# Patient Record
Sex: FEMALE | Race: WHITE | NOT HISPANIC OR LATINO | Employment: OTHER | URBAN - METROPOLITAN AREA
[De-identification: names, ages, dates, MRNs, and addresses within clinical notes are randomized per-mention and may not be internally consistent; named-entity substitution may affect disease eponyms.]

---

## 2017-06-05 ENCOUNTER — APPOINTMENT (OUTPATIENT)
Dept: PREADMISSION TESTING | Facility: HOSPITAL | Age: 67
End: 2017-06-05

## 2017-06-05 VITALS — BODY MASS INDEX: 35.32 KG/M2 | HEIGHT: 68 IN | WEIGHT: 233.03 LBS

## 2017-06-05 LAB
ANION GAP SERPL CALCULATED.3IONS-SCNC: 6 MMOL/L (ref 3–11)
BUN BLD-MCNC: 20 MG/DL (ref 9–23)
BUN/CREAT SERPL: 25 (ref 7–25)
CALCIUM SPEC-SCNC: 9.5 MG/DL (ref 8.7–10.4)
CHLORIDE SERPL-SCNC: 108 MMOL/L (ref 99–109)
CO2 SERPL-SCNC: 30 MMOL/L (ref 20–31)
CREAT BLD-MCNC: 0.8 MG/DL (ref 0.6–1.3)
DEPRECATED RDW RBC AUTO: 45.6 FL (ref 37–54)
ERYTHROCYTE [DISTWIDTH] IN BLOOD BY AUTOMATED COUNT: 13.6 % (ref 11.3–14.5)
GFR SERPL CREATININE-BSD FRML MDRD: 72 ML/MIN/1.73
GLUCOSE BLD-MCNC: 126 MG/DL (ref 70–100)
HCT VFR BLD AUTO: 44.1 % (ref 34.5–44)
HGB BLD-MCNC: 14 G/DL (ref 11.5–15.5)
MCH RBC QN AUTO: 28.9 PG (ref 27–31)
MCHC RBC AUTO-ENTMCNC: 31.7 G/DL (ref 32–36)
MCV RBC AUTO: 91.1 FL (ref 80–99)
PLATELET # BLD AUTO: 204 10*3/MM3 (ref 150–450)
PMV BLD AUTO: 10.2 FL (ref 6–12)
POTASSIUM BLD-SCNC: 4.3 MMOL/L (ref 3.5–5.5)
RBC # BLD AUTO: 4.84 10*6/MM3 (ref 3.89–5.14)
SODIUM BLD-SCNC: 144 MMOL/L (ref 132–146)
WBC NRBC COR # BLD: 4.61 10*3/MM3 (ref 3.5–10.8)

## 2017-06-05 PROCEDURE — 93010 ELECTROCARDIOGRAM REPORT: CPT | Performed by: INTERNAL MEDICINE

## 2017-06-05 PROCEDURE — 93005 ELECTROCARDIOGRAM TRACING: CPT

## 2017-06-05 PROCEDURE — 80048 BASIC METABOLIC PNL TOTAL CA: CPT | Performed by: SURGERY

## 2017-06-05 PROCEDURE — 36415 COLL VENOUS BLD VENIPUNCTURE: CPT

## 2017-06-05 PROCEDURE — 85027 COMPLETE CBC AUTOMATED: CPT | Performed by: SURGERY

## 2017-06-05 NOTE — DISCHARGE INSTRUCTIONS
The following information and instructions were given:    NPO after MN except sips of water with routine prescribed medication (except blood thinner, diabetes, or weight reducing medication) unless otherwise instructed by your physician.  Do not eat, drink, smoke or chew gum after MN the night before surgery. This also includes no mints.    DO NOT shave, wear makeup or dark nail polish.    Remove all jewelry (advised to go to jeweler if unable to remove).    Leave anything you consider valuable at home.    Leave your suitcase in the car until after your surgery.    Bring the following with you (if applicable)   -picture ID and insurance cards   -Co-pay/deductible required by insurance   -Medications in the original bottles (not a list) including all over-the-counter  medications if not brought to PAT   -Copy of advance directive, living will or power of  documents if not  brought to PAT   -CPAP or BIPAP mask and tubing (do not bring machine)   -Skin prep instructions sheet   -PAT Pass   Education booklet, brochure, handout or binder given to patient.    Pain Control After Surgery handout given to patient.    Respirex use (handout given to patient) and pneumonia prevention.    Signs and Symptoms of infection.    DVT Prevention stressing the importance of ambulation.    Patient to apply Chlorhexadine wipes to surgical area (as instructed) the night before procedure and the AM of procedure.      Hernia book given

## 2017-06-06 ENCOUNTER — HOSPITAL ENCOUNTER (OUTPATIENT)
Facility: HOSPITAL | Age: 67
Discharge: HOME | End: 2017-06-07
Attending: SURGERY | Admitting: SURGERY

## 2017-06-06 ENCOUNTER — ANESTHESIA (OUTPATIENT)
Dept: PERIOP | Facility: HOSPITAL | Age: 67
End: 2017-06-06

## 2017-06-06 ENCOUNTER — ANESTHESIA EVENT (OUTPATIENT)
Dept: PERIOP | Facility: HOSPITAL | Age: 67
End: 2017-06-06

## 2017-06-06 LAB
GLUCOSE BLDC GLUCOMTR-MCNC: 134 MG/DL (ref 70–130)
GLUCOSE BLDC GLUCOMTR-MCNC: 136 MG/DL (ref 70–130)

## 2017-06-06 PROCEDURE — 25010000002 HYDROMORPHONE PER 4 MG: Performed by: NURSE ANESTHETIST, CERTIFIED REGISTERED

## 2017-06-06 PROCEDURE — 25010000002 FENTANYL CITRATE (PF) 100 MCG/2ML SOLUTION: Performed by: NURSE ANESTHETIST, CERTIFIED REGISTERED

## 2017-06-06 PROCEDURE — C1781 MESH (IMPLANTABLE): HCPCS | Performed by: SURGERY

## 2017-06-06 PROCEDURE — 63710000001 NIFEDIPINE 10 MG CAPSULE: Performed by: SURGERY

## 2017-06-06 PROCEDURE — A9270 NON-COVERED ITEM OR SERVICE: HCPCS | Performed by: SURGERY

## 2017-06-06 PROCEDURE — 25010000003 CEFAZOLIN IN DEXTROSE 2-4 GM/100ML-% SOLUTION: Performed by: SURGERY

## 2017-06-06 PROCEDURE — 25010000002 HYDROMORPHONE PER 4 MG: Performed by: SURGERY

## 2017-06-06 PROCEDURE — 82962 GLUCOSE BLOOD TEST: CPT

## 2017-06-06 PROCEDURE — 25010000002 HYDRALAZINE PER 20 MG: Performed by: NURSE ANESTHETIST, CERTIFIED REGISTERED

## 2017-06-06 PROCEDURE — 63710000001 HYDROCODONE-ACETAMINOPHEN 7.5-325 MG TABLET: Performed by: SURGERY

## 2017-06-06 PROCEDURE — 25010000002 ONDANSETRON PER 1 MG: Performed by: NURSE ANESTHETIST, CERTIFIED REGISTERED

## 2017-06-06 PROCEDURE — 25010000002 NEOSTIGMINE 10 MG/10ML SOLUTION: Performed by: NURSE ANESTHETIST, CERTIFIED REGISTERED

## 2017-06-06 PROCEDURE — 25010000002 DEXAMETHASONE PER 1 MG: Performed by: NURSE ANESTHETIST, CERTIFIED REGISTERED

## 2017-06-06 DEVICE — VENTRALIGHT ST MESH WITH ECHO PS POSITONING SYSTEM
Type: IMPLANTABLE DEVICE | Site: UMBILICAL | Status: FUNCTIONAL
Brand: VENTRALIGHT ST MESH WITH ECHO PS POSITONING SYSTEM

## 2017-06-06 RX ORDER — ALBUTEROL SULFATE 90 UG/1
AEROSOL, METERED RESPIRATORY (INHALATION) AS NEEDED
Status: DISCONTINUED | OUTPATIENT
Start: 2017-06-06 | End: 2017-06-06 | Stop reason: SURG

## 2017-06-06 RX ORDER — SODIUM CHLORIDE 9 MG/ML
50 INJECTION, SOLUTION INTRAVENOUS CONTINUOUS
Status: DISCONTINUED | OUTPATIENT
Start: 2017-06-06 | End: 2017-06-07 | Stop reason: HOSPADM

## 2017-06-06 RX ORDER — FAMOTIDINE 20 MG/1
20 TABLET, FILM COATED ORAL ONCE
Status: DISCONTINUED | OUTPATIENT
Start: 2017-06-06 | End: 2017-06-06 | Stop reason: HOSPADM

## 2017-06-06 RX ORDER — LIDOCAINE HYDROCHLORIDE 10 MG/ML
0.5 INJECTION, SOLUTION EPIDURAL; INFILTRATION; INTRACAUDAL; PERINEURAL ONCE AS NEEDED
Status: DISCONTINUED | OUTPATIENT
Start: 2017-06-06 | End: 2017-06-06 | Stop reason: HOSPADM

## 2017-06-06 RX ORDER — PROMETHAZINE HYDROCHLORIDE 25 MG/ML
6.25 INJECTION, SOLUTION INTRAMUSCULAR; INTRAVENOUS ONCE AS NEEDED
Status: DISCONTINUED | OUTPATIENT
Start: 2017-06-06 | End: 2017-06-06 | Stop reason: HOSPADM

## 2017-06-06 RX ORDER — HYDRALAZINE HYDROCHLORIDE 20 MG/ML
5 INJECTION INTRAMUSCULAR; INTRAVENOUS
Status: DISCONTINUED | OUTPATIENT
Start: 2017-06-06 | End: 2017-06-06 | Stop reason: HOSPADM

## 2017-06-06 RX ORDER — GLYCOPYRROLATE 0.2 MG/ML
INJECTION INTRAMUSCULAR; INTRAVENOUS AS NEEDED
Status: DISCONTINUED | OUTPATIENT
Start: 2017-06-06 | End: 2017-06-06 | Stop reason: SURG

## 2017-06-06 RX ORDER — SODIUM CHLORIDE 0.9 % (FLUSH) 0.9 %
1-10 SYRINGE (ML) INJECTION AS NEEDED
Status: DISCONTINUED | OUTPATIENT
Start: 2017-06-06 | End: 2017-06-06 | Stop reason: HOSPADM

## 2017-06-06 RX ORDER — FENTANYL CITRATE 50 UG/ML
INJECTION, SOLUTION INTRAMUSCULAR; INTRAVENOUS AS NEEDED
Status: DISCONTINUED | OUTPATIENT
Start: 2017-06-06 | End: 2017-06-06 | Stop reason: SURG

## 2017-06-06 RX ORDER — HYDROCODONE BITARTRATE AND ACETAMINOPHEN 7.5; 325 MG/1; MG/1
1 TABLET ORAL EVERY 6 HOURS PRN
Status: DISCONTINUED | OUTPATIENT
Start: 2017-06-06 | End: 2017-06-07 | Stop reason: HOSPADM

## 2017-06-06 RX ORDER — CEFAZOLIN SODIUM 2 G/100ML
2 INJECTION, SOLUTION INTRAVENOUS ONCE
Status: COMPLETED | OUTPATIENT
Start: 2017-06-06 | End: 2017-06-06

## 2017-06-06 RX ORDER — ONDANSETRON 2 MG/ML
4 INJECTION INTRAMUSCULAR; INTRAVENOUS ONCE AS NEEDED
Status: COMPLETED | OUTPATIENT
Start: 2017-06-06 | End: 2017-06-06

## 2017-06-06 RX ORDER — DEXAMETHASONE SODIUM PHOSPHATE 4 MG/ML
INJECTION, SOLUTION INTRA-ARTICULAR; INTRALESIONAL; INTRAMUSCULAR; INTRAVENOUS; SOFT TISSUE AS NEEDED
Status: DISCONTINUED | OUTPATIENT
Start: 2017-06-06 | End: 2017-06-06 | Stop reason: SURG

## 2017-06-06 RX ORDER — LIDOCAINE HYDROCHLORIDE 10 MG/ML
0.5 INJECTION, SOLUTION EPIDURAL; INFILTRATION; INTRACAUDAL; PERINEURAL ONCE AS NEEDED
Status: COMPLETED | OUTPATIENT
Start: 2017-06-06 | End: 2017-06-06

## 2017-06-06 RX ORDER — PROMETHAZINE HYDROCHLORIDE 25 MG/1
25 TABLET ORAL ONCE AS NEEDED
Status: DISCONTINUED | OUTPATIENT
Start: 2017-06-06 | End: 2017-06-06 | Stop reason: HOSPADM

## 2017-06-06 RX ORDER — HYDROMORPHONE HYDROCHLORIDE 1 MG/ML
0.5 INJECTION, SOLUTION INTRAMUSCULAR; INTRAVENOUS; SUBCUTANEOUS
Status: DISCONTINUED | OUTPATIENT
Start: 2017-06-06 | End: 2017-06-06 | Stop reason: HOSPADM

## 2017-06-06 RX ORDER — NIFEDIPINE 10 MG/1
10 CAPSULE ORAL EVERY 8 HOURS SCHEDULED
Status: DISCONTINUED | OUTPATIENT
Start: 2017-06-06 | End: 2017-06-07

## 2017-06-06 RX ORDER — FAMOTIDINE 20 MG/1
20 TABLET, FILM COATED ORAL ONCE
Status: COMPLETED | OUTPATIENT
Start: 2017-06-06 | End: 2017-06-06

## 2017-06-06 RX ORDER — OXYCODONE HYDROCHLORIDE AND ACETAMINOPHEN 5; 325 MG/1; MG/1
1 TABLET ORAL ONCE AS NEEDED
Status: COMPLETED | OUTPATIENT
Start: 2017-06-06 | End: 2017-06-06

## 2017-06-06 RX ORDER — FENTANYL CITRATE 50 UG/ML
50 INJECTION, SOLUTION INTRAMUSCULAR; INTRAVENOUS
Status: DISCONTINUED | OUTPATIENT
Start: 2017-06-06 | End: 2017-06-06 | Stop reason: HOSPADM

## 2017-06-06 RX ORDER — FAMOTIDINE 10 MG/ML
20 INJECTION, SOLUTION INTRAVENOUS ONCE
Status: DISCONTINUED | OUTPATIENT
Start: 2017-06-06 | End: 2017-06-06 | Stop reason: HOSPADM

## 2017-06-06 RX ORDER — MAGNESIUM HYDROXIDE 1200 MG/15ML
LIQUID ORAL AS NEEDED
Status: DISCONTINUED | OUTPATIENT
Start: 2017-06-06 | End: 2017-06-06 | Stop reason: HOSPADM

## 2017-06-06 RX ORDER — SODIUM CHLORIDE, SODIUM LACTATE, POTASSIUM CHLORIDE, CALCIUM CHLORIDE 600; 310; 30; 20 MG/100ML; MG/100ML; MG/100ML; MG/100ML
9 INJECTION, SOLUTION INTRAVENOUS CONTINUOUS
Status: DISCONTINUED | OUTPATIENT
Start: 2017-06-06 | End: 2017-06-07

## 2017-06-06 RX ORDER — CEFAZOLIN SODIUM 1 G/3ML
1 INJECTION, POWDER, FOR SOLUTION INTRAMUSCULAR; INTRAVENOUS EVERY 8 HOURS
Status: DISCONTINUED | OUTPATIENT
Start: 2017-06-06 | End: 2017-06-06

## 2017-06-06 RX ORDER — CEFAZOLIN SODIUM 1 G/3ML
1 INJECTION, POWDER, FOR SOLUTION INTRAMUSCULAR; INTRAVENOUS EVERY 8 HOURS
Status: DISCONTINUED | OUTPATIENT
Start: 2017-06-06 | End: 2017-06-07 | Stop reason: HOSPADM

## 2017-06-06 RX ORDER — ONDANSETRON 2 MG/ML
4 INJECTION INTRAMUSCULAR; INTRAVENOUS EVERY 6 HOURS PRN
Status: DISCONTINUED | OUTPATIENT
Start: 2017-06-06 | End: 2017-06-07 | Stop reason: HOSPADM

## 2017-06-06 RX ORDER — SODIUM CHLORIDE 9 MG/ML
INJECTION, SOLUTION INTRAVENOUS AS NEEDED
Status: DISCONTINUED | OUTPATIENT
Start: 2017-06-06 | End: 2017-06-06 | Stop reason: HOSPADM

## 2017-06-06 RX ORDER — HYDROMORPHONE HYDROCHLORIDE 1 MG/ML
0.5 INJECTION, SOLUTION INTRAMUSCULAR; INTRAVENOUS; SUBCUTANEOUS EVERY 4 HOURS PRN
Status: DISCONTINUED | OUTPATIENT
Start: 2017-06-06 | End: 2017-06-07 | Stop reason: HOSPADM

## 2017-06-06 RX ORDER — PROMETHAZINE HYDROCHLORIDE 25 MG/1
25 SUPPOSITORY RECTAL ONCE AS NEEDED
Status: DISCONTINUED | OUTPATIENT
Start: 2017-06-06 | End: 2017-06-06 | Stop reason: HOSPADM

## 2017-06-06 RX ORDER — HYDROCODONE BITARTRATE AND ACETAMINOPHEN 5; 325 MG/1; MG/1
1-2 TABLET ORAL EVERY 6 HOURS PRN
Qty: 30 TABLET | Refills: 0 | Status: SHIPPED | OUTPATIENT
Start: 2017-06-06 | End: 2017-06-14

## 2017-06-06 RX ORDER — FAMOTIDINE 10 MG/ML
20 INJECTION, SOLUTION INTRAVENOUS ONCE
Status: CANCELLED | OUTPATIENT
Start: 2017-06-06 | End: 2017-06-06

## 2017-06-06 RX ORDER — HYDRALAZINE HYDROCHLORIDE 20 MG/ML
INJECTION INTRAMUSCULAR; INTRAVENOUS AS NEEDED
Status: DISCONTINUED | OUTPATIENT
Start: 2017-06-06 | End: 2017-06-06 | Stop reason: SURG

## 2017-06-06 RX ORDER — MEPERIDINE HYDROCHLORIDE 25 MG/ML
12.5 INJECTION INTRAMUSCULAR; INTRAVENOUS; SUBCUTANEOUS
Status: DISCONTINUED | OUTPATIENT
Start: 2017-06-06 | End: 2017-06-06 | Stop reason: HOSPADM

## 2017-06-06 RX ORDER — NEOSTIGMINE METHYLSULFATE 1 MG/ML
INJECTION, SOLUTION INTRAVENOUS AS NEEDED
Status: DISCONTINUED | OUTPATIENT
Start: 2017-06-06 | End: 2017-06-06 | Stop reason: SURG

## 2017-06-06 RX ORDER — BUPIVACAINE HYDROCHLORIDE AND EPINEPHRINE 5; 5 MG/ML; UG/ML
INJECTION, SOLUTION PERINEURAL AS NEEDED
Status: DISCONTINUED | OUTPATIENT
Start: 2017-06-06 | End: 2017-06-06 | Stop reason: HOSPADM

## 2017-06-06 RX ORDER — LABETALOL HYDROCHLORIDE 5 MG/ML
5 INJECTION, SOLUTION INTRAVENOUS
Status: DISCONTINUED | OUTPATIENT
Start: 2017-06-06 | End: 2017-06-06 | Stop reason: HOSPADM

## 2017-06-06 RX ADMIN — EPHEDRINE SULFATE 10 MG: 50 INJECTION INTRAMUSCULAR; INTRAVENOUS; SUBCUTANEOUS at 13:37

## 2017-06-06 RX ADMIN — SODIUM CHLORIDE, POTASSIUM CHLORIDE, SODIUM LACTATE AND CALCIUM CHLORIDE 9 ML/HR: 600; 310; 30; 20 INJECTION, SOLUTION INTRAVENOUS at 14:51

## 2017-06-06 RX ADMIN — DEXAMETHASONE SODIUM PHOSPHATE 8 MG: 4 INJECTION, SOLUTION INTRAMUSCULAR; INTRAVENOUS at 14:02

## 2017-06-06 RX ADMIN — NEOSTIGMINE METHYLSULFATE 3 MG: 1 INJECTION, SOLUTION INTRAVENOUS at 14:30

## 2017-06-06 RX ADMIN — ROBINUL 0.2 MG: 0.2 INJECTION INTRAMUSCULAR; INTRAVENOUS at 13:38

## 2017-06-06 RX ADMIN — HYDROMORPHONE HYDROCHLORIDE 0.5 MG: 1 INJECTION, SOLUTION INTRAMUSCULAR; INTRAVENOUS; SUBCUTANEOUS at 22:38

## 2017-06-06 RX ADMIN — EPHEDRINE SULFATE 10 MG: 50 INJECTION INTRAMUSCULAR; INTRAVENOUS; SUBCUTANEOUS at 13:34

## 2017-06-06 RX ADMIN — LIDOCAINE HYDROCHLORIDE 0.5 ML: 10 INJECTION, SOLUTION EPIDURAL; INFILTRATION; INTRACAUDAL; PERINEURAL at 11:10

## 2017-06-06 RX ADMIN — SODIUM CHLORIDE, POTASSIUM CHLORIDE, SODIUM LACTATE AND CALCIUM CHLORIDE 9 ML/HR: 600; 310; 30; 20 INJECTION, SOLUTION INTRAVENOUS at 11:10

## 2017-06-06 RX ADMIN — FENTANYL CITRATE 50 MCG: 50 INJECTION INTRAMUSCULAR; INTRAVENOUS at 16:23

## 2017-06-06 RX ADMIN — ALBUTEROL SULFATE 4 PUFF: 90 AEROSOL, METERED RESPIRATORY (INHALATION) at 13:34

## 2017-06-06 RX ADMIN — NIFEDIPINE 10 MG: 10 CAPSULE, LIQUID FILLED ORAL at 21:54

## 2017-06-06 RX ADMIN — HYDROMORPHONE HYDROCHLORIDE 0.5 MG: 1 INJECTION, SOLUTION INTRAMUSCULAR; INTRAVENOUS; SUBCUTANEOUS at 15:40

## 2017-06-06 RX ADMIN — HYDRALAZINE HYDROCHLORIDE 5 MG: 20 INJECTION INTRAMUSCULAR; INTRAVENOUS at 15:56

## 2017-06-06 RX ADMIN — ROBINUL 0.4 MG: 0.2 INJECTION INTRAMUSCULAR; INTRAVENOUS at 14:30

## 2017-06-06 RX ADMIN — HYDRALAZINE HYDROCHLORIDE 5 MG: 20 INJECTION INTRAMUSCULAR; INTRAVENOUS at 16:19

## 2017-06-06 RX ADMIN — HYDRALAZINE HYDROCHLORIDE 5 MG: 20 INJECTION INTRAMUSCULAR; INTRAVENOUS at 14:08

## 2017-06-06 RX ADMIN — HYDROCODONE BITARTRATE AND ACETAMINOPHEN 1 TABLET: 7.5; 325 TABLET ORAL at 19:12

## 2017-06-06 RX ADMIN — HYDROMORPHONE HYDROCHLORIDE 0.5 MG: 1 INJECTION, SOLUTION INTRAMUSCULAR; INTRAVENOUS; SUBCUTANEOUS at 15:05

## 2017-06-06 RX ADMIN — SODIUM CHLORIDE 50 ML/HR: 9 INJECTION, SOLUTION INTRAVENOUS at 16:52

## 2017-06-06 RX ADMIN — CEFAZOLIN SODIUM 2 G: 2 INJECTION, SOLUTION INTRAVENOUS at 13:16

## 2017-06-06 RX ADMIN — ONDANSETRON 4 MG: 2 INJECTION INTRAMUSCULAR; INTRAVENOUS at 15:39

## 2017-06-06 RX ADMIN — OXYCODONE AND ACETAMINOPHEN 1 TABLET: 5; 325 TABLET ORAL at 16:06

## 2017-06-06 RX ADMIN — HYDROMORPHONE HYDROCHLORIDE 0.5 MG: 1 INJECTION, SOLUTION INTRAMUSCULAR; INTRAVENOUS; SUBCUTANEOUS at 14:50

## 2017-06-06 RX ADMIN — FENTANYL CITRATE 50 MCG: 50 INJECTION, SOLUTION INTRAMUSCULAR; INTRAVENOUS at 14:04

## 2017-06-06 RX ADMIN — FAMOTIDINE 20 MG: 20 TABLET ORAL at 11:10

## 2017-06-06 NOTE — ANESTHESIA PREPROCEDURE EVALUATION
Anesthesia Evaluation     Patient summary reviewed and Nursing notes reviewed          Airway   Mallampati: II  TM distance: >3 FB  Neck ROM: full  no difficulty expected  Dental - normal exam     Pulmonary - normal exam   (+) sleep apnea,   Cardiovascular - normal exam    (+) hypertension,     ROS comment: 9/16 - stress test - nl EF, neg for reversible ischemia    Neuro/Psych- negative ROS  GI/Hepatic/Renal/Endo    (+) morbid obesity, GERD, renal disease,     Musculoskeletal     Abdominal  - normal exam    Bowel sounds: normal.   Substance History - negative use     OB/GYN negative ob/gyn ROS         Other   (+) arthritis                                   Anesthesia Plan    ASA 3     general     intravenous induction   Anesthetic plan and risks discussed with patient.    Plan discussed with CRNA.

## 2017-06-06 NOTE — OP NOTE
DATE OF PROCEDURE:  06/06/2017    PREOPERATIVE DIAGNOSIS: Incisional hernia.     POSTOPERATIVE DIAGNOSIS: Incisional hernia.    PROCEDURE PERFORMED: Laparoscopic repair of incisional hernia with Bard Ventralight ST Mesh.     SURGEON: Conrad Hernandez MD     ANESTHESIA: General endotracheal.     FINDINGS: The patient had a moderate-sized midline fascial defect. There was incarcerated omentum and small bowel present. The repair was performed with a 6 inch circular Bard Ventralight ST Mesh patch.     DESCRIPTION OF PROCEDURE: The patient was brought to the operating room where general endotracheal anesthesia was induced. She was sterilely prepped and draped. Preoperative antibiotics were in place. Sequential compression boots were used for DVT prophylaxis. Timeout was performed per protocol. Then, 0.5% Marcaine with epinephrine was placed in each trocar site for postoperative analgesia. A small skin incision was made in the left upper quadrant, then a 5 mm Optiview was used to enter the peritoneal cavity under direct vision of the laparoscope. Two additional ports were placed in a standard fashion on the left lateral abdominal wall under direct vision with the laparoscope. Two of the ports were 11 mm ports and one was a 5 mm port. We then began our dissection. As noted, the patient had extensive adhesions in the midline of the abdomen from her prior surgery. These were carefully taken down with hook cautery as well as sharp dissection. In the region of the umbilicus, there was a large fascial defect measuring 5-7 cm in size. There was incarcerated omentum and bowel, which was all reduced. The entirety of the anterior abdominal wall was carefully cleared. After defining the fascial defect, we measured out its size and then the mesh was chosen for the repair. It was passed into the peritoneal cavity and unrolled. A small suture passer was used to pull the mesh up to the anterior abdominal wall and the Echo Positioning  System was deployed. A secure strap absorbable tacking device was then used to secure the mesh using a double ring technique. There was excellent underlay coverage of the fascial defect with good apposition of the mesh to the anterior abdominal wall. After assuring hemostasis, we proceeded to release the CO2 from the peritoneal cavity and our trocars were removed. Trocar fascia sites were closed with 0 Vicryl stitches. The skin incisions were closed with 4-0 Monocryl and skin adhesive. Sterile compressive dressings were placed followed by an abdominal binder.     The patient tolerated the procedure well. There were no immediate complications. Sponge and needle counts were correct. She was taken to recovery in stable condition.      MD PRINCE Nelson/harish  DD: 06/06/2017 14:54:51  DT: 06/06/2017 17:02:43  Voice Rec. ID #[id number]  Voice Original ID #[id number]  Doc ID #[id number]  Rev. #0  cc:

## 2017-06-06 NOTE — PLAN OF CARE
Problem: Patient Care Overview (Adult)  Goal: Plan of Care Review  Outcome: Ongoing (interventions implemented as appropriate)    06/06/17 5921   Coping/Psychosocial Response Interventions   Plan Of Care Reviewed With patient;spouse

## 2017-06-06 NOTE — ANESTHESIA POSTPROCEDURE EVALUATION
Patient: Sara Esparza    Procedure Summary     Date Anesthesia Start Anesthesia Stop Room / Location    06/06/17 1316  [facility] / [facility]       Procedure Diagnosis Surgeon Provider    INCISIONAL HERNIA REPAIR LAPAROSCOPIC (N/A Abdomen) No diagnosis on file. MD Andrea Sal MD          Anesthesia Type: general  Last vitals  /80 (06/06/17 1452)    Temp 97.7 °F (36.5 °C) (06/06/17 1452)    Pulse 63 (06/06/17 1452)   Resp 24 (06/06/17 1452)    SpO2 91 % (06/06/17 1452)      Post Anesthesia Care and Evaluation    Patient location during evaluation: PACU  Patient participation: complete - patient participated  Level of consciousness: awake and alert  Pain score: 7  Pain management: adequate  Airway patency: patent  Anesthetic complications: No anesthetic complications  PONV Status: none  Cardiovascular status: hemodynamically stable and acceptable  Respiratory status: nonlabored ventilation, acceptable and nasal cannula  Hydration status: acceptable    Comments: Pt awake, mcgee, following commands, vss report given

## 2017-06-06 NOTE — ANESTHESIA PROCEDURE NOTES
Airway  Urgency: elective    Airway not difficult    General Information and Staff    Patient location during procedure: OR  CRNA: MEREDITH MALHOTRA    Indications and Patient Condition  Indications for airway management: airway protection    Preoxygenated: yes  MILS not maintained throughout  Mask difficulty assessment: 1 - vent by mask    Final Airway Details  Final airway type: endotracheal airway      Successful airway: ETT  Cuffed: yes   Successful intubation technique: direct laryngoscopy  Endotracheal tube insertion site: oral  Blade: Wally  Blade size: #3  ETT size: 7.0 mm  Cormack-Lehane Classification: grade I - full view of glottis  Placement verified by: chest auscultation and capnometry   Measured from: lips  ETT to lips (cm): 20  Number of attempts at approach: 1    Additional Comments  Negative epigastric sounds, Breath sound equal bilaterally with symmetric chest rise and fall, atraumatic intubation

## 2017-06-06 NOTE — BRIEF OP NOTE
VENTRAL/INCISIONAL HERNIA REPAIR LAPAROSCOPIC  Procedure Note    Sara Esparza  6/6/2017    Pre-op Diagnosis:   * No pre-op diagnosis entered *Incisional hernia    Post-op Diagnosis:     * No Diagnosis Codes entered *  [facility]  Procedure/CPT® Codes:      Procedure(s):  INCISIONAL HERNIA REPAIR LAPAROSCOPIC    Surgeon(s):  Conrad Hernandez MD    Anesthesia: General with Block    Staff:   Circulator: Maria Teresa Vela RN  Scrub Person: Darin Moncada RN  Assistant: MAG Epperson    Estimated Blood Loss: *No blood loss documented*  Urine Voided: * No values recorded between 6/6/2017  1:16 PM and 6/6/2017  2:42 PM *    Specimens:                * No specimens in log *      Drains:           Findings: Incarcerated incisional hernia    Complications: none      Conrad Hernandez MD     Date: 6/6/2017  Time: 2:44 PM

## 2017-06-06 NOTE — INTERVAL H&P NOTE
H&P reviewed. The patient was examined and there are no changes to the H&P.     Temp:  [97.3 °F (36.3 °C)] 97.3 °F (36.3 °C)  Heart Rate:  [58] 58  Resp:  [20] 20  BP: (190)/(90) 190/90     Heart: RRR  Lungs: CTAB    Immunizations:    Tetanus: Unknown     Influenza: 2016     Pneumo: UTD    Labs were reviewed.     EKG: NSR    Plan: Incisional Hernia Repair Lap

## 2017-06-07 VITALS
HEIGHT: 68 IN | RESPIRATION RATE: 16 BRPM | WEIGHT: 230 LBS | TEMPERATURE: 98 F | SYSTOLIC BLOOD PRESSURE: 115 MMHG | BODY MASS INDEX: 34.86 KG/M2 | HEART RATE: 95 BPM | DIASTOLIC BLOOD PRESSURE: 79 MMHG | OXYGEN SATURATION: 92 %

## 2017-06-07 PROBLEM — K43.2 POSTOPERATIVE ABDOMINAL HERNIA: Status: ACTIVE | Noted: 2017-06-07

## 2017-06-07 LAB
GLUCOSE BLDC GLUCOMTR-MCNC: 156 MG/DL (ref 70–130)
GLUCOSE BLDC GLUCOMTR-MCNC: 253 MG/DL (ref 70–130)

## 2017-06-07 PROCEDURE — 82962 GLUCOSE BLOOD TEST: CPT

## 2017-06-07 PROCEDURE — G0378 HOSPITAL OBSERVATION PER HR: HCPCS

## 2017-06-07 PROCEDURE — 63710000001 HYDROCODONE-ACETAMINOPHEN 7.5-325 MG TABLET: Performed by: SURGERY

## 2017-06-07 PROCEDURE — A9270 NON-COVERED ITEM OR SERVICE: HCPCS | Performed by: SURGERY

## 2017-06-07 PROCEDURE — 25010000003 CEFAZOLIN PER 500 MG: Performed by: SURGERY

## 2017-06-07 RX ADMIN — CEFAZOLIN 1 G: 330 INJECTION, POWDER, FOR SOLUTION INTRAMUSCULAR; INTRAVENOUS at 01:38

## 2017-06-07 RX ADMIN — HYDROCODONE BITARTRATE AND ACETAMINOPHEN 1 TABLET: 7.5; 325 TABLET ORAL at 03:39

## 2017-06-07 NOTE — PLAN OF CARE
Problem: Perioperative Period (Adult)  Goal: Signs and Symptoms of Listed Potential Problems Will be Absent or Manageable (Perioperative Period)  Outcome: Ongoing (interventions implemented as appropriate)    06/07/17 0898   Perioperative Period   Problems Assessed (Perioperative Period) all   Problems Present (Perioperative Period) pain

## 2017-06-07 NOTE — DISCHARGE SUMMARY
Pt admitted overnight for observtion.  Issues with HTN and DM.  She is doing ]well today.  Ready for d/c. Instructions given

## 2017-06-07 NOTE — NURSING NOTE
Spoke with Dr Hernandez for meds for elevated BP and headache, meds ordered. Advised that patient has not been seen by consulting MD at this time.

## 2017-06-07 NOTE — PLAN OF CARE
Problem: Perioperative Period (Adult)  Goal: Signs and Symptoms of Listed Potential Problems Will be Absent or Manageable (Perioperative Period)  Outcome: Ongoing (interventions implemented as appropriate)    06/07/17 0545   Perioperative Period   Problems Assessed (Perioperative Period) pain;wound complications;infection   Problems Present (Perioperative Period) none

## 2017-06-07 NOTE — PROGRESS NOTES
Pt admitted overight for observation.  Issues with HTN and DM.  Hospitalists consulted.  Pt doing well today. Ready for d/c

## 2017-06-07 NOTE — PLAN OF CARE
Problem: Pain, Acute (Adult)  Goal: Identify Related Risk Factors and Signs and Symptoms  Outcome: Ongoing (interventions implemented as appropriate)    06/07/17 0545   Pain, Acute   Related Risk Factors (Acute Pain) patient perception;persistent pain;surgery   Signs and Symptoms (Acute Pain) BADLs/IADLs reluctance/inability to perform;sleep pattern alteration;verbalization of pain descriptors

## 2017-06-07 NOTE — NURSING NOTE
Spoke with Dr HOLLAND and alerted of patient consult, he had not been contacted for consult. New orders given for [facility] blood sugar sticks and stated he would see patient in AM

## 2017-11-14 ENCOUNTER — TRANSCRIBE ORDERS (OUTPATIENT)
Dept: PULMONOLOGY | Facility: HOSPITAL | Age: 67
End: 2017-11-14

## 2017-11-14 DIAGNOSIS — G47.61 PERIODIC LIMB MOVEMENT DISORDER: Primary | ICD-10-CM

## 2017-11-14 DIAGNOSIS — R29.818 SUSPECTED SLEEP APNEA: ICD-10-CM

## 2017-12-04 ENCOUNTER — HOSPITAL ENCOUNTER (OUTPATIENT)
Dept: SLEEP MEDICINE | Facility: HOSPITAL | Age: 67
Discharge: HOME | End: 2017-12-04
Admitting: INTERNAL MEDICINE

## 2017-12-04 VITALS
HEIGHT: 68 IN | WEIGHT: 237.38 LBS | BODY MASS INDEX: 35.98 KG/M2 | OXYGEN SATURATION: 96 % | DIASTOLIC BLOOD PRESSURE: 100 MMHG | HEART RATE: 76 BPM | SYSTOLIC BLOOD PRESSURE: 225 MMHG

## 2017-12-04 DIAGNOSIS — R29.818 SUSPECTED SLEEP APNEA: ICD-10-CM

## 2017-12-04 DIAGNOSIS — G47.61 PERIODIC LIMB MOVEMENT DISORDER: ICD-10-CM

## 2017-12-04 PROCEDURE — 95810 POLYSOM 6/> YRS 4/> PARAM: CPT

## 2020-06-26 ENCOUNTER — TRANSCRIBE ORDERS (OUTPATIENT)
Dept: ADMINISTRATIVE | Facility: HOSPITAL | Age: 70
End: 2020-06-26

## 2020-06-26 DIAGNOSIS — R07.9 CHEST PAIN, UNSPECIFIED TYPE: ICD-10-CM

## 2020-06-26 DIAGNOSIS — R07.9 CHEST PAIN, UNSPECIFIED TYPE: Primary | ICD-10-CM

## 2020-06-26 DIAGNOSIS — R60.0 LOCALIZED EDEMA: Primary | ICD-10-CM

## 2020-07-06 ENCOUNTER — HOSPITAL ENCOUNTER (EMERGENCY)
Facility: HOSPITAL | Age: 70
Discharge: HOME | End: 2020-07-06
Attending: EMERGENCY MEDICINE | Admitting: EMERGENCY MEDICINE

## 2020-07-06 ENCOUNTER — APPOINTMENT (OUTPATIENT)
Dept: GENERAL RADIOLOGY | Facility: HOSPITAL | Age: 70
End: 2020-07-06

## 2020-07-06 VITALS
TEMPERATURE: 99.1 F | HEART RATE: 82 BPM | DIASTOLIC BLOOD PRESSURE: 95 MMHG | SYSTOLIC BLOOD PRESSURE: 199 MMHG | RESPIRATION RATE: 18 BRPM | OXYGEN SATURATION: 95 %

## 2020-07-06 DIAGNOSIS — M10.071 ACUTE IDIOPATHIC GOUT INVOLVING TOE OF RIGHT FOOT: Primary | ICD-10-CM

## 2020-07-06 LAB
ALBUMIN SERPL-MCNC: 3.9 G/DL (ref 3.5–5.2)
ALBUMIN/GLOB SERPL: 1.1 G/DL
ALP SERPL-CCNC: 101 U/L (ref 39–117)
ALT SERPL W P-5'-P-CCNC: 21 U/L (ref 1–33)
ANION GAP SERPL CALCULATED.3IONS-SCNC: 10 MMOL/L (ref 5–15)
AST SERPL-CCNC: 28 U/L (ref 1–32)
BASOPHILS # BLD AUTO: 0.04 10*3/MM3 (ref 0–0.2)
BASOPHILS NFR BLD AUTO: 0.7 % (ref 0–1.5)
BILIRUB SERPL-MCNC: 0.5 MG/DL (ref 0–1.2)
BUN SERPL-MCNC: 30 MG/DL (ref 8–23)
BUN/CREAT SERPL: 18.2 (ref 7–25)
CALCIUM SPEC-SCNC: 9.3 MG/DL (ref 8.6–10.5)
CHLORIDE SERPL-SCNC: 101 MMOL/L (ref 98–107)
CO2 SERPL-SCNC: 30 MMOL/L (ref 22–29)
CREAT SERPL-MCNC: 1.65 MG/DL (ref 0.57–1)
CRP SERPL-MCNC: 1.46 MG/DL (ref 0–0.5)
DEPRECATED RDW RBC AUTO: 40.4 FL (ref 37–54)
EOSINOPHIL # BLD AUTO: 0.18 10*3/MM3 (ref 0–0.4)
EOSINOPHIL NFR BLD AUTO: 3.1 % (ref 0.3–6.2)
ERYTHROCYTE [DISTWIDTH] IN BLOOD BY AUTOMATED COUNT: 12.4 % (ref 12.3–15.4)
GFR SERPL CREATININE-BSD FRML MDRD: 31 ML/MIN/1.73
GLOBULIN UR ELPH-MCNC: 3.6 GM/DL
GLUCOSE SERPL-MCNC: 246 MG/DL (ref 65–99)
HCT VFR BLD AUTO: 41.5 % (ref 34–46.6)
HGB BLD-MCNC: 13.4 G/DL (ref 12–15.9)
IMM GRANULOCYTES # BLD AUTO: 0.01 10*3/MM3 (ref 0–0.05)
IMM GRANULOCYTES NFR BLD AUTO: 0.2 % (ref 0–0.5)
LYMPHOCYTES # BLD AUTO: 1.74 10*3/MM3 (ref 0.7–3.1)
LYMPHOCYTES NFR BLD AUTO: 30.2 % (ref 19.6–45.3)
MCH RBC QN AUTO: 28.8 PG (ref 26.6–33)
MCHC RBC AUTO-ENTMCNC: 32.3 G/DL (ref 31.5–35.7)
MCV RBC AUTO: 89.1 FL (ref 79–97)
MONOCYTES # BLD AUTO: 0.92 10*3/MM3 (ref 0.1–0.9)
MONOCYTES NFR BLD AUTO: 16 % (ref 5–12)
NEUTROPHILS NFR BLD AUTO: 2.87 10*3/MM3 (ref 1.7–7)
NEUTROPHILS NFR BLD AUTO: 49.8 % (ref 42.7–76)
NRBC BLD AUTO-RTO: 0 /100 WBC (ref 0–0.2)
PLATELET # BLD AUTO: 280 10*3/MM3 (ref 140–450)
PMV BLD AUTO: 10.2 FL (ref 6–12)
POTASSIUM SERPL-SCNC: 4.3 MMOL/L (ref 3.5–5.2)
PROT SERPL-MCNC: 7.5 G/DL (ref 6–8.5)
RBC # BLD AUTO: 4.66 10*6/MM3 (ref 3.77–5.28)
SODIUM SERPL-SCNC: 141 MMOL/L (ref 136–145)
URATE SERPL-MCNC: 12.3 MG/DL (ref 2.4–5.7)
WBC # BLD AUTO: 5.76 10*3/MM3 (ref 3.4–10.8)

## 2020-07-06 PROCEDURE — 86140 C-REACTIVE PROTEIN: CPT | Performed by: EMERGENCY MEDICINE

## 2020-07-06 PROCEDURE — 99283 EMERGENCY DEPT VISIT LOW MDM: CPT

## 2020-07-06 PROCEDURE — 84550 ASSAY OF BLOOD/URIC ACID: CPT | Performed by: EMERGENCY MEDICINE

## 2020-07-06 PROCEDURE — 85025 COMPLETE CBC W/AUTO DIFF WBC: CPT | Performed by: EMERGENCY MEDICINE

## 2020-07-06 PROCEDURE — 73630 X-RAY EXAM OF FOOT: CPT

## 2020-07-06 PROCEDURE — 80053 COMPREHEN METABOLIC PANEL: CPT | Performed by: EMERGENCY MEDICINE

## 2020-07-06 RX ORDER — PREDNISONE 20 MG/1
40 TABLET ORAL DAILY
Qty: 10 TABLET | Refills: 0 | OUTPATIENT
Start: 2020-07-06 | End: 2021-01-15

## 2020-07-06 NOTE — DISCHARGE INSTRUCTIONS
Steroids for gout are going to cause your sugar to go high.  Monitor your sugar closely and eat as few carbs as possible while taking steroids.

## 2020-07-07 NOTE — ED PROVIDER NOTES
Subjective   Pt was sent from [facility] for foot pain.  She has a 4-5 day history of gradual onset right 1st MTP redness, pain, swelling.  Starting to improve.  No fever, myalgias, vomiting.  She has never had gout before but her father had it a lot.            Review of Systems   Constitutional: Negative for fever.   Gastrointestinal: Negative for vomiting.   Musculoskeletal: Positive for joint swelling. Negative for myalgias.   Skin: Negative for wound.   All other systems reviewed and are negative.      Past Medical History:   Diagnosis Date   • Anxiety    • Arthritis    • Asthma     allergy induced   • Back pain    • Depression    • Diabetes mellitus (CMS/HCC)     dx 10 years ago- checks fsbs most days   • Diverticula of colon    • GERD (gastroesophageal reflux disease)    • Head ache    • Hypertension    • Sleep apnea     no longer needs cpap - approx. 10 years r/t weight loss       No Known Allergies    Past Surgical History:   Procedure Laterality Date   • BACK SURGERY      x 2   • CARDIAC CATHETERIZATION      no intervention   • CHOLECYSTECTOMY OPEN     • COLONOSCOPY      2013   • HYSTERECTOMY     • REPLACEMENT TOTAL KNEE BILATERAL Bilateral    • TONSILLECTOMY     • VENTRAL/INCISIONAL HERNIA REPAIR N/A 6/6/2017    Procedure: INCISIONAL HERNIA REPAIR LAPAROSCOPIC;  Surgeon: Conrad Hernandez MD;  Location: [facility];  Service:        No family history on file.    Social History     Socioeconomic History   • Marital status: Unknown     Spouse name: Not on file   • Number of children: Not on file   • Years of education: Not on file   • Highest education level: Not on file   Tobacco Use   • Smoking status: Never Smoker   • Smokeless tobacco: Never Used   Substance and Sexual Activity   • Alcohol use: No   • Drug use: No   • Sexual activity: Defer           Objective   Physical Exam   Constitutional: She is oriented to person, place, and time. She appears well-developed and well-nourished.   HENT:   Head: Normocephalic  and atraumatic.   Eyes: Conjunctivae are normal. No scleral icterus.   Neck: Neck supple.   Cardiovascular: Normal rate.   Pulmonary/Chest: Effort normal.   Musculoskeletal: Normal range of motion.   There is redness, swelling to the right 1st MTP.  There is minimal tenderness. Normal ROM.  No streaks.  Foot otherwise unremarkable.   Neurological: She is alert and oriented to person, place, and time.   Skin: Skin is warm and dry. No rash noted.   Psychiatric: She has a normal mood and affect. Her behavior is normal. Thought content normal.   Nursing note and vitals reviewed.      Procedures           ED Course         Very high uric acid.  CRP up a little. XR c/w gout.  Ambulatory and no wounds, septic arthritis is much less likely than acute gout.    Discussed treatment plan with pt.  Due to CKD and carvedilol use, colchicine is contraindicated.  NSAIDs are contraindicated by her GFR.  This leaves steroids which are discouraged due to her DM.  Unfortunately it's the only option available.  We discussed this, that it's the best of three bad choices and that she needs to limit carbs and monitor her sugar closely.                                  MDM  Number of Diagnoses or Management Options  Acute idiopathic gout involving toe of right foot:      Amount and/or Complexity of Data Reviewed  Clinical lab tests: reviewed and ordered  Tests in the radiology section of CPT®: ordered and reviewed  Independent visualization of images, tracings, or specimens: yes        Final diagnoses:   Acute idiopathic gout involving toe of right foot            Asad Cruz MD  07/06/20 2021

## 2020-07-17 ENCOUNTER — HOSPITAL ENCOUNTER (OUTPATIENT)
Dept: CARDIOLOGY | Facility: HOSPITAL | Age: 70
Discharge: HOME | End: 2020-07-17
Admitting: INTERNAL MEDICINE

## 2020-07-17 VITALS — WEIGHT: 221 LBS | HEIGHT: 67 IN | BODY MASS INDEX: 34.69 KG/M2

## 2020-07-17 DIAGNOSIS — R60.0 LOCALIZED EDEMA: ICD-10-CM

## 2020-07-17 LAB
BH CV ECHO MEAS - BSA(HAYCOCK): 2.2 M^2
BH CV ECHO MEAS - BSA: 2.1 M^2
BH CV ECHO MEAS - BZI_BMI: 33.6 KILOGRAMS/M^2
BH CV ECHO MEAS - BZI_METRIC_HEIGHT: 172.7 CM
BH CV ECHO MEAS - BZI_METRIC_WEIGHT: 100.2 KG
BH CV LOWER VASCULAR LEFT COMMON FEMORAL AUGMENT: NORMAL
BH CV LOWER VASCULAR LEFT COMMON FEMORAL COMPRESS: NORMAL
BH CV LOWER VASCULAR LEFT COMMON FEMORAL PHASIC: NORMAL
BH CV LOWER VASCULAR LEFT COMMON FEMORAL SPONT: NORMAL
BH CV LOWER VASCULAR LEFT DISTAL FEMORAL AUGMENT: NORMAL
BH CV LOWER VASCULAR LEFT DISTAL FEMORAL COMPRESS: NORMAL
BH CV LOWER VASCULAR LEFT DISTAL FEMORAL PHASIC: NORMAL
BH CV LOWER VASCULAR LEFT DISTAL FEMORAL SPONT: NORMAL
BH CV LOWER VASCULAR LEFT GASTRONEMIUS AUGMENT: NORMAL
BH CV LOWER VASCULAR LEFT GASTRONEMIUS COMPRESS: NORMAL
BH CV LOWER VASCULAR LEFT GASTRONEMIUS PHASIC: NORMAL
BH CV LOWER VASCULAR LEFT GASTRONEMIUS SPONT: NORMAL
BH CV LOWER VASCULAR LEFT GREATER SAPH AK AUGMENT: NORMAL
BH CV LOWER VASCULAR LEFT GREATER SAPH AK COMPRESS: NORMAL
BH CV LOWER VASCULAR LEFT GREATER SAPH AK PHASIC: NORMAL
BH CV LOWER VASCULAR LEFT GREATER SAPH AK SPONT: NORMAL
BH CV LOWER VASCULAR LEFT GREATER SAPH BK AUGMENT: NORMAL
BH CV LOWER VASCULAR LEFT GREATER SAPH BK COMPRESS: NORMAL
BH CV LOWER VASCULAR LEFT GREATER SAPH BK PHASIC: NORMAL
BH CV LOWER VASCULAR LEFT GREATER SAPH BK SPONT: NORMAL
BH CV LOWER VASCULAR LEFT LESSER SAPH AUGMENT: NORMAL
BH CV LOWER VASCULAR LEFT LESSER SAPH COMPRESS: NORMAL
BH CV LOWER VASCULAR LEFT LESSER SAPH PHASIC: NORMAL
BH CV LOWER VASCULAR LEFT LESSER SAPH SPONT: NORMAL
BH CV LOWER VASCULAR LEFT MID FEMORAL AUGMENT: NORMAL
BH CV LOWER VASCULAR LEFT MID FEMORAL COMPRESS: NORMAL
BH CV LOWER VASCULAR LEFT MID FEMORAL PHASIC: NORMAL
BH CV LOWER VASCULAR LEFT MID FEMORAL SPONT: NORMAL
BH CV LOWER VASCULAR LEFT PERONEAL AUGMENT: NORMAL
BH CV LOWER VASCULAR LEFT PERONEAL COMPRESS: NORMAL
BH CV LOWER VASCULAR LEFT PERONEAL PHASIC: NORMAL
BH CV LOWER VASCULAR LEFT PERONEAL SPONT: NORMAL
BH CV LOWER VASCULAR LEFT POPLITEAL AUGMENT: NORMAL
BH CV LOWER VASCULAR LEFT POPLITEAL COMPRESS: NORMAL
BH CV LOWER VASCULAR LEFT POPLITEAL PHASIC: NORMAL
BH CV LOWER VASCULAR LEFT POPLITEAL SPONT: NORMAL
BH CV LOWER VASCULAR LEFT POSTERIOR TIBIAL AUGMENT: NORMAL
BH CV LOWER VASCULAR LEFT POSTERIOR TIBIAL COMPRESS: NORMAL
BH CV LOWER VASCULAR LEFT POSTERIOR TIBIAL PHASIC: NORMAL
BH CV LOWER VASCULAR LEFT POSTERIOR TIBIAL SPONT: NORMAL
BH CV LOWER VASCULAR LEFT PROFUNDA FEMORAL AUGMENT: NORMAL
BH CV LOWER VASCULAR LEFT PROFUNDA FEMORAL COMPRESS: NORMAL
BH CV LOWER VASCULAR LEFT PROFUNDA FEMORAL PHASIC: NORMAL
BH CV LOWER VASCULAR LEFT PROFUNDA FEMORAL SPONT: NORMAL
BH CV LOWER VASCULAR LEFT PROXIMAL FEMORAL AUGMENT: NORMAL
BH CV LOWER VASCULAR LEFT PROXIMAL FEMORAL COMPRESS: NORMAL
BH CV LOWER VASCULAR LEFT PROXIMAL FEMORAL PHASIC: NORMAL
BH CV LOWER VASCULAR LEFT PROXIMAL FEMORAL SPONT: NORMAL
BH CV LOWER VASCULAR LEFT SAPHENOFEMORAL JUNCTION AUGMENT: NORMAL
BH CV LOWER VASCULAR LEFT SAPHENOFEMORAL JUNCTION COMPRESS: NORMAL
BH CV LOWER VASCULAR LEFT SAPHENOFEMORAL JUNCTION PHASIC: NORMAL
BH CV LOWER VASCULAR LEFT SAPHENOFEMORAL JUNCTION SPONT: NORMAL
BH CV LOWER VASCULAR LEFT SOLEAL AUGMENT: NORMAL
BH CV LOWER VASCULAR LEFT SOLEAL COMPRESS: NORMAL
BH CV LOWER VASCULAR LEFT SOLEAL PHASIC: NORMAL
BH CV LOWER VASCULAR LEFT SOLEAL SPONT: NORMAL
BH CV LOWER VASCULAR RIGHT COMMON FEMORAL AUGMENT: NORMAL
BH CV LOWER VASCULAR RIGHT COMMON FEMORAL COMPRESS: NORMAL
BH CV LOWER VASCULAR RIGHT COMMON FEMORAL PHASIC: NORMAL
BH CV LOWER VASCULAR RIGHT COMMON FEMORAL SPONT: NORMAL

## 2020-07-17 PROCEDURE — 93971 EXTREMITY STUDY: CPT | Performed by: INTERNAL MEDICINE

## 2020-07-17 PROCEDURE — 93971 EXTREMITY STUDY: CPT

## 2020-07-30 ENCOUNTER — HOSPITAL ENCOUNTER (OUTPATIENT)
Dept: CARDIOLOGY | Facility: HOSPITAL | Age: 70
Discharge: HOME | End: 2020-07-30
Admitting: INTERNAL MEDICINE

## 2020-07-30 VITALS
HEIGHT: 67 IN | BODY MASS INDEX: 34.69 KG/M2 | DIASTOLIC BLOOD PRESSURE: 84 MMHG | HEART RATE: 65 BPM | WEIGHT: 221 LBS | SYSTOLIC BLOOD PRESSURE: 178 MMHG

## 2020-07-30 DIAGNOSIS — R07.9 CHEST PAIN, UNSPECIFIED TYPE: ICD-10-CM

## 2020-07-30 LAB
BH CV STRESS BP STAGE 2: NORMAL
BH CV STRESS BP STAGE 4: NORMAL
BH CV STRESS COMMENTS STAGE 1: NORMAL
BH CV STRESS DOSE REGADENOSON STAGE 1: 0.4
BH CV STRESS DURATION MIN STAGE 1: 1
BH CV STRESS DURATION MIN STAGE 2: 1
BH CV STRESS DURATION MIN STAGE 3: 1
BH CV STRESS DURATION MIN STAGE 4: 1
BH CV STRESS DURATION SEC STAGE 2: 0
BH CV STRESS HR STAGE 1: 88
BH CV STRESS HR STAGE 2: 90
BH CV STRESS HR STAGE 3: 88
BH CV STRESS HR STAGE 4: 88
BH CV STRESS O2 STAGE 2: 99
BH CV STRESS PROTOCOL 1: NORMAL
BH CV STRESS RECOVERY BP: NORMAL MMHG
BH CV STRESS RECOVERY HR: 84 BPM
BH CV STRESS STAGE 1: 1
BH CV STRESS STAGE 2: 2
BH CV STRESS STAGE 3: 3
BH CV STRESS STAGE 4: 4
LV EF NUC BP: 71 %
MAXIMAL PREDICTED HEART RATE: 150 BPM
PERCENT MAX PREDICTED HR: 64.67 %
STRESS BASELINE BP: NORMAL MMHG
STRESS BASELINE HR: 67 BPM
STRESS PERCENT HR: 76 %
STRESS POST PEAK BP: NORMAL MMHG
STRESS POST PEAK HR: 97 BPM
STRESS TARGET HR: 128 BPM

## 2020-07-30 PROCEDURE — A9500 TC99M SESTAMIBI: HCPCS | Performed by: INTERNAL MEDICINE

## 2020-07-30 PROCEDURE — 78452 HT MUSCLE IMAGE SPECT MULT: CPT

## 2020-07-30 PROCEDURE — 0 TECHNETIUM SESTAMIBI: Performed by: INTERNAL MEDICINE

## 2020-07-30 PROCEDURE — 93017 CV STRESS TEST TRACING ONLY: CPT

## 2020-07-30 PROCEDURE — 25010000002 REGADENOSON 0.4 MG/5ML SOLUTION: Performed by: INTERNAL MEDICINE

## 2020-07-30 PROCEDURE — 93018 CV STRESS TEST I&R ONLY: CPT | Performed by: INTERNAL MEDICINE

## 2020-07-30 PROCEDURE — 78452 HT MUSCLE IMAGE SPECT MULT: CPT | Performed by: INTERNAL MEDICINE

## 2020-07-30 RX ORDER — CYCLOBENZAPRINE HCL 5 MG
5 TABLET ORAL 3 TIMES DAILY PRN
COMMUNITY

## 2020-07-30 RX ORDER — OMEPRAZOLE 40 MG/1
40 CAPSULE, DELAYED RELEASE ORAL DAILY
COMMUNITY

## 2020-07-30 RX ADMIN — REGADENOSON 0.4 MG: 0.08 INJECTION, SOLUTION INTRAVENOUS at 13:19

## 2020-07-30 RX ADMIN — TECHNETIUM TC 99M SESTAMIBI 1 DOSE: 1 INJECTION INTRAVENOUS at 13:20

## 2020-07-30 RX ADMIN — TECHNETIUM TC 99M SESTAMIBI 1 DOSE: 1 INJECTION INTRAVENOUS at 11:20

## 2020-07-31 ENCOUNTER — APPOINTMENT (OUTPATIENT)
Dept: CARDIOLOGY | Facility: HOSPITAL | Age: 70
End: 2020-07-31

## 2021-08-31 PROCEDURE — U0004 COV-19 TEST NON-CDC HGH THRU: HCPCS | Performed by: FAMILY MEDICINE

## 2021-08-31 PROCEDURE — U0005 INFEC AGEN DETEC AMPLI PROBE: HCPCS | Performed by: FAMILY MEDICINE

## 2021-09-02 ENCOUNTER — TELEPHONE (OUTPATIENT)
Dept: FAMILY MEDICINE CLINIC | Facility: TELEHEALTH | Age: 71
End: 2021-09-02

## 2022-01-02 PROCEDURE — U0005 INFEC AGEN DETEC AMPLI PROBE: HCPCS | Performed by: NURSE PRACTITIONER

## 2022-01-02 PROCEDURE — U0004 COV-19 TEST NON-CDC HGH THRU: HCPCS | Performed by: NURSE PRACTITIONER

## 2023-05-31 ENCOUNTER — HOSPITAL ENCOUNTER (OUTPATIENT)
Age: 73
End: 2023-05-31
Payer: MEDICARE

## 2023-05-31 DIAGNOSIS — Z01.818: ICD-10-CM

## 2023-05-31 DIAGNOSIS — R60.9: ICD-10-CM

## 2023-05-31 DIAGNOSIS — M79.671: ICD-10-CM

## 2023-05-31 DIAGNOSIS — M79.672: ICD-10-CM

## 2023-05-31 PROCEDURE — 71046 X-RAY EXAM CHEST 2 VIEWS: CPT

## 2023-05-31 PROCEDURE — 73630 X-RAY EXAM OF FOOT: CPT

## 2023-05-31 PROCEDURE — 93005 ELECTROCARDIOGRAM TRACING: CPT

## 2024-03-26 PROCEDURE — 87070 CULTURE OTHR SPECIMN AEROBIC: CPT | Performed by: FAMILY MEDICINE

## 2024-03-26 PROCEDURE — 87186 SC STD MICRODIL/AGAR DIL: CPT | Performed by: FAMILY MEDICINE

## 2024-03-26 PROCEDURE — 87205 SMEAR GRAM STAIN: CPT | Performed by: FAMILY MEDICINE

## 2024-03-26 PROCEDURE — 87147 CULTURE TYPE IMMUNOLOGIC: CPT | Performed by: FAMILY MEDICINE

## 2024-03-29 DIAGNOSIS — L03.116 CELLULITIS OF LEFT ANTERIOR LOWER LEG: Primary | ICD-10-CM

## 2024-03-29 RX ORDER — DOXYCYCLINE 100 MG/1
100 CAPSULE ORAL 2 TIMES DAILY
Qty: 14 CAPSULE | Refills: 0 | Status: SHIPPED | OUTPATIENT
Start: 2024-03-29

## 2024-09-09 ENCOUNTER — TRANSCRIBE ORDERS (OUTPATIENT)
Dept: ADMINISTRATIVE | Facility: HOSPITAL | Age: 74
End: 2024-09-09
Payer: MEDICARE

## 2024-09-09 DIAGNOSIS — G47.33 OBSTRUCTIVE SLEEP APNEA (ADULT) (PEDIATRIC): Primary | ICD-10-CM

## 2024-09-13 ENCOUNTER — APPOINTMENT (OUTPATIENT)
Dept: INTERVENTIONAL RADIOLOGY/VASCULAR | Facility: HOSPITAL | Age: 74
End: 2024-09-13
Payer: MEDICARE

## 2024-09-13 ENCOUNTER — HOSPITAL ENCOUNTER (INPATIENT)
Facility: HOSPITAL | Age: 74
LOS: 5 days | Discharge: HOME | End: 2024-09-18
Attending: INTERNAL MEDICINE | Admitting: HOSPITALIST
Payer: MEDICARE

## 2024-09-13 ENCOUNTER — APPOINTMENT (OUTPATIENT)
Dept: GENERAL RADIOLOGY | Facility: HOSPITAL | Age: 74
End: 2024-09-13
Payer: MEDICARE

## 2024-09-13 PROBLEM — E87.6 HYPOKALEMIA: Status: ACTIVE | Noted: 2024-09-13

## 2024-09-13 PROBLEM — N18.5 TYPE 2 DIABETES MELLITUS WITH STAGE 5 CHRONIC KIDNEY DISEASE NOT ON CHRONIC DIALYSIS, WITHOUT LONG-TERM CURRENT USE OF INSULIN: Status: ACTIVE | Noted: 2024-09-13

## 2024-09-13 PROBLEM — N18.5 CKD (CHRONIC KIDNEY DISEASE) STAGE 5, GFR LESS THAN 15 ML/MIN: Status: ACTIVE | Noted: 2024-09-13

## 2024-09-13 PROBLEM — E46 MALNUTRITION: Status: ACTIVE | Noted: 2024-09-13

## 2024-09-13 PROBLEM — E11.22 TYPE 2 DIABETES MELLITUS WITH STAGE 5 CHRONIC KIDNEY DISEASE NOT ON CHRONIC DIALYSIS, WITHOUT LONG-TERM CURRENT USE OF INSULIN: Status: ACTIVE | Noted: 2024-09-13

## 2024-09-13 LAB
ALBUMIN SERPL-MCNC: 3.1 G/DL (ref 3.5–5.2)
ALBUMIN/GLOB SERPL: 1 G/DL
ALP SERPL-CCNC: 112 U/L (ref 39–117)
ALT SERPL W P-5'-P-CCNC: 14 U/L (ref 1–33)
ANION GAP SERPL CALCULATED.3IONS-SCNC: 22 MMOL/L (ref 5–15)
AST SERPL-CCNC: 24 U/L (ref 1–32)
BASOPHILS # BLD AUTO: 0.02 10*3/MM3 (ref 0–0.2)
BASOPHILS NFR BLD AUTO: 0.5 % (ref 0–1.5)
BILIRUB SERPL-MCNC: 0.5 MG/DL (ref 0–1.2)
BUN SERPL-MCNC: 111 MG/DL (ref 8–23)
BUN/CREAT SERPL: 16.7 (ref 7–25)
CALCIUM SPEC-SCNC: 8.1 MG/DL (ref 8.6–10.5)
CHLORIDE SERPL-SCNC: 95 MMOL/L (ref 98–107)
CO2 SERPL-SCNC: 25 MMOL/L (ref 22–29)
CREAT SERPL-MCNC: 6.64 MG/DL (ref 0.57–1)
DEPRECATED RDW RBC AUTO: 50.2 FL (ref 37–54)
EGFRCR SERPLBLD CKD-EPI 2021: 6.1 ML/MIN/1.73
EOSINOPHIL # BLD AUTO: 0.14 10*3/MM3 (ref 0–0.4)
EOSINOPHIL NFR BLD AUTO: 3.2 % (ref 0.3–6.2)
ERYTHROCYTE [DISTWIDTH] IN BLOOD BY AUTOMATED COUNT: 15.4 % (ref 12.3–15.4)
GLOBULIN UR ELPH-MCNC: 3 GM/DL
GLUCOSE BLDC GLUCOMTR-MCNC: 119 MG/DL (ref 70–130)
GLUCOSE BLDC GLUCOMTR-MCNC: 129 MG/DL (ref 70–130)
GLUCOSE SERPL-MCNC: 110 MG/DL (ref 65–99)
HCT VFR BLD AUTO: 33.3 % (ref 34–46.6)
HGB BLD-MCNC: 11 G/DL (ref 12–15.9)
IMM GRANULOCYTES # BLD AUTO: 0.01 10*3/MM3 (ref 0–0.05)
IMM GRANULOCYTES NFR BLD AUTO: 0.2 % (ref 0–0.5)
INR PPP: 1.3 (ref 0.89–1.12)
LYMPHOCYTES # BLD AUTO: 1.64 10*3/MM3 (ref 0.7–3.1)
LYMPHOCYTES NFR BLD AUTO: 37.5 % (ref 19.6–45.3)
MAGNESIUM SERPL-MCNC: 2.2 MG/DL (ref 1.6–2.4)
MCH RBC QN AUTO: 29.8 PG (ref 26.6–33)
MCHC RBC AUTO-ENTMCNC: 33 G/DL (ref 31.5–35.7)
MCV RBC AUTO: 90.2 FL (ref 79–97)
MONOCYTES # BLD AUTO: 0.49 10*3/MM3 (ref 0.1–0.9)
MONOCYTES NFR BLD AUTO: 11.2 % (ref 5–12)
NEUTROPHILS NFR BLD AUTO: 2.07 10*3/MM3 (ref 1.7–7)
NEUTROPHILS NFR BLD AUTO: 47.4 % (ref 42.7–76)
NRBC BLD AUTO-RTO: 0 /100 WBC (ref 0–0.2)
PLATELET # BLD AUTO: 178 10*3/MM3 (ref 140–450)
PMV BLD AUTO: 11.2 FL (ref 6–12)
POTASSIUM SERPL-SCNC: 2.5 MMOL/L (ref 3.5–5.2)
PROT SERPL-MCNC: 6.1 G/DL (ref 6–8.5)
PROTHROMBIN TIME: 16.3 SECONDS (ref 12.2–14.5)
RBC # BLD AUTO: 3.69 10*6/MM3 (ref 3.77–5.28)
SODIUM SERPL-SCNC: 142 MMOL/L (ref 136–145)
WBC NRBC COR # BLD AUTO: 4.37 10*3/MM3 (ref 3.4–10.8)

## 2024-09-13 PROCEDURE — 25010000002 FENTANYL CITRATE (PF) 50 MCG/ML SOLUTION: Performed by: RADIOLOGY

## 2024-09-13 PROCEDURE — 76937 US GUIDE VASCULAR ACCESS: CPT

## 2024-09-13 PROCEDURE — 80053 COMPREHEN METABOLIC PANEL: CPT | Performed by: INTERNAL MEDICINE

## 2024-09-13 PROCEDURE — 25010000002 MIDAZOLAM PER 1 MG: Performed by: RADIOLOGY

## 2024-09-13 PROCEDURE — 82948 REAGENT STRIP/BLOOD GLUCOSE: CPT

## 2024-09-13 PROCEDURE — 36558 INSERT TUNNELED CV CATH: CPT

## 2024-09-13 PROCEDURE — 25010000002 HEPARIN (PORCINE) PER 1000 UNITS

## 2024-09-13 PROCEDURE — 85610 PROTHROMBIN TIME: CPT | Performed by: INTERNAL MEDICINE

## 2024-09-13 PROCEDURE — 71045 X-RAY EXAM CHEST 1 VIEW: CPT

## 2024-09-13 PROCEDURE — 99223 1ST HOSP IP/OBS HIGH 75: CPT | Performed by: INTERNAL MEDICINE

## 2024-09-13 PROCEDURE — 83735 ASSAY OF MAGNESIUM: CPT

## 2024-09-13 PROCEDURE — 85025 COMPLETE CBC W/AUTO DIFF WBC: CPT | Performed by: INTERNAL MEDICINE

## 2024-09-13 PROCEDURE — G0316 PR PROLONG INPT EVAL ADD15 M: HCPCS | Performed by: INTERNAL MEDICINE

## 2024-09-13 PROCEDURE — 77001 FLUOROGUIDE FOR VEIN DEVICE: CPT

## 2024-09-13 PROCEDURE — 25010000002 LIDOCAINE 1 % SOLUTION: Performed by: INTERNAL MEDICINE

## 2024-09-13 RX ORDER — MIDAZOLAM HYDROCHLORIDE 1 MG/ML
INJECTION INTRAMUSCULAR; INTRAVENOUS AS NEEDED
Status: COMPLETED | OUTPATIENT
Start: 2024-09-13 | End: 2024-09-13

## 2024-09-13 RX ORDER — SODIUM CHLORIDE 0.9 % (FLUSH) 0.9 %
10 SYRINGE (ML) INJECTION EVERY 12 HOURS SCHEDULED
Status: DISCONTINUED | OUTPATIENT
Start: 2024-09-13 | End: 2024-09-18 | Stop reason: HOSPADM

## 2024-09-13 RX ORDER — SODIUM CHLORIDE 0.9 % (FLUSH) 0.9 %
10 SYRINGE (ML) INJECTION AS NEEDED
Status: DISCONTINUED | OUTPATIENT
Start: 2024-09-13 | End: 2024-09-18 | Stop reason: HOSPADM

## 2024-09-13 RX ORDER — POTASSIUM CHLORIDE 750 MG/1
40 CAPSULE, EXTENDED RELEASE ORAL EVERY 4 HOURS
Status: COMPLETED | OUTPATIENT
Start: 2024-09-13 | End: 2024-09-14

## 2024-09-13 RX ORDER — FENTANYL CITRATE 50 UG/ML
INJECTION, SOLUTION INTRAMUSCULAR; INTRAVENOUS AS NEEDED
Status: COMPLETED | OUTPATIENT
Start: 2024-09-13 | End: 2024-09-13

## 2024-09-13 RX ORDER — ATORVASTATIN CALCIUM 10 MG/1
10 TABLET, FILM COATED ORAL DAILY
Status: DISCONTINUED | OUTPATIENT
Start: 2024-09-14 | End: 2024-09-18 | Stop reason: HOSPADM

## 2024-09-13 RX ORDER — MIDAZOLAM HYDROCHLORIDE 1 MG/ML
INJECTION INTRAMUSCULAR; INTRAVENOUS
Status: DISPENSED
Start: 2024-09-13 | End: 2024-09-14

## 2024-09-13 RX ORDER — INSULIN LISPRO 100 [IU]/ML
2-7 INJECTION, SOLUTION INTRAVENOUS; SUBCUTANEOUS
Status: DISCONTINUED | OUTPATIENT
Start: 2024-09-13 | End: 2024-09-17

## 2024-09-13 RX ORDER — ONDANSETRON 2 MG/ML
4 INJECTION INTRAMUSCULAR; INTRAVENOUS EVERY 6 HOURS PRN
Status: DISCONTINUED | OUTPATIENT
Start: 2024-09-13 | End: 2024-09-13

## 2024-09-13 RX ORDER — CARVEDILOL 12.5 MG/1
50 TABLET ORAL 2 TIMES DAILY WITH MEALS
Status: DISCONTINUED | OUTPATIENT
Start: 2024-09-13 | End: 2024-09-13

## 2024-09-13 RX ORDER — SODIUM CHLORIDE 9 MG/ML
40 INJECTION, SOLUTION INTRAVENOUS AS NEEDED
Status: DISCONTINUED | OUTPATIENT
Start: 2024-09-13 | End: 2024-09-18 | Stop reason: HOSPADM

## 2024-09-13 RX ORDER — NICOTINE POLACRILEX 4 MG
15 LOZENGE BUCCAL
Status: DISCONTINUED | OUTPATIENT
Start: 2024-09-13 | End: 2024-09-18 | Stop reason: HOSPADM

## 2024-09-13 RX ORDER — CARVEDILOL 12.5 MG/1
25 TABLET ORAL 2 TIMES DAILY WITH MEALS
Status: DISCONTINUED | OUTPATIENT
Start: 2024-09-14 | End: 2024-09-15

## 2024-09-13 RX ORDER — PREGABALIN 25 MG/1
25 CAPSULE ORAL DAILY
Status: DISCONTINUED | OUTPATIENT
Start: 2024-09-14 | End: 2024-09-18 | Stop reason: HOSPADM

## 2024-09-13 RX ORDER — AMOXICILLIN 250 MG
2 CAPSULE ORAL 2 TIMES DAILY PRN
Status: DISCONTINUED | OUTPATIENT
Start: 2024-09-13 | End: 2024-09-18 | Stop reason: HOSPADM

## 2024-09-13 RX ORDER — TERAZOSIN 2 MG/1
2 CAPSULE ORAL NIGHTLY
Status: DISCONTINUED | OUTPATIENT
Start: 2024-09-13 | End: 2024-09-14

## 2024-09-13 RX ORDER — ACETAMINOPHEN 325 MG/1
650 TABLET ORAL EVERY 4 HOURS PRN
Status: DISCONTINUED | OUTPATIENT
Start: 2024-09-13 | End: 2024-09-18 | Stop reason: HOSPADM

## 2024-09-13 RX ORDER — ALLOPURINOL 100 MG/1
200 TABLET ORAL DAILY
Status: DISCONTINUED | OUTPATIENT
Start: 2024-09-14 | End: 2024-09-18 | Stop reason: HOSPADM

## 2024-09-13 RX ORDER — ISOSORBIDE MONONITRATE 60 MG/1
120 TABLET, EXTENDED RELEASE ORAL DAILY
Status: DISCONTINUED | OUTPATIENT
Start: 2024-09-13 | End: 2024-09-18 | Stop reason: HOSPADM

## 2024-09-13 RX ORDER — ALBUTEROL SULFATE 1.25 MG/3ML
1.25 SOLUTION RESPIRATORY (INHALATION) EVERY 4 HOURS PRN
Status: DISCONTINUED | OUTPATIENT
Start: 2024-09-13 | End: 2024-09-18 | Stop reason: HOSPADM

## 2024-09-13 RX ORDER — POLYETHYLENE GLYCOL 3350 17 G/17G
17 POWDER, FOR SOLUTION ORAL DAILY PRN
Status: DISCONTINUED | OUTPATIENT
Start: 2024-09-13 | End: 2024-09-18 | Stop reason: HOSPADM

## 2024-09-13 RX ORDER — BISACODYL 10 MG
10 SUPPOSITORY, RECTAL RECTAL DAILY PRN
Status: DISCONTINUED | OUTPATIENT
Start: 2024-09-13 | End: 2024-09-18 | Stop reason: HOSPADM

## 2024-09-13 RX ORDER — IBUPROFEN 600 MG/1
1 TABLET ORAL
Status: DISCONTINUED | OUTPATIENT
Start: 2024-09-13 | End: 2024-09-18 | Stop reason: HOSPADM

## 2024-09-13 RX ORDER — HEPARIN SODIUM 1000 [USP'U]/ML
INJECTION, SOLUTION INTRAVENOUS; SUBCUTANEOUS
Status: COMPLETED
Start: 2024-09-13 | End: 2024-09-13

## 2024-09-13 RX ORDER — ACETAMINOPHEN 160 MG/5ML
650 SOLUTION ORAL EVERY 4 HOURS PRN
Status: DISCONTINUED | OUTPATIENT
Start: 2024-09-13 | End: 2024-09-18 | Stop reason: HOSPADM

## 2024-09-13 RX ORDER — POTASSIUM CHLORIDE 750 MG/1
40 CAPSULE, EXTENDED RELEASE ORAL 2 TIMES DAILY WITH MEALS
Status: DISCONTINUED | OUTPATIENT
Start: 2024-09-13 | End: 2024-09-13

## 2024-09-13 RX ORDER — LIDOCAINE HYDROCHLORIDE 10 MG/ML
10 INJECTION, SOLUTION INFILTRATION; PERINEURAL ONCE
Status: COMPLETED | OUTPATIENT
Start: 2024-09-13 | End: 2024-09-13

## 2024-09-13 RX ORDER — DEXTROSE MONOHYDRATE 25 G/50ML
25 INJECTION, SOLUTION INTRAVENOUS
Status: DISCONTINUED | OUTPATIENT
Start: 2024-09-13 | End: 2024-09-18 | Stop reason: HOSPADM

## 2024-09-13 RX ORDER — ACETAMINOPHEN 650 MG/1
650 SUPPOSITORY RECTAL EVERY 4 HOURS PRN
Status: DISCONTINUED | OUTPATIENT
Start: 2024-09-13 | End: 2024-09-18 | Stop reason: HOSPADM

## 2024-09-13 RX ORDER — ONDANSETRON 4 MG/1
4 TABLET, ORALLY DISINTEGRATING ORAL EVERY 6 HOURS PRN
Status: DISCONTINUED | OUTPATIENT
Start: 2024-09-13 | End: 2024-09-13

## 2024-09-13 RX ORDER — BISACODYL 5 MG/1
5 TABLET, DELAYED RELEASE ORAL DAILY PRN
Status: DISCONTINUED | OUTPATIENT
Start: 2024-09-13 | End: 2024-09-18 | Stop reason: HOSPADM

## 2024-09-13 RX ORDER — FENTANYL CITRATE 50 UG/ML
INJECTION, SOLUTION INTRAMUSCULAR; INTRAVENOUS
Status: DISPENSED
Start: 2024-09-13 | End: 2024-09-14

## 2024-09-13 RX ADMIN — Medication 10 ML: at 20:46

## 2024-09-13 RX ADMIN — ISOSORBIDE MONONITRATE 120 MG: 60 TABLET, EXTENDED RELEASE ORAL at 19:16

## 2024-09-13 RX ADMIN — FENTANYL CITRATE 50 MCG: 50 INJECTION, SOLUTION INTRAMUSCULAR; INTRAVENOUS at 15:29

## 2024-09-13 RX ADMIN — Medication 10 ML: at 20:45

## 2024-09-13 RX ADMIN — MIDAZOLAM HYDROCHLORIDE 1 MG: 1 INJECTION, SOLUTION INTRAMUSCULAR; INTRAVENOUS at 15:33

## 2024-09-13 RX ADMIN — FENTANYL CITRATE 50 MCG: 50 INJECTION, SOLUTION INTRAMUSCULAR; INTRAVENOUS at 15:33

## 2024-09-13 RX ADMIN — TERAZOSIN HYDROCHLORIDE 2 MG: 2 CAPSULE ORAL at 20:45

## 2024-09-13 RX ADMIN — HEPARIN SODIUM 4200 UNITS: 1000 INJECTION INTRAVENOUS; SUBCUTANEOUS at 15:38

## 2024-09-13 RX ADMIN — NIFEDIPINE 90 MG: 30 TABLET, FILM COATED, EXTENDED RELEASE ORAL at 19:06

## 2024-09-13 RX ADMIN — POTASSIUM CHLORIDE 40 MEQ: 750 CAPSULE, EXTENDED RELEASE ORAL at 19:06

## 2024-09-13 RX ADMIN — MIDAZOLAM HYDROCHLORIDE 1 MG: 1 INJECTION, SOLUTION INTRAMUSCULAR; INTRAVENOUS at 15:30

## 2024-09-13 RX ADMIN — LIDOCAINE HYDROCHLORIDE 9 ML: 10 INJECTION, SOLUTION INFILTRATION; PERINEURAL at 15:39

## 2024-09-14 ENCOUNTER — APPOINTMENT (OUTPATIENT)
Dept: NEPHROLOGY | Facility: HOSPITAL | Age: 74
End: 2024-09-14
Payer: MEDICARE

## 2024-09-14 LAB
ANION GAP SERPL CALCULATED.3IONS-SCNC: 16 MMOL/L (ref 5–15)
BASOPHILS # BLD AUTO: 0.02 10*3/MM3 (ref 0–0.2)
BASOPHILS NFR BLD AUTO: 0.4 % (ref 0–1.5)
BUN SERPL-MCNC: 121 MG/DL (ref 8–23)
BUN/CREAT SERPL: 17.2 (ref 7–25)
CALCIUM SPEC-SCNC: 8 MG/DL (ref 8.6–10.5)
CHLORIDE SERPL-SCNC: 98 MMOL/L (ref 98–107)
CO2 SERPL-SCNC: 27 MMOL/L (ref 22–29)
CREAT SERPL-MCNC: 7.03 MG/DL (ref 0.57–1)
DEPRECATED RDW RBC AUTO: 51.3 FL (ref 37–54)
DEPRECATED RDW RBC AUTO: 51.8 FL (ref 37–54)
EGFRCR SERPLBLD CKD-EPI 2021: 5.7 ML/MIN/1.73
EOSINOPHIL # BLD AUTO: 0.2 10*3/MM3 (ref 0–0.4)
EOSINOPHIL NFR BLD AUTO: 4.4 % (ref 0.3–6.2)
ERYTHROCYTE [DISTWIDTH] IN BLOOD BY AUTOMATED COUNT: 15.5 % (ref 12.3–15.4)
ERYTHROCYTE [DISTWIDTH] IN BLOOD BY AUTOMATED COUNT: 15.6 % (ref 12.3–15.4)
GLUCOSE BLDC GLUCOMTR-MCNC: 115 MG/DL (ref 70–130)
GLUCOSE BLDC GLUCOMTR-MCNC: 119 MG/DL (ref 70–130)
GLUCOSE BLDC GLUCOMTR-MCNC: 119 MG/DL (ref 70–130)
GLUCOSE BLDC GLUCOMTR-MCNC: 152 MG/DL (ref 70–130)
GLUCOSE SERPL-MCNC: 117 MG/DL (ref 65–99)
HAV IGM SERPL QL IA: NORMAL
HBV CORE IGM SERPL QL IA: NORMAL
HBV SURFACE AG SERPL QL IA: NORMAL
HCT VFR BLD AUTO: 31.2 % (ref 34–46.6)
HCT VFR BLD AUTO: 32.5 % (ref 34–46.6)
HCV AB SER QL: NORMAL
HGB BLD-MCNC: 10.1 G/DL (ref 12–15.9)
HGB BLD-MCNC: 10.6 G/DL (ref 12–15.9)
IMM GRANULOCYTES # BLD AUTO: 0.01 10*3/MM3 (ref 0–0.05)
IMM GRANULOCYTES NFR BLD AUTO: 0.2 % (ref 0–0.5)
LYMPHOCYTES # BLD AUTO: 1.5 10*3/MM3 (ref 0.7–3.1)
LYMPHOCYTES NFR BLD AUTO: 33.3 % (ref 19.6–45.3)
MCH RBC QN AUTO: 29.6 PG (ref 26.6–33)
MCH RBC QN AUTO: 29.8 PG (ref 26.6–33)
MCHC RBC AUTO-ENTMCNC: 32.4 G/DL (ref 31.5–35.7)
MCHC RBC AUTO-ENTMCNC: 32.6 G/DL (ref 31.5–35.7)
MCV RBC AUTO: 91.3 FL (ref 79–97)
MCV RBC AUTO: 91.5 FL (ref 79–97)
MONOCYTES # BLD AUTO: 0.54 10*3/MM3 (ref 0.1–0.9)
MONOCYTES NFR BLD AUTO: 12 % (ref 5–12)
NEUTROPHILS NFR BLD AUTO: 2.24 10*3/MM3 (ref 1.7–7)
NEUTROPHILS NFR BLD AUTO: 49.7 % (ref 42.7–76)
NRBC BLD AUTO-RTO: 0 /100 WBC (ref 0–0.2)
PLATELET # BLD AUTO: 160 10*3/MM3 (ref 140–450)
PLATELET # BLD AUTO: 163 10*3/MM3 (ref 140–450)
PMV BLD AUTO: 11.2 FL (ref 6–12)
PMV BLD AUTO: 11.5 FL (ref 6–12)
POTASSIUM SERPL-SCNC: 3 MMOL/L (ref 3.5–5.2)
RBC # BLD AUTO: 3.41 10*6/MM3 (ref 3.77–5.28)
RBC # BLD AUTO: 3.56 10*6/MM3 (ref 3.77–5.28)
SODIUM SERPL-SCNC: 141 MMOL/L (ref 136–145)
WBC NRBC COR # BLD AUTO: 4.21 10*3/MM3 (ref 3.4–10.8)
WBC NRBC COR # BLD AUTO: 4.51 10*3/MM3 (ref 3.4–10.8)

## 2024-09-14 PROCEDURE — 85027 COMPLETE CBC AUTOMATED: CPT | Performed by: NURSE PRACTITIONER

## 2024-09-14 PROCEDURE — 80074 ACUTE HEPATITIS PANEL: CPT | Performed by: INTERNAL MEDICINE

## 2024-09-14 PROCEDURE — 82948 REAGENT STRIP/BLOOD GLUCOSE: CPT

## 2024-09-14 PROCEDURE — 97166 OT EVAL MOD COMPLEX 45 MIN: CPT

## 2024-09-14 PROCEDURE — 97535 SELF CARE MNGMENT TRAINING: CPT

## 2024-09-14 PROCEDURE — 5A1D70Z PERFORMANCE OF URINARY FILTRATION, INTERMITTENT, LESS THAN 6 HOURS PER DAY: ICD-10-PCS | Performed by: INTERNAL MEDICINE

## 2024-09-14 PROCEDURE — 99232 SBSQ HOSP IP/OBS MODERATE 35: CPT | Performed by: PHYSICIAN ASSISTANT

## 2024-09-14 PROCEDURE — 85025 COMPLETE CBC W/AUTO DIFF WBC: CPT | Performed by: NURSE PRACTITIONER

## 2024-09-14 PROCEDURE — 63710000001 INSULIN LISPRO (HUMAN) PER 5 UNITS: Performed by: INTERNAL MEDICINE

## 2024-09-14 PROCEDURE — 25010000002 HEPARIN (PORCINE) PER 1000 UNITS: Performed by: INTERNAL MEDICINE

## 2024-09-14 PROCEDURE — 80048 BASIC METABOLIC PNL TOTAL CA: CPT

## 2024-09-14 RX ORDER — FOLIC ACID/VIT B COMPLEX AND C 0.8 MG
1 TABLET ORAL DAILY
Status: DISCONTINUED | OUTPATIENT
Start: 2024-09-14 | End: 2024-09-18 | Stop reason: HOSPADM

## 2024-09-14 RX ORDER — HEPARIN SODIUM 1000 [USP'U]/ML
2000 INJECTION, SOLUTION INTRAVENOUS; SUBCUTANEOUS AS NEEDED
Status: DISCONTINUED | OUTPATIENT
Start: 2024-09-14 | End: 2024-09-18 | Stop reason: HOSPADM

## 2024-09-14 RX ORDER — ALBUMIN (HUMAN) 12.5 G/50ML
12.5 SOLUTION INTRAVENOUS AS NEEDED
Status: ACTIVE | OUTPATIENT
Start: 2024-09-14 | End: 2024-09-14

## 2024-09-14 RX ADMIN — Medication 10 ML: at 21:07

## 2024-09-14 RX ADMIN — ACETAMINOPHEN 650 MG: 325 TABLET ORAL at 11:21

## 2024-09-14 RX ADMIN — POTASSIUM CHLORIDE 40 MEQ: 750 CAPSULE, EXTENDED RELEASE ORAL at 01:35

## 2024-09-14 RX ADMIN — CARVEDILOL 25 MG: 12.5 TABLET, FILM COATED ORAL at 17:14

## 2024-09-14 RX ADMIN — Medication 10 ML: at 11:25

## 2024-09-14 RX ADMIN — ATORVASTATIN CALCIUM 10 MG: 10 TABLET, FILM COATED ORAL at 11:20

## 2024-09-14 RX ADMIN — ACETAMINOPHEN 650 MG: 325 TABLET ORAL at 01:33

## 2024-09-14 RX ADMIN — ACETAMINOPHEN 650 MG: 325 TABLET ORAL at 22:38

## 2024-09-14 RX ADMIN — ALLOPURINOL 200 MG: 100 TABLET ORAL at 11:20

## 2024-09-14 RX ADMIN — ISOSORBIDE MONONITRATE 120 MG: 60 TABLET, EXTENDED RELEASE ORAL at 11:20

## 2024-09-14 RX ADMIN — CARVEDILOL 25 MG: 12.5 TABLET, FILM COATED ORAL at 11:21

## 2024-09-14 RX ADMIN — INSULIN LISPRO 2 UNITS: 100 INJECTION, SOLUTION INTRAVENOUS; SUBCUTANEOUS at 21:12

## 2024-09-14 RX ADMIN — PREGABALIN 25 MG: 25 CAPSULE ORAL at 11:20

## 2024-09-14 RX ADMIN — HEPARIN SODIUM 2000 UNITS: 1000 INJECTION INTRAVENOUS; SUBCUTANEOUS at 09:24

## 2024-09-14 RX ADMIN — Medication 1 TABLET: at 11:20

## 2024-09-15 PROBLEM — E43 SEVERE MALNUTRITION: Status: ACTIVE | Noted: 2024-09-15

## 2024-09-15 LAB
ALBUMIN SERPL-MCNC: 3.4 G/DL (ref 3.5–5.2)
ANION GAP SERPL CALCULATED.3IONS-SCNC: 14 MMOL/L (ref 5–15)
B PARAPERT DNA SPEC QL NAA+PROBE: NOT DETECTED
B PERT DNA SPEC QL NAA+PROBE: NOT DETECTED
BUN SERPL-MCNC: 84 MG/DL (ref 8–23)
BUN/CREAT SERPL: 15.2 (ref 7–25)
C PNEUM DNA NPH QL NAA+NON-PROBE: NOT DETECTED
CALCIUM SPEC-SCNC: 8 MG/DL (ref 8.6–10.5)
CHLORIDE SERPL-SCNC: 101 MMOL/L (ref 98–107)
CO2 SERPL-SCNC: 28 MMOL/L (ref 22–29)
CREAT SERPL-MCNC: 5.53 MG/DL (ref 0.57–1)
DEPRECATED RDW RBC AUTO: 52.7 FL (ref 37–54)
EGFRCR SERPLBLD CKD-EPI 2021: 7.6 ML/MIN/1.73
ERYTHROCYTE [DISTWIDTH] IN BLOOD BY AUTOMATED COUNT: 15.6 % (ref 12.3–15.4)
FLUAV SUBTYP SPEC NAA+PROBE: NOT DETECTED
FLUBV RNA ISLT QL NAA+PROBE: NOT DETECTED
GLUCOSE BLDC GLUCOMTR-MCNC: 109 MG/DL (ref 70–130)
GLUCOSE BLDC GLUCOMTR-MCNC: 116 MG/DL (ref 70–130)
GLUCOSE BLDC GLUCOMTR-MCNC: 120 MG/DL (ref 70–130)
GLUCOSE BLDC GLUCOMTR-MCNC: 140 MG/DL (ref 70–130)
GLUCOSE BLDC GLUCOMTR-MCNC: 193 MG/DL (ref 70–130)
GLUCOSE SERPL-MCNC: 142 MG/DL (ref 65–99)
HADV DNA SPEC NAA+PROBE: NOT DETECTED
HBA1C MFR BLD: 5.8 % (ref 4.8–5.6)
HCOV 229E RNA SPEC QL NAA+PROBE: NOT DETECTED
HCOV HKU1 RNA SPEC QL NAA+PROBE: NOT DETECTED
HCOV NL63 RNA SPEC QL NAA+PROBE: NOT DETECTED
HCOV OC43 RNA SPEC QL NAA+PROBE: NOT DETECTED
HCT VFR BLD AUTO: 32.3 % (ref 34–46.6)
HGB BLD-MCNC: 10.4 G/DL (ref 12–15.9)
HMPV RNA NPH QL NAA+NON-PROBE: NOT DETECTED
HPIV1 RNA ISLT QL NAA+PROBE: NOT DETECTED
HPIV2 RNA SPEC QL NAA+PROBE: NOT DETECTED
HPIV3 RNA NPH QL NAA+PROBE: NOT DETECTED
HPIV4 P GENE NPH QL NAA+PROBE: NOT DETECTED
M PNEUMO IGG SER IA-ACNC: NOT DETECTED
MCH RBC QN AUTO: 29.7 PG (ref 26.6–33)
MCHC RBC AUTO-ENTMCNC: 32.2 G/DL (ref 31.5–35.7)
MCV RBC AUTO: 92.3 FL (ref 79–97)
PHOSPHATE SERPL-MCNC: 7 MG/DL (ref 2.5–4.5)
PLATELET # BLD AUTO: 153 10*3/MM3 (ref 140–450)
PMV BLD AUTO: 10.7 FL (ref 6–12)
POTASSIUM SERPL-SCNC: 3.2 MMOL/L (ref 3.5–5.2)
RBC # BLD AUTO: 3.5 10*6/MM3 (ref 3.77–5.28)
RHINOVIRUS RNA SPEC NAA+PROBE: NOT DETECTED
RSV RNA NPH QL NAA+NON-PROBE: NOT DETECTED
SARS-COV-2 RNA NPH QL NAA+NON-PROBE: NOT DETECTED
SODIUM SERPL-SCNC: 143 MMOL/L (ref 136–145)
WBC NRBC COR # BLD AUTO: 4.17 10*3/MM3 (ref 3.4–10.8)

## 2024-09-15 PROCEDURE — 97162 PT EVAL MOD COMPLEX 30 MIN: CPT

## 2024-09-15 PROCEDURE — 99232 SBSQ HOSP IP/OBS MODERATE 35: CPT | Performed by: PHYSICIAN ASSISTANT

## 2024-09-15 PROCEDURE — 85027 COMPLETE CBC AUTOMATED: CPT | Performed by: PHYSICIAN ASSISTANT

## 2024-09-15 PROCEDURE — 0202U NFCT DS 22 TRGT SARS-COV-2: CPT | Performed by: PHYSICIAN ASSISTANT

## 2024-09-15 PROCEDURE — 82948 REAGENT STRIP/BLOOD GLUCOSE: CPT

## 2024-09-15 PROCEDURE — 63710000001 INSULIN LISPRO (HUMAN) PER 5 UNITS: Performed by: INTERNAL MEDICINE

## 2024-09-15 PROCEDURE — 83036 HEMOGLOBIN GLYCOSYLATED A1C: CPT | Performed by: PHYSICIAN ASSISTANT

## 2024-09-15 PROCEDURE — 97530 THERAPEUTIC ACTIVITIES: CPT

## 2024-09-15 PROCEDURE — 97116 GAIT TRAINING THERAPY: CPT

## 2024-09-15 PROCEDURE — 80069 RENAL FUNCTION PANEL: CPT | Performed by: PHYSICIAN ASSISTANT

## 2024-09-15 RX ORDER — FLUTICASONE PROPIONATE 50 UG/1
2 SPRAY, METERED NASAL 2 TIMES DAILY
Status: DISCONTINUED | OUTPATIENT
Start: 2024-09-15 | End: 2024-09-18 | Stop reason: HOSPADM

## 2024-09-15 RX ORDER — CARVEDILOL 12.5 MG/1
12.5 TABLET ORAL 2 TIMES DAILY WITH MEALS
Status: DISCONTINUED | OUTPATIENT
Start: 2024-09-15 | End: 2024-09-18 | Stop reason: HOSPADM

## 2024-09-15 RX ORDER — CETIRIZINE HYDROCHLORIDE 10 MG/1
5 TABLET ORAL DAILY
Status: DISCONTINUED | OUTPATIENT
Start: 2024-09-15 | End: 2024-09-18 | Stop reason: HOSPADM

## 2024-09-15 RX ADMIN — ISOSORBIDE MONONITRATE 120 MG: 60 TABLET, EXTENDED RELEASE ORAL at 08:55

## 2024-09-15 RX ADMIN — ALLOPURINOL 200 MG: 100 TABLET ORAL at 08:55

## 2024-09-15 RX ADMIN — CETIRIZINE HYDROCHLORIDE 5 MG: 10 TABLET, FILM COATED ORAL at 10:23

## 2024-09-15 RX ADMIN — Medication 10 ML: at 09:13

## 2024-09-15 RX ADMIN — CARVEDILOL 12.5 MG: 12.5 TABLET, FILM COATED ORAL at 17:40

## 2024-09-15 RX ADMIN — Medication 10 ML: at 21:51

## 2024-09-15 RX ADMIN — Medication 10 ML: at 08:56

## 2024-09-15 RX ADMIN — ATORVASTATIN CALCIUM 10 MG: 10 TABLET, FILM COATED ORAL at 08:55

## 2024-09-15 RX ADMIN — CARVEDILOL 12.5 MG: 12.5 TABLET, FILM COATED ORAL at 08:55

## 2024-09-15 RX ADMIN — FLUTICASONE PROPIONATE 2 SPRAY: 50 SPRAY, METERED NASAL at 21:50

## 2024-09-15 RX ADMIN — Medication 1 TABLET: at 08:55

## 2024-09-15 RX ADMIN — PREGABALIN 25 MG: 25 CAPSULE ORAL at 08:55

## 2024-09-16 ENCOUNTER — APPOINTMENT (OUTPATIENT)
Dept: NEPHROLOGY | Facility: HOSPITAL | Age: 74
End: 2024-09-16
Payer: MEDICARE

## 2024-09-16 LAB
ALBUMIN SERPL-MCNC: 3.1 G/DL (ref 3.5–5.2)
ANION GAP SERPL CALCULATED.3IONS-SCNC: 17 MMOL/L (ref 5–15)
BACTERIA UR QL AUTO: ABNORMAL /HPF
BILIRUB UR QL STRIP: NEGATIVE
BUN SERPL-MCNC: 84 MG/DL (ref 8–23)
BUN/CREAT SERPL: 14.6 (ref 7–25)
CALCIUM SPEC-SCNC: 7.8 MG/DL (ref 8.6–10.5)
CHLORIDE SERPL-SCNC: 100 MMOL/L (ref 98–107)
CLARITY UR: CLEAR
CO2 SERPL-SCNC: 25 MMOL/L (ref 22–29)
COLOR UR: YELLOW
CREAT SERPL-MCNC: 5.74 MG/DL (ref 0.57–1)
EGFRCR SERPLBLD CKD-EPI 2021: 7.2 ML/MIN/1.73
FERRITIN SERPL-MCNC: 560.6 NG/ML (ref 13–150)
FOLATE SERPL-MCNC: 9.09 NG/ML (ref 4.78–24.2)
GLUCOSE BLDC GLUCOMTR-MCNC: 136 MG/DL (ref 70–130)
GLUCOSE BLDC GLUCOMTR-MCNC: 154 MG/DL (ref 70–130)
GLUCOSE BLDC GLUCOMTR-MCNC: 94 MG/DL (ref 70–130)
GLUCOSE BLDC GLUCOMTR-MCNC: 99 MG/DL (ref 70–130)
GLUCOSE SERPL-MCNC: 96 MG/DL (ref 65–99)
GLUCOSE UR STRIP-MCNC: NEGATIVE MG/DL
HGB UR QL STRIP.AUTO: NEGATIVE
HYALINE CASTS UR QL AUTO: ABNORMAL /LPF
IRON 24H UR-MRATE: 40 MCG/DL (ref 37–145)
IRON SATN MFR SERPL: 21 % (ref 20–50)
KETONES UR QL STRIP: NEGATIVE
LEUKOCYTE ESTERASE UR QL STRIP.AUTO: ABNORMAL
MAGNESIUM SERPL-MCNC: 2.1 MG/DL (ref 1.6–2.4)
NITRITE UR QL STRIP: NEGATIVE
PH UR STRIP.AUTO: 5.5 [PH] (ref 5–8)
PHOSPHATE SERPL-MCNC: 7.2 MG/DL (ref 2.5–4.5)
POTASSIUM SERPL-SCNC: 3 MMOL/L (ref 3.5–5.2)
PROT UR QL STRIP: ABNORMAL
RBC # UR STRIP: ABNORMAL /HPF
REF LAB TEST METHOD: ABNORMAL
RETICS # AUTO: 0.05 10*6/MM3 (ref 0.02–0.13)
RETICS/RBC NFR AUTO: 1.39 % (ref 0.7–1.9)
SODIUM SERPL-SCNC: 142 MMOL/L (ref 136–145)
SP GR UR STRIP: 1.01 (ref 1–1.03)
SQUAMOUS #/AREA URNS HPF: ABNORMAL /HPF
TIBC SERPL-MCNC: 194 MCG/DL (ref 298–536)
TRANSFERRIN SERPL-MCNC: 130 MG/DL (ref 200–360)
UROBILINOGEN UR QL STRIP: ABNORMAL
VIT B12 BLD-MCNC: 643 PG/ML (ref 211–946)
WBC # UR STRIP: ABNORMAL /HPF

## 2024-09-16 PROCEDURE — 85045 AUTOMATED RETICULOCYTE COUNT: CPT | Performed by: PHYSICIAN ASSISTANT

## 2024-09-16 PROCEDURE — 83735 ASSAY OF MAGNESIUM: CPT | Performed by: PHYSICIAN ASSISTANT

## 2024-09-16 PROCEDURE — 99232 SBSQ HOSP IP/OBS MODERATE 35: CPT | Performed by: PHYSICIAN ASSISTANT

## 2024-09-16 PROCEDURE — 82607 VITAMIN B-12: CPT | Performed by: PHYSICIAN ASSISTANT

## 2024-09-16 PROCEDURE — 63710000001 INSULIN LISPRO (HUMAN) PER 5 UNITS: Performed by: INTERNAL MEDICINE

## 2024-09-16 PROCEDURE — 82728 ASSAY OF FERRITIN: CPT | Performed by: PHYSICIAN ASSISTANT

## 2024-09-16 PROCEDURE — 84466 ASSAY OF TRANSFERRIN: CPT | Performed by: PHYSICIAN ASSISTANT

## 2024-09-16 PROCEDURE — 82746 ASSAY OF FOLIC ACID SERUM: CPT | Performed by: PHYSICIAN ASSISTANT

## 2024-09-16 PROCEDURE — 80069 RENAL FUNCTION PANEL: CPT | Performed by: PHYSICIAN ASSISTANT

## 2024-09-16 PROCEDURE — 83540 ASSAY OF IRON: CPT | Performed by: PHYSICIAN ASSISTANT

## 2024-09-16 PROCEDURE — 81001 URINALYSIS AUTO W/SCOPE: CPT | Performed by: PHYSICIAN ASSISTANT

## 2024-09-16 PROCEDURE — 82948 REAGENT STRIP/BLOOD GLUCOSE: CPT

## 2024-09-16 RX ORDER — FERROUS SULFATE 325(65) MG
325 TABLET ORAL
Status: DISCONTINUED | OUTPATIENT
Start: 2024-09-16 | End: 2024-09-18 | Stop reason: HOSPADM

## 2024-09-16 RX ORDER — CHOLESTYRAMINE LIGHT 4 G/5.7G
1 POWDER, FOR SUSPENSION ORAL DAILY
Status: DISCONTINUED | OUTPATIENT
Start: 2024-09-16 | End: 2024-09-18 | Stop reason: HOSPADM

## 2024-09-16 RX ADMIN — CETIRIZINE HYDROCHLORIDE 5 MG: 10 TABLET, FILM COATED ORAL at 09:00

## 2024-09-16 RX ADMIN — CHOLEYSTYRAMINE LIGHT 4 G: 4 POWDER, FOR SUSPENSION ORAL at 17:32

## 2024-09-16 RX ADMIN — Medication 10 ML: at 21:43

## 2024-09-16 RX ADMIN — Medication 10 ML: at 09:04

## 2024-09-16 RX ADMIN — CARVEDILOL 12.5 MG: 12.5 TABLET, FILM COATED ORAL at 09:01

## 2024-09-16 RX ADMIN — ALLOPURINOL 200 MG: 100 TABLET ORAL at 09:01

## 2024-09-16 RX ADMIN — FLUTICASONE PROPIONATE 2 SPRAY: 50 SPRAY, METERED NASAL at 09:01

## 2024-09-16 RX ADMIN — FERROUS SULFATE TAB 325 MG (65 MG ELEMENTAL FE) 325 MG: 325 (65 FE) TAB at 09:01

## 2024-09-16 RX ADMIN — Medication 1 TABLET: at 09:03

## 2024-09-16 RX ADMIN — Medication 10 ML: at 09:01

## 2024-09-16 RX ADMIN — PREGABALIN 25 MG: 25 CAPSULE ORAL at 09:01

## 2024-09-16 RX ADMIN — FLUTICASONE PROPIONATE 2 SPRAY: 50 SPRAY, METERED NASAL at 21:42

## 2024-09-16 RX ADMIN — ISOSORBIDE MONONITRATE 120 MG: 60 TABLET, EXTENDED RELEASE ORAL at 09:00

## 2024-09-16 RX ADMIN — ATORVASTATIN CALCIUM 10 MG: 10 TABLET, FILM COATED ORAL at 09:01

## 2024-09-16 RX ADMIN — ACETAMINOPHEN 650 MG: 325 TABLET ORAL at 19:48

## 2024-09-16 RX ADMIN — CARVEDILOL 12.5 MG: 12.5 TABLET, FILM COATED ORAL at 17:32

## 2024-09-16 RX ADMIN — INSULIN LISPRO 2 UNITS: 100 INJECTION, SOLUTION INTRAVENOUS; SUBCUTANEOUS at 12:05

## 2024-09-17 ENCOUNTER — APPOINTMENT (OUTPATIENT)
Dept: INTERVENTIONAL RADIOLOGY/VASCULAR | Facility: HOSPITAL | Age: 74
End: 2024-09-17
Payer: MEDICARE

## 2024-09-17 LAB
ALBUMIN SERPL-MCNC: 3.2 G/DL (ref 3.5–5.2)
ANION GAP SERPL CALCULATED.3IONS-SCNC: 13 MMOL/L (ref 5–15)
BUN SERPL-MCNC: 40 MG/DL (ref 8–23)
BUN/CREAT SERPL: 11 (ref 7–25)
CALCIUM SPEC-SCNC: 8.1 MG/DL (ref 8.6–10.5)
CHLORIDE SERPL-SCNC: 102 MMOL/L (ref 98–107)
CO2 SERPL-SCNC: 27 MMOL/L (ref 22–29)
CREAT SERPL-MCNC: 3.62 MG/DL (ref 0.57–1)
EGFRCR SERPLBLD CKD-EPI 2021: 12.6 ML/MIN/1.73
GLUCOSE BLDC GLUCOMTR-MCNC: 108 MG/DL (ref 70–130)
GLUCOSE BLDC GLUCOMTR-MCNC: 96 MG/DL (ref 70–130)
GLUCOSE SERPL-MCNC: 87 MG/DL (ref 65–99)
PHOSPHATE SERPL-MCNC: 4.3 MG/DL (ref 2.5–4.5)
POTASSIUM SERPL-SCNC: 3.4 MMOL/L (ref 3.5–5.2)
SODIUM SERPL-SCNC: 142 MMOL/L (ref 136–145)

## 2024-09-17 PROCEDURE — 25010000002 LIDOCAINE 1 % SOLUTION: Performed by: RADIOLOGY

## 2024-09-17 PROCEDURE — 97116 GAIT TRAINING THERAPY: CPT

## 2024-09-17 PROCEDURE — 82948 REAGENT STRIP/BLOOD GLUCOSE: CPT

## 2024-09-17 PROCEDURE — 97110 THERAPEUTIC EXERCISES: CPT

## 2024-09-17 PROCEDURE — 80069 RENAL FUNCTION PANEL: CPT | Performed by: PHYSICIAN ASSISTANT

## 2024-09-17 PROCEDURE — 99232 SBSQ HOSP IP/OBS MODERATE 35: CPT | Performed by: PHYSICIAN ASSISTANT

## 2024-09-17 RX ORDER — LIDOCAINE HYDROCHLORIDE 10 MG/ML
6 INJECTION, SOLUTION INFILTRATION; PERINEURAL ONCE
Status: COMPLETED | OUTPATIENT
Start: 2024-09-17 | End: 2024-09-17

## 2024-09-17 RX ADMIN — ISOSORBIDE MONONITRATE 120 MG: 60 TABLET, EXTENDED RELEASE ORAL at 08:25

## 2024-09-17 RX ADMIN — CHOLEYSTYRAMINE LIGHT 4 G: 4 POWDER, FOR SUSPENSION ORAL at 09:52

## 2024-09-17 RX ADMIN — ACETAMINOPHEN 650 MG: 325 TABLET ORAL at 18:16

## 2024-09-17 RX ADMIN — ALLOPURINOL 200 MG: 100 TABLET ORAL at 08:25

## 2024-09-17 RX ADMIN — FLUTICASONE PROPIONATE 2 SPRAY: 50 SPRAY, METERED NASAL at 22:03

## 2024-09-17 RX ADMIN — FERROUS SULFATE TAB 325 MG (65 MG ELEMENTAL FE) 325 MG: 325 (65 FE) TAB at 08:25

## 2024-09-17 RX ADMIN — ATORVASTATIN CALCIUM 10 MG: 10 TABLET, FILM COATED ORAL at 08:25

## 2024-09-17 RX ADMIN — CETIRIZINE HYDROCHLORIDE 5 MG: 10 TABLET, FILM COATED ORAL at 08:25

## 2024-09-17 RX ADMIN — LIDOCAINE HYDROCHLORIDE 6 ML: 10 INJECTION, SOLUTION INFILTRATION; PERINEURAL at 15:49

## 2024-09-17 RX ADMIN — CARVEDILOL 12.5 MG: 12.5 TABLET, FILM COATED ORAL at 08:25

## 2024-09-17 RX ADMIN — Medication 10 ML: at 08:32

## 2024-09-17 RX ADMIN — ACETAMINOPHEN 650 MG: 325 TABLET ORAL at 08:32

## 2024-09-17 RX ADMIN — PREGABALIN 25 MG: 25 CAPSULE ORAL at 08:26

## 2024-09-17 RX ADMIN — FLUTICASONE PROPIONATE 2 SPRAY: 50 SPRAY, METERED NASAL at 08:24

## 2024-09-17 RX ADMIN — ACETAMINOPHEN 650 MG: 325 TABLET ORAL at 22:02

## 2024-09-17 RX ADMIN — Medication 10 ML: at 22:03

## 2024-09-17 RX ADMIN — Medication 1 TABLET: at 08:26

## 2024-09-17 RX ADMIN — CARVEDILOL 12.5 MG: 12.5 TABLET, FILM COATED ORAL at 18:11

## 2024-09-18 ENCOUNTER — APPOINTMENT (OUTPATIENT)
Dept: NEPHROLOGY | Facility: HOSPITAL | Age: 74
End: 2024-09-18
Payer: MEDICARE

## 2024-09-18 ENCOUNTER — READMISSION MANAGEMENT (OUTPATIENT)
Dept: CALL CENTER | Facility: HOSPITAL | Age: 74
End: 2024-09-18
Payer: MEDICARE

## 2024-09-18 VITALS
TEMPERATURE: 98 F | DIASTOLIC BLOOD PRESSURE: 72 MMHG | HEART RATE: 69 BPM | HEIGHT: 68 IN | WEIGHT: 168 LBS | RESPIRATION RATE: 18 BRPM | BODY MASS INDEX: 25.46 KG/M2 | SYSTOLIC BLOOD PRESSURE: 159 MMHG | OXYGEN SATURATION: 100 %

## 2024-09-18 LAB
ALBUMIN SERPL-MCNC: 2.9 G/DL (ref 3.5–5.2)
ANION GAP SERPL CALCULATED.3IONS-SCNC: 16 MMOL/L (ref 5–15)
BUN SERPL-MCNC: 52 MG/DL (ref 8–23)
BUN/CREAT SERPL: 11.7 (ref 7–25)
CALCIUM SPEC-SCNC: 8 MG/DL (ref 8.6–10.5)
CHLORIDE SERPL-SCNC: 101 MMOL/L (ref 98–107)
CO2 SERPL-SCNC: 21 MMOL/L (ref 22–29)
CREAT SERPL-MCNC: 4.45 MG/DL (ref 0.57–1)
EGFRCR SERPLBLD CKD-EPI 2021: 9.8 ML/MIN/1.73
GLUCOSE SERPL-MCNC: 102 MG/DL (ref 65–99)
PHOSPHATE SERPL-MCNC: 6.1 MG/DL (ref 2.5–4.5)
POTASSIUM SERPL-SCNC: 3.3 MMOL/L (ref 3.5–5.2)
SODIUM SERPL-SCNC: 138 MMOL/L (ref 136–145)

## 2024-09-18 PROCEDURE — 99239 HOSP IP/OBS DSCHRG MGMT >30: CPT | Performed by: PHYSICIAN ASSISTANT

## 2024-09-18 PROCEDURE — 25010000002 HEPARIN (PORCINE) PER 1000 UNITS: Performed by: INTERNAL MEDICINE

## 2024-09-18 PROCEDURE — 80069 RENAL FUNCTION PANEL: CPT | Performed by: PHYSICIAN ASSISTANT

## 2024-09-18 RX ORDER — FOLIC ACID/VIT B COMPLEX AND C 0.8 MG
1 TABLET ORAL DAILY
Qty: 90 TABLET | Refills: 3 | Status: SHIPPED | OUTPATIENT
Start: 2024-09-19

## 2024-09-18 RX ORDER — ISOSORBIDE MONONITRATE 120 MG/1
120 TABLET, EXTENDED RELEASE ORAL DAILY
Qty: 90 TABLET | Refills: 3 | Status: SHIPPED | OUTPATIENT
Start: 2024-09-18 | End: 2025-09-13

## 2024-09-18 RX ORDER — FERROUS SULFATE 325(65) MG
325 TABLET ORAL
Qty: 90 TABLET | Refills: 3 | Status: SHIPPED | OUTPATIENT
Start: 2024-09-19

## 2024-09-18 RX ORDER — ALBUTEROL SULFATE 90 UG/1
2 INHALANT RESPIRATORY (INHALATION) EVERY 4 HOURS PRN
Start: 2024-09-18

## 2024-09-18 RX ORDER — NIFEDIPINE 60 MG/1
60 TABLET, EXTENDED RELEASE ORAL
Status: DISCONTINUED | OUTPATIENT
Start: 2024-09-18 | End: 2024-09-18 | Stop reason: HOSPADM

## 2024-09-18 RX ORDER — CARVEDILOL 12.5 MG/1
12.5 TABLET ORAL 2 TIMES DAILY WITH MEALS
Qty: 180 TABLET | Refills: 3 | Status: SHIPPED | OUTPATIENT
Start: 2024-09-18 | End: 2024-09-18

## 2024-09-18 RX ORDER — CARVEDILOL 12.5 MG/1
12.5 TABLET ORAL 2 TIMES DAILY WITH MEALS
Qty: 180 TABLET | Refills: 3 | Status: SHIPPED | OUTPATIENT
Start: 2024-09-18

## 2024-09-18 RX ORDER — TORSEMIDE 100 MG/1
50 TABLET ORAL DAILY
Start: 2024-09-18

## 2024-09-18 RX ADMIN — CARVEDILOL 12.5 MG: 12.5 TABLET, FILM COATED ORAL at 10:57

## 2024-09-18 RX ADMIN — ISOSORBIDE MONONITRATE 120 MG: 60 TABLET, EXTENDED RELEASE ORAL at 10:59

## 2024-09-18 RX ADMIN — Medication 10 ML: at 11:00

## 2024-09-18 RX ADMIN — PREGABALIN 25 MG: 25 CAPSULE ORAL at 10:59

## 2024-09-18 RX ADMIN — ACETAMINOPHEN 650 MG: 325 TABLET ORAL at 03:11

## 2024-09-18 RX ADMIN — NIFEDIPINE 60 MG: 60 TABLET, EXTENDED RELEASE ORAL at 14:05

## 2024-09-18 RX ADMIN — HEPARIN SODIUM 2000 UNITS: 1000 INJECTION INTRAVENOUS; SUBCUTANEOUS at 08:44

## 2024-09-18 RX ADMIN — ALLOPURINOL 200 MG: 100 TABLET ORAL at 10:58

## 2024-09-18 RX ADMIN — ATORVASTATIN CALCIUM 10 MG: 10 TABLET, FILM COATED ORAL at 10:57

## 2024-09-18 RX ADMIN — Medication 1 TABLET: at 10:58

## 2024-09-18 RX ADMIN — ACETAMINOPHEN 650 MG: 325 TABLET ORAL at 08:43

## 2024-09-18 RX ADMIN — CHOLEYSTYRAMINE LIGHT 4 G: 4 POWDER, FOR SUSPENSION ORAL at 10:58

## 2024-09-18 RX ADMIN — CETIRIZINE HYDROCHLORIDE 5 MG: 10 TABLET, FILM COATED ORAL at 10:59

## 2024-09-18 RX ADMIN — FERROUS SULFATE TAB 325 MG (65 MG ELEMENTAL FE) 325 MG: 325 (65 FE) TAB at 10:58

## 2024-09-25 ENCOUNTER — READMISSION MANAGEMENT (OUTPATIENT)
Dept: CALL CENTER | Facility: HOSPITAL | Age: 74
End: 2024-09-25
Payer: MEDICARE

## 2024-10-02 ENCOUNTER — READMISSION MANAGEMENT (OUTPATIENT)
Dept: CALL CENTER | Facility: HOSPITAL | Age: 74
End: 2024-10-02
Payer: MEDICARE

## 2024-10-02 NOTE — OUTREACH NOTE
Medical Week 2 Survey      Flowsheet Row Responses   [facility] patient discharged from? [facility]   Does the patient have one of the following disease processes/diagnoses(primary or secondary)? Other   Week 2 attempt successful? No   Unsuccessful attempts Attempt 1            Anne Marie GONZALEZ - Registered Nurse

## 2024-10-11 ENCOUNTER — APPOINTMENT (OUTPATIENT)
Dept: INTERVENTIONAL RADIOLOGY/VASCULAR | Facility: HOSPITAL | Age: 74
End: 2024-10-11
Payer: MEDICARE

## 2024-10-11 ENCOUNTER — HOSPITAL ENCOUNTER (EMERGENCY)
Facility: HOSPITAL | Age: 74
Discharge: HOME | End: 2024-10-11
Attending: EMERGENCY MEDICINE
Payer: MEDICARE

## 2024-10-11 ENCOUNTER — APPOINTMENT (OUTPATIENT)
Dept: GENERAL RADIOLOGY | Facility: HOSPITAL | Age: 74
End: 2024-10-11
Payer: MEDICARE

## 2024-10-11 VITALS
HEIGHT: 68 IN | DIASTOLIC BLOOD PRESSURE: 79 MMHG | HEART RATE: 75 BPM | OXYGEN SATURATION: 100 % | RESPIRATION RATE: 16 BRPM | BODY MASS INDEX: 25.31 KG/M2 | SYSTOLIC BLOOD PRESSURE: 178 MMHG | TEMPERATURE: 97.5 F | WEIGHT: 167 LBS

## 2024-10-11 DIAGNOSIS — T82.9XXA COMPLICATION ASSOCIATED WITH DIALYSIS CATHETER: Primary | ICD-10-CM

## 2024-10-11 LAB
ALBUMIN SERPL-MCNC: 3.6 G/DL (ref 3.5–5.2)
ALBUMIN/GLOB SERPL: 1.4 G/DL
ALP SERPL-CCNC: 114 U/L (ref 39–117)
ALT SERPL W P-5'-P-CCNC: 14 U/L (ref 1–33)
ANION GAP SERPL CALCULATED.3IONS-SCNC: 13 MMOL/L (ref 5–15)
AST SERPL-CCNC: 22 U/L (ref 1–32)
BASOPHILS # BLD AUTO: 0.03 10*3/MM3 (ref 0–0.2)
BASOPHILS NFR BLD AUTO: 0.6 % (ref 0–1.5)
BILIRUB SERPL-MCNC: 0.3 MG/DL (ref 0–1.2)
BUN SERPL-MCNC: 46 MG/DL (ref 8–23)
BUN/CREAT SERPL: 12.8 (ref 7–25)
CALCIUM SPEC-SCNC: 9.4 MG/DL (ref 8.6–10.5)
CHLORIDE SERPL-SCNC: 106 MMOL/L (ref 98–107)
CO2 SERPL-SCNC: 23 MMOL/L (ref 22–29)
CREAT SERPL-MCNC: 3.59 MG/DL (ref 0.57–1)
DEPRECATED RDW RBC AUTO: 53.3 FL (ref 37–54)
EGFRCR SERPLBLD CKD-EPI 2021: 12.7 ML/MIN/1.73
EOSINOPHIL # BLD AUTO: 0.19 10*3/MM3 (ref 0–0.4)
EOSINOPHIL NFR BLD AUTO: 4 % (ref 0.3–6.2)
ERYTHROCYTE [DISTWIDTH] IN BLOOD BY AUTOMATED COUNT: 15.8 % (ref 12.3–15.4)
GLOBULIN UR ELPH-MCNC: 2.5 GM/DL
GLUCOSE SERPL-MCNC: 114 MG/DL (ref 65–99)
HCT VFR BLD AUTO: 30.9 % (ref 34–46.6)
HGB BLD-MCNC: 10.2 G/DL (ref 12–15.9)
IMM GRANULOCYTES # BLD AUTO: 0.01 10*3/MM3 (ref 0–0.05)
IMM GRANULOCYTES NFR BLD AUTO: 0.2 % (ref 0–0.5)
LYMPHOCYTES # BLD AUTO: 2.02 10*3/MM3 (ref 0.7–3.1)
LYMPHOCYTES NFR BLD AUTO: 42.3 % (ref 19.6–45.3)
MAGNESIUM SERPL-MCNC: 2.2 MG/DL (ref 1.6–2.4)
MCH RBC QN AUTO: 30.1 PG (ref 26.6–33)
MCHC RBC AUTO-ENTMCNC: 33 G/DL (ref 31.5–35.7)
MCV RBC AUTO: 91.2 FL (ref 79–97)
MONOCYTES # BLD AUTO: 0.52 10*3/MM3 (ref 0.1–0.9)
MONOCYTES NFR BLD AUTO: 10.9 % (ref 5–12)
NEUTROPHILS NFR BLD AUTO: 2.01 10*3/MM3 (ref 1.7–7)
NEUTROPHILS NFR BLD AUTO: 42 % (ref 42.7–76)
NRBC BLD AUTO-RTO: 0 /100 WBC (ref 0–0.2)
PHOSPHATE SERPL-MCNC: 4.9 MG/DL (ref 2.5–4.5)
PLATELET # BLD AUTO: 231 10*3/MM3 (ref 140–450)
PMV BLD AUTO: 9.9 FL (ref 6–12)
POTASSIUM SERPL-SCNC: 4 MMOL/L (ref 3.5–5.2)
PROT SERPL-MCNC: 6.1 G/DL (ref 6–8.5)
RBC # BLD AUTO: 3.39 10*6/MM3 (ref 3.77–5.28)
SODIUM SERPL-SCNC: 142 MMOL/L (ref 136–145)
WBC NRBC COR # BLD AUTO: 4.78 10*3/MM3 (ref 3.4–10.8)

## 2024-10-11 PROCEDURE — 84100 ASSAY OF PHOSPHORUS: CPT | Performed by: NURSE PRACTITIONER

## 2024-10-11 PROCEDURE — 36581 REPLACE TUNNELED CV CATH: CPT

## 2024-10-11 PROCEDURE — 77001 FLUOROGUIDE FOR VEIN DEVICE: CPT

## 2024-10-11 PROCEDURE — 83735 ASSAY OF MAGNESIUM: CPT | Performed by: NURSE PRACTITIONER

## 2024-10-11 PROCEDURE — 25010000002 LIDOCAINE 1% - EPINEPHRINE 1:100000 1 %-1:100000 SOLUTION: Performed by: RADIOLOGY

## 2024-10-11 PROCEDURE — 25010000002 HEPARIN (PORCINE) PER 1000 UNITS

## 2024-10-11 PROCEDURE — 36415 COLL VENOUS BLD VENIPUNCTURE: CPT

## 2024-10-11 PROCEDURE — C1750 CATH, HEMODIALYSIS,LONG-TERM: HCPCS

## 2024-10-11 PROCEDURE — C1769 GUIDE WIRE: HCPCS

## 2024-10-11 PROCEDURE — 71046 X-RAY EXAM CHEST 2 VIEWS: CPT

## 2024-10-11 PROCEDURE — 85025 COMPLETE CBC W/AUTO DIFF WBC: CPT | Performed by: NURSE PRACTITIONER

## 2024-10-11 PROCEDURE — 99284 EMERGENCY DEPT VISIT MOD MDM: CPT

## 2024-10-11 PROCEDURE — 80053 COMPREHEN METABOLIC PANEL: CPT | Performed by: NURSE PRACTITIONER

## 2024-10-11 RX ORDER — HEPARIN SODIUM 1000 [USP'U]/ML
INJECTION, SOLUTION INTRAVENOUS; SUBCUTANEOUS
Status: COMPLETED
Start: 2024-10-11 | End: 2024-10-11

## 2024-10-11 RX ORDER — LIDOCAINE HYDROCHLORIDE AND EPINEPHRINE 10; 10 MG/ML; UG/ML
8 INJECTION, SOLUTION INFILTRATION; PERINEURAL ONCE
Status: COMPLETED | OUTPATIENT
Start: 2024-10-11 | End: 2024-10-11

## 2024-10-11 RX ADMIN — HEPARIN SODIUM 4500 UNITS: 1000 INJECTION INTRAVENOUS; SUBCUTANEOUS at 15:32

## 2024-10-11 RX ADMIN — LIDOCAINE HYDROCHLORIDE AND EPINEPHRINE 10 ML: 10; 10 INJECTION, SOLUTION INFILTRATION; PERINEURAL at 15:23

## 2024-10-11 NOTE — H&P
[facility]   Vascular Interventional Radiology  History & Physicial        Patient Name:Sara Esparza    : 1950    MRN: [id number]    Primary Care Physician: Toribio Roberts MD    Referring Physician: No ref. provider found     Date of admission: (Not on file)    Subjective     Reason for Consult: TDC malfunction    History of Present Illness     Sara Esparza is a 74 y.o. female referred to IR as noted above.      Active Hospital Problems:  There are no active hospital problems to display for this patient.      Personal History     Past Medical History:   Diagnosis Date    Anxiety     Arthritis     Asthma     allergy induced    Back pain     Depression     Diabetes mellitus     dx 10 years ago- checks fsbs most days    Diverticula of colon     GERD (gastroesophageal reflux disease)     Head ache     Hypertension     Sleep apnea     no longer needs cpap - approx. 10 years r/t weight loss       Past Surgical History:   Procedure Laterality Date    BACK SURGERY      x 2    CARDIAC CATHETERIZATION      no intervention    CHOLECYSTECTOMY OPEN      COLONOSCOPY          HYSTERECTOMY      REPLACEMENT TOTAL KNEE BILATERAL Bilateral     TONSILLECTOMY      VENTRAL/INCISIONAL HERNIA REPAIR N/A 2017    Procedure: INCISIONAL HERNIA REPAIR LAPAROSCOPIC;  Surgeon: Conrad Hernandez MD;  Location: [facility];  Service:        Family History: Her family history is not on file.     Social History: She  reports that she has never smoked. She has never used smokeless tobacco. She reports that she does not drink alcohol and does not use drugs.    Home Medications:  Cholecalciferol, Full Spectrum B, NIFEdipine CC, SITagliptin, acetaminophen, albuterol sulfate HFA, allopurinol, apixaban, atorvastatin, carvedilol, famotidine, ferrous sulfate, isosorbide mononitrate, multivitamin with minerals, omeprazole, and torsemide    Current Medications:    heparin (porcine)     Allergies:  No Known Allergies    Review of  "Systems    IR Procedure pertinent significant findings are mentioned in the PMH and PSH above.    Objective     Visit Vitals  /68 (BP Location: Left arm, Patient Position: Sitting)   Pulse 79   Temp 97.5 °F (36.4 °C) (Oral)   Resp 18   Ht 172.7 cm (68\")   Wt 75.8 kg (167 lb)   SpO2 98%   BMI 25.39 kg/m²        Physical Exam    A&Ox3.   Able to communicate  No Apparent Distress  Average physique   CVS: VS as noted. Chart reviewed. Stable for the procedure.  Respiratory: Non labored breathing. No signs of respiratory compromise.    Result Review      I have personally reviewed the results from the time of this admission to 10/11/2024 14:57 EDT and agree with these findings.  [x]  Laboratory  []  Microbiology  [x]  Radiology  []  EKG/Telemetry   []  Cardiology/Vascular   []  Pathology  []  Old records  []  Other:    Most notable findings include: As noted:    Results from last 7 days   Lab Units 10/11/24  1405   WBC 10*3/mm3 4.78   HEMOGLOBIN g/dL 10.2*   HEMATOCRIT % 30.9*   PLATELETS 10*3/mm3 231       Results from last 7 days   Lab Units 10/11/24  1405   SODIUM mmol/L 142   POTASSIUM mmol/L 4.0   CHLORIDE mmol/L 106   CO2 mmol/L 23.0   BUN mg/dL 46*   CREATININE mg/dL 3.59*   EGFR mL/min/1.73 12.7*   GLUCOSE mg/dL 114*           Lab 10/11/24  1405   TOTAL PROTEIN 6.1   ALBUMIN 3.6   GLOBULIN 2.5   ALT (SGPT) 14   AST (SGOT) 22   BILIRUBIN 0.3   ALK PHOS 114       Estimated Creatinine Clearance: 16.2 mL/min (A) (by C-G formula based on SCr of 3.59 mg/dL (H)).   Creatinine   Date Value Ref Range Status   10/11/2024 3.59 (H) 0.57 - 1.00 mg/dL Final       COVID19   Date Value Ref Range Status   09/15/2024 Not Detected Not Detected - Ref. Range Final        No results found for: \"PREGTESTUR\", \"PREGSERUM\", \"HCG\", \"HCGQUANT\"     ASA SCALE ASSESSMENT (applicable ONLY if sedation planned):   3     MALLAMPATI CLASSIFICATION (applicable ONLY if sedation planned):   2    Assessment / Plan     Sara S Isaias is a 74 y.o. " female referred to the IR service with above problem.    Plan:   As above.    Notice: The note was created before the performance of the procedure. It might have been left in the pending status and signed off after the procedure. The time stamp on the note may be misleading.    Umang Gavin MD   Vascular Interventional Radiology  10/11/24   2:57 PM EDT

## 2024-10-11 NOTE — PROCEDURES
The following procedure was performed: TDC exchange for a longer catheter.    Please see corresponding Radiology report for in detail procedural information. The Radiology report will be dictated shortly, if not done so already. Please see the IR RN note for the information regarding medicines administered if any, jd-procedural vitals and I/O information.

## 2024-10-11 NOTE — NURSING NOTE
Image guided tunneled dialysis catheter exchange completed via Dr. Gavin. No sedation used, local anesthetic only. Tolerated well.

## 2024-10-11 NOTE — ED PROVIDER NOTES
" EMERGENCY DEPARTMENT ENCOUNTER    Pt Name: Sara Esparza  MRN: [id number]  Pt :   1950  Room Number:    Date of encounter:  10/11/2024  PCP: Toribio Roberts MD  ED Provider: TRISTEN Gil    Historian: Patient, spouse      HPI:  Chief Complaint: Tunneled catheter malfunction        Context: Sara Esparza is a 74 y.o. female who presents to the ED c/o tunneled catheter malfunction.  Patient reports recent admission to the hospital due to an abnormal labs with critical BUN of 111, unintentional weight loss, poor p.o. intake, MARCUS.  Tunneled HD catheter placed on 2024.  Patient receives hemodialysis MWF.  She sent from dialysis clinic today due to catheter malfunctioning.  Staff of the clinic concerned that it was \"flipped\".  Patient reported pain around the catheter yesterday.      PAST MEDICAL HISTORY  Past Medical History:   Diagnosis Date    Anxiety     Arthritis     Asthma     allergy induced    Back pain     Depression     Diabetes mellitus     dx 10 years ago- checks fsbs most days    Diverticula of colon     GERD (gastroesophageal reflux disease)     Head ache     Hypertension     Sleep apnea     no longer needs cpap - approx. 10 years r/t weight loss         PAST SURGICAL HISTORY  Past Surgical History:   Procedure Laterality Date    BACK SURGERY      x 2    CARDIAC CATHETERIZATION      no intervention    CHOLECYSTECTOMY OPEN      COLONOSCOPY      2013    HYSTERECTOMY      REPLACEMENT TOTAL KNEE BILATERAL Bilateral     TONSILLECTOMY      VENTRAL/INCISIONAL HERNIA REPAIR N/A 2017    Procedure: INCISIONAL HERNIA REPAIR LAPAROSCOPIC;  Surgeon: Conrad Hernandez MD;  Location: [facility];  Service:          FAMILY HISTORY  History reviewed. No pertinent family history.      SOCIAL HISTORY  Social History     Socioeconomic History    Marital status:    Tobacco Use    Smoking status: Never    Smokeless tobacco: Never   Vaping Use    Vaping status: Never Used   Substance and " Sexual Activity    Alcohol use: No    Drug use: No    Sexual activity: Defer         ALLERGIES  Patient has no known allergies.        REVIEW OF SYSTEMS  Review of Systems       All systems reviewed and negative except for those discussed in HPI.       PHYSICAL EXAM    I have reviewed the triage vital signs and nursing notes.    ED Triage Vitals [10/11/24 1337]   Temp Heart Rate Resp BP SpO2   97.5 °F (36.4 °C) 79 18 162/68 98 %      Temp src Heart Rate Source Patient Position BP Location FiO2 (%)   Oral Monitor Sitting Left arm --       Physical Exam  GENERAL:   Appears in no acute distress. Pale  HENT: Nares patent.  EYES: No scleral icterus.  CV: Regular rhythm, regular rate.  RESPIRATORY: Normal effort.  No audible wheezes, rales or rhonchi.  Tunneled catheter right anterior chest  ABDOMEN: Soft, nontender  MUSCULOSKELETAL: No deformities.   NEURO: Alert, moves all extremities, follows commands.  SKIN: Warm, dry, no rash visualized.      LAB RESULTS  Recent Results (from the past 24 hour(s))   Comprehensive Metabolic Panel    Collection Time: 10/11/24  2:05 PM    Specimen: Blood   Result Value Ref Range    Glucose 114 (H) 65 - 99 mg/dL    BUN 46 (H) 8 - 23 mg/dL    Creatinine 3.59 (H) 0.57 - 1.00 mg/dL    Sodium 142 136 - 145 mmol/L    Potassium 4.0 3.5 - 5.2 mmol/L    Chloride 106 98 - 107 mmol/L    CO2 23.0 22.0 - 29.0 mmol/L    Calcium 9.4 8.6 - 10.5 mg/dL    Total Protein 6.1 6.0 - 8.5 g/dL    Albumin 3.6 3.5 - 5.2 g/dL    ALT (SGPT) 14 1 - 33 U/L    AST (SGOT) 22 1 - 32 U/L    Alkaline Phosphatase 114 39 - 117 U/L    Total Bilirubin 0.3 0.0 - 1.2 mg/dL    Globulin 2.5 gm/dL    A/G Ratio 1.4 g/dL    BUN/Creatinine Ratio 12.8 7.0 - 25.0    Anion Gap 13.0 5.0 - 15.0 mmol/L    eGFR 12.7 (L) >60.0 mL/min/1.73   Magnesium    Collection Time: 10/11/24  2:05 PM    Specimen: Blood   Result Value Ref Range    Magnesium 2.2 1.6 - 2.4 mg/dL   CBC Auto Differential    Collection Time: 10/11/24  2:05 PM    Specimen:  Blood   Result Value Ref Range    WBC 4.78 3.40 - 10.80 10*3/mm3    RBC 3.39 (L) 3.77 - 5.28 10*6/mm3    Hemoglobin 10.2 (L) 12.0 - 15.9 g/dL    Hematocrit 30.9 (L) 34.0 - 46.6 %    MCV 91.2 79.0 - 97.0 fL    MCH 30.1 26.6 - 33.0 pg    MCHC 33.0 31.5 - 35.7 g/dL    RDW 15.8 (H) 12.3 - 15.4 %    RDW-SD 53.3 37.0 - 54.0 fl    MPV 9.9 6.0 - 12.0 fL    Platelets 231 140 - 450 10*3/mm3    Neutrophil % 42.0 (L) 42.7 - 76.0 %    Lymphocyte % 42.3 19.6 - 45.3 %    Monocyte % 10.9 5.0 - 12.0 %    Eosinophil % 4.0 0.3 - 6.2 %    Basophil % 0.6 0.0 - 1.5 %    Immature Grans % 0.2 0.0 - 0.5 %    Neutrophils, Absolute 2.01 1.70 - 7.00 10*3/mm3    Lymphocytes, Absolute 2.02 0.70 - 3.10 10*3/mm3    Monocytes, Absolute 0.52 0.10 - 0.90 10*3/mm3    Eosinophils, Absolute 0.19 0.00 - 0.40 10*3/mm3    Basophils, Absolute 0.03 0.00 - 0.20 10*3/mm3    Immature Grans, Absolute 0.01 0.00 - 0.05 10*3/mm3    nRBC 0.0 0.0 - 0.2 /100 WBC   Phosphorus    Collection Time: 10/11/24  2:05 PM    Specimen: Blood   Result Value Ref Range    Phosphorus 4.9 (H) 2.5 - 4.5 mg/dL       If labs were ordered, I independently reviewed the results and considered them in treating the patient.        RADIOLOGY  IR Replace/Exchange Tunneled CV Cath WO Port    Result Date: 10/11/2024  IR REPLACE/EXCHANGE TUNNELED CV CATH WO PORT History: Catheter malfunction    : Umang Gavin MD.                                                                             Modality: Fluoroscopy                              DOSE REDUCTION: The examination was performed according to departmental dose-optimization program. Fluoro time: 0.1 minutes. Radiation dose: 0 mGy air Kerma. 2.74 uGy square meters                                                                              No sedation was used Medicines: Ancef 2 g IV Anesthesia: Lidocaine 1% with epinephrine, local infiltration.          Estimated blood loss:  < 5 cc.         Technique: A thorough discussion of  the risks, benefits, and alternatives of the procedure, and if applicable, moderate sedation, was carried out with the patient. They were encouraged to ask any questions. Any questions were answered. They verbalized understanding. A written informed consent was then signed. A multi-component timeout was performed prior to starting the procedure using the departmental protocol. The procedure room personnel used personal protective equipment. The operators used sterile gloves and if indicated, sterile gowns. The surgical site was prepped with chlorhexidine gluconate  and draped in the maximal applicable sterile fashion. Local anesthesia was infiltrated at the exit site and along the tunnel. Heparin was aspirated from the catheter port(s). Over the wire, the existing catheter was withdrawn after freeing the retention cuff from the tunnel using blunt dissection. A new cuffed tunneled hemodialysis catheter was placed over the wire with the tip positioned in the right atrium. The catheter aspirated and flushed well and was terminally packed with heparin 1000 units per cc. The catheter was secured to skin using nonabsorbable suture and a CHG dressing applied. The patient was transferred to the recovery area and was discharged from the department in stable condition.                     Complications: None immediate.    Device: 14.5 Armenian x 23 cm cuff to tip Palindrome catheter. Findings: A  image reveals the catheter tip to be in the SVC. Final image shows the catheter to be in good position with the catheter tip in the right atrium, an excellent position for use. There is no complication.                                                               Impression:    Successful ultrasound and fluoroscopic guided right IJ vein route 23 cm cuff to tip cuffed tunneled hemodialysis catheter placement in exchange for a 19 cm cuff to tip tunneled dialysis catheter with malfunction as described above. Thank you for the  opportunity to assist in the care of your patient. Electronically Signed: Umang Gavin MD  10/11/2024 3:45 PM EDT  Workstation ID: [id number]    XR Chest 2 View    Result Date: 10/11/2024  XR CHEST 2 VW Date of Exam: 10/11/2024 2:24 PM EDT Indication: dialysis catheter dislodged Comparison: September 13, 2024 Findings: There is a dialysis catheter noted with its tip in the superior vena cava. There is no pneumothorax. There are no definite filtrates. There are no pleural effusions. Stimulator leads project over the mid/lower thoracic spine. Tip is around T8. There is hardware in the visualized lumbar spine.     Impression: 1.An acute pulmonary process is not apparent. Electronically Signed: Vinod Martell MD  10/11/2024 2:52 PM EDT  Workstation ID: [id number]     I ordered and independently reviewed the above noted radiographic studies.      I viewed images of chest x-ray which showed tunnel catheter, no lobar pneumonia per my independent interpretation.    See radiologist's dictation for official interpretation.        PROCEDURES    Procedures    No orders to display       MEDICATIONS GIVEN IN ER    Medications   heparin (porcine) 1000 UNIT/ML injection  - ADS Override Pull (4,500 Units  Given 10/11/24 1532)   lidocaine 1% - EPINEPHrine 1:720018 (XYLOCAINE W/EPI) 1 %-1:059051 injection 8 mL (10 mL Infiltration Given 10/11/24 1523)         MEDICAL DECISION MAKING, PROGRESS, and CONSULTS    All labs, if obtained, have been independently reviewed by me.  All radiology studies, if obtained, have been reviewed by me and the radiologist dictating the report.  All EKG's, if obtained, have been independently viewed and interpreted by me/my attending physician.      Discussion below represents my analysis of pertinent findings related to patient's condition, differential diagnosis, treatment plan and final disposition.  Patient is 74-year-old female who presented for evaluation of low functioning HD catheter.  On physical exam  she was nontoxic-appearing, tunneled catheter present to the right anterior chest, lab work was obtained significant for creatinine 3.59, respiratory panel negative, phosphorus 4.9, potassium 4 within the range, chest x-ray showing no acute pulmonary process.  Vital signs were stable, no hypoxia..  Dr. Gavin performed IR replaced exchange tunneled CVC catheter with port procedure.  Patient has appointment with dialysis clinic tomorrow morning.  She was discharged in stable condition, return precautions for any new or concerning symptoms were discussed.  Patient was understanding, agreeable, was stable on discharge.                       Differential diagnosis:    Tunneled catheter malfunction, hyperkalemia, fluid overload, electrolyte disbalance      Additional sources:    - Discussed/ obtained information from independent historians: Spouse    - External (non-ED) record review:    Narrative & Impression   DATE OF EXAM:  9/13/2024 3:20 PM EDT     PROCEDURE:  IR INS TUNNELED DIALYSIS CATHETER WO PORT     INDICATIONS:  HD initiation     COMPARISON:  No Comparisons Available     FLUOROSCOPIC TIME:  6 seconds     PHYSICIAN MONITORED CONSCIOUS SEDATION TIME:  8 minutes     TECHNIQUE:   A detailed explanation of the procedure, including the risks, benefits, and alternatives was provided. A preprocedure timeout was performed. The interventional radiology nurse monitored the patient at all times and provided conscious sedation. The   patient was placed supine on the fluoroscopy table and the right neck and chest were prepped and draped using maximal sterile barrier technique. The skin and subcutaneous tissues were anesthetized with 1% lidocaine. A small skin incision was made. Next,   under direct ultrasound guidance access was obtained to the right internal jugular vein with a micropuncture kit. Ultrasound visualized the needle entering the internal jugular vein. Ultrasound demonstrated patent right internal jugular vein  and an image   was obtained. A guidewire was manipulated centrally under fluoroscopic guidance.     Next, an appropriate exit site was marked, anesthetized and a small skin incision was made. The PermCath was then tunneled from the exit site to the access site.     The access site was then serially dilated to accommodate a peel-away sheath. The catheter was then placed through the peel-away sheath and the peel-away sheath and wire were removed. Fluoroscopy confirmed catheter tip in satisfactory position. Both ports   aspirated and flushed normally and were locked with heparinized saline solution. The catheter was secured to the skin with Prolene. The access site was closed with Dermabond and Steri-Strips. The patient tolerated the procedure well without immediate   complication.     FINDINGS:  See above     IMPRESSION:     1. Successful placement of right IJ PermCath        Electronically Signed: Josh Barrett MD    9/13/2024 3:52 PM EDT      Study Result    Narrative & Impression   IR REPLACE/EXCHANGE TUNNELED CV CATH WO PORT     History: Catheter malfunction       : Umang Gavin MD.                                                                                Modality: Fluoroscopy                                  DOSE REDUCTION: The examination was performed according to departmental dose-optimization program.      Fluoro time: 0.1 minutes.     Radiation dose: 0 mGy air Kerma. 2.74 uGy square meters                                                                                 No sedation was used     Medicines: Ancef 2 g IV     Anesthesia: Lidocaine 1% with epinephrine, local infiltration.               Estimated blood loss:  < 5 cc.              Technique:   A thorough discussion of the risks, benefits, and alternatives of the procedure, and if applicable, moderate sedation, was carried out with the patient. They were encouraged to ask any questions. Any questions were answered. They  verbalized   understanding. A written informed consent was then signed.     A multi-component timeout was performed prior to starting the procedure using the departmental protocol.      The procedure room personnel used personal protective equipment. The operators used sterile gloves and if indicated, sterile gowns. The surgical site was prepped with chlorhexidine gluconate  and draped in the maximal applicable sterile fashion.     Local anesthesia was infiltrated at the exit site and along the tunnel.     Heparin was aspirated from the catheter port(s).     Over the wire, the existing catheter was withdrawn after freeing the retention cuff from the tunnel using blunt dissection.      A new cuffed tunneled hemodialysis catheter was placed over the wire with the tip positioned in the right atrium.     The catheter aspirated and flushed well and was terminally packed with heparin 1000 units per cc.     The catheter was secured to skin using nonabsorbable suture and a CHG dressing applied.     The patient was transferred to the recovery area and was discharged from the department in stable condition.                        Complications: None immediate.         Device: 14.5 Macedonian x 23 cm cuff to tip Palindrome catheter.     Findings: A  image reveals the catheter tip to be in the SVC. Final image shows the catheter to be in good position with the catheter tip in the right atrium, an excellent position for use. There is no complication.                                                              IMPRESSION:  Impression:      Successful ultrasound and fluoroscopic guided right IJ vein route 23 cm cuff to tip cuffed tunneled hemodialysis catheter placement in exchange for a 19 cm cuff to tip tunneled dialysis catheter with malfunction as described above.     Thank you for the opportunity to assist in the care of your patient.              Electronically Signed: Umang Gavin MD    10/11/2024 3:45 PM EDT     Workstation ID: [id number]       - Chronic or social conditions impacting care:  ESRD    - Shared decision making: Patient      Orders placed during this visit:  Orders Placed This Encounter   Procedures    XR Chest 2 View    IR Replace/Exchange Tunneled CV Cath WO Port    Comprehensive Metabolic Panel    Magnesium    CBC Auto Differential    Phosphorus    Ok to use the IR placed Venous Access Device. Device care per hospital protocol.  Nursing Communication    CBC & Differential         Additional orders considered but not ordered:      ED Course:    Consultants:      ED Course as of 10/11/24 2231   Fri Oct 11, 2024   1402 Spoke with interventional radiology technician, Dr. Gavin in the case; I sent secure chat Dr. Gavin with inquiry. [IR]   1430 Dr. Gavin replied to the message, will replace the catheter.  Order placed.  [IR]   1500 Successful ultrasound and fluoroscopic guided right IJ vein route 23 cm cuff to tip cuffed tunneled hemodialysis catheter placement in exchange for a 19 cm cuff to tip tunneled dialysis catheter with malfunction as described above.    Electronically Signed: Umang Gavin MD    10/11/2024 3:45 PM EDT    Workstation ID: [id number]   [IR]   1606 Spoke with the patient, she is feeling well, resting the stretcher.  [facility] called her with appointment for dialysis on Saturday morning(tomorrow). [IR]      ED Course User Index  [IR] Ailyn Mcclain APRN              Shared Decision Making:  After my consideration of clinical presentation and any laboratory/radiology studies obtained, I discussed the findings with the patient/patient representative who is in agreement with the treatment plan and the final disposition.   Risks and benefits of discharge and/or observation/admission were discussed.       AS OF 22:31 EDT VITALS:    BP - 178/79  HR - 75  TEMP - 97.5 °F (36.4 °C) (Oral)  O2 SATS - 100%                  DIAGNOSIS  Final diagnoses:   Complication associated with dialysis  catheter         DISPOSITION  DISCHARGE    Patient discharged in stable condition.    Reviewed implications of results, diagnosis, meds, responsibility to follow up, warning signs and symptoms of possible worsening, potential complications and reasons to return to ER.    Patient/Family voiced understanding of above instructions.    Discussed plan for discharge, as there is no emergent indication for admission.  Pt/family is agreeable and understands need for follow up and possible repeat testing.  Pt/family is aware that discharge does not mean that nothing is wrong but that it indicates no emergency is currently present that requires admission and they must continue care with follow-up as given below or with a physician of their choice.     FOLLOW-UP  Toribio Roberts MD  [address]  [address]  [place] [place] [facility] [address]  [phone]          [facility] EMERGENCY DEPARTMENT  [address] [address] [phone]    If symptoms worsen         Medication List      No changes were made to your prescriptions during this visit.            Please note that portions of this document were completed with voice recognition software.     TRISTEN Gil   10/11/24   22:31 EDT        Ailyn Mcclain APRN  10/11/24 2231

## 2024-10-12 ENCOUNTER — APPOINTMENT (OUTPATIENT)
Dept: CT IMAGING | Facility: HOSPITAL | Age: 74
End: 2024-10-12
Payer: MEDICARE

## 2024-10-12 ENCOUNTER — HOSPITAL ENCOUNTER (EMERGENCY)
Facility: HOSPITAL | Age: 74
Discharge: HOME | End: 2024-10-12
Attending: EMERGENCY MEDICINE
Payer: MEDICARE

## 2024-10-12 ENCOUNTER — APPOINTMENT (OUTPATIENT)
Dept: GENERAL RADIOLOGY | Facility: HOSPITAL | Age: 74
End: 2024-10-12
Payer: MEDICARE

## 2024-10-12 VITALS
DIASTOLIC BLOOD PRESSURE: 65 MMHG | OXYGEN SATURATION: 97 % | HEART RATE: 74 BPM | TEMPERATURE: 98.8 F | HEIGHT: 68 IN | BODY MASS INDEX: 25.31 KG/M2 | WEIGHT: 167 LBS | RESPIRATION RATE: 16 BRPM | SYSTOLIC BLOOD PRESSURE: 147 MMHG

## 2024-10-12 DIAGNOSIS — T82.9XXA VASCULAR PORT COMPLICATION, INITIAL ENCOUNTER: Primary | ICD-10-CM

## 2024-10-12 LAB
ALBUMIN SERPL-MCNC: 3.4 G/DL (ref 3.5–5.2)
ALBUMIN/GLOB SERPL: 1.3 G/DL
ALP SERPL-CCNC: 107 U/L (ref 39–117)
ALT SERPL W P-5'-P-CCNC: 10 U/L (ref 1–33)
ANION GAP SERPL CALCULATED.3IONS-SCNC: 13 MMOL/L (ref 5–15)
AST SERPL-CCNC: 25 U/L (ref 1–32)
BASOPHILS # BLD AUTO: 0.03 10*3/MM3 (ref 0–0.2)
BASOPHILS NFR BLD AUTO: 0.7 % (ref 0–1.5)
BILIRUB SERPL-MCNC: 0.3 MG/DL (ref 0–1.2)
BUN SERPL-MCNC: 24 MG/DL (ref 8–23)
BUN/CREAT SERPL: 10 (ref 7–25)
CALCIUM SPEC-SCNC: 9.3 MG/DL (ref 8.6–10.5)
CHLORIDE SERPL-SCNC: 106 MMOL/L (ref 98–107)
CO2 SERPL-SCNC: 21 MMOL/L (ref 22–29)
CREAT SERPL-MCNC: 2.4 MG/DL (ref 0.57–1)
DEPRECATED RDW RBC AUTO: 57.6 FL (ref 37–54)
EGFRCR SERPLBLD CKD-EPI 2021: 20.6 ML/MIN/1.73
EOSINOPHIL # BLD AUTO: 0.16 10*3/MM3 (ref 0–0.4)
EOSINOPHIL NFR BLD AUTO: 3.8 % (ref 0.3–6.2)
ERYTHROCYTE [DISTWIDTH] IN BLOOD BY AUTOMATED COUNT: 16.2 % (ref 12.3–15.4)
GLOBULIN UR ELPH-MCNC: 2.7 GM/DL
GLUCOSE SERPL-MCNC: 119 MG/DL (ref 65–99)
HCT VFR BLD AUTO: 34.1 % (ref 34–46.6)
HGB BLD-MCNC: 10.8 G/DL (ref 12–15.9)
IMM GRANULOCYTES # BLD AUTO: 0.02 10*3/MM3 (ref 0–0.05)
IMM GRANULOCYTES NFR BLD AUTO: 0.5 % (ref 0–0.5)
LYMPHOCYTES # BLD AUTO: 1.61 10*3/MM3 (ref 0.7–3.1)
LYMPHOCYTES NFR BLD AUTO: 38.7 % (ref 19.6–45.3)
MAGNESIUM SERPL-MCNC: 2.2 MG/DL (ref 1.6–2.4)
MCH RBC QN AUTO: 30.9 PG (ref 26.6–33)
MCHC RBC AUTO-ENTMCNC: 31.7 G/DL (ref 31.5–35.7)
MCV RBC AUTO: 97.7 FL (ref 79–97)
MONOCYTES # BLD AUTO: 0.6 10*3/MM3 (ref 0.1–0.9)
MONOCYTES NFR BLD AUTO: 14.4 % (ref 5–12)
NEUTROPHILS NFR BLD AUTO: 1.74 10*3/MM3 (ref 1.7–7)
NEUTROPHILS NFR BLD AUTO: 41.9 % (ref 42.7–76)
NRBC BLD AUTO-RTO: 0 /100 WBC (ref 0–0.2)
PLATELET # BLD AUTO: 178 10*3/MM3 (ref 140–450)
PMV BLD AUTO: 10.1 FL (ref 6–12)
POTASSIUM SERPL-SCNC: 3.8 MMOL/L (ref 3.5–5.2)
PROT SERPL-MCNC: 6.1 G/DL (ref 6–8.5)
RBC # BLD AUTO: 3.49 10*6/MM3 (ref 3.77–5.28)
SODIUM SERPL-SCNC: 140 MMOL/L (ref 136–145)
WBC NRBC COR # BLD AUTO: 4.16 10*3/MM3 (ref 3.4–10.8)

## 2024-10-12 PROCEDURE — 83735 ASSAY OF MAGNESIUM: CPT | Performed by: PHYSICIAN ASSISTANT

## 2024-10-12 PROCEDURE — 80053 COMPREHEN METABOLIC PANEL: CPT | Performed by: PHYSICIAN ASSISTANT

## 2024-10-12 PROCEDURE — 36415 COLL VENOUS BLD VENIPUNCTURE: CPT

## 2024-10-12 PROCEDURE — 71250 CT THORAX DX C-: CPT

## 2024-10-12 PROCEDURE — 99284 EMERGENCY DEPT VISIT MOD MDM: CPT

## 2024-10-12 PROCEDURE — 71045 X-RAY EXAM CHEST 1 VIEW: CPT

## 2024-10-12 PROCEDURE — 85025 COMPLETE CBC W/AUTO DIFF WBC: CPT | Performed by: PHYSICIAN ASSISTANT

## 2024-10-12 RX ORDER — OXYCODONE HYDROCHLORIDE 5 MG/1
5 TABLET ORAL ONCE
Status: COMPLETED | OUTPATIENT
Start: 2024-10-12 | End: 2024-10-12

## 2024-10-12 RX ORDER — TRANEXAMIC ACID 100 MG/ML
500 INJECTION, SOLUTION INTRAVENOUS ONCE
Status: COMPLETED | OUTPATIENT
Start: 2024-10-12 | End: 2024-10-12

## 2024-10-12 RX ADMIN — OXYCODONE HYDROCHLORIDE 5 MG: 5 TABLET ORAL at 21:03

## 2024-10-12 RX ADMIN — TRANEXAMIC ACID 500 MG: 100 INJECTION, SOLUTION INTRAVENOUS at 21:14

## 2024-10-12 NOTE — ED PROVIDER NOTES
Subjective  History of Present Illness:    Chief Complaint: Pain, bleeding at right IJ dialysis port  History of Present Illness: 74-year-old female presents with pain and bleeding at the right IJ dialysis port site, she had the port replaced yesterday afternoon, she had dialysis this morning at 9, she got home at approximately 11, she noticed some blood at the port site shortly after.  She also states she has pain at the site of the insertion at the skin.  Onset: Sudden  Duration: Bleeding started after dialysis at 1130  Exacerbating / Alleviating factors: Replaced yesterday  Associated symptoms: Pain      Nurses Notes reviewed and agree, including vitals, allergies, social history and prior medical history.     REVIEW OF SYSTEMS: All systems reviewed and not pertinent unless noted.    Review of Systems   Skin:         Pain and bleeding at right IJ dialysis port site   All other systems reviewed and are negative.      Past Medical History:   Diagnosis Date    Anxiety     Arthritis     Asthma     allergy induced    Back pain     Depression     Diabetes mellitus     dx 10 years ago- checks fsbs most days    Diverticula of colon     GERD (gastroesophageal reflux disease)     Head ache     Hypertension     Sleep apnea     no longer needs cpap - approx. 10 years r/t weight loss       Allergies:    Patient has no known allergies.      Past Surgical History:   Procedure Laterality Date    BACK SURGERY      x 2    CARDIAC CATHETERIZATION      no intervention    CHOLECYSTECTOMY OPEN      COLONOSCOPY      2013    HYSTERECTOMY      REPLACEMENT TOTAL KNEE BILATERAL Bilateral     TONSILLECTOMY      VENTRAL/INCISIONAL HERNIA REPAIR N/A 6/6/2017    Procedure: INCISIONAL HERNIA REPAIR LAPAROSCOPIC;  Surgeon: Conrad Hernandez MD;  Location: [facility];  Service:          Social History     Socioeconomic History    Marital status:    Tobacco Use    Smoking status: Never    Smokeless tobacco: Never   Vaping Use    Vaping status:  "Never Used   Substance and Sexual Activity    Alcohol use: No    Drug use: No    Sexual activity: Defer         No family history on file.    Objective  Physical Exam:  /65   Pulse 74   Temp 98.8 °F (37.1 °C) (Oral)   Resp 16   Ht 172.7 cm (68\")   Wt 75.8 kg (167 lb)   SpO2 97%   BMI 25.39 kg/m²      Physical Exam  Vitals and nursing note reviewed.   Constitutional:       Appearance: She is well-developed.   HENT:      Head: Normocephalic and atraumatic.   Cardiovascular:      Rate and Rhythm: Normal rate and regular rhythm.   Pulmonary:      Effort: Pulmonary effort is normal.      Breath sounds: Normal breath sounds.   Abdominal:      Palpations: Abdomen is soft.   Musculoskeletal:         General: Normal range of motion.      Cervical back: Normal range of motion and neck supple.   Skin:     General: Skin is warm and dry.             Comments: Blood noticed at the right IJ port and insertion site, no active bleeding   Neurological:      Mental Status: She is alert and oriented to person, place, and time.      Deep Tendon Reflexes: Reflexes are normal and symmetric.           Procedures    ED Course:    CT chest interpretation by me: No acute intrathoracic abnormality    ED Course as of 10/13/24 0208   Sat Oct 12, 2024   1916 Call placed to the exchange for nephrology, Dr. Gavin [CS]   1956 Review of previous  non ED visits, prior labs, prior imaging, available notes from prior evaluations or visits with specialists, medication list, allergies, past medical history, past surgical history     [CS]   1956 Discussed the case with nephrology, Dr. Gavin, who recommended checking her hemoglobin hematocrit, bandages may need to be reinforced, I will call out to interventional radiology, once labs are back [CS]   2032 Discussed the case with interventional radiology, he recommended reinforcing the bandage, I will change it out and apply TXA, as well as reinforce the bandage [CS]   2138 TXA dressing placed " and bleeding controlled, pain still persist, will perform a CT scan [CS]   2226 Creatinine(!): 2.40  Known end-stage renal disease. [RS]      ED Course User Index  [CS] Charlie Klein Jr., PA-C  [RS] Dejon Darnell MD       Lab Results (last 24 hours)       Procedure Component Value Units Date/Time    CBC Auto Differential [[id number]]  (Abnormal) Collected: 10/12/24 1944    Specimen: Blood Updated: 10/12/24 1957     WBC 4.16 10*3/mm3      RBC 3.49 10*6/mm3      Hemoglobin 10.8 g/dL      Hematocrit 34.1 %      MCV 97.7 fL      MCH 30.9 pg      MCHC 31.7 g/dL      RDW 16.2 %      RDW-SD 57.6 fl      MPV 10.1 fL      Platelets 178 10*3/mm3      Neutrophil % 41.9 %      Lymphocyte % 38.7 %      Monocyte % 14.4 %      Eosinophil % 3.8 %      Basophil % 0.7 %      Immature Grans % 0.5 %      Neutrophils, Absolute 1.74 10*3/mm3      Lymphocytes, Absolute 1.61 10*3/mm3      Monocytes, Absolute 0.60 10*3/mm3      Eosinophils, Absolute 0.16 10*3/mm3      Basophils, Absolute 0.03 10*3/mm3      Immature Grans, Absolute 0.02 10*3/mm3      nRBC 0.0 /100 WBC     Comprehensive Metabolic Panel [[id number]]  (Abnormal) Collected: 10/12/24 1944    Specimen: Blood Updated: 10/12/24 2013     Glucose 119 mg/dL      BUN 24 mg/dL      Creatinine 2.40 mg/dL      Sodium 140 mmol/L      Potassium 3.8 mmol/L      Comment: Slight hemolysis detected by analyzer. Result may be falsely elevated.        Chloride 106 mmol/L      CO2 21.0 mmol/L      Calcium 9.3 mg/dL      Total Protein 6.1 g/dL      Albumin 3.4 g/dL      ALT (SGPT) 10 U/L      AST (SGOT) 25 U/L      Alkaline Phosphatase 107 U/L      Total Bilirubin 0.3 mg/dL      Globulin 2.7 gm/dL      Comment: Calculated Result        A/G Ratio 1.3 g/dL      BUN/Creatinine Ratio 10.0     Anion Gap 13.0 mmol/L      eGFR 20.6 mL/min/1.73     Narrative:      GFR Normal >60  Chronic Kidney Disease <60  Kidney Failure <15    The GFR formula is only valid for adults with stable renal function  between ages 18 and 70.    Magnesium [[id number]]  (Normal) Collected: 10/12/24 1944    Specimen: Blood Updated: 10/12/24 2013     Magnesium 2.2 mg/dL              CT Chest Without Contrast Diagnostic    Result Date: 10/12/2024  CT CHEST WO CONTRAST DIAGNOSTIC Date of Exam: 10/12/2024 9:47 PM EDT Indication: pain at the site of in dialysis port. Comparison: None available. Technique: Axial CT images were obtained of the chest without contrast administration.  Reconstructed coronal and sagittal images were also obtained. Automated exposure control and iterative construction methods were used. Findings: Hilum and Mediastinum: No pathologically enlarged lymph nodes.  Normal heart size.   No pericardial effusion.  Unremarkable thoracic aorta and pulmonary arteries. There is mild coronary artery calcific atherosclerosis. Lung Parenchyma and Pleura: There are no focal consolidations. There are several 3 to 5 mm subpleural pulmonary nodules involving the right upper lobe and the right lower lobe. These are felt to be benign nodules. Upper Abdomen: There is intrahepatic biliary ductal dilatation which is most likely reservoir effect given changes of cholecystectomy. Correlation with enzymes would be required. There is a right adrenal adenoma. There is a left adrenal mass which also appears to be fat-containing and likely an adenoma. Soft tissues: There is a right-sided internal jugular tunneled dialysis catheter. Osseous structures: There is a thoracic spinal stimulator in place.     Impression: Impression: 1.No active disease. 2.Bilateral adrenal adenomas. 3.Intrahepatic biliary ductal dilatation is likely reservoir effect given changes of cholecystectomy. Correlation with enzymes would be required. 4.There are several 3 to 5 mm subpleural pulmonary nodules involving the right upper lobe and the right lower lobe. The appearance favors benignity. Electronically Signed: García Kinney MD  10/12/2024 10:27 PM EDT  Workstation  ID: [id number]    XR Chest 1 View    Result Date: 10/12/2024  XR CHEST 1 VW Date of Exam: 10/12/2024 7:00 PM EDT Indication: pain at ij port site Comparison: 10/11/2024 Findings: Right-sided central venous catheter distal tip in the lower SVC. There is no kinking of the catheter identified. Spinal stimulator is present. Heart size is normal. Vascularity is normal. Lungs are clear. No pneumothorax.     Impression: No acute cardiopulmonary findings. Central venous catheter appears within normal limits. Electronically Signed: Naren Concepcion MD  10/12/2024 7:45 PM EDT  Workstation ID: [id number]    IR Replace/Exchange Tunneled CV Cath WO Port    Result Date: 10/11/2024  IR REPLACE/EXCHANGE TUNNELED CV CATH WO PORT History: Catheter malfunction    : Umang Gavin MD.                                                                             Modality: Fluoroscopy                              DOSE REDUCTION: The examination was performed according to departmental dose-optimization program. Fluoro time: 0.1 minutes. Radiation dose: 0 mGy air Kerma. 2.74 uGy square meters                                                                              No sedation was used Medicines: Ancef 2 g IV Anesthesia: Lidocaine 1% with epinephrine, local infiltration.          Estimated blood loss:  < 5 cc.         Technique: A thorough discussion of the risks, benefits, and alternatives of the procedure, and if applicable, moderate sedation, was carried out with the patient. They were encouraged to ask any questions. Any questions were answered. They verbalized understanding. A written informed consent was then signed. A multi-component timeout was performed prior to starting the procedure using the departmental protocol. The procedure room personnel used personal protective equipment. The operators used sterile gloves and if indicated, sterile gowns. The surgical site was prepped with chlorhexidine gluconate  and draped in  the maximal applicable sterile fashion. Local anesthesia was infiltrated at the exit site and along the tunnel. Heparin was aspirated from the catheter port(s). Over the wire, the existing catheter was withdrawn after freeing the retention cuff from the tunnel using blunt dissection. A new cuffed tunneled hemodialysis catheter was placed over the wire with the tip positioned in the right atrium. The catheter aspirated and flushed well and was terminally packed with heparin 1000 units per cc. The catheter was secured to skin using nonabsorbable suture and a CHG dressing applied. The patient was transferred to the recovery area and was discharged from the department in stable condition.                     Complications: None immediate.    Device: 14.5 Mal[place] x 23 cm cuff to tip Palindrome catheter. Findings: A  image reveals the catheter tip to be in the SVC. Final image shows the catheter to be in good position with the catheter tip in the right atrium, an excellent position for use. There is no complication.                                                               Impression: Impression:    Successful ultrasound and fluoroscopic guided right IJ vein route 23 cm cuff to tip cuffed tunneled hemodialysis catheter placement in exchange for a 19 cm cuff to tip tunneled dialysis catheter with malfunction as described above. Thank you for the opportunity to assist in the care of your patient. Electronically Signed: Umang Gavin MD  10/11/2024 3:45 PM EDT  Workstation ID: [id number]    XR Chest 2 View    Result Date: 10/11/2024  XR CHEST 2 VW Date of Exam: 10/11/2024 2:24 PM EDT Indication: dialysis catheter dislodged Comparison: September 13, 2024 Findings: There is a dialysis catheter noted with its tip in the superior vena cava. There is no pneumothorax. There are no definite filtrates. There are no pleural effusions. Stimulator leads project over the mid/lower thoracic spine. Tip is around T8. There is  hardware in the visualized lumbar spine.     Impression: Impression: 1.An acute pulmonary process is not apparent. Electronically Signed: Vinod Martell MD  10/11/2024 2:52 PM EDT  Workstation ID: [id number]        Medical Decision Making  Patient Presentation 74-year-old female presents with blood in the side of her dialysis port, the right IJ, she had a small amount of blood, mild tenderness to palpation evaluation no redness swelling or cellulitis.    DDX hematoma, seroma, bleeding from port site, cellulitis,    Data Review/ Non ED Records /Analysis/Ordering unique tests  Review of previous  non ED visits, prior labs, prior imaging, available notes from prior evaluations or visits with specialists, medication list, allergies, past medical history, past surgical history        Independent Review Studies  I Personally reviewed all laboratory studies performed in the emergency department     Intervention/Re-evaluation intervention included oral pain medication for discomfort, and TXA to the port site    Independent Clinician discussed with the on-call interventional radiologist Dr. Gavin    Risk Stratification tools/clinical decision rules patient presented with mild amount of blood at the incision site of the right IJ dialysis port is concerned about bleeding, hematoma, thrombus, infection, labs unremarkable, hemoglobin hematocrit was stable, TXA was applied to the area which controlled the mild oozing, bandage was changed, CT scan was performed and unremarkable, she was able to be safely discharged    Shared Decision Making discussed all findings with patient and family they were agreed    Disposition patient stable for discharge    Problems Addressed:  Vascular port complication, initial encounter: complicated acute illness or injury    Amount and/or Complexity of Data Reviewed  External Data Reviewed: labs, radiology and notes.     Details: Review of previous  non ED visits, prior labs, prior imaging, available  notes from prior evaluations or visits with specialists, medication list, allergies, past medical history, past surgical history      Labs: ordered. Decision-making details documented in ED Course.     Details: I Personally reviewed all laboratory studies performed in the emergency department   Radiology: ordered and independent interpretation performed. Decision-making details documented in ED Course.    Risk  Prescription drug management.          Final diagnoses:   Vascular port complication, initial encounter           Disposition DISCHARGE    Patient discharged in stable condition.    Reviewed implications of results, diagnosis, meds, responsibility to follow up, warning signs and symptoms of possible worsening, potential complications and reasons to return to ER.    Patient/Family voiced understanding of above instructions.    Discussed plan for discharge, as there is no emergent indication for admission.  Pt/family is agreeable and understands need for follow up and possible repeat testing.  Pt/family is aware that discharge does not mean that nothing is wrong but that it indicates no emergency is currently present that requires admission and they must continue care with follow-up as given below or with a physician of their choice.     FOLLOW-UP  [facility] EMERGENCY DEPARTMENT  [address] [id number] [phone]    If symptoms worsen    Toribio Roberts MD  [address]  [address]  [place] [id number]  [phone]    Schedule an appointment as soon as possible for a visit            Medication List      No changes were made to your prescriptions during this visit.             Charlie Klein Jr., PADeniseC  10/13/24 0208

## 2024-10-15 ENCOUNTER — HOSPITAL ENCOUNTER (OUTPATIENT)
Dept: ULTRASOUND IMAGING | Facility: HOSPITAL | Age: 74
Discharge: HOME | End: 2024-10-15
Admitting: INTERNAL MEDICINE
Payer: MEDICARE

## 2024-10-15 DIAGNOSIS — G47.33 OBSTRUCTIVE SLEEP APNEA (ADULT) (PEDIATRIC): ICD-10-CM

## 2024-10-15 PROCEDURE — 76705 ECHO EXAM OF ABDOMEN: CPT

## 2025-01-01 ENCOUNTER — APPOINTMENT (OUTPATIENT)
Dept: GENERAL RADIOLOGY | Facility: HOSPITAL | Age: 75
DRG: 091 | End: 2025-01-01
Payer: MEDICARE

## 2025-01-01 ENCOUNTER — APPOINTMENT (OUTPATIENT)
Dept: NEPHROLOGY | Facility: HOSPITAL | Age: 75
DRG: 091 | End: 2025-01-01
Payer: MEDICARE

## 2025-01-01 ENCOUNTER — APPOINTMENT (OUTPATIENT)
Dept: CT IMAGING | Facility: HOSPITAL | Age: 75
DRG: 091 | End: 2025-01-01
Payer: MEDICARE

## 2025-01-01 ENCOUNTER — HOSPITAL ENCOUNTER (INPATIENT)
Facility: HOSPITAL | Age: 75
LOS: 2 days | Discharge: HOSPICE | DRG: 091 | End: 2025-04-21
Attending: EMERGENCY MEDICINE | Admitting: INTERNAL MEDICINE
Payer: MEDICARE

## 2025-01-01 ENCOUNTER — HOSPITAL ENCOUNTER (INPATIENT)
Facility: HOSPITAL | Age: 75
LOS: 5 days | End: 2025-04-26
Attending: INTERNAL MEDICINE | Admitting: INTERNAL MEDICINE
Payer: COMMERCIAL

## 2025-01-01 VITALS
TEMPERATURE: 98 F | OXYGEN SATURATION: 97 % | HEART RATE: 65 BPM | BODY MASS INDEX: 23.45 KG/M2 | SYSTOLIC BLOOD PRESSURE: 127 MMHG | DIASTOLIC BLOOD PRESSURE: 52 MMHG | RESPIRATION RATE: 15 BRPM | HEIGHT: 67 IN | WEIGHT: 149.4 LBS

## 2025-01-01 DIAGNOSIS — R41.82 ALTERED MENTAL STATUS, UNSPECIFIED ALTERED MENTAL STATUS TYPE: Primary | ICD-10-CM

## 2025-01-01 DIAGNOSIS — N30.00 ACUTE CYSTITIS WITHOUT HEMATURIA: ICD-10-CM

## 2025-01-01 DIAGNOSIS — R13.10 DYSPHAGIA, UNSPECIFIED TYPE: ICD-10-CM

## 2025-01-01 LAB
ALBUMIN SERPL-MCNC: 2 G/DL (ref 3.5–5.2)
ALBUMIN SERPL-MCNC: 2.1 G/DL (ref 3.5–5.2)
ALBUMIN/GLOB SERPL: 0.8 G/DL
ALBUMIN/GLOB SERPL: 0.8 G/DL
ALP SERPL-CCNC: 206 U/L (ref 39–117)
ALP SERPL-CCNC: 224 U/L (ref 39–117)
ALT SERPL W P-5'-P-CCNC: 7 U/L (ref 1–33)
ALT SERPL W P-5'-P-CCNC: 9 U/L (ref 1–33)
ANION GAP SERPL CALCULATED.3IONS-SCNC: 10 MMOL/L (ref 5–15)
ANION GAP SERPL CALCULATED.3IONS-SCNC: 7 MMOL/L (ref 5–15)
ANION GAP SERPL CALCULATED.3IONS-SCNC: 7 MMOL/L (ref 5–15)
ANION GAP SERPL CALCULATED.3IONS-SCNC: 8 MMOL/L (ref 5–15)
AST SERPL-CCNC: 13 U/L (ref 1–32)
AST SERPL-CCNC: 21 U/L (ref 1–32)
BACTERIA BLD CULT: ABNORMAL
BACTERIA SPEC AEROBE CULT: ABNORMAL
BACTERIA UR QL AUTO: ABNORMAL /HPF
BASOPHILS # BLD AUTO: 0 10*3/MM3 (ref 0–0.2)
BASOPHILS # BLD AUTO: 0 10*3/MM3 (ref 0–0.2)
BASOPHILS # BLD AUTO: 0.01 10*3/MM3 (ref 0–0.2)
BASOPHILS # BLD AUTO: 0.01 10*3/MM3 (ref 0–0.2)
BASOPHILS NFR BLD AUTO: 0 % (ref 0–1.5)
BASOPHILS NFR BLD AUTO: 0 % (ref 0–1.5)
BASOPHILS NFR BLD AUTO: 0.1 % (ref 0–1.5)
BASOPHILS NFR BLD AUTO: 0.2 % (ref 0–1.5)
BILIRUB SERPL-MCNC: 0.3 MG/DL (ref 0–1.2)
BILIRUB SERPL-MCNC: 0.4 MG/DL (ref 0–1.2)
BILIRUB UR QL STRIP: NEGATIVE
BOTTLE TYPE: ABNORMAL
BUN SERPL-MCNC: 18 MG/DL (ref 8–23)
BUN SERPL-MCNC: 25 MG/DL (ref 8–23)
BUN SERPL-MCNC: 31 MG/DL (ref 8–23)
BUN SERPL-MCNC: 37 MG/DL (ref 8–23)
BUN/CREAT SERPL: 7.9 (ref 7–25)
BUN/CREAT SERPL: 8.8 (ref 7–25)
BUN/CREAT SERPL: 8.9 (ref 7–25)
BUN/CREAT SERPL: 9.6 (ref 7–25)
CALCIUM SPEC-SCNC: 8.2 MG/DL (ref 8.6–10.5)
CALCIUM SPEC-SCNC: 8.5 MG/DL (ref 8.6–10.5)
CALCIUM SPEC-SCNC: 9.1 MG/DL (ref 8.6–10.5)
CALCIUM SPEC-SCNC: 9.2 MG/DL (ref 8.6–10.5)
CHLORIDE SERPL-SCNC: 101 MMOL/L (ref 98–107)
CHLORIDE SERPL-SCNC: 102 MMOL/L (ref 98–107)
CHLORIDE SERPL-SCNC: 104 MMOL/L (ref 98–107)
CHLORIDE SERPL-SCNC: 105 MMOL/L (ref 98–107)
CLARITY UR: ABNORMAL
CO2 SERPL-SCNC: 24 MMOL/L (ref 22–29)
CO2 SERPL-SCNC: 26 MMOL/L (ref 22–29)
CO2 SERPL-SCNC: 27 MMOL/L (ref 22–29)
CO2 SERPL-SCNC: 27 MMOL/L (ref 22–29)
COLOR UR: ABNORMAL
CREAT SERPL-MCNC: 2.28 MG/DL (ref 0.57–1)
CREAT SERPL-MCNC: 2.84 MG/DL (ref 0.57–1)
CREAT SERPL-MCNC: 3.48 MG/DL (ref 0.57–1)
CREAT SERPL-MCNC: 3.86 MG/DL (ref 0.57–1)
CRP SERPL-MCNC: 11.38 MG/DL (ref 0–0.5)
D-LACTATE SERPL-SCNC: 1.3 MMOL/L (ref 0.5–2)
DEPRECATED RDW RBC AUTO: 55 FL (ref 37–54)
DEPRECATED RDW RBC AUTO: 55 FL (ref 37–54)
DEPRECATED RDW RBC AUTO: 56.1 FL (ref 37–54)
DEPRECATED RDW RBC AUTO: 56.3 FL (ref 37–54)
EGFRCR SERPLBLD CKD-EPI 2021: 11.6 ML/MIN/1.73
EGFRCR SERPLBLD CKD-EPI 2021: 13.2 ML/MIN/1.73
EGFRCR SERPLBLD CKD-EPI 2021: 16.8 ML/MIN/1.73
EGFRCR SERPLBLD CKD-EPI 2021: 21.9 ML/MIN/1.73
EOSINOPHIL # BLD AUTO: 0 10*3/MM3 (ref 0–0.4)
EOSINOPHIL # BLD AUTO: 0 10*3/MM3 (ref 0–0.4)
EOSINOPHIL # BLD AUTO: 0.01 10*3/MM3 (ref 0–0.4)
EOSINOPHIL # BLD AUTO: 0.03 10*3/MM3 (ref 0–0.4)
EOSINOPHIL NFR BLD AUTO: 0 % (ref 0.3–6.2)
EOSINOPHIL NFR BLD AUTO: 0 % (ref 0.3–6.2)
EOSINOPHIL NFR BLD AUTO: 0.2 % (ref 0.3–6.2)
EOSINOPHIL NFR BLD AUTO: 0.5 % (ref 0.3–6.2)
ERYTHROCYTE [DISTWIDTH] IN BLOOD BY AUTOMATED COUNT: 16 % (ref 12.3–15.4)
ERYTHROCYTE [DISTWIDTH] IN BLOOD BY AUTOMATED COUNT: 16.1 % (ref 12.3–15.4)
ERYTHROCYTE [DISTWIDTH] IN BLOOD BY AUTOMATED COUNT: 16.5 % (ref 12.3–15.4)
ERYTHROCYTE [DISTWIDTH] IN BLOOD BY AUTOMATED COUNT: 16.7 % (ref 12.3–15.4)
ERYTHROCYTE [SEDIMENTATION RATE] IN BLOOD: 12 MM/HR (ref 0–30)
GEN 5 1HR TROPONIN T REFLEX: 105 NG/L
GLOBULIN UR ELPH-MCNC: 2.4 GM/DL
GLOBULIN UR ELPH-MCNC: 2.7 GM/DL
GLUCOSE BLDC GLUCOMTR-MCNC: 101 MG/DL (ref 70–130)
GLUCOSE BLDC GLUCOMTR-MCNC: 103 MG/DL (ref 70–130)
GLUCOSE BLDC GLUCOMTR-MCNC: 103 MG/DL (ref 70–130)
GLUCOSE BLDC GLUCOMTR-MCNC: 111 MG/DL (ref 70–130)
GLUCOSE BLDC GLUCOMTR-MCNC: 116 MG/DL (ref 70–130)
GLUCOSE BLDC GLUCOMTR-MCNC: 55 MG/DL (ref 70–130)
GLUCOSE BLDC GLUCOMTR-MCNC: 62 MG/DL (ref 70–130)
GLUCOSE BLDC GLUCOMTR-MCNC: 65 MG/DL (ref 70–130)
GLUCOSE BLDC GLUCOMTR-MCNC: 95 MG/DL (ref 70–130)
GLUCOSE BLDC GLUCOMTR-MCNC: 96 MG/DL (ref 70–130)
GLUCOSE BLDC GLUCOMTR-MCNC: 98 MG/DL (ref 70–130)
GLUCOSE BLDC GLUCOMTR-MCNC: 99 MG/DL (ref 70–130)
GLUCOSE SERPL-MCNC: 106 MG/DL (ref 65–99)
GLUCOSE SERPL-MCNC: 110 MG/DL (ref 65–99)
GLUCOSE SERPL-MCNC: 84 MG/DL (ref 65–99)
GLUCOSE SERPL-MCNC: 87 MG/DL (ref 65–99)
GLUCOSE UR STRIP-MCNC: NEGATIVE MG/DL
GRAM STN SPEC: ABNORMAL
HCT VFR BLD AUTO: 27.2 % (ref 34–46.6)
HCT VFR BLD AUTO: 29.1 % (ref 34–46.6)
HCT VFR BLD AUTO: 29.6 % (ref 34–46.6)
HCT VFR BLD AUTO: 31.1 % (ref 34–46.6)
HGB BLD-MCNC: 8.2 G/DL (ref 12–15.9)
HGB BLD-MCNC: 8.6 G/DL (ref 12–15.9)
HGB BLD-MCNC: 8.8 G/DL (ref 12–15.9)
HGB BLD-MCNC: 9.2 G/DL (ref 12–15.9)
HGB UR QL STRIP.AUTO: ABNORMAL
HOLD SPECIMEN: NORMAL
HYALINE CASTS UR QL AUTO: ABNORMAL /LPF
IMM GRANULOCYTES # BLD AUTO: 0.01 10*3/MM3 (ref 0–0.05)
IMM GRANULOCYTES # BLD AUTO: 0.02 10*3/MM3 (ref 0–0.05)
IMM GRANULOCYTES # BLD AUTO: 0.03 10*3/MM3 (ref 0–0.05)
IMM GRANULOCYTES # BLD AUTO: 0.03 10*3/MM3 (ref 0–0.05)
IMM GRANULOCYTES NFR BLD AUTO: 0.2 % (ref 0–0.5)
IMM GRANULOCYTES NFR BLD AUTO: 0.3 % (ref 0–0.5)
IMM GRANULOCYTES NFR BLD AUTO: 0.4 % (ref 0–0.5)
IMM GRANULOCYTES NFR BLD AUTO: 0.5 % (ref 0–0.5)
ISOLATED FROM: ABNORMAL
ISOLATED FROM: ABNORMAL
KETONES UR QL STRIP: NEGATIVE
LEUKOCYTE ESTERASE UR QL STRIP.AUTO: ABNORMAL
LYMPHOCYTES # BLD AUTO: 0.42 10*3/MM3 (ref 0.7–3.1)
LYMPHOCYTES # BLD AUTO: 0.56 10*3/MM3 (ref 0.7–3.1)
LYMPHOCYTES # BLD AUTO: 0.8 10*3/MM3 (ref 0.7–3.1)
LYMPHOCYTES # BLD AUTO: 1.25 10*3/MM3 (ref 0.7–3.1)
LYMPHOCYTES NFR BLD AUTO: 13.2 % (ref 19.6–45.3)
LYMPHOCYTES NFR BLD AUTO: 20.1 % (ref 19.6–45.3)
LYMPHOCYTES NFR BLD AUTO: 6.9 % (ref 19.6–45.3)
LYMPHOCYTES NFR BLD AUTO: 7.9 % (ref 19.6–45.3)
MAGNESIUM SERPL-MCNC: 1.9 MG/DL (ref 1.6–2.4)
MAGNESIUM SERPL-MCNC: 2 MG/DL (ref 1.6–2.4)
MCH RBC QN AUTO: 27.5 PG (ref 26.6–33)
MCH RBC QN AUTO: 27.6 PG (ref 26.6–33)
MCH RBC QN AUTO: 27.8 PG (ref 26.6–33)
MCH RBC QN AUTO: 27.9 PG (ref 26.6–33)
MCHC RBC AUTO-ENTMCNC: 29.6 G/DL (ref 31.5–35.7)
MCHC RBC AUTO-ENTMCNC: 29.6 G/DL (ref 31.5–35.7)
MCHC RBC AUTO-ENTMCNC: 29.7 G/DL (ref 31.5–35.7)
MCHC RBC AUTO-ENTMCNC: 30.1 G/DL (ref 31.5–35.7)
MCV RBC AUTO: 92.5 FL (ref 79–97)
MCV RBC AUTO: 93 FL (ref 79–97)
MCV RBC AUTO: 93.4 FL (ref 79–97)
MCV RBC AUTO: 93.7 FL (ref 79–97)
MONOCYTES # BLD AUTO: 0.53 10*3/MM3 (ref 0.1–0.9)
MONOCYTES # BLD AUTO: 0.59 10*3/MM3 (ref 0.1–0.9)
MONOCYTES # BLD AUTO: 0.7 10*3/MM3 (ref 0.1–0.9)
MONOCYTES # BLD AUTO: 0.72 10*3/MM3 (ref 0.1–0.9)
MONOCYTES NFR BLD AUTO: 11.6 % (ref 5–12)
MONOCYTES NFR BLD AUTO: 8.7 % (ref 5–12)
MONOCYTES NFR BLD AUTO: 9.7 % (ref 5–12)
MONOCYTES NFR BLD AUTO: 9.9 % (ref 5–12)
NEUTROPHILS NFR BLD AUTO: 4.19 10*3/MM3 (ref 1.7–7)
NEUTROPHILS NFR BLD AUTO: 4.66 10*3/MM3 (ref 1.7–7)
NEUTROPHILS NFR BLD AUTO: 5.12 10*3/MM3 (ref 1.7–7)
NEUTROPHILS NFR BLD AUTO: 5.76 10*3/MM3 (ref 1.7–7)
NEUTROPHILS NFR BLD AUTO: 67.3 % (ref 42.7–76)
NEUTROPHILS NFR BLD AUTO: 76.5 % (ref 42.7–76)
NEUTROPHILS NFR BLD AUTO: 81.7 % (ref 42.7–76)
NEUTROPHILS NFR BLD AUTO: 84.1 % (ref 42.7–76)
NITRITE UR QL STRIP: NEGATIVE
NRBC BLD AUTO-RTO: 0 /100 WBC (ref 0–0.2)
PH UR STRIP.AUTO: 8 [PH] (ref 5–8)
PHOSPHATE SERPL-MCNC: 1.6 MG/DL (ref 2.5–4.5)
PHOSPHATE SERPL-MCNC: 3.6 MG/DL (ref 2.5–4.5)
PLATELET # BLD AUTO: 116 10*3/MM3 (ref 140–450)
PLATELET # BLD AUTO: 119 10*3/MM3 (ref 140–450)
PLATELET # BLD AUTO: 129 10*3/MM3 (ref 140–450)
PLATELET # BLD AUTO: 136 10*3/MM3 (ref 140–450)
PMV BLD AUTO: 10.4 FL (ref 6–12)
PMV BLD AUTO: 10.5 FL (ref 6–12)
PMV BLD AUTO: 10.8 FL (ref 6–12)
PMV BLD AUTO: 11 FL (ref 6–12)
POTASSIUM SERPL-SCNC: 4.1 MMOL/L (ref 3.5–5.2)
POTASSIUM SERPL-SCNC: 4.2 MMOL/L (ref 3.5–5.2)
POTASSIUM SERPL-SCNC: 4.3 MMOL/L (ref 3.5–5.2)
POTASSIUM SERPL-SCNC: 4.5 MMOL/L (ref 3.5–5.2)
PROT SERPL-MCNC: 4.4 G/DL (ref 6–8.5)
PROT SERPL-MCNC: 4.8 G/DL (ref 6–8.5)
PROT UR QL STRIP: ABNORMAL
QT INTERVAL: 392 MS
QTC INTERVAL: 466 MS
RBC # BLD AUTO: 2.94 10*6/MM3 (ref 3.77–5.28)
RBC # BLD AUTO: 3.13 10*6/MM3 (ref 3.77–5.28)
RBC # BLD AUTO: 3.16 10*6/MM3 (ref 3.77–5.28)
RBC # BLD AUTO: 3.33 10*6/MM3 (ref 3.77–5.28)
RBC # UR STRIP: ABNORMAL /HPF
REF LAB TEST METHOD: ABNORMAL
SODIUM SERPL-SCNC: 135 MMOL/L (ref 136–145)
SODIUM SERPL-SCNC: 136 MMOL/L (ref 136–145)
SODIUM SERPL-SCNC: 138 MMOL/L (ref 136–145)
SODIUM SERPL-SCNC: 139 MMOL/L (ref 136–145)
SP GR UR STRIP: 1.01 (ref 1–1.03)
SQUAMOUS #/AREA URNS HPF: ABNORMAL /HPF
TROPONIN T % DELTA: -4
TROPONIN T NUMERIC DELTA: -4 NG/L
TROPONIN T SERPL HS-MCNC: 109 NG/L
TROPONIN T SERPL HS-MCNC: 99 NG/L
UROBILINOGEN UR QL STRIP: ABNORMAL
WBC # UR STRIP: ABNORMAL /HPF
WBC NRBC COR # BLD AUTO: 6.08 10*3/MM3 (ref 3.4–10.8)
WBC NRBC COR # BLD AUTO: 6.09 10*3/MM3 (ref 3.4–10.8)
WBC NRBC COR # BLD AUTO: 6.22 10*3/MM3 (ref 3.4–10.8)
WBC NRBC COR # BLD AUTO: 7.06 10*3/MM3 (ref 3.4–10.8)
WHOLE BLOOD HOLD COAG: NORMAL
WHOLE BLOOD HOLD SPECIMEN: NORMAL

## 2025-01-01 PROCEDURE — 5A1D70Z PERFORMANCE OF URINARY FILTRATION, INTERMITTENT, LESS THAN 6 HOURS PER DAY: ICD-10-PCS | Performed by: INTERNAL MEDICINE

## 2025-01-01 PROCEDURE — 84484 ASSAY OF TROPONIN QUANT: CPT | Performed by: EMERGENCY MEDICINE

## 2025-01-01 PROCEDURE — 85025 COMPLETE CBC W/AUTO DIFF WBC: CPT | Performed by: INTERNAL MEDICINE

## 2025-01-01 PROCEDURE — 25010000002 HYDROMORPHONE 1 MG/ML SOLUTION: Performed by: FAMILY MEDICINE

## 2025-01-01 PROCEDURE — 25010000002 HYDROMORPHONE PER 4 MG: Performed by: NURSE PRACTITIONER

## 2025-01-01 PROCEDURE — 87154 CUL TYP ID BLD PTHGN 6+ TRGT: CPT | Performed by: EMERGENCY MEDICINE

## 2025-01-01 PROCEDURE — 87086 URINE CULTURE/COLONY COUNT: CPT | Performed by: EMERGENCY MEDICINE

## 2025-01-01 PROCEDURE — 87186 SC STD MICRODIL/AGAR DIL: CPT | Performed by: EMERGENCY MEDICINE

## 2025-01-01 PROCEDURE — 25010000002 MIDAZOLAM PER 1 MG: Performed by: FAMILY MEDICINE

## 2025-01-01 PROCEDURE — 83735 ASSAY OF MAGNESIUM: CPT | Performed by: STUDENT IN AN ORGANIZED HEALTH CARE EDUCATION/TRAINING PROGRAM

## 2025-01-01 PROCEDURE — 25010000002 HYDROMORPHONE PER 4 MG: Performed by: FAMILY MEDICINE

## 2025-01-01 PROCEDURE — 25010000002 GLUCAGON (RDNA) PER 1 MG: Performed by: STUDENT IN AN ORGANIZED HEALTH CARE EDUCATION/TRAINING PROGRAM

## 2025-01-01 PROCEDURE — 97162 PT EVAL MOD COMPLEX 30 MIN: CPT

## 2025-01-01 PROCEDURE — 25010000002 HALOPERIDOL LACTATE PER 5 MG: Performed by: FAMILY MEDICINE

## 2025-01-01 PROCEDURE — 25010000002 ERTAPENEM PER 500 MG: Performed by: INTERNAL MEDICINE

## 2025-01-01 PROCEDURE — 99285 EMERGENCY DEPT VISIT HI MDM: CPT

## 2025-01-01 PROCEDURE — 82948 REAGENT STRIP/BLOOD GLUCOSE: CPT

## 2025-01-01 PROCEDURE — 97166 OT EVAL MOD COMPLEX 45 MIN: CPT

## 2025-01-01 PROCEDURE — 25010000002 VANCOMYCIN 1.5-0.9 GM/500ML-% SOLUTION

## 2025-01-01 PROCEDURE — 70450 CT HEAD/BRAIN W/O DYE: CPT

## 2025-01-01 PROCEDURE — 80053 COMPREHEN METABOLIC PANEL: CPT | Performed by: STUDENT IN AN ORGANIZED HEALTH CARE EDUCATION/TRAINING PROGRAM

## 2025-01-01 PROCEDURE — 80048 BASIC METABOLIC PNL TOTAL CA: CPT | Performed by: INTERNAL MEDICINE

## 2025-01-01 PROCEDURE — 25010000002 MIDAZOLAM PER 1 MG: Performed by: NURSE PRACTITIONER

## 2025-01-01 PROCEDURE — 87040 BLOOD CULTURE FOR BACTERIA: CPT | Performed by: EMERGENCY MEDICINE

## 2025-01-01 PROCEDURE — 94660 CPAP INITIATION&MGMT: CPT

## 2025-01-01 PROCEDURE — 99222 1ST HOSP IP/OBS MODERATE 55: CPT | Performed by: STUDENT IN AN ORGANIZED HEALTH CARE EDUCATION/TRAINING PROGRAM

## 2025-01-01 PROCEDURE — 94799 UNLISTED PULMONARY SVC/PX: CPT

## 2025-01-01 PROCEDURE — 83735 ASSAY OF MAGNESIUM: CPT | Performed by: EMERGENCY MEDICINE

## 2025-01-01 PROCEDURE — 85652 RBC SED RATE AUTOMATED: CPT | Performed by: EMERGENCY MEDICINE

## 2025-01-01 PROCEDURE — 71045 X-RAY EXAM CHEST 1 VIEW: CPT

## 2025-01-01 PROCEDURE — 25010000002 DIAZEPAM PER 5 MG: Performed by: NURSE PRACTITIONER

## 2025-01-01 PROCEDURE — P9612 CATHETERIZE FOR URINE SPEC: HCPCS

## 2025-01-01 PROCEDURE — 92610 EVALUATE SWALLOWING FUNCTION: CPT

## 2025-01-01 PROCEDURE — 84100 ASSAY OF PHOSPHORUS: CPT | Performed by: STUDENT IN AN ORGANIZED HEALTH CARE EDUCATION/TRAINING PROGRAM

## 2025-01-01 PROCEDURE — 25010000002 CEFTRIAXONE PER 250 MG: Performed by: STUDENT IN AN ORGANIZED HEALTH CARE EDUCATION/TRAINING PROGRAM

## 2025-01-01 PROCEDURE — 99232 SBSQ HOSP IP/OBS MODERATE 35: CPT | Performed by: INTERNAL MEDICINE

## 2025-01-01 PROCEDURE — 84100 ASSAY OF PHOSPHORUS: CPT | Performed by: INTERNAL MEDICINE

## 2025-01-01 PROCEDURE — 25010000002 CEFTRIAXONE PER 250 MG: Performed by: EMERGENCY MEDICINE

## 2025-01-01 PROCEDURE — 81001 URINALYSIS AUTO W/SCOPE: CPT | Performed by: EMERGENCY MEDICINE

## 2025-01-01 PROCEDURE — 25010000002 GLYCOPYRROLATE 0.2 MG/ML SOLUTION: Performed by: NURSE PRACTITIONER

## 2025-01-01 PROCEDURE — 25010000002 HEPARIN (PORCINE) PER 1000 UNITS: Performed by: INTERNAL MEDICINE

## 2025-01-01 PROCEDURE — 85025 COMPLETE CBC W/AUTO DIFF WBC: CPT | Performed by: EMERGENCY MEDICINE

## 2025-01-01 PROCEDURE — 36415 COLL VENOUS BLD VENIPUNCTURE: CPT

## 2025-01-01 PROCEDURE — 25010000002 ALBUMIN HUMAN 25% PER 50 ML: Performed by: INTERNAL MEDICINE

## 2025-01-01 PROCEDURE — 73630 X-RAY EXAM OF FOOT: CPT

## 2025-01-01 PROCEDURE — 93005 ELECTROCARDIOGRAM TRACING: CPT | Performed by: EMERGENCY MEDICINE

## 2025-01-01 PROCEDURE — 84484 ASSAY OF TROPONIN QUANT: CPT | Performed by: STUDENT IN AN ORGANIZED HEALTH CARE EDUCATION/TRAINING PROGRAM

## 2025-01-01 PROCEDURE — 80053 COMPREHEN METABOLIC PANEL: CPT | Performed by: EMERGENCY MEDICINE

## 2025-01-01 PROCEDURE — 87077 CULTURE AEROBIC IDENTIFY: CPT | Performed by: EMERGENCY MEDICINE

## 2025-01-01 PROCEDURE — 25810000003 SODIUM CHLORIDE 0.9 % SOLUTION 250 ML FLEX CONT: Performed by: INTERNAL MEDICINE

## 2025-01-01 PROCEDURE — P9047 ALBUMIN (HUMAN), 25%, 50ML: HCPCS | Performed by: INTERNAL MEDICINE

## 2025-01-01 PROCEDURE — 86140 C-REACTIVE PROTEIN: CPT | Performed by: EMERGENCY MEDICINE

## 2025-01-01 PROCEDURE — 87147 CULTURE TYPE IMMUNOLOGIC: CPT | Performed by: EMERGENCY MEDICINE

## 2025-01-01 PROCEDURE — 85025 COMPLETE CBC W/AUTO DIFF WBC: CPT | Performed by: STUDENT IN AN ORGANIZED HEALTH CARE EDUCATION/TRAINING PROGRAM

## 2025-01-01 PROCEDURE — 83605 ASSAY OF LACTIC ACID: CPT | Performed by: EMERGENCY MEDICINE

## 2025-01-01 RX ORDER — SODIUM CHLORIDE 0.9 % (FLUSH) 0.9 %
10 SYRINGE (ML) INJECTION AS NEEDED
Status: DISCONTINUED | OUTPATIENT
Start: 2025-01-01 | End: 2025-01-01 | Stop reason: HOSPADM

## 2025-01-01 RX ORDER — SODIUM CHLORIDE 0.9 % (FLUSH) 0.9 %
10 SYRINGE (ML) INJECTION AS NEEDED
Status: CANCELLED | OUTPATIENT
Start: 2025-01-01

## 2025-01-01 RX ORDER — LORAZEPAM 0.5 MG/1
0.5 TABLET ORAL EVERY 6 HOURS PRN
Status: DISCONTINUED | OUTPATIENT
Start: 2025-01-01 | End: 2025-01-01 | Stop reason: HOSPADM

## 2025-01-01 RX ORDER — GLYCOPYRROLATE 0.2 MG/ML
0.2 INJECTION INTRAMUSCULAR; INTRAVENOUS EVERY 4 HOURS PRN
Status: DISCONTINUED | OUTPATIENT
Start: 2025-01-01 | End: 2025-01-01 | Stop reason: SDUPTHER

## 2025-01-01 RX ORDER — BISACODYL 5 MG/1
5 TABLET, DELAYED RELEASE ORAL DAILY PRN
Status: CANCELLED | OUTPATIENT
Start: 2025-01-01

## 2025-01-01 RX ORDER — SODIUM CHLORIDE 9 MG/ML
40 INJECTION, SOLUTION INTRAVENOUS AS NEEDED
Status: DISCONTINUED | OUTPATIENT
Start: 2025-01-01 | End: 2025-01-01 | Stop reason: HOSPADM

## 2025-01-01 RX ORDER — NICOTINE POLACRILEX 4 MG
15 LOZENGE BUCCAL
Status: CANCELLED | OUTPATIENT
Start: 2025-01-01

## 2025-01-01 RX ORDER — BISACODYL 10 MG
10 SUPPOSITORY, RECTAL RECTAL DAILY PRN
Status: CANCELLED | OUTPATIENT
Start: 2025-01-01

## 2025-01-01 RX ORDER — ONDANSETRON 2 MG/ML
4 INJECTION INTRAMUSCULAR; INTRAVENOUS EVERY 6 HOURS PRN
Status: DISCONTINUED | OUTPATIENT
Start: 2025-01-01 | End: 2025-04-27 | Stop reason: HOSPADM

## 2025-01-01 RX ORDER — AMIODARONE HYDROCHLORIDE 200 MG/1
200 TABLET ORAL
Status: DISCONTINUED | OUTPATIENT
Start: 2025-01-01 | End: 2025-01-01 | Stop reason: HOSPADM

## 2025-01-01 RX ORDER — NITROGLYCERIN 0.4 MG/1
0.4 TABLET SUBLINGUAL
Status: DISCONTINUED | OUTPATIENT
Start: 2025-01-01 | End: 2025-01-01 | Stop reason: HOSPADM

## 2025-01-01 RX ORDER — SCOPOLAMINE 1 MG/3D
1 PATCH, EXTENDED RELEASE TRANSDERMAL
Status: DISCONTINUED | OUTPATIENT
Start: 2025-01-01 | End: 2025-04-27 | Stop reason: HOSPADM

## 2025-01-01 RX ORDER — GLYCOPYRROLATE 0.2 MG/ML
0.4 INJECTION INTRAMUSCULAR; INTRAVENOUS EVERY 4 HOURS PRN
Status: DISCONTINUED | OUTPATIENT
Start: 2025-01-01 | End: 2025-04-27 | Stop reason: HOSPADM

## 2025-01-01 RX ORDER — ALBUMIN (HUMAN) 12.5 G/50ML
12.5 SOLUTION INTRAVENOUS AS NEEDED
Status: CANCELLED | OUTPATIENT
Start: 2025-01-01 | End: 2025-01-01

## 2025-01-01 RX ORDER — IBUPROFEN 600 MG/1
1 TABLET ORAL
Status: CANCELLED | OUTPATIENT
Start: 2025-01-01

## 2025-01-01 RX ORDER — AMIODARONE HYDROCHLORIDE 200 MG/1
200 TABLET ORAL
Status: CANCELLED | OUTPATIENT
Start: 2025-01-01

## 2025-01-01 RX ORDER — MIDAZOLAM HYDROCHLORIDE 1 MG/ML
1 INJECTION, SOLUTION INTRAMUSCULAR; INTRAVENOUS
Status: DISCONTINUED | OUTPATIENT
Start: 2025-01-01 | End: 2025-04-27 | Stop reason: HOSPADM

## 2025-01-01 RX ORDER — AMOXICILLIN 250 MG
2 CAPSULE ORAL 2 TIMES DAILY PRN
Status: CANCELLED | OUTPATIENT
Start: 2025-01-01

## 2025-01-01 RX ORDER — ATORVASTATIN CALCIUM 10 MG/1
10 TABLET, FILM COATED ORAL NIGHTLY
Status: DISCONTINUED | OUTPATIENT
Start: 2025-01-01 | End: 2025-01-01

## 2025-01-01 RX ORDER — SODIUM CHLORIDE 0.9 % (FLUSH) 0.9 %
10 SYRINGE (ML) INJECTION EVERY 12 HOURS SCHEDULED
Status: CANCELLED | OUTPATIENT
Start: 2025-01-01

## 2025-01-01 RX ORDER — HEPARIN SODIUM 5000 [USP'U]/ML
5000 INJECTION, SOLUTION INTRAVENOUS; SUBCUTANEOUS EVERY 8 HOURS SCHEDULED
Status: DISCONTINUED | OUTPATIENT
Start: 2025-01-01 | End: 2025-01-01

## 2025-01-01 RX ORDER — HYDROMORPHONE HYDROCHLORIDE 1 MG/ML
0.5 INJECTION, SOLUTION INTRAMUSCULAR; INTRAVENOUS; SUBCUTANEOUS EVERY 6 HOURS
Status: DISCONTINUED | OUTPATIENT
Start: 2025-01-01 | End: 2025-01-01

## 2025-01-01 RX ORDER — ACETAMINOPHEN 325 MG/1
650 TABLET ORAL EVERY 4 HOURS PRN
Status: CANCELLED | OUTPATIENT
Start: 2025-01-01

## 2025-01-01 RX ORDER — INSULIN LISPRO 100 [IU]/ML
2-7 INJECTION, SOLUTION INTRAVENOUS; SUBCUTANEOUS
Status: DISCONTINUED | OUTPATIENT
Start: 2025-01-01 | End: 2025-01-01

## 2025-01-01 RX ORDER — OXYCODONE HYDROCHLORIDE 5 MG/1
5 TABLET ORAL EVERY 4 HOURS PRN
Refills: 0 | Status: CANCELLED | OUTPATIENT
Start: 2025-01-01

## 2025-01-01 RX ORDER — ONDANSETRON 2 MG/ML
4 INJECTION INTRAMUSCULAR; INTRAVENOUS EVERY 6 HOURS PRN
Status: CANCELLED | OUTPATIENT
Start: 2025-01-01

## 2025-01-01 RX ORDER — ATORVASTATIN CALCIUM 10 MG/1
10 TABLET, FILM COATED ORAL NIGHTLY
Status: CANCELLED | OUTPATIENT
Start: 2025-01-01

## 2025-01-01 RX ORDER — PANTOPRAZOLE SODIUM 40 MG/10ML
40 INJECTION, POWDER, LYOPHILIZED, FOR SOLUTION INTRAVENOUS
Status: DISCONTINUED | OUTPATIENT
Start: 2025-01-01 | End: 2025-01-01 | Stop reason: HOSPADM

## 2025-01-01 RX ORDER — SODIUM CHLORIDE 9 MG/ML
40 INJECTION, SOLUTION INTRAVENOUS AS NEEDED
Status: CANCELLED | OUTPATIENT
Start: 2025-01-01

## 2025-01-01 RX ORDER — HALOPERIDOL 5 MG/ML
1 INJECTION INTRAMUSCULAR EVERY 4 HOURS PRN
Status: DISCONTINUED | OUTPATIENT
Start: 2025-01-01 | End: 2025-04-27 | Stop reason: HOSPADM

## 2025-01-01 RX ORDER — ALBUMIN (HUMAN) 12.5 G/50ML
SOLUTION INTRAVENOUS
Status: DISCONTINUED
Start: 2025-01-01 | End: 2025-01-01 | Stop reason: HOSPADM

## 2025-01-01 RX ORDER — NICOTINE POLACRILEX 4 MG
15 LOZENGE BUCCAL
Status: DISCONTINUED | OUTPATIENT
Start: 2025-01-01 | End: 2025-01-01 | Stop reason: HOSPADM

## 2025-01-01 RX ORDER — LORAZEPAM 0.5 MG/1
0.5 TABLET ORAL EVERY 6 HOURS PRN
Status: CANCELLED | OUTPATIENT
Start: 2025-01-01 | End: 2025-01-01

## 2025-01-01 RX ORDER — ALBUMIN (HUMAN) 12.5 G/50ML
50 SOLUTION INTRAVENOUS ONCE
Status: COMPLETED | OUTPATIENT
Start: 2025-01-01 | End: 2025-01-01

## 2025-01-01 RX ORDER — MIDAZOLAM HYDROCHLORIDE 1 MG/ML
0.5 INJECTION, SOLUTION INTRAMUSCULAR; INTRAVENOUS
Status: DISCONTINUED | OUTPATIENT
Start: 2025-01-01 | End: 2025-01-01

## 2025-01-01 RX ORDER — BISACODYL 10 MG
10 SUPPOSITORY, RECTAL RECTAL DAILY PRN
Status: DISCONTINUED | OUTPATIENT
Start: 2025-01-01 | End: 2025-01-01 | Stop reason: HOSPADM

## 2025-01-01 RX ORDER — ACETAMINOPHEN 325 MG/1
650 TABLET ORAL EVERY 4 HOURS PRN
Status: DISCONTINUED | OUTPATIENT
Start: 2025-01-01 | End: 2025-04-27 | Stop reason: HOSPADM

## 2025-01-01 RX ORDER — PANTOPRAZOLE SODIUM 40 MG/10ML
40 INJECTION, POWDER, LYOPHILIZED, FOR SOLUTION INTRAVENOUS
Status: CANCELLED | OUTPATIENT
Start: 2025-01-01

## 2025-01-01 RX ORDER — HEPARIN SODIUM 1000 [USP'U]/ML
2000 INJECTION, SOLUTION INTRAVENOUS; SUBCUTANEOUS AS NEEDED
Status: CANCELLED | OUTPATIENT
Start: 2025-01-01 | End: 2025-05-01

## 2025-01-01 RX ORDER — PHENOBARBITAL SODIUM 65 MG/ML
65 INJECTION, SOLUTION INTRAMUSCULAR; INTRAVENOUS EVERY 6 HOURS PRN
Status: DISCONTINUED | OUTPATIENT
Start: 2025-01-01 | End: 2025-04-27 | Stop reason: HOSPADM

## 2025-01-01 RX ORDER — HYDROMORPHONE HYDROCHLORIDE 1 MG/ML
0.5 INJECTION, SOLUTION INTRAMUSCULAR; INTRAVENOUS; SUBCUTANEOUS EVERY 4 HOURS
Status: DISCONTINUED | OUTPATIENT
Start: 2025-01-01 | End: 2025-01-01

## 2025-01-01 RX ORDER — ACETAMINOPHEN 160 MG/5ML
650 SOLUTION ORAL EVERY 4 HOURS PRN
Status: DISCONTINUED | OUTPATIENT
Start: 2025-01-01 | End: 2025-04-27 | Stop reason: HOSPADM

## 2025-01-01 RX ORDER — DEXTROSE MONOHYDRATE 25 G/50ML
25 INJECTION, SOLUTION INTRAVENOUS
Status: DISCONTINUED | OUTPATIENT
Start: 2025-01-01 | End: 2025-01-01 | Stop reason: HOSPADM

## 2025-01-01 RX ORDER — AMOXICILLIN 250 MG
2 CAPSULE ORAL 2 TIMES DAILY PRN
Status: DISCONTINUED | OUTPATIENT
Start: 2025-01-01 | End: 2025-01-01 | Stop reason: HOSPADM

## 2025-01-01 RX ORDER — HEPARIN SODIUM 1000 [USP'U]/ML
2000 INJECTION, SOLUTION INTRAVENOUS; SUBCUTANEOUS AS NEEDED
Status: DISCONTINUED | OUTPATIENT
Start: 2025-01-01 | End: 2025-01-01 | Stop reason: HOSPADM

## 2025-01-01 RX ORDER — MIDAZOLAM HYDROCHLORIDE 1 MG/ML
1 INJECTION, SOLUTION INTRAMUSCULAR; INTRAVENOUS EVERY 4 HOURS
Status: DISCONTINUED | OUTPATIENT
Start: 2025-01-01 | End: 2025-04-27 | Stop reason: HOSPADM

## 2025-01-01 RX ORDER — BISACODYL 5 MG/1
5 TABLET, DELAYED RELEASE ORAL DAILY PRN
Status: DISCONTINUED | OUTPATIENT
Start: 2025-01-01 | End: 2025-01-01 | Stop reason: HOSPADM

## 2025-01-01 RX ORDER — NITROGLYCERIN 0.4 MG/1
0.4 TABLET SUBLINGUAL
Status: CANCELLED | OUTPATIENT
Start: 2025-01-01

## 2025-01-01 RX ORDER — IPRATROPIUM BROMIDE AND ALBUTEROL SULFATE 2.5; .5 MG/3ML; MG/3ML
3 SOLUTION RESPIRATORY (INHALATION) EVERY 4 HOURS PRN
Status: CANCELLED | OUTPATIENT
Start: 2025-01-01

## 2025-01-01 RX ORDER — POLYETHYLENE GLYCOL 3350 17 G/17G
17 POWDER, FOR SOLUTION ORAL DAILY PRN
Status: CANCELLED | OUTPATIENT
Start: 2025-01-01

## 2025-01-01 RX ORDER — HYDROMORPHONE HYDROCHLORIDE 1 MG/ML
0.5 INJECTION, SOLUTION INTRAMUSCULAR; INTRAVENOUS; SUBCUTANEOUS
Refills: 0 | Status: DISCONTINUED | OUTPATIENT
Start: 2025-01-01 | End: 2025-01-01

## 2025-01-01 RX ORDER — LORAZEPAM 0.5 MG/1
0.5 TABLET ORAL EVERY 6 HOURS PRN
Status: DISCONTINUED | OUTPATIENT
Start: 2025-01-01 | End: 2025-01-01

## 2025-01-01 RX ORDER — INSULIN LISPRO 100 [IU]/ML
2-7 INJECTION, SOLUTION INTRAVENOUS; SUBCUTANEOUS
Status: CANCELLED | OUTPATIENT
Start: 2025-01-01

## 2025-01-01 RX ORDER — SODIUM CHLORIDE 0.9 % (FLUSH) 0.9 %
10 SYRINGE (ML) INJECTION AS NEEDED
Status: DISCONTINUED | OUTPATIENT
Start: 2025-01-01 | End: 2025-04-27 | Stop reason: HOSPADM

## 2025-01-01 RX ORDER — VANCOMYCIN/0.9 % SOD CHLORIDE 1.5G/250ML
1500 PLASTIC BAG, INJECTION (ML) INTRAVENOUS ONCE
Status: COMPLETED | OUTPATIENT
Start: 2025-01-01 | End: 2025-01-01

## 2025-01-01 RX ORDER — ACETAMINOPHEN 325 MG/1
650 TABLET ORAL EVERY 4 HOURS PRN
Status: DISCONTINUED | OUTPATIENT
Start: 2025-01-01 | End: 2025-01-01 | Stop reason: HOSPADM

## 2025-01-01 RX ORDER — MIDAZOLAM HYDROCHLORIDE 1 MG/ML
0.5 INJECTION, SOLUTION INTRAMUSCULAR; INTRAVENOUS EVERY 4 HOURS
Status: DISCONTINUED | OUTPATIENT
Start: 2025-01-01 | End: 2025-01-01

## 2025-01-01 RX ORDER — DEXTROSE MONOHYDRATE 25 G/50ML
25 INJECTION, SOLUTION INTRAVENOUS
Status: CANCELLED | OUTPATIENT
Start: 2025-01-01

## 2025-01-01 RX ORDER — ACETAMINOPHEN 650 MG/1
650 SUPPOSITORY RECTAL EVERY 4 HOURS PRN
Status: DISCONTINUED | OUTPATIENT
Start: 2025-01-01 | End: 2025-04-27 | Stop reason: HOSPADM

## 2025-01-01 RX ORDER — DIAZEPAM 10 MG/2ML
2.5 INJECTION, SOLUTION INTRAMUSCULAR; INTRAVENOUS ONCE
Status: COMPLETED | OUTPATIENT
Start: 2025-01-01 | End: 2025-01-01

## 2025-01-01 RX ORDER — OXYCODONE HYDROCHLORIDE 5 MG/1
5 TABLET ORAL EVERY 4 HOURS PRN
Refills: 0 | Status: DISCONTINUED | OUTPATIENT
Start: 2025-01-01 | End: 2025-01-01 | Stop reason: HOSPADM

## 2025-01-01 RX ORDER — PANTOPRAZOLE SODIUM 40 MG/10ML
40 INJECTION, POWDER, LYOPHILIZED, FOR SOLUTION INTRAVENOUS
Status: DISCONTINUED | OUTPATIENT
Start: 2025-01-01 | End: 2025-01-01

## 2025-01-01 RX ORDER — KETOROLAC TROMETHAMINE 15 MG/ML
15 INJECTION, SOLUTION INTRAMUSCULAR; INTRAVENOUS EVERY 6 HOURS PRN
Status: DISCONTINUED | OUTPATIENT
Start: 2025-01-01 | End: 2025-04-27 | Stop reason: HOSPADM

## 2025-01-01 RX ORDER — HYDROMORPHONE HYDROCHLORIDE 1 MG/ML
0.5 INJECTION, SOLUTION INTRAMUSCULAR; INTRAVENOUS; SUBCUTANEOUS
Refills: 0 | Status: DISCONTINUED | OUTPATIENT
Start: 2025-01-01 | End: 2025-01-01 | Stop reason: HOSPADM

## 2025-01-01 RX ORDER — POLYETHYLENE GLYCOL 3350 17 G/17G
17 POWDER, FOR SOLUTION ORAL DAILY PRN
Status: DISCONTINUED | OUTPATIENT
Start: 2025-01-01 | End: 2025-01-01

## 2025-01-01 RX ORDER — MIDAZOLAM HYDROCHLORIDE 1 MG/ML
1 INJECTION, SOLUTION INTRAMUSCULAR; INTRAVENOUS EVERY 4 HOURS PRN
Status: DISCONTINUED | OUTPATIENT
Start: 2025-01-01 | End: 2025-01-01 | Stop reason: HOSPADM

## 2025-01-01 RX ORDER — PANTOPRAZOLE SODIUM 40 MG/1
40 TABLET, DELAYED RELEASE ORAL
Status: DISCONTINUED | OUTPATIENT
Start: 2025-01-01 | End: 2025-01-01

## 2025-01-01 RX ORDER — IBUPROFEN 600 MG/1
1 TABLET ORAL
Status: DISCONTINUED | OUTPATIENT
Start: 2025-01-01 | End: 2025-01-01 | Stop reason: HOSPADM

## 2025-01-01 RX ORDER — SODIUM CHLORIDE, SODIUM LACTATE, POTASSIUM CHLORIDE, CALCIUM CHLORIDE 600; 310; 30; 20 MG/100ML; MG/100ML; MG/100ML; MG/100ML
100 INJECTION, SOLUTION INTRAVENOUS ONCE
Status: DISCONTINUED | OUTPATIENT
Start: 2025-01-01 | End: 2025-01-01

## 2025-01-01 RX ORDER — ALUMINA, MAGNESIA, AND SIMETHICONE 2400; 2400; 240 MG/30ML; MG/30ML; MG/30ML
15 SUSPENSION ORAL EVERY 6 HOURS PRN
Status: DISCONTINUED | OUTPATIENT
Start: 2025-01-01 | End: 2025-01-01 | Stop reason: HOSPADM

## 2025-01-01 RX ORDER — SODIUM CHLORIDE 0.9 % (FLUSH) 0.9 %
10 SYRINGE (ML) INJECTION EVERY 12 HOURS SCHEDULED
Status: DISCONTINUED | OUTPATIENT
Start: 2025-01-01 | End: 2025-01-01 | Stop reason: HOSPADM

## 2025-01-01 RX ORDER — ALUMINA, MAGNESIA, AND SIMETHICONE 2400; 2400; 240 MG/30ML; MG/30ML; MG/30ML
15 SUSPENSION ORAL EVERY 6 HOURS PRN
Status: CANCELLED | OUTPATIENT
Start: 2025-01-01

## 2025-01-01 RX ORDER — BISACODYL 10 MG
10 SUPPOSITORY, RECTAL RECTAL DAILY PRN
Status: DISCONTINUED | OUTPATIENT
Start: 2025-01-01 | End: 2025-04-27 | Stop reason: HOSPADM

## 2025-01-01 RX ORDER — ONDANSETRON 2 MG/ML
4 INJECTION INTRAMUSCULAR; INTRAVENOUS EVERY 6 HOURS PRN
Status: DISCONTINUED | OUTPATIENT
Start: 2025-01-01 | End: 2025-01-01 | Stop reason: HOSPADM

## 2025-01-01 RX ORDER — IPRATROPIUM BROMIDE AND ALBUTEROL SULFATE 2.5; .5 MG/3ML; MG/3ML
3 SOLUTION RESPIRATORY (INHALATION) EVERY 4 HOURS PRN
Status: DISCONTINUED | OUTPATIENT
Start: 2025-01-01 | End: 2025-01-01 | Stop reason: HOSPADM

## 2025-01-01 RX ADMIN — MIDAZOLAM HYDROCHLORIDE 1 MG: 1 INJECTION, SOLUTION INTRAMUSCULAR; INTRAVENOUS at 22:24

## 2025-01-01 RX ADMIN — MIDAZOLAM HYDROCHLORIDE 1 MG: 1 INJECTION, SOLUTION INTRAMUSCULAR; INTRAVENOUS at 07:47

## 2025-01-01 RX ADMIN — HYDROMORPHONE HYDROCHLORIDE 1 MG: 1 INJECTION, SOLUTION INTRAMUSCULAR; INTRAVENOUS; SUBCUTANEOUS at 02:24

## 2025-01-01 RX ADMIN — MINERAL OIL: 999 LIQUID ORAL at 09:05

## 2025-01-01 RX ADMIN — HYDROMORPHONE HYDROCHLORIDE 0.5 MG: 1 INJECTION, SOLUTION INTRAMUSCULAR; INTRAVENOUS; SUBCUTANEOUS at 21:20

## 2025-01-01 RX ADMIN — HYDROMORPHONE HYDROCHLORIDE 1 MG: 1 INJECTION, SOLUTION INTRAMUSCULAR; INTRAVENOUS; SUBCUTANEOUS at 11:01

## 2025-01-01 RX ADMIN — ACETAMINOPHEN 650 MG: 325 TABLET, FILM COATED ORAL at 15:37

## 2025-01-01 RX ADMIN — MIDAZOLAM HYDROCHLORIDE 0.5 MG: 1 INJECTION, SOLUTION INTRAMUSCULAR; INTRAVENOUS at 22:10

## 2025-01-01 RX ADMIN — HYDROMORPHONE HYDROCHLORIDE 0.5 MG: 1 INJECTION, SOLUTION INTRAMUSCULAR; INTRAVENOUS; SUBCUTANEOUS at 06:21

## 2025-01-01 RX ADMIN — MIDAZOLAM HYDROCHLORIDE 1 MG: 1 INJECTION, SOLUTION INTRAMUSCULAR; INTRAVENOUS at 10:57

## 2025-01-01 RX ADMIN — HYDROMORPHONE HYDROCHLORIDE 0.5 MG: 1 INJECTION, SOLUTION INTRAMUSCULAR; INTRAVENOUS; SUBCUTANEOUS at 18:09

## 2025-01-01 RX ADMIN — ERTAPENEM 500 MG: 1 INJECTION INTRAMUSCULAR; INTRAVENOUS at 13:44

## 2025-01-01 RX ADMIN — MINERAL OIL: 999 LIQUID ORAL at 02:09

## 2025-01-01 RX ADMIN — HEPARIN SODIUM 2000 UNITS: 1000 INJECTION INTRAVENOUS; SUBCUTANEOUS at 11:18

## 2025-01-01 RX ADMIN — ACETAMINOPHEN 650 MG: 325 TABLET, FILM COATED ORAL at 08:47

## 2025-01-01 RX ADMIN — HYDROMORPHONE HYDROCHLORIDE 1 MG: 1 INJECTION, SOLUTION INTRAMUSCULAR; INTRAVENOUS; SUBCUTANEOUS at 10:57

## 2025-01-01 RX ADMIN — OXYCODONE HYDROCHLORIDE 5 MG: 5 TABLET ORAL at 20:19

## 2025-01-01 RX ADMIN — ACETAMINOPHEN 650 MG: 325 TABLET, FILM COATED ORAL at 02:31

## 2025-01-01 RX ADMIN — MIDAZOLAM HYDROCHLORIDE 1 MG: 1 INJECTION, SOLUTION INTRAMUSCULAR; INTRAVENOUS at 15:46

## 2025-01-01 RX ADMIN — APIXABAN 2.5 MG: 2.5 TABLET, FILM COATED ORAL at 20:19

## 2025-01-01 RX ADMIN — MINERAL OIL: 999 LIQUID ORAL at 00:48

## 2025-01-01 RX ADMIN — ACETAMINOPHEN 650 MG: 325 TABLET, FILM COATED ORAL at 20:18

## 2025-01-01 RX ADMIN — HYDROMORPHONE HYDROCHLORIDE 0.5 MG: 1 INJECTION, SOLUTION INTRAMUSCULAR; INTRAVENOUS; SUBCUTANEOUS at 03:43

## 2025-01-01 RX ADMIN — DIAZEPAM 2.5 MG: 10 INJECTION, SOLUTION INTRAMUSCULAR; INTRAVENOUS at 22:10

## 2025-01-01 RX ADMIN — SODIUM CHLORIDE 1000 MG: 900 INJECTION INTRAVENOUS at 01:35

## 2025-01-01 RX ADMIN — MIDAZOLAM HYDROCHLORIDE 1 MG: 1 INJECTION, SOLUTION INTRAMUSCULAR; INTRAVENOUS at 04:17

## 2025-01-01 RX ADMIN — AMIODARONE HYDROCHLORIDE 200 MG: 200 TABLET ORAL at 08:48

## 2025-01-01 RX ADMIN — HYDROMORPHONE HYDROCHLORIDE 1 MG: 1 INJECTION, SOLUTION INTRAMUSCULAR; INTRAVENOUS; SUBCUTANEOUS at 22:39

## 2025-01-01 RX ADMIN — APIXABAN 2.5 MG: 2.5 TABLET, FILM COATED ORAL at 08:47

## 2025-01-01 RX ADMIN — MIDAZOLAM HYDROCHLORIDE 1 MG: 1 INJECTION, SOLUTION INTRAMUSCULAR; INTRAVENOUS at 15:18

## 2025-01-01 RX ADMIN — HYDROMORPHONE HYDROCHLORIDE 1 MG: 1 INJECTION, SOLUTION INTRAMUSCULAR; INTRAVENOUS; SUBCUTANEOUS at 03:54

## 2025-01-01 RX ADMIN — HYDROMORPHONE HYDROCHLORIDE 1 MG: 1 INJECTION, SOLUTION INTRAMUSCULAR; INTRAVENOUS; SUBCUTANEOUS at 00:20

## 2025-01-01 RX ADMIN — SCOPOLAMINE 1 PATCH: 1.5 PATCH, EXTENDED RELEASE TRANSDERMAL at 10:18

## 2025-01-01 RX ADMIN — MIDAZOLAM HYDROCHLORIDE 0.5 MG: 1 INJECTION, SOLUTION INTRAMUSCULAR; INTRAVENOUS at 06:21

## 2025-01-01 RX ADMIN — OXYCODONE HYDROCHLORIDE 5 MG: 5 TABLET ORAL at 16:24

## 2025-01-01 RX ADMIN — GLYCOPYRROLATE 0.4 MG: 0.2 INJECTION, SOLUTION INTRAMUSCULAR; INTRAVENOUS at 00:29

## 2025-01-01 RX ADMIN — PANTOPRAZOLE SODIUM 40 MG: 40 INJECTION, POWDER, FOR SOLUTION INTRAVENOUS at 05:06

## 2025-01-01 RX ADMIN — HYDROMORPHONE HYDROCHLORIDE 1 MG: 1 INJECTION, SOLUTION INTRAMUSCULAR; INTRAVENOUS; SUBCUTANEOUS at 10:19

## 2025-01-01 RX ADMIN — MINERAL OIL: 999 LIQUID ORAL at 14:44

## 2025-01-01 RX ADMIN — LORAZEPAM 0.5 MG: 0.5 TABLET ORAL at 15:37

## 2025-01-01 RX ADMIN — ALBUMIN (HUMAN) 50 G: 0.25 INJECTION, SOLUTION INTRAVENOUS at 09:30

## 2025-01-01 RX ADMIN — DEXTROSE MONOHYDRATE 25 G: 25 INJECTION, SOLUTION INTRAVENOUS at 16:37

## 2025-01-01 RX ADMIN — HYDROMORPHONE HYDROCHLORIDE 1 MG: 1 INJECTION, SOLUTION INTRAMUSCULAR; INTRAVENOUS; SUBCUTANEOUS at 12:09

## 2025-01-01 RX ADMIN — MINERAL OIL: 999 LIQUID ORAL at 19:17

## 2025-01-01 RX ADMIN — APIXABAN 2.5 MG: 2.5 TABLET, FILM COATED ORAL at 11:43

## 2025-01-01 RX ADMIN — MIDAZOLAM HYDROCHLORIDE 1 MG: 1 INJECTION, SOLUTION INTRAMUSCULAR; INTRAVENOUS at 00:10

## 2025-01-01 RX ADMIN — ATORVASTATIN CALCIUM 10 MG: 10 TABLET, FILM COATED ORAL at 20:19

## 2025-01-01 RX ADMIN — MINERAL OIL: 999 LIQUID ORAL at 19:54

## 2025-01-01 RX ADMIN — HYDROMORPHONE HYDROCHLORIDE 1 MG: 1 INJECTION, SOLUTION INTRAMUSCULAR; INTRAVENOUS; SUBCUTANEOUS at 06:49

## 2025-01-01 RX ADMIN — MIDAZOLAM HYDROCHLORIDE 0.5 MG: 1 INJECTION, SOLUTION INTRAMUSCULAR; INTRAVENOUS at 19:56

## 2025-01-01 RX ADMIN — MIDAZOLAM HYDROCHLORIDE 1 MG: 1 INJECTION, SOLUTION INTRAMUSCULAR; INTRAVENOUS at 22:36

## 2025-01-01 RX ADMIN — HYDROMORPHONE HYDROCHLORIDE 1 MG: 1 INJECTION, SOLUTION INTRAMUSCULAR; INTRAVENOUS; SUBCUTANEOUS at 02:09

## 2025-01-01 RX ADMIN — HYDROMORPHONE HYDROCHLORIDE 0.5 MG: 1 INJECTION, SOLUTION INTRAMUSCULAR; INTRAVENOUS; SUBCUTANEOUS at 09:48

## 2025-01-01 RX ADMIN — MIDAZOLAM HYDROCHLORIDE 0.5 MG: 1 INJECTION, SOLUTION INTRAMUSCULAR; INTRAVENOUS at 03:43

## 2025-01-01 RX ADMIN — MIDAZOLAM HYDROCHLORIDE 0.5 MG: 1 INJECTION, SOLUTION INTRAMUSCULAR; INTRAVENOUS at 18:51

## 2025-01-01 RX ADMIN — OXYCODONE HYDROCHLORIDE 5 MG: 5 TABLET ORAL at 02:31

## 2025-01-01 RX ADMIN — SODIUM CHLORIDE 15 MMOL: 9 INJECTION, SOLUTION INTRAVENOUS at 20:18

## 2025-01-01 RX ADMIN — MINERAL OIL: 999 LIQUID ORAL at 06:51

## 2025-01-01 RX ADMIN — MIDAZOLAM HYDROCHLORIDE 1 MG: 1 INJECTION, SOLUTION INTRAMUSCULAR; INTRAVENOUS at 18:00

## 2025-01-01 RX ADMIN — Medication 10 ML: at 08:48

## 2025-01-01 RX ADMIN — MIDAZOLAM HYDROCHLORIDE 1 MG: 1 INJECTION, SOLUTION INTRAMUSCULAR; INTRAVENOUS at 11:00

## 2025-01-01 RX ADMIN — HYDROMORPHONE HYDROCHLORIDE 0.5 MG: 1 INJECTION, SOLUTION INTRAMUSCULAR; INTRAVENOUS; SUBCUTANEOUS at 12:58

## 2025-01-01 RX ADMIN — HALOPERIDOL LACTATE 1 MG: 5 INJECTION, SOLUTION INTRAMUSCULAR at 03:49

## 2025-01-01 RX ADMIN — MIDAZOLAM HYDROCHLORIDE 0.5 MG: 1 INJECTION, SOLUTION INTRAMUSCULAR; INTRAVENOUS at 23:24

## 2025-01-01 RX ADMIN — HYDROMORPHONE HYDROCHLORIDE 0.5 MG: 1 INJECTION, SOLUTION INTRAMUSCULAR; INTRAVENOUS; SUBCUTANEOUS at 23:24

## 2025-01-01 RX ADMIN — HYDROMORPHONE HYDROCHLORIDE 0.5 MG: 1 INJECTION, SOLUTION INTRAMUSCULAR; INTRAVENOUS; SUBCUTANEOUS at 20:27

## 2025-01-01 RX ADMIN — MIDAZOLAM HYDROCHLORIDE 1 MG: 1 INJECTION, SOLUTION INTRAMUSCULAR; INTRAVENOUS at 04:19

## 2025-01-01 RX ADMIN — HYDROMORPHONE HYDROCHLORIDE 1 MG: 1 INJECTION, SOLUTION INTRAMUSCULAR; INTRAVENOUS; SUBCUTANEOUS at 15:09

## 2025-01-01 RX ADMIN — SODIUM CHLORIDE 15 MMOL: 9 INJECTION, SOLUTION INTRAVENOUS at 18:29

## 2025-01-01 RX ADMIN — MIDAZOLAM HYDROCHLORIDE 0.5 MG: 1 INJECTION, SOLUTION INTRAMUSCULAR; INTRAVENOUS at 19:54

## 2025-01-01 RX ADMIN — DEXTROSE MONOHYDRATE 25 G: 25 INJECTION, SOLUTION INTRAVENOUS at 11:34

## 2025-01-01 RX ADMIN — Medication 10 ML: at 20:34

## 2025-01-01 RX ADMIN — HYDROMORPHONE HYDROCHLORIDE 1 MG: 1 INJECTION, SOLUTION INTRAMUSCULAR; INTRAVENOUS; SUBCUTANEOUS at 18:00

## 2025-01-01 RX ADMIN — MIDAZOLAM HYDROCHLORIDE 0.5 MG: 1 INJECTION, SOLUTION INTRAMUSCULAR; INTRAVENOUS at 03:44

## 2025-01-01 RX ADMIN — ACETAMINOPHEN 650 MG: 325 TABLET, FILM COATED ORAL at 10:27

## 2025-01-01 RX ADMIN — AMIODARONE HYDROCHLORIDE 200 MG: 200 TABLET ORAL at 10:21

## 2025-01-01 RX ADMIN — MIDAZOLAM HYDROCHLORIDE 0.5 MG: 1 INJECTION, SOLUTION INTRAMUSCULAR; INTRAVENOUS at 14:12

## 2025-01-01 RX ADMIN — MIDAZOLAM HYDROCHLORIDE 1 MG: 1 INJECTION, SOLUTION INTRAMUSCULAR; INTRAVENOUS at 19:50

## 2025-01-01 RX ADMIN — Medication 1 MG: at 12:05

## 2025-01-01 RX ADMIN — HYDROMORPHONE HYDROCHLORIDE 0.5 MG: 1 INJECTION, SOLUTION INTRAMUSCULAR; INTRAVENOUS; SUBCUTANEOUS at 14:53

## 2025-01-01 RX ADMIN — OXYCODONE HYDROCHLORIDE 5 MG: 5 TABLET ORAL at 13:33

## 2025-01-01 RX ADMIN — MINERAL OIL: 999 LIQUID ORAL at 08:00

## 2025-01-01 RX ADMIN — HALOPERIDOL LACTATE 1 MG: 5 INJECTION, SOLUTION INTRAMUSCULAR at 15:36

## 2025-01-01 RX ADMIN — HYDROMORPHONE HYDROCHLORIDE 0.5 MG: 1 INJECTION, SOLUTION INTRAMUSCULAR; INTRAVENOUS; SUBCUTANEOUS at 18:51

## 2025-01-01 RX ADMIN — HYDROMORPHONE HYDROCHLORIDE 0.5 MG: 1 INJECTION, SOLUTION INTRAMUSCULAR; INTRAVENOUS; SUBCUTANEOUS at 22:10

## 2025-01-01 RX ADMIN — MINERAL OIL: 999 LIQUID ORAL at 06:42

## 2025-01-01 RX ADMIN — HYDROMORPHONE HYDROCHLORIDE 1 MG: 1 INJECTION, SOLUTION INTRAMUSCULAR; INTRAVENOUS; SUBCUTANEOUS at 20:46

## 2025-01-01 RX ADMIN — MINERAL OIL: 999 LIQUID ORAL at 00:11

## 2025-01-01 RX ADMIN — MIDAZOLAM HYDROCHLORIDE 0.5 MG: 1 INJECTION, SOLUTION INTRAMUSCULAR; INTRAVENOUS at 18:49

## 2025-01-01 RX ADMIN — MIDAZOLAM HYDROCHLORIDE 1 MG: 1 INJECTION, SOLUTION INTRAMUSCULAR; INTRAVENOUS at 23:49

## 2025-01-01 RX ADMIN — MIDAZOLAM HYDROCHLORIDE 1 MG: 1 INJECTION, SOLUTION INTRAMUSCULAR; INTRAVENOUS at 06:49

## 2025-01-01 RX ADMIN — MIDAZOLAM HYDROCHLORIDE 0.5 MG: 1 INJECTION, SOLUTION INTRAMUSCULAR; INTRAVENOUS at 11:48

## 2025-01-01 RX ADMIN — MIDAZOLAM HYDROCHLORIDE 0.5 MG: 1 INJECTION, SOLUTION INTRAMUSCULAR; INTRAVENOUS at 09:48

## 2025-01-01 RX ADMIN — MIDAZOLAM HYDROCHLORIDE 1 MG: 1 INJECTION, SOLUTION INTRAMUSCULAR; INTRAVENOUS at 03:54

## 2025-01-01 RX ADMIN — SODIUM CHLORIDE 1000 MG: 900 INJECTION INTRAVENOUS at 05:05

## 2025-01-01 RX ADMIN — ATORVASTATIN CALCIUM 10 MG: 10 TABLET, FILM COATED ORAL at 20:34

## 2025-01-01 RX ADMIN — HYDROMORPHONE HYDROCHLORIDE 0.5 MG: 1 INJECTION, SOLUTION INTRAMUSCULAR; INTRAVENOUS; SUBCUTANEOUS at 10:54

## 2025-01-01 RX ADMIN — HYDROMORPHONE HYDROCHLORIDE 1 MG: 1 INJECTION, SOLUTION INTRAMUSCULAR; INTRAVENOUS; SUBCUTANEOUS at 02:20

## 2025-01-01 RX ADMIN — HYDROMORPHONE HYDROCHLORIDE 0.5 MG: 1 INJECTION, SOLUTION INTRAMUSCULAR; INTRAVENOUS; SUBCUTANEOUS at 15:36

## 2025-01-01 RX ADMIN — LORAZEPAM 0.5 MG: 0.5 TABLET ORAL at 04:26

## 2025-01-01 RX ADMIN — MIDAZOLAM HYDROCHLORIDE 1 MG: 1 INJECTION, SOLUTION INTRAMUSCULAR; INTRAVENOUS at 06:46

## 2025-01-01 RX ADMIN — APIXABAN 2.5 MG: 2.5 TABLET, FILM COATED ORAL at 10:21

## 2025-01-01 RX ADMIN — MIDAZOLAM HYDROCHLORIDE 1 MG: 1 INJECTION, SOLUTION INTRAMUSCULAR; INTRAVENOUS at 12:09

## 2025-01-01 RX ADMIN — MIDAZOLAM HYDROCHLORIDE 0.5 MG: 1 INJECTION, SOLUTION INTRAMUSCULAR; INTRAVENOUS at 01:29

## 2025-01-01 RX ADMIN — HYDROMORPHONE HYDROCHLORIDE 1 MG: 1 INJECTION, SOLUTION INTRAMUSCULAR; INTRAVENOUS; SUBCUTANEOUS at 22:24

## 2025-01-01 RX ADMIN — HYDROMORPHONE HYDROCHLORIDE 1 MG: 1 INJECTION, SOLUTION INTRAMUSCULAR; INTRAVENOUS; SUBCUTANEOUS at 00:10

## 2025-01-01 RX ADMIN — MIDAZOLAM HYDROCHLORIDE 1 MG: 1 INJECTION, SOLUTION INTRAMUSCULAR; INTRAVENOUS at 17:28

## 2025-01-01 RX ADMIN — HYDROMORPHONE HYDROCHLORIDE 0.5 MG: 1 INJECTION, SOLUTION INTRAMUSCULAR; INTRAVENOUS; SUBCUTANEOUS at 05:44

## 2025-01-01 RX ADMIN — MINERAL OIL: 999 LIQUID ORAL at 10:19

## 2025-01-01 RX ADMIN — MIDAZOLAM HYDROCHLORIDE 1 MG: 1 INJECTION, SOLUTION INTRAMUSCULAR; INTRAVENOUS at 02:09

## 2025-01-01 RX ADMIN — HYDROMORPHONE HYDROCHLORIDE 0.5 MG: 1 INJECTION, SOLUTION INTRAMUSCULAR; INTRAVENOUS; SUBCUTANEOUS at 17:27

## 2025-01-01 RX ADMIN — MIDAZOLAM HYDROCHLORIDE 0.5 MG: 1 INJECTION, SOLUTION INTRAMUSCULAR; INTRAVENOUS at 09:04

## 2025-01-01 RX ADMIN — OXYCODONE HYDROCHLORIDE 5 MG: 5 TABLET ORAL at 20:34

## 2025-01-01 RX ADMIN — HYDROMORPHONE HYDROCHLORIDE 1 MG: 1 INJECTION, SOLUTION INTRAMUSCULAR; INTRAVENOUS; SUBCUTANEOUS at 14:44

## 2025-01-01 RX ADMIN — AMIODARONE HYDROCHLORIDE 200 MG: 200 TABLET ORAL at 11:43

## 2025-01-01 RX ADMIN — HYDROMORPHONE HYDROCHLORIDE 0.5 MG: 1 INJECTION, SOLUTION INTRAMUSCULAR; INTRAVENOUS; SUBCUTANEOUS at 01:29

## 2025-01-01 RX ADMIN — APIXABAN 2.5 MG: 2.5 TABLET, FILM COATED ORAL at 20:34

## 2025-01-01 RX ADMIN — HYDROMORPHONE HYDROCHLORIDE 1 MG: 1 INJECTION, SOLUTION INTRAMUSCULAR; INTRAVENOUS; SUBCUTANEOUS at 06:30

## 2025-01-01 RX ADMIN — SODIUM CHLORIDE 1000 MG: 900 INJECTION INTRAVENOUS at 04:58

## 2025-01-01 RX ADMIN — HYDROMORPHONE HYDROCHLORIDE 0.5 MG: 1 INJECTION, SOLUTION INTRAMUSCULAR; INTRAVENOUS; SUBCUTANEOUS at 03:44

## 2025-01-01 RX ADMIN — MINERAL OIL: 999 LIQUID ORAL at 14:11

## 2025-01-01 RX ADMIN — HYDROMORPHONE HYDROCHLORIDE 0.5 MG: 1 INJECTION, SOLUTION INTRAMUSCULAR; INTRAVENOUS; SUBCUTANEOUS at 21:35

## 2025-01-01 RX ADMIN — MIDAZOLAM HYDROCHLORIDE 0.5 MG: 1 INJECTION, SOLUTION INTRAMUSCULAR; INTRAVENOUS at 12:39

## 2025-01-01 RX ADMIN — MIDAZOLAM HYDROCHLORIDE 1 MG: 1 INJECTION, SOLUTION INTRAMUSCULAR; INTRAVENOUS at 00:20

## 2025-01-01 RX ADMIN — OXYCODONE HYDROCHLORIDE 5 MG: 5 TABLET ORAL at 04:34

## 2025-01-01 RX ADMIN — HYDROMORPHONE HYDROCHLORIDE 0.5 MG: 1 INJECTION, SOLUTION INTRAMUSCULAR; INTRAVENOUS; SUBCUTANEOUS at 14:10

## 2025-01-01 RX ADMIN — MINERAL OIL: 999 LIQUID ORAL at 15:22

## 2025-01-01 RX ADMIN — Medication 10 ML: at 10:22

## 2025-01-01 RX ADMIN — MIDAZOLAM HYDROCHLORIDE 1 MG: 1 INJECTION, SOLUTION INTRAMUSCULAR; INTRAVENOUS at 14:45

## 2025-01-01 RX ADMIN — HYDROMORPHONE HYDROCHLORIDE 0.5 MG: 1 INJECTION, SOLUTION INTRAMUSCULAR; INTRAVENOUS; SUBCUTANEOUS at 10:52

## 2025-01-01 RX ADMIN — HYDROMORPHONE HYDROCHLORIDE 1 MG: 1 INJECTION, SOLUTION INTRAMUSCULAR; INTRAVENOUS; SUBCUTANEOUS at 06:46

## 2025-01-01 RX ADMIN — HYDROMORPHONE HYDROCHLORIDE 1 MG: 1 INJECTION, SOLUTION INTRAMUSCULAR; INTRAVENOUS; SUBCUTANEOUS at 17:29

## 2025-01-01 RX ADMIN — BISACODYL 10 MG: 10 SUPPOSITORY RECTAL at 12:40

## 2025-01-01 RX ADMIN — MIDAZOLAM HYDROCHLORIDE 0.5 MG: 1 INJECTION, SOLUTION INTRAMUSCULAR; INTRAVENOUS at 14:52

## 2025-01-01 RX ADMIN — GLYCOPYRROLATE 0.4 MG: 0.2 INJECTION, SOLUTION INTRAMUSCULAR; INTRAVENOUS at 10:18

## 2025-01-01 RX ADMIN — MINERAL OIL: 999 LIQUID ORAL at 18:51

## 2025-01-01 RX ADMIN — HYDROMORPHONE HYDROCHLORIDE 0.5 MG: 1 INJECTION, SOLUTION INTRAMUSCULAR; INTRAVENOUS; SUBCUTANEOUS at 19:18

## 2025-01-01 RX ADMIN — PANTOPRAZOLE SODIUM 40 MG: 40 INJECTION, POWDER, FOR SOLUTION INTRAVENOUS at 05:50

## 2025-01-01 RX ADMIN — Medication 10 ML: at 03:32

## 2025-01-01 RX ADMIN — HYDROMORPHONE HYDROCHLORIDE 1 MG: 1 INJECTION, SOLUTION INTRAMUSCULAR; INTRAVENOUS; SUBCUTANEOUS at 15:18

## 2025-01-01 RX ADMIN — HYDROMORPHONE HYDROCHLORIDE 1 MG: 1 INJECTION, SOLUTION INTRAMUSCULAR; INTRAVENOUS; SUBCUTANEOUS at 22:36

## 2025-01-01 RX ADMIN — MIDAZOLAM HYDROCHLORIDE 0.5 MG: 1 INJECTION, SOLUTION INTRAMUSCULAR; INTRAVENOUS at 05:44

## 2025-01-01 RX ADMIN — PANTOPRAZOLE SODIUM 40 MG: 40 INJECTION, POWDER, FOR SOLUTION INTRAVENOUS at 05:01

## 2025-01-01 RX ADMIN — HYDROMORPHONE HYDROCHLORIDE 0.5 MG: 1 INJECTION, SOLUTION INTRAMUSCULAR; INTRAVENOUS; SUBCUTANEOUS at 20:00

## 2025-01-01 RX ADMIN — ACETAMINOPHEN 650 MG: 325 TABLET, FILM COATED ORAL at 20:34

## 2025-01-01 RX ADMIN — MINERAL OIL: 999 LIQUID ORAL at 19:51

## 2025-01-01 RX ADMIN — Medication 1500 MG: at 20:32

## 2025-01-16 ENCOUNTER — APPOINTMENT (OUTPATIENT)
Dept: GENERAL RADIOLOGY | Facility: HOSPITAL | Age: 75
End: 2025-01-16
Payer: MEDICARE

## 2025-01-16 ENCOUNTER — HOSPITAL ENCOUNTER (EMERGENCY)
Facility: HOSPITAL | Age: 75
Discharge: HOME | End: 2025-01-16
Attending: EMERGENCY MEDICINE
Payer: MEDICARE

## 2025-01-16 ENCOUNTER — APPOINTMENT (OUTPATIENT)
Dept: CT IMAGING | Facility: HOSPITAL | Age: 75
End: 2025-01-16
Payer: MEDICARE

## 2025-01-16 VITALS
SYSTOLIC BLOOD PRESSURE: 142 MMHG | OXYGEN SATURATION: 97 % | WEIGHT: 237 LBS | DIASTOLIC BLOOD PRESSURE: 71 MMHG | TEMPERATURE: 98 F | BODY MASS INDEX: 35.92 KG/M2 | HEART RATE: 78 BPM | HEIGHT: 68 IN | RESPIRATION RATE: 18 BRPM

## 2025-01-16 DIAGNOSIS — Z86.79 HISTORY OF CORONARY ARTERY DISEASE: ICD-10-CM

## 2025-01-16 DIAGNOSIS — Z86.39 HISTORY OF DIABETES MELLITUS: ICD-10-CM

## 2025-01-16 DIAGNOSIS — Z86.79 HISTORY OF HYPERTENSION: ICD-10-CM

## 2025-01-16 DIAGNOSIS — R07.9 ACUTE CHEST PAIN: Primary | ICD-10-CM

## 2025-01-16 DIAGNOSIS — Z99.2 ESRD ON HEMODIALYSIS: ICD-10-CM

## 2025-01-16 DIAGNOSIS — N18.6 ESRD ON HEMODIALYSIS: ICD-10-CM

## 2025-01-16 LAB
ALBUMIN SERPL-MCNC: 4 G/DL (ref 3.5–5.2)
ALBUMIN/GLOB SERPL: 1.3 G/DL
ALP SERPL-CCNC: 96 U/L (ref 39–117)
ALT SERPL W P-5'-P-CCNC: 10 U/L (ref 1–33)
ANION GAP SERPL CALCULATED.3IONS-SCNC: 16 MMOL/L (ref 5–15)
AST SERPL-CCNC: 15 U/L (ref 1–32)
BASOPHILS # BLD AUTO: 0.03 10*3/MM3 (ref 0–0.2)
BASOPHILS NFR BLD AUTO: 0.3 % (ref 0–1.5)
BILIRUB SERPL-MCNC: 0.4 MG/DL (ref 0–1.2)
BUN SERPL-MCNC: 40 MG/DL (ref 8–23)
BUN/CREAT SERPL: 10.4 (ref 7–25)
CALCIUM SPEC-SCNC: 9.7 MG/DL (ref 8.6–10.5)
CHLORIDE SERPL-SCNC: 101 MMOL/L (ref 98–107)
CO2 SERPL-SCNC: 22 MMOL/L (ref 22–29)
CREAT SERPL-MCNC: 3.86 MG/DL (ref 0.57–1)
D DIMER PPP FEU-MCNC: 0.99 MCGFEU/ML (ref 0–0.75)
DEPRECATED RDW RBC AUTO: 44.8 FL (ref 37–54)
EGFRCR SERPLBLD CKD-EPI 2021: 11.6 ML/MIN/1.73
EOSINOPHIL # BLD AUTO: 0.03 10*3/MM3 (ref 0–0.4)
EOSINOPHIL NFR BLD AUTO: 0.3 % (ref 0.3–6.2)
ERYTHROCYTE [DISTWIDTH] IN BLOOD BY AUTOMATED COUNT: 12.6 % (ref 12.3–15.4)
GEN 5 1HR TROPONIN T REFLEX: 89 NG/L
GLOBULIN UR ELPH-MCNC: 3.1 GM/DL
GLUCOSE SERPL-MCNC: 124 MG/DL (ref 65–99)
HCT VFR BLD AUTO: 37.5 % (ref 34–46.6)
HGB BLD-MCNC: 12.4 G/DL (ref 12–15.9)
HOLD SPECIMEN: NORMAL
IMM GRANULOCYTES # BLD AUTO: 0.02 10*3/MM3 (ref 0–0.05)
IMM GRANULOCYTES NFR BLD AUTO: 0.2 % (ref 0–0.5)
LIPASE SERPL-CCNC: 68 U/L (ref 13–60)
LYMPHOCYTES # BLD AUTO: 1.3 10*3/MM3 (ref 0.7–3.1)
LYMPHOCYTES NFR BLD AUTO: 11.7 % (ref 19.6–45.3)
MCH RBC QN AUTO: 32.2 PG (ref 26.6–33)
MCHC RBC AUTO-ENTMCNC: 33.1 G/DL (ref 31.5–35.7)
MCV RBC AUTO: 97.4 FL (ref 79–97)
MONOCYTES # BLD AUTO: 1.02 10*3/MM3 (ref 0.1–0.9)
MONOCYTES NFR BLD AUTO: 9.2 % (ref 5–12)
NEUTROPHILS NFR BLD AUTO: 78.3 % (ref 42.7–76)
NEUTROPHILS NFR BLD AUTO: 8.68 10*3/MM3 (ref 1.7–7)
NRBC BLD AUTO-RTO: 0 /100 WBC (ref 0–0.2)
NT-PROBNP SERPL-MCNC: 4232 PG/ML (ref 0–1800)
PLATELET # BLD AUTO: 249 10*3/MM3 (ref 140–450)
PMV BLD AUTO: 10 FL (ref 6–12)
POTASSIUM SERPL-SCNC: 5.3 MMOL/L (ref 3.5–5.2)
PROT SERPL-MCNC: 7.1 G/DL (ref 6–8.5)
RBC # BLD AUTO: 3.85 10*6/MM3 (ref 3.77–5.28)
SODIUM SERPL-SCNC: 139 MMOL/L (ref 136–145)
TROPONIN T % DELTA: 2
TROPONIN T NUMERIC DELTA: 2 NG/L
TROPONIN T SERPL HS-MCNC: 87 NG/L
WBC NRBC COR # BLD AUTO: 11.08 10*3/MM3 (ref 3.4–10.8)
WHOLE BLOOD HOLD COAG: NORMAL
WHOLE BLOOD HOLD SPECIMEN: NORMAL

## 2025-01-16 PROCEDURE — 71275 CT ANGIOGRAPHY CHEST: CPT

## 2025-01-16 PROCEDURE — 93005 ELECTROCARDIOGRAM TRACING: CPT | Performed by: EMERGENCY MEDICINE

## 2025-01-16 PROCEDURE — 25510000001 IOPAMIDOL PER 1 ML: Performed by: EMERGENCY MEDICINE

## 2025-01-16 PROCEDURE — 85379 FIBRIN DEGRADATION QUANT: CPT | Performed by: EMERGENCY MEDICINE

## 2025-01-16 PROCEDURE — 83690 ASSAY OF LIPASE: CPT | Performed by: EMERGENCY MEDICINE

## 2025-01-16 PROCEDURE — 93005 ELECTROCARDIOGRAM TRACING: CPT

## 2025-01-16 PROCEDURE — 99285 EMERGENCY DEPT VISIT HI MDM: CPT

## 2025-01-16 PROCEDURE — 36415 COLL VENOUS BLD VENIPUNCTURE: CPT

## 2025-01-16 PROCEDURE — 80053 COMPREHEN METABOLIC PANEL: CPT | Performed by: EMERGENCY MEDICINE

## 2025-01-16 PROCEDURE — 85025 COMPLETE CBC W/AUTO DIFF WBC: CPT | Performed by: EMERGENCY MEDICINE

## 2025-01-16 PROCEDURE — 83880 ASSAY OF NATRIURETIC PEPTIDE: CPT | Performed by: EMERGENCY MEDICINE

## 2025-01-16 PROCEDURE — 84484 ASSAY OF TROPONIN QUANT: CPT | Performed by: EMERGENCY MEDICINE

## 2025-01-16 PROCEDURE — 71045 X-RAY EXAM CHEST 1 VIEW: CPT

## 2025-01-16 RX ORDER — ASPIRIN 81 MG/1
324 TABLET, CHEWABLE ORAL ONCE
Status: COMPLETED | OUTPATIENT
Start: 2025-01-16 | End: 2025-01-16

## 2025-01-16 RX ORDER — IOPAMIDOL 755 MG/ML
85 INJECTION, SOLUTION INTRAVASCULAR
Status: COMPLETED | OUTPATIENT
Start: 2025-01-16 | End: 2025-01-16

## 2025-01-16 RX ORDER — SODIUM CHLORIDE 0.9 % (FLUSH) 0.9 %
10 SYRINGE (ML) INJECTION AS NEEDED
Status: DISCONTINUED | OUTPATIENT
Start: 2025-01-16 | End: 2025-01-17 | Stop reason: HOSPADM

## 2025-01-16 RX ADMIN — ASPIRIN 81 MG CHEWABLE TABLET 324 MG: 81 TABLET CHEWABLE at 16:46

## 2025-01-16 RX ADMIN — IOPAMIDOL 85 ML: 755 INJECTION, SOLUTION INTRAVENOUS at 20:27

## 2025-01-16 NOTE — ED PROVIDER NOTES
[place]    EMERGENCY DEPARTMENT ENCOUNTER      Pt Name: Sara Esparza  MRN: [id number]  YOB: 1950  Date of evaluation: 1/16/2025  Provider: Duane Frank MD    CHIEF COMPLAINT       Chief Complaint   Patient presents with    Chest Pain    Shortness of Breath         HISTORY OF PRESENT ILLNESS   Sara Esparza is a 74 y.o. female who presents to the emergency department with complaint of sharp right-sided chest discomfort and pain with taking a deep breath over the course the past 2 days.  Patient had recent heart cath that showed mild nonobstructive coronary artery disease.  She is on dialysis for end-stage renal disease and has a right chest wall dialysis catheter and has some discomfort around that.  Pain is worse with movement of the torso and with pushing over the area.  She has no associated diaphoresis, nausea, vomiting, or other GI symptoms.      Nursing notes were reviewed.    REVIEW OF SYSTEMS     ROS:  A chief complaint appropriate review of systems was completed and is negative except as noted in the HPI.      PAST MEDICAL HISTORY     Past Medical History:   Diagnosis Date    Anxiety     Arthritis     Asthma     allergy induced    Back pain     Depression     Diabetes mellitus     dx 10 years ago- checks fsbs most days    Diverticula of colon     GERD (gastroesophageal reflux disease)     Head ache     Hypertension     Sleep apnea     no longer needs cpap - approx. 10 years r/t weight loss         SURGICAL HISTORY       Past Surgical History:   Procedure Laterality Date    BACK SURGERY      x 2    CARDIAC CATHETERIZATION      no intervention    CHOLECYSTECTOMY OPEN      COLONOSCOPY      2013    HYSTERECTOMY      REPLACEMENT TOTAL KNEE BILATERAL Bilateral     TONSILLECTOMY      VENTRAL/INCISIONAL HERNIA REPAIR N/A 6/6/2017    Procedure: INCISIONAL HERNIA REPAIR LAPAROSCOPIC;  Surgeon: Conrad Hernandez MD;  Location: [facility];  Service:          CURRENT MEDICATIONS     No current  facility-administered medications for this encounter.  No current outpatient medications on file.    Facility-Administered Medications Ordered in Other Encounters:     sennosides-docusate (PERICOLACE) 8.6-50 MG per tablet 2 tablet, 2 tablet, Oral, BID PRN **AND** polyethylene glycol (MIRALAX) packet 17 g, 17 g, Oral, Daily PRN **AND** bisacodyl (DULCOLAX) EC tablet 5 mg, 5 mg, Oral, Daily PRN **AND** bisacodyl (DULCOLAX) suppository 10 mg, 10 mg, Rectal, Daily PRN, Sebastian Menjivar MD    Calcium Replacement - Follow Nurse / BPA Driven Protocol, , Not Applicable, PRN, Sebastian Menjivar MD    dextrose (D50W) (25 g/50 mL) IV injection 25 g, 25 g, Intravenous, Q15 Min PRN, Sebastian Menjivar MD    dextrose (GLUTOSE) oral gel 15 g, 15 g, Oral, Q15 Min PRN, Sebastian Menjivar MD    glucagon (GLUCAGEN) injection 1 mg, 1 mg, Intramuscular, Q15 Min PRN, Sebastian Menjivar MD    heparin (porcine) 5000 UNIT/ML injection 5,000 Units, 5,000 Units, Subcutaneous, Q12H, Sebastian Menjivar MD    Insulin Lispro (humaLOG) injection 2-9 Units, 2-9 Units, Subcutaneous, 4x Daily AC & at Bedtime, Sebastian Menjivar MD    Magnesium Standard Dose Replacement - Follow Nurse / BPA Driven Protocol, , Not Applicable, PRN, Sebastian Menjivar MD    melatonin tablet 2.5 mg, 2.5 mg, Oral, Nightly PRN, Sebastian Menjivar MD    ondansetron (ZOFRAN) injection 4 mg, 4 mg, Intravenous, Q6H PRN, Sebastian Menjivar MD    Potassium Replacement - Follow Nurse / BPA Driven Protocol, , Not Applicable, PRN, Sebastian Menjivar MD    [COMPLETED] Insert Peripheral IV, , , Once **AND** sodium chloride 0.9 % flush 10 mL, 10 mL, Intravenous, PRN, Deacon Larose MD    sodium chloride 0.9 % flush 10 mL, 10 mL, Intravenous, Q12H, Menjivar, Quanecia, MD    sodium chloride 0.9 % flush 10 mL, 10 mL, Intravenous, PRN, Sebastian Menjivar MD    sodium chloride 0.9 % infusion 40 mL, 40 mL, Intravenous, PRN, Sebastian Menjivar MD    ALLERGIES     Patient has no known  "allergies.    FAMILY HISTORY     No family history on file.       SOCIAL HISTORY       Social History     Socioeconomic History    Marital status:    Tobacco Use    Smoking status: Never    Smokeless tobacco: Never   Vaping Use    Vaping status: Never Used   Substance and Sexual Activity    Alcohol use: No    Drug use: No    Sexual activity: Defer         PHYSICAL EXAM    (up to 7 for level 4, 8 or more for level 5)     Vitals:    01/16/25 1632 01/16/25 2213 01/16/25 2308   BP: 115/81 147/64 142/71   BP Location: Left arm Right arm Right arm   Patient Position: Sitting Lying Lying   Pulse: 109 77 78   Resp: 20 18 18   Temp: 98.3 °F (36.8 °C)  98 °F (36.7 °C)   TempSrc: Oral  Oral   SpO2: 96% 96% 97%   Weight: 108 kg (237 lb)     Height: 172.7 cm (68\")         General: Awake, alert, no acute distress.  HEENT: Conjunctivae normal.  Neck: Trachea midline.  Cardiac: Heart regular rate, rhythm, no murmurs, rubs, or gallops  Lungs: Lungs are clear to auscultation, there is no wheezing, rhonchi, or rales. There is no use of accessory muscles.  Chest wall: Moderate right chest wall tenderness  Abdomen: Abdomen is soft, nontender, nondistended. There are no firm or pulsatile masses, no rebound rigidity or guarding.   Musculoskeletal: No deformity.  Neuro: Alert and oriented x 4.  Dermatology: Skin is warm and dry  Psych: Mentation is grossly normal, cognition is grossly normal. Affect is appropriate.       DIAGNOSTIC RESULTS     EKG: All EKGs are interpreted by the Emergency Department Physician who either signs or Co-signs this chart in the absence of a cardiologist.    ECG 12 Lead ED Triage Standing Order; Chest Pain   Final Result   Test Reason : ED Triage Standing Order~   Blood Pressure :   */*   mmHG   Vent. Rate :  76 BPM     Atrial Rate :  76 BPM      P-R Int : 162 ms          QRS Dur : 110 ms       QT Int : 404 ms       P-R-T Axes :  81 -58 -12 degrees     QTcB Int : 454 ms      Normal sinus rhythm "   Pulmonary disease pattern   Incomplete right bundle branch block   Left anterior fascicular block   Voltage criteria for left ventricular hypertrophy   Nonspecific ST abnormality   Abnormal ECG   When compared with ECG of 16-Jan-2025 16:37, (Unconfirmed)   Sinus rhythm has replaced Junctional rhythm   Incomplete right bundle branch block is now present   Confirmed by Deacon Larose ([id number]) on 1/18/2025 10:03:08 PM      Referred By: BRENDAN           Confirmed By: Deacon Larose      ECG 12 Lead ED Triage Standing Order; Chest Pain   Preliminary Result   Test Reason : ED Triage Standing Order~   Blood Pressure :   */*   mmHG   Vent. Rate : 109 BPM     Atrial Rate : 105 BPM      P-R Int :   * ms          QRS Dur :  84 ms       QT Int : 328 ms       P-R-T Axes :   * -58  46 degrees     QTcB Int : 441 ms      ** Suspect unspecified pacemaker failure   Accelerated Junctional rhythm   Left axis deviation   Voltage criteria for left ventricular hypertrophy   Inferior infarct Anterolateral infarct , age undetermined   Abnormal ECG   When compared with ECG of 05-Jun-2017 08:34,   Significant changes have occurred      Referred By: BRENDAN           Confirmed By:             RADIOLOGY:   [x] Radiologist's Report Reviewed:  CT Angiogram Chest Pulmonary Embolism   Final Result   Impression:   No pulmonary embolus or other acute finding in the chest.            Electronically Signed: Angelito Ramires MD     1/16/2025 8:55 PM EST     Workstation ID: [id number]      XR Chest 1 View   Final Result   Impression:   No acute cardiopulmonary disease.            Electronically Signed: Josh Barrett MD     1/16/2025 5:45 PM EST     Workstation ID: [id number]          I ordered and independently reviewed the above noted radiographic studies.        LABS:    I have reviewed and interpreted all of the currently available lab results from this visit (if applicable):  Results for orders placed or performed during the hospital encounter of 01/16/25    ECG 12 Lead ED Triage Standing Order; Chest Pain    Collection Time: 01/16/25  4:37 PM   Result Value Ref Range    QT Interval 328 ms    QTC Interval 441 ms   High Sensitivity Troponin T    Collection Time: 01/16/25  4:45 PM    Specimen: Blood   Result Value Ref Range    HS Troponin T 87 (C) <14 ng/L   Comprehensive Metabolic Panel    Collection Time: 01/16/25  4:45 PM    Specimen: Blood   Result Value Ref Range    Glucose 124 (H) 65 - 99 mg/dL    BUN 40 (H) 8 - 23 mg/dL    Creatinine 3.86 (H) 0.57 - 1.00 mg/dL    Sodium 139 136 - 145 mmol/L    Potassium 5.3 (H) 3.5 - 5.2 mmol/L    Chloride 101 98 - 107 mmol/L    CO2 22.0 22.0 - 29.0 mmol/L    Calcium 9.7 8.6 - 10.5 mg/dL    Total Protein 7.1 6.0 - 8.5 g/dL    Albumin 4.0 3.5 - 5.2 g/dL    ALT (SGPT) 10 1 - 33 U/L    AST (SGOT) 15 1 - 32 U/L    Alkaline Phosphatase 96 39 - 117 U/L    Total Bilirubin 0.4 0.0 - 1.2 mg/dL    Globulin 3.1 gm/dL    A/G Ratio 1.3 g/dL    BUN/Creatinine Ratio 10.4 7.0 - 25.0    Anion Gap 16.0 (H) 5.0 - 15.0 mmol/L    eGFR 11.6 (L) >60.0 mL/min/1.73   Lipase    Collection Time: 01/16/25  4:45 PM    Specimen: Blood   Result Value Ref Range    Lipase 68 (H) 13 - 60 U/L   BNP    Collection Time: 01/16/25  4:45 PM    Specimen: Blood   Result Value Ref Range    proBNP 4,232.0 (H) 0.0 - 1,800.0 pg/mL   CBC Auto Differential    Collection Time: 01/16/25  4:45 PM    Specimen: Blood   Result Value Ref Range    WBC 11.08 (H) 3.40 - 10.80 10*3/mm3    RBC 3.85 3.77 - 5.28 10*6/mm3    Hemoglobin 12.4 12.0 - 15.9 g/dL    Hematocrit 37.5 34.0 - 46.6 %    MCV 97.4 (H) 79.0 - 97.0 fL    MCH 32.2 26.6 - 33.0 pg    MCHC 33.1 31.5 - 35.7 g/dL    RDW 12.6 12.3 - 15.4 %    RDW-SD 44.8 37.0 - 54.0 fl    MPV 10.0 6.0 - 12.0 fL    Platelets 249 140 - 450 10*3/mm3    Neutrophil % 78.3 (H) 42.7 - 76.0 %    Lymphocyte % 11.7 (L) 19.6 - 45.3 %    Monocyte % 9.2 5.0 - 12.0 %    Eosinophil % 0.3 0.3 - 6.2 %    Basophil % 0.3 0.0 - 1.5 %    Immature Grans % 0.2 0.0 -  0.5 %    Neutrophils, Absolute 8.68 (H) 1.70 - 7.00 10*3/mm3    Lymphocytes, Absolute 1.30 0.70 - 3.10 10*3/mm3    Monocytes, Absolute 1.02 (H) 0.10 - 0.90 10*3/mm3    Eosinophils, Absolute 0.03 0.00 - 0.40 10*3/mm3    Basophils, Absolute 0.03 0.00 - 0.20 10*3/mm3    Immature Grans, Absolute 0.02 0.00 - 0.05 10*3/mm3    nRBC 0.0 0.0 - 0.2 /100 WBC   D-dimer, Quantitative    Collection Time: 01/16/25  4:45 PM    Specimen: Blood   Result Value Ref Range    D-Dimer, Quantitative 0.99 (H) 0.00 - 0.75 MCGFEU/mL   Green Top (Gel)    Collection Time: 01/16/25  4:45 PM   Result Value Ref Range    Extra Tube Hold for add-ons.    Lavender Top    Collection Time: 01/16/25  4:45 PM   Result Value Ref Range    Extra Tube hold for add-on    Gold Top - SST    Collection Time: 01/16/25  4:45 PM   Result Value Ref Range    Extra Tube Hold for add-ons.    Gray Top    Collection Time: 01/16/25  4:45 PM   Result Value Ref Range    Extra Tube Hold for add-ons.    Light Blue Top    Collection Time: 01/16/25  4:45 PM   Result Value Ref Range    Extra Tube Hold for add-ons.    High Sensitivity Troponin T 1Hr    Collection Time: 01/16/25  8:11 PM    Specimen: Blood   Result Value Ref Range    HS Troponin T 89 (C) <14 ng/L    Troponin T Numeric Delta 2 ng/L    Troponin T % Delta 2 Abnormal if >/= 20%   ECG 12 Lead ED Triage Standing Order; Chest Pain    Collection Time: 01/16/25  8:48 PM   Result Value Ref Range    QT Interval 404 ms    QTC Interval 454 ms        If labs were ordered, I independently reviewed the results and considered them in treating the patient.      EMERGENCY DEPARTMENT COURSE and DIFFERENTIAL DIAGNOSIS/MDM:   Vitals:  AS OF 08:49 EST    BP - 142/71  HR - 78  TEMP - 98 °F (36.7 °C) (Oral)  O2 SATS - 97%        Discussion below represents my analysis of pertinent findings related to patient's condition, differential diagnosis, treatment plan and final disposition.      Differential diagnosis:  The differential diagnosis  associated with the patient's presentation includes: ACS, pulmonary embolism, aortic dissection, GERD, esophageal spasm, costochondritis      Independent interpretations (ECG/rhythm strip/X-ray/US/CT scan): I independently interpreted the patient's CTA chest and cardiac monitor.  There is no PE.  Patient is in sinus rhythm.      Additional sources:  Discussed/obtained information from independent historians:   [] Spouse:   [] Parent:   [] Friend:   [] EMS:   [] Other:  External (non-ED) record review:   [] Inpatient record:   [] Office record:   [] Outpatient record:   [] Prior Outpatient labs:   [] Prior Outpatient radiology:   [] Primary Care record:   [] Outside ED record:   [x] Other: Reviewed documentation from recent cardiac catheterization.  Mild nonobstructive coronary artery disease.      Patient's care impacted by:   [] Diabetes   [] Hypertension   [x] Coronary Artery Disease   [] Cancer   [] Other:     Care significantly affected by Social Determinants of Health (housing and economic circumstances, unemployment)    [] Yes     [x] No   If yes, Patient's care significantly limited by  Social Determinants of Health including:    [] Inadequate housing    [] Low income    [] Alcoholism and drug addiction in family    [] Problems related to primary support group    [] Unemployment    [] Problems related to employment    [] Other Social Determinants of Health:       Consideration of admission/observation vs discharge: Considered observation admission, however feel that patient is currently low risk and is stable for discharge home with close outpatient follow-up.      ED Course:    ED Course as of 01/19/25 0849   Thu Jan 16, 2025   [id number] On reevaluation, the patient is resting comfortably in bed with no worsening or new symptoms.  Slight tachycardia on arrival has resolved.  She has pinpoint right chest wall tenderness and the pain that she is having in her back is reproducible as well.  When I push over these  areas, she winces and says that this is the exact pain that she is having.  She actually says that this pain has been going on for a while intermittently.  She just had a heart cath performed at [facility] on January 7 which I have reviewed.  She had mild nonobstructive coronary artery disease at that point.  She has stable troponin elevation here with no ischemic appearing ECG changes.  Feel troponin elevation is related to renal dysfunction.  D-dimer was slightly elevated and so followed up with CT angiogram of the chest which revealed no evidence of pulmonary embolism, dissection, or other acute thoracic process.  Patient stable for discharge home with outpatient follow-up.  She is scheduled for a dialysis session tomorrow. [NS]      ED Course User Index  [NS] Duane Frank MD           I had a discussion with the patient/family regarding diagnosis, diagnostic results, treatment plan, and medications.  The patient/family indicated understanding of these instructions.  I spent adequate time at the bedside preceding discharge necessary to personally discuss the aftercare instructions, giving patient education, providing explanations of the results of our evaluations/findings, and my decision making to assure that the patient/family understand the plan of care.  Time was allotted to answer questions at that time and throughout the ED course.  Emphasis was placed on timely follow-up after discharge.  I also discussed the potential for the development of an acute emergent condition requiring further evaluation, admission, or even surgical intervention. I discussed that we found nothing during the visit today indicating the need for further workup, admission, or the presence of an unstable medical condition.  I encouraged the patient to return to the emergency department immediately for ANY concerns, worsening, new complaints, or if symptoms persist and unable to seek follow-up in a timely fashion.  The  patient/family expressed understanding and agreement with this plan.  The patient will follow-up with their PCP in 1-2 days for reevaluation.           PROCEDURES:  Procedures    CRITICAL CARE TIME        FINAL IMPRESSION      1. Acute chest pain    2. History of coronary artery disease    3. ESRD on hemodialysis    4. History of diabetes mellitus    5. History of hypertension          DISPOSITION/PLAN     ED Disposition       ED Disposition   Discharge    Condition   Stable    Comment   --                 Comment: Please note this report has been produced using speech recognition software.      Duane Frank MD  Attending Emergency Physician             Duane Frank MD  01/19/25 4462

## 2025-01-17 LAB
QT INTERVAL: 328 MS
QTC INTERVAL: 441 MS

## 2025-01-17 NOTE — DISCHARGE INSTRUCTIONS
While your workup in the ED looks okay, we cannot definitively rule out a cardiac cause for your chest pain.  Please follow-up with your cardiologist and your primary care provider soon as possible.  Return to the emergency department immediately with any worsening pain, difficulty breathing, unexplained sweating, or any other concerning symptoms.

## 2025-01-18 ENCOUNTER — APPOINTMENT (OUTPATIENT)
Dept: CT IMAGING | Facility: HOSPITAL | Age: 75
DRG: 853 | End: 2025-01-18
Payer: MEDICARE

## 2025-01-18 ENCOUNTER — APPOINTMENT (OUTPATIENT)
Dept: GENERAL RADIOLOGY | Facility: HOSPITAL | Age: 75
DRG: 853 | End: 2025-01-18
Payer: MEDICARE

## 2025-01-18 ENCOUNTER — HOSPITAL ENCOUNTER (EMERGENCY)
Facility: HOSPITAL | Age: 75
Discharge: HOME | DRG: 853 | End: 2025-01-19
Attending: EMERGENCY MEDICINE
Payer: MEDICARE

## 2025-01-18 DIAGNOSIS — R53.1 GENERALIZED WEAKNESS: Primary | ICD-10-CM

## 2025-01-18 DIAGNOSIS — R29.6 FREQUENT FALLS: ICD-10-CM

## 2025-01-18 DIAGNOSIS — I10 PRIMARY HYPERTENSION: ICD-10-CM

## 2025-01-18 DIAGNOSIS — N18.5 CKD (CHRONIC KIDNEY DISEASE) STAGE 5, GFR LESS THAN 15 ML/MIN: ICD-10-CM

## 2025-01-18 DIAGNOSIS — R06.02 SHORTNESS OF BREATH: ICD-10-CM

## 2025-01-18 LAB
ALBUMIN SERPL-MCNC: 3.8 G/DL (ref 3.5–5.2)
ALBUMIN/GLOB SERPL: 1.2 G/DL
ALP SERPL-CCNC: 90 U/L (ref 39–117)
ALT SERPL W P-5'-P-CCNC: 21 U/L (ref 1–33)
ANION GAP SERPL CALCULATED.3IONS-SCNC: 16 MMOL/L (ref 5–15)
AST SERPL-CCNC: 33 U/L (ref 1–32)
BASOPHILS # BLD AUTO: 0.01 10*3/MM3 (ref 0–0.2)
BASOPHILS NFR BLD AUTO: 0.1 % (ref 0–1.5)
BILIRUB SERPL-MCNC: 0.5 MG/DL (ref 0–1.2)
BUN SERPL-MCNC: 52 MG/DL (ref 8–23)
BUN/CREAT SERPL: 10.4 (ref 7–25)
CALCIUM SPEC-SCNC: 10.7 MG/DL (ref 8.6–10.5)
CHLORIDE SERPL-SCNC: 94 MMOL/L (ref 98–107)
CO2 SERPL-SCNC: 22 MMOL/L (ref 22–29)
CREAT SERPL-MCNC: 5.02 MG/DL (ref 0.57–1)
DEPRECATED RDW RBC AUTO: 44.4 FL (ref 37–54)
EGFRCR SERPLBLD CKD-EPI 2021: 8.5 ML/MIN/1.73
EOSINOPHIL # BLD AUTO: 0 10*3/MM3 (ref 0–0.4)
EOSINOPHIL NFR BLD AUTO: 0 % (ref 0.3–6.2)
ERYTHROCYTE [DISTWIDTH] IN BLOOD BY AUTOMATED COUNT: 12.5 % (ref 12.3–15.4)
GEN 5 1HR TROPONIN T REFLEX: 128 NG/L
GLOBULIN UR ELPH-MCNC: 3.2 GM/DL
GLUCOSE BLDC GLUCOMTR-MCNC: 167 MG/DL (ref 70–130)
GLUCOSE SERPL-MCNC: 167 MG/DL (ref 65–99)
HCT VFR BLD AUTO: 34.7 % (ref 34–46.6)
HGB BLD-MCNC: 11.5 G/DL (ref 12–15.9)
HOLD SPECIMEN: NORMAL
IMM GRANULOCYTES # BLD AUTO: 0.04 10*3/MM3 (ref 0–0.05)
IMM GRANULOCYTES NFR BLD AUTO: 0.3 % (ref 0–0.5)
LYMPHOCYTES # BLD AUTO: 0.53 10*3/MM3 (ref 0.7–3.1)
LYMPHOCYTES NFR BLD AUTO: 3.9 % (ref 19.6–45.3)
MAGNESIUM SERPL-MCNC: 2.3 MG/DL (ref 1.6–2.4)
MCH RBC QN AUTO: 31.9 PG (ref 26.6–33)
MCHC RBC AUTO-ENTMCNC: 33.1 G/DL (ref 31.5–35.7)
MCV RBC AUTO: 96.4 FL (ref 79–97)
MONOCYTES # BLD AUTO: 1.27 10*3/MM3 (ref 0.1–0.9)
MONOCYTES NFR BLD AUTO: 9.4 % (ref 5–12)
NEUTROPHILS NFR BLD AUTO: 11.62 10*3/MM3 (ref 1.7–7)
NEUTROPHILS NFR BLD AUTO: 86.3 % (ref 42.7–76)
NRBC BLD AUTO-RTO: 0 /100 WBC (ref 0–0.2)
NT-PROBNP SERPL-MCNC: 8028 PG/ML (ref 0–1800)
PLATELET # BLD AUTO: 220 10*3/MM3 (ref 140–450)
PMV BLD AUTO: 10.2 FL (ref 6–12)
POTASSIUM SERPL-SCNC: 4.4 MMOL/L (ref 3.5–5.2)
PROT SERPL-MCNC: 7 G/DL (ref 6–8.5)
QT INTERVAL: 384 MS
QT INTERVAL: 404 MS
QTC INTERVAL: 454 MS
QTC INTERVAL: 459 MS
RBC # BLD AUTO: 3.6 10*6/MM3 (ref 3.77–5.28)
SODIUM SERPL-SCNC: 132 MMOL/L (ref 136–145)
TROPONIN T % DELTA: -4
TROPONIN T NUMERIC DELTA: -6 NG/L
TROPONIN T SERPL HS-MCNC: 134 NG/L
WBC NRBC COR # BLD AUTO: 13.47 10*3/MM3 (ref 3.4–10.8)
WHOLE BLOOD HOLD COAG: NORMAL
WHOLE BLOOD HOLD SPECIMEN: NORMAL

## 2025-01-18 PROCEDURE — 71250 CT THORAX DX C-: CPT

## 2025-01-18 PROCEDURE — 80053 COMPREHEN METABOLIC PANEL: CPT | Performed by: EMERGENCY MEDICINE

## 2025-01-18 PROCEDURE — 82948 REAGENT STRIP/BLOOD GLUCOSE: CPT

## 2025-01-18 PROCEDURE — 83735 ASSAY OF MAGNESIUM: CPT | Performed by: EMERGENCY MEDICINE

## 2025-01-18 PROCEDURE — 93005 ELECTROCARDIOGRAM TRACING: CPT | Performed by: EMERGENCY MEDICINE

## 2025-01-18 PROCEDURE — 99284 EMERGENCY DEPT VISIT MOD MDM: CPT

## 2025-01-18 PROCEDURE — 85025 COMPLETE CBC W/AUTO DIFF WBC: CPT | Performed by: EMERGENCY MEDICINE

## 2025-01-18 PROCEDURE — 71045 X-RAY EXAM CHEST 1 VIEW: CPT

## 2025-01-18 PROCEDURE — 84484 ASSAY OF TROPONIN QUANT: CPT | Performed by: EMERGENCY MEDICINE

## 2025-01-18 PROCEDURE — 83880 ASSAY OF NATRIURETIC PEPTIDE: CPT | Performed by: EMERGENCY MEDICINE

## 2025-01-18 PROCEDURE — 36415 COLL VENOUS BLD VENIPUNCTURE: CPT

## 2025-01-18 RX ORDER — SODIUM CHLORIDE 0.9 % (FLUSH) 0.9 %
10 SYRINGE (ML) INJECTION AS NEEDED
Status: DISCONTINUED | OUTPATIENT
Start: 2025-01-18 | End: 2025-01-19 | Stop reason: HOSPADM

## 2025-01-19 ENCOUNTER — APPOINTMENT (OUTPATIENT)
Dept: GENERAL RADIOLOGY | Facility: HOSPITAL | Age: 75
DRG: 853 | End: 2025-01-19
Payer: MEDICARE

## 2025-01-19 ENCOUNTER — APPOINTMENT (OUTPATIENT)
Dept: CT IMAGING | Facility: HOSPITAL | Age: 75
DRG: 853 | End: 2025-01-19
Payer: MEDICARE

## 2025-01-19 ENCOUNTER — HOSPITAL ENCOUNTER (INPATIENT)
Facility: HOSPITAL | Age: 75
LOS: 24 days | Discharge: NURSING, REHAB OR LONG-TERM CARE | DRG: 853 | End: 2025-02-12
Attending: EMERGENCY MEDICINE | Admitting: STUDENT IN AN ORGANIZED HEALTH CARE EDUCATION/TRAINING PROGRAM
Payer: MEDICARE

## 2025-01-19 VITALS
DIASTOLIC BLOOD PRESSURE: 49 MMHG | OXYGEN SATURATION: 95 % | HEART RATE: 80 BPM | BODY MASS INDEX: 37.2 KG/M2 | SYSTOLIC BLOOD PRESSURE: 162 MMHG | WEIGHT: 237 LBS | TEMPERATURE: 99.3 F | RESPIRATION RATE: 18 BRPM | HEIGHT: 67 IN

## 2025-01-19 DIAGNOSIS — R53.1 GENERALIZED WEAKNESS: ICD-10-CM

## 2025-01-19 DIAGNOSIS — D64.9 CHRONIC ANEMIA: ICD-10-CM

## 2025-01-19 DIAGNOSIS — R65.20 SEPSIS DUE TO METHICILLIN RESISTANT STAPHYLOCOCCUS AUREUS (MRSA) WITH ENCEPHALOPATHY WITHOUT SEPTIC SHOCK: ICD-10-CM

## 2025-01-19 DIAGNOSIS — G93.41 SEPSIS DUE TO METHICILLIN RESISTANT STAPHYLOCOCCUS AUREUS (MRSA) WITH ENCEPHALOPATHY WITHOUT SEPTIC SHOCK: ICD-10-CM

## 2025-01-19 DIAGNOSIS — Z89.512 S/P BKA (BELOW KNEE AMPUTATION), LEFT: Primary | ICD-10-CM

## 2025-01-19 DIAGNOSIS — E11.22 TYPE 2 DIABETES MELLITUS WITH STAGE 5 CHRONIC KIDNEY DISEASE NOT ON CHRONIC DIALYSIS, WITHOUT LONG-TERM CURRENT USE OF INSULIN: ICD-10-CM

## 2025-01-19 DIAGNOSIS — M86.672 CHRONIC OSTEOMYELITIS OF LEFT FOOT: ICD-10-CM

## 2025-01-19 DIAGNOSIS — R29.6 FREQUENT FALLS: ICD-10-CM

## 2025-01-19 DIAGNOSIS — N18.5 TYPE 2 DIABETES MELLITUS WITH STAGE 5 CHRONIC KIDNEY DISEASE NOT ON CHRONIC DIALYSIS, WITHOUT LONG-TERM CURRENT USE OF INSULIN: ICD-10-CM

## 2025-01-19 DIAGNOSIS — N18.9 CHRONIC KIDNEY DISEASE, UNSPECIFIED CKD STAGE: ICD-10-CM

## 2025-01-19 DIAGNOSIS — A41.02 SEPSIS DUE TO METHICILLIN RESISTANT STAPHYLOCOCCUS AUREUS (MRSA) WITH ENCEPHALOPATHY WITHOUT SEPTIC SHOCK: ICD-10-CM

## 2025-01-19 DIAGNOSIS — I70.222 CRITICAL LIMB ISCHEMIA OF LEFT LOWER EXTREMITY: ICD-10-CM

## 2025-01-19 LAB
ALBUMIN SERPL-MCNC: 3.6 G/DL (ref 3.5–5.2)
ALBUMIN/GLOB SERPL: 1.2 G/DL
ALP SERPL-CCNC: 96 U/L (ref 39–117)
ALT SERPL W P-5'-P-CCNC: 23 U/L (ref 1–33)
AMORPH URATE CRY URNS QL MICRO: ABNORMAL /HPF
ANION GAP SERPL CALCULATED.3IONS-SCNC: 16 MMOL/L (ref 5–15)
AST SERPL-CCNC: 32 U/L (ref 1–32)
BACTERIA UR QL AUTO: ABNORMAL /HPF
BASOPHILS # BLD AUTO: 0.01 10*3/MM3 (ref 0–0.2)
BASOPHILS NFR BLD AUTO: 0.1 % (ref 0–1.5)
BILIRUB SERPL-MCNC: 0.6 MG/DL (ref 0–1.2)
BILIRUB UR QL STRIP: NEGATIVE
BUN SERPL-MCNC: 58 MG/DL (ref 8–23)
BUN/CREAT SERPL: 10.7 (ref 7–25)
CALCIUM SPEC-SCNC: 10.6 MG/DL (ref 8.6–10.5)
CHLORIDE SERPL-SCNC: 94 MMOL/L (ref 98–107)
CK SERPL-CCNC: 121 U/L (ref 20–180)
CLARITY UR: ABNORMAL
CO2 SERPL-SCNC: 23 MMOL/L (ref 22–29)
COLOR UR: YELLOW
CREAT SERPL-MCNC: 5.4 MG/DL (ref 0.57–1)
DEPRECATED RDW RBC AUTO: 45.2 FL (ref 37–54)
EGFRCR SERPLBLD CKD-EPI 2021: 7.8 ML/MIN/1.73
EOSINOPHIL # BLD AUTO: 0 10*3/MM3 (ref 0–0.4)
EOSINOPHIL NFR BLD AUTO: 0 % (ref 0.3–6.2)
ERYTHROCYTE [DISTWIDTH] IN BLOOD BY AUTOMATED COUNT: 12.4 % (ref 12.3–15.4)
GLOBULIN UR ELPH-MCNC: 2.9 GM/DL
GLUCOSE BLDC GLUCOMTR-MCNC: 153 MG/DL (ref 70–130)
GLUCOSE BLDC GLUCOMTR-MCNC: 171 MG/DL (ref 70–130)
GLUCOSE BLDC GLUCOMTR-MCNC: 175 MG/DL (ref 70–130)
GLUCOSE BLDC GLUCOMTR-MCNC: 177 MG/DL (ref 70–130)
GLUCOSE SERPL-MCNC: 173 MG/DL (ref 65–99)
GLUCOSE UR STRIP-MCNC: NEGATIVE MG/DL
GRAN CASTS URNS QL MICRO: ABNORMAL /LPF
HBA1C MFR BLD: 4.9 % (ref 4.8–5.6)
HCT VFR BLD AUTO: 36.1 % (ref 34–46.6)
HGB BLD-MCNC: 11.7 G/DL (ref 12–15.9)
HGB UR QL STRIP.AUTO: ABNORMAL
HYALINE CASTS UR QL AUTO: ABNORMAL /LPF
IMM GRANULOCYTES # BLD AUTO: 0.06 10*3/MM3 (ref 0–0.05)
IMM GRANULOCYTES NFR BLD AUTO: 0.4 % (ref 0–0.5)
KETONES UR QL STRIP: ABNORMAL
LEUKOCYTE ESTERASE UR QL STRIP.AUTO: ABNORMAL
LYMPHOCYTES # BLD AUTO: 0.41 10*3/MM3 (ref 0.7–3.1)
LYMPHOCYTES NFR BLD AUTO: 2.9 % (ref 19.6–45.3)
MCH RBC QN AUTO: 31.8 PG (ref 26.6–33)
MCHC RBC AUTO-ENTMCNC: 32.4 G/DL (ref 31.5–35.7)
MCV RBC AUTO: 98.1 FL (ref 79–97)
MONOCYTES # BLD AUTO: 1.46 10*3/MM3 (ref 0.1–0.9)
MONOCYTES NFR BLD AUTO: 10.2 % (ref 5–12)
NEUTROPHILS NFR BLD AUTO: 12.42 10*3/MM3 (ref 1.7–7)
NEUTROPHILS NFR BLD AUTO: 86.4 % (ref 42.7–76)
NITRITE UR QL STRIP: NEGATIVE
NRBC BLD AUTO-RTO: 0 /100 WBC (ref 0–0.2)
PH UR STRIP.AUTO: 5.5 [PH] (ref 5–8)
PLATELET # BLD AUTO: 208 10*3/MM3 (ref 140–450)
PMV BLD AUTO: 10.2 FL (ref 6–12)
POTASSIUM SERPL-SCNC: 4.7 MMOL/L (ref 3.5–5.2)
PROT SERPL-MCNC: 6.5 G/DL (ref 6–8.5)
PROT UR QL STRIP: ABNORMAL
RBC # BLD AUTO: 3.68 10*6/MM3 (ref 3.77–5.28)
RBC # UR STRIP: ABNORMAL /HPF
REF LAB TEST METHOD: ABNORMAL
RENAL EPI CELLS #/AREA URNS HPF: ABNORMAL /HPF
SODIUM SERPL-SCNC: 133 MMOL/L (ref 136–145)
SP GR UR STRIP: 1.02 (ref 1–1.03)
SQUAMOUS #/AREA URNS HPF: ABNORMAL /HPF
TRANS CELLS #/AREA URNS HPF: ABNORMAL /HPF
TROPONIN T SERPL HS-MCNC: 132 NG/L
TSH SERPL DL<=0.05 MIU/L-ACNC: 1.25 UIU/ML (ref 0.27–4.2)
UROBILINOGEN UR QL STRIP: ABNORMAL
VIT B12 BLD-MCNC: 1272 PG/ML (ref 211–946)
WBC # UR STRIP: ABNORMAL /HPF
WBC NRBC COR # BLD AUTO: 14.36 10*3/MM3 (ref 3.4–10.8)
YEAST URNS QL MICRO: ABNORMAL /HPF

## 2025-01-19 PROCEDURE — 87086 URINE CULTURE/COLONY COUNT: CPT | Performed by: FAMILY MEDICINE

## 2025-01-19 PROCEDURE — 71045 X-RAY EXAM CHEST 1 VIEW: CPT

## 2025-01-19 PROCEDURE — 25810000003 SODIUM CHLORIDE 0.9 % SOLUTION: Performed by: FAMILY MEDICINE

## 2025-01-19 PROCEDURE — 25010000002 HEPARIN (PORCINE) PER 1000 UNITS: Performed by: FAMILY MEDICINE

## 2025-01-19 PROCEDURE — 82948 REAGENT STRIP/BLOOD GLUCOSE: CPT

## 2025-01-19 PROCEDURE — 70450 CT HEAD/BRAIN W/O DYE: CPT

## 2025-01-19 PROCEDURE — 82607 VITAMIN B-12: CPT | Performed by: INTERNAL MEDICINE

## 2025-01-19 PROCEDURE — 84484 ASSAY OF TROPONIN QUANT: CPT | Performed by: EMERGENCY MEDICINE

## 2025-01-19 PROCEDURE — 80053 COMPREHEN METABOLIC PANEL: CPT | Performed by: EMERGENCY MEDICINE

## 2025-01-19 PROCEDURE — 85025 COMPLETE CBC W/AUTO DIFF WBC: CPT | Performed by: EMERGENCY MEDICINE

## 2025-01-19 PROCEDURE — 63710000001 INSULIN LISPRO (HUMAN) PER 5 UNITS: Performed by: FAMILY MEDICINE

## 2025-01-19 PROCEDURE — 83036 HEMOGLOBIN GLYCOSYLATED A1C: CPT | Performed by: FAMILY MEDICINE

## 2025-01-19 PROCEDURE — 25010000002 ONDANSETRON PER 1 MG: Performed by: FAMILY MEDICINE

## 2025-01-19 PROCEDURE — 81001 URINALYSIS AUTO W/SCOPE: CPT | Performed by: FAMILY MEDICINE

## 2025-01-19 PROCEDURE — 99223 1ST HOSP IP/OBS HIGH 75: CPT | Performed by: FAMILY MEDICINE

## 2025-01-19 PROCEDURE — 84443 ASSAY THYROID STIM HORMONE: CPT | Performed by: INTERNAL MEDICINE

## 2025-01-19 PROCEDURE — 99285 EMERGENCY DEPT VISIT HI MDM: CPT

## 2025-01-19 PROCEDURE — 82550 ASSAY OF CK (CPK): CPT | Performed by: INTERNAL MEDICINE

## 2025-01-19 RX ORDER — DEXTROSE MONOHYDRATE 25 G/50ML
25 INJECTION, SOLUTION INTRAVENOUS
Status: DISCONTINUED | OUTPATIENT
Start: 2025-01-19 | End: 2025-02-12 | Stop reason: HOSPADM

## 2025-01-19 RX ORDER — IBUPROFEN 600 MG/1
1 TABLET ORAL
Status: DISCONTINUED | OUTPATIENT
Start: 2025-01-19 | End: 2025-02-12 | Stop reason: HOSPADM

## 2025-01-19 RX ORDER — PANTOPRAZOLE SODIUM 40 MG/1
40 TABLET, DELAYED RELEASE ORAL
Status: DISCONTINUED | OUTPATIENT
Start: 2025-01-20 | End: 2025-01-22

## 2025-01-19 RX ORDER — SODIUM CHLORIDE 0.9 % (FLUSH) 0.9 %
10 SYRINGE (ML) INJECTION EVERY 12 HOURS SCHEDULED
Status: DISCONTINUED | OUTPATIENT
Start: 2025-01-19 | End: 2025-02-12 | Stop reason: HOSPADM

## 2025-01-19 RX ORDER — HEPARIN SODIUM 5000 [USP'U]/ML
5000 INJECTION, SOLUTION INTRAVENOUS; SUBCUTANEOUS EVERY 12 HOURS SCHEDULED
Status: DISCONTINUED | OUTPATIENT
Start: 2025-01-19 | End: 2025-01-20

## 2025-01-19 RX ORDER — SODIUM CHLORIDE 0.9 % (FLUSH) 0.9 %
10 SYRINGE (ML) INJECTION AS NEEDED
Status: DISCONTINUED | OUTPATIENT
Start: 2025-01-19 | End: 2025-02-12 | Stop reason: HOSPADM

## 2025-01-19 RX ORDER — NIFEDIPINE 60 MG/1
60 TABLET, EXTENDED RELEASE ORAL
Status: DISCONTINUED | OUTPATIENT
Start: 2025-01-19 | End: 2025-01-23

## 2025-01-19 RX ORDER — ALLOPURINOL 100 MG/1
200 TABLET ORAL DAILY
Status: DISCONTINUED | OUTPATIENT
Start: 2025-01-19 | End: 2025-02-12 | Stop reason: HOSPADM

## 2025-01-19 RX ORDER — BISACODYL 10 MG
10 SUPPOSITORY, RECTAL RECTAL DAILY PRN
Status: DISCONTINUED | OUTPATIENT
Start: 2025-01-19 | End: 2025-02-06

## 2025-01-19 RX ORDER — SODIUM CHLORIDE 9 MG/ML
40 INJECTION, SOLUTION INTRAVENOUS AS NEEDED
Status: DISCONTINUED | OUTPATIENT
Start: 2025-01-19 | End: 2025-02-12 | Stop reason: HOSPADM

## 2025-01-19 RX ORDER — BISACODYL 5 MG/1
5 TABLET, DELAYED RELEASE ORAL DAILY PRN
Status: DISCONTINUED | OUTPATIENT
Start: 2025-01-19 | End: 2025-02-06

## 2025-01-19 RX ORDER — ACETAMINOPHEN 325 MG/1
650 TABLET ORAL EVERY 6 HOURS PRN
Status: DISCONTINUED | OUTPATIENT
Start: 2025-01-19 | End: 2025-02-06

## 2025-01-19 RX ORDER — ISOSORBIDE MONONITRATE 60 MG/1
120 TABLET, EXTENDED RELEASE ORAL
Status: DISCONTINUED | OUTPATIENT
Start: 2025-01-19 | End: 2025-01-22

## 2025-01-19 RX ORDER — AMOXICILLIN 250 MG
2 CAPSULE ORAL 2 TIMES DAILY PRN
Status: DISCONTINUED | OUTPATIENT
Start: 2025-01-19 | End: 2025-02-06

## 2025-01-19 RX ORDER — POLYETHYLENE GLYCOL 3350 17 G/17G
17 POWDER, FOR SOLUTION ORAL DAILY PRN
Status: DISCONTINUED | OUTPATIENT
Start: 2025-01-19 | End: 2025-02-06

## 2025-01-19 RX ORDER — ATORVASTATIN CALCIUM 10 MG/1
10 TABLET, FILM COATED ORAL NIGHTLY
Status: DISCONTINUED | OUTPATIENT
Start: 2025-01-19 | End: 2025-01-22

## 2025-01-19 RX ORDER — ONDANSETRON 2 MG/ML
4 INJECTION INTRAMUSCULAR; INTRAVENOUS EVERY 6 HOURS PRN
Status: DISCONTINUED | OUTPATIENT
Start: 2025-01-19 | End: 2025-02-12 | Stop reason: HOSPADM

## 2025-01-19 RX ORDER — INSULIN LISPRO 100 [IU]/ML
2-9 INJECTION, SOLUTION INTRAVENOUS; SUBCUTANEOUS
Status: DISCONTINUED | OUTPATIENT
Start: 2025-01-19 | End: 2025-02-12 | Stop reason: HOSPADM

## 2025-01-19 RX ORDER — NICOTINE POLACRILEX 4 MG
15 LOZENGE BUCCAL
Status: DISCONTINUED | OUTPATIENT
Start: 2025-01-19 | End: 2025-02-12 | Stop reason: HOSPADM

## 2025-01-19 RX ADMIN — INSULIN LISPRO 2 UNITS: 100 INJECTION, SOLUTION INTRAVENOUS; SUBCUTANEOUS at 21:21

## 2025-01-19 RX ADMIN — ALLOPURINOL 200 MG: 100 TABLET ORAL at 18:05

## 2025-01-19 RX ADMIN — ONDANSETRON 4 MG: 2 INJECTION INTRAMUSCULAR; INTRAVENOUS at 23:10

## 2025-01-19 RX ADMIN — ACETAMINOPHEN 650 MG: 325 TABLET ORAL at 23:33

## 2025-01-19 RX ADMIN — ACETAMINOPHEN 650 MG: 325 TABLET ORAL at 16:26

## 2025-01-19 RX ADMIN — INSULIN LISPRO 2 UNITS: 100 INJECTION, SOLUTION INTRAVENOUS; SUBCUTANEOUS at 18:01

## 2025-01-19 RX ADMIN — ATORVASTATIN CALCIUM 10 MG: 10 TABLET, FILM COATED ORAL at 21:21

## 2025-01-19 RX ADMIN — ACETAMINOPHEN 650 MG: 325 TABLET ORAL at 11:18

## 2025-01-19 RX ADMIN — INSULIN LISPRO 2 UNITS: 100 INJECTION, SOLUTION INTRAVENOUS; SUBCUTANEOUS at 11:18

## 2025-01-19 RX ADMIN — HEPARIN SODIUM 5000 UNITS: 5000 INJECTION INTRAVENOUS; SUBCUTANEOUS at 21:21

## 2025-01-19 RX ADMIN — Medication 2.5 MG: at 21:21

## 2025-01-19 RX ADMIN — NIFEDIPINE 60 MG: 60 TABLET, EXTENDED RELEASE ORAL at 18:04

## 2025-01-19 RX ADMIN — HEPARIN SODIUM 5000 UNITS: 5000 INJECTION INTRAVENOUS; SUBCUTANEOUS at 10:30

## 2025-01-19 RX ADMIN — SODIUM CHLORIDE 500 ML: 9 INJECTION, SOLUTION INTRAVENOUS at 07:03

## 2025-01-19 NOTE — PLAN OF CARE
Goal Outcome Evaluation:  Plan of Care Reviewed With: patient        Progress: no change  Outcome Evaluation: Patient admitted from ED for generalized weakness and fall

## 2025-01-19 NOTE — ED NOTES
Sara Esparza    Nursing Report ED to Floor:  Mental status: AOX4  Ambulatory status: NON-AMB IN ED  Oxygen Therapy:  RA  Cardiac Rhythm: SR  Admitted from: HOME  Safety Concerns:  HIGH FALL RISK  Social Issues: UNKNOWN  ED Room #:  17    ED Nurse Phone Extension - [phone] or may call [phone].      HPI:   Chief Complaint   Patient presents with    Weakness - Generalized       Past Medical History:  Past Medical History:   Diagnosis Date    Anxiety     Arthritis     Asthma     allergy induced    Back pain     Depression     Diabetes mellitus     dx 10 years ago- checks fsbs most days    Diverticula of colon     GERD (gastroesophageal reflux disease)     Head ache     Hypertension     Sleep apnea     no longer needs cpap - approx. 10 years r/t weight loss        Past Surgical History:  Past Surgical History:   Procedure Laterality Date    BACK SURGERY      x 2    CARDIAC CATHETERIZATION      no intervention    CHOLECYSTECTOMY OPEN      COLONOSCOPY      2013    HYSTERECTOMY      REPLACEMENT TOTAL KNEE BILATERAL Bilateral     TONSILLECTOMY      VENTRAL/INCISIONAL HERNIA REPAIR N/A 6/6/2017    Procedure: INCISIONAL HERNIA REPAIR LAPAROSCOPIC;  Surgeon: Conrad Hernandez MD;  Location: [facility];  Service:         Admitting Doctor:   Sebastian Menjivar MD    Consulting Provider(s):  Consults       Date and Time Order Name Status Description    1/19/2025  5:59 AM Inpatient Nephrology Consult               Admitting Diagnosis:   The primary encounter diagnosis was Generalized weakness. Diagnoses of Frequent falls, Chronic kidney disease, unspecified CKD stage, Chronic anemia, and Type 2 diabetes mellitus with stage 5 chronic kidney disease not on chronic dialysis, without long-term current use of insulin were also pertinent to this visit.    Most Recent Vitals:   Vitals:    01/19/25 0307 01/19/25 0332 01/19/25 0402 01/19/25 0431   BP: 166/63 165/65 (!) 195/77 169/62   BP Location: Right arm      Patient Position: Lying      Pulse:  "91 88 89 88   Resp: 16      Temp: 99.1 °F (37.3 °C)      TempSrc: Oral      SpO2: 92% 90% 92% 92%   Weight: 108 kg (238 lb 1.6 oz)      Height: 170.2 cm (67\")          Active LDAs/IV Access:   Lines, Drains & Airways       Active LDAs       Name Placement date Placement time Site Days    Peripheral IV 01/19/25 0403 Left Antecubital 01/19/25  0403  Antecubital  less than 1    Hemodialysis Cath Double with Pigtail 10/11/24  1527  Subclavian  99                    Labs (abnormal labs have a star):   Labs Reviewed   COMPREHENSIVE METABOLIC PANEL - Abnormal; Notable for the following components:       Result Value    Glucose 173 (*)     BUN 58 (*)     Creatinine 5.40 (*)     Sodium 133 (*)     Chloride 94 (*)     Calcium 10.6 (*)     Anion Gap 16.0 (*)     eGFR 7.8 (*)     All other components within normal limits    Narrative:     GFR Categories in Chronic Kidney Disease (CKD)      GFR Category          GFR (mL/min/1.73)    Interpretation  G1                     90 or greater         Normal or high (1)  G2                      60-89                Mild decrease (1)  G3a                   45-59                Mild to moderate decrease  G3b                   30-44                Moderate to severe decrease  G4                    15-29                Severe decrease  G5                    14 or less           Kidney failure          (1)In the absence of evidence of kidney disease, neither GFR category G1 or G2 fulfill the criteria for CKD.    eGFR calculation 2021 CKD-EPI creatinine equation, which does not include race as a factor   TROPONIN - Abnormal; Notable for the following components:    HS Troponin T 132 (*)     All other components within normal limits    Narrative:     High Sensitive Troponin T Reference Range:  <14.0 ng/L- Negative Female for AMI  <22.0 ng/L- Negative Male for AMI  >=14 - Abnormal Female indicating possible myocardial injury.  >=22 - Abnormal Male indicating possible myocardial injury. "   Clinicians would have to utilize clinical acumen, EKG, Troponin, and serial changes to determine if it is an Acute Myocardial Infarction or myocardial injury due to an underlying chronic condition.        CBC WITH AUTO DIFFERENTIAL - Abnormal; Notable for the following components:    WBC 14.36 (*)     RBC 3.68 (*)     Hemoglobin 11.7 (*)     MCV 98.1 (*)     Neutrophil % 86.4 (*)     Lymphocyte % 2.9 (*)     Eosinophil % 0.0 (*)     Neutrophils, Absolute 12.42 (*)     Lymphocytes, Absolute 0.41 (*)     Monocytes, Absolute 1.46 (*)     Immature Grans, Absolute 0.06 (*)     All other components within normal limits   AMMONIA   URINALYSIS W/ CULTURE IF INDICATED   CBC AND DIFFERENTIAL    Narrative:     The following orders were created for panel order CBC & Differential.  Procedure                               Abnormality         Status                     ---------                               -----------         ------                     CBC Auto Differential[[id number]]        Abnormal            Final result                 Please view results for these tests on the individual orders.       Meds Given in ED:   Medications   sodium chloride 0.9 % flush 10 mL (has no administration in time range)   sodium chloride 0.9 % flush 10 mL (has no administration in time range)   sodium chloride 0.9 % flush 10 mL (has no administration in time range)   sodium chloride 0.9 % infusion 40 mL (has no administration in time range)   sennosides-docusate (PERICOLACE) 8.6-50 MG per tablet 2 tablet (has no administration in time range)     And   polyethylene glycol (MIRALAX) packet 17 g (has no administration in time range)     And   bisacodyl (DULCOLAX) EC tablet 5 mg (has no administration in time range)     And   bisacodyl (DULCOLAX) suppository 10 mg (has no administration in time range)   Potassium Replacement - Follow Nurse / BPA Driven Protocol (has no administration in time range)   Magnesium Standard Dose Replacement -  Follow Nurse / BPA Driven Protocol (has no administration in time range)   Calcium Replacement - Follow Nurse / BPA Driven Protocol (has no administration in time range)   ondansetron (ZOFRAN) injection 4 mg (has no administration in time range)   melatonin tablet 2.5 mg (has no administration in time range)   heparin (porcine) 5000 UNIT/ML injection 5,000 Units (has no administration in time range)   sodium chloride 0.9 % bolus 500 mL (has no administration in time range)           Last NIH score:                                                          Dysphagia screening results:  Patient Factors Component (Dysphagia:Stroke or Rule-out)  Best Eye Response: 4-->(E4) spontaneous (01/19/25 0311)  Best Motor Response: 6-->(M6) obeys commands (01/19/25 0311)  Best Verbal Response: 5-->(V5) oriented (01/19/25 0311)  Harish Coma Scale Score: 15 (01/19/25 0311)     Harish Coma Scale:  No data recorded     CIWA:        Restraint Type:            Isolation Status:  No active isolations

## 2025-01-19 NOTE — H&P
[facility]   HISTORY AND PHYSICAL    Patient Name: Sara Esparza  : 1950  MRN: [id number]  Primary Care Physician:  Toribio Roberts MD  Date of admission: 2025    Subjective   Subjective     Chief Complaint: generalized weakness     History of Present Illness  74-year-old female with past medical history of end-stage renal disease on hemodialysis, hypertension, diabetes presented to the emergency department with complaints of generalized weakness and myalgias.   was at bedside and explains that she had a slip and fall yesterday from her chair.  He denies any trauma to her head.  He states that she has been walking around as if she was drunk and had to help her with ambulation.  Patient has had multiple falls.  She was seen earlier for similar symptoms. She complaining of some pain in her upper mid back.  She denies any fevers, chills, diaphoresis, chest pain, shortness of breath, nausea, vomiting, constipation or diarrhea.  In the ED she was given 500 mL bolus of normal saline.  She was admitted for further evaluation and management.  Weakness - Generalized  Symptoms include weakness.    Pertinent negative symptoms include no abdominal pain, no chest pain, no chills, no fatigue, no fever, no nausea, no numbness, no sore throat, no dysuria and no vomiting.       Review of Systems   Constitutional:  Negative for activity change, appetite change, chills, fatigue and fever.   HENT:  Negative for drooling, facial swelling, postnasal drip and sore throat.    Respiratory:  Positive for shortness of breath.    Cardiovascular:  Negative for chest pain.   Gastrointestinal:  Negative for abdominal distention, abdominal pain, nausea and vomiting.   Endocrine: Negative for polydipsia and polyphagia.   Genitourinary:  Negative for dysuria and hematuria.   Musculoskeletal:  Negative for gait problem and joint swelling.   Neurological:  Positive for weakness. Negative for dizziness, light-headedness  and numbness.   Psychiatric/Behavioral:  Negative for agitation and behavioral problems.         Personal History     Past Medical History:   Diagnosis Date    Anxiety     Arthritis     Asthma     allergy induced    Back pain     Depression     Diabetes mellitus     dx 10 years ago- checks fsbs most days    Diverticula of colon     GERD (gastroesophageal reflux disease)     Head ache     Hypertension     Sleep apnea     no longer needs cpap - approx. 10 years r/t weight loss       Past Surgical History:   Procedure Laterality Date    BACK SURGERY      x 2    CARDIAC CATHETERIZATION      no intervention    CHOLECYSTECTOMY OPEN      COLONOSCOPY      2013    HYSTERECTOMY      REPLACEMENT TOTAL KNEE BILATERAL Bilateral     TONSILLECTOMY      VENTRAL/INCISIONAL HERNIA REPAIR N/A 6/6/2017    Procedure: INCISIONAL HERNIA REPAIR LAPAROSCOPIC;  Surgeon: Conrad Hernandez MD;  Location: [facility];  Service:        Family History: family history is not on file. Otherwise pertinent FHx was reviewed and not pertinent to current issue.    Social History:  reports that she has never smoked. She has never used smokeless tobacco. She reports that she does not drink alcohol and does not use drugs.    Home Medications:  Cholecalciferol, Full Spectrum B, NIFEdipine CC, SITagliptin, acetaminophen, albuterol sulfate HFA, allopurinol, apixaban, atorvastatin, carvedilol, famotidine, ferrous sulfate, isosorbide mononitrate, multivitamin with minerals, omeprazole, and torsemide    Allergies:  No Known Allergies    Objective    Objective     Vitals:   Temp:  [99.1 °F (37.3 °C)-99.3 °F (37.4 °C)] 99.1 °F (37.3 °C)  Heart Rate:  [77-95] 88  Resp:  [16-18] 16  BP: (130-195)/(49-82) 169/62    Physical Exam  Constitutional:       General: She is not in acute distress.     Appearance: She is not ill-appearing.   HENT:      Head: Normocephalic and atraumatic.      Nose: No rhinorrhea.      Mouth/Throat:      Mouth: Mucous membranes are moist.       Pharynx: Oropharynx is clear.   Eyes:      Extraocular Movements: Extraocular movements intact.      Pupils: Pupils are equal, round, and reactive to light.   Cardiovascular:      Rate and Rhythm: Normal rate and regular rhythm.      Heart sounds: No murmur heard.  Pulmonary:      Effort: Pulmonary effort is normal. No respiratory distress.      Breath sounds: Normal breath sounds. No wheezing.   Chest:      Chest wall: No tenderness.   Abdominal:      General: Abdomen is flat. Bowel sounds are normal. There is no distension.      Palpations: Abdomen is soft.      Tenderness: There is no abdominal tenderness. There is no guarding.   Musculoskeletal:         General: No swelling or tenderness.      Cervical back: Normal range of motion.   Skin:     General: Skin is warm and dry.   Neurological:      General: No focal deficit present.      Mental Status: She is alert and oriented to person, place, and time.   Psychiatric:         Mood and Affect: Mood normal.         Result Review    Result Review:  I have personally reviewed the results from the time of this admission to 1/19/2025 05:12 EST and agree with these findings:  []  Laboratory list / accordion  []  Microbiology  []  Radiology  []  EKG/Telemetry   []  Cardiology/Vascular   []  Pathology  []  Old records  []  Other:  Most notable findings include:       Assessment & Plan   Assessment / Plan     Brief Patient Summary:  Sara Esparza is a 74 y.o. female who presented to the ED with generalized weakness.     Active Hospital Problems:  There are no active hospital problems to display for this patient.    Plan:   Generalized weakness:  Chest x-ray showed No acute cardiopulmonary process.   Ct chest showed No acute cardiopulmonary process.   Ammonia level:   UA pending   Admit to inpatient telemetry  Cardiac diet  500ml normal saline bolus   Consulted PT/OT for eval and treatment  Daily CBC and CMP, will continue to monitor  Will continue to monitor vitals    ESRD  on HD:  HD on MWF  Patient last received dialysis on   Consulted nephrology for dialysis management  Avoid any nephrotoxic medications    Hyperglycemia secondary to DMII:  Order insulin sliding scale  Order hypoglycemia protocol  Order A1c  Hold home medications for now  Consult nutritionist for diabetic education  Daily CMP, will continue to monitor    Hx of HTN:   Will continue home medications      DVT prophylaxis: Heparin   GI prophylaxis: Zofran, Protonix  CODE STATUS: CPR (full code)      VTE Prophylaxis:  No VTE prophylaxis order currently exists.        CODE STATUS:       Admission Status:  I believe this patient meets Full code status.    Sebastian Menjivar MD

## 2025-01-19 NOTE — CONSULTS
[facility]  Renal Consult Note    Sara Esparza  1950  [id number]    Date of Admit:  1/19/2025    Date of Consult: 1/19/2025    Requesting Provider: Dr. Fiore    Evaluating Physician: Woo Barnes MD    Chief Complaint: Weakness    Reason for Consultation: ESRD management    History of present illness:    Patient is a 74 y.o.  Yr old female history of ESRD on HD MWF at [facility] with NAL.   at bedside reports patient had dialysis on Friday.  Was very weak after dialysis.  Went to bed early.  Yesterday patient continued with weakness and fatigue.  Patient fell several times.  Patient reports lightheadedness with standing.  Presented to the ER yesterday for evaluation.  Patient did not want to be admitted.  Symptoms continued and patient returned.  Was given 500 mL bolus normal saline in the ER after which patient reports she felt a lot better.  Still with significant weakness.  Per patient she still makes good amount of urine.  Denies any urinary symptoms.  Has had low blood pressure with dialysis at times.  Patient unsure of EDW.      Past Medical History  Past Medical History:   Diagnosis Date    Anxiety     Arthritis     Asthma     allergy induced    Back pain     Depression     Diabetes mellitus     dx 10 years ago- checks fsbs most days    Diverticula of colon     GERD (gastroesophageal reflux disease)     Head ache     Hypertension     Sleep apnea     no longer needs cpap - approx. 10 years r/t weight loss       Past Surgical History:   Procedure Laterality Date    BACK SURGERY      x 2    CARDIAC CATHETERIZATION      no intervention    CHOLECYSTECTOMY OPEN      COLONOSCOPY      2013    HYSTERECTOMY      REPLACEMENT TOTAL KNEE BILATERAL Bilateral     TONSILLECTOMY      VENTRAL/INCISIONAL HERNIA REPAIR N/A 6/6/2017    Procedure: INCISIONAL HERNIA REPAIR LAPAROSCOPIC;  Surgeon: Conrad Hernandez MD;  Location: [facility];  Service:        Allergies:  No Known  "Allergies    Medication:   See electronic record    Soc Hx:   Social History     Socioeconomic History    Marital status:    Tobacco Use    Smoking status: Never    Smokeless tobacco: Never   Vaping Use    Vaping status: Never Used   Substance and Sexual Activity    Alcohol use: No    Drug use: No    Sexual activity: Defer       Fam Hx:  No congenital renal disease      Review of Systems:  Full review of systems reviewed and are as above  In HPI or per admitting H&P,otherwise negative for acute complaints    Physical Exam:   Vital Signs   Blood pressure 137/66, pulse 74, temperature 98.3 °F (36.8 °C), temperature source Oral, resp. rate 18, height 170.2 cm (67\"), weight 108 kg (238 lb 1.6 oz), SpO2 92%.  No intake/output data recorded.    GENERAL: WD WF mild distress  NEURO: Awake.  Oriented. No focal deficit  PSYCHIATRIC: NMA. Cooperative with PE  EYE: PE, no icterus, no conjunctivitis  ENT: omm dry, dentition intact,  Hearing intact  NECK:  No JVD discernable,  Trachea midline  CV: No edema, RRR  LUNGS:  Quiet,  Nonlabored resp.  Symmetrical expansion  ABDOMEN: Nondistended, soft nontender.  : No Cor[place], no palp bladder  SKIN: Warm and dry without rash  + RIJ TDC    Laboratory Data  Results from last 7 days   Lab Units 01/19/25  0403 01/18/25  2149 01/16/25  1645   HEMOGLOBIN g/dL 11.7* 11.5* 12.4   HEMATOCRIT % 36.1 34.7 37.5     Results from last 7 days   Lab Units 01/19/25  0403 01/18/25  2149 01/16/25  1645   SODIUM mmol/L 133* 132* 139   POTASSIUM mmol/L 4.7 4.4 5.3*   CHLORIDE mmol/L 94* 94* 101   CO2 mmol/L 23.0 22.0 22.0   BUN mg/dL 58* 52* 40*   CREATININE mg/dL 5.40* 5.02* 3.86*   CALCIUM mg/dL 10.6* 10.7* 9.7   MAGNESIUM mg/dL  --  2.3  --    ALBUMIN g/dL 3.6 3.8 4.0         Results from last 7 days   Lab Units 01/19/25  0403   ALK PHOS U/L 96   BILIRUBIN mg/dL 0.6   ALT (SGPT) U/L 23   AST (SGOT) U/L 32         Estimated Creatinine Clearance: 11.4 mL/min (A) (by C-G formula based on SCr of 5.4 " mg/dL (H)).  Lab Results   Component Value Date    PROTEINUR 1,199 01/07/2024       A/P:      ESRD: On HD MWF at [facility] with NAL .  Plan on HD in a.m. to keep on schedule.    HTN: Blood pressure elevated, however patient with complaints of orthostasis.  Will try to get orthostatic blood pressures    Hyponatremia: Mild.  Due to underlying renal failure    Hyperkalemia: Adjust with HD    Metabolic acidosis: Stable.  Adjust with HD    Anemia: Hemoglobin above goal.  No CORNEL unless hemoglobin <10.5.    Volume: Does not look overloaded on exam.  No edema.  Chest x-ray is negative for pulmonary edema.  Symptoms improved with 500 mL bolus in ER.  Patient likely with intravascular volume depletion with dialysis.  May need dry weight adjusted.  For now we will need strict I's and O's to assess urine output.  No UF with HD tomorrow.    Bone mineralization: Calcium elevated at 10.6.  Will review dialysis chart.  Monitor for now.    Hyperphosphatemia: Check phosphorus levels.    Nutrition: Low potassium low Phos high-protein diet.  Renal vitamin.    Weakness: Per history sounds like it worsened after patient had dialysis on Friday.  May have underlying dehydration with overly aggressive UF on HD.  Check orthostatics.  Further workup per primary team.    Thank you consulting us on Sara Esparza who is of high risk and complexity.  We will follow along closely    Woo Barnes MD  1/19/2025  10:35 EST

## 2025-01-19 NOTE — LETTER
EMS Transport Request  For use at [facility], [place], [place], [place], and [place] only   Patient Name: Sara Esparza : 1950   Weight:108 kg (238 lb 1.6 oz) Pick-up Location: [facility] BLS/ALS: BLS/ALS: BLS   Insurance: MEDICARE Auth End Date:    Pre-Cert #: D/C Summary complete:    Destination: Other [facility], [address], [facility]   Contact Precautions: None   Equipment (O2, Fluids, etc.): Other hemodialysis catheter    Arrive By Date/Time: 25 after 1200 Stretcher/WC: Stretcher   CM Requesting: Blanka Puentes RN Ext: [phone]   Notes/Medical Necessity: impaired balance/cognition/endurance/activity tolerance, pain, poor postural/trunk control, 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  Wheelchairs and walkers CANNOT transported with the patient. Acknowledge: Yes    
Hyperviscosity  patient on aspirin  Rheumatoid arthritis of hip, unspecified laterality, unspecified rheumatoid factor presence    Tumor of esophagus

## 2025-01-19 NOTE — CASE MANAGEMENT/SOCIAL WORK
Discharge Planning Assessment  [facility]     Patient Name: Sara Esparza  MRN: [id number]  Today's Date: 1/19/2025    Admit Date: 1/19/2025    Plan: home   Discharge Needs Assessment       Row Name 01/19/25 1530       Living Environment    People in Home spouse    Name(s) of People in Home  [place]ario    Current Living Arrangements home    Potentially Unsafe Housing Conditions none    In the past 12 months has the electric, gas, oil, or water company threatened to shut off services in your home? No    Primary Care Provided by spouse/significant other;child(sylvie)    Provides Primary Care For no one, unable/limited ability to care for self    Family Caregiver if Needed child(sylvie), adult;spouse    Quality of Family Relationships helpful;involved;supportive    Living Arrangement Comments Plan is home       Resource/Environmental Concerns    Resource/Environmental Concerns none    Transportation Concerns none       Transportation Needs    In the past 12 months, has lack of transportation kept you from medical appointments or from getting medications? no    In the past 12 months, has lack of transportation kept you from meetings, work, or from getting things needed for daily living? No       Food Insecurity    Within the past 12 months, you worried that your food would run out before you got the money to buy more. Never true    Within the past 12 months, the food you bought just didn't last and you didn't have money to get more. Never true       Transition Planning    Patient/Family Anticipates Transition to home with family    Patient/Family Anticipated Services at Transition none    Transportation Anticipated family or friend will provide       Discharge Needs Assessment    Equipment Currently Used at Home cane, straight;walker, rolling    Do you want help finding or keeping work or a job? I do not need or want help    Do you want help with school or training? For example, starting or completing job training or  getting a high school diploma, GED or equivalent No    Anticipated Changes Related to Illness none    Equipment Needed After Discharge walker, rolling    Outpatient/Agency/Support Group Needs outpatient hemodialysis    Discharge Coordination/Progress Home is the plan                   Discharge Plan       Row Name 01/19/25 1536       Plan    Plan home    Patient/Family in Agreement with Plan yes    Plan Comments I spoke with patient, , son and DIL at the bedside. Patient not feeling well,  nauseated so the  and  son did the talking. Patient lives with  in [place] in a split foyer home. Patient has OP HD at [facility] off [address] on MWF around 1200 and  drives her. Patient uses a cane in the home and RW out and about. No HH and no home 02. Plan is home and PT is ordered. CM will follow    Final Discharge Disposition Code 01 - home or self-care                  Continued Care and Services - Admitted Since 1/19/2025    No active coordination exists for this encounter.       Expected Discharge Date and Time       Expected Discharge Date Expected Discharge Time    Jan 21, 2025            Demographic Summary       Row Name 01/19/25 1528       General Information    Admission Type inpatient    Referral Source admission list    Reason for Consult discharge planning    Preferred Language English       Contact Information    Permission Granted to Share Info With     Contact Information Obtained for     Contact Information Comments PCP: Toribio Roberts                   Functional Status       Row Name 01/19/25 1529       Functional Status    Usual Activity Tolerance fair    Current Activity Tolerance fair       Physical Activity    On average, how many days per week do you engage in moderate to strenuous exercise (like a brisk walk)? 0 days    On average, how many minutes do you engage in exercise at this level? 0 min    Number of minutes of exercise per week 0        Functional Status, IADL    If for any reason you need help with day-to-day activities such as bathing, preparing meals, shopping, managing finances, etc., do you get the help you need? I don't need any help    IADL Comments Patient has coverage for medications       Employment/    Employment Status retired                   Psychosocial    No documentation.                  Abuse/Neglect    No documentation.                  Legal    No documentation.                  Substance Abuse    No documentation.                  Patient Forms    No documentation.                     Elina Shaver RN

## 2025-01-19 NOTE — ED PROVIDER NOTES
Subjective   History of Present Illness  Is a 74-year-old female with past medical history of end-stage renal disease on hemodialysis presented to the emergency department with  multiple complaints.  Patient states that she has been having some shortness of breath for the last 2 days.  Just occasionally feels like she cannot catch her breath.  Patient has also had multiple falls.  She was seen earlier for similar symptoms.  Patient states that she fell twice today and was having difficulty getting back to her feet.  She complaining of some pain in her upper mid back.  She denies any trauma to the head.  No loss consciousness.  She does not have any chest pain.  No cough.  No abdominal pain or vomiting.  No fevers or chills    History provided by:  Patient   used: No        Review of Systems   Constitutional:  Positive for fatigue. Negative for chills and fever.   HENT:  Negative for congestion, ear pain and sore throat.    Eyes:  Negative for visual disturbance.   Respiratory:  Positive for shortness of breath.    Cardiovascular:  Negative for chest pain.   Gastrointestinal:  Negative for abdominal pain.   Genitourinary:  Negative for difficulty urinating.   Musculoskeletal:  Positive for back pain. Negative for arthralgias.   Skin:  Negative for rash.   Neurological:  Negative for dizziness, weakness and numbness.   Psychiatric/Behavioral:  Negative for agitation.        Past Medical History:   Diagnosis Date    Anxiety     Arthritis     Asthma     allergy induced    Back pain     Depression     Diabetes mellitus     dx 10 years ago- checks fsbs most days    Diverticula of colon     GERD (gastroesophageal reflux disease)     Head ache     Hypertension     Sleep apnea     no longer needs cpap - approx. 10 years r/t weight loss       No Known Allergies    Past Surgical History:   Procedure Laterality Date    BACK SURGERY      x 2    CARDIAC CATHETERIZATION      no intervention    CHOLECYSTECTOMY  OPEN      COLONOSCOPY      2013    HYSTERECTOMY      REPLACEMENT TOTAL KNEE BILATERAL Bilateral     TONSILLECTOMY      VENTRAL/INCISIONAL HERNIA REPAIR N/A 6/6/2017    Procedure: INCISIONAL HERNIA REPAIR LAPAROSCOPIC;  Surgeon: Conrad Hernandez MD;  Location: [facility];  Service:        No family history on file.    Social History     Socioeconomic History    Marital status:    Tobacco Use    Smoking status: Never    Smokeless tobacco: Never   Vaping Use    Vaping status: Never Used   Substance and Sexual Activity    Alcohol use: No    Drug use: No    Sexual activity: Defer           Objective   Physical Exam  Vitals and nursing note reviewed.   Constitutional:       General: She is not in acute distress.     Appearance: She is not ill-appearing or toxic-appearing.   HENT:      Mouth/Throat:      Pharynx: No posterior oropharyngeal erythema.   Eyes:      Conjunctiva/sclera: Conjunctivae normal.      Pupils: Pupils are equal, round, and reactive to light.   Cardiovascular:      Rate and Rhythm: Normal rate and regular rhythm.   Pulmonary:      Effort: Pulmonary effort is normal. No respiratory distress.      Breath sounds: No rhonchi or rales.   Abdominal:      General: Abdomen is flat. There is no distension.      Palpations: There is no mass.      Tenderness: There is no abdominal tenderness. There is no guarding or rebound.   Musculoskeletal:         General: No deformity. Normal range of motion.      Comments: Patient is some mild tenderness palpation in the mid thoracic spine.  No step-off or deformity.  No neurovascular compromise   Skin:     General: Skin is warm.      Findings: No rash.   Neurological:      General: No focal deficit present.      Mental Status: She is alert and oriented to person, place, and time.      Motor: No weakness.         ECG 12 Lead      Date/Time: 1/18/2025 10:02 PM    Performed by: Deacon Larose MD  Authorized by: Deacon Larose MD  Interpreted by ED  physician  Comparison: compared with previous ECG   Similar to previous ECG  Rhythm: sinus rhythm  Rate: normal  BPM: 76  QRS axis: normal  Conduction: conduction normal  Conduction: LAFB  Other findings comments: Nonspecific ST changes  Clinical impression: non-specific ECG  Comments: Interpretation:  EKG was directly visualized by myself, interpretations as documented in hospital course.               ED Course  ED Course as of 01/19/25 0053   Sat Jan 18, 2025 2200 BP: 144/72 [JK]   2200 Temp: 99.3 °F (37.4 °C) [JK]   2200 Temp src: Oral [JK]   2200 Heart Rate: 95 [JK]   2200 Resp: 18 [JK]   2200 SpO2: 96 % [JK]   2200 Device (Oxygen Therapy): room air  Interpretation:  Patient's repeat vitals, telemetry tracing, and pulse oximetry tracing were directly viewed and interpreted by myself.   O2 sat 96% on room air, interpreted as normal.  Telemetry rhythm strip revealed a rate of 95 bpm, interpreted as normal sinus rhythm [JK]   Sun Jan 19, 2025   0049 BNP(!) [JK]   0049 High Sensitivity Troponin T(!!) [JK]   0049 CBC & Differential(!) [JK]   0049 Comprehensive Metabolic Panel(!) [JK]   0049 High Sensitivity Troponin T 1Hr(!!) [JK]   0049 POC Glucose Once(!) [JK]   0049 High Sensitivity Troponin T(!!)  Interpretation:  Laboratory studies were reviewed and interpreted directly by myself.  CBC shows a chronic anemia with a hemoglobin 11.5, CMP showed some chronic kidney disease with a BUN of 52 creatinine of 5.02, initial troponin was 134, repeat was 128 with a delta gap of -6, BNP elevated 8028 [JK]   0050 CT Chest Without Contrast Diagnostic [JK]   0050 XR Chest 1 View  Interpretation:  Imaging was directly visualized by myself, per my interpretations, chest x-ray and CT of the chest did not show any acute process. [JK]   0050 On reevaluation, patient appears to be at her baseline.  States that her pain is improved.  Patient does have some metabolic abnormalities, however I do believe these are extending from her  chronic kidney disease.  Given her weakness and falls I do feel she would benefit from admission to the hospital for further observation.  I did discuss this with the patient and family member at bedside.  They both did not want to stay in the hospital.  I did explain to the patient and family that leaving could result in catastrophic outcome without further treatment.  They are alert and have decision-making capability.  They are electing to go home and would like to just follow-up with her primary physician.  I encouraged them to return to the emergency department for any changes in symptoms.  They verbalized understanding. [JK]   0051 Shared decision making:   After full review of the patient's clinical presentation, review of any work-up including but not limited to laboratory studies and radiology obtained, I had a discussion with the patient.  Treatment options were discussed as well as the risks, benefits and consequences.  I discussed all findings with the patient and family members if available.  During the discussion, treatment goals were understood by all as well as any misconceptions which were addressed with the patient.  Ample time was given for any questions they may have had.  They are in agreement with the treatment plan as well as final disposition. [JK]      ED Course User Index  [JK] Deacon Larose MD                                                       Medical Decision Making  This is a 74-year-old female with a history of end-stage renal disease on hemodialysis presented to the emergency department with multiple complaints.  Patient is having some shortness of breath, frequent falls generalized fatigue.  Patient does not appear to be in any respiratory distress at this time.  She saturating well on room air.  Patient has been having some falls and I am concerned for possible thoracic fracture.  Overall, the patient is nontoxic.  Afebrile.  IV access was established and the patient.   Placed on continuous telemetry monitoring.  Given the patient's presentation, differential is broad and will require further evaluation.  Workup initiated.      Differential diagnosis: Shortness of breath, pleural effusions, CHF, pneumonia, thoracic spine fracture, contusion, sprain, acute kidney injury, electrolyte abnormality      Amount and/or Complexity of Data Reviewed  External Data Reviewed: labs, radiology, ECG and notes.     Details: External laboratories, imaging as well as notes were reviewed personally by myself.  All relevant studies were used to guide decision making.     Date of previous record: 1/7/2025    Source of note: Cardiology    Summary:  Patient was seen and evaluated for routine visit.  I did review basic laboratory studies on file as well as a previous chest x-ray and EKG.  Records reviewed    Labs: ordered. Decision-making details documented in ED Course.  Radiology: ordered and independent interpretation performed. Decision-making details documented in ED Course.  ECG/medicine tests: ordered and independent interpretation performed. Decision-making details documented in ED Course.    Risk  Prescription drug management.        Final diagnoses:   Generalized weakness   Shortness of breath   Frequent falls   CKD (chronic kidney disease) stage 5, GFR less than 15 ml/min   Primary hypertension       ED Disposition  ED Disposition       ED Disposition   Discharge    Condition   Stable    Comment   --               Toribio Roberts MD  [address]  [address]  [place] [address]  [phone]    Call in 1 day           Medication List      No changes were made to your prescriptions during this visit.            Deacon Larose MD  01/19/25 0053

## 2025-01-19 NOTE — PROGRESS NOTES
[facility] Medicine Services  PROGRESS NOTE    Patient Name: Sara Esparza  : 1950  MRN: [id number]    Date of Admission: 2025  Primary Care Physician: Toribio Roberts MD    Subjective   Subjective     CC: weakness, progressive    HPI:  Feels the same as yest.  Main issue is progressive generalized weakness, pt very tired all the time, x past few weeks.  She started HD about 3 months ago and reports that HD is sometimes limited by BP issues.  Mild muscle soreness.  Mild dyspnea, chronic.  No focal weakness/numbness.        Objective   Objective     Vital Signs:   Temp:  [99.1 °F (37.3 °C)-99.3 °F (37.4 °C)] 99.1 °F (37.3 °C)  Heart Rate:  [77-95] 82  Resp:  [16-18] 18  BP: (130-195)/(49-82) 137/66     Physical Exam:  Gen:  very tired looking in bed, answers questions but freq closes eyes, seems exhausted by leg raises.   present.   Neuro: tired but oriented, clear speech, follows commands, grossly nonfocal , FTN intact bilat, HTS intact, can lift each leg off bed briefly  HEENT:  NC/AT   Neck:  Supple, no LAD  Heart RRR   Lungs CTA, no crackles   Abd:  Soft, nontender, no rebound or guarding, pos BS  Extrem:  No c/c/e      Results Reviewed:  LAB RESULTS:      Lab 25  0403 25  2259 25  2149 25  1645   WBC 14.36*  --  13.47*  --  11.08*   HEMOGLOBIN 11.7*  --  11.5*  --  12.4   HEMATOCRIT 36.1  --  34.7  --  37.5   PLATELETS 208  --  220  --  249   NEUTROS ABS 12.42*  --  11.62*  --  8.68*   IMMATURE GRANS (ABS) 0.06*  --  0.04  --  0.02   LYMPHS ABS 0.41*  --  0.53*  --  1.30   MONOS ABS 1.46*  --  1.27*  --  1.02*   EOS ABS 0.00  --  0.00  --  0.03   MCV 98.1*  --  96.4  --  97.4*   D DIMER QUANT  --   --   --   --  0.99*   HSTROP T 132* 128* 134*   < > 87*    < > = values in this interval not displayed.         Lab 25  0403 25  2149 25  1645   SODIUM 133* 132* 139   POTASSIUM 4.7 4.4 5.3*   CHLORIDE 94*  94* 101   CO2 23.0 22.0 22.0   ANION GAP 16.0* 16.0* 16.0*   BUN 58* 52* 40*   CREATININE 5.40* 5.02* 3.86*   EGFR 7.8* 8.5* 11.6*   GLUCOSE 173* 167* 124*   CALCIUM 10.6* 10.7* 9.7   MAGNESIUM  --  2.3  --    HEMOGLOBIN A1C 4.90  --   --          Lab 01/19/25 0403 01/18/25 2149 01/16/25  1645   TOTAL PROTEIN 6.5 7.0 7.1   ALBUMIN 3.6 3.8 4.0   GLOBULIN 2.9 3.2 3.1   ALT (SGPT) 23 21 10   AST (SGOT) 32 33* 15   BILIRUBIN 0.6 0.5 0.4   ALK PHOS 96 90 96   LIPASE  --   --  68*         Lab 01/19/25 0403 01/18/25 2259 01/18/25 2149 01/16/25 2011 01/16/25  1645   PROBNP  --   --  8,028.0*  --  4,232.0*   HSTROP T 132* 128* 134* 89* 87*                 Brief Urine Lab Results  (Last result in the past 365 days)        Color   Clarity   Blood   Leuk Est   Nitrite   Protein   CREAT   Urine HCG        09/16/24 1112 Yellow   Clear   Negative   Small (1+)   Negative   >=300 mg/dL (3+)                   Microbiology Results Abnormal       None            XR Chest 1 View    Result Date: 1/19/2025  XR CHEST 1 VW Date of Exam: 1/19/2025 4:30 AM EST Indication: Cough Comparison: 1/18/2025. Findings: Right internal jugular PermCath present with the tip at the cavoatrial junction. There are no airspace consolidations. No pleural fluid. No pneumothorax. The pulmonary vasculature appears within normal limits. The cardiac and mediastinal silhouette appear unremarkable. No acute osseous abnormality identified.     Impression: Impression: No acute cardiopulmonary process. Electronically Signed: Cindy Fuller MD  1/19/2025 4:56 AM EST  Workstation ID: [id number]    CT Chest Without Contrast Diagnostic    Result Date: 1/18/2025  CT CHEST WO CONTRAST DIAGNOSTIC Date of Exam: 1/18/2025 10:46 PM EST Indication: shortness of breath, back pain. Comparison: 1/16/2025, 1/18/2025. Technique: Axial CT images were obtained of the chest without contrast administration.  Reconstructed coronal and sagittal images were also obtained. Automated  exposure control and iterative construction methods were used. Findings: Hilum and Mediastinum: No pathologically enlarged lymph nodes.  Normal heart size.   No pericardial effusion.  Unremarkable thoracic aorta and pulmonary arteries. Right internal jugular PermCath present with the tip in the distal SVC. Atherosclerotic calcifications are present including within the coronary arteries. Lung Parenchyma and Pleura: No focal consolidations.  No suspicious pulmonary nodules.  No endobronchial lesions.  No significant pleural effusions. Upper Abdomen: No acute process. Soft tissues: Unremarkable. Osseous structures: No aggressive focal lytic or sclerotic osseous lesions. Moderate degenerative changes are present within the spine. Postsurgical changes are seen related to previous spinal fusion with no evidence of acute hardware failure. No acute osseous abnormality.     Impression: Impression: 1.No acute cardiopulmonary process. 2.Ancillary findings as described above. Electronically Signed: Cindy Fuller MD  1/18/2025 10:52 PM EST  Workstation ID: [id number]    XR Chest 1 View    Result Date: 1/18/2025  XR CHEST 1 VW Date of Exam: 1/18/2025 9:49 PM EST Indication: Weak/Dizzy/AMS triage protocol Comparison: 1/16/2025. Findings: Right internal jugular PermCath present with the tip in the distal SVC. There are no airspace consolidations. No pleural fluid. No pneumothorax. The pulmonary vasculature appears within normal limits. The cardiac and mediastinal silhouette appear unremarkable. No acute osseous abnormality identified.     Impression: Impression: No acute cardiopulmonary process. Electronically Signed: Cindy Fuller MD  1/18/2025 10:09 PM EST  Workstation ID: [id number]         Current medications:  Scheduled Meds:heparin (porcine), 5,000 Units, Subcutaneous, Q12H  insulin lispro, 2-9 Units, Subcutaneous, 4x Daily AC & at Bedtime  sodium chloride, 10 mL, Intravenous, Q12H      Continuous Infusions:   PRN Meds:.   "senna-docusate sodium **AND** polyethylene glycol **AND** bisacodyl **AND** bisacodyl    Calcium Replacement - Follow Nurse / BPA Driven Protocol    dextrose    dextrose    glucagon (human recombinant)    Magnesium Standard Dose Replacement - Follow Nurse / BPA Driven Protocol    melatonin    ondansetron    Potassium Replacement - Follow Nurse / BPA Driven Protocol    [COMPLETED] Insert Peripheral IV **AND** sodium chloride    sodium chloride    sodium chloride    Assessment & Plan   Assessment & Plan     Active Hospital Problems    Diagnosis  POA    **Generalized weakness [R53.1]  Yes      Resolved Hospital Problems   No resolved problems to display.        Brief Hospital Course to date:  Sara Esparza is a 74 y.o. female w ESRD on HD, also HTN and DM.  Admitted for generalized weakness and myalgias. Multiple falls, \"walking as if drunk\" per     All problems new to me     Generalized weakness, falls   - suspect this is uremia and dialysis-related fatigue but will check other possibilities.  Consult nephrology   - Ct chest benign, ammonia  pending   - UA large protein   - check TSH and head CT and CK   - PT OT    ESRD on HD:  HD on MWF     DMII:  A1C 4.9   - on sitagliptin at home. SSI.  May need to decrease dose.      Hx of HTN:   Will continue home medications    Dispo: will consider SNF     Expected Discharge Location and Transportation: home vs snf   Expected Discharge tbd  Expected discharge date/ time has not been documented.     VTE Prophylaxis:  Pharmacologic VTE prophylaxis orders are present.              CODE STATUS:   Code Status and Medical Interventions: CPR (Attempt to Resuscitate); Full Support   Ordered at: 01/19/25 0518     Level Of Support Discussed With:    Patient     Code Status (Patient has no pulse and is not breathing):    CPR (Attempt to Resuscitate)     Medical Interventions (Patient has pulse or is breathing):    Full Support       Leslye Fiore MD  01/19/25      "

## 2025-01-19 NOTE — ED PROVIDER NOTES
Subjective   History of Present Illness  74-year-old female with a history of diabetes presented to the emergency department some generalized weakness.  Patient was just seen earlier and refused admission.  Apparently they got the patient high when she was having difficulties getting to her bedroom due to her weakness.  She got very lightheaded and fell like she was going to pass out.  She did not actually fall.  No injuries.  She states that she just feels weak.  No headache or change in vision.  No focal weakness.  No chest pain or shortness of breath    History provided by:  Patient and spouse   used: No        Review of Systems   Constitutional:  Positive for fatigue. Negative for chills and fever.   HENT:  Negative for congestion, ear pain and sore throat.    Eyes:  Negative for visual disturbance.   Respiratory:  Negative for shortness of breath.    Cardiovascular:  Negative for chest pain.   Gastrointestinal:  Negative for abdominal pain.   Genitourinary:  Negative for difficulty urinating.   Musculoskeletal:  Negative for arthralgias.   Skin:  Negative for rash.   Neurological:  Positive for weakness. Negative for dizziness and numbness.   Psychiatric/Behavioral:  Negative for agitation.        Past Medical History:   Diagnosis Date    Anxiety     Arthritis     Asthma     allergy induced    Back pain     Depression     Diabetes mellitus     dx 10 years ago- checks fsbs most days    Diverticula of colon     GERD (gastroesophageal reflux disease)     Head ache     Hypertension     Sleep apnea     no longer needs cpap - approx. 10 years r/t weight loss       No Known Allergies    Past Surgical History:   Procedure Laterality Date    BACK SURGERY      x 2    CARDIAC CATHETERIZATION      no intervention    CHOLECYSTECTOMY OPEN      COLONOSCOPY      2013    HYSTERECTOMY      REPLACEMENT TOTAL KNEE BILATERAL Bilateral     TONSILLECTOMY      VENTRAL/INCISIONAL HERNIA REPAIR N/A 6/6/2017     Procedure: INCISIONAL HERNIA REPAIR LAPAROSCOPIC;  Surgeon: Conrad Hernandez MD;  [facility];  Service:        No family history on file.    Social History     Socioeconomic History    Marital status:    Tobacco Use    Smoking status: Never    Smokeless tobacco: Never   Vaping Use    Vaping status: Never Used   Substance and Sexual Activity    Alcohol use: No    Drug use: No    Sexual activity: Defer           Objective   Physical Exam  Vitals and nursing note reviewed.   Constitutional:       General: She is not in acute distress.     Appearance: She is not ill-appearing or toxic-appearing.   Eyes:      Conjunctiva/sclera: Conjunctivae normal.      Pupils: Pupils are equal, round, and reactive to light.   Cardiovascular:      Rate and Rhythm: Normal rate and regular rhythm.   Pulmonary:      Effort: Pulmonary effort is normal. No respiratory distress.   Abdominal:      General: Abdomen is flat. There is no distension.      Palpations: There is no mass.      Tenderness: There is no abdominal tenderness. There is no guarding or rebound.   Musculoskeletal:         General: No deformity.   Skin:     General: Skin is warm.      Findings: No rash.   Neurological:      Mental Status: She is alert. She is disoriented.      Sensory: No sensory deficit.      Motor: No weakness.         Procedures           ED Course  ED Course as of 01/19/25 0518   Sun Jan 19, 2025   0425 BP: 166/63 [JK]   0425 Temp: 99.1 °F (37.3 °C) [JK]   0425 Temp src: Oral [JK]   0425 Heart Rate: 91 [JK]   0425 Resp: 16 [JK]   0425 SpO2: 92 % [JK]   0425 Device (Oxygen Therapy): nasal cannula  Interpretation:  Patient's repeat vitals, telemetry tracing, and pulse oximetry tracing were directly viewed and interpreted by myself.   O2 sat 92% on nasal cannula, interpreted as normal.  Telemetry rhythm strip revealed a rate of 90 bpm, interpreted as normal sinus rhythm [JK]   0516 High Sensitivity Troponin T(!!) [JK]   0516 CBC &  Differential(!) [JK]   0516 Comprehensive Metabolic Panel(!)  Interpretation:  Laboratory studies were reviewed and interpreted directly by myself.  CMP showed some chronic kidney disease with a BUN of 58 and creatinine of 5.4, CBC showed some minor leukocytosis with a white blood cell count of 14.3, troponin was elevated, however at baseline at 132 [JK]   0517 XR Chest 1 View  Interpretation:  Imaging was directly visualized by myself, per my interpretations, chest x-ray was unremarkable. [JK]   0517 Based on the patient's presentation, history and diffuse work-up in the emergency department, the patient is deemed appropriate for admission to the hospital for further evaluation and treatment.  This was discussed with the patient at bedside.  They are in agreement with the current medical management.    Admitting physician: Dr. Arboleda    Discussion was had with admitting physician regarding the laboratory and imaging findings.  We did discuss current therapeutics in the emergency department and progression of the patient.  Working diagnosis was conveyed to the admitting physician, as well as current status and prognosis for the patient.  They are in agreement with these findings and have accepted admission.    Shared decision making:   After full review of the patient's clinical presentation, review of any work-up including but not limited to laboratory studies and radiology obtained, I had a discussion with the patient.  Treatment options were discussed as well as the risks, benefits and consequences.  I discussed all findings with the patient and family members if available.  During the discussion, treatment goals were understood by all as well as any misconceptions which were addressed with the patient.  Ample time was given for any questions they may have had.  They are in agreement with the treatment plan as well as final disposition. [JK]      ED Course User Index  [JK] Deacon Larose MD                                                        Medical Decision Making  This is a 74-year-old female with a history of diabetes and hypertension presenting to the emergency department with some generalized weakness.  I do believe the patient's overall fatigue is related to her chronic kidney disease and uremia.  Patient was seen previously, however refused admission at that time..  Overall, the patient is nontoxic.  Afebrile.  IV access was established and the patient.  Placed on continuous telemetry monitoring.  Given the patient's presentation, differential is broad and will require further evaluation.  Workup initiated.      Differential diagnosis: Chronic kidney disease, uremia, electrolyte abnormality, anemia, generalized weakness      Amount and/or Complexity of Data Reviewed  External Data Reviewed: labs, radiology, ECG and notes.     Details: External laboratories, imaging as well as notes were reviewed personally by myself.  All relevant studies were used to guide decision making.     Date of previous record: 11/26/2024    Source of note: Cardiology    Summary:  Patient was seen and evaluated for routine visit.  I did review basic laboratory studies on file as well as a previous chest x-ray and EKG.  Records reviewed    Labs: ordered. Decision-making details documented in ED Course.  Radiology: ordered and independent interpretation performed. Decision-making details documented in ED Course.    Risk  Prescription drug management.        Final diagnoses:   Generalized weakness   Frequent falls   Chronic kidney disease, unspecified CKD stage   Chronic anemia   Type 2 diabetes mellitus with stage 5 chronic kidney disease not on chronic dialysis, without long-term current use of insulin       ED Disposition  ED Disposition       ED Disposition   Decision to Admit    Condition   --    Comment   Level of Care: Telemetry [5]   Diagnosis: Generalized weakness [914396]   Admitting Physician: KEYSHAWN EPPS [[id number]]    Attending Physician: KEYSHAWN EPPS [[id number]]   Certification: I Certify That Inpatient Hospital Services Are Medically Necessary For Greater Than 2 Midnights                 No follow-up provider specified.       Medication List      No changes were made to your prescriptions during this visit.            Deacon Larose MD  01/19/25 0518

## 2025-01-20 ENCOUNTER — APPOINTMENT (OUTPATIENT)
Dept: CT IMAGING | Facility: HOSPITAL | Age: 75
DRG: 853 | End: 2025-01-20
Payer: MEDICARE

## 2025-01-20 ENCOUNTER — APPOINTMENT (OUTPATIENT)
Dept: NEPHROLOGY | Facility: HOSPITAL | Age: 75
DRG: 853 | End: 2025-01-20
Payer: MEDICARE

## 2025-01-20 LAB
ALBUMIN SERPL-MCNC: 3 G/DL (ref 3.5–5.2)
ALBUMIN/GLOB SERPL: 1 G/DL
ALP SERPL-CCNC: 98 U/L (ref 39–117)
ALT SERPL W P-5'-P-CCNC: 29 U/L (ref 1–33)
ANION GAP SERPL CALCULATED.3IONS-SCNC: 18 MMOL/L (ref 5–15)
AST SERPL-CCNC: 35 U/L (ref 1–32)
BACTERIA SPEC AEROBE CULT: NORMAL
BASOPHILS # BLD AUTO: 0.02 10*3/MM3 (ref 0–0.2)
BASOPHILS NFR BLD AUTO: 0.1 % (ref 0–1.5)
BILIRUB SERPL-MCNC: 0.3 MG/DL (ref 0–1.2)
BUN SERPL-MCNC: 79 MG/DL (ref 8–23)
BUN/CREAT SERPL: 12.1 (ref 7–25)
CALCIUM SPEC-SCNC: 10.3 MG/DL (ref 8.6–10.5)
CHLORIDE SERPL-SCNC: 94 MMOL/L (ref 98–107)
CO2 SERPL-SCNC: 19 MMOL/L (ref 22–29)
CREAT SERPL-MCNC: 6.51 MG/DL (ref 0.57–1)
DEPRECATED RDW RBC AUTO: 45.1 FL (ref 37–54)
EGFRCR SERPLBLD CKD-EPI 2021: 6.2 ML/MIN/1.73
EOSINOPHIL # BLD AUTO: 0 10*3/MM3 (ref 0–0.4)
EOSINOPHIL NFR BLD AUTO: 0 % (ref 0.3–6.2)
ERYTHROCYTE [DISTWIDTH] IN BLOOD BY AUTOMATED COUNT: 12.7 % (ref 12.3–15.4)
GLOBULIN UR ELPH-MCNC: 3 GM/DL
GLUCOSE BLDC GLUCOMTR-MCNC: 118 MG/DL (ref 70–130)
GLUCOSE BLDC GLUCOMTR-MCNC: 140 MG/DL (ref 70–130)
GLUCOSE BLDC GLUCOMTR-MCNC: 166 MG/DL (ref 70–130)
GLUCOSE BLDC GLUCOMTR-MCNC: 171 MG/DL (ref 70–130)
GLUCOSE SERPL-MCNC: 173 MG/DL (ref 65–99)
HCT VFR BLD AUTO: 33.9 % (ref 34–46.6)
HGB BLD-MCNC: 11.1 G/DL (ref 12–15.9)
IMM GRANULOCYTES # BLD AUTO: 0.05 10*3/MM3 (ref 0–0.05)
IMM GRANULOCYTES NFR BLD AUTO: 0.3 % (ref 0–0.5)
LYMPHOCYTES # BLD AUTO: 0.44 10*3/MM3 (ref 0.7–3.1)
LYMPHOCYTES NFR BLD AUTO: 2.9 % (ref 19.6–45.3)
MCH RBC QN AUTO: 31.7 PG (ref 26.6–33)
MCHC RBC AUTO-ENTMCNC: 32.7 G/DL (ref 31.5–35.7)
MCV RBC AUTO: 96.9 FL (ref 79–97)
MONOCYTES # BLD AUTO: 1.79 10*3/MM3 (ref 0.1–0.9)
MONOCYTES NFR BLD AUTO: 11.9 % (ref 5–12)
NEUTROPHILS NFR BLD AUTO: 12.73 10*3/MM3 (ref 1.7–7)
NEUTROPHILS NFR BLD AUTO: 84.8 % (ref 42.7–76)
NRBC BLD AUTO-RTO: 0 /100 WBC (ref 0–0.2)
PLATELET # BLD AUTO: 177 10*3/MM3 (ref 140–450)
PMV BLD AUTO: 10.7 FL (ref 6–12)
POTASSIUM SERPL-SCNC: 4.2 MMOL/L (ref 3.5–5.2)
PROT SERPL-MCNC: 6 G/DL (ref 6–8.5)
RBC # BLD AUTO: 3.5 10*6/MM3 (ref 3.77–5.28)
SODIUM SERPL-SCNC: 131 MMOL/L (ref 136–145)
WBC NRBC COR # BLD AUTO: 15.03 10*3/MM3 (ref 3.4–10.8)

## 2025-01-20 PROCEDURE — 25010000002 HYDROMORPHONE PER 4 MG: Performed by: INTERNAL MEDICINE

## 2025-01-20 PROCEDURE — 5A1D70Z PERFORMANCE OF URINARY FILTRATION, INTERMITTENT, LESS THAN 6 HOURS PER DAY: ICD-10-PCS | Performed by: INTERNAL MEDICINE

## 2025-01-20 PROCEDURE — 25010000002 HEPARIN (PORCINE) PER 1000 UNITS: Performed by: FAMILY MEDICINE

## 2025-01-20 PROCEDURE — 82948 REAGENT STRIP/BLOOD GLUCOSE: CPT

## 2025-01-20 PROCEDURE — 87077 CULTURE AEROBIC IDENTIFY: CPT | Performed by: INTERNAL MEDICINE

## 2025-01-20 PROCEDURE — 80053 COMPREHEN METABOLIC PANEL: CPT | Performed by: FAMILY MEDICINE

## 2025-01-20 PROCEDURE — 63710000001 INSULIN LISPRO (HUMAN) PER 5 UNITS: Performed by: FAMILY MEDICINE

## 2025-01-20 PROCEDURE — 25010000002 HEPARIN (PORCINE) PER 1000 UNITS: Performed by: INTERNAL MEDICINE

## 2025-01-20 PROCEDURE — 87070 CULTURE OTHR SPECIMN AEROBIC: CPT | Performed by: INTERNAL MEDICINE

## 2025-01-20 PROCEDURE — 85025 COMPLETE CBC W/AUTO DIFF WBC: CPT | Performed by: FAMILY MEDICINE

## 2025-01-20 PROCEDURE — 99232 SBSQ HOSP IP/OBS MODERATE 35: CPT | Performed by: INTERNAL MEDICINE

## 2025-01-20 PROCEDURE — 87205 SMEAR GRAM STAIN: CPT | Performed by: INTERNAL MEDICINE

## 2025-01-20 PROCEDURE — 25010000002 ONDANSETRON PER 1 MG: Performed by: FAMILY MEDICINE

## 2025-01-20 PROCEDURE — 72125 CT NECK SPINE W/O DYE: CPT

## 2025-01-20 PROCEDURE — 87147 CULTURE TYPE IMMUNOLOGIC: CPT | Performed by: INTERNAL MEDICINE

## 2025-01-20 PROCEDURE — 25010000002 CEFTRIAXONE PER 250 MG: Performed by: INTERNAL MEDICINE

## 2025-01-20 PROCEDURE — 87186 SC STD MICRODIL/AGAR DIL: CPT | Performed by: INTERNAL MEDICINE

## 2025-01-20 RX ORDER — HEPARIN SODIUM 1000 [USP'U]/ML
2000 INJECTION, SOLUTION INTRAVENOUS; SUBCUTANEOUS AS NEEDED
Status: DISCONTINUED | OUTPATIENT
Start: 2025-01-20 | End: 2025-02-12 | Stop reason: HOSPADM

## 2025-01-20 RX ORDER — NIFEDIPINE 90 MG/1
90 TABLET, EXTENDED RELEASE ORAL DAILY
COMMUNITY

## 2025-01-20 RX ORDER — CARVEDILOL 25 MG/1
25 TABLET ORAL DAILY
COMMUNITY
End: 2025-02-12 | Stop reason: HOSPADM

## 2025-01-20 RX ORDER — HYDROMORPHONE HYDROCHLORIDE 1 MG/ML
0.25 INJECTION, SOLUTION INTRAMUSCULAR; INTRAVENOUS; SUBCUTANEOUS ONCE
Status: COMPLETED | OUTPATIENT
Start: 2025-01-20 | End: 2025-01-20

## 2025-01-20 RX ORDER — HYDROMORPHONE HYDROCHLORIDE 1 MG/ML
0.25 INJECTION, SOLUTION INTRAMUSCULAR; INTRAVENOUS; SUBCUTANEOUS 2 TIMES DAILY PRN
Status: DISCONTINUED | OUTPATIENT
Start: 2025-01-20 | End: 2025-01-25

## 2025-01-20 RX ORDER — TRAMADOL HYDROCHLORIDE 50 MG/1
50 TABLET ORAL EVERY 12 HOURS PRN
Status: DISCONTINUED | OUTPATIENT
Start: 2025-01-20 | End: 2025-01-23

## 2025-01-20 RX ADMIN — INSULIN LISPRO 2 UNITS: 100 INJECTION, SOLUTION INTRAVENOUS; SUBCUTANEOUS at 20:29

## 2025-01-20 RX ADMIN — HYDROMORPHONE HYDROCHLORIDE 0.25 MG: 1 INJECTION, SOLUTION INTRAMUSCULAR; INTRAVENOUS; SUBCUTANEOUS at 12:51

## 2025-01-20 RX ADMIN — ACETAMINOPHEN 650 MG: 325 TABLET ORAL at 20:25

## 2025-01-20 RX ADMIN — HEPARIN SODIUM 5000 UNITS: 5000 INJECTION INTRAVENOUS; SUBCUTANEOUS at 11:51

## 2025-01-20 RX ADMIN — ONDANSETRON 4 MG: 2 INJECTION INTRAMUSCULAR; INTRAVENOUS at 08:32

## 2025-01-20 RX ADMIN — NIFEDIPINE 60 MG: 60 TABLET, EXTENDED RELEASE ORAL at 11:52

## 2025-01-20 RX ADMIN — ACETAMINOPHEN 650 MG: 325 TABLET ORAL at 08:32

## 2025-01-20 RX ADMIN — HYDROMORPHONE HYDROCHLORIDE 0.25 MG: 1 INJECTION, SOLUTION INTRAMUSCULAR; INTRAVENOUS; SUBCUTANEOUS at 22:23

## 2025-01-20 RX ADMIN — SODIUM CHLORIDE 1000 MG: 900 INJECTION INTRAVENOUS at 11:51

## 2025-01-20 RX ADMIN — HEPARIN SODIUM 2000 UNITS: 1000 INJECTION INTRAVENOUS; SUBCUTANEOUS at 11:27

## 2025-01-20 RX ADMIN — ALLOPURINOL 200 MG: 100 TABLET ORAL at 11:52

## 2025-01-20 RX ADMIN — TRAMADOL HYDROCHLORIDE 50 MG: 50 TABLET, COATED ORAL at 20:25

## 2025-01-20 RX ADMIN — ATORVASTATIN CALCIUM 10 MG: 10 TABLET, FILM COATED ORAL at 20:25

## 2025-01-20 RX ADMIN — ISOSORBIDE MONONITRATE 120 MG: 60 TABLET, EXTENDED RELEASE ORAL at 11:52

## 2025-01-20 NOTE — PROGRESS NOTES
"   LOS: 1 day    Patient Care Team:  Toribio Roberts MD as PCP - General (Internal Medicine)    Subjective     Stable overnight.  Still feels poorly.  Continues with increased weakness.  Denies any chest pain shortness of breath.    Objective     Vital Signs:  Blood pressure 135/69, pulse 89, temperature 97.9 °F (36.6 °C), temperature source Oral, resp. rate 22, height 170.2 cm (67\"), weight 108 kg (238 lb 1.6 oz), SpO2 94%.      Intake/Output Summary (Last 24 hours) at 1/20/2025 1401  Last data filed at 1/20/2025 1204  Gross per 24 hour   Intake --   Output 0 ml   Net 0 ml        01/19 0701 - 01/20 0700  In: -   Out: 300 [Urine:300]    Physical Exam:    GENERAL: WD WF, appears uncomfortable  NEURO: Awake.  Drowsy no focal deficit  PSYCHIATRIC: Calm, cooperative with PE  EYE: PE, no icterus, no conjunctivitis  ENT: omm dry, dentition intact,  Hearing intact  NECK:  No JVD discernable,  Trachea midline  CV: No edema, RRR  LUNGS:  Quiet,  Nonlabored resp.  Symmetrical expansion  ABDOMEN: Nondistended, soft nontender.  : No Corral, no palp bladder  SKIN: Warm and dry without rash  + RIJ TDC         Labs:  Results from last 7 days   Lab Units 01/20/25  0624 01/19/25  0403 01/18/25  2149   WBC 10*3/mm3 15.03* 14.36* 13.47*   HEMOGLOBIN g/dL 11.1* 11.7* 11.5*   PLATELETS 10*3/mm3 177 208 220     Results from last 7 days   Lab Units 01/20/25  0624 01/19/25  0403 01/18/25  2149 01/16/25  1645   SODIUM mmol/L 131* 133* 132* 139   POTASSIUM mmol/L 4.2 4.7 4.4 5.3*   CHLORIDE mmol/L 94* 94* 94* 101   CO2 mmol/L 19.0* 23.0 22.0 22.0   BUN mg/dL 79* 58* 52* 40*   CREATININE mg/dL 6.51* 5.40* 5.02* 3.86*   CALCIUM mg/dL 10.3 10.6* 10.7* 9.7   MAGNESIUM mg/dL  --   --  2.3  --    ALBUMIN g/dL 3.0* 3.6 3.8 4.0     Results from last 7 days   Lab Units 01/20/25  0624   ALK PHOS U/L 98   BILIRUBIN mg/dL 0.3   ALT (SGPT) U/L 29   AST (SGOT) U/L 35*                   Estimated Creatinine Clearance: 9.5 mL/min (A) (by C-G formula " based on SCr of 6.51 mg/dL (H)).         A/P:      ESRD: On HD MWF at [facility] with NAL .  Continue outpatient schedule.     HTN: Blood pressure elevated, however patient with complaints of orthostasis.  Blood pressure dropped to 116 from 144 with standing yesterday.  May have component of intravascular volume depletion.    Hyponatremia:   Due to underlying renal failure.  Adjust with HD.     Hyperkalemia: Resolved.  Adjust with HD     Metabolic acidosis:  Adjust with HD     Anemia: Hemoglobin above goal.  No CORNEL unless hemoglobin <10.5.     Volume: Does not look overloaded on exam.  No edema.  Chest x-ray is negative for pulmonary edema.  Symptoms improved with 500 mL bolus in ER.  +25 mmHg drop in orthostatic blood pressure.  Patient likely with intravascular volume depletion with dialysis.  May need dry weight adjusted.  For now we will need strict I's and O's to assess urine output.  No UF with HD for now.     Bone mineralization: Calcium remains elevated but better at 10.3.  No active vitamin D.  Monitor for now.    Hyperphosphatemia: Check phosphorus levels.     Nutrition: Low potassium low Phos high-protein diet.  Renal vitamin.     UTI: Patient febrile overnight.  On antibiotics.      Woo Barnes MD  01/20/25  14:01 EST

## 2025-01-20 NOTE — CASE MANAGEMENT/SOCIAL WORK
Continued Stay Note  ANURADHA Khan     Patient Name: Sara Esparza  MRN: [id number]  Today's Date: 1/20/2025    Admit Date: 1/19/2025    Plan: home   Discharge Plan       Row Name 01/20/25 1254       Plan    Plan home    Patient/Family in Agreement with Plan yes    Plan Comments I spoke with this patient and her  bedside. She had HD this morning. Her plan is home with her family to transport. Therapy still needs to see her and place updated notes. CM to follow.    Final Discharge Disposition Code 01 - home or self-care                   Discharge Codes    No documentation.                 Expected Discharge Date and Time       Expected Discharge Date Expected Discharge Time    Jan 21, 2025               Sharee Williamson RN

## 2025-01-20 NOTE — PLAN OF CARE
"Goal Outcome Evaluation:  Aox4  VSS on 2L NC while sleeping  She vomited x1 last night  Did orthostatics but she was unable to stay standing for \"standing\" BP  Afebrile all night but has stayed clammy majority of the night.  Multiple gown changes.  Skin and fall precautions in place  Nursing staff will continue to monitor    "

## 2025-01-20 NOTE — PROGRESS NOTES
[facility] Medicine Services  PROGRESS NOTE    Patient Name: Sara Esparza  : 1950  MRN: [id number]    Date of Admission: 2025  Primary Care Physician: Toribio Roberts MD    Subjective   Subjective     CC: weakness, progressive    HPI:   Back from HD.  Tmax 101.9.  confused.  Complaint of neck pain, unclear when it started but family notes several recent falls including an unwitnessed fall.      She endorses that the falls are caused by lightheadedness       Objective   Objective     Vital Signs:   Temp:  [97.8 °F (36.6 °C)-99 °F (37.2 °C)] 98.3 °F (36.8 °C)  Heart Rate:  [71-89] 89  Resp:  [18-22] 18  BP: (116-192)/(51-98) 148/70  Flow (L/min) (Oxygen Therapy):  [2] 2     Physical Exam:  Gen:  lying back w eyes closed, murmurs answers to questions but won't open eyes.  Family present   Neuro: sleepy/;ethargic   HEENT:  NC/AT   Neck:  tender over C7 and surrounding area, worst in midline   Heart RRR   Lungs CTA,  Abd:  Soft, nontender, no rebound or guarding, pos BS  Extrem:  No c/c/e      Results Reviewed:  LAB RESULTS:      Lab 25  0624 25  0403 25  2259 25  2149 25  1645   WBC 15.03* 14.36*  --  13.47*  --  11.08*   HEMOGLOBIN 11.1* 11.7*  --  11.5*  --  12.4   HEMATOCRIT 33.9* 36.1  --  34.7  --  37.5   PLATELETS 177 208  --  220  --  249   NEUTROS ABS 12.73* 12.42*  --  11.62*  --  8.68*   IMMATURE GRANS (ABS) 0.05 0.06*  --  0.04  --  0.02   LYMPHS ABS 0.44* 0.41*  --  0.53*  --  1.30   MONOS ABS 1.79* 1.46*  --  1.27*  --  1.02*   EOS ABS 0.00 0.00  --  0.00  --  0.03   MCV 96.9 98.1*  --  96.4  --  97.4*   D DIMER QUANT  --   --   --   --   --  0.99*   HSTROP T  --  132* 128* 134*   < > 87*    < > = values in this interval not displayed.         Lab 25  0624 25  0403 25  2149 25  1645   SODIUM 131* 133* 132* 139   POTASSIUM 4.2 4.7 4.4 5.3*   CHLORIDE 94* 94* 94* 101   CO2 19.0* 23.0 22.0 22.0    ANION GAP 18.0* 16.0* 16.0* 16.0*   BUN 79* 58* 52* 40*   CREATININE 6.51* 5.40* 5.02* 3.86*   EGFR 6.2* 7.8* 8.5* 11.6*   GLUCOSE 173* 173* 167* 124*   CALCIUM 10.3 10.6* 10.7* 9.7   MAGNESIUM  --   --  2.3  --    HEMOGLOBIN A1C  --  4.90  --   --    TSH  --  1.250  --   --          Lab 01/20/25  0624 01/19/25  0403 01/18/25 2149 01/16/25  1645   TOTAL PROTEIN 6.0 6.5 7.0 7.1   ALBUMIN 3.0* 3.6 3.8 4.0   GLOBULIN 3.0 2.9 3.2 3.1   ALT (SGPT) 29 23 21 10   AST (SGOT) 35* 32 33* 15   BILIRUBIN 0.3 0.6 0.5 0.4   ALK PHOS 98 96 90 96   LIPASE  --   --   --  68*         Lab 01/19/25  0403 01/18/25 2259 01/18/25 2149 01/16/25 2011 01/16/25  1645   PROBNP  --   --  8,028.0*  --  4,232.0*   HSTROP T 132* 128* 134* 89* 87*             Lab 01/19/25  0956   VITAMIN B 12 1,272*         Brief Urine Lab Results  (Last result in the past 365 days)        Color   Clarity   Blood   Leuk Est   Nitrite   Protein   CREAT   Urine HCG        01/19/25 1404 Yellow   Cloudy   Moderate (2+)   Moderate (2+)   Negative   >=300 mg/dL (3+)                   Microbiology Results Abnormal       None            CT Head Without Contrast    Result Date: 1/19/2025  CT HEAD WO CONTRAST Date of Exam: 1/19/2025 3:26 PM EST Indication: falls, ataxia. Comparison: None available. Technique: Axial CT images were obtained of the head without contrast administration.  Automated exposure control and iterative construction methods were used. Findings: Negative for large territory infarct, acute intracranial hemorrhage, large mass lesion, midline shift or hydrocephalus. Small focal hypodensities in the right occipital lobe example image 39 series 3 and more medially image 40 series 3, the latter associated with encephalomalacia. Right frontal lobe hypodensity measuring 1 x 0.9 cm image 44 series 3. Parenchymal volume within normal limits for age. No large extra-axial fluid collection. Symmetric orbits. Distal carotid atherosclerotic disease. Nonobstructive  maxillary sinus disease with frothy secretions on the left. No large mastoid effusions. No aggressive bone lesions or displaced fractures.     Impression: Impression: 1. 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 Comparisons not available to assess stability. Consider nonemergent MRI for further assessment. 2. Negative for acute intracranial hemorrhage, large mass lesion, midline shift or hydrocephalus. Electronically Signed: Deacon Loyola MD  1/19/2025 6:00 PM EST  Workstation ID: [id number]    XR Chest 1 View    Result Date: 1/19/2025  XR CHEST 1 VW Date of Exam: 1/19/2025 4:30 AM EST Indication: Cough Comparison: 1/18/2025. Findings: Right internal jugular PermCath present with the tip at the cavoatrial junction. There are no airspace consolidations. No pleural fluid. No pneumothorax. The pulmonary vasculature appears within normal limits. The cardiac and mediastinal silhouette appear unremarkable. No acute osseous abnormality identified.     Impression: Impression: No acute cardiopulmonary process. Electronically Signed: Cindy Fuller MD  1/19/2025 4:56 AM EST  Workstation ID: [id number]    CT Chest Without Contrast Diagnostic    Result Date: 1/18/2025  CT CHEST WO CONTRAST DIAGNOSTIC Date of Exam: 1/18/2025 10:46 PM EST Indication: shortness of breath, back pain. Comparison: 1/16/2025, 1/18/2025. Technique: Axial CT images were obtained of the chest without contrast administration.  Reconstructed coronal and sagittal images were also obtained. Automated exposure control and iterative construction methods were used. Findings: Hilum and Mediastinum: No pathologically enlarged lymph nodes.  Normal heart size.   No pericardial effusion.  Unremarkable thoracic aorta and pulmonary arteries. Right internal jugular PermCath present with the tip in the distal SVC. Atherosclerotic calcifications are present  including within the coronary arteries. Lung Parenchyma and Pleura: No focal consolidations.  No suspicious pulmonary nodules.  No endobronchial lesions.  No significant pleural effusions. Upper Abdomen: No acute process. Soft tissues: Unremarkable. Osseous structures: No aggressive focal lytic or sclerotic osseous lesions. Moderate degenerative changes are present within the spine. Postsurgical changes are seen related to previous spinal fusion with no evidence of acute hardware failure. No acute osseous abnormality.     Impression: Impression: 1.No acute cardiopulmonary process. 2.Ancillary findings as described above. Electronically Signed: Cindy Fuller MD  1/18/2025 10:52 PM EST  Workstation ID: [id number]    XR Chest 1 View    Result Date: 1/18/2025  XR CHEST 1 VW Date of Exam: 1/18/2025 9:49 PM EST Indication: Weak/Dizzy/AMS triage protocol Comparison: 1/16/2025. Findings: Right internal jugular PermCath present with the tip in the distal SVC. There are no airspace consolidations. No pleural fluid. No pneumothorax. The pulmonary vasculature appears within normal limits. The cardiac and mediastinal silhouette appear unremarkable. No acute osseous abnormality identified.     Impression: Impression: No acute cardiopulmonary process. Electronically Signed: Cindy Fuller MD  1/18/2025 10:09 PM EST  Workstation ID: [id number]         Current medications:  Scheduled Meds:allopurinol, 200 mg, Oral, Daily  atorvastatin, 10 mg, Oral, Nightly  heparin (porcine), 5,000 Units, Subcutaneous, Q12H  insulin lispro, 2-9 Units, Subcutaneous, 4x Daily AC & at Bedtime  isosorbide mononitrate, 120 mg, Oral, Q24H  NIFEdipine XL, 60 mg, Oral, Q24H  pantoprazole, 40 mg, Oral, Q AM  sodium chloride, 10 mL, Intravenous, Q12H      Continuous Infusions:   PRN Meds:.  acetaminophen    senna-docusate sodium **AND** polyethylene glycol **AND** bisacodyl **AND** bisacodyl    Calcium Replacement - Follow Nurse / BPA Driven Protocol    dextrose     "dextrose    glucagon (human recombinant)    Magnesium Standard Dose Replacement - Follow Nurse / BPA Driven Protocol    melatonin    ondansetron    Potassium Replacement - Follow Nurse / BPA Driven Protocol    [COMPLETED] Insert Peripheral IV **AND** sodium chloride    sodium chloride    sodium chloride    Assessment & Plan   Assessment & Plan     Active Hospital Problems    Diagnosis  POA    **Generalized weakness [R53.1]  Yes      Resolved Hospital Problems   No resolved problems to display.        Brief Hospital Course to date:  Sara Esparza is a 74 y.o. female w ESRD on HD, also HTN and DM.  Admitted for generalized weakness and myalgias. Multiple falls, \"walking as if drunk\" per     UTI, fever, confusion  Sepsis   R heel wound   - makes good urine.  UA +   - CTX .  Await cx from urine  - wound cx sent from heel decub       Neck pain   - C7 region   - Given recent falls, will check CT neck - no fx - trying ultram     Generalized weakness, falls   - suspicion of overdiuresis and orthostasis   - Ct chest benign,   - normal TSH and  CK   - PT OT  - orthostatics when able       ESRD on HD:  HD on MWF     DMII:  A1C 4.9   - on sitagliptin at home. SSI.  May need to decrease dose.      Hx of HTN:   Will continue home medications    Dispo: will consider SNF     Expected Discharge Location and Transportation: home vs snf   Expected Discharge tbd  Expected Discharge Date: 1/21/2025; Expected Discharge Time:      VTE Prophylaxis:  Pharmacologic VTE prophylaxis orders are present.         AM-PAC 6 Clicks Score (PT): 18 (01/19/25 2000)    CODE STATUS:   Code Status and Medical Interventions: CPR (Attempt to Resuscitate); Full Support   Ordered at: 01/19/25 0518     Level Of Support Discussed With:    Patient     Code Status (Patient has no pulse and is not breathing):    CPR (Attempt to Resuscitate)     Medical Interventions (Patient has pulse or is breathing):    Full Support       Leslye Fiore MD  01/20/25      "

## 2025-01-20 NOTE — PLAN OF CARE
Goal Outcome Evaluation:  Plan of Care Reviewed With: patient        Progress: no change  Outcome Evaluation: VSS on 2L NC. Disoriented to time and situation. Hemodialysis completed today. PRN tylenol given for fever. Pt complained of severe posterior neck pain, MD notified, IV dilaudid given with relief. CT cervical spine performed. WOC RN changed dressing on L heel wound, wound cultures sent. Abx infused per order. Pt unable to tolerate orthostatics due to severe weakness. Fall and skin precautions in place. Will continue plan of care.

## 2025-01-20 NOTE — PLAN OF CARE
Goal Outcome Evaluation:Scheduled HD completed. Pt tolerated well.Blood reunfused to pt. Called report to PAULIE King    Problem: Hemodialysis  Goal: Safe, Effective Therapy Delivery  Outcome: Progressing  Goal: Effective Tissue Perfusion  Outcome: Progressing  Goal: Absence of Infection Signs and Symptoms  Outcome: Progressing

## 2025-01-20 NOTE — PROGRESS NOTES
"          Clinical Nutrition Assessment     Patient Name: Sara Esparza  YOB: 1950  MRN: [id number]  Date of Encounter: 01/20/25 15:14 EST  Admission date: 1/19/2025  Reason for Visit: MST score 2+, \"Unsure\" unintentional weight loss    Assessment   Nutrition Assessment   Admission Diagnosis:  Generalized weakness [R53.1]    Problem List:    Generalized weakness      PMH:   She  has a past medical history of Anxiety, Arthritis, Asthma, Back pain, Depression, Diabetes mellitus, Diverticula of colon, GERD (gastroesophageal reflux disease), Head ache, Hypertension, and Sleep apnea.    PSH:  She  has a past surgical history that includes Replacement total knee bilateral (Bilateral); Cholecystectomy open; Tonsillectomy; Hysterectomy; Back surgery; Cardiac catheterization; Colonoscopy; and ventral/incisional hernia repair (N/A, 6/6/2017).    Applicable Nutrition History:   Skin integrity  DM ulcer to L plantar heel  WOC following    Anthropometrics     Height: Height: 170.2 cm (67\")  Last Filed Weight: Weight: 108 kg (238 lb 1.6 oz) (01/19/25 0307)  Method: Weight Method: Stated  BMI: BMI (Calculated): 37.3    UBW:     Weight      Weight (kg) Weight (lbs) Weight Method   1/6/2024 88.905 kg  196 lb  Stated    3/26/2024 93.441 kg  206 lb  Stated    9/13/2024 76.204 kg  168 lb  Bed scale    10/11/2024 75.751 kg  167 lb  Stated    10/12/2024 75.751 kg  167 lb  Stated    1/16/2025 107.502 kg  237 lb  Stated    1/18/2025 107.502 kg  237 lb     1/19/2025 108 kg  238 lb 1.6 oz  Stated      Weight change:  unable to evaluate 2/2 on HD with fluid fluctuations    Nutrition Focused Physical Exam    Date:  1/20       Unable to perform due to Pt unable to participate at time of visit     Subjective   Reported/Observed/Food/Nutrition Related History:     Pt laying in bed returning from imaging at time of visit, unable to contribute to recent nutrition hx - all information obtained from spouse. Endorsed pt having a good " appetite prior to admission - ate 2-3 meals daily. After last admission c/o taste changes but had resolved. Apparently no c/o of return of taste changes - ? Present in acute illness. Chronic HD 3x/wk - per dialysis estimated DW 77kg however ? If using prior lower BW. Spouse reports pt tolerating soup well and requesting focus on softer foods. Pt dislikes ONS - agreeable to try alternative. NKFA    Dislikes chicken    Current Nutrition Prescription   PO: Diet: Cardiac; Healthy Heart (2-3 Na+); Fluid Consistency: Thin (IDDSI 0)  Oral Nutrition Supplement: N/A  Intake:  12.5% x 2 meals documented    Assessment & Plan   Nutrition Diagnosis   Date:  1/20            Updated:    Problem Predicted inadequate nutrient intake    Etiology AMS   Signs/Symptoms <25% since admission   Status: New    Goal:   Nutrition to support treatment and Increase intake      Nutrition Intervention      Follow treatment progress, Care plan reviewed, Menu adjusted, Encourage intake, Supplement provided    Encouraged PO intake at meals  Change to soft, whole diet  May return to regular consistency upon request  Provide Magic Cup 1x daily  ? Tolerate Jose to aide in wound healing    Obtain measured weight    Monitoring/Evaluation:   Per protocol, I&O, PO intake, Pertinent labs, Weight, Symptoms, POC/GOC    Bailee Becerra, MS,RD,LD  Time Spent: 25min

## 2025-01-20 NOTE — NURSING NOTE
"Reason for Wound, Ostomy and Continence (WOC) Nursing Consultation: \"R heel PI, L leg wound\"    Patient sleeping in bed.  Family/support person at bedside.     Wound Assessment  Wound Type: Diabetic Ulcer  Location: L plantar heel (R heel without wound)  Measurements: per flowsheet  Wound Bed: slough  Wound Edges: Open  Periwound Skin: macerated, redness, and warm callous  Drainage Characteristics/Odor: malodorous  and purulent  Drainage Amount: moderate  Pain: Yes   Care provided: 5 minute vashe soak. Skin prep periwound. Hydrofera Ready to wound bed. Covered with 4x4 optifoam, Alleyvn adjusted.  Notes: Pt sleeping, easily awakened, but fell asleep soon after.  states that pt does see podiatrist for feet, Dr García and he is unsure how long area has been there, at least a couple of months. Purulent drainage noted which is foul-smelling. White wound edges, callous, then dark redness which extends to arch. Area is painful, as pt winced during treatment. Took wound culture and secure messaged MD, in case she would like it sent to lab, also notified nurse. Cleansed with vashe and applied skin prep to periwound due to apparent maceration. Hydrofera used for debridement property and antimicrobial property.   Wound Image:       Recommendation(s) for management of wound:   -Refer to wound care orders for specific instructions on how to treat/manage wound.  -Vashe soak for 5 minutes Q2 days. Apply Hydrofera Ready to wound bed. Skin prep to periwound. Cover with optifoam. Allevyn to heels and offload.  -Waffle overlay and inflate.  -Practice pressure injury prevention protocol.    R leg with bruising, healing/scabbed abrasions from fall at home. Keep clean and dry.       Most recent Brad Scale score:  Sensory Perception: 3-->slightly limited  Moisture: 2-->very moist  Activity: 2-->chairfast  Mobility: 2-->very limited  Nutrition: 2-->probably inadequate  Friction and Shear: 2-->potential problem  Brad Score: 13 " (01/20/25 1200)     Specialty support surface: Foam Mattress       Pressure Injury Prevention Protocol (initiate for Brad Score of 18 or less):   *Turn q 2 hr, keep heels elevated and offloaded with offloading heel boots.  *Allevn dressings to heels, sacrum/coccyx  *Follow C.A.R.E protocol if medical devices (Bipap, hernandez, Ng tube, etc) are being used.  *Reduce layers under patient (one sheet as drawsheet and two incontinence pads) to allow waffle or ISAIAS to improve microclimate   *Raise knee-gatch before elevating HOB to reduce shearing      WOC Team will continue to follow.  Please re-consult if the wound(s) worsens.

## 2025-01-21 ENCOUNTER — APPOINTMENT (OUTPATIENT)
Dept: CT IMAGING | Facility: HOSPITAL | Age: 75
DRG: 853 | End: 2025-01-21
Payer: MEDICARE

## 2025-01-21 LAB
ALBUMIN SERPL-MCNC: 3.3 G/DL (ref 3.5–5.2)
ALBUMIN/GLOB SERPL: 1 G/DL
ALP SERPL-CCNC: 114 U/L (ref 39–117)
ALT SERPL W P-5'-P-CCNC: 49 U/L (ref 1–33)
ANION GAP SERPL CALCULATED.3IONS-SCNC: 16 MMOL/L (ref 5–15)
AST SERPL-CCNC: 54 U/L (ref 1–32)
B PARAPERT DNA SPEC QL NAA+PROBE: NOT DETECTED
B PERT DNA SPEC QL NAA+PROBE: NOT DETECTED
BASOPHILS # BLD AUTO: 0.01 10*3/MM3 (ref 0–0.2)
BASOPHILS NFR BLD AUTO: 0.1 % (ref 0–1.5)
BILIRUB SERPL-MCNC: 0.3 MG/DL (ref 0–1.2)
BUN SERPL-MCNC: 52 MG/DL (ref 8–23)
BUN/CREAT SERPL: 11.1 (ref 7–25)
C PNEUM DNA NPH QL NAA+NON-PROBE: NOT DETECTED
CALCIUM SPEC-SCNC: 10.8 MG/DL (ref 8.6–10.5)
CHLORIDE SERPL-SCNC: 94 MMOL/L (ref 98–107)
CO2 SERPL-SCNC: 24 MMOL/L (ref 22–29)
CREAT SERPL-MCNC: 4.68 MG/DL (ref 0.57–1)
D-LACTATE SERPL-SCNC: 1.3 MMOL/L (ref 0.5–2)
D-LACTATE SERPL-SCNC: 4.1 MMOL/L (ref 0.5–2)
DEPRECATED RDW RBC AUTO: 48 FL (ref 37–54)
EGFRCR SERPLBLD CKD-EPI 2021: 9.2 ML/MIN/1.73
EOSINOPHIL # BLD AUTO: 0 10*3/MM3 (ref 0–0.4)
EOSINOPHIL NFR BLD AUTO: 0 % (ref 0.3–6.2)
ERYTHROCYTE [DISTWIDTH] IN BLOOD BY AUTOMATED COUNT: 13 % (ref 12.3–15.4)
FLUAV SUBTYP SPEC NAA+PROBE: NOT DETECTED
FLUBV RNA ISLT QL NAA+PROBE: NOT DETECTED
GLOBULIN UR ELPH-MCNC: 3.3 GM/DL
GLUCOSE BLDC GLUCOMTR-MCNC: 130 MG/DL (ref 70–130)
GLUCOSE BLDC GLUCOMTR-MCNC: 141 MG/DL (ref 70–130)
GLUCOSE BLDC GLUCOMTR-MCNC: 148 MG/DL (ref 70–130)
GLUCOSE BLDC GLUCOMTR-MCNC: 171 MG/DL (ref 70–130)
GLUCOSE BLDC GLUCOMTR-MCNC: 183 MG/DL (ref 70–130)
GLUCOSE BLDC GLUCOMTR-MCNC: 210 MG/DL (ref 70–130)
GLUCOSE SERPL-MCNC: 187 MG/DL (ref 65–99)
HADV DNA SPEC NAA+PROBE: NOT DETECTED
HCOV 229E RNA SPEC QL NAA+PROBE: NOT DETECTED
HCOV HKU1 RNA SPEC QL NAA+PROBE: NOT DETECTED
HCOV NL63 RNA SPEC QL NAA+PROBE: NOT DETECTED
HCOV OC43 RNA SPEC QL NAA+PROBE: NOT DETECTED
HCT VFR BLD AUTO: 29.1 % (ref 34–46.6)
HGB BLD-MCNC: 9.3 G/DL (ref 12–15.9)
HMPV RNA NPH QL NAA+NON-PROBE: NOT DETECTED
HPIV1 RNA ISLT QL NAA+PROBE: NOT DETECTED
HPIV2 RNA SPEC QL NAA+PROBE: NOT DETECTED
HPIV3 RNA NPH QL NAA+PROBE: NOT DETECTED
HPIV4 P GENE NPH QL NAA+PROBE: NOT DETECTED
IMM GRANULOCYTES # BLD AUTO: 0.08 10*3/MM3 (ref 0–0.05)
IMM GRANULOCYTES NFR BLD AUTO: 0.6 % (ref 0–0.5)
LYMPHOCYTES # BLD AUTO: 0.62 10*3/MM3 (ref 0.7–3.1)
LYMPHOCYTES NFR BLD AUTO: 4.9 % (ref 19.6–45.3)
M PNEUMO IGG SER IA-ACNC: NOT DETECTED
MCH RBC QN AUTO: 32 PG (ref 26.6–33)
MCHC RBC AUTO-ENTMCNC: 32 G/DL (ref 31.5–35.7)
MCV RBC AUTO: 100 FL (ref 79–97)
MONOCYTES # BLD AUTO: 1.89 10*3/MM3 (ref 0.1–0.9)
MONOCYTES NFR BLD AUTO: 14.8 % (ref 5–12)
MRSA DNA SPEC QL NAA+PROBE: POSITIVE
NEUTROPHILS NFR BLD AUTO: 10.18 10*3/MM3 (ref 1.7–7)
NEUTROPHILS NFR BLD AUTO: 79.6 % (ref 42.7–76)
NRBC BLD AUTO-RTO: 0 /100 WBC (ref 0–0.2)
PLATELET # BLD AUTO: 151 10*3/MM3 (ref 140–450)
PMV BLD AUTO: 10.9 FL (ref 6–12)
POTASSIUM SERPL-SCNC: 4.7 MMOL/L (ref 3.5–5.2)
PROCALCITONIN SERPL-MCNC: 5.99 NG/ML (ref 0–0.25)
PROT SERPL-MCNC: 6.6 G/DL (ref 6–8.5)
QT INTERVAL: 384 MS
QTC INTERVAL: 459 MS
RBC # BLD AUTO: 2.91 10*6/MM3 (ref 3.77–5.28)
RHINOVIRUS RNA SPEC NAA+PROBE: NOT DETECTED
RSV RNA NPH QL NAA+NON-PROBE: NOT DETECTED
SARS-COV-2 RNA NPH QL NAA+NON-PROBE: NOT DETECTED
SODIUM SERPL-SCNC: 134 MMOL/L (ref 136–145)
WBC NRBC COR # BLD AUTO: 12.78 10*3/MM3 (ref 3.4–10.8)

## 2025-01-21 PROCEDURE — 85025 COMPLETE CBC W/AUTO DIFF WBC: CPT | Performed by: FAMILY MEDICINE

## 2025-01-21 PROCEDURE — 71250 CT THORAX DX C-: CPT

## 2025-01-21 PROCEDURE — 0202U NFCT DS 22 TRGT SARS-COV-2: CPT | Performed by: STUDENT IN AN ORGANIZED HEALTH CARE EDUCATION/TRAINING PROGRAM

## 2025-01-21 PROCEDURE — 80053 COMPREHEN METABOLIC PANEL: CPT | Performed by: FAMILY MEDICINE

## 2025-01-21 PROCEDURE — 82948 REAGENT STRIP/BLOOD GLUCOSE: CPT

## 2025-01-21 PROCEDURE — 84145 PROCALCITONIN (PCT): CPT | Performed by: NURSE PRACTITIONER

## 2025-01-21 PROCEDURE — 83605 ASSAY OF LACTIC ACID: CPT | Performed by: NURSE PRACTITIONER

## 2025-01-21 PROCEDURE — 25010000002 ONDANSETRON PER 1 MG: Performed by: FAMILY MEDICINE

## 2025-01-21 PROCEDURE — 73700 CT LOWER EXTREMITY W/O DYE: CPT

## 2025-01-21 PROCEDURE — 93005 ELECTROCARDIOGRAM TRACING: CPT | Performed by: INTERNAL MEDICINE

## 2025-01-21 PROCEDURE — 87641 MR-STAPH DNA AMP PROBE: CPT | Performed by: STUDENT IN AN ORGANIZED HEALTH CARE EDUCATION/TRAINING PROGRAM

## 2025-01-21 PROCEDURE — 93010 ELECTROCARDIOGRAM REPORT: CPT | Performed by: INTERNAL MEDICINE

## 2025-01-21 PROCEDURE — 25010000002 METRONIDAZOLE 500 MG/100ML SOLUTION: Performed by: STUDENT IN AN ORGANIZED HEALTH CARE EDUCATION/TRAINING PROGRAM

## 2025-01-21 PROCEDURE — 25010000002 HYDROMORPHONE PER 4 MG: Performed by: INTERNAL MEDICINE

## 2025-01-21 PROCEDURE — 25010000002 VANCOMYCIN 10 G RECONSTITUTED SOLUTION

## 2025-01-21 PROCEDURE — 25810000003 SODIUM CHLORIDE 0.9 % SOLUTION

## 2025-01-21 PROCEDURE — 87040 BLOOD CULTURE FOR BACTERIA: CPT | Performed by: INTERNAL MEDICINE

## 2025-01-21 PROCEDURE — 74176 CT ABD & PELVIS W/O CONTRAST: CPT

## 2025-01-21 PROCEDURE — 63710000001 INSULIN LISPRO (HUMAN) PER 5 UNITS: Performed by: FAMILY MEDICINE

## 2025-01-21 PROCEDURE — 99233 SBSQ HOSP IP/OBS HIGH 50: CPT | Performed by: INTERNAL MEDICINE

## 2025-01-21 PROCEDURE — 94799 UNLISTED PULMONARY SVC/PX: CPT

## 2025-01-21 PROCEDURE — 25810000003 SODIUM CHLORIDE 0.9 % SOLUTION: Performed by: STUDENT IN AN ORGANIZED HEALTH CARE EDUCATION/TRAINING PROGRAM

## 2025-01-21 PROCEDURE — 25010000002 CEFEPIME PER 500 MG: Performed by: STUDENT IN AN ORGANIZED HEALTH CARE EDUCATION/TRAINING PROGRAM

## 2025-01-21 RX ORDER — METRONIDAZOLE 500 MG/100ML
500 INJECTION, SOLUTION INTRAVENOUS EVERY 8 HOURS
Status: DISPENSED | OUTPATIENT
Start: 2025-01-21 | End: 2025-01-26

## 2025-01-21 RX ADMIN — TRAMADOL HYDROCHLORIDE 50 MG: 50 TABLET, COATED ORAL at 21:10

## 2025-01-21 RX ADMIN — ALLOPURINOL 200 MG: 100 TABLET ORAL at 08:56

## 2025-01-21 RX ADMIN — NIFEDIPINE 60 MG: 60 TABLET, EXTENDED RELEASE ORAL at 08:56

## 2025-01-21 RX ADMIN — ACETAMINOPHEN 650 MG: 325 TABLET ORAL at 21:06

## 2025-01-21 RX ADMIN — PANTOPRAZOLE SODIUM 40 MG: 40 TABLET, DELAYED RELEASE ORAL at 05:37

## 2025-01-21 RX ADMIN — METRONIDAZOLE 500 MG: 500 INJECTION, SOLUTION INTRAVENOUS at 14:26

## 2025-01-21 RX ADMIN — METRONIDAZOLE 500 MG: 500 INJECTION, SOLUTION INTRAVENOUS at 05:36

## 2025-01-21 RX ADMIN — METRONIDAZOLE 500 MG: 500 INJECTION, SOLUTION INTRAVENOUS at 21:06

## 2025-01-21 RX ADMIN — VANCOMYCIN HYDROCHLORIDE 2250 MG: 10 INJECTION, POWDER, LYOPHILIZED, FOR SOLUTION INTRAVENOUS at 07:35

## 2025-01-21 RX ADMIN — SODIUM CHLORIDE 500 ML: 9 INJECTION, SOLUTION INTRAVENOUS at 05:36

## 2025-01-21 RX ADMIN — TRAMADOL HYDROCHLORIDE 50 MG: 50 TABLET, COATED ORAL at 08:56

## 2025-01-21 RX ADMIN — ACETAMINOPHEN 650 MG: 325 TABLET ORAL at 05:37

## 2025-01-21 RX ADMIN — ISOSORBIDE MONONITRATE 120 MG: 60 TABLET, EXTENDED RELEASE ORAL at 08:56

## 2025-01-21 RX ADMIN — ONDANSETRON 4 MG: 2 INJECTION INTRAMUSCULAR; INTRAVENOUS at 05:37

## 2025-01-21 RX ADMIN — INSULIN LISPRO 4 UNITS: 100 INJECTION, SOLUTION INTRAVENOUS; SUBCUTANEOUS at 08:56

## 2025-01-21 RX ADMIN — HYDROMORPHONE HYDROCHLORIDE 0.25 MG: 1 INJECTION, SOLUTION INTRAMUSCULAR; INTRAVENOUS; SUBCUTANEOUS at 18:41

## 2025-01-21 RX ADMIN — ATORVASTATIN CALCIUM 10 MG: 10 TABLET, FILM COATED ORAL at 21:06

## 2025-01-21 RX ADMIN — CEFEPIME HYDROCHLORIDE 1000 MG: 1 INJECTION, POWDER, FOR SOLUTION INTRAMUSCULAR; INTRAVENOUS at 06:45

## 2025-01-21 NOTE — PROGRESS NOTES
"Pharmacy Consult-Vancomycin Dosing  Sara Esparza is a  74 y.o. female receiving vancomycin therapy.     Indication:empiric  Consulting Provider: hospitalist   ID Consult:     Goal Trough: 15-20    Current Antimicrobial Therapy  Anti-Infectives (From admission, onward)      Ordered     Dose/Rate Route Frequency Start Stop    01/21/25 0505  metroNIDAZOLE (FLAGYL) IVPB 500 mg        Ordering Provider: Conrad Alvarez MD    500 mg  200 mL/hr over 30 Minutes Intravenous Every 8 Hours 01/21/25 0600 01/26/25 0559    01/21/25 0513  vancomycin 2250 mg/500 mL 0.9% NS IVPB ([facility])        Ordering Provider: Woo Wells, PharmD    20 mg/kg × 108 kg  over 135 Minutes Intravenous Once 01/21/25 0600      01/21/25 0505  Pharmacy to dose vancomycin        Ordering Provider: Conrad Alvarez MD     Not Applicable Continuous PRN 01/21/25 0500 01/28/25 0459    01/20/25 0738  cefTRIAXone (ROCEPHIN) 1,000 mg in sodium chloride 0.9 % 100 mL MBP        Ordering Provider: Leslye Fiore MD    1,000 mg  200 mL/hr over 30 Minutes Intravenous Every 24 Hours 01/20/25 0900 01/25/25 0859            Allergies  Allergies as of 01/19/2025    (No Known Allergies)       Labs    Results from last 7 days   Lab Units 01/21/25  0432 01/20/25  0624 01/19/25  0403   BUN mg/dL 52* 79* 58*   CREATININE mg/dL 4.68* 6.51* 5.40*       Results from last 7 days   Lab Units 01/20/25  0624 01/19/25  0403 01/18/25  2149   WBC 10*3/mm3 15.03* 14.36* 13.47*       Evaluation of Dosing     Last Dose Received in the ED/Outside Facility: none  Is Patient on Dialysis or Renal Replacement: yes    Ht - 170.2 cm (67\")  Wt - 108 kg (238 lb 1.6 oz)    Estimated Creatinine Clearance: 13.2 mL/min (A) (by C-G formula based on SCr of 4.68 mg/dL (H)).    Intake & Output (last 3 days)         01/18 0701 01/19 0700 01/19 0701 01/20 0700 01/20 0701  01/21 0700    Urine (mL/kg/hr)  300 (0.1)     Stool  0     Dialysis   0    Total Output  300 0    Net  -300 0           Stool " Unmeasured Occurrence  1 x             Microbiology and Radiology  Microbiology Results (last 10 days)       Procedure Component Value - Date/Time    Wound Culture - Wound, Foot, Left [[id number]] Collected: 01/20/25 1409    Lab Status: Preliminary result Specimen: Wound from Foot, Left Updated: 01/20/25 1647     Gram Stain Few (2+) WBCs seen      Many (4+) Gram positive cocci in pairs      Rare (1+) Gram negative bacilli      Rare (1+) Gram positive bacilli    Urine Culture - Urine, Urine, Clean Catch [[id number]] Collected: 01/19/25 1404    Lab Status: Final result Specimen: Urine, Clean Catch Updated: 01/20/25 1116     Urine Culture 50,000 CFU/mL Mixed Kimber Isolated    Narrative:      Specimen contains mixed organisms of questionable pathogenicity suggestive of contamination. If symptoms persist, suggest recollection.  Colonization of the urinary tract without infection is common. Treatment is discouraged unless the patient is symptomatic, pregnant, or undergoing an invasive urologic procedure.            Vancomycin Levels:                        Assessment/Plan:  The patient will be started on a bolus of 20mg/kg for a dose of 2250mg . Given the patient's current renal status, will dose per random levels and obtain a random level with morning labs before ordering another dose.  Due to infection severity, will target a trough of 15-20.    Pharmacy will continue to follow the patient’s culture results and clinical progress daily.    Woo Wells, FlorinD

## 2025-01-21 NOTE — PROGRESS NOTES
[facility] Medicine Services  PROGRESS NOTE    Patient Name: Sara Esparza  : 1950  MRN: [id number]    Date of Admission: 2025  Primary Care Physician: Toribio Roberts MD    Subjective   Subjective     CC: weakness, progressive    HPI:   Ongoing fevers:  102.4 max.  Had shaking chills last night; CT's ordered ; abx broadened.      She remains tired.  No focal complaints.  She has had recurrent problems with the RIJ tunneled cath.  She was unaware of the L heel ulcer.       states she had periods of confusion w fever last night.        Objective   Objective     Vital Signs:   Temp:  [97.5 °F (36.4 °C)-102.6 °F (39.2 °C)] 98.2 °F (36.8 °C)  Heart Rate:  [] 90  Resp:  [19-24] 22  BP: (112-219)/() 130/61  Flow (L/min) (Oxygen Therapy):  [2-3] 3     Physical Exam:  Gen:  opens eyes today, makes a few jokes, is oriented.  Still looks very tired   Neuro: oriented, clear speech   HEENT:  NC/AT   Neck:  trach midline   Heart RRR .  TDC:  no redness or tenderness   Lungs CTA,  Abd:  Soft, nontender, no rebound or guarding, pos BS  Extrem:  No c/c/e.  Left heel wound:  purulent, malodorous.   R foot without lesions.       Results Reviewed:  LAB RESULTS:      Lab 25  0446 25  0432 25  0624 25  0403 25  2259 25  2149 25  1645   WBC  --   --  15.03* 14.36*  --  13.47*  --  11.08*   HEMOGLOBIN  --   --  11.1* 11.7*  --  11.5*  --  12.4   HEMATOCRIT  --   --  33.9* 36.1  --  34.7  --  37.5   PLATELETS  --   --  177 208  --  220  --  249   NEUTROS ABS  --   --  12.73* 12.42*  --  11.62*  --  8.68*   IMMATURE GRANS (ABS)  --   --  0.05 0.06*  --  0.04  --  0.02   LYMPHS ABS  --   --  0.44* 0.41*  --  0.53*  --  1.30   MONOS ABS  --   --  1.79* 1.46*  --  1.27*  --  1.02*   EOS ABS  --   --  0.00 0.00  --  0.00  --  0.03   MCV  --   --  96.9 98.1*  --  96.4  --  97.4*   PROCALCITONIN  --  5.99*  --   --   --   --    --   --    LACTATE 4.1*  --   --   --   --   --   --   --    D DIMER QUANT  --   --   --   --   --   --   --  0.99*   HSTROP T  --   --   --  132* 128* 134*   < > 87*    < > = values in this interval not displayed.         Lab 01/21/25 0432 01/20/25 0624 01/19/25 0403 01/18/25 2149 01/16/25  1645   SODIUM 134* 131* 133* 132* 139   POTASSIUM 4.7 4.2 4.7 4.4 5.3*   CHLORIDE 94* 94* 94* 94* 101   CO2 24.0 19.0* 23.0 22.0 22.0   ANION GAP 16.0* 18.0* 16.0* 16.0* 16.0*   BUN 52* 79* 58* 52* 40*   CREATININE 4.68* 6.51* 5.40* 5.02* 3.86*   EGFR 9.2* 6.2* 7.8* 8.5* 11.6*   GLUCOSE 187* 173* 173* 167* 124*   CALCIUM 10.8* 10.3 10.6* 10.7* 9.7   MAGNESIUM  --   --   --  2.3  --    HEMOGLOBIN A1C  --   --  4.90  --   --    TSH  --   --  1.250  --   --          Lab 01/21/25 0432 01/20/25 0624 01/19/25 0403 01/18/25 2149 01/16/25  1645   TOTAL PROTEIN 6.6 6.0 6.5 7.0 7.1   ALBUMIN 3.3* 3.0* 3.6 3.8 4.0   GLOBULIN 3.3 3.0 2.9 3.2 3.1   ALT (SGPT) 49* 29 23 21 10   AST (SGOT) 54* 35* 32 33* 15   BILIRUBIN 0.3 0.3 0.6 0.5 0.4   ALK PHOS 114 98 96 90 96   LIPASE  --   --   --   --  68*         Lab 01/19/25 0403 01/18/25 2259 01/18/25 2149 01/16/25 2011 01/16/25  1645   PROBNP  --   --  8,028.0*  --  4,232.0*   HSTROP T 132* 128* 134* 89* 87*             Lab 01/19/25  0956   VITAMIN B 12 1,272*         Brief Urine Lab Results  (Last result in the past 365 days)        Color   Clarity   Blood   Leuk Est   Nitrite   Protein   CREAT   Urine HCG        01/19/25 1404 Yellow   Cloudy   Moderate (2+)   Moderate (2+)   Negative   >=300 mg/dL (3+)                   Microbiology Results Abnormal       None            CT Abdomen Pelvis Without Contrast    Result Date: 1/21/2025  CT ABDOMEN PELVIS WO CONTRAST, CT CHEST WO CONTRAST DIAGNOSTIC, CT LOWER EXTREMITY LEFT WO CONTRAST Date of Exam: 1/21/2025 4:45 AM EST Indication: Sepsis. Comparison: None available. Technique: Axial CT images were obtained of the chest, abdomen and  pelvis as well as the left lower extremity without the administration of contrast. Reconstructed coronal and sagittal images were also obtained. Automated exposure control and iterative construction methods were used. Findings: CT Chest: Limited evaluation of the solid organs due to lack of intravenous contrast. The thyroid appears within normal limits. The trachea and the esophagus are unremarkable.  Heart size is normal. Atherosclerotic calcifications are present including within the coronary arteries. Right internal jugular PermCath present with the tip at the cavoatrial junction. No mediastinal or hilar lymphadenopathy.  No significant pericardial effusion.  The aorta and great vessels appear within normal limits.  Pulmonary artery  is unremarkable. There is no pneumothorax, pleural effusion or focal airspace consolidation. There are no suspicious lung nodules.  No significant pleural disease.  Central and segmental airways appear patent. Subcutaneous fat and underlying musculature appear within normal limits.  There are no acute osseous abnormalities or destructive bone lesions. CT Abdomen and Pelvis: The liver appears normal in size without focal abnormality. Spleen is normal size and appears homogeneous.  Stomach is nondistended.  No adrenal mass.  Kidneys appear unremarkable. Probable small hemorrhagic or proteinaceous cyst present on the right. Mild scarring is seen surrounding the kidneys bilaterally. No hydronephrosis.  Urinary bladder is within normal limits.  There is no bowel distention.  No pneumatosis intestinalis, pneumoperitoneum or lymphadenopathy.  No significant ascites.  The appendix appears within normal limits.  Gallbladder is nonvisualized and likely resected. There is diastases of the rectus abdominal musculature. No evidence of hernia. No significant stool burden. Diverticulosis of the left hemicolon present. No significant inflammatory changes. Left-sided stimulator device present.  Biliary tree is nondistended.  The pancreas appears homogeneous.  Abdominal vasculature is within normal limits. Subcutaneous fat and underlying musculature appear within normal limits.  There are no acute osseous abnormalities or destructive bone lesions. Orthopedic hardware appears unremarkable with no evidence of acute hardware failure. No acute osseous abnormality. Left lower extremity: Patchy subcutaneous air is seen along the left calcaneal region with a draining wound seen extending to the plantar aspect of the calcaneus (series 8 image 80). Mild destructive changes are seen along the plantar aspect of the calcaneus with interosseous air (series 8 image 77). Plantar enthesopathy is present within this region. No evidence of fracture. No evidence of dislocation. Diffuse soft tissue swelling is present along the plantar aspect of the foot. No definite drainable collection identified though evaluation is limited due to lack of intravenous contrast. Wound appears to extend near the bone. No additional interosseous air identified. No tibiotalar joint effusion identified. Moderate degenerative changes. Lisfranc ligament region appears unremarkable with no evidence of widening.     Impression: Impression: 1.No acute cardiopulmonary process. No acute intra-abdominal or intrapelvic process. 2.Draining wound seen along the plantar aspect of the left calcaneus with subcutaneous air and mild destructive changes seen along the plantar aspect of the calcaneus with interosseous air present consistent with osteomyelitis. No additional osseous involvement. No definite drainable collection identified though evaluation is limited due to lack of intravenous contrast. Draining sinus tract likely present extending from the wound to the calcaneus. 3.Ancillary findings as described above. Electronically Signed: Cindy Fuller MD  1/21/2025 5:31 AM EST  Workstation ID: [id number]    CT Chest Without Contrast Diagnostic    Result Date:  1/21/2025  CT ABDOMEN PELVIS WO CONTRAST, CT CHEST WO CONTRAST DIAGNOSTIC, CT LOWER EXTREMITY LEFT WO CONTRAST Date of Exam: 1/21/2025 4:45 AM EST Indication: Sepsis. Comparison: None available. Technique: Axial CT images were obtained of the chest, abdomen and pelvis as well as the left lower extremity without the administration of contrast. Reconstructed coronal and sagittal images were also obtained. Automated exposure control and iterative construction methods were used. Findings: CT Chest: Limited evaluation of the solid organs due to lack of intravenous contrast. The thyroid appears within normal limits. The trachea and the esophagus are unremarkable.  Heart size is normal. Atherosclerotic calcifications are present including within the coronary arteries. Right internal jugular PermCath present with the tip at the cavoatrial junction. No mediastinal or hilar lymphadenopathy.  No significant pericardial effusion.  The aorta and great vessels appear within normal limits.  Pulmonary artery  is unremarkable. There is no pneumothorax, pleural effusion or focal airspace consolidation. There are no suspicious lung nodules.  No significant pleural disease.  Central and segmental airways appear patent. Subcutaneous fat and underlying musculature appear within normal limits.  There are no acute osseous abnormalities or destructive bone lesions. CT Abdomen and Pelvis: The liver appears normal in size without focal abnormality. Spleen is normal size and appears homogeneous.  Stomach is nondistended.  No adrenal mass.  Kidneys appear unremarkable. Probable small hemorrhagic or proteinaceous cyst present on the right. Mild scarring is seen surrounding the kidneys bilaterally. No hydronephrosis.  Urinary bladder is within normal limits.  There is no bowel distention.  No pneumatosis intestinalis, pneumoperitoneum or lymphadenopathy.  No significant ascites.  The appendix appears within normal limits.  Gallbladder is  nonvisualized and likely resected. There is diastases of the rectus abdominal musculature. No evidence of hernia. No significant stool burden. Diverticulosis of the left hemicolon present. No significant inflammatory changes. Left-sided stimulator device present. Biliary tree is nondistended.  The pancreas appears homogeneous.  Abdominal vasculature is within normal limits. Subcutaneous fat and underlying musculature appear within normal limits.  There are no acute osseous abnormalities or destructive bone lesions. Orthopedic hardware appears unremarkable with no evidence of acute hardware failure. No acute osseous abnormality. Left lower extremity: Patchy subcutaneous air is seen along the left calcaneal region with a draining wound seen extending to the plantar aspect of the calcaneus (series 8 image 80). Mild destructive changes are seen along the plantar aspect of the calcaneus with interosseous air (series 8 image 77). Plantar enthesopathy is present within this region. No evidence of fracture. No evidence of dislocation. Diffuse soft tissue swelling is present along the plantar aspect of the foot. No definite drainable collection identified though evaluation is limited due to lack of intravenous contrast. Wound appears to extend near the bone. No additional interosseous air identified. No tibiotalar joint effusion identified. Moderate degenerative changes. Lisfranc ligament region appears unremarkable with no evidence of widening.     Impression: Impression: 1.No acute cardiopulmonary process. No acute intra-abdominal or intrapelvic process. 2.Draining wound seen along the plantar aspect of the left calcaneus with subcutaneous air and mild destructive changes seen along the plantar aspect of the calcaneus with interosseous air present consistent with osteomyelitis. No additional osseous involvement. No definite drainable collection identified though evaluation is limited due to lack of intravenous contrast.  Draining sinus tract likely present extending from the wound to the calcaneus. 3.Ancillary findings as described above. Electronically Signed: Cindy Fuller MD  1/21/2025 5:31 AM EST  Workstation ID: [id number]    CT Lower Extremity Left Without Contrast    Result Date: 1/21/2025  CT ABDOMEN PELVIS WO CONTRAST, CT CHEST WO CONTRAST DIAGNOSTIC, CT LOWER EXTREMITY LEFT WO CONTRAST Date of Exam: 1/21/2025 4:45 AM EST Indication: Sepsis. Comparison: None available. Technique: Axial CT images were obtained of the chest, abdomen and pelvis as well as the left lower extremity without the administration of contrast. Reconstructed coronal and sagittal images were also obtained. Automated exposure control and iterative construction methods were used. Findings: CT Chest: Limited evaluation of the solid organs due to lack of intravenous contrast. The thyroid appears within normal limits. The trachea and the esophagus are unremarkable.  Heart size is normal. Atherosclerotic calcifications are present including within the coronary arteries. Right internal jugular PermCath present with the tip at the cavoatrial junction. No mediastinal or hilar lymphadenopathy.  No significant pericardial effusion.  The aorta and great vessels appear within normal limits.  Pulmonary artery  is unremarkable. There is no pneumothorax, pleural effusion or focal airspace consolidation. There are no suspicious lung nodules.  No significant pleural disease.  Central and segmental airways appear patent. Subcutaneous fat and underlying musculature appear within normal limits.  There are no acute osseous abnormalities or destructive bone lesions. CT Abdomen and Pelvis: The liver appears normal in size without focal abnormality. Spleen is normal size and appears homogeneous.  Stomach is nondistended.  No adrenal mass.  Kidneys appear unremarkable. Probable small hemorrhagic or proteinaceous cyst present on the right. Mild scarring is seen surrounding the  kidneys bilaterally. No hydronephrosis.  Urinary bladder is within normal limits.  There is no bowel distention.  No pneumatosis intestinalis, pneumoperitoneum or lymphadenopathy.  No significant ascites.  The appendix appears within normal limits.  Gallbladder is nonvisualized and likely resected. There is diastases of the rectus abdominal musculature. No evidence of hernia. No significant stool burden. Diverticulosis of the left hemicolon present. No significant inflammatory changes. Left-sided stimulator device present. Biliary tree is nondistended.  The pancreas appears homogeneous.  Abdominal vasculature is within normal limits. Subcutaneous fat and underlying musculature appear within normal limits.  There are no acute osseous abnormalities or destructive bone lesions. Orthopedic hardware appears unremarkable with no evidence of acute hardware failure. No acute osseous abnormality. Left lower extremity: Patchy subcutaneous air is seen along the left calcaneal region with a draining wound seen extending to the plantar aspect of the calcaneus (series 8 image 80). Mild destructive changes are seen along the plantar aspect of the calcaneus with interosseous air (series 8 image 77). Plantar enthesopathy is present within this region. No evidence of fracture. No evidence of dislocation. Diffuse soft tissue swelling is present along the plantar aspect of the foot. No definite drainable collection identified though evaluation is limited due to lack of intravenous contrast. Wound appears to extend near the bone. No additional interosseous air identified. No tibiotalar joint effusion identified. Moderate degenerative changes. Lisfranc ligament region appears unremarkable with no evidence of widening.     Impression: Impression: 1.No acute cardiopulmonary process. No acute intra-abdominal or intrapelvic process. 2.Draining wound seen along the plantar aspect of the left calcaneus with subcutaneous air and mild  destructive changes seen along the plantar aspect of the calcaneus with interosseous air present consistent with osteomyelitis. No additional osseous involvement. No definite drainable collection identified though evaluation is limited due to lack of intravenous contrast. Draining sinus tract likely present extending from the wound to the calcaneus. 3.Ancillary findings as described above. Electronically Signed: Cindy Fuller MD  1/21/2025 5:31 AM EST  Workstation ID: [id number]    CT Cervical Spine Without Contrast    Result Date: 1/20/2025  CT CERVICAL SPINE WO CONTRAST Date of Exam: 1/20/2025 2:29 PM EST Indication: c7 pain after fall. Comparison: None available. Technique: Axial CT images were obtained of the cervical spine without contrast administration.  Reconstructed coronal and sagittal images were also obtained. Automated exposure control and iterative construction methods were used. Findings: There is slight reversal of the normal cervical lordosis associated with advanced degenerative disc disease at C5-6 and C6-7. There is mild posterior subluxation of C5 with respect to both C4 and C6, likely as result of underlying degenerative disc disease. There are prominent posterior osteophytes at both C5-6 and C6-7. No vertebral compression deformity is seen. No fracture is identified. There is mild nuchal ligament calcification but no evidence of acute posterior spinous process avulsion. Prevertebral soft tissues appear normal. Facet joints appear normally aligned. Coronal images show extensive left-sided multilevel facet DJD and milder facet degenerative change on the right, but no evidence of acute bony trauma. Axial bone images show no evidence of skull base fracture. The dens and ring of C1 appear normally aligned and intact. No vertebral body fracture or posterior element fracture is identified. Soft tissue axial images show moderate, possibly marked canal stenosis at C5-6 due to large posterior vertebral  osteophytes, which may be chronic. No paraspinous hematoma, mass or inflammatory change is seen. There is extensive bilateral carotid bulb calcification.     Impression: Impression: 1. Advanced C5-6 and C6-7 degenerative disc disease; suspected moderate or greater C5-6 canal stenosis due to posterior vertebral osteophytes, which may be chronic, as noted. 2. No evidence of acute cervical spine trauma is identified. Electronically Signed: Celestino Aguirre MD  1/20/2025 4:25 PM EST  Workstation ID: [id number]    CT Head Without Contrast    Result Date: 1/19/2025  CT HEAD WO CONTRAST Date of Exam: 1/19/2025 3:26 PM EST Indication: falls, ataxia. Comparison: None available. Technique: Axial CT images were obtained of the head without contrast administration.  Automated exposure control and iterative construction methods were used. Findings: Negative for large territory infarct, acute intracranial hemorrhage, large mass lesion, midline shift or hydrocephalus. Small focal hypodensities in the right occipital lobe example image 39 series 3 and more medially image 40 series 3, the latter associated with encephalomalacia. Right frontal lobe hypodensity measuring 1 x 0.9 cm image 44 series 3. Parenchymal volume within normal limits for age. No large extra-axial fluid collection. Symmetric orbits. Distal carotid atherosclerotic disease. Nonobstructive maxillary sinus disease with frothy secretions on the left. No large mastoid effusions. No aggressive bone lesions or displaced fractures.     Impression: Impression: 1. 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 Comparisons not available to assess stability. Consider nonemergent MRI for further assessment. 2. Negative for acute intracranial hemorrhage, large mass lesion, midline shift or hydrocephalus. Electronically Signed: Deacon Loyola MD  1/19/2025 6:00 PM EST  Workstation  ID: [id number]         Current medications:  Scheduled Meds:allopurinol, 200 mg, Oral, Daily  atorvastatin, 10 mg, Oral, Nightly  [START ON 1/22/2025] cefepime, 1,000 mg, Intravenous, Q24H  insulin lispro, 2-9 Units, Subcutaneous, 4x Daily AC & at Bedtime  isosorbide mononitrate, 120 mg, Oral, Q24H  metroNIDAZOLE, 500 mg, Intravenous, Q8H  NIFEdipine XL, 60 mg, Oral, Q24H  pantoprazole, 40 mg, Oral, Q AM  sodium chloride, 10 mL, Intravenous, Q12H  vancomycin (dosing per levels), , Not Applicable, Daily  vancomycin, 20 mg/kg, Intravenous, Once      Continuous Infusions:Pharmacy to dose vancomycin,       PRN Meds:.  acetaminophen    senna-docusate sodium **AND** polyethylene glycol **AND** bisacodyl **AND** bisacodyl    Calcium Replacement - Follow Nurse / BPA Driven Protocol    dextrose    dextrose    glucagon (human recombinant)    heparin (porcine)    HYDROmorphone    Magnesium Standard Dose Replacement - Follow Nurse / BPA Driven Protocol    melatonin    ondansetron    Pharmacy to dose vancomycin    Potassium Replacement - Follow Nurse / BPA Driven Protocol    [COMPLETED] Insert Peripheral IV **AND** sodium chloride    sodium chloride    sodium chloride    traMADol    Assessment & Plan   Assessment & Plan     Active Hospital Problems    Diagnosis  POA    **Generalized weakness [R53.1]  Yes      Resolved Hospital Problems   No resolved problems to display.        Brief Hospital Course to date:  Sara Esparza is a 74 y.o. female w ESRD on HD, also HTN and DM.  Admitted for several weeks generalized weakness and myalgias and multiple falls in setting of lightheadedness.  However, after admission she has spiked temps to 102.4; sepsis workup underway.      sepsis, fever, confusion  UTI:  cx mixed hanny    L calcaneal osteo: GPC-pairs + mixed hanny     - RIJ tunneled dialysis cath - increases risk of bacteremia   - blood cx ordered   - broaden abx to vanc/cefepime/flagyl.    - wound cx sent from heel decub    -  orthopedic consult to consider debridement   - await cx results, then consider ID consult for abx management       Generalized weakness progressive x weeks, falls at home   - suspicion of overdiuresis and orthostasis ongoing, w superimposed sepsis now   - Ct chest/abd without acute findings   - normal TSH and  CK   - PT OT  - orthostatics when able   - considering SNF      Neck pain , C7 region   C5-6 canal stenosis  -  CT cspine - no fx  - history of oxycodone dependence; family requests avoiding opiates.  After discussion, trying ultram   - consider C spine mri when pt more stable    ESRD on HD:  HD on MWF     DMII:  A1C 4.9   - on sitagliptin at home. SSI.  May need to decrease dose.      Hx of HTN:   Will continue home medications for now - imdur, nifedipine    Dispo: she says she will consider SNF .  OT PT     Expected Discharge Location and Transportation: home vs snf   Expected Discharge tbd  Expected Discharge Date: 1/21/2025; Expected Discharge Time:      VTE Prophylaxis:  Pharmacologic VTE prophylaxis orders are present.         AM-PAC 6 Clicks Score (PT): 11 (01/20/25 2025)    CODE STATUS:   Code Status and Medical Interventions: CPR (Attempt to Resuscitate); Full Support   Ordered at: 01/19/25 0518     Level Of Support Discussed With:    Patient     Code Status (Patient has no pulse and is not breathing):    CPR (Attempt to Resuscitate)     Medical Interventions (Patient has pulse or is breathing):    Full Support       Leslye Fiore MD  01/21/25

## 2025-01-21 NOTE — PROGRESS NOTES
"   LOS: 2 days    Patient Care Team:  Toribio Roberts MD as PCP - General (Internal Medicine)    Subjective     Continues to feel poorly.  Still with fevers up to 102.4.    Objective     Vital Signs:  Blood pressure 129/59, pulse 66, temperature 97.8 °F (36.6 °C), temperature source Oral, resp. rate 20, height 170.2 cm (67\"), weight 108 kg (238 lb 1.6 oz), SpO2 96%.      Intake/Output Summary (Last 24 hours) at 1/21/2025 1054  Last data filed at 1/21/2025 0830  Gross per 24 hour   Intake --   Output 700 ml   Net -700 ml        No intake/output data recorded.    Physical Exam:    GENERAL: WD WF, appears uncomfortable  NEURO: Awake.  Drowsy no focal deficit  PSYCHIATRIC: Calm, cooperative with PE  EYE: PE, no icterus, no conjunctivitis  ENT: omm dry, dentition intact,  Hearing intact  NECK:  No JVD discernable,  Trachea midline  CV: No edema, RRR  LUNGS:  Quiet,  Nonlabored resp.  Symmetrical expansion  ABDOMEN: Nondistended, soft nontender.  : No Corral, no palp bladder  SKIN: Warm and dry without rash  + RIJ TDC         Labs:  Results from last 7 days   Lab Units 01/20/25  0624 01/19/25  0403 01/18/25  2149   WBC 10*3/mm3 15.03* 14.36* 13.47*   HEMOGLOBIN g/dL 11.1* 11.7* 11.5*   PLATELETS 10*3/mm3 177 208 220     Results from last 7 days   Lab Units 01/21/25  0432 01/20/25  0624 01/19/25  0403 01/18/25  2149   SODIUM mmol/L 134* 131* 133* 132*   POTASSIUM mmol/L 4.7 4.2 4.7 4.4   CHLORIDE mmol/L 94* 94* 94* 94*   CO2 mmol/L 24.0 19.0* 23.0 22.0   BUN mg/dL 52* 79* 58* 52*   CREATININE mg/dL 4.68* 6.51* 5.40* 5.02*   CALCIUM mg/dL 10.8* 10.3 10.6* 10.7*   MAGNESIUM mg/dL  --   --   --  2.3   ALBUMIN g/dL 3.3* 3.0* 3.6 3.8     Results from last 7 days   Lab Units 01/21/25  0432   ALK PHOS U/L 114   BILIRUBIN mg/dL 0.3   ALT (SGPT) U/L 49*   AST (SGOT) U/L 54*                   Estimated Creatinine Clearance: 13.2 mL/min (A) (by C-G formula based on SCr of 4.68 mg/dL (H)).         A/P:      ESRD: On HD MWF at " [facility] ESW with NAL .  Continue outpatient schedule.     HTN: Blood pressure elevated, however patient with complaints of orthostasis.  Blood pressure dropped to 116 from 144 with standing on orthostatic pressures.  May have component of intravascular volume depletion.    Hyponatremia:   Due to underlying renal failure.  Adjust with HD.     Hyperkalemia: Resolved.  Adjust with HD     Metabolic acidosis:  Adjust with HD     Anemia: Hemoglobin above goal.  No CORNEL unless hemoglobin <10.5.     Volume: Does not look overloaded on exam.  No edema.  Chest x-ray is negative for pulmonary edema.  Today lying flat on 2 L nasal cannula.  Symptoms improved with 500 mL bolus in ER.  +25 mmHg drop in orthostatic blood pressure.  Patient likely with intravascular volume depletion with dialysis along with febrile illness.  For now we will need strict I's and O's to assess urine output.  Avoid aggressive UF with HD.     Bone mineralization: Calcium back up to 10.8.  No active vitamin D.  Will transfer to low calcium bath for now.    Hyperphosphatemia: Check phosphorus levels.     Nutrition: Low potassium low Phos high-protein diet.  Renal vitamin.     Fever: On antibiotics.  Urine culture with mixed hanny.  Patient with foot wound.  Primary team continues to evaluate for source.  Dialysis catheter possible source.  May need removed if blood cultures positive.    High risk and complexity patient.    Woo Barnes MD  01/21/25  10:54 EST

## 2025-01-21 NOTE — PLAN OF CARE
Goal Outcome Evaluation:  Alert to self only.  VSS on 2L NC.    PRN tylenol given for fever.   Pt complained of neck pain and right knee pain.  Tramadol given with no relief but IV dilaudid was given with relief.   Fall and skin precautions in place.   Nursing staff will continue to monitor.

## 2025-01-21 NOTE — SIGNIFICANT NOTE
RRT for fever, tachycardia, rigors    Pt currently on ceftriaxone day #2 for sepsis 2/2 UTI. Culture with 50k CFU mixed hanny. Recurrent fevers and rigors.  Pt uncomfortable with shaking chills, regular tachycardia, lungs diminished bilateral bases, abd with some distension but overal soft without localizing tenderness. Also has L heel wound with purulent drainage. BSG 120s. 140cc on bladder scan.     -tylenol  -500cc fluid bolus  -broaden abx - vanc+cefepime+flagyl  -RVP  -CBC, CMP, procal, lactate  -CT chest, abd/pelv, LLE    Pending above consider MRI+surgical eval LLE    Conrad Alvarez MD

## 2025-01-22 ENCOUNTER — APPOINTMENT (OUTPATIENT)
Dept: CARDIOLOGY | Facility: HOSPITAL | Age: 75
DRG: 853 | End: 2025-01-22
Payer: MEDICARE

## 2025-01-22 ENCOUNTER — APPOINTMENT (OUTPATIENT)
Dept: NEPHROLOGY | Facility: HOSPITAL | Age: 75
DRG: 853 | End: 2025-01-22
Payer: MEDICARE

## 2025-01-22 PROBLEM — M86.672 CHRONIC OSTEOMYELITIS OF LEFT FOOT: Status: ACTIVE | Noted: 2025-01-19

## 2025-01-22 LAB
ALBUMIN SERPL-MCNC: 2.4 G/DL (ref 3.5–5.2)
ALBUMIN/GLOB SERPL: 1.1 G/DL
ALP SERPL-CCNC: 79 U/L (ref 39–117)
ALT SERPL W P-5'-P-CCNC: 32 U/L (ref 1–33)
ANION GAP SERPL CALCULATED.3IONS-SCNC: 15 MMOL/L (ref 5–15)
AST SERPL-CCNC: 24 U/L (ref 1–32)
BASOPHILS # BLD AUTO: 0.02 10*3/MM3 (ref 0–0.2)
BASOPHILS NFR BLD AUTO: 0.2 % (ref 0–1.5)
BH CV GRAFT BRACHIAL PRESSURE LEFT: NORMAL MMHG
BH CV GRAFT BRACHIAL PRESSURE RIGHT: NORMAL MMHG
BH CV LEA LEFT ANT TIBIAL A DISTAL PSV: 87.2 CM/S
BH CV LEA LEFT ANT TIBIAL A MID PSV: 63.8 CM/S
BH CV LEA LEFT ANT TIBIAL A PROX PSV: 104.1 CM/S
BH CV LEA LEFT CFA DISTAL PSV: 171 CM/S
BH CV LEA LEFT CFA PROX PSV: 176 CM/S
BH CV LEA LEFT DFA PROX PSV: 125.67 CM/S
BH CV LEA LEFT DPA PRESSURE: 134 MMHG
BH CV LEA LEFT PERONEAL  MID PSV: 125 CM/S
BH CV LEA LEFT POPITEAL A  DISTAL PSV: 102 CM/S
BH CV LEA LEFT POPITEAL A  PROX PSV: 148 CM/S
BH CV LEA LEFT PTA MID PSV: 39.3 CM/S
BH CV LEA LEFT PTA PRESSURE: 120 MMHG
BH CV LEA LEFT PTA PROX PSV: 29.7 CM/S
BH CV LEA LEFT SFA DISTAL PSV: 122.2 CM/S
BH CV LEA LEFT SFA MID PSV: 127.6 CM/S
BH CV LEA LEFT SFA PROX PSV: 170.2 CM/S
BH CV LEA LEFT TIBEOPERONEAL PSV: 61.76 CM/S
BH CV LEA RIGHT ANT TIBIAL A DISTAL PSV: 70.2 CM/S
BH CV LEA RIGHT ANT TIBIAL A MID PSV: 77 CM/S
BH CV LEA RIGHT ANT TIBIAL A PROX PSV: 77 CM/S
BH CV LEA RIGHT CFA DISTAL PSV: 130 CM/S
BH CV LEA RIGHT CFA PROX PSV: 237 CM/S
BH CV LEA RIGHT DFA PROX PSV: 75.8 CM/S
BH CV LEA RIGHT DPA PRESSURE: 130 MMHG
BH CV LEA RIGHT PERONEAL  MID PSV: 104.9 CM/S
BH CV LEA RIGHT POPITEAL A  DISTAL PSV: 52.1 CM/S
BH CV LEA RIGHT POPITEAL A  PROX PSV: 72.4 CM/S
BH CV LEA RIGHT PTA DISTAL PSV: 83.1 CM/S
BH CV LEA RIGHT PTA MID PSV: 67.4 CM/S
BH CV LEA RIGHT PTA PRESSURE: 100 MMHG
BH CV LEA RIGHT PTA PROX PSV: 51.1 CM/S
BH CV LEA RIGHT SFA DISTAL PSV: 88.7 CM/S
BH CV LEA RIGHT SFA MID PSV: 85.1 CM/S
BH CV LEA RIGHT SFA PROX PSV: 105.1 CM/S
BH CV LEA RIGHT TIBEOPERONEAL PSV: 52.2 CM/S
BH CV LOWER ARTERIAL LEFT ABI RATIO: 1.3
BH CV LOWER ARTERIAL RIGHT ABI RATIO: 1.26
BILIRUB SERPL-MCNC: 0.2 MG/DL (ref 0–1.2)
BUN SERPL-MCNC: 67 MG/DL (ref 8–23)
BUN/CREAT SERPL: 12.2 (ref 7–25)
CALCIUM SPEC-SCNC: 9.3 MG/DL (ref 8.6–10.5)
CHLORIDE SERPL-SCNC: 96 MMOL/L (ref 98–107)
CO2 SERPL-SCNC: 22 MMOL/L (ref 22–29)
CREAT SERPL-MCNC: 5.49 MG/DL (ref 0.57–1)
DEPRECATED RDW RBC AUTO: 47.8 FL (ref 37–54)
EGFRCR SERPLBLD CKD-EPI 2021: 7.6 ML/MIN/1.73
EOSINOPHIL # BLD AUTO: 0.03 10*3/MM3 (ref 0–0.4)
EOSINOPHIL NFR BLD AUTO: 0.3 % (ref 0.3–6.2)
ERYTHROCYTE [DISTWIDTH] IN BLOOD BY AUTOMATED COUNT: 13 % (ref 12.3–15.4)
GLOBULIN UR ELPH-MCNC: 2.1 GM/DL
GLUCOSE BLDC GLUCOMTR-MCNC: 121 MG/DL (ref 70–130)
GLUCOSE BLDC GLUCOMTR-MCNC: 142 MG/DL (ref 70–130)
GLUCOSE BLDC GLUCOMTR-MCNC: 154 MG/DL (ref 70–130)
GLUCOSE BLDC GLUCOMTR-MCNC: 183 MG/DL (ref 70–130)
GLUCOSE SERPL-MCNC: 149 MG/DL (ref 65–99)
HCT VFR BLD AUTO: 32.3 % (ref 34–46.6)
HGB BLD-MCNC: 10.1 G/DL (ref 12–15.9)
IMM GRANULOCYTES # BLD AUTO: 0.07 10*3/MM3 (ref 0–0.05)
IMM GRANULOCYTES NFR BLD AUTO: 0.7 % (ref 0–0.5)
LYMPHOCYTES # BLD AUTO: 0.53 10*3/MM3 (ref 0.7–3.1)
LYMPHOCYTES NFR BLD AUTO: 5.2 % (ref 19.6–45.3)
MCH RBC QN AUTO: 31.5 PG (ref 26.6–33)
MCHC RBC AUTO-ENTMCNC: 31.3 G/DL (ref 31.5–35.7)
MCV RBC AUTO: 100.6 FL (ref 79–97)
MONOCYTES # BLD AUTO: 1.77 10*3/MM3 (ref 0.1–0.9)
MONOCYTES NFR BLD AUTO: 17.4 % (ref 5–12)
NEUTROPHILS NFR BLD AUTO: 7.77 10*3/MM3 (ref 1.7–7)
NEUTROPHILS NFR BLD AUTO: 76.2 % (ref 42.7–76)
NRBC BLD AUTO-RTO: 0 /100 WBC (ref 0–0.2)
PHOSPHATE SERPL-MCNC: 5.5 MG/DL (ref 2.5–4.5)
PLATELET # BLD AUTO: 140 10*3/MM3 (ref 140–450)
PMV BLD AUTO: 10.5 FL (ref 6–12)
POTASSIUM SERPL-SCNC: 4.2 MMOL/L (ref 3.5–5.2)
PROT SERPL-MCNC: 4.5 G/DL (ref 6–8.5)
QT INTERVAL: 264 MS
QTC INTERVAL: 388 MS
RBC # BLD AUTO: 3.21 10*6/MM3 (ref 3.77–5.28)
SODIUM SERPL-SCNC: 133 MMOL/L (ref 136–145)
VANCOMYCIN SERPL-MCNC: 21.1 MCG/ML (ref 5–40)
WBC NRBC COR # BLD AUTO: 10.19 10*3/MM3 (ref 3.4–10.8)

## 2025-01-22 PROCEDURE — 25010000002 HEPARIN (PORCINE) PER 1000 UNITS: Performed by: INTERNAL MEDICINE

## 2025-01-22 PROCEDURE — 80202 ASSAY OF VANCOMYCIN: CPT

## 2025-01-22 PROCEDURE — 99233 SBSQ HOSP IP/OBS HIGH 50: CPT | Performed by: INTERNAL MEDICINE

## 2025-01-22 PROCEDURE — 93925 LOWER EXTREMITY STUDY: CPT

## 2025-01-22 PROCEDURE — 63710000001 INSULIN LISPRO (HUMAN) PER 5 UNITS: Performed by: FAMILY MEDICINE

## 2025-01-22 PROCEDURE — 25810000003 SODIUM CHLORIDE 0.9 % SOLUTION 250 ML FLEX CONT

## 2025-01-22 PROCEDURE — 25010000002 DIGOXIN PER 500 MCG: Performed by: INTERNAL MEDICINE

## 2025-01-22 PROCEDURE — 25010000002 VANCOMYCIN 750 MG RECONSTITUTED SOLUTION 1 EACH VIAL

## 2025-01-22 PROCEDURE — 25010000002 HYDROMORPHONE PER 4 MG: Performed by: INTERNAL MEDICINE

## 2025-01-22 PROCEDURE — 93010 ELECTROCARDIOGRAM REPORT: CPT | Performed by: INTERNAL MEDICINE

## 2025-01-22 PROCEDURE — 84100 ASSAY OF PHOSPHORUS: CPT | Performed by: INTERNAL MEDICINE

## 2025-01-22 PROCEDURE — 93005 ELECTROCARDIOGRAM TRACING: CPT | Performed by: INTERNAL MEDICINE

## 2025-01-22 PROCEDURE — 93925 LOWER EXTREMITY STUDY: CPT | Performed by: INTERNAL MEDICINE

## 2025-01-22 PROCEDURE — 82948 REAGENT STRIP/BLOOD GLUCOSE: CPT

## 2025-01-22 PROCEDURE — P9047 ALBUMIN (HUMAN), 25%, 50ML: HCPCS | Performed by: STUDENT IN AN ORGANIZED HEALTH CARE EDUCATION/TRAINING PROGRAM

## 2025-01-22 PROCEDURE — 25010000002 METRONIDAZOLE 500 MG/100ML SOLUTION: Performed by: STUDENT IN AN ORGANIZED HEALTH CARE EDUCATION/TRAINING PROGRAM

## 2025-01-22 PROCEDURE — 25010000002 ALBUMIN HUMAN 25% PER 50 ML: Performed by: STUDENT IN AN ORGANIZED HEALTH CARE EDUCATION/TRAINING PROGRAM

## 2025-01-22 PROCEDURE — 99222 1ST HOSP IP/OBS MODERATE 55: CPT | Performed by: INTERNAL MEDICINE

## 2025-01-22 PROCEDURE — 85025 COMPLETE CBC W/AUTO DIFF WBC: CPT | Performed by: FAMILY MEDICINE

## 2025-01-22 PROCEDURE — 25010000002 AMIODARONE IN DEXTROSE 5% 360-4.14 MG/200ML-% SOLUTION: Performed by: INTERNAL MEDICINE

## 2025-01-22 PROCEDURE — 25010000002 AMIODARONE IN DEXTROSE 5% 150-4.21 MG/100ML-% SOLUTION: Performed by: INTERNAL MEDICINE

## 2025-01-22 PROCEDURE — 80053 COMPREHEN METABOLIC PANEL: CPT | Performed by: FAMILY MEDICINE

## 2025-01-22 PROCEDURE — 25010000002 CEFEPIME PER 500 MG: Performed by: STUDENT IN AN ORGANIZED HEALTH CARE EDUCATION/TRAINING PROGRAM

## 2025-01-22 RX ORDER — PANTOPRAZOLE SODIUM 40 MG/1
40 TABLET, DELAYED RELEASE ORAL
Status: DISCONTINUED | OUTPATIENT
Start: 2025-01-22 | End: 2025-02-12 | Stop reason: HOSPADM

## 2025-01-22 RX ORDER — VANCOMYCIN/0.9 % SOD CHLORIDE 1.5G/250ML
15 PLASTIC BAG, INJECTION (ML) INTRAVENOUS ONCE
Status: CANCELLED | OUTPATIENT
Start: 2025-01-22 | End: 2025-01-22

## 2025-01-22 RX ORDER — ALBUMIN (HUMAN) 12.5 G/50ML
12.5 SOLUTION INTRAVENOUS ONCE
Status: COMPLETED | OUTPATIENT
Start: 2025-01-22 | End: 2025-01-22

## 2025-01-22 RX ORDER — DIGOXIN 0.25 MG/ML
250 INJECTION INTRAMUSCULAR; INTRAVENOUS ONCE
Status: CANCELLED | OUTPATIENT
Start: 2025-01-22 | End: 2025-01-22

## 2025-01-22 RX ORDER — ALBUMIN (HUMAN) 12.5 G/50ML
SOLUTION INTRAVENOUS
Status: DISCONTINUED
Start: 2025-01-22 | End: 2025-02-12 | Stop reason: HOSPADM

## 2025-01-22 RX ORDER — METOPROLOL TARTRATE 1 MG/ML
5 INJECTION, SOLUTION INTRAVENOUS ONCE
Status: COMPLETED | OUTPATIENT
Start: 2025-01-22 | End: 2025-01-22

## 2025-01-22 RX ORDER — DILTIAZEM HCL/D5W 125 MG/125
5-15 PLASTIC BAG, INJECTION (ML) INTRAVENOUS CONTINUOUS
Status: DISCONTINUED | OUTPATIENT
Start: 2025-01-22 | End: 2025-01-28

## 2025-01-22 RX ORDER — CARVEDILOL 12.5 MG/1
25 TABLET ORAL DAILY
Status: DISCONTINUED | OUTPATIENT
Start: 2025-01-22 | End: 2025-01-22

## 2025-01-22 RX ORDER — DIGOXIN 0.25 MG/ML
250 INJECTION INTRAMUSCULAR; INTRAVENOUS ONCE
Status: COMPLETED | OUTPATIENT
Start: 2025-01-22 | End: 2025-01-22

## 2025-01-22 RX ORDER — FAMOTIDINE 20 MG/1
20 TABLET, FILM COATED ORAL DAILY
Status: DISCONTINUED | OUTPATIENT
Start: 2025-01-22 | End: 2025-02-04

## 2025-01-22 RX ORDER — ATORVASTATIN CALCIUM 10 MG/1
10 TABLET, FILM COATED ORAL DAILY
Status: DISCONTINUED | OUTPATIENT
Start: 2025-01-22 | End: 2025-02-04

## 2025-01-22 RX ORDER — METOPROLOL TARTRATE 1 MG/ML
5 INJECTION, SOLUTION INTRAVENOUS EVERY 6 HOURS PRN
Status: DISCONTINUED | OUTPATIENT
Start: 2025-01-22 | End: 2025-02-12 | Stop reason: HOSPADM

## 2025-01-22 RX ADMIN — METOPROLOL TARTRATE 5 MG: 5 INJECTION INTRAVENOUS at 10:02

## 2025-01-22 RX ADMIN — INSULIN LISPRO 2 UNITS: 100 INJECTION, SOLUTION INTRAVENOUS; SUBCUTANEOUS at 20:48

## 2025-01-22 RX ADMIN — AMIODARONE HYDROCHLORIDE 1 MG/MIN: 1.8 INJECTION, SOLUTION INTRAVENOUS at 15:17

## 2025-01-22 RX ADMIN — DIGOXIN 250 MCG: 0.25 INJECTION INTRAMUSCULAR; INTRAVENOUS at 13:16

## 2025-01-22 RX ADMIN — INSULIN LISPRO 2 UNITS: 100 INJECTION, SOLUTION INTRAVENOUS; SUBCUTANEOUS at 18:43

## 2025-01-22 RX ADMIN — TRAMADOL HYDROCHLORIDE 50 MG: 50 TABLET, COATED ORAL at 23:25

## 2025-01-22 RX ADMIN — ALBUMIN (HUMAN) 12.5 G: 0.25 INJECTION, SOLUTION INTRAVENOUS at 10:20

## 2025-01-22 RX ADMIN — METRONIDAZOLE 500 MG: 500 INJECTION, SOLUTION INTRAVENOUS at 05:15

## 2025-01-22 RX ADMIN — METRONIDAZOLE 500 MG: 500 INJECTION, SOLUTION INTRAVENOUS at 17:24

## 2025-01-22 RX ADMIN — VANCOMYCIN HYDROCHLORIDE 750 MG: 750 INJECTION, POWDER, LYOPHILIZED, FOR SOLUTION INTRAVENOUS at 17:25

## 2025-01-22 RX ADMIN — HYDROMORPHONE HYDROCHLORIDE 0.25 MG: 1 INJECTION, SOLUTION INTRAMUSCULAR; INTRAVENOUS; SUBCUTANEOUS at 18:43

## 2025-01-22 RX ADMIN — HYDROMORPHONE HYDROCHLORIDE 0.25 MG: 1 INJECTION, SOLUTION INTRAMUSCULAR; INTRAVENOUS; SUBCUTANEOUS at 05:15

## 2025-01-22 RX ADMIN — HEPARIN SODIUM 2000 UNITS: 1000 INJECTION INTRAVENOUS; SUBCUTANEOUS at 11:43

## 2025-01-22 RX ADMIN — METRONIDAZOLE 500 MG: 500 INJECTION, SOLUTION INTRAVENOUS at 23:15

## 2025-01-22 RX ADMIN — APIXABAN 2.5 MG: 2.5 TABLET, FILM COATED ORAL at 20:47

## 2025-01-22 RX ADMIN — AMIODARONE HYDROCHLORIDE 150 MG: 1.5 INJECTION, SOLUTION INTRAVENOUS at 17:24

## 2025-01-22 RX ADMIN — AMIODARONE HYDROCHLORIDE 1 MG/MIN: 1.8 INJECTION, SOLUTION INTRAVENOUS at 22:01

## 2025-01-22 RX ADMIN — ACETAMINOPHEN 650 MG: 325 TABLET ORAL at 20:47

## 2025-01-22 RX ADMIN — CEFEPIME HYDROCHLORIDE 1000 MG: 1 INJECTION, POWDER, FOR SOLUTION INTRAMUSCULAR; INTRAVENOUS at 13:16

## 2025-01-22 RX ADMIN — AMIODARONE HYDROCHLORIDE 150 MG: 1.5 INJECTION, SOLUTION INTRAVENOUS at 14:56

## 2025-01-22 RX ADMIN — Medication 2.5 MG: at 20:47

## 2025-01-22 NOTE — PROGRESS NOTES
"   LOS: 3 days    Patient Care Team:  Toribio Roberts MD as PCP - General (Internal Medicine)    Subjective     Seen on dialysis. Lethargic and weak.  Complains of shortness of breath.     Objective     Vital Signs:  Blood pressure 116/69, pulse (!) 144, temperature 97.6 °F (36.4 °C), temperature source Oral, resp. rate 20, height 170.2 cm (67\"), weight 108 kg (238 lb 1.6 oz), SpO2 91%.      Intake/Output Summary (Last 24 hours) at 1/22/2025 1016  Last data filed at 1/21/2025 1426  Gross per 24 hour   Intake 100 ml   Output --   Net 100 ml        01/21 0701 - 01/22 0700  In: 100   Out: 700 [Urine:700]    Physical Exam:  GENERAL: WD WF, appears uncomfortable  NEURO: Drowsy no focal deficit  PSYCHIATRIC: Cooperative with PE  EYE: PE, no icterus, no conjunctivitis  ENT: omm dry, dentition intact,  Hearing intact  NECK:  No JVD discernable,  Trachea midline  CV: No edema, RRR  LUNGS:  Quiet,  Nonlabored resp.  Symmetrical expansion  ABDOMEN: Nondistended, soft nontender.  : No Corral, no palp bladder  SKIN: Warm and dry without rash  + RIJ TDC       Labs:  Results from last 7 days   Lab Units 01/22/25  0652 01/21/25  1327 01/20/25  0624   WBC 10*3/mm3 10.19 12.78* 15.03*   HEMOGLOBIN g/dL 10.1* 9.3* 11.1*   PLATELETS 10*3/mm3 140 151 177     Results from last 7 days   Lab Units 01/22/25  0652 01/21/25  0432 01/20/25  0624 01/19/25  0403 01/18/25  2149   SODIUM mmol/L 133* 134* 131* 133* 132*   POTASSIUM mmol/L 4.2 4.7 4.2 4.7 4.4   CHLORIDE mmol/L 96* 94* 94* 94* 94*   CO2 mmol/L 22.0 24.0 19.0* 23.0 22.0   BUN mg/dL 67* 52* 79* 58* 52*   CREATININE mg/dL 5.49* 4.68* 6.51* 5.40* 5.02*   CALCIUM mg/dL 9.3 10.8* 10.3 10.6* 10.7*   PHOSPHORUS mg/dL 5.5*  --   --   --   --    MAGNESIUM mg/dL  --   --   --   --  2.3   ALBUMIN g/dL 2.4* 3.3* 3.0* 3.6 3.8     Results from last 7 days   Lab Units 01/22/25  0652   ALK PHOS U/L 79   BILIRUBIN mg/dL 0.2   ALT (SGPT) U/L 32   AST (SGOT) U/L 24           A/P:    ESRD: On HD " MWF at [facility] ESW with NAL .       HTN: Blood pressure low on dialysis today.     Hyponatremia:  Due to underlying renal failure.  Adjust with HD.     Hyperkalemia: Resolved.  Adjust with HD     Metabolic acidosis:  Adjust with HD     Anemia: Hemoglobin above goal.  No CORNEL unless hemoglobin <10.5.    Bone mineralization: Calcium 9.3. getting HD with 3 Ca bath (? Will change to 2.5 Ca bath)    Hyperphosphatemia: phos level 5.5     Nutrition: Low potassium low Phos high-protein diet.  Renal vitamin.     Fever: On antibiotics.  Urine culture with mixed hanny.  Patient with foot wound.  Primary team continues to evaluate for source.  Dialysis catheter possible source.  May need removed if blood cultures positive.    High risk and complexity patient.    Ruel Giraldo MD  01/22/25  10:16 EST

## 2025-01-22 NOTE — CONSULTS
[facility]  Cardiovascular Consultation Note    Reason for consultation: PAF RVR    History of Present Illness:  74-year-old female with diabetes hypertension hyperlipidemia CKD on HD was admitted with weakness and myalgia.  She is also found to have abscess formation in her foot.  The patient underwent cardiac cath January 7, 2025 which showed minimal coronary artery disease normal LVEDP.  The patient has CT of the head because of confusion which showed old infarcts in the right occipital and right frontal lobe.  Today she went to A-fib RVR and had chest pain hypotension.  The patient's  gives me most of the history.  He states she did not have any heart problems until the onset of COVID.  During the COVID era she developed blood clots and is placed on anticoagulation therapy.  She was also told around that time she had A-fib.  The patient came to the hospital for weakness and falling and was sent home.  She came in again with weakness of her legs falling, shortness of breath lethargy.  She also found to have abscess in her foot.    Cardiac risk factors: #1 age greater than 65 #2 diabetes #3 hypertension #4 peripheral vascular disease    Past medical and surgical history, social and family history reviewed in EMR.    REVIEW OF SYSTEMS:     Past Medical History:   Diagnosis Date    Anxiety     Arthritis     Asthma     allergy induced    Back pain     Depression     Diabetes mellitus     dx 10 years ago- checks fsbs most days    Diverticula of colon     GERD (gastroesophageal reflux disease)     Head ache     Hypertension     Sleep apnea     no longer needs cpap - approx. 10 years r/t weight loss     Past Surgical History:   Procedure Laterality Date    BACK SURGERY      x 2    CARDIAC CATHETERIZATION      no intervention    CHOLECYSTECTOMY OPEN      COLONOSCOPY      2013    HYSTERECTOMY      REPLACEMENT TOTAL KNEE BILATERAL Bilateral     TONSILLECTOMY       VENTRAL/INCISIONAL HERNIA REPAIR N/A 6/6/2017    Procedure: INCISIONAL HERNIA REPAIR LAPAROSCOPIC;  Surgeon: Conrad Hernandez MD;  Location: [facility];  Service:      Social History     Socioeconomic History    Marital status:    Tobacco Use    Smoking status: Never    Smokeless tobacco: Never   Vaping Use    Vaping status: Never Used   Substance and Sexual Activity    Alcohol use: No    Drug use: No    Sexual activity: Defer     No family history on file.    H&P ROS reviewed and pertinent CV ROS as noted in HPI.    Cardiac: Patient had negative cardiac cath January 7, 2025.  Patient states she does feel her heart racing with the A-fib RVR  Respiratory: Complains of dyspnea on exertion/no hemoptysis  Lower Extremities: Has an abscess in her foot.  History of edema  GI: No bright red blood per rectum melena  Neuro: CT evidence of prior strokes in the right frontal and right parietal lobe  Hematology: No hematologic malignancy ecchymosis or petechiae  Renal: Has chronic kidney disease on HD  Musculoskeletal: No specific complaints of any particular joint pain  Endocrine: Has diabetes but no hypothyroidism  Constitutional: Has fatigue and malaise and weakness  Psych: No deysi depression or suicide ideation      Physical Exam: General Frail elderly female in bed moaning as if to be in pain.  Resting heart rate 130s       HEENT: Dialysis catheter is noted.  No obvious JVP       Respiratory: Equal bilateral symmetrical expansion with sparse crackles       Cardiovascular: Tachycardic and irregular without any obvious murmur       GI: Soft and flat       Lower Extremities: Patient has a bandage on her heel       Neuro: Difficult to assess because the patient is getting a lower extremity duplex and she is writhing in pain       Skin: Warm and dry       Psych: Flat affect    Results Review: EKG on presentation shows sinus rhythm poor R progression no ischemia today the patient developed A-fib RVR with an IVCD            Vital Sign Min/Max for last 24 hours  Temp  Min: 97.5 °F (36.4 °C)  Max: 98.2 °F (36.8 °C)   BP  Min: 107/65  Max: 153/63   Pulse  Min: 61  Max: 144   Resp  Min: 18  Max: 20   SpO2  Min: 89 %  Max: 97 %   No data recorded      Intake/Output Summary (Last 24 hours) at 1/22/2025 1317  Last data filed at 1/21/2025 1426  Gross per 24 hour   Intake 100 ml   Output --   Net 100 ml             Current Facility-Administered Medications:     acetaminophen (TYLENOL) tablet 650 mg, 650 mg, Oral, Q6H PRN, Leslye Fiore MD, 650 mg at 01/21/25 2106    albumin human 25 % IV SOLN  - ADS Override Pull, , , ,     allopurinol (ZYLOPRIM) tablet 200 mg, 200 mg, Oral, Daily, Leslye Fiore MD, 200 mg at 01/21/25 0856    [Held by provider] apixaban (ELIQUIS) tablet 2.5 mg, 2.5 mg, Oral, Q12H, Heather Carlos MD    atorvastatin (LIPITOR) tablet 10 mg, 10 mg, Oral, Daily, Heather Carlos MD    sennosides-docusate (PERICOLACE) 8.6-50 MG per tablet 2 tablet, 2 tablet, Oral, BID PRN **AND** polyethylene glycol (MIRALAX) packet 17 g, 17 g, Oral, Daily PRN **AND** bisacodyl (DULCOLAX) EC tablet 5 mg, 5 mg, Oral, Daily PRN **AND** bisacodyl (DULCOLAX) suppository 10 mg, 10 mg, Rectal, Daily PRN, Sebastian Menjivar MD    Calcium Replacement - Follow Nurse / BPA Driven Protocol, , Not Applicable, PRN, Sebastian Menjivar MD    carvedilol (COREG) tablet 25 mg, 25 mg, Oral, Daily, Heather Carlos MD    cefepime 1000 mg IVPB in 100 mL NS (MBP), 1,000 mg, Intravenous, Q24H, Conrad Alvarez MD, 1,000 mg at 01/22/25 1316    dextrose (D50W) (25 g/50 mL) IV injection 25 g, 25 g, Intravenous, Q15 Min PRN, Sebastian Menjivar MD    dextrose (GLUTOSE) oral gel 15 g, 15 g, Oral, Q15 Min PRN, Sebastian Menjivar MD    famotidine (PEPCID) tablet 20 mg, 20 mg, Oral, Daily, Heather Carlos MD    glucagon (GLUCAGEN) injection 1 mg, 1 mg, Intramuscular, Q15 Min PRN, Sebastian Menjivar MD    heparin (porcine) injection 2,000 Units,  2,000 Units, Intracatheter, PRN, Woo Barnes MD, 2,000 Units at 01/22/25 1143    HYDROmorphone (DILAUDID) injection 0.25 mg, 0.25 mg, Intravenous, BID PRN, Leslye Fiore MD, 0.25 mg at 01/22/25 0515    Insulin Lispro (humaLOG) injection 2-9 Units, 2-9 Units, Subcutaneous, 4x Daily AC & at Bedtime, Sebastian Menjivar MD, 4 Units at 01/21/25 0856    isosorbide mononitrate (IMDUR) 24 hr tablet 120 mg, 120 mg, Oral, Q24H, Leslye Fiore MD, 120 mg at 01/21/25 0856    Magnesium Standard Dose Replacement - Follow Nurse / BPA Driven Protocol, , Not Applicable, PRN, Sebastian Menjivar MD    melatonin tablet 2.5 mg, 2.5 mg, Oral, Nightly PRN, Sebastian Menjivar MD, 2.5 mg at 01/19/25 2121    metroNIDAZOLE (FLAGYL) IVPB 500 mg, 500 mg, Intravenous, Q8H, Conrad Alvarez MD, Last Rate: 200 mL/hr at 01/22/25 0515, 500 mg at 01/22/25 0515    NIFEdipine XL (PROCARDIA XL) 24 hr tablet 60 mg, 60 mg, Oral, Q24H, Leslye Fiore MD, 60 mg at 01/21/25 0856    ondansetron (ZOFRAN) injection 4 mg, 4 mg, Intravenous, Q6H PRN, Sebastian Menjivar MD, 4 mg at 01/21/25 0537    pantoprazole (PROTONIX) EC tablet 40 mg, 40 mg, Oral, Q AM, Heather Carlos MD    Pharmacy to dose vancomycin, , Not Applicable, Continuous PRN, Conrad Alvarez MD    Potassium Replacement - Follow Nurse / BPA Driven Protocol, , Not Applicable, PRN, Sebastian Menjivar MD    [COMPLETED] Insert Peripheral IV, , , Once **AND** sodium chloride 0.9 % flush 10 mL, 10 mL, Intravenous, PRN, Deacon Larose MD    sodium chloride 0.9 % flush 10 mL, 10 mL, Intravenous, Q12H, Sebastian Menjivar MD    sodium chloride 0.9 % flush 10 mL, 10 mL, Intravenous, PRN, Sebastian Menjivar MD    sodium chloride 0.9 % infusion 40 mL, 40 mL, Intravenous, PRN, Sebastian Menjivar MD    traMADol (ULTRAM) tablet 50 mg, 50 mg, Oral, Q12H PRN, Leslye Fiore MD, 50 mg at 01/21/25 2110    vancomycin (dosing per levels), , Not Applicable, Daily, Woo Wells, PharmD     vancomycin 750 mg in sodium chloride 0.9 % 250 mL IVPB-VTB, 750 mg, Intravenous, Once, Edmund Erwin PharmD    Lab Review:   Results from last 7 days   Lab Units 01/22/25  0652 01/21/25  1327 01/20/25  0624   WBC 10*3/mm3 10.19 12.78* 15.03*   HEMOGLOBIN g/dL 10.1* 9.3* 11.1*   PLATELETS 10*3/mm3 140 151 177     Results from last 7 days   Lab Units 01/22/25  0652 01/21/25  0432 01/20/25  0624 01/19/25  0403 01/18/25  2149   SODIUM mmol/L 133* 134* 131*   < > 132*   POTASSIUM mmol/L 4.2 4.7 4.2   < > 4.4   CO2 mmol/L 22.0 24.0 19.0*   < > 22.0   BUN mg/dL 67* 52* 79*   < > 52*   CREATININE mg/dL 5.49* 4.68* 6.51*   < > 5.02*   MAGNESIUM mg/dL  --   --   --   --  2.3   PHOSPHORUS mg/dL 5.5*  --   --   --   --    GLUCOSE mg/dL 149* 187* 173*   < > 167*    < > = values in this interval not displayed.     Estimated Creatinine Clearance: 11.2 mL/min (A) (by C-G formula based on SCr of 5.49 mg/dL (H)).    Results from last 7 days   Lab Units 01/19/25  0403   HEMOGLOBIN A1C % 4.90     .lipd  Lab Results   Component Value Date    AST 24 01/22/2025    ALT 32 01/22/2025       Radiology Reports:  Imaging Results (Last 72 Hours)       Procedure Component Value Units Date/Time    CT Abdomen Pelvis Without Contrast [[id number]] Collected: 01/21/25 0526     Updated: 01/21/25 0534    Narrative:      CT ABDOMEN PELVIS WO CONTRAST, CT CHEST WO CONTRAST DIAGNOSTIC, CT LOWER EXTREMITY LEFT WO CONTRAST    Date of Exam: 1/21/2025 4:45 AM EST    Indication: Sepsis.    Comparison: None available.    Technique: Axial CT images were obtained of the chest, abdomen and pelvis as well as the left lower extremity without the administration of contrast. Reconstructed coronal and sagittal images were also obtained. Automated exposure control and iterative   construction methods were used.      Findings:  CT Chest:   Limited evaluation of the solid organs due to lack of intravenous contrast. The thyroid appears within normal limits. The trachea  and the esophagus are unremarkable.  Heart size is normal. Atherosclerotic calcifications are present including within the   coronary arteries. Right internal jugular PermCath present with the tip at the cavoatrial junction. No mediastinal or hilar lymphadenopathy.  No significant pericardial effusion.  The aorta and great vessels appear within normal limits.  Pulmonary artery   is unremarkable.    There is no pneumothorax, pleural effusion or focal airspace consolidation. There are no suspicious lung nodules.  No significant pleural disease.  Central and segmental airways appear patent.      Subcutaneous fat and underlying musculature appear within normal limits.  There are no acute osseous abnormalities or destructive bone lesions.      CT Abdomen and Pelvis:  The liver appears normal in size without focal abnormality. Spleen is normal size and appears homogeneous.  Stomach is nondistended.  No adrenal mass.  Kidneys appear unremarkable. Probable small hemorrhagic or proteinaceous cyst present on the right.   Mild scarring is seen surrounding the kidneys bilaterally. No hydronephrosis.  Urinary bladder is within normal limits.  There is no bowel distention.  No pneumatosis intestinalis, pneumoperitoneum or lymphadenopathy.  No significant ascites.  The   appendix appears within normal limits.  Gallbladder is nonvisualized and likely resected. There is diastases of the rectus abdominal musculature. No evidence of hernia. No significant stool burden. Diverticulosis of the left hemicolon present. No   significant inflammatory changes. Left-sided stimulator device present. Biliary tree is nondistended.  The pancreas appears homogeneous.  Abdominal vasculature is within normal limits.    Subcutaneous fat and underlying musculature appear within normal limits.  There are no acute osseous abnormalities or destructive bone lesions. Orthopedic hardware appears unremarkable with no evidence of acute hardware failure. No  acute osseous   abnormality.    Left lower extremity: Patchy subcutaneous air is seen along the left calcaneal region with a draining wound seen extending to the plantar aspect of the calcaneus (series 8 image 80). Mild destructive changes are seen along the plantar aspect of the   calcaneus with interosseous air (series 8 image 77). Plantar enthesopathy is present within this region. No evidence of fracture. No evidence of dislocation. Diffuse soft tissue swelling is present along the plantar aspect of the foot. No definite   drainable collection identified though evaluation is limited due to lack of intravenous contrast. Wound appears to extend near the bone. No additional interosseous air identified. No tibiotalar joint effusion identified. Moderate degenerative changes.   Lisfranc ligament region appears unremarkable with no evidence of widening.        Impression:      Impression:  1.No acute cardiopulmonary process. No acute intra-abdominal or intrapelvic process.  2.Draining wound seen along the plantar aspect of the left calcaneus with subcutaneous air and mild destructive changes seen along the plantar aspect of the calcaneus with interosseous air present consistent with osteomyelitis. No additional osseous   involvement. No definite drainable collection identified though evaluation is limited due to lack of intravenous contrast. Draining sinus tract likely present extending from the wound to the calcaneus.  3.Ancillary findings as described above.                Electronically Signed: Cindy Fuller MD    1/21/2025 5:31 AM EST    Workstation ID: [id number]    CT Chest Without Contrast Diagnostic [[id number]] Collected: 01/21/25 0526     Updated: 01/21/25 0534    Narrative:      CT ABDOMEN PELVIS WO CONTRAST, CT CHEST WO CONTRAST DIAGNOSTIC, CT LOWER EXTREMITY LEFT WO CONTRAST    Date of Exam: 1/21/2025 4:45 AM EST    Indication: Sepsis.    Comparison: None available.    Technique: Axial CT images were obtained  of the chest, abdomen and pelvis as well as the left lower extremity without the administration of contrast. Reconstructed coronal and sagittal images were also obtained. Automated exposure control and iterative   construction methods were used.      Findings:  CT Chest:   Limited evaluation of the solid organs due to lack of intravenous contrast. The thyroid appears within normal limits. The trachea and the esophagus are unremarkable.  Heart size is normal. Atherosclerotic calcifications are present including within the   coronary arteries. Right internal jugular PermCath present with the tip at the cavoatrial junction. No mediastinal or hilar lymphadenopathy.  No significant pericardial effusion.  The aorta and great vessels appear within normal limits.  Pulmonary artery   is unremarkable.    There is no pneumothorax, pleural effusion or focal airspace consolidation. There are no suspicious lung nodules.  No significant pleural disease.  Central and segmental airways appear patent.      Subcutaneous fat and underlying musculature appear within normal limits.  There are no acute osseous abnormalities or destructive bone lesions.      CT Abdomen and Pelvis:  The liver appears normal in size without focal abnormality. Spleen is normal size and appears homogeneous.  Stomach is nondistended.  No adrenal mass.  Kidneys appear unremarkable. Probable small hemorrhagic or proteinaceous cyst present on the right.   Mild scarring is seen surrounding the kidneys bilaterally. No hydronephrosis.  Urinary bladder is within normal limits.  There is no bowel distention.  No pneumatosis intestinalis, pneumoperitoneum or lymphadenopathy.  No significant ascites.  The   appendix appears within normal limits.  Gallbladder is nonvisualized and likely resected. There is diastases of the rectus abdominal musculature. No evidence of hernia. No significant stool burden. Diverticulosis of the left hemicolon present. No   significant  inflammatory changes. Left-sided stimulator device present. Biliary tree is nondistended.  The pancreas appears homogeneous.  Abdominal vasculature is within normal limits.    Subcutaneous fat and underlying musculature appear within normal limits.  There are no acute osseous abnormalities or destructive bone lesions. Orthopedic hardware appears unremarkable with no evidence of acute hardware failure. No acute osseous   abnormality.    Left lower extremity: Patchy subcutaneous air is seen along the left calcaneal region with a draining wound seen extending to the plantar aspect of the calcaneus (series 8 image 80). Mild destructive changes are seen along the plantar aspect of the   calcaneus with interosseous air (series 8 image 77). Plantar enthesopathy is present within this region. No evidence of fracture. No evidence of dislocation. Diffuse soft tissue swelling is present along the plantar aspect of the foot. No definite   drainable collection identified though evaluation is limited due to lack of intravenous contrast. Wound appears to extend near the bone. No additional interosseous air identified. No tibiotalar joint effusion identified. Moderate degenerative changes.   Lisfranc ligament region appears unremarkable with no evidence of widening.        Impression:      Impression:  1.No acute cardiopulmonary process. No acute intra-abdominal or intrapelvic process.  2.Draining wound seen along the plantar aspect of the left calcaneus with subcutaneous air and mild destructive changes seen along the plantar aspect of the calcaneus with interosseous air present consistent with osteomyelitis. No additional osseous   involvement. No definite drainable collection identified though evaluation is limited due to lack of intravenous contrast. Draining sinus tract likely present extending from the wound to the calcaneus.  3.Ancillary findings as described above.                Electronically Signed: Cindy Fuller MD     1/21/2025 5:31 AM EST    Workstation ID: [id number]    CT Lower Extremity Left Without Contrast [[id number]] Collected: 01/21/25 0526     Updated: 01/21/25 0534    Narrative:      CT ABDOMEN PELVIS WO CONTRAST, CT CHEST WO CONTRAST DIAGNOSTIC, CT LOWER EXTREMITY LEFT WO CONTRAST    Date of Exam: 1/21/2025 4:45 AM EST    Indication: Sepsis.    Comparison: None available.    Technique: Axial CT images were obtained of the chest, abdomen and pelvis as well as the left lower extremity without the administration of contrast. Reconstructed coronal and sagittal images were also obtained. Automated exposure control and iterative   construction methods were used.      Findings:  CT Chest:   Limited evaluation of the solid organs due to lack of intravenous contrast. The thyroid appears within normal limits. The trachea and the esophagus are unremarkable.  Heart size is normal. Atherosclerotic calcifications are present including within the   coronary arteries. Right internal jugular PermCath present with the tip at the cavoatrial junction. No mediastinal or hilar lymphadenopathy.  No significant pericardial effusion.  The aorta and great vessels appear within normal limits.  Pulmonary artery   is unremarkable.    There is no pneumothorax, pleural effusion or focal airspace consolidation. There are no suspicious lung nodules.  No significant pleural disease.  Central and segmental airways appear patent.      Subcutaneous fat and underlying musculature appear within normal limits.  There are no acute osseous abnormalities or destructive bone lesions.      CT Abdomen and Pelvis:  The liver appears normal in size without focal abnormality. Spleen is normal size and appears homogeneous.  Stomach is nondistended.  No adrenal mass.  Kidneys appear unremarkable. Probable small hemorrhagic or proteinaceous cyst present on the right.   Mild scarring is seen surrounding the kidneys bilaterally. No hydronephrosis.  Urinary bladder is within  normal limits.  There is no bowel distention.  No pneumatosis intestinalis, pneumoperitoneum or lymphadenopathy.  No significant ascites.  The   appendix appears within normal limits.  Gallbladder is nonvisualized and likely resected. There is diastases of the rectus abdominal musculature. No evidence of hernia. No significant stool burden. Diverticulosis of the left hemicolon present. No   significant inflammatory changes. Left-sided stimulator device present. Biliary tree is nondistended.  The pancreas appears homogeneous.  Abdominal vasculature is within normal limits.    Subcutaneous fat and underlying musculature appear within normal limits.  There are no acute osseous abnormalities or destructive bone lesions. Orthopedic hardware appears unremarkable with no evidence of acute hardware failure. No acute osseous   abnormality.    Left lower extremity: Patchy subcutaneous air is seen along the left calcaneal region with a draining wound seen extending to the plantar aspect of the calcaneus (series 8 image 80). Mild destructive changes are seen along the plantar aspect of the   calcaneus with interosseous air (series 8 image 77). Plantar enthesopathy is present within this region. No evidence of fracture. No evidence of dislocation. Diffuse soft tissue swelling is present along the plantar aspect of the foot. No definite   drainable collection identified though evaluation is limited due to lack of intravenous contrast. Wound appears to extend near the bone. No additional interosseous air identified. No tibiotalar joint effusion identified. Moderate degenerative changes.   Lisfranc ligament region appears unremarkable with no evidence of widening.        Impression:      Impression:  1.No acute cardiopulmonary process. No acute intra-abdominal or intrapelvic process.  2.Draining wound seen along the plantar aspect of the left calcaneus with subcutaneous air and mild destructive changes seen along the plantar aspect  of the calcaneus with interosseous air present consistent with osteomyelitis. No additional osseous   involvement. No definite drainable collection identified though evaluation is limited due to lack of intravenous contrast. Draining sinus tract likely present extending from the wound to the calcaneus.  3.Ancillary findings as described above.                Electronically Signed: Cindy Fuller MD    1/21/2025 5:31 AM EST    Workstation ID: [id number]    CT Cervical Spine Without Contrast [[id number]] Collected: 01/20/25 1616     Updated: 01/20/25 1628    Narrative:      CT CERVICAL SPINE WO CONTRAST    Date of Exam: 1/20/2025 2:29 PM EST    Indication: c7 pain after fall.    Comparison: None available.    Technique: Axial CT images were obtained of the cervical spine without contrast administration.  Reconstructed coronal and sagittal images were also obtained. Automated exposure control and iterative construction methods were used.      Findings:  There is slight reversal of the normal cervical lordosis associated with advanced degenerative disc disease at C5-6 and C6-7. There is mild posterior subluxation of C5 with respect to both C4 and C6, likely as result of underlying degenerative disc   disease. There are prominent posterior osteophytes at both C5-6 and C6-7. No vertebral compression deformity is seen. No fracture is identified.     There is mild nuchal ligament calcification but no evidence of acute posterior spinous process avulsion. Prevertebral soft tissues appear normal. Facet joints appear normally aligned. Coronal images show extensive left-sided multilevel facet DJD and   milder facet degenerative change on the right, but no evidence of acute bony trauma.    Axial bone images show no evidence of skull base fracture. The dens and ring of C1 appear normally aligned and intact. No vertebral body fracture or posterior element fracture is identified. Soft tissue axial images show moderate, possibly marked  canal   stenosis at C5-6 due to large posterior vertebral osteophytes, which may be chronic. No paraspinous hematoma, mass or inflammatory change is seen. There is extensive bilateral carotid bulb calcification.      Impression:      Impression:    1. Advanced C5-6 and C6-7 degenerative disc disease; suspected moderate or greater C5-6 canal stenosis due to posterior vertebral osteophytes, which may be chronic, as noted.    2. No evidence of acute cervical spine trauma is identified.        Electronically Signed: Celestino Aguirre MD    1/20/2025 4:25 PM EST    Workstation ID: [id number]    CT Head Without Contrast [[id number]] Collected: 01/19/25 1756     Updated: 01/19/25 1803    Narrative:      CT HEAD WO CONTRAST    Date of Exam: 1/19/2025 3:26 PM EST    Indication: falls, ataxia.    Comparison: None available.    Technique: Axial CT images were obtained of the head without contrast administration.  Automated exposure control and iterative construction methods were used.      Findings:  Negative for large territory infarct, acute intracranial hemorrhage, large mass lesion, midline shift or hydrocephalus. Small focal hypodensities in the right occipital lobe example image 39 series 3 and more medially image 40 series 3, the latter   associated with encephalomalacia. Right frontal lobe hypodensity measuring 1 x 0.9 cm image 44 series 3. Parenchymal volume within normal limits for age. No large extra-axial fluid collection. Symmetric orbits. Distal carotid atherosclerotic disease.   Nonobstructive maxillary sinus disease with frothy secretions on the left. No large mastoid effusions. No aggressive bone lesions or displaced fractures.      Impression:      Impression:  1. 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 Comparisons not available to assess stability. Consider nonemergent MRI for further assessment.  2.  Negative for acute intracranial hemorrhage, large mass lesion, midline shift or hydrocephalus.        Electronically Signed: Deacon Loyola MD    1/19/2025 6:00 PM EST    Workstation ID: [id number]            Assessment/Plan: This patient is a history of A-fib and is on Eliquis.  She went into A-fib RVR today experiencing some chest discomfort.  At this time her anticoagulant has been held but now that she is in A-fib RVR I would advise that we restart it.  She has been given amiodarone bolus as well as amiodarone drip is currently infusing.  I will DC Coreg and put her on metoprolol for better rate control.  If her anticoagulation has not been held for any significant length of time could consider cardioversion if necessary.  The patient's  tells me that she has all kinds of problems with labile blood pressure during dialysis.  Informs me that nephrology is recommended she do home PD.  Patient has a history of thyroid issues  Since the patient's cardiac cath did not have any significant disease I see no need for her to continue Imdur therefore I will discontinue Imdur      Naren Escobar MD  01/22/25  13:17 EST     91

## 2025-01-22 NOTE — PLAN OF CARE
Problem: Hemodialysis  Goal: Safe, Effective Therapy Delivery  Outcome: Progressing  Goal: Effective Tissue Perfusion  Outcome: Progressing  Goal: Absence of Infection Signs and Symptoms  Outcome: Progressing     HD completed. Tolerated the last part of tx of 3hrs well. Access functioned well. UF goal of 1 liter not reached, (520) due to machine in minimum for whole treatment of 3 hrs. Blood returned with no complications. Report given to Ele Zuniga. Goal Outcome Evaluation:

## 2025-01-22 NOTE — CASE MANAGEMENT/SOCIAL WORK
Continued Stay Note  ANURADHA Khan     Patient Name: Sara Esparza  MRN: [id number]  Today's Date: 1/22/2025    Admit Date: 1/19/2025    Plan: home   Discharge Plan       Row Name 01/22/25 0830       Plan    Plan home    Patient/Family in Agreement with Plan yes    Plan Comments I met with the patient and her  bedside. Therapy still needs to work with the patient and make recommendations. She will go to HD today. The family will transport. CM to follow.    Final Discharge Disposition Code 01 - home or self-care                   Discharge Codes    No documentation.                 Expected Discharge Date and Time       Expected Discharge Date Expected Discharge Time    Jan 21, 2025               Sharee Williamson RN

## 2025-01-22 NOTE — PROGRESS NOTES
"Pharmacy Consult-Vancomycin Dosing  Sara Esparza is a  74 y.o. female receiving vancomycin therapy.     Indication:empiric  Consulting Provider: hospitalist   ID Consult:     Goal Trough: 15-20    Current Antimicrobial Therapy  Anti-Infectives (From admission, onward)      Ordered     Dose/Rate Route Frequency Start Stop    01/21/25 0505  metroNIDAZOLE (FLAGYL) IVPB 500 mg        Ordering Provider: Conrad Alvarez MD    500 mg  200 mL/hr over 30 Minutes Intravenous Every 8 Hours 01/21/25 0600 01/26/25 0559    01/21/25 0513  vancomycin 2250 mg/500 mL 0.9% NS IVPB ([facility])        Ordering Provider: Woo Wells, PharmD    20 mg/kg × 108 kg  over 135 Minutes Intravenous Once 01/21/25 0600      01/21/25 0505  Pharmacy to dose vancomycin        Ordering Provider: Conrad Alvarez MD     Not Applicable Continuous PRN 01/21/25 0500 01/28/25 0459    01/20/25 0738  cefTRIAXone (ROCEPHIN) 1,000 mg in sodium chloride 0.9 % 100 mL MBP        Ordering Provider: Leslye Fiore MD    1,000 mg  200 mL/hr over 30 Minutes Intravenous Every 24 Hours 01/20/25 0900 01/25/25 0859            Allergies  Allergies as of 01/19/2025    (No Known Allergies)       Labs    Results from last 7 days   Lab Units 01/21/25  0432 01/20/25  0624 01/19/25  0403   BUN mg/dL 52* 79* 58*   CREATININE mg/dL 4.68* 6.51* 5.40*       Results from last 7 days   Lab Units 01/20/25  0624 01/19/25  0403 01/18/25  2149   WBC 10*3/mm3 15.03* 14.36* 13.47*       Evaluation of Dosing     Last Dose Received in the ED/Outside Facility: none  Is Patient on Dialysis or Renal Replacement: yes    Ht - 170.2 cm (67\")  Wt - 108 kg (238 lb 1.6 oz)    Estimated Creatinine Clearance: 13.2 mL/min (A) (by C-G formula based on SCr of 4.68 mg/dL (H)).    Intake & Output (last 3 days)         01/18 0701 01/19 0700 01/19 0701 01/20 0700 01/20 0701  01/21 0700    Urine (mL/kg/hr)  300 (0.1)     Stool  0     Dialysis   0    Total Output  300 0    Net  -300 0           Stool " Unmeasured Occurrence  1 x             Microbiology and Radiology  Microbiology Results (last 10 days)       Procedure Component Value - Date/Time    Wound Culture - Wound, Foot, Left [[id number]] Collected: 01/20/25 1409    Lab Status: Preliminary result Specimen: Wound from Foot, Left Updated: 01/20/25 1647     Gram Stain Few (2+) WBCs seen      Many (4+) Gram positive cocci in pairs      Rare (1+) Gram negative bacilli      Rare (1+) Gram positive bacilli    Urine Culture - Urine, Urine, Clean Catch [[id number]] Collected: 01/19/25 1404    Lab Status: Final result Specimen: Urine, Clean Catch Updated: 01/20/25 1116     Urine Culture 50,000 CFU/mL Mixed Kimber Isolated    Narrative:      Specimen contains mixed organisms of questionable pathogenicity suggestive of contamination. If symptoms persist, suggest recollection.  Colonization of the urinary tract without infection is common. Treatment is discouraged unless the patient is symptomatic, pregnant, or undergoing an invasive urologic procedure.            Vancomycin Levels:                        Assessment/Plan:  The patient will be started on a bolus of 20mg/kg for a dose of 2250mg . Given the patient's current renal status, will dose per random levels and obtain a random level with morning labs before ordering another dose.  Due to infection severity, will target a trough of 15-20.    Random level obtained this AM resulted at 21.1 mcg/ml. Dialysis session occurred this morning. Will give 750 mg dose after dialysis today and obtain level on 1/24 prior to next session.     Pharmacy will continue to follow the patient’s culture results and clinical progress daily.    Edmund Erwin, PharmD  1/22/2025  15:45 EST

## 2025-01-22 NOTE — NURSING NOTE
Client had scheduled dialysis treatment for 3 hours, entered the unit alert and stable with stable vital signs and in sinus rhythm. Client started to complain of chest discomfort,heart rate reading 145-150.rhythm noted AFIB-RVR, blood pressure was 104/74, rapid response called, Terrance Spangler and Dr. Moreno at clients bedside, Metoprolol 5 mg was prescribed and administered,as per Dr. Carlos requested and orders. Clients bfr was decreased from 350 ml/min to 300 ml/min and machine was placed in minimum,  Nephrologist was contacted regarding clients status. Clients heart rate stabilized to wnls, but increased again at the completion of treatment, client was transported to her assigned room alert and talking and report was given to clients assigned staff nurse Ijeoma Marlow

## 2025-01-22 NOTE — CONSULTS
INFECTIOUS DISEASE CONSULT/INITIAL HOSPITAL VISIT    Sara Esparza  1950  [id number]    Date of Consult: 1/22/2025    Admission Date: 1/19/2025      Requesting Provider: No ref. provider found  Evaluating Physician: Bailee Barrios MD    Reason for Consultation: Sepsis and osteomyelitis of the left calcaneus    History of present illness:    Patient is a 74 y.o. 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  Evaluation in the ED included WBC 14.3 and PCT 5.9.  Subsequent lactic acid was 4.1 on 1/21.  After admission she became febrile to 101.9.  She was started on ceftriaxone and vancomycin.  Blood culture was sent yesterday afternoon.  Urinalysis showed pyuria.  Urine culture showed mixed hanny in the lab discarded the culture.  She was noted to have a left calcaneus wound.  CT scan of the chest, abdomen and pelvis were unremarkable for acute process.  CT scan of the left lower extremity showed a wound at the plantar aspect of the left calcaneus with subcutaneous and intraosseous air and destructive changes .  CT scan of the neck showed advanced C5-6 and C6-7 degenerative disc disease; suspected moderate or greater C5-6 canal stenosis due to posterior vertebral osteophytes.  Wound culture of the left heel is growing MRSA and a gram-negative ange.  At the time of my exam the patient is nonverbal other than moaning.  She does not follow commands.  Her  is at the bedside and is able to provide medical history although he is a little uncertain about timing of events such as when she started dialysis.     She had a dialysis catheter placed 9/13 which was replaced on 10/11.  Past Medical History:   Diagnosis Date     End-stage renal disease on hemodialysis since 9/2024     Anxiety     Arthritis     Asthma     PAF    Back pain     Depression     Diabetes mellitus     Covid pna 2023    Diverticula of colon     GERD (gastroesophageal reflux disease)     Cervical spine stenosis     Hypertension     Sleep apnea     no longer needs cpap - approx. 10 years r/t weight loss       Past Surgical History:   Procedure Laterality Date    BACK SURGERY      x 2    CARDIAC CATHETERIZATION      no intervention    CHOLECYSTECTOMY OPEN      COLONOSCOPY      2013    HYSTERECTOMY      REPLACEMENT TOTAL KNEE BILATERAL Bilateral     TONSILLECTOMY      VENTRAL/INCISIONAL HERNIA REPAIR N/A 6/6/2017    Procedure: INCISIONAL HERNIA REPAIR LAPAROSCOPIC;  Surgeon: Conrad Hernandez MD;  Location: [facility];  Service:        No family history on file.    Social History     Socioeconomic History    Marital status:    Tobacco Use    Smoking status: Never.  Extensive secondary smoke exposure for most of her life according to the patient's     Smokeless tobacco: Never   Vaping Use    Vaping status: Never Used   Substance and Sexual Activity    Alcohol use: No    Drug use: No    Sexual activity: Defer       No Known Allergies      Medication:    Current Facility-Administered Medications:     acetaminophen (TYLENOL) tablet 650 mg, 650 mg, Oral, Q6H PRN, Leslye Fiore MD, 650 mg at 01/21/25 2106    albumin human 25 % IV SOLN  - ADS Override Pull, , , ,     allopurinol (ZYLOPRIM) tablet 200 mg, 200 mg, Oral, Daily, Leslye Fiore MD, 200 mg at 01/21/25 0856    amiodarone 150 mg in 100 mL D5W (loading dose), 150 mg, Intravenous, Once, Naren Escobar MD    amiodarone 360 mg in 200 mL D5W infusion, 1 mg/min, Intravenous, Continuous, Naren Escobar MD    apixaban (ELIQUIS) tablet 2.5 mg, 2.5 mg, Oral, Q12H, Naren Escobar MD    atorvastatin (LIPITOR) tablet 10 mg, 10 mg, Oral, Daily, Heather Carlos MD    sennosides-docusate  (PERICOLACE) 8.6-50 MG per tablet 2 tablet, 2 tablet, Oral, BID PRN **AND** polyethylene glycol (MIRALAX) packet 17 g, 17 g, Oral, Daily PRN **AND** bisacodyl (DULCOLAX) EC tablet 5 mg, 5 mg, Oral, Daily PRN **AND** bisacodyl (DULCOLAX) suppository 10 mg, 10 mg, Rectal, Daily PRN, Sebastian Menjivar MD    Calcium Replacement - Follow Nurse / BPA Driven Protocol, , Not Applicable, PRN, Sebastian Menjivar MD    cefepime 1000 mg IVPB in 100 mL NS (MBP), 1,000 mg, Intravenous, Q24H, Conrad Alvarez MD, 1,000 mg at 01/22/25 1316    dextrose (D50W) (25 g/50 mL) IV injection 25 g, 25 g, Intravenous, Q15 Min PRN, Sebastian Menjivar MD    dextrose (GLUTOSE) oral gel 15 g, 15 g, Oral, Q15 Min PRN, Sebastian Menjivar MD    dilTIAZem (CARDIZEM) 125 mg in 125 mL D5W infusion, 5-15 mg/hr, Intravenous, Continuous, Heather Carlos MD    dilTIAZem (CARDIZEM) bolus from bag 1 mg/mL solution 10 mg, 10 mg, Intravenous, Once, Heather Carlos MD    famotidine (PEPCID) tablet 20 mg, 20 mg, Oral, Daily, Heather Carlos MD    glucagon (GLUCAGEN) injection 1 mg, 1 mg, Intramuscular, Q15 Min PRN, Sebastian Menjivar MD    heparin (porcine) injection 2,000 Units, 2,000 Units, Intracatheter, PRN, Woo Barnes MD, 2,000 Units at 01/22/25 1143    HYDROmorphone (DILAUDID) injection 0.25 mg, 0.25 mg, Intravenous, BID PRN, Leslye Fiore MD, 0.25 mg at 01/22/25 0515    Insulin Lispro (humaLOG) injection 2-9 Units, 2-9 Units, Subcutaneous, 4x Daily AC & at Bedtime, Sebastian Menjivar MD, 4 Units at 01/21/25 0856    Magnesium Standard Dose Replacement - Follow Nurse / BPA Driven Protocol, , Not Applicable, PRN, Sebastian Menjivar MD    melatonin tablet 2.5 mg, 2.5 mg, Oral, Nightly PRN, Sebastian Menjivar MD, 2.5 mg at 01/19/25 2121    metoprolol tartrate (LOPRESSOR) half tablet 12.5 mg, 12.5 mg, Oral, Q12H, Naren Escobar MD    metroNIDAZOLE (FLAGYL) IVPB 500 mg, 500 mg, Intravenous, Q8H, Conrad Alvarez MD,  Last Rate: 200 mL/hr at 01/22/25 0515, 500 mg at 01/22/25 0515    NIFEdipine XL (PROCARDIA XL) 24 hr tablet 60 mg, 60 mg, Oral, Q24H, Leslye Fiore MD, 60 mg at 01/21/25 0856    ondansetron (ZOFRAN) injection 4 mg, 4 mg, Intravenous, Q6H PRN, Sebastian Menjivar MD, 4 mg at 01/21/25 0537    pantoprazole (PROTONIX) EC tablet 40 mg, 40 mg, Oral, Q AM, Heather Carlos MD    Pharmacy to dose vancomycin, , Not Applicable, Continuous PRN, Conrad Alvarez MD    Potassium Replacement - Follow Nurse / BPA Driven Protocol, , Not Applicable, PRN, Sebastian Menjivar MD    [COMPLETED] Insert Peripheral IV, , , Once **AND** sodium chloride 0.9 % flush 10 mL, 10 mL, Intravenous, PRN, Deacon Larose MD    sodium chloride 0.9 % flush 10 mL, 10 mL, Intravenous, Q12H, Sebastian Menjivar MD    sodium chloride 0.9 % flush 10 mL, 10 mL, Intravenous, PRN, Sebastian Menjivar MD    sodium chloride 0.9 % infusion 40 mL, 40 mL, Intravenous, PRN, Sebastian Menjivar MD    traMADol (ULTRAM) tablet 50 mg, 50 mg, Oral, Q12H PRN, Leslye Fiore MD, 50 mg at 01/21/25 2110    vancomycin (dosing per levels), , Not Applicable, Daily, Woo Wells, PharmD    vancomycin 750 mg in sodium chloride 0.9 % 250 mL IVPB-VTB, 750 mg, Intravenous, Once, Edmund Erwin PharmD    Antibiotics:  Anti-Infectives (From admission, onward)      Ordered     Dose/Rate Route Frequency Start Stop    01/22/25 1230  vancomycin 750 mg in sodium chloride 0.9 % 250 mL IVPB-VTB        Ordering Provider: Edmund Erwin PharmD    750 mg  333.3 mL/hr over 45 Minutes Intravenous Once 01/22/25 1400      01/21/25 0519  cefepime 1000 mg IVPB in 100 mL NS (MBP)        Ordering Provider: Conrad Alvarez MD    1,000 mg  over 4 Hours Intravenous Every 24 Hours 01/22/25 0600 01/29/25 0559    01/21/25 0537  vancomycin (dosing per levels)        Ordering Provider: Woo Wells, PharmD     Not Applicable Daily 01/21/25 0900 01/28/25 0859    01/21/25 0519   cefepime 1000 mg IVPB in 100 mL NS (MBP)        Ordering Provider: Conrad Alvarez MD    1,000 mg  over 30 Minutes Intravenous Once 25 0615 25 0715    25 0505  metroNIDAZOLE (FLAGYL) IVPB 500 mg        Ordering Provider: Conrad Alvarez MD    500 mg  200 mL/hr over 30 Minutes Intravenous Every 8 Hours 25 0600 25 0759    25 0513  vancomycin 2250 mg/500 mL 0.9% NS IVPB ([facility])        Ordering Provider: Woo Wells, PharmD    20 mg/kg × 108 kg  over 135 Minutes Intravenous Once 25 0600 25 0950    25 0505  Pharmacy to dose vancomycin        Ordering Provider: Conrad Alvarez MD     Not Applicable Continuous PRN 25 0500 25 0459    25 0738  cefTRIAXone (ROCEPHIN) 1,000 mg in sodium chloride 0.9 % 100 mL MBP  Status:  Discontinued        Ordering Provider: Leslye Fiore MD    1,000 mg  200 mL/hr over 30 Minutes Intravenous Every 24 Hours 25 0900 25 0519              Review of Systems:  Unable to assess from patient  According to her  the main complaints at home have been neck pain, shortness of breath and generalized weakness.  Evidently neither the  nor the patient were aware of the chronic appearing left heel ulcer      Physical Exam:   Vital Signs  Temp (24hrs), Av.9 °F (36.6 °C), Min:97.5 °F (36.4 °C), Max:98.3 °F (36.8 °C)    Temp  Min: 97.5 °F (36.4 °C)  Max: 98.3 °F (36.8 °C)  BP  Min: 107/65  Max: 153/63  Pulse  Min: 2  Max: 144  Resp  Min: 16  Max: 24  SpO2  Min: 89 %  Max: 97 %    GENERAL: Frail elderly female who is moaning in bed  HEENT: Normocephalic, atraumatic.   No conjunctival injection. No icterus.  No oral labial lesions   NECK: She does not have deysi meningismus but she moans when I attempt to raise her head.  No mass.    HEART: IRRR; No murmur, rubs, gallops.   LUNGS: Clear anteriorly, no wheezes or rhonchi appreciated  ABDOMEN: Abdomen bloated but soft.  No evidence of tenderness elicited  "with deep pressure throughout the abdomen.  Hypoactive bowel sounds.  EXT:  No cyanosis, clubbing or edema. No cord.  :  Without Corral catheter.  MSK: No joint effusions or erythema.  Bilateral knees appeared to be free of erythema or soft tissue swelling.  Left heel with a chronic appearing wound on the plantar aspect approximately 1 cm in diameter; positive surrounding erythema but no active drainage at the time of my exam  SKIN: Warm and dry without cutaneous eruptions on Inspection/palpation.    NEURO: Appears to be nonfocal  PSYCHIATRIC: Unable to assess    Laboratory Data    Results from last 7 days   Lab Units 01/22/25  0652 01/21/25  1327 01/20/25  0624   WBC 10*3/mm3 10.19 12.78* 15.03*   HEMOGLOBIN g/dL 10.1* 9.3* 11.1*   HEMATOCRIT % 32.3* 29.1* 33.9*   PLATELETS 10*3/mm3 140 151 177     Results from last 7 days   Lab Units 01/22/25  0652   SODIUM mmol/L 133*   POTASSIUM mmol/L 4.2   CHLORIDE mmol/L 96*   CO2 mmol/L 22.0   BUN mg/dL 67*   CREATININE mg/dL 5.49*   GLUCOSE mg/dL 149*   CALCIUM mg/dL 9.3     Results from last 7 days   Lab Units 01/22/25  0652   ALK PHOS U/L 79   BILIRUBIN mg/dL 0.2   ALT (SGPT) U/L 32   AST (SGOT) U/L 24             Results from last 7 days   Lab Units 01/21/25  1327   LACTATE mmol/L 1.3     Results from last 7 days   Lab Units 01/19/25  0956   CK TOTAL U/L 121     Results from last 7 days   Lab Units 01/22/25  0652   VANCOMYCIN RM mcg/mL 21.10     Estimated Creatinine Clearance: 11.2 mL/min (A) (by C-G formula based on SCr of 5.49 mg/dL (H)).      Microbiology:  No results found for: \"ACANTHNAEG\", \"AFBCX\", \"BPERTUSSISCX\", \"BLOODCX\"  No results found for: \"BCIDPCR\", \"CXREFLEX\", \"CSFCX\", \"CULTURETIS\"  No results found for: \"CULTURES\", \"HSVCX\", \"URCX\"  No results found for: \"EYECULTURE\", \"GCCX\", \"HSVCULTURE\", \"LABHSV\"  No results found for: \"LEGIONELLA\", \"MRSACX\", \"MUMPSCX\", \"MYCOPLASCX\"  No results found for: \"NOCARDIACX\", \"STOOLCX\"  Urine Culture   Date Value Ref Range " Status   01/19/2025 50,000 CFU/mL Mixed Kimber Isolated  Final     Wound Culture   Date Value Ref Range Status   01/20/2025 Scant growth (1+) Normal Skin Kimber  Preliminary   01/20/2025 Scant growth (1+) Staphylococcus aureus, MRSA (A)  Preliminary     Comment:     MICs to follow  Methicillin resistant Staphylococcus aureus, Patient may be an isolation risk.   01/20/2025 Rare growth Gram Negative Bacilli (A)  Preliminary           Radiology:  Imaging Results (Last 72 Hours)       Procedure Component Value Units Date/Time    CT Abdomen Pelvis Without Contrast [[id number]] Collected: 01/21/25 0526     Updated: 01/21/25 0534    Narrative:      CT ABDOMEN PELVIS WO CONTRAST, CT CHEST WO CONTRAST DIAGNOSTIC, CT LOWER EXTREMITY LEFT WO CONTRAST    Date of Exam: 1/21/2025 4:45 AM EST    Indication: Sepsis.    Comparison: None available.    Technique: Axial CT images were obtained of the chest, abdomen and pelvis as well as the left lower extremity without the administration of contrast. Reconstructed coronal and sagittal images were also obtained. Automated exposure control and iterative   construction methods were used.      Findings:  CT Chest:   Limited evaluation of the solid organs due to lack of intravenous contrast. The thyroid appears within normal limits. The trachea and the esophagus are unremarkable.  Heart size is normal. Atherosclerotic calcifications are present including within the   coronary arteries. Right internal jugular PermCath present with the tip at the cavoatrial junction. No mediastinal or hilar lymphadenopathy.  No significant pericardial effusion.  The aorta and great vessels appear within normal limits.  Pulmonary artery   is unremarkable.    There is no pneumothorax, pleural effusion or focal airspace consolidation. There are no suspicious lung nodules.  No significant pleural disease.  Central and segmental airways appear patent.      Subcutaneous fat and underlying musculature appear within  normal limits.  There are no acute osseous abnormalities or destructive bone lesions.      CT Abdomen and Pelvis:  The liver appears normal in size without focal abnormality. Spleen is normal size and appears homogeneous.  Stomach is nondistended.  No adrenal mass.  Kidneys appear unremarkable. Probable small hemorrhagic or proteinaceous cyst present on the right.   Mild scarring is seen surrounding the kidneys bilaterally. No hydronephrosis.  Urinary bladder is within normal limits.  There is no bowel distention.  No pneumatosis intestinalis, pneumoperitoneum or lymphadenopathy.  No significant ascites.  The   appendix appears within normal limits.  Gallbladder is nonvisualized and likely resected. There is diastases of the rectus abdominal musculature. No evidence of hernia. No significant stool burden. Diverticulosis of the left hemicolon present. No   significant inflammatory changes. Left-sided stimulator device present. Biliary tree is nondistended.  The pancreas appears homogeneous.  Abdominal vasculature is within normal limits.    Subcutaneous fat and underlying musculature appear within normal limits.  There are no acute osseous abnormalities or destructive bone lesions. Orthopedic hardware appears unremarkable with no evidence of acute hardware failure. No acute osseous   abnormality.    Left lower extremity: Patchy subcutaneous air is seen along the left calcaneal region with a draining wound seen extending to the plantar aspect of the calcaneus (series 8 image 80). Mild destructive changes are seen along the plantar aspect of the   calcaneus with interosseous air (series 8 image 77). Plantar enthesopathy is present within this region. No evidence of fracture. No evidence of dislocation. Diffuse soft tissue swelling is present along the plantar aspect of the foot. No definite   drainable collection identified though evaluation is limited due to lack of intravenous contrast. Wound appears to extend near  the bone. No additional interosseous air identified. No tibiotalar joint effusion identified. Moderate degenerative changes.   Lisfranc ligament region appears unremarkable with no evidence of widening.        Impression:      Impression:  1.No acute cardiopulmonary process. No acute intra-abdominal or intrapelvic process.  2.Draining wound seen along the plantar aspect of the left calcaneus with subcutaneous air and mild destructive changes seen along the plantar aspect of the calcaneus with interosseous air present consistent with osteomyelitis. No additional osseous   involvement. No definite drainable collection identified though evaluation is limited due to lack of intravenous contrast. Draining sinus tract likely present extending from the wound to the calcaneus.  3.Ancillary findings as described above.                Electronically Signed: Cindy Fuller MD    1/21/2025 5:31 AM EST    Workstation ID: [id number]    CT Chest Without Contrast Diagnostic [[id number]] Collected: 01/21/25 0526     Updated: 01/21/25 0534    Narrative:      CT ABDOMEN PELVIS WO CONTRAST, CT CHEST WO CONTRAST DIAGNOSTIC, CT LOWER EXTREMITY LEFT WO CONTRAST    Date of Exam: 1/21/2025 4:45 AM EST    Indication: Sepsis.    Comparison: None available.    Technique: Axial CT images were obtained of the chest, abdomen and pelvis as well as the left lower extremity without the administration of contrast. Reconstructed coronal and sagittal images were also obtained. Automated exposure control and iterative   construction methods were used.      Findings:  CT Chest:   Limited evaluation of the solid organs due to lack of intravenous contrast. The thyroid appears within normal limits. The trachea and the esophagus are unremarkable.  Heart size is normal. Atherosclerotic calcifications are present including within the   coronary arteries. Right internal jugular PermCath present with the tip at the cavoatrial junction. No mediastinal or hilar  lymphadenopathy.  No significant pericardial effusion.  The aorta and great vessels appear within normal limits.  Pulmonary artery   is unremarkable.    There is no pneumothorax, pleural effusion or focal airspace consolidation. There are no suspicious lung nodules.  No significant pleural disease.  Central and segmental airways appear patent.      Subcutaneous fat and underlying musculature appear within normal limits.  There are no acute osseous abnormalities or destructive bone lesions.      CT Abdomen and Pelvis:  The liver appears normal in size without focal abnormality. Spleen is normal size and appears homogeneous.  Stomach is nondistended.  No adrenal mass.  Kidneys appear unremarkable. Probable small hemorrhagic or proteinaceous cyst present on the right.   Mild scarring is seen surrounding the kidneys bilaterally. No hydronephrosis.  Urinary bladder is within normal limits.  There is no bowel distention.  No pneumatosis intestinalis, pneumoperitoneum or lymphadenopathy.  No significant ascites.  The   appendix appears within normal limits.  Gallbladder is nonvisualized and likely resected. There is diastases of the rectus abdominal musculature. No evidence of hernia. No significant stool burden. Diverticulosis of the left hemicolon present. No   significant inflammatory changes. Left-sided stimulator device present. Biliary tree is nondistended.  The pancreas appears homogeneous.  Abdominal vasculature is within normal limits.    Subcutaneous fat and underlying musculature appear within normal limits.  There are no acute osseous abnormalities or destructive bone lesions. Orthopedic hardware appears unremarkable with no evidence of acute hardware failure. No acute osseous   abnormality.    Left lower extremity: Patchy subcutaneous air is seen along the left calcaneal region with a draining wound seen extending to the plantar aspect of the calcaneus (series 8 image 80). Mild destructive changes are seen  along the plantar aspect of the   calcaneus with interosseous air (series 8 image 77). Plantar enthesopathy is present within this region. No evidence of fracture. No evidence of dislocation. Diffuse soft tissue swelling is present along the plantar aspect of the foot. No definite   drainable collection identified though evaluation is limited due to lack of intravenous contrast. Wound appears to extend near the bone. No additional interosseous air identified. No tibiotalar joint effusion identified. Moderate degenerative changes.   Lisfranc ligament region appears unremarkable with no evidence of widening.        Impression:      Impression:  1.No acute cardiopulmonary process. No acute intra-abdominal or intrapelvic process.  2.Draining wound seen along the plantar aspect of the left calcaneus with subcutaneous air and mild destructive changes seen along the plantar aspect of the calcaneus with interosseous air present consistent with osteomyelitis. No additional osseous   involvement. No definite drainable collection identified though evaluation is limited due to lack of intravenous contrast. Draining sinus tract likely present extending from the wound to the calcaneus.  3.Ancillary findings as described above.                Electronically Signed: Cnidy Fuller MD    1/21/2025 5:31 AM EST    Workstation ID: [id number]    CT Lower Extremity Left Without Contrast [[id number]] Collected: 01/21/25 0526     Updated: 01/21/25 0534    Narrative:      CT ABDOMEN PELVIS WO CONTRAST, CT CHEST WO CONTRAST DIAGNOSTIC, CT LOWER EXTREMITY LEFT WO CONTRAST    Date of Exam: 1/21/2025 4:45 AM EST    Indication: Sepsis.    Comparison: None available.    Technique: Axial CT images were obtained of the chest, abdomen and pelvis as well as the left lower extremity without the administration of contrast. Reconstructed coronal and sagittal images were also obtained. Automated exposure control and iterative   construction methods were  used.      Findings:  CT Chest:   Limited evaluation of the solid organs due to lack of intravenous contrast. The thyroid appears within normal limits. The trachea and the esophagus are unremarkable.  Heart size is normal. Atherosclerotic calcifications are present including within the   coronary arteries. Right internal jugular PermCath present with the tip at the cavoatrial junction. No mediastinal or hilar lymphadenopathy.  No significant pericardial effusion.  The aorta and great vessels appear within normal limits.  Pulmonary artery   is unremarkable.    There is no pneumothorax, pleural effusion or focal airspace consolidation. There are no suspicious lung nodules.  No significant pleural disease.  Central and segmental airways appear patent.      Subcutaneous fat and underlying musculature appear within normal limits.  There are no acute osseous abnormalities or destructive bone lesions.      CT Abdomen and Pelvis:  The liver appears normal in size without focal abnormality. Spleen is normal size and appears homogeneous.  Stomach is nondistended.  No adrenal mass.  Kidneys appear unremarkable. Probable small hemorrhagic or proteinaceous cyst present on the right.   Mild scarring is seen surrounding the kidneys bilaterally. No hydronephrosis.  Urinary bladder is within normal limits.  There is no bowel distention.  No pneumatosis intestinalis, pneumoperitoneum or lymphadenopathy.  No significant ascites.  The   appendix appears within normal limits.  Gallbladder is nonvisualized and likely resected. There is diastases of the rectus abdominal musculature. No evidence of hernia. No significant stool burden. Diverticulosis of the left hemicolon present. No   significant inflammatory changes. Left-sided stimulator device present. Biliary tree is nondistended.  The pancreas appears homogeneous.  Abdominal vasculature is within normal limits.    Subcutaneous fat and underlying musculature appear within normal  limits.  There are no acute osseous abnormalities or destructive bone lesions. Orthopedic hardware appears unremarkable with no evidence of acute hardware failure. No acute osseous   abnormality.    Left lower extremity: Patchy subcutaneous air is seen along the left calcaneal region with a draining wound seen extending to the plantar aspect of the calcaneus (series 8 image 80). Mild destructive changes are seen along the plantar aspect of the   calcaneus with interosseous air (series 8 image 77). Plantar enthesopathy is present within this region. No evidence of fracture. No evidence of dislocation. Diffuse soft tissue swelling is present along the plantar aspect of the foot. No definite   drainable collection identified though evaluation is limited due to lack of intravenous contrast. Wound appears to extend near the bone. No additional interosseous air identified. No tibiotalar joint effusion identified. Moderate degenerative changes.   Lisfranc ligament region appears unremarkable with no evidence of widening.        Impression:      Impression:  1.No acute cardiopulmonary process. No acute intra-abdominal or intrapelvic process.  2.Draining wound seen along the plantar aspect of the left calcaneus with subcutaneous air and mild destructive changes seen along the plantar aspect of the calcaneus with interosseous air present consistent with osteomyelitis. No additional osseous   involvement. No definite drainable collection identified though evaluation is limited due to lack of intravenous contrast. Draining sinus tract likely present extending from the wound to the calcaneus.  3.Ancillary findings as described above.                Electronically Signed: Cindy Fuller MD    1/21/2025 5:31 AM EST    Workstation ID: [id number]    CT Cervical Spine Without Contrast [[id number]] Collected: 01/20/25 1616     Updated: 01/20/25 1628    Narrative:      CT CERVICAL SPINE WO CONTRAST    Date of Exam: 1/20/2025 2:29 PM  EST    Indication: c7 pain after fall.    Comparison: None available.    Technique: Axial CT images were obtained of the cervical spine without contrast administration.  Reconstructed coronal and sagittal images were also obtained. Automated exposure control and iterative construction methods were used.      Findings:  There is slight reversal of the normal cervical lordosis associated with advanced degenerative disc disease at C5-6 and C6-7. There is mild posterior subluxation of C5 with respect to both C4 and C6, likely as result of underlying degenerative disc   disease. There are prominent posterior osteophytes at both C5-6 and C6-7. No vertebral compression deformity is seen. No fracture is identified.     There is mild nuchal ligament calcification but no evidence of acute posterior spinous process avulsion. Prevertebral soft tissues appear normal. Facet joints appear normally aligned. Coronal images show extensive left-sided multilevel facet DJD and   milder facet degenerative change on the right, but no evidence of acute bony trauma.    Axial bone images show no evidence of skull base fracture. The dens and ring of C1 appear normally aligned and intact. No vertebral body fracture or posterior element fracture is identified. Soft tissue axial images show moderate, possibly marked canal   stenosis at C5-6 due to large posterior vertebral osteophytes, which may be chronic. No paraspinous hematoma, mass or inflammatory change is seen. There is extensive bilateral carotid bulb calcification.      Impression:      Impression:    1. Advanced C5-6 and C6-7 degenerative disc disease; suspected moderate or greater C5-6 canal stenosis due to posterior vertebral osteophytes, which may be chronic, as noted.    2. No evidence of acute cervical spine trauma is identified.        Electronically Signed: Celestino Aguirre MD    1/20/2025 4:25 PM EST    Workstation ID: [id number]    CT Head Without Contrast [[id number]] Collected:  01/19/25 1756     Updated: 01/19/25 1803    Narrative:      CT HEAD WO CONTRAST    Date of Exam: 1/19/2025 3:26 PM EST    Indication: falls, ataxia.    Comparison: None available.    Technique: Axial CT images were obtained of the head without contrast administration.  Automated exposure control and iterative construction methods were used.      Findings:  Negative for large territory infarct, acute intracranial hemorrhage, large mass lesion, midline shift or hydrocephalus. Small focal hypodensities in the right occipital lobe example image 39 series 3 and more medially image 40 series 3, the latter   associated with encephalomalacia. Right frontal lobe hypodensity measuring 1 x 0.9 cm image 44 series 3. Parenchymal volume within normal limits for age. No large extra-axial fluid collection. Symmetric orbits. Distal carotid atherosclerotic disease.   Nonobstructive maxillary sinus disease with frothy secretions on the left. No large mastoid effusions. No aggressive bone lesions or displaced fractures.      Impression:      Impression:  1. 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 Comparisons not available to assess stability. Consider nonemergent MRI for further assessment.  2. Negative for acute intracranial hemorrhage, large mass lesion, midline shift or hydrocephalus.        Electronically Signed: Deacon Loyola MD    1/19/2025 6:00 PM EST    Workstation ID: [id number]              Impression:     -- Sepsis with leukocytosis, lactic acidosis, encephalopathy.  She has multiple potential sites of infection.  I would have concerned that she may have had bacteremia secondary to her left calcaneus osteomyelitis and could have infected her dialysis catheter which has now been in place for 102 days.  It appears that she has a UTI although unfortunately we do not have data from the micro lab.  In addition there  would be concern that she has developed prosthetic knee infection although I could not appreciate evidence of this on exam.  Another potential problem would be osteomyelitis of the cervical spine with her history of progressive neck pain.  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    --Paroxysmal atrial fibrillation    --Encephalopathy likely secondary to sepsis    PLAN/RECOMMENDATIONS:   Thank you for asking us to see Sara Esparza, I recommend the following:      -- Agree with cefepime and vancomycin while blood cultures are pending    -- Consider MRI of the cervical spine if her neck pain progresses.  At present it does not appear that she would be able to cooperate with this exam because of her encephalopathy    -- Follow-up wound cultures of the left heel.      -- Follow blood culture result although this is antibiotic modified.  Low threshold for repeat blood cultures when she is on dialysis particularly if she remains febrile    -- Orthopedic surgery is following and has ordered an MRI of the heel.    --Consider echo at some point, particularly if her blood culture is positive    This unfortunate patient with extensive comorbidity has a guarded prognosis     Discussed with  at length  Bailee Barrios MD  1/22/2025  14:36 EST

## 2025-01-22 NOTE — CONSULTS
[facility]  [address] [address]  [phone]       Orthopedic Consult    Patient: Sara Esparza    Date of Admission: 1/19/2025  3:03 AM    YOB: 1950    Medical Record Number: [id number]    Attending Physician: Heather Carlos MD    Consulting Physician: MAG Haines    Chief Complaint: Generalized weakness [R53.1]    History of Present Illness: 74 y.o. female admitted to [facility] with Generalized weakness [R53.1]. I was consulted for further evaluation and treatment.  Patient has a past medical history significant for diabetes mellitus, end-stage renal disease on hemodialysis, hypertension.  She presented to the [facility] emergency department with concerns for general malaise, several falls at home.  Orthopedics consulted in regards to left heel wound.  Patient seen on hemodialysis this morning.  She is alert and oriented to self, sleepy during my exam today but is able to be aroused to answer questions.  She states that she is unsure of how long she has had a wound on the plantar aspect of her heel.  Per chart review her has been reported that she has been followed by outpatient podiatry for several months.  She states that she has had some feelings of general malaise, pain in her left heel.  She denies any other symptoms or concerns at this time.     No Known Allergies     Home Medications:  Medications Prior to Admission   Medication Sig Dispense Refill Last Dose/Taking    acetaminophen (TYLENOL) 500 MG tablet Take 1 tablet by mouth Every 6 (Six) Hours As Needed for Mild Pain.   Taking As Needed    allopurinol (ZYLOPRIM) 100 MG tablet Take 2 tablets every day by oral route.   Taking    apixaban (ELIQUIS) 2.5 MG tablet tablet Take 1 tablet by mouth Every 12 (Twelve) Hours. 180 tablet 3 Taking    atorvastatin (LIPITOR) 10 MG tablet Take 1 tablet by mouth Daily.   Taking    isosorbide mononitrate (IMDUR) 120 MG 24 hr tablet Take 1 tablet by  mouth Daily for 360 days. 90 tablet 3 Taking    omeprazole (priLOSEC) 40 MG capsule Take 1 capsule by mouth Daily.   Taking    SITagliptin (JANUVIA) 25 MG tablet Take 1 tablet by mouth Daily. 90 tablet 3 Taking    albuterol sulfate  (90 Base) MCG/ACT inhaler Inhale 2 puffs Every 4 (Four) Hours As Needed for Wheezing or Shortness of Air.       carvedilol (COREG) 25 MG tablet Take 1 tablet by mouth Daily.       Cholecalciferol 50 MCG (2000 UT) capsule Take 1 capsule by mouth Daily.       famotidine (PEPCID) 20 MG tablet Take 1 tablet by mouth Daily.       multivitamin with minerals tablet tablet Take 1 tablet by mouth Daily.       NIFEdipine XL (PROCARDIA XL) 90 MG 24 hr tablet Take 1 tablet by mouth Daily.          Current Medications:  Scheduled Meds:allopurinol, 200 mg, Oral, Daily  atorvastatin, 10 mg, Oral, Nightly  cefepime, 1,000 mg, Intravenous, Q24H  insulin lispro, 2-9 Units, Subcutaneous, 4x Daily AC & at Bedtime  isosorbide mononitrate, 120 mg, Oral, Q24H  metroNIDAZOLE, 500 mg, Intravenous, Q8H  NIFEdipine XL, 60 mg, Oral, Q24H  pantoprazole, 40 mg, Oral, Q AM  sodium chloride, 10 mL, Intravenous, Q12H  vancomycin (dosing per levels), , Not Applicable, Daily      Continuous Infusions:Pharmacy to dose vancomycin,       PRN Meds:.  acetaminophen    senna-docusate sodium **AND** polyethylene glycol **AND** bisacodyl **AND** bisacodyl    Calcium Replacement - Follow Nurse / BPA Driven Protocol    dextrose    dextrose    glucagon (human recombinant)    heparin (porcine)    HYDROmorphone    Magnesium Standard Dose Replacement - Follow Nurse / BPA Driven Protocol    melatonin    ondansetron    Pharmacy to dose vancomycin    Potassium Replacement - Follow Nurse / BPA Driven Protocol    [COMPLETED] Insert Peripheral IV **AND** sodium chloride    sodium chloride    sodium chloride    traMADol    Past Medical History:   Diagnosis Date    Anxiety     Arthritis     Asthma     allergy induced    Back pain      Depression     Diabetes mellitus     dx 10 years ago- checks fsbs most days    Diverticula of colon     GERD (gastroesophageal reflux disease)     Head ache     Hypertension     Sleep apnea     no longer needs cpap - approx. 10 years r/t weight loss        Past Surgical History:   Procedure Laterality Date    BACK SURGERY      x 2    CARDIAC CATHETERIZATION      no intervention    CHOLECYSTECTOMY OPEN      COLONOSCOPY      2013    HYSTERECTOMY      REPLACEMENT TOTAL KNEE BILATERAL Bilateral     TONSILLECTOMY      VENTRAL/INCISIONAL HERNIA REPAIR N/A 6/6/2017    Procedure: INCISIONAL HERNIA REPAIR LAPAROSCOPIC;  Surgeon: Conrad Hernandez MD;  Location: [facility];  Service:         Social History     Occupational History    Not on file   Tobacco Use    Smoking status: Never    Smokeless tobacco: Never   Vaping Use    Vaping status: Never Used   Substance and Sexual Activity    Alcohol use: No    Drug use: No    Sexual activity: Defer      Social History     Social History Narrative    Not on file      No family history on file.      Review of Systems:   HEENT: Patient denies any headaches, vision changes, change in hearing, or tinnitus, Patient denies any rhinorrhea,epistaxis, sinus pain, mouth or dental problems, sore throat or hoarseness, or dysphagia  Pulmonary: Patient denies any cough, congestion, SOA, or wheezing  Cardiovascular: Patient denies any chest pain, dyspnea, palpitations, weakness, intolerance of exercise, varicosities, swelling of extremities, known murmur  Gastrointestinal:  Patient denies nausea, vomiting, diarrhea, constipation, loss  of appetite, change in appetite, dysphagia, gas, heartburn, melena, change in bowel habits, use of laxatives or other drugs to alter the function of the gastrointestinal tract.  Genital/Urinary: Patient denies dysuria, change in color of urine, change in frequency of urination, pain with urgency, incontinence, retention, or nocturia.  Musculoskeletal: Patient denies  increased warmth; redness; or swelling of joints; limitation of function; deformity; crepitation: pain in a joint or an extremity, the neck, or the back, especially with movement.  Neurological: Patient denies dizziness, tremor, ataxia, difficulty in speaking, change in speech, paresthesia, loss of sensation, seizures, syncope, changes in memory.  Endocrine system: Patient denies tremors, palpitations, intolerance of heat or cold, polyuria, polydipsia, polyphagia, diaphoresis, exophthalmos, or goiter.  Psychological: Patient denies thoughts/plans or harming self or other; depression,  insomnia, night terrors, javan, memory loss, disorientation.  Skin: Patient denies any bruising, rashes, discoloration, pruritus, wounds, ulcers, decubiti, changes in the hair or nails  Hematopoietic: Patient denies history of spontaneous or excessive bleeding, epistaxis, hematuria, melena, fatigue, enlarged or tender lymph nodes, pallor, history of anemia.    Physical Exam: 74 y.o. female    General Appearance:    Alert, cooperative, in no acute distress                   Vitals:    01/22/25 0510 01/22/25 0600 01/22/25 0610 01/22/25 0611   BP: 153/63      BP Location: Right arm      Patient Position: Lying      Pulse: 74 73 75 85   Resp: 20      Temp: 97.6 °F (36.4 °C)      TempSrc: Oral      SpO2: 94% 96% (!) 89% 91%   Weight:       Height:            Head:    Normocephalic, without obvious abnormality, atraumatic               Back:     No C-T-L spine tenderness   Lungs:   Symmetric chest rise and fall,respirations regular, even and    unlabored.  No accessory muscle use, speaks in complete sentences.   Chest Wall:    No abnormalities observed       Extremities: Left lower extremity: Left foot/heel is diffusely tender to palpation.  There is a wound present on the plantar aspect of the heel which measures approximately 1 cm x 1 cm with surrounding maceration, malodorous purulent appearing drainage.  There is surrounding erythema.    Pulses:   DP/PT pulses palpable   Skin:   No bleeding, bruising or rash   Lymph nodes:   No palpable adenopathy   Neurologic:  Cranial nerves 2 - 12 grossly intact, DTR present and equal bilaterally           Diagnostic Tests:    No results displayed because visit has over 200 results.          CT Abdomen Pelvis Without Contrast    Result Date: 1/21/2025  Narrative: CT ABDOMEN PELVIS WO CONTRAST, CT CHEST WO CONTRAST DIAGNOSTIC, CT LOWER EXTREMITY LEFT WO CONTRAST Date of Exam: 1/21/2025 4:45 AM EST Indication: Sepsis. Comparison: None available. Technique: Axial CT images were obtained of the chest, abdomen and pelvis as well as the left lower extremity without the administration of contrast. Reconstructed coronal and sagittal images were also obtained. Automated exposure control and iterative construction methods were used. Findings: CT Chest: Limited evaluation of the solid organs due to lack of intravenous contrast. The thyroid appears within normal limits. The trachea and the esophagus are unremarkable.  Heart size is normal. Atherosclerotic calcifications are present including within the coronary arteries. Right internal jugular PermCath present with the tip at the cavoatrial junction. No mediastinal or hilar lymphadenopathy.  No significant pericardial effusion.  The aorta and great vessels appear within normal limits.  Pulmonary artery  is unremarkable. There is no pneumothorax, pleural effusion or focal airspace consolidation. There are no suspicious lung nodules.  No significant pleural disease.  Central and segmental airways appear patent. Subcutaneous fat and underlying musculature appear within normal limits.  There are no acute osseous abnormalities or destructive bone lesions. CT Abdomen and Pelvis: The liver appears normal in size without focal abnormality. Spleen is normal size and appears homogeneous.  Stomach is nondistended.  No adrenal mass.  Kidneys appear unremarkable. Probable small  hemorrhagic or proteinaceous cyst present on the right. Mild scarring is seen surrounding the kidneys bilaterally. No hydronephrosis.  Urinary bladder is within normal limits.  There is no bowel distention.  No pneumatosis intestinalis, pneumoperitoneum or lymphadenopathy.  No significant ascites.  The appendix appears within normal limits.  Gallbladder is nonvisualized and likely resected. There is diastases of the rectus abdominal musculature. No evidence of hernia. No significant stool burden. Diverticulosis of the left hemicolon present. No significant inflammatory changes. Left-sided stimulator device present. Biliary tree is nondistended.  The pancreas appears homogeneous.  Abdominal vasculature is within normal limits. Subcutaneous fat and underlying musculature appear within normal limits.  There are no acute osseous abnormalities or destructive bone lesions. Orthopedic hardware appears unremarkable with no evidence of acute hardware failure. No acute osseous abnormality. Left lower extremity: Patchy subcutaneous air is seen along the left calcaneal region with a draining wound seen extending to the plantar aspect of the calcaneus (series 8 image 80). Mild destructive changes are seen along the plantar aspect of the calcaneus with interosseous air (series 8 image 77). Plantar enthesopathy is present within this region. No evidence of fracture. No evidence of dislocation. Diffuse soft tissue swelling is present along the plantar aspect of the foot. No definite drainable collection identified though evaluation is limited due to lack of intravenous contrast. Wound appears to extend near the bone. No additional interosseous air identified. No tibiotalar joint effusion identified. Moderate degenerative changes. Lisfranc ligament region appears unremarkable with no evidence of widening.     Impression: Impression: 1.No acute cardiopulmonary process. No acute intra-abdominal or intrapelvic process. 2.Draining wound  seen along the plantar aspect of the left calcaneus with subcutaneous air and mild destructive changes seen along the plantar aspect of the calcaneus with interosseous air present consistent with osteomyelitis. No additional osseous involvement. No definite drainable collection identified though evaluation is limited due to lack of intravenous contrast. Draining sinus tract likely present extending from the wound to the calcaneus. 3.Ancillary findings as described above. Electronically Signed: Cindy Fuller MD  1/21/2025 5:31 AM EST  Workstation ID: [id number]    CT Chest Without Contrast Diagnostic    Result Date: 1/21/2025  Narrative: CT ABDOMEN PELVIS WO CONTRAST, CT CHEST WO CONTRAST DIAGNOSTIC, CT LOWER EXTREMITY LEFT WO CONTRAST Date of Exam: 1/21/2025 4:45 AM EST Indication: Sepsis. Comparison: None available. Technique: Axial CT images were obtained of the chest, abdomen and pelvis as well as the left lower extremity without the administration of contrast. Reconstructed coronal and sagittal images were also obtained. Automated exposure control and iterative construction methods were used. Findings: CT Chest: Limited evaluation of the solid organs due to lack of intravenous contrast. The thyroid appears within normal limits. The trachea and the esophagus are unremarkable.  Heart size is normal. Atherosclerotic calcifications are present including within the coronary arteries. Right internal jugular PermCath present with the tip at the cavoatrial junction. No mediastinal or hilar lymphadenopathy.  No significant pericardial effusion.  The aorta and great vessels appear within normal limits.  Pulmonary artery  is unremarkable. There is no pneumothorax, pleural effusion or focal airspace consolidation. There are no suspicious lung nodules.  No significant pleural disease.  Central and segmental airways appear patent. Subcutaneous fat and underlying musculature appear within normal limits.  There are no acute  osseous abnormalities or destructive bone lesions. CT Abdomen and Pelvis: The liver appears normal in size without focal abnormality. Spleen is normal size and appears homogeneous.  Stomach is nondistended.  No adrenal mass.  Kidneys appear unremarkable. Probable small hemorrhagic or proteinaceous cyst present on the right. Mild scarring is seen surrounding the kidneys bilaterally. No hydronephrosis.  Urinary bladder is within normal limits.  There is no bowel distention.  No pneumatosis intestinalis, pneumoperitoneum or lymphadenopathy.  No significant ascites.  The appendix appears within normal limits.  Gallbladder is nonvisualized and likely resected. There is diastases of the rectus abdominal musculature. No evidence of hernia. No significant stool burden. Diverticulosis of the left hemicolon present. No significant inflammatory changes. Left-sided stimulator device present. Biliary tree is nondistended.  The pancreas appears homogeneous.  Abdominal vasculature is within normal limits. Subcutaneous fat and underlying musculature appear within normal limits.  There are no acute osseous abnormalities or destructive bone lesions. Orthopedic hardware appears unremarkable with no evidence of acute hardware failure. No acute osseous abnormality. Left lower extremity: Patchy subcutaneous air is seen along the left calcaneal region with a draining wound seen extending to the plantar aspect of the calcaneus (series 8 image 80). Mild destructive changes are seen along the plantar aspect of the calcaneus with interosseous air (series 8 image 77). Plantar enthesopathy is present within this region. No evidence of fracture. No evidence of dislocation. Diffuse soft tissue swelling is present along the plantar aspect of the foot. No definite drainable collection identified though evaluation is limited due to lack of intravenous contrast. Wound appears to extend near the bone. No additional interosseous air identified. No  tibiotalar joint effusion identified. Moderate degenerative changes. Lisfranc ligament region appears unremarkable with no evidence of widening.     Impression: Impression: 1.No acute cardiopulmonary process. No acute intra-abdominal or intrapelvic process. 2.Draining wound seen along the plantar aspect of the left calcaneus with subcutaneous air and mild destructive changes seen along the plantar aspect of the calcaneus with interosseous air present consistent with osteomyelitis. No additional osseous involvement. No definite drainable collection identified though evaluation is limited due to lack of intravenous contrast. Draining sinus tract likely present extending from the wound to the calcaneus. 3.Ancillary findings as described above. Electronically Signed: Cindy Fuller MD  1/21/2025 5:31 AM EST  Workstation ID: [id number]    CT Lower Extremity Left Without Contrast    Result Date: 1/21/2025  Narrative: CT ABDOMEN PELVIS WO CONTRAST, CT CHEST WO CONTRAST DIAGNOSTIC, CT LOWER EXTREMITY LEFT WO CONTRAST Date of Exam: 1/21/2025 4:45 AM EST Indication: Sepsis. Comparison: None available. Technique: Axial CT images were obtained of the chest, abdomen and pelvis as well as the left lower extremity without the administration of contrast. Reconstructed coronal and sagittal images were also obtained. Automated exposure control and iterative construction methods were used. Findings: CT Chest: Limited evaluation of the solid organs due to lack of intravenous contrast. The thyroid appears within normal limits. The trachea and the esophagus are unremarkable.  Heart size is normal. Atherosclerotic calcifications are present including within the coronary arteries. Right internal jugular PermCath present with the tip at the cavoatrial junction. No mediastinal or hilar lymphadenopathy.  No significant pericardial effusion.  The aorta and great vessels appear within normal limits.  Pulmonary artery  is unremarkable. There is no  pneumothorax, pleural effusion or focal airspace consolidation. There are no suspicious lung nodules.  No significant pleural disease.  Central and segmental airways appear patent. Subcutaneous fat and underlying musculature appear within normal limits.  There are no acute osseous abnormalities or destructive bone lesions. CT Abdomen and Pelvis: The liver appears normal in size without focal abnormality. Spleen is normal size and appears homogeneous.  Stomach is nondistended.  No adrenal mass.  Kidneys appear unremarkable. Probable small hemorrhagic or proteinaceous cyst present on the right. Mild scarring is seen surrounding the kidneys bilaterally. No hydronephrosis.  Urinary bladder is within normal limits.  There is no bowel distention.  No pneumatosis intestinalis, pneumoperitoneum or lymphadenopathy.  No significant ascites.  The appendix appears within normal limits.  Gallbladder is nonvisualized and likely resected. There is diastases of the rectus abdominal musculature. No evidence of hernia. No significant stool burden. Diverticulosis of the left hemicolon present. No significant inflammatory changes. Left-sided stimulator device present. Biliary tree is nondistended.  The pancreas appears homogeneous.  Abdominal vasculature is within normal limits. Subcutaneous fat and underlying musculature appear within normal limits.  There are no acute osseous abnormalities or destructive bone lesions. Orthopedic hardware appears unremarkable with no evidence of acute hardware failure. No acute osseous abnormality. Left lower extremity: Patchy subcutaneous air is seen along the left calcaneal region with a draining wound seen extending to the plantar aspect of the calcaneus (series 8 image 80). Mild destructive changes are seen along the plantar aspect of the calcaneus with interosseous air (series 8 image 77). Plantar enthesopathy is present within this region. No evidence of fracture. No evidence of dislocation.  Diffuse soft tissue swelling is present along the plantar aspect of the foot. No definite drainable collection identified though evaluation is limited due to lack of intravenous contrast. Wound appears to extend near the bone. No additional interosseous air identified. No tibiotalar joint effusion identified. Moderate degenerative changes. Lisfranc ligament region appears unremarkable with no evidence of widening.     Impression: Impression: 1.No acute cardiopulmonary process. No acute intra-abdominal or intrapelvic process. 2.Draining wound seen along the plantar aspect of the left calcaneus with subcutaneous air and mild destructive changes seen along the plantar aspect of the calcaneus with interosseous air present consistent with osteomyelitis. No additional osseous involvement. No definite drainable collection identified though evaluation is limited due to lack of intravenous contrast. Draining sinus tract likely present extending from the wound to the calcaneus. 3.Ancillary findings as described above. Electronically Signed: Cindy Fuller MD  1/21/2025 5:31 AM EST  Workstation ID: [id number]    CT Cervical Spine Without Contrast    Result Date: 1/20/2025  Narrative: CT CERVICAL SPINE WO CONTRAST Date of Exam: 1/20/2025 2:29 PM EST Indication: c7 pain after fall. Comparison: None available. Technique: Axial CT images were obtained of the cervical spine without contrast administration.  Reconstructed coronal and sagittal images were also obtained. Automated exposure control and iterative construction methods were used. Findings: There is slight reversal of the normal cervical lordosis associated with advanced degenerative disc disease at C5-6 and C6-7. There is mild posterior subluxation of C5 with respect to both C4 and C6, likely as result of underlying degenerative disc disease. There are prominent posterior osteophytes at both C5-6 and C6-7. No vertebral compression deformity is seen. No fracture is identified.  There is mild nuchal ligament calcification but no evidence of acute posterior spinous process avulsion. Prevertebral soft tissues appear normal. Facet joints appear normally aligned. Coronal images show extensive left-sided multilevel facet DJD and milder facet degenerative change on the right, but no evidence of acute bony trauma. Axial bone images show no evidence of skull base fracture. The dens and ring of C1 appear normally aligned and intact. No vertebral body fracture or posterior element fracture is identified. Soft tissue axial images show moderate, possibly marked canal stenosis at C5-6 due to large posterior vertebral osteophytes, which may be chronic. No paraspinous hematoma, mass or inflammatory change is seen. There is extensive bilateral carotid bulb calcification.     Impression: Impression: 1. Advanced C5-6 and C6-7 degenerative disc disease; suspected moderate or greater C5-6 canal stenosis due to posterior vertebral osteophytes, which may be chronic, as noted. 2. No evidence of acute cervical spine trauma is identified. Electronically Signed: Celestino Aguirre MD  1/20/2025 4:25 PM EST  Workstation ID: [id number]    CT Head Without Contrast    Result Date: 1/19/2025  Narrative: CT HEAD WO CONTRAST Date of Exam: 1/19/2025 3:26 PM EST Indication: falls, ataxia. Comparison: None available. Technique: Axial CT images were obtained of the head without contrast administration.  Automated exposure control and iterative construction methods were used. Findings: Negative for large territory infarct, acute intracranial hemorrhage, large mass lesion, midline shift or hydrocephalus. Small focal hypodensities in the right occipital lobe example image 39 series 3 and more medially image 40 series 3, the latter associated with encephalomalacia. Right frontal lobe hypodensity measuring 1 x 0.9 cm image 44 series 3. Parenchymal volume within normal limits for age. No large extra-axial fluid collection. Symmetric orbits.  Distal carotid atherosclerotic disease. Nonobstructive maxillary sinus disease with frothy secretions on the left. No large mastoid effusions. No aggressive bone lesions or displaced fractures.     Impression: Impression: 1. 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 Comparisons not available to assess stability. Consider nonemergent MRI for further assessment. 2. Negative for acute intracranial hemorrhage, large mass lesion, midline shift or hydrocephalus. Electronically Signed: Deacon Loyola MD  1/19/2025 6:00 PM EST  Workstation ID: [id number]    XR Chest 1 View    Result Date: 1/19/2025  Narrative: XR CHEST 1 VW Date of Exam: 1/19/2025 4:30 AM EST Indication: Cough Comparison: 1/18/2025. Findings: Right internal jugular PermCath present with the tip at the cavoatrial junction. There are no airspace consolidations. No pleural fluid. No pneumothorax. The pulmonary vasculature appears within normal limits. The cardiac and mediastinal silhouette appear unremarkable. No acute osseous abnormality identified.     Impression: Impression: No acute cardiopulmonary process. Electronically Signed: Cindy Fuller MD  1/19/2025 4:56 AM EST  Workstation ID: [id number]    CT Chest Without Contrast Diagnostic    Result Date: 1/18/2025  Narrative: CT CHEST WO CONTRAST DIAGNOSTIC Date of Exam: 1/18/2025 10:46 PM EST Indication: shortness of breath, back pain. Comparison: 1/16/2025, 1/18/2025. Technique: Axial CT images were obtained of the chest without contrast administration.  Reconstructed coronal and sagittal images were also obtained. Automated exposure control and iterative construction methods were used. Findings: Hilum and Mediastinum: No pathologically enlarged lymph nodes.  Normal heart size.   No pericardial effusion.  Unremarkable thoracic aorta and pulmonary arteries. Right internal jugular PermCath present with  the tip in the distal SVC. Atherosclerotic calcifications are present including within the coronary arteries. Lung Parenchyma and Pleura: No focal consolidations.  No suspicious pulmonary nodules.  No endobronchial lesions.  No significant pleural effusions. Upper Abdomen: No acute process. Soft tissues: Unremarkable. Osseous structures: No aggressive focal lytic or sclerotic osseous lesions. Moderate degenerative changes are present within the spine. Postsurgical changes are seen related to previous spinal fusion with no evidence of acute hardware failure. No acute osseous abnormality.     Impression: Impression: 1.No acute cardiopulmonary process. 2.Ancillary findings as described above. Electronically Signed: Cindy Fuller MD  1/18/2025 10:52 PM EST  Workstation ID: [id number]    XR Chest 1 View    Result Date: 1/18/2025  Narrative: XR CHEST 1 VW Date of Exam: 1/18/2025 9:49 PM EST Indication: Weak/Dizzy/AMS triage protocol Comparison: 1/16/2025. Findings: Right internal jugular PermCath present with the tip in the distal SVC. There are no airspace consolidations. No pleural fluid. No pneumothorax. The pulmonary vasculature appears within normal limits. The cardiac and mediastinal silhouette appear unremarkable. No acute osseous abnormality identified.     Impression: Impression: No acute cardiopulmonary process. Electronically Signed: Cindy Fuller MD  1/18/2025 10:09 PM EST  Workstation ID: [id number]    CT Angiogram Chest Pulmonary Embolism    Result Date: 1/16/2025  Narrative: CT ANGIOGRAM CHEST PULMONARY EMBOLISM Date of Exam: 1/16/2025 8:24 PM EST Indication: acute sob, elevated dimer, eval for PE; dialysis pt. Comparison: 10/12/2024. Technique: Axial CT images were obtained of the chest after the uneventful intravenous administration of 85 mL Isovue-370 utilizing pulmonary embolism protocol.  In addition, a 3-D volume rendered image was created for interpretation.  Reconstructed coronal and sagittal images were  also obtained. Automated exposure control and iterative construction methods were used. Findings: There is no pathologic axillary adenopathy or other worrisome body wall soft tissue findings in the chest. No acute findings are present in the partially characterized upper abdomen. There is no pleural or pericardial effusion. The pulmonary arteries are  well opacified, without evidence of focal filling defect. No aneurysmal thoracic aorta. The lung fields demonstrate no evidence of acute infectious process or distinct suspicious focal pulmonary nodularity. The osseous structures demonstrate multilevel spondylosis, without evidence of acute fracture or aggressive osseous lesion.     Impression: Impression: No pulmonary embolus or other acute finding in the chest. Electronically Signed: Angelito Ramires MD  1/16/2025 8:55 PM EST  Workstation ID: [id number]    XR Chest 1 View    Result Date: 1/16/2025  Narrative: XR CHEST 1 VW Date of Exam: 1/16/2025 5:17 PM EST Indication: Chest Pain Triage Protocol Comparison: 10/12/2024 Findings: Right IJ PermCath in satisfactory position. Lungs are clear bilaterally. Cardiac mediastinal contours are within normal limits. Regional skeleton is unremarkable. Postsurgical changes of the lumbar spine.     Impression: Impression: No acute cardiopulmonary disease. Electronically Signed: Josh Barrett MD  1/16/2025 5:45 PM EST  Workstation ID: [id number]    Ultrasound-guided vascular access    Result Date: 1/7/2025  Narrative: Vascular Procedure  Demographics  Patient Name       CLARISSA SNELL    Age                   74  Patient Number     [id number]        Gender                Female  Account Number     [id number]        Race                    Medical Record #   [id number]        Ethnicity  Corporate ID       [id number]        Height                68  Date of Birth      1950        Weight                178  Accession Number   [id number]          BSA                   1.95 m^2   Room Number                          BMI                   27.06 kg/m^2  Referring          YARIEL RUSHING MD      Interpreting          YARIEL RUSHING MD  Physician                            Physician  Sonographer        Ness [facility] RVT Procedure Type of Study:  US GUIDED VASCULAR ACCESS : .  Impressions  Summary  INDICATION: Anginal chest pain at rest (I20.69)  #####################################  RIGHT:  Ultrasound guided vascular access of the radial artery.  Radial artery was 0.5 cm in depth and patent.  Access was not successful due to non-advancing wire.  Ultrasound guided access of the common femoral artery.  CFA was 3.5 cm in depth and patent.  Access was successful.  ##################################### Allergies   - NSAIDS.  Patient Status:Outpatient. Study Location:Vascular Lab. Technical Quality:Good visualization.  Signature  ----------------------------------------------------------------  Electronically signed by YARIEL RUSHING MD(Interpreting Physician)  on 01/07/2025 19:34  ----------------------------------------------------------------    CARDIAC CATH - LEFT HEART CATH W/ POSSIBLE INTERVENTION    Result Date: 1/7/2025  Narrative: LEFT HEART CATHETERIZATION INDICATION: Recurrent CP and ER visits. REFERRING MD: Dr. BELL Roberts TECHNIQUE: A 5/6-Chinese sheath was placed in the right radial artery.  5 mg of verapamil and 50 units/kg heparin were administered via the radial artery sheath.   Selective angiography revealed a tortuous right radial artery at the forearm level.   Unable to advance the Ramu catheter beyond the antecubital fossa. 6 Chinese sheath was then placed into the right common femoral artery using ultrasound guidance.  JL 4 JR4 catheters were used for selective angiography of the left and right coronary arteries.  6 Chinese pigtail catheter was used to obtain pressures in the left ventricle.   Following the diagnostic catheterization, the radial artery sheath was removed  and the access site successfully compressed using a TR band.  6 Angolan Angio-Seal device was used to successfully secured to the right common femoral artery.  No complications.   The patient received 5 mg of verapamil and 50 units/kg of heparin via the radial artery sheath.  She received moderate conscious sedation including intravenous Versed and fentanyl.  She was monitored by me for more than 30 minutes and remained stable hemodynamically without respiratory distress.   She was given intravenous Cardene x 2 for severe hypertension. HEMODYNAMICS: Left ventricle 198/12 mmHg, aorta 200/83 mmHg. DIAGNOSES: 1. Mild coronary artery atherosclerosis. 2. Normal left ventricular filling pressure without gradient across the aortic valve. CORONARY ANATOMY: 1. Left main trunk:  Angiographically normal. 2. Left anterior descending artery: Large caliber tortuous vessel, which gives rise to moderate diagonal branch before extending to the apex.  Mild atherosclerosis present in the left anterior descending artery. 3. Circumflex artery: Large tortuous caliber vessel, which gives rise to a small caliber high lateral branch and a moderate caliber posterolateral branch.  Luminal irregularities present in the circumflex artery. 5. Right coronary artery:  Dominant vessel.  Large caliber vessel with calcification, which gives rise to a small caliber posterior descending artery and a small caliber posterolateral branch.  40% narrowing resent in the ostium of the right coronary artery. 6. Left ventricle:  Normal left ventricular filling pressure without gradient across the aortic valve.  Left ventriculography was not performed. 7.  Radial artery: Severe tortuosity right radial artery at the forearm level. IMPRESSION: Angiographically, the patient has mild coronary artery atherosclerosis.   There is normal left ventricular systolic function.  There is normal filling pressure without gradient across the aortic valve.  Medical management and  lifestyle optimization of cardiovascular risk factors are recommended.        Assessment:      Generalized weakness           Plan:      74-year-old female with multiple medical comorbidities, right heel wound.      CT scan of the left lower extremity demonstrates evidence of calcaneal osteomyelitis, subcutaneous air and sinus tract which extends to the calcaneus.     Consider left foot MRI as well as vascular studies.    Discussed further management options with the patient at bedside this morning.  We discussed both surgical as well as nonsurgical management options.  Given her clinical exam and imaging findings, anticipate she would benefit from minimum left heel irrigation, debridement, possible wound vacuum-assisted closure.  We discussed that she is at high risk for left below-knee amputation.    Further recommendations pending imaging/physician review.  No imminent plan for orthopedic intervention today.    Will follow.    Date: 1/22/2025    MAG Haines

## 2025-01-22 NOTE — PROGRESS NOTES
[facility] Medicine Services  PROGRESS NOTE    Patient Name: Sara Esparza  : 1950  MRN: [id number]    Date of Admission: 2025  Primary Care Physician: Toribio Roberts MD    Subjective   Subjective     CC: weakness, progressive    HPI:   RRT called this am during HD (about 1.5 hours into HD) due to patient complaining of chest pain and noted HR in the 140s-150s. EKG showed Afib with RVR.  Pt states that she has a h/o Afib with RVR and chest pain feels similar to that.  Pt also very lethargic.       Objective   Objective     Vital Signs:   Temp:  [97.5 °F (36.4 °C)-98 °F (36.7 °C)] 97.6 °F (36.4 °C)  Heart Rate:  [61-85] 85  Resp:  [18-20] 20  BP: (108-153)/(49-63) 153/63  Flow (L/min) (Oxygen Therapy):  [2-3] 3     Physical Exam:  Gen:  lethargic but answers questions appropriately, falls back to sleep quickly, seen on HD  Neuro: oriented, clear slurred  HEENT:  NC/AT   Neck:  trach midline   Heart irregularly irregular with rates in the 110s, appears Afib with RVR on tele  TDC:  no redness or tenderness   Lungs CTA  Abd:  Soft, nontender, no rebound or guarding, pos BS  Extrem:  No c/c/e.  Left heel wound:  purulent, malodorous with wound dressings in place.   R foot without lesions.       Results Reviewed:  LAB RESULTS:      Lab 25  0652 25  1327 25  0446 25  0432 25  0624 25  0403 25  2259 25  2149 25  1645   WBC 10.19 12.78*  --   --  15.03* 14.36*  --  13.47*  --  11.08*   HEMOGLOBIN 10.1* 9.3*  --   --  11.1* 11.7*  --  11.5*  --  12.4   HEMATOCRIT 32.3* 29.1*  --   --  33.9* 36.1  --  34.7  --  37.5   PLATELETS 140 151  --   --  177 208  --  220  --  249   NEUTROS ABS 7.77* 10.18*  --   --  12.73* 12.42*  --  11.62*  --  8.68*   IMMATURE GRANS (ABS) 0.07* 0.08*  --   --  0.05 0.06*  --  0.04  --  0.02   LYMPHS ABS 0.53* 0.62*  --   --  0.44* 0.41*  --  0.53*  --  1.30   MONOS ABS 1.77* 1.89*  --    --  1.79* 1.46*  --  1.27*  --  1.02*   EOS ABS 0.03 0.00  --   --  0.00 0.00  --  0.00  --  0.03   .6* 100.0*  --   --  96.9 98.1*  --  96.4  --  97.4*   PROCALCITONIN  --   --   --  5.99*  --   --   --   --   --   --    LACTATE  --  1.3 4.1*  --   --   --   --   --   --   --    D DIMER QUANT  --   --   --   --   --   --   --   --   --  0.99*   HSTROP T  --   --   --   --   --  132* 128* 134*   < > 87*    < > = values in this interval not displayed.         Lab 01/22/25 0652 01/21/25 0432 01/20/25 0624 01/19/25 0403 01/18/25  2149   SODIUM 133* 134* 131* 133* 132*   POTASSIUM 4.2 4.7 4.2 4.7 4.4   CHLORIDE 96* 94* 94* 94* 94*   CO2 22.0 24.0 19.0* 23.0 22.0   ANION GAP 15.0 16.0* 18.0* 16.0* 16.0*   BUN 67* 52* 79* 58* 52*   CREATININE 5.49* 4.68* 6.51* 5.40* 5.02*   EGFR 7.6* 9.2* 6.2* 7.8* 8.5*   GLUCOSE 149* 187* 173* 173* 167*   CALCIUM 9.3 10.8* 10.3 10.6* 10.7*   MAGNESIUM  --   --   --   --  2.3   PHOSPHORUS 5.5*  --   --   --   --    HEMOGLOBIN A1C  --   --   --  4.90  --    TSH  --   --   --  1.250  --          Lab 01/22/25 0652 01/21/25 0432 01/20/25 0624 01/19/25 0403 01/18/25  2149 01/16/25  1645   TOTAL PROTEIN 4.5* 6.6 6.0 6.5 7.0 7.1   ALBUMIN 2.4* 3.3* 3.0* 3.6 3.8 4.0   GLOBULIN 2.1 3.3 3.0 2.9 3.2 3.1   ALT (SGPT) 32 49* 29 23 21 10   AST (SGOT) 24 54* 35* 32 33* 15   BILIRUBIN 0.2 0.3 0.3 0.6 0.5 0.4   ALK PHOS 79 114 98 96 90 96   LIPASE  --   --   --   --   --  68*         Lab 01/19/25  0403 01/18/25  2259 01/18/25  2149 01/16/25 2011 01/16/25  1645   PROBNP  --   --  8,028.0*  --  4,232.0*   HSTROP T 132* 128* 134* 89* 87*             Lab 01/19/25  0956   VITAMIN B 12 1,272*         Brief Urine Lab Results  (Last result in the past 365 days)        Color   Clarity   Blood   Leuk Est   Nitrite   Protein   CREAT   Urine HCG        01/19/25 1404 Yellow   Cloudy   Moderate (2+)   Moderate (2+)   Negative   >=300 mg/dL (3+)                   Microbiology Results Abnormal        Procedure Component Value - Date/Time    MRSA Screen, PCR (Inpatient) - Swab, Nares [[id number]]  (Abnormal) Collected: 01/21/25 0614    Lab Status: Final result Specimen: Swab from Nares Updated: 01/21/25 0811     MRSA PCR Positive    Narrative:      The negative predictive value of this diagnostic test is high and should only be used to consider de-escalating anti-MRSA therapy. A positive result may indicate colonization with MRSA and must be correlated clinically.            CT Abdomen Pelvis Without Contrast    Result Date: 1/21/2025  CT ABDOMEN PELVIS WO CONTRAST, CT CHEST WO CONTRAST DIAGNOSTIC, CT LOWER EXTREMITY LEFT WO CONTRAST Date of Exam: 1/21/2025 4:45 AM EST Indication: Sepsis. Comparison: None available. Technique: Axial CT images were obtained of the chest, abdomen and pelvis as well as the left lower extremity without the administration of contrast. Reconstructed coronal and sagittal images were also obtained. Automated exposure control and iterative construction methods were used. Findings: CT Chest: Limited evaluation of the solid organs due to lack of intravenous contrast. The thyroid appears within normal limits. The trachea and the esophagus are unremarkable.  Heart size is normal. Atherosclerotic calcifications are present including within the coronary arteries. Right internal jugular PermCath present with the tip at the cavoatrial junction. No mediastinal or hilar lymphadenopathy.  No significant pericardial effusion.  The aorta and great vessels appear within normal limits.  Pulmonary artery  is unremarkable. There is no pneumothorax, pleural effusion or focal airspace consolidation. There are no suspicious lung nodules.  No significant pleural disease.  Central and segmental airways appear patent. Subcutaneous fat and underlying musculature appear within normal limits.  There are no acute osseous abnormalities or destructive bone lesions. CT Abdomen and Pelvis: The liver appears normal in  size without focal abnormality. Spleen is normal size and appears homogeneous.  Stomach is nondistended.  No adrenal mass.  Kidneys appear unremarkable. Probable small hemorrhagic or proteinaceous cyst present on the right. Mild scarring is seen surrounding the kidneys bilaterally. No hydronephrosis.  Urinary bladder is within normal limits.  There is no bowel distention.  No pneumatosis intestinalis, pneumoperitoneum or lymphadenopathy.  No significant ascites.  The appendix appears within normal limits.  Gallbladder is nonvisualized and likely resected. There is diastases of the rectus abdominal musculature. No evidence of hernia. No significant stool burden. Diverticulosis of the left hemicolon present. No significant inflammatory changes. Left-sided stimulator device present. Biliary tree is nondistended.  The pancreas appears homogeneous.  Abdominal vasculature is within normal limits. Subcutaneous fat and underlying musculature appear within normal limits.  There are no acute osseous abnormalities or destructive bone lesions. Orthopedic hardware appears unremarkable with no evidence of acute hardware failure. No acute osseous abnormality. Left lower extremity: Patchy subcutaneous air is seen along the left calcaneal region with a draining wound seen extending to the plantar aspect of the calcaneus (series 8 image 80). Mild destructive changes are seen along the plantar aspect of the calcaneus with interosseous air (series 8 image 77). Plantar enthesopathy is present within this region. No evidence of fracture. No evidence of dislocation. Diffuse soft tissue swelling is present along the plantar aspect of the foot. No definite drainable collection identified though evaluation is limited due to lack of intravenous contrast. Wound appears to extend near the bone. No additional interosseous air identified. No tibiotalar joint effusion identified. Moderate degenerative changes. Lisfranc ligament region appears  unremarkable with no evidence of widening.     Impression: Impression: 1.No acute cardiopulmonary process. No acute intra-abdominal or intrapelvic process. 2.Draining wound seen along the plantar aspect of the left calcaneus with subcutaneous air and mild destructive changes seen along the plantar aspect of the calcaneus with interosseous air present consistent with osteomyelitis. No additional osseous involvement. No definite drainable collection identified though evaluation is limited due to lack of intravenous contrast. Draining sinus tract likely present extending from the wound to the calcaneus. 3.Ancillary findings as described above. Electronically Signed: Cindy Fuller MD  1/21/2025 5:31 AM EST  Workstation ID: [id number]    CT Chest Without Contrast Diagnostic    Result Date: 1/21/2025  CT ABDOMEN PELVIS WO CONTRAST, CT CHEST WO CONTRAST DIAGNOSTIC, CT LOWER EXTREMITY LEFT WO CONTRAST Date of Exam: 1/21/2025 4:45 AM EST Indication: Sepsis. Comparison: None available. Technique: Axial CT images were obtained of the chest, abdomen and pelvis as well as the left lower extremity without the administration of contrast. Reconstructed coronal and sagittal images were also obtained. Automated exposure control and iterative construction methods were used. Findings: CT Chest: Limited evaluation of the solid organs due to lack of intravenous contrast. The thyroid appears within normal limits. The trachea and the esophagus are unremarkable.  Heart size is normal. Atherosclerotic calcifications are present including within the coronary arteries. Right internal jugular PermCath present with the tip at the cavoatrial junction. No mediastinal or hilar lymphadenopathy.  No significant pericardial effusion.  The aorta and great vessels appear within normal limits.  Pulmonary artery  is unremarkable. There is no pneumothorax, pleural effusion or focal airspace consolidation. There are no suspicious lung nodules.  No significant  pleural disease.  Central and segmental airways appear patent. Subcutaneous fat and underlying musculature appear within normal limits.  There are no acute osseous abnormalities or destructive bone lesions. CT Abdomen and Pelvis: The liver appears normal in size without focal abnormality. Spleen is normal size and appears homogeneous.  Stomach is nondistended.  No adrenal mass.  Kidneys appear unremarkable. Probable small hemorrhagic or proteinaceous cyst present on the right. Mild scarring is seen surrounding the kidneys bilaterally. No hydronephrosis.  Urinary bladder is within normal limits.  There is no bowel distention.  No pneumatosis intestinalis, pneumoperitoneum or lymphadenopathy.  No significant ascites.  The appendix appears within normal limits.  Gallbladder is nonvisualized and likely resected. There is diastases of the rectus abdominal musculature. No evidence of hernia. No significant stool burden. Diverticulosis of the left hemicolon present. No significant inflammatory changes. Left-sided stimulator device present. Biliary tree is nondistended.  The pancreas appears homogeneous.  Abdominal vasculature is within normal limits. Subcutaneous fat and underlying musculature appear within normal limits.  There are no acute osseous abnormalities or destructive bone lesions. Orthopedic hardware appears unremarkable with no evidence of acute hardware failure. No acute osseous abnormality. Left lower extremity: Patchy subcutaneous air is seen along the left calcaneal region with a draining wound seen extending to the plantar aspect of the calcaneus (series 8 image 80). Mild destructive changes are seen along the plantar aspect of the calcaneus with interosseous air (series 8 image 77). Plantar enthesopathy is present within this region. No evidence of fracture. No evidence of dislocation. Diffuse soft tissue swelling is present along the plantar aspect of the foot. No definite drainable collection identified  though evaluation is limited due to lack of intravenous contrast. Wound appears to extend near the bone. No additional interosseous air identified. No tibiotalar joint effusion identified. Moderate degenerative changes. Lisfranc ligament region appears unremarkable with no evidence of widening.     Impression: Impression: 1.No acute cardiopulmonary process. No acute intra-abdominal or intrapelvic process. 2.Draining wound seen along the plantar aspect of the left calcaneus with subcutaneous air and mild destructive changes seen along the plantar aspect of the calcaneus with interosseous air present consistent with osteomyelitis. No additional osseous involvement. No definite drainable collection identified though evaluation is limited due to lack of intravenous contrast. Draining sinus tract likely present extending from the wound to the calcaneus. 3.Ancillary findings as described above. Electronically Signed: Cindy Fuller MD  1/21/2025 5:31 AM EST  Workstation ID: [id number]    CT Lower Extremity Left Without Contrast    Result Date: 1/21/2025  CT ABDOMEN PELVIS WO CONTRAST, CT CHEST WO CONTRAST DIAGNOSTIC, CT LOWER EXTREMITY LEFT WO CONTRAST Date of Exam: 1/21/2025 4:45 AM EST Indication: Sepsis. Comparison: None available. Technique: Axial CT images were obtained of the chest, abdomen and pelvis as well as the left lower extremity without the administration of contrast. Reconstructed coronal and sagittal images were also obtained. Automated exposure control and iterative construction methods were used. Findings: CT Chest: Limited evaluation of the solid organs due to lack of intravenous contrast. The thyroid appears within normal limits. The trachea and the esophagus are unremarkable.  Heart size is normal. Atherosclerotic calcifications are present including within the coronary arteries. Right internal jugular PermCath present with the tip at the cavoatrial junction. No mediastinal or hilar lymphadenopathy.  No  significant pericardial effusion.  The aorta and great vessels appear within normal limits.  Pulmonary artery  is unremarkable. There is no pneumothorax, pleural effusion or focal airspace consolidation. There are no suspicious lung nodules.  No significant pleural disease.  Central and segmental airways appear patent. Subcutaneous fat and underlying musculature appear within normal limits.  There are no acute osseous abnormalities or destructive bone lesions. CT Abdomen and Pelvis: The liver appears normal in size without focal abnormality. Spleen is normal size and appears homogeneous.  Stomach is nondistended.  No adrenal mass.  Kidneys appear unremarkable. Probable small hemorrhagic or proteinaceous cyst present on the right. Mild scarring is seen surrounding the kidneys bilaterally. No hydronephrosis.  Urinary bladder is within normal limits.  There is no bowel distention.  No pneumatosis intestinalis, pneumoperitoneum or lymphadenopathy.  No significant ascites.  The appendix appears within normal limits.  Gallbladder is nonvisualized and likely resected. There is diastases of the rectus abdominal musculature. No evidence of hernia. No significant stool burden. Diverticulosis of the left hemicolon present. No significant inflammatory changes. Left-sided stimulator device present. Biliary tree is nondistended.  The pancreas appears homogeneous.  Abdominal vasculature is within normal limits. Subcutaneous fat and underlying musculature appear within normal limits.  There are no acute osseous abnormalities or destructive bone lesions. Orthopedic hardware appears unremarkable with no evidence of acute hardware failure. No acute osseous abnormality. Left lower extremity: Patchy subcutaneous air is seen along the left calcaneal region with a draining wound seen extending to the plantar aspect of the calcaneus (series 8 image 80). Mild destructive changes are seen along the plantar aspect of the calcaneus with  interosseous air (series 8 image 77). Plantar enthesopathy is present within this region. No evidence of fracture. No evidence of dislocation. Diffuse soft tissue swelling is present along the plantar aspect of the foot. No definite drainable collection identified though evaluation is limited due to lack of intravenous contrast. Wound appears to extend near the bone. No additional interosseous air identified. No tibiotalar joint effusion identified. Moderate degenerative changes. Lisfranc ligament region appears unremarkable with no evidence of widening.     Impression: Impression: 1.No acute cardiopulmonary process. No acute intra-abdominal or intrapelvic process. 2.Draining wound seen along the plantar aspect of the left calcaneus with subcutaneous air and mild destructive changes seen along the plantar aspect of the calcaneus with interosseous air present consistent with osteomyelitis. No additional osseous involvement. No definite drainable collection identified though evaluation is limited due to lack of intravenous contrast. Draining sinus tract likely present extending from the wound to the calcaneus. 3.Ancillary findings as described above. Electronically Signed: Cindy Fuller MD  1/21/2025 5:31 AM EST  Workstation ID: [id number]    CT Cervical Spine Without Contrast    Result Date: 1/20/2025  CT CERVICAL SPINE WO CONTRAST Date of Exam: 1/20/2025 2:29 PM EST Indication: c7 pain after fall. Comparison: None available. Technique: Axial CT images were obtained of the cervical spine without contrast administration.  Reconstructed coronal and sagittal images were also obtained. Automated exposure control and iterative construction methods were used. Findings: There is slight reversal of the normal cervical lordosis associated with advanced degenerative disc disease at C5-6 and C6-7. There is mild posterior subluxation of C5 with respect to both C4 and C6, likely as result of underlying degenerative disc disease.  There are prominent posterior osteophytes at both C5-6 and C6-7. No vertebral compression deformity is seen. No fracture is identified. There is mild nuchal ligament calcification but no evidence of acute posterior spinous process avulsion. Prevertebral soft tissues appear normal. Facet joints appear normally aligned. Coronal images show extensive left-sided multilevel facet DJD and milder facet degenerative change on the right, but no evidence of acute bony trauma. Axial bone images show no evidence of skull base fracture. The dens and ring of C1 appear normally aligned and intact. No vertebral body fracture or posterior element fracture is identified. Soft tissue axial images show moderate, possibly marked canal stenosis at C5-6 due to large posterior vertebral osteophytes, which may be chronic. No paraspinous hematoma, mass or inflammatory change is seen. There is extensive bilateral carotid bulb calcification.     Impression: Impression: 1. Advanced C5-6 and C6-7 degenerative disc disease; suspected moderate or greater C5-6 canal stenosis due to posterior vertebral osteophytes, which may be chronic, as noted. 2. No evidence of acute cervical spine trauma is identified. Electronically Signed: Celestino Aguirre MD  1/20/2025 4:25 PM EST  Workstation ID: [id number]         Current medications:  Scheduled Meds:allopurinol, 200 mg, Oral, Daily  atorvastatin, 10 mg, Oral, Nightly  cefepime, 1,000 mg, Intravenous, Q24H  insulin lispro, 2-9 Units, Subcutaneous, 4x Daily AC & at Bedtime  isosorbide mononitrate, 120 mg, Oral, Q24H  metroNIDAZOLE, 500 mg, Intravenous, Q8H  NIFEdipine XL, 60 mg, Oral, Q24H  pantoprazole, 40 mg, Oral, Q AM  sodium chloride, 10 mL, Intravenous, Q12H  vancomycin (dosing per levels), , Not Applicable, Daily      Continuous Infusions:Pharmacy to dose vancomycin,       PRN Meds:.  acetaminophen    senna-docusate sodium **AND** polyethylene glycol **AND** bisacodyl **AND** bisacodyl    Calcium Replacement  - Follow Nurse / BPA Driven Protocol    dextrose    dextrose    glucagon (human recombinant)    heparin (porcine)    HYDROmorphone    Magnesium Standard Dose Replacement - Follow Nurse / BPA Driven Protocol    melatonin    ondansetron    Pharmacy to dose vancomycin    Potassium Replacement - Follow Nurse / BPA Driven Protocol    [COMPLETED] Insert Peripheral IV **AND** sodium chloride    sodium chloride    sodium chloride    traMADol    Assessment & Plan   Assessment & Plan     Active Hospital Problems    Diagnosis  POA    **Generalized weakness [R53.1]  Yes      Resolved Hospital Problems   No resolved problems to display.        Brief Hospital Course to date:  Sara Esparza is a 74 y.o. female w ESRD on HD, also HTN and DM admitted for several weeks generalized weakness and myalgias and multiple falls in setting of lightheadedness- after admission she has spiked temps to 102.4; sepsis workup underway.      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    - orthopedic consult to consider debridement   - MRSA + from wound site. Consult ID      Generalized weakness progressive x weeks, falls at home   - suspicion of overdiuresis and orthostasis ongoing, w superimposed sepsis now   - Ct chest/abd without acute findings   - normal TSH and  CK   - PT OT  - orthostatics when able   - considering SNF      Neck pain , C7 region   C5-6 canal stenosis  -  CT cspine - no fx  - history of oxycodone dependence; family requests avoiding opiates.  After discussion, trying ultram   - consider C spine mri when pt more stable    ESRD on HD:  HD on MWF     DMII:  A1C 4.9   - on sitagliptin at home. SSI.  May need to decrease dose.      Hx of HTN:   Will continue home medications for now - imdur, nifedipine    H/o Afib  Episode of Afib with RVR  during HD  -- given IV metoprolol 5 mg x 1 to see if this would help  -- resume home dose beta blocker (Coreg).   -- on Eliquis at home, holding for possible intervention for osteo    Dispo: she says she will consider SNF .  OT PT     Expected Discharge Location and Transportation: home vs snf   Expected Discharge tbd  Expected Discharge Date: 1/25/2025; Expected Discharge Time:      VTE Prophylaxis:  Pharmacologic VTE prophylaxis orders are present.         AM-PAC 6 Clicks Score (PT): 11 (01/21/25 0856)    CODE STATUS:   Code Status and Medical Interventions: CPR (Attempt to Resuscitate); Full Support   Ordered at: 01/19/25 0518     Level Of Support Discussed With:    Patient     Code Status (Patient has no pulse and is not breathing):    CPR (Attempt to Resuscitate)     Medical Interventions (Patient has pulse or is breathing):    Full Support       Heather Carlos MD  01/22/25

## 2025-01-22 NOTE — PROGRESS NOTES
Nephrology note:     Rapid response was called as patient was complaining of some chest discomfort; heart rate was in 140s-150s.  She was in A-fib with RVR.  Metoprolol IV push 5 mg was given per hospitalist.  UF was turned down.  Blood pressure dropped after metoprolol push so albumin was given.  Now blood pressure is stable.  Patient looks more alert and awake.  She is still having some chest discomfort.  Need to evaluate closely.

## 2025-01-23 ENCOUNTER — ANESTHESIA EVENT (OUTPATIENT)
Dept: PERIOP | Facility: HOSPITAL | Age: 75
End: 2025-01-23
Payer: MEDICARE

## 2025-01-23 ENCOUNTER — APPOINTMENT (OUTPATIENT)
Dept: MRI IMAGING | Facility: HOSPITAL | Age: 75
DRG: 853 | End: 2025-01-23
Payer: MEDICARE

## 2025-01-23 ENCOUNTER — APPOINTMENT (OUTPATIENT)
Dept: GENERAL RADIOLOGY | Facility: HOSPITAL | Age: 75
DRG: 853 | End: 2025-01-23
Payer: MEDICARE

## 2025-01-23 LAB
ANION GAP SERPL CALCULATED.3IONS-SCNC: 13 MMOL/L (ref 5–15)
BASOPHILS # BLD AUTO: 0.01 10*3/MM3 (ref 0–0.2)
BASOPHILS NFR BLD AUTO: 0.1 % (ref 0–1.5)
BUN SERPL-MCNC: 40 MG/DL (ref 8–23)
BUN/CREAT SERPL: 10.9 (ref 7–25)
CALCIUM SPEC-SCNC: 9.1 MG/DL (ref 8.6–10.5)
CHLORIDE SERPL-SCNC: 97 MMOL/L (ref 98–107)
CO2 SERPL-SCNC: 23 MMOL/L (ref 22–29)
CREAT SERPL-MCNC: 3.66 MG/DL (ref 0.57–1)
DEPRECATED RDW RBC AUTO: 48.5 FL (ref 37–54)
EGFRCR SERPLBLD CKD-EPI 2021: 12.4 ML/MIN/1.73
EOSINOPHIL # BLD AUTO: 0.02 10*3/MM3 (ref 0–0.4)
EOSINOPHIL NFR BLD AUTO: 0.2 % (ref 0.3–6.2)
ERYTHROCYTE [DISTWIDTH] IN BLOOD BY AUTOMATED COUNT: 13.2 % (ref 12.3–15.4)
GEN 5 1HR TROPONIN T REFLEX: 101 NG/L
GLUCOSE BLDC GLUCOMTR-MCNC: 159 MG/DL (ref 70–130)
GLUCOSE BLDC GLUCOMTR-MCNC: 163 MG/DL (ref 70–130)
GLUCOSE BLDC GLUCOMTR-MCNC: 165 MG/DL (ref 70–130)
GLUCOSE BLDC GLUCOMTR-MCNC: 183 MG/DL (ref 70–130)
GLUCOSE SERPL-MCNC: 207 MG/DL (ref 65–99)
HCT VFR BLD AUTO: 33.7 % (ref 34–46.6)
HGB BLD-MCNC: 10.7 G/DL (ref 12–15.9)
IMM GRANULOCYTES # BLD AUTO: 0.07 10*3/MM3 (ref 0–0.05)
IMM GRANULOCYTES NFR BLD AUTO: 0.7 % (ref 0–0.5)
LYMPHOCYTES # BLD AUTO: 0.49 10*3/MM3 (ref 0.7–3.1)
LYMPHOCYTES NFR BLD AUTO: 4.6 % (ref 19.6–45.3)
MCH RBC QN AUTO: 31.8 PG (ref 26.6–33)
MCHC RBC AUTO-ENTMCNC: 31.8 G/DL (ref 31.5–35.7)
MCV RBC AUTO: 100.3 FL (ref 79–97)
MONOCYTES # BLD AUTO: 1.62 10*3/MM3 (ref 0.1–0.9)
MONOCYTES NFR BLD AUTO: 15.3 % (ref 5–12)
NEUTROPHILS NFR BLD AUTO: 79.1 % (ref 42.7–76)
NEUTROPHILS NFR BLD AUTO: 8.35 10*3/MM3 (ref 1.7–7)
NRBC BLD AUTO-RTO: 0 /100 WBC (ref 0–0.2)
PLATELET # BLD AUTO: 138 10*3/MM3 (ref 140–450)
PMV BLD AUTO: 11 FL (ref 6–12)
POTASSIUM SERPL-SCNC: 3.8 MMOL/L (ref 3.5–5.2)
QT INTERVAL: 280 MS
QT INTERVAL: 386 MS
QTC INTERVAL: 428 MS
QTC INTERVAL: 485 MS
RBC # BLD AUTO: 3.36 10*6/MM3 (ref 3.77–5.28)
SODIUM SERPL-SCNC: 133 MMOL/L (ref 136–145)
TROPONIN T % DELTA: -5
TROPONIN T NUMERIC DELTA: -5 NG/L
TROPONIN T SERPL HS-MCNC: 106 NG/L
WBC NRBC COR # BLD AUTO: 10.56 10*3/MM3 (ref 3.4–10.8)

## 2025-01-23 PROCEDURE — 99232 SBSQ HOSP IP/OBS MODERATE 35: CPT | Performed by: INTERNAL MEDICINE

## 2025-01-23 PROCEDURE — 25010000002 METRONIDAZOLE 500 MG/100ML SOLUTION: Performed by: STUDENT IN AN ORGANIZED HEALTH CARE EDUCATION/TRAINING PROGRAM

## 2025-01-23 PROCEDURE — 82948 REAGENT STRIP/BLOOD GLUCOSE: CPT

## 2025-01-23 PROCEDURE — 25010000002 HYDROMORPHONE PER 4 MG: Performed by: NURSE PRACTITIONER

## 2025-01-23 PROCEDURE — 25010000002 ONDANSETRON PER 1 MG: Performed by: FAMILY MEDICINE

## 2025-01-23 PROCEDURE — 93005 ELECTROCARDIOGRAM TRACING: CPT | Performed by: INTERNAL MEDICINE

## 2025-01-23 PROCEDURE — 84484 ASSAY OF TROPONIN QUANT: CPT | Performed by: NURSE PRACTITIONER

## 2025-01-23 PROCEDURE — 25010000002 HYDROMORPHONE PER 4 MG: Performed by: INTERNAL MEDICINE

## 2025-01-23 PROCEDURE — 85025 COMPLETE CBC W/AUTO DIFF WBC: CPT | Performed by: INTERNAL MEDICINE

## 2025-01-23 PROCEDURE — 71045 X-RAY EXAM CHEST 1 VIEW: CPT

## 2025-01-23 PROCEDURE — 93010 ELECTROCARDIOGRAM REPORT: CPT | Performed by: INTERNAL MEDICINE

## 2025-01-23 PROCEDURE — 25010000002 AMIODARONE IN DEXTROSE 5% 360-4.14 MG/200ML-% SOLUTION: Performed by: INTERNAL MEDICINE

## 2025-01-23 PROCEDURE — 25010000002 CEFEPIME PER 500 MG: Performed by: STUDENT IN AN ORGANIZED HEALTH CARE EDUCATION/TRAINING PROGRAM

## 2025-01-23 PROCEDURE — 80048 BASIC METABOLIC PNL TOTAL CA: CPT | Performed by: INTERNAL MEDICINE

## 2025-01-23 PROCEDURE — 63710000001 INSULIN LISPRO (HUMAN) PER 5 UNITS: Performed by: FAMILY MEDICINE

## 2025-01-23 RX ORDER — FAMOTIDINE 20 MG/1
20 TABLET, FILM COATED ORAL ONCE
Status: CANCELLED | OUTPATIENT
Start: 2025-01-23 | End: 2025-01-23

## 2025-01-23 RX ORDER — FAMOTIDINE 10 MG/ML
20 INJECTION, SOLUTION INTRAVENOUS ONCE
Status: CANCELLED | OUTPATIENT
Start: 2025-01-23 | End: 2025-01-23

## 2025-01-23 RX ORDER — TRAMADOL HYDROCHLORIDE 50 MG/1
50 TABLET ORAL EVERY 4 HOURS PRN
Status: DISCONTINUED | OUTPATIENT
Start: 2025-01-23 | End: 2025-02-12 | Stop reason: HOSPADM

## 2025-01-23 RX ORDER — LIDOCAINE HYDROCHLORIDE 10 MG/ML
0.5 INJECTION, SOLUTION EPIDURAL; INFILTRATION; INTRACAUDAL; PERINEURAL ONCE AS NEEDED
Status: CANCELLED | OUTPATIENT
Start: 2025-01-23

## 2025-01-23 RX ORDER — MIDAZOLAM HYDROCHLORIDE 1 MG/ML
0.5 INJECTION, SOLUTION INTRAMUSCULAR; INTRAVENOUS
Status: CANCELLED | OUTPATIENT
Start: 2025-01-23

## 2025-01-23 RX ORDER — NIFEDIPINE 30 MG/1
30 TABLET, EXTENDED RELEASE ORAL
Status: DISCONTINUED | OUTPATIENT
Start: 2025-01-24 | End: 2025-02-01

## 2025-01-23 RX ORDER — SODIUM CHLORIDE 0.9 % (FLUSH) 0.9 %
10 SYRINGE (ML) INJECTION AS NEEDED
Status: CANCELLED | OUTPATIENT
Start: 2025-01-23

## 2025-01-23 RX ORDER — SODIUM CHLORIDE 0.9 % (FLUSH) 0.9 %
10 SYRINGE (ML) INJECTION EVERY 12 HOURS SCHEDULED
Status: CANCELLED | OUTPATIENT
Start: 2025-01-23

## 2025-01-23 RX ORDER — METOPROLOL TARTRATE 25 MG/1
25 TABLET, FILM COATED ORAL EVERY 12 HOURS SCHEDULED
Status: DISCONTINUED | OUTPATIENT
Start: 2025-01-23 | End: 2025-01-30

## 2025-01-23 RX ORDER — AMIODARONE HYDROCHLORIDE 200 MG/1
200 TABLET ORAL 3 TIMES DAILY
Status: DISCONTINUED | OUTPATIENT
Start: 2025-01-23 | End: 2025-01-29

## 2025-01-23 RX ORDER — HYDROMORPHONE HYDROCHLORIDE 1 MG/ML
0.25 INJECTION, SOLUTION INTRAMUSCULAR; INTRAVENOUS; SUBCUTANEOUS ONCE
Status: COMPLETED | OUTPATIENT
Start: 2025-01-23 | End: 2025-01-23

## 2025-01-23 RX ORDER — SODIUM CHLORIDE, SODIUM LACTATE, POTASSIUM CHLORIDE, CALCIUM CHLORIDE 600; 310; 30; 20 MG/100ML; MG/100ML; MG/100ML; MG/100ML
9 INJECTION, SOLUTION INTRAVENOUS CONTINUOUS
Status: CANCELLED | OUTPATIENT
Start: 2025-01-24 | End: 2025-01-24

## 2025-01-23 RX ADMIN — METOPROLOL TARTRATE 25 MG: 25 TABLET, FILM COATED ORAL at 21:03

## 2025-01-23 RX ADMIN — FAMOTIDINE 20 MG: 20 TABLET, FILM COATED ORAL at 08:38

## 2025-01-23 RX ADMIN — AMIODARONE HYDROCHLORIDE 1 MG/MIN: 1.8 INJECTION, SOLUTION INTRAVENOUS at 09:43

## 2025-01-23 RX ADMIN — HYDROMORPHONE HYDROCHLORIDE 0.25 MG: 1 INJECTION, SOLUTION INTRAMUSCULAR; INTRAVENOUS; SUBCUTANEOUS at 01:53

## 2025-01-23 RX ADMIN — METRONIDAZOLE 500 MG: 500 INJECTION, SOLUTION INTRAVENOUS at 16:40

## 2025-01-23 RX ADMIN — METOPROLOL TARTRATE 5 MG: 5 INJECTION INTRAVENOUS at 03:29

## 2025-01-23 RX ADMIN — Medication 10 ML: at 21:09

## 2025-01-23 RX ADMIN — METRONIDAZOLE 500 MG: 500 INJECTION, SOLUTION INTRAVENOUS at 23:13

## 2025-01-23 RX ADMIN — AMIODARONE HYDROCHLORIDE 200 MG: 200 TABLET ORAL at 08:38

## 2025-01-23 RX ADMIN — AMIODARONE HYDROCHLORIDE 1 MG/MIN: 1.8 INJECTION, SOLUTION INTRAVENOUS at 23:43

## 2025-01-23 RX ADMIN — AMIODARONE HYDROCHLORIDE 1 MG/MIN: 1.8 INJECTION, SOLUTION INTRAVENOUS at 03:40

## 2025-01-23 RX ADMIN — METOPROLOL TARTRATE 25 MG: 25 TABLET, FILM COATED ORAL at 08:48

## 2025-01-23 RX ADMIN — NIFEDIPINE 60 MG: 60 TABLET, EXTENDED RELEASE ORAL at 08:38

## 2025-01-23 RX ADMIN — CEFEPIME HYDROCHLORIDE 1000 MG: 1 INJECTION, POWDER, FOR SOLUTION INTRAMUSCULAR; INTRAVENOUS at 14:29

## 2025-01-23 RX ADMIN — HYDROMORPHONE HYDROCHLORIDE 0.25 MG: 1 INJECTION, SOLUTION INTRAMUSCULAR; INTRAVENOUS; SUBCUTANEOUS at 06:26

## 2025-01-23 RX ADMIN — APIXABAN 2.5 MG: 2.5 TABLET, FILM COATED ORAL at 08:38

## 2025-01-23 RX ADMIN — TRAMADOL HYDROCHLORIDE 50 MG: 50 TABLET, COATED ORAL at 21:03

## 2025-01-23 RX ADMIN — ONDANSETRON 4 MG: 2 INJECTION INTRAMUSCULAR; INTRAVENOUS at 15:38

## 2025-01-23 RX ADMIN — ACETAMINOPHEN 650 MG: 325 TABLET ORAL at 11:47

## 2025-01-23 RX ADMIN — AMIODARONE HYDROCHLORIDE 200 MG: 200 TABLET ORAL at 21:03

## 2025-01-23 RX ADMIN — Medication 2.5 MG: at 21:03

## 2025-01-23 RX ADMIN — HYDROMORPHONE HYDROCHLORIDE 0.25 MG: 1 INJECTION, SOLUTION INTRAMUSCULAR; INTRAVENOUS; SUBCUTANEOUS at 18:31

## 2025-01-23 RX ADMIN — TRAMADOL HYDROCHLORIDE 50 MG: 50 TABLET, COATED ORAL at 16:40

## 2025-01-23 RX ADMIN — INSULIN LISPRO 2 UNITS: 100 INJECTION, SOLUTION INTRAVENOUS; SUBCUTANEOUS at 21:03

## 2025-01-23 RX ADMIN — ATORVASTATIN CALCIUM 10 MG: 10 TABLET, FILM COATED ORAL at 08:38

## 2025-01-23 RX ADMIN — INSULIN LISPRO 2 UNITS: 100 INJECTION, SOLUTION INTRAVENOUS; SUBCUTANEOUS at 08:37

## 2025-01-23 RX ADMIN — ACETAMINOPHEN 650 MG: 325 TABLET ORAL at 05:38

## 2025-01-23 RX ADMIN — METRONIDAZOLE 500 MG: 500 INJECTION, SOLUTION INTRAVENOUS at 08:42

## 2025-01-23 RX ADMIN — ALLOPURINOL 200 MG: 100 TABLET ORAL at 08:38

## 2025-01-23 RX ADMIN — TRAMADOL HYDROCHLORIDE 50 MG: 50 TABLET, COATED ORAL at 12:36

## 2025-01-23 RX ADMIN — INSULIN LISPRO 2 UNITS: 100 INJECTION, SOLUTION INTRAVENOUS; SUBCUTANEOUS at 11:20

## 2025-01-23 RX ADMIN — PANTOPRAZOLE SODIUM 40 MG: 40 TABLET, DELAYED RELEASE ORAL at 05:38

## 2025-01-23 RX ADMIN — INSULIN LISPRO 2 UNITS: 100 INJECTION, SOLUTION INTRAVENOUS; SUBCUTANEOUS at 16:41

## 2025-01-23 RX ADMIN — AMIODARONE HYDROCHLORIDE 200 MG: 200 TABLET ORAL at 15:38

## 2025-01-23 RX ADMIN — AMIODARONE HYDROCHLORIDE 1 MG/MIN: 1.8 INJECTION, SOLUTION INTRAVENOUS at 16:40

## 2025-01-23 NOTE — PROGRESS NOTES
[facility] Progress Note    Admission Date: 1/19/2025    Sara Esparza  1950  [id number]    Date: 1/23/2025    Antibiotics:  Anti-Infectives (From admission, onward)      Ordered     Dose/Rate Route Frequency Start Stop    01/22/25 1230  vancomycin 750 mg in sodium chloride 0.9 % 250 mL IVPB-VTB        Ordering Provider: Edmund Erwin, Amrita    750 mg  333.3 mL/hr over 45 Minutes Intravenous Once 01/22/25 1400 01/22/25 1810    01/21/25 0519  cefepime 1000 mg IVPB in 100 mL NS (MBP)        Ordering Provider: Conrad Alvarez MD    1,000 mg  over 4 Hours Intravenous Every 24 Hours 01/22/25 0600 01/29/25 1359    01/21/25 0537  vancomycin (dosing per levels)        Ordering Provider: Woo Wells, PharmD     Not Applicable Daily 01/21/25 0900 01/28/25 0859    01/21/25 0519  cefepime 1000 mg IVPB in 100 mL NS (MBP)        Ordering Provider: Conrad Alvarez MD    1,000 mg  over 30 Minutes Intravenous Once 01/21/25 0615 01/21/25 0715    01/21/25 0505  metroNIDAZOLE (FLAGYL) IVPB 500 mg        Ordering Provider: Conrad Alvarez MD    500 mg  200 mL/hr over 30 Minutes Intravenous Every 8 Hours 01/21/25 0600 01/26/25 0759    01/21/25 0513  vancomycin 2250 mg/500 mL 0.9% NS IVPB ([facility])        Ordering Provider: Woo Wells, PharmD    20 mg/kg × 108 kg  over 135 Minutes Intravenous Once 01/21/25 0600 01/21/25 0950    01/21/25 0505  Pharmacy to dose vancomycin        Ordering Provider: Conrad Alvarez MD     Not Applicable Continuous PRN 01/21/25 0500 01/28/25 0459    01/20/25 0738  cefTRIAXone (ROCEPHIN) 1,000 mg in sodium chloride 0.9 % 100 mL MBP  Status:  Discontinued        Ordering Provider: Leslye Fiore MD    1,000 mg  200 mL/hr over 30 Minutes Intravenous Every 24 Hours 01/20/25 0900 01/21/25 0519            Reason for Consultation: Sepsis and osteomyelitis of the left calcaneus     History of present illness:    Patient is a 74 y.o. 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  Evaluation in the ED included WBC 14.3 and PCT 5.9.  Subsequent lactic acid was 4.1 on 1/21.  After admission she became febrile to 101.9.  She was started on ceftriaxone and vancomycin.  Blood culture was sent yesterday afternoon.  Urinalysis showed pyuria.  Urine culture showed mixed hanny in the lab discarded the culture.  She was noted to have a left calcaneus wound.  CT scan of the chest, abdomen and pelvis were unremarkable for acute process.  CT scan of the left lower extremity showed a wound at the plantar aspect of the left calcaneus with subcutaneous and intraosseous air and destructive changes .  CT scan of the neck showed advanced C5-6 and C6-7 degenerative disc disease; suspected moderate or greater C5-6 canal stenosis due to posterior vertebral osteophytes.  Wound culture of the left heel is growing MRSA and a gram-negative ange.  At the time of my exam the patient is nonverbal other than moaning.  She does not follow commands.  Her  is at the bedside and is able to provide medical history although he is a little uncertain about timing of events such as when she started dialysis.      She had a dialysis catheter placed 9/13 which was replaced on 10/11.    1/23/2025.  Afebrile x 2 days.  She is considerably more alert today.  She remains on IV amiodarone for atrial fibrillation with RVR.  She complains of pain but does not localize it to any specific site.  Wound culture with MRSA and Morganella.  Blood culture negative.         Review of Systems:    According to her  1/22 the main complaints at home have been neck pain, shortness of breath and generalized weakness.  Evidently neither the  nor the patient were aware  of the chronic appearing left heel ulcer.  The patient has no new complaints to add to her 's history            PE:  Vital Signs  Temp  Min: 97.9 °F (36.6 °C)  Max: 98.6 °F (37 °C)  BP  Min: 120/51  Max: 156/71  Pulse  Min: 67  Max: 167  Resp  Min: 18  Max: 20  SpO2  Min: 90 %  Max: 99 %    GENERAL: Alert, appropriate, appears chronically ill  HEENT: Normocephalic, atraumatic.   No conjunctival injection. No icterus.  No oral labial lesions   NECK: No evidence of meningismusHEART: IRRR; No murmur, rubs, gallops.   LUNGS: Clear anteriorly, no wheezes or rhonchi appreciated  ABDOMEN: Abdomen bloated but soft.  No evidence of tenderness elicited with deep pressure throughout the abdomen.  Hypoactive bowel sounds.  EXT:  No cyanosis, clubbing or edema. No cord.  :  Without Corral catheter.  MSK: No joint effusions or erythema.  Bilateral knees appeared to be free of erythema or soft tissue swelling.  Left heel with a chronic appearing wound on the plantar aspect approximately 1 cm in diameter; positive surrounding erythema but no active drainage at the time of my exam but foul-smelling odor noted  SKIN: Warm and dry without cutaneous eruptions on Inspection/palpation.    NEURO: Oriented to PPT.    PSYCHIATRIC: Normal insight and judgment. Cooperative with PE       Laboratory Data    Results from last 7 days   Lab Units 01/23/25  0816 01/22/25  0652 01/21/25  1327   WBC 10*3/mm3 10.56 10.19 12.78*   HEMOGLOBIN g/dL 10.7* 10.1* 9.3*   HEMATOCRIT % 33.7* 32.3* 29.1*   PLATELETS 10*3/mm3 138* 140 151     Results from last 7 days   Lab Units 01/23/25  0816   SODIUM mmol/L 133*   POTASSIUM mmol/L 3.8   CHLORIDE mmol/L 97*   CO2 mmol/L 23.0   BUN mg/dL 40*   CREATININE mg/dL 3.66*   GLUCOSE mg/dL 207*   CALCIUM mg/dL 9.1     Results from last 7 days   Lab Units 01/22/25  0652   ALK PHOS U/L 79   BILIRUBIN mg/dL 0.2   ALT (SGPT) U/L 32   AST (SGOT) U/L 24               Estimated Creatinine Clearance: 16.8 mL/min (A)  (by C-G formula based on SCr of 3.66 mg/dL (H)).      Microbiology:  MRSA and Morganella from foot wound    Radiology:  Imaging Results (Last 72 Hours)       Procedure Component Value Units Date/Time    XR Chest 1 View [[id number]] Collected: 01/23/25 0246     Updated: 01/23/25 0250    Narrative:      XR CHEST 1 VW    Date of Exam: 1/23/2025 1:51 AM EST    Indication: chest pain    Comparison: 1/21/2025, 1/19/2025.    Findings:  Right internal jugular PermCath present with the tip at the cavoatrial junction. Low lung volumes. There are no airspace consolidations. No pleural fluid. No pneumothorax. The pulmonary vasculature appears mildly indistinct. The cardiac and mediastinal   silhouette appear unremarkable. No acute osseous abnormality identified.      Impression:      Impression:  Questionable mild pulmonary edema pattern.        Electronically Signed: Cindy Fuller MD    1/23/2025 2:47 AM EST    Workstation ID: [id number]    CT Abdomen Pelvis Without Contrast [[id number]] Collected: 01/21/25 0526     Updated: 01/21/25 0534    Narrative:      CT ABDOMEN PELVIS WO CONTRAST, CT CHEST WO CONTRAST DIAGNOSTIC, CT LOWER EXTREMITY LEFT WO CONTRAST    Date of Exam: 1/21/2025 4:45 AM EST    Indication: Sepsis.    Comparison: None available.    Technique: Axial CT images were obtained of the chest, abdomen and pelvis as well as the left lower extremity without the administration of contrast. Reconstructed coronal and sagittal images were also obtained. Automated exposure control and iterative   construction methods were used.      Findings:  CT Chest:   Limited evaluation of the solid organs due to lack of intravenous contrast. The thyroid appears within normal limits. The trachea and the esophagus are unremarkable.  Heart size is normal. Atherosclerotic calcifications are present including within the   coronary arteries. Right internal jugular PermCath present with the tip at the cavoatrial junction. No mediastinal or hilar  lymphadenopathy.  No significant pericardial effusion.  The aorta and great vessels appear within normal limits.  Pulmonary artery   is unremarkable.    There is no pneumothorax, pleural effusion or focal airspace consolidation. There are no suspicious lung nodules.  No significant pleural disease.  Central and segmental airways appear patent.      Subcutaneous fat and underlying musculature appear within normal limits.  There are no acute osseous abnormalities or destructive bone lesions.      CT Abdomen and Pelvis:  The liver appears normal in size without focal abnormality. Spleen is normal size and appears homogeneous.  Stomach is nondistended.  No adrenal mass.  Kidneys appear unremarkable. Probable small hemorrhagic or proteinaceous cyst present on the right.   Mild scarring is seen surrounding the kidneys bilaterally. No hydronephrosis.  Urinary bladder is within normal limits.  There is no bowel distention.  No pneumatosis intestinalis, pneumoperitoneum or lymphadenopathy.  No significant ascites.  The   appendix appears within normal limits.  Gallbladder is nonvisualized and likely resected. There is diastases of the rectus abdominal musculature. No evidence of hernia. No significant stool burden. Diverticulosis of the left hemicolon present. No   significant inflammatory changes. Left-sided stimulator device present. Biliary tree is nondistended.  The pancreas appears homogeneous.  Abdominal vasculature is within normal limits.    Subcutaneous fat and underlying musculature appear within normal limits.  There are no acute osseous abnormalities or destructive bone lesions. Orthopedic hardware appears unremarkable with no evidence of acute hardware failure. No acute osseous   abnormality.    Left lower extremity: Patchy subcutaneous air is seen along the left calcaneal region with a draining wound seen extending to the plantar aspect of the calcaneus (series 8 image 80). Mild destructive changes are seen  along the plantar aspect of the   calcaneus with interosseous air (series 8 image 77). Plantar enthesopathy is present within this region. No evidence of fracture. No evidence of dislocation. Diffuse soft tissue swelling is present along the plantar aspect of the foot. No definite   drainable collection identified though evaluation is limited due to lack of intravenous contrast. Wound appears to extend near the bone. No additional interosseous air identified. No tibiotalar joint effusion identified. Moderate degenerative changes.   Lisfranc ligament region appears unremarkable with no evidence of widening.        Impression:      Impression:  1.No acute cardiopulmonary process. No acute intra-abdominal or intrapelvic process.  2.Draining wound seen along the plantar aspect of the left calcaneus with subcutaneous air and mild destructive changes seen along the plantar aspect of the calcaneus with interosseous air present consistent with osteomyelitis. No additional osseous   involvement. No definite drainable collection identified though evaluation is limited due to lack of intravenous contrast. Draining sinus tract likely present extending from the wound to the calcaneus.  3.Ancillary findings as described above.                Electronically Signed: Cindy Fuller MD    1/21/2025 5:31 AM EST    Workstation ID: [id number]    CT Chest Without Contrast Diagnostic [[id number]] Collected: 01/21/25 0526     Updated: 01/21/25 0534    Narrative:      CT ABDOMEN PELVIS WO CONTRAST, CT CHEST WO CONTRAST DIAGNOSTIC, CT LOWER EXTREMITY LEFT WO CONTRAST    Date of Exam: 1/21/2025 4:45 AM EST    Indication: Sepsis.    Comparison: None available.    Technique: Axial CT images were obtained of the chest, abdomen and pelvis as well as the left lower extremity without the administration of contrast. Reconstructed coronal and sagittal images were also obtained. Automated exposure control and iterative   construction methods were  used.      Findings:  CT Chest:   Limited evaluation of the solid organs due to lack of intravenous contrast. The thyroid appears within normal limits. The trachea and the esophagus are unremarkable.  Heart size is normal. Atherosclerotic calcifications are present including within the   coronary arteries. Right internal jugular PermCath present with the tip at the cavoatrial junction. No mediastinal or hilar lymphadenopathy.  No significant pericardial effusion.  The aorta and great vessels appear within normal limits.  Pulmonary artery   is unremarkable.    There is no pneumothorax, pleural effusion or focal airspace consolidation. There are no suspicious lung nodules.  No significant pleural disease.  Central and segmental airways appear patent.      Subcutaneous fat and underlying musculature appear within normal limits.  There are no acute osseous abnormalities or destructive bone lesions.      CT Abdomen and Pelvis:  The liver appears normal in size without focal abnormality. Spleen is normal size and appears homogeneous.  Stomach is nondistended.  No adrenal mass.  Kidneys appear unremarkable. Probable small hemorrhagic or proteinaceous cyst present on the right.   Mild scarring is seen surrounding the kidneys bilaterally. No hydronephrosis.  Urinary bladder is within normal limits.  There is no bowel distention.  No pneumatosis intestinalis, pneumoperitoneum or lymphadenopathy.  No significant ascites.  The   appendix appears within normal limits.  Gallbladder is nonvisualized and likely resected. There is diastases of the rectus abdominal musculature. No evidence of hernia. No significant stool burden. Diverticulosis of the left hemicolon present. No   significant inflammatory changes. Left-sided stimulator device present. Biliary tree is nondistended.  The pancreas appears homogeneous.  Abdominal vasculature is within normal limits.    Subcutaneous fat and underlying musculature appear within normal  limits.  There are no acute osseous abnormalities or destructive bone lesions. Orthopedic hardware appears unremarkable with no evidence of acute hardware failure. No acute osseous   abnormality.    Left lower extremity: Patchy subcutaneous air is seen along the left calcaneal region with a draining wound seen extending to the plantar aspect of the calcaneus (series 8 image 80). Mild destructive changes are seen along the plantar aspect of the   calcaneus with interosseous air (series 8 image 77). Plantar enthesopathy is present within this region. No evidence of fracture. No evidence of dislocation. Diffuse soft tissue swelling is present along the plantar aspect of the foot. No definite   drainable collection identified though evaluation is limited due to lack of intravenous contrast. Wound appears to extend near the bone. No additional interosseous air identified. No tibiotalar joint effusion identified. Moderate degenerative changes.   Lisfranc ligament region appears unremarkable with no evidence of widening.        Impression:      Impression:  1.No acute cardiopulmonary process. No acute intra-abdominal or intrapelvic process.  2.Draining wound seen along the plantar aspect of the left calcaneus with subcutaneous air and mild destructive changes seen along the plantar aspect of the calcaneus with interosseous air present consistent with osteomyelitis. No additional osseous   involvement. No definite drainable collection identified though evaluation is limited due to lack of intravenous contrast. Draining sinus tract likely present extending from the wound to the calcaneus.  3.Ancillary findings as described above.                Electronically Signed: Cindy Fuller MD    1/21/2025 5:31 AM EST    Workstation ID: [id number]    CT Lower Extremity Left Without Contrast [[id number]] Collected: 01/21/25 0526     Updated: 01/21/25 0534    Narrative:      CT ABDOMEN PELVIS WO CONTRAST, CT CHEST WO CONTRAST DIAGNOSTIC, CT  LOWER EXTREMITY LEFT WO CONTRAST    Date of Exam: 1/21/2025 4:45 AM EST    Indication: Sepsis.    Comparison: None available.    Technique: Axial CT images were obtained of the chest, abdomen and pelvis as well as the left lower extremity without the administration of contrast. Reconstructed coronal and sagittal images were also obtained. Automated exposure control and iterative   construction methods were used.      Findings:  CT Chest:   Limited evaluation of the solid organs due to lack of intravenous contrast. The thyroid appears within normal limits. The trachea and the esophagus are unremarkable.  Heart size is normal. Atherosclerotic calcifications are present including within the   coronary arteries. Right internal jugular PermCath present with the tip at the cavoatrial junction. No mediastinal or hilar lymphadenopathy.  No significant pericardial effusion.  The aorta and great vessels appear within normal limits.  Pulmonary artery   is unremarkable.    There is no pneumothorax, pleural effusion or focal airspace consolidation. There are no suspicious lung nodules.  No significant pleural disease.  Central and segmental airways appear patent.      Subcutaneous fat and underlying musculature appear within normal limits.  There are no acute osseous abnormalities or destructive bone lesions.      CT Abdomen and Pelvis:  The liver appears normal in size without focal abnormality. Spleen is normal size and appears homogeneous.  Stomach is nondistended.  No adrenal mass.  Kidneys appear unremarkable. Probable small hemorrhagic or proteinaceous cyst present on the right.   Mild scarring is seen surrounding the kidneys bilaterally. No hydronephrosis.  Urinary bladder is within normal limits.  There is no bowel distention.  No pneumatosis intestinalis, pneumoperitoneum or lymphadenopathy.  No significant ascites.  The   appendix appears within normal limits.  Gallbladder is nonvisualized and likely resected. There is  diastases of the rectus abdominal musculature. No evidence of hernia. No significant stool burden. Diverticulosis of the left hemicolon present. No   significant inflammatory changes. Left-sided stimulator device present. Biliary tree is nondistended.  The pancreas appears homogeneous.  Abdominal vasculature is within normal limits.    Subcutaneous fat and underlying musculature appear within normal limits.  There are no acute osseous abnormalities or destructive bone lesions. Orthopedic hardware appears unremarkable with no evidence of acute hardware failure. No acute osseous   abnormality.    Left lower extremity: Patchy subcutaneous air is seen along the left calcaneal region with a draining wound seen extending to the plantar aspect of the calcaneus (series 8 image 80). Mild destructive changes are seen along the plantar aspect of the   calcaneus with interosseous air (series 8 image 77). Plantar enthesopathy is present within this region. No evidence of fracture. No evidence of dislocation. Diffuse soft tissue swelling is present along the plantar aspect of the foot. No definite   drainable collection identified though evaluation is limited due to lack of intravenous contrast. Wound appears to extend near the bone. No additional interosseous air identified. No tibiotalar joint effusion identified. Moderate degenerative changes.   Lisfranc ligament region appears unremarkable with no evidence of widening.        Impression:      Impression:  1.No acute cardiopulmonary process. No acute intra-abdominal or intrapelvic process.  2.Draining wound seen along the plantar aspect of the left calcaneus with subcutaneous air and mild destructive changes seen along the plantar aspect of the calcaneus with interosseous air present consistent with osteomyelitis. No additional osseous   involvement. No definite drainable collection identified though evaluation is limited due to lack of intravenous contrast. Draining sinus  tract likely present extending from the wound to the calcaneus.  3.Ancillary findings as described above.                Electronically Signed: Cindy Fuller MD    1/21/2025 5:31 AM EST    Workstation ID: [id number]    CT Cervical Spine Without Contrast [[id number]] Collected: 01/20/25 1616     Updated: 01/20/25 1628    Narrative:      CT CERVICAL SPINE WO CONTRAST    Date of Exam: 1/20/2025 2:29 PM EST    Indication: c7 pain after fall.    Comparison: None available.    Technique: Axial CT images were obtained of the cervical spine without contrast administration.  Reconstructed coronal and sagittal images were also obtained. Automated exposure control and iterative construction methods were used.      Findings:  There is slight reversal of the normal cervical lordosis associated with advanced degenerative disc disease at C5-6 and C6-7. There is mild posterior subluxation of C5 with respect to both C4 and C6, likely as result of underlying degenerative disc   disease. There are prominent posterior osteophytes at both C5-6 and C6-7. No vertebral compression deformity is seen. No fracture is identified.     There is mild nuchal ligament calcification but no evidence of acute posterior spinous process avulsion. Prevertebral soft tissues appear normal. Facet joints appear normally aligned. Coronal images show extensive left-sided multilevel facet DJD and   milder facet degenerative change on the right, but no evidence of acute bony trauma.    Axial bone images show no evidence of skull base fracture. The dens and ring of C1 appear normally aligned and intact. No vertebral body fracture or posterior element fracture is identified. Soft tissue axial images show moderate, possibly marked canal   stenosis at C5-6 due to large posterior vertebral osteophytes, which may be chronic. No paraspinous hematoma, mass or inflammatory change is seen. There is extensive bilateral carotid bulb calcification.      Impression:       Impression:    1. Advanced C5-6 and C6-7 degenerative disc disease; suspected moderate or greater C5-6 canal stenosis due to posterior vertebral osteophytes, which may be chronic, as noted.    2. No evidence of acute cervical spine trauma is identified.        Electronically Signed: Celestino Aguirre MD    1/20/2025 4:25 PM EST    Workstation ID: [id number]            I personally reviewed the radiographic studies       Impression:      -- Sepsis with leukocytosis, lactic acidosis, encephalopathy.  She has multiple potential sites of infection.  I would have concerned that she may have had bacteremia secondary to her left calcaneus osteomyelitis and could have infected her dialysis catheter which has now been in place for 102 days.  It appears that she has a UTI although unfortunately we do not have data from the micro lab.  In addition there would be concern that she has developed prosthetic knee infection although I could not appreciate evidence of this on exam.  Another potential problem would be osteomyelitis of the cervical spine with her history of progressive neck pain.  The noncontrast CT scan was nondiagnostic but this would have limited value in completely excluding the possibility of cervical osteomyelitis     --Probable osteomyelitis of the left calcaneus.  MRSA and Morganella from wound culture     --ESRD on hemodialysis     --Diabetes mellitus A1c 4.9     --Pyuria, no culture data.  I suspect that she had a UTI which has responded to her current therapy with cefepime and vancomycin     --Progressive neck pain likely secondary to degenerative arthritis but the possibility of osteomyelitis should be considered.     --Paroxysmal atrial fibrillation     --Encephalopathy likely secondary to sepsis-significantly improved     PLAN/RECOMMENDATIONS:   Thank you for asking us to see Sara Esparza, I recommend the following:        -- Agree with cefepime and vancomycin while blood cultures are pending.  Metronidazole  added for coverage of anaerobic pathogens in the foot     -- Consider MRI of the cervical spine if her neck pain progresses.  At present her pain appears to be improved     -- Follow-up wound cultures of the left heel.       -- Follow blood culture result although this is antibiotic modified.  Low threshold for repeat blood cultures when she is on dialysis particularly if she remains febrile     -- Orthopedic surgery is following and has ordered an MRI of the heel.  MRI currently on hold to see if her implantable device is MRI compatible.  There are tentative plans to proceed with debridement of the calcaneus 1/24.     --Consider echo at some point, particularly if her blood culture is positive     Discussed at length with the patient and daughter we discussed the fact that although we do not have positive cultures of urine but her clinical improvement on cefepime and vancomycin suggest that she is on appropriate agents for this.       Bailee Barrios MD  1/23/2025

## 2025-01-23 NOTE — PROGRESS NOTES
Sara Esparza       LOS: 4 days   Patient Care Team:  Toribio Roberts MD as PCP - General (Internal Medicine)    Chief Complaint: Left heel ulcer    Subjective     Interval History:     Resting comfortably in bed this morning.  Alert and oriented x 3.  No acute events overnight.  Family present at bedside.    Review of Systems:      Gen- No fevers, chills  CV- No chest pain, palpitations  Resp- No cough, dyspnea  GI- No N/V/D, abd pain    Objective     Vital Signs  Vital Signs (last 24 hours)         01/22 0700  01/23 0659 01/23 0700 01/23 0827   Most Recent      Temp (°F) 97.9 -  98.6      98     98 (36.7) 01/23 0700    Heart Rate 73 -  167       99 01/23 0341    Resp 16 -  24      18     18 01/23 0700    BP 74/54 -  156/71       132/90 01/23 0341    SpO2 (%) 90 -  99       94 01/23 0341    Flow (L/min) (Oxygen Therapy)   3       3 01/23 0600              Physical Exam:     No acute distress.  Nonlabored respirations.  Regular rate and rhythm.  Abdomen nondistended.  Left lower extremity: Dressing present is clean, dry, intact.     Results Review:     I reviewed the patient's new clinical results.    Medication Review:   Hospital Medications (active)         Dose Frequency Start End    acetaminophen (TYLENOL) tablet 650 mg 650 mg Every 6 Hours PRN 1/19/2025 --    Admin Instructions: If given for fever, use fever parameter: fever greater than 100.4 °F  Based on patient request - if ordered for moderate or severe pain, provider allows for administration of a medication prescribed for a lower pain scale.    Do not exceed 4 grams of acetaminophen in a 24 hr period. Max dose of 2gm for AST/ALT greater than 120 units/L.    If given for pain, use the following pain scale:   Mild Pain = Pain Score of 1-3, CPOT 1-2  Moderate Pain = Pain Score of 4-6, CPOT 3-4  Severe Pain = Pain Score of 7-10, CPOT 5-8    Route: Oral    albumin human 25 % IV SOLN  - ADS Override Pull   1/22/2025 --    Admin Instructions: Created by  "cabinet override    Notes to Pharmacy: Created by cabinet [email or web address]    allopurinol (ZYLOPRIM) tablet 200 mg 200 mg Daily 1/19/2025 --    Admin Instructions: ([facility]) Take with food if GI upset occurs.    Route: Oral    amiodarone (PACERONE) tablet 200 mg 200 mg 3 Times Daily 1/23/2025 --    Admin Instructions: Avoid grapefruit juice while taking this medication.    Route: Oral    amiodarone 360 mg in 200 mL D5W infusion 1 mg/min Continuous 1/22/2025 --    Admin Instructions: Central line preferred, if unavailable use large bore IV access with frequent nurse monitoring of IV site.  IV med requiring filtration 0.22 micron inline filter.    Route: Intravenous    apixaban (ELIQUIS) tablet 2.5 mg 2.5 mg Every 12 Hours Scheduled 1/22/2025 --    Admin Instructions: Tablet may be crushed and suspended in 60 mL of water or D5W and immediately delivered via NG tube.  Avoid grapefruit juice.    Route: Oral    atorvastatin (LIPITOR) tablet 10 mg 10 mg Daily 1/22/2025 3/18/2025    Admin Instructions: Avoid grapefruit juice.    Route: Oral    bisacodyl (DULCOLAX) EC tablet 5 mg 5 mg Daily PRN 1/19/2025 --    Admin Instructions: Use if no bowel movement after 12 hours.  Swallow whole. Do not crush, split, or chew tablet.    Route: Oral    Linked Group 1: Placed in \"And\" Linked Group        bisacodyl (DULCOLAX) suppository 10 mg 10 mg Daily PRN 1/19/2025 --    Admin Instructions: Use if no bowel movement after 12 hours.  Hold for diarrhea    Route: Rectal    Linked Group 1: Placed in \"And\" Linked Group        Calcium Replacement - Follow Nurse / BPA Driven Protocol  As Needed 1/19/2025 --    Admin Instructions: Open Order & Select \"[facility] Electrolyte Replacement Protocol Algorithm\" to View Details    Route: Not Applicable    cefepime 1000 mg IVPB in 100 mL NS (MBP) 1,000 mg Every 24 Hours 1/22/2025 1/29/2025    Admin Instructions: Give after dialysis on dialysis days.     Route: Intravenous    dextrose (D50W) (25 g/50 mL) IV injection 25 " g 25 g Every 15 Minutes PRN 1/19/2025 --    Admin Instructions: Blood sugar less than 70; patient has IV access - Unresponsive, NPO or Unable To Safely Swallow    Route: Intravenous    dextrose (GLUTOSE) oral gel 15 g 15 g Every 15 Minutes PRN 1/19/2025 --    Admin Instructions: BS<70, Patient Alert, Is not NPO, Can safely swallow.    Route: Oral    dilTIAZem (CARDIZEM) 125 mg in 125 mL D5W infusion 5-15 mg/hr Continuous 1/22/2025 --    Admin Instructions: For heart rate - To maintain HR less than 120, initiate infusion at 5 mg/hr. Titrate up or down 2.5-5 mg/hr every 15 minutes to use the lowest dose possible. Maximum infusion rate of 15 mg/hr. Hold for SBP less than 100 mmHg or heart rate less than 60. Contact provider if unable to maintain HR less than 120 on maximum dose. Once Heart Rate target achieved obtain vitals a minimum of every 30 minutes. For patients not in critical care areas - obtain vitals a minimum of every 4 hours.   Caution: Look alike/sound alike drug alert.   Refrigerate.    Route: Intravenous    famotidine (PEPCID) tablet 20 mg 20 mg Daily 1/22/2025 --    Route: Oral    glucagon (GLUCAGEN) injection 1 mg 1 mg Every 15 Minutes PRN 1/19/2025 --    Admin Instructions: Blood Glucose Less Than 70 - Patient Without IV Access - Unresponsive, NPO or Unable To Safely Swallow  Reconstitute powder for injection by adding 1 mL of -supplied sterile diluent or sterile water for injection to a vial containing 1 mg of the drug, to provide solutions containing 1 mg/mL. Shake vial gently to dissolve.    Route: Intramuscular    heparin (porcine) injection 2,000 Units 2,000 Units As Needed 1/20/2025 --    Route: Intracatheter    HYDROmorphone (DILAUDID) injection 0.25 mg 0.25 mg 2 Times Daily PRN 1/20/2025 1/25/2025    Admin Instructions: Based on patient request - if ordered for moderate or severe pain, provider allows for administration of a medication prescribed for a lower pain scale.  If given  "for pain, use the following pain scale:  Mild Pain = Pain Score of 1-3, CPOT 1-2  Moderate Pain = Pain Score of 4-6, CPOT 3-4  Severe Pain = Pain Score of 7-10, CPOT 5-8    Route: Intravenous    Insulin Lispro (humaLOG) injection 2-9 Units 2-9 Units 4 Times Daily Before Meals & Nightly 1/19/2025 --    Admin Instructions: Correction Insulin - Moderate Dose (Total Insulin Dose 40-60 units/day, Average Weight Patient, Patient Taking Oral Hypoglycemic)    Blood Glucose 150-199 mg/dL - 2 units  Blood Glucose 200-249 mg/dL - 4 units  Blood Glucose 250-299 mg/dL - 6 units  Blood Glucose 300-349 mg/dL - 7 units  Blood Glucose 350-400 mg/dL - 8 units  Blood Glucose greater than 400 mg/dL - 9 units & Call Provider   Caution: Look alike/sound alike drug alert    Route: Subcutaneous    Magnesium Standard Dose Replacement - Follow Nurse / BPA Driven Protocol  As Needed 1/19/2025 --    Admin Instructions: Open Order & Select \"[facility] Electrolyte Replacement Protocol Algorithm\" to View Details    Route: Not Applicable    melatonin tablet 2.5 mg 2.5 mg Nightly PRN 1/19/2025 --    Route: Oral    metoprolol tartrate (LOPRESSOR) injection 5 mg 5 mg Every 6 Hours PRN 1/22/2025 --    Admin Instructions: Hold for SBP less than 100, DBP less than 60, or heart rate less than 50. If a dose is held, please contact the provider.    Route: Intravenous    metoprolol tartrate (LOPRESSOR) tablet 25 mg 25 mg Every 12 Hours Scheduled 1/23/2025 --    Route: Oral    metroNIDAZOLE (FLAGYL) IVPB 500 mg 500 mg Every 8 Hours 1/21/2025 1/26/2025    Admin Instructions: Caution: Look alike/sound alike drug alert.  Do not refrigerate.    Route: Intravenous    NIFEdipine XL (PROCARDIA XL) 24 hr tablet 60 mg 60 mg Every 24 Hours Scheduled 1/19/2025 --    Admin Instructions: Hold for SBP less than 100, DBP less than 60.  Caution: Look alike/sound alike drug alert. Avoid grapefruit juice. Swallow whole. Do not crush, split or chew.    Route: Oral    ondansetron " "(ZOFRAN) injection 4 mg 4 mg Every 6 Hours PRN 1/19/2025 --    Admin Instructions: If BOTH ondansetron (ZOFRAN) and promethazine (PHENERGAN) are ordered use ondansetron first and THEN promethazine IF ondansetron is ineffective.    Route: Intravenous    pantoprazole (PROTONIX) EC tablet 40 mg 40 mg Every Early Morning 1/22/2025 --    Admin Instructions: Swallow whole; do not crush, split, or chew.    Route: Oral    Pharmacy to dose vancomycin  Continuous PRN 1/21/2025 1/28/2025    Route: Not Applicable    polyethylene glycol (MIRALAX) packet 17 g 17 g Daily PRN 1/19/2025 --    Admin Instructions: Use if no bowel movement after 12 hours. Mix in 6-8 ounces of water.  Use 4-8 ounces of water, tea, or juice for each 17 gram dose.    Route: Oral    Linked Group 1: Placed in \"And\" Linked Group        Potassium Replacement - Follow Nurse / BPA Driven Protocol  As Needed 1/19/2025 --    Admin Instructions: Open Order & Select \"[facility] Electrolyte Replacement Protocol Algorithm\" to View Details    Route: Not Applicable    sennosides-docusate (PERICOLACE) 8.6-50 MG per tablet 2 tablet 2 tablet 2 Times Daily PRN 1/19/2025 --    Admin Instructions: Start bowel management regimen if patient has not had a bowel movement after 12 hours.    Route: Oral    Linked Group 1: Placed in \"And\" Linked Group        sodium chloride 0.9 % flush 10 mL 10 mL As Needed 1/19/2025 --    Route: Intravenous    Linked Group 2: Placed in \"And\" Linked Group        sodium chloride 0.9 % flush 10 mL 10 mL Every 12 Hours Scheduled 1/19/2025 --    Route: Intravenous    sodium chloride 0.9 % flush 10 mL 10 mL As Needed 1/19/2025 --    Route: Intravenous    sodium chloride 0.9 % infusion 40 mL 40 mL As Needed 1/19/2025 --    Admin Instructions: Following administration of an IV intermittent medication, flush line with 40mL NS at 100mL/hr.    Route: Intravenous    traMADol (ULTRAM) tablet 50 mg 50 mg Every 12 Hours PRN 1/20/2025 1/25/2025    Admin Instructions: " Based on patient request - if ordered for moderate or severe pain, provider allows for administration of a medication prescribed for a lower pain scale.      Caution: Look alike/sound alike drug alert    If given for pain, use the following pain scale:  Mild Pain = Pain Score of 1-3, CPOT 1-2  Moderate Pain = Pain Score of 4-6, CPOT 3-4  Severe Pain = Pain Score of 7-10, CPOT 5-8    Route: Oral    vancomycin (dosing per levels)  Daily 1/21/2025 1/28/2025    Admin Instructions: Pharmacy is dosing vancomycin per levels    Route: Not Applicable              Assessment & Plan     74-year-old female with multiple medical comorbidities, left heel wound, calcaneal osteomyelitis.      Generalized weakness    Chronic osteomyelitis of left foot      CT scan of the left lower extremity demonstrates evidence of calcaneal osteomyelitis, subcutaneous air and sinus tract which extends to the calcaneus.    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    MRI of the left foot/ankle pending.  She reports this morning that she has implantable device; proceed with MRI if compatible.    I again discussed further management options with the patient and her family at bedside this morning.  We discussed surgical as well as nonsurgical management options.  Given her clinical exam findings, evidence of calcaneal osteomyelitis, anticipate she would benefit from minimum left heel irrigation, debridement, possible wound vacuum-assisted closure.  We also outlined the possibility of left below-knee amputation.  The patient is alert and oriented x 3 this morning, insist that she does not wish to consider left below-knee amputation at this time.  I advised her that left heel irrigation, debridement, wound vacuum-assisted closure may not be curative and that she is at risk for persistent osteomyelitis, sepsis, or proximal amputation, loss of  limb/life and she verbalized understanding.  She is at very high risk for limb loss.  She is adamant that she wishes to pursue limb salvage if at all possible, is agreeable to proceed with left heel irrigation, debridement, possible wound vacuum-assisted closure.    Risks discussed include but are not limited to infection, bleeding, injury to adjacent structures, wound healing complications, need for further surgery, DVT/PE, loss of limb, life.  The patient seems to understand and accepts these risks and wishes to proceed with the proposed procedure.    Tentative plan to proceed with left heel irrigation, debridement, possible wound vacuum-assisted closure tomorrow 1/24/2025 after she undergoes her scheduled hemodialysis.  N.p.o. beginning at midnight.      Will follow.    MAG Haines  01/23/25  08:27 EST

## 2025-01-23 NOTE — PROGRESS NOTES
"    [facility] Medicine Services  PROGRESS NOTE    Patient Name: Sara Esparza  : 1950  MRN: [id number]    Date of Admission: 2025  Primary Care Physician: Toribio Roberts MD    Subjective   Subjective     CC: weakness, progressive    HPI:  Doing a lot better today, mental status seems to have improved as has HR and BP.  Pt denies any complaints. She was talking with Ortho PA, her , and her son when I arrived in the room.  Pt denies any F/C overnight.   reports she had a \"rough night\" due to her Afib with RVR and chest pain associated with it.     Objective   Objective     Vital Signs:   Temp:  [97.9 °F (36.6 °C)-98.6 °F (37 °C)] 98 °F (36.7 °C)  Heart Rate:  [] 100  Resp:  [18-24] 18  BP: ()/(51-90) 120/51  Flow (L/min) (Oxygen Therapy):  [3] 3     Physical Exam:  Gen:  awake, much more alert than yesterday  Neuro: oriented, clear speech  HEENT:  NC/AT   Neck:  trach midline   Heart: RRR, no M/R/G  TDC:  no redness or tenderness   Lungs CTA  Abd:  Soft, nontender, no rebound or guarding, pos BS  Extrem:  No c/c/e.  Left heel wound:  purulent, malodorous with wound dressings in place.   R foot without lesions.       Results Reviewed:  LAB RESULTS:      Lab 25  0816 25  0159 25  0652 25  1327 25  0446 25  0432 25  0624 25  0403 25  1645   WBC 10.56  --  10.19 12.78*  --   --  15.03* 14.36*   < > 11.08*   HEMOGLOBIN 10.7*  --  10.1* 9.3*  --   --  11.1* 11.7*   < > 12.4   HEMATOCRIT 33.7*  --  32.3* 29.1*  --   --  33.9* 36.1   < > 37.5   PLATELETS 138*  --  140 151  --   --  177 208   < > 249   NEUTROS ABS 8.35*  --  7.77* 10.18*  --   --  12.73* 12.42*   < > 8.68*   IMMATURE GRANS (ABS) 0.07*  --  0.07* 0.08*  --   --  0.05 0.06*   < > 0.02   LYMPHS ABS 0.49*  --  0.53* 0.62*  --   --  0.44* 0.41*   < > 1.30   MONOS ABS 1.62*  --  1.77* 1.89*  --   --  1.79* 1.46*   < > 1.02* "   EOS ABS 0.02  --  0.03 0.00  --   --  0.00 0.00   < > 0.03   .3*  --  100.6* 100.0*  --   --  96.9 98.1*   < > 97.4*   PROCALCITONIN  --   --   --   --   --  5.99*  --   --   --   --    LACTATE  --   --   --  1.3 4.1*  --   --   --   --   --    D DIMER QUANT  --   --   --   --   --   --   --   --   --  0.99*   HSTROP T 101* 106*  --   --   --   --   --  132*   < > 87*    < > = values in this interval not displayed.         Lab 01/23/25 0816 01/22/25 0652 01/21/25 0432 01/20/25 0624 01/19/25 0403 01/18/25  2149   SODIUM 133* 133* 134* 131* 133* 132*   POTASSIUM 3.8 4.2 4.7 4.2 4.7 4.4   CHLORIDE 97* 96* 94* 94* 94* 94*   CO2 23.0 22.0 24.0 19.0* 23.0 22.0   ANION GAP 13.0 15.0 16.0* 18.0* 16.0* 16.0*   BUN 40* 67* 52* 79* 58* 52*   CREATININE 3.66* 5.49* 4.68* 6.51* 5.40* 5.02*   EGFR 12.4* 7.6* 9.2* 6.2* 7.8* 8.5*   GLUCOSE 207* 149* 187* 173* 173* 167*   CALCIUM 9.1 9.3 10.8* 10.3 10.6* 10.7*   MAGNESIUM  --   --   --   --   --  2.3   PHOSPHORUS  --  5.5*  --   --   --   --    HEMOGLOBIN A1C  --   --   --   --  4.90  --    TSH  --   --   --   --  1.250  --          Lab 01/22/25  0652 01/21/25 0432 01/20/25 0624 01/19/25 0403 01/18/25 2149 01/16/25  1645   TOTAL PROTEIN 4.5* 6.6 6.0 6.5 7.0 7.1   ALBUMIN 2.4* 3.3* 3.0* 3.6 3.8 4.0   GLOBULIN 2.1 3.3 3.0 2.9 3.2 3.1   ALT (SGPT) 32 49* 29 23 21 10   AST (SGOT) 24 54* 35* 32 33* 15   BILIRUBIN 0.2 0.3 0.3 0.6 0.5 0.4   ALK PHOS 79 114 98 96 90 96   LIPASE  --   --   --   --   --  68*         Lab 01/23/25  0816 01/23/25  0159 01/19/25  0403 01/18/25  2259 01/18/25  2149 01/16/25 2011 01/16/25  1645   PROBNP  --   --   --   --  8,028.0*  --  4,232.0*   HSTROP T 101* 106* 132* 128* 134*   < > 87*    < > = values in this interval not displayed.             Lab 01/19/25  0956   VITAMIN B 12 1,272*         Brief Urine Lab Results  (Last result in the past 365 days)        Color   Clarity   Blood   Leuk Est   Nitrite   Protein   CREAT   Urine HCG         01/19/25 1404 Yellow   Cloudy   Moderate (2+)   Moderate (2+)   Negative   >=300 mg/dL (3+)                   Microbiology Results Abnormal       Procedure Component Value - Date/Time    Wound Culture - Wound, Foot, Left [[id number]]  (Abnormal)  (Susceptibility) Collected: 01/20/25 1409    Lab Status: Preliminary result Specimen: Wound from Foot, Left Updated: 01/23/25 0739     Wound Culture Scant growth (1+) Normal Skin Kimber      Scant growth (1+) Staphylococcus aureus, MRSA     Comment: MICs to follow  Methicillin resistant Staphylococcus aureus, Patient may be an isolation risk.         Rare growth Morganella morganii ssp morganii     Comment: Cefepime and ceftriaxone to follow.          Gram Stain Few (2+) WBCs seen      Many (4+) Gram positive cocci in pairs      Rare (1+) Gram negative bacilli      Rare (1+) Gram positive bacilli    Susceptibility        Morganella morganii ssp morganii      TAYLOR      Amoxicillin + Clavulanate Resistant      Ampicillin Resistant      Ampicillin + Sulbactam Resistant      Cefazolin (Non Urine) Resistant      Cefotaxime Susceptible      Gentamicin Susceptible      Levofloxacin Susceptible      Piperacillin + Tazobactam Susceptible      Tetracycline Susceptible      Trimethoprim + Sulfamethoxazole Susceptible                       Susceptibility Comments       Morganella morganii ssp morganii    Cefotaxime susceptibility can be used as a surrogate for ceftriaxone susceptibility               MRSA Screen, PCR (Inpatient) - Swab, Nares [[id number]]  (Abnormal) Collected: 01/21/25 0614    Lab Status: Final result Specimen: Swab from Nares Updated: 01/21/25 0847     MRSA PCR Positive    Narrative:      The negative predictive value of this diagnostic test is high and should only be used to consider de-escalating anti-MRSA therapy. A positive result may indicate colonization with MRSA and must be correlated clinically.            XR Chest 1 View    Result Date: 1/23/2025  XR CHEST 1 VW  Date of Exam: 1/23/2025 1:51 AM EST Indication: chest pain Comparison: 1/21/2025, 1/19/2025. Findings: Right internal jugular PermCath present with the tip at the cavoatrial junction. Low lung volumes. There are no airspace consolidations. No pleural fluid. No pneumothorax. The pulmonary vasculature appears mildly indistinct. The cardiac and mediastinal silhouette appear unremarkable. No acute osseous abnormality identified.     Impression: Impression: Questionable mild pulmonary edema pattern. Electronically Signed: Cindy Fuller MD  1/23/2025 2:47 AM EST  Workstation ID: [id number]    Duplex Lower Extremity Art / Grafts - Bilateral CAR    Addendum Date: 1/22/2025      The right external iliac artery demonstrates no significant stenosis.   The right common femoral artery demonstrates no significant stenosis.   The right proximal deep femoral artery demonstrates no significant stenosis.   The right proximal superficial femoral artery demonstrates no significant stenosis.   The right mid superficial femoral artery demonstrates no significant stenosis.   The right distal superficial femoral artery demonstrates no significant stenosis.   The right popliteal artery demonstrates no significant stenosis.   The right anterior tibial artery demonstrates no significant stenosis.   The right tibial peroneal trunk artery demonstrates no significant stenosis.   The right posterior tibial artery demonstrates no significant stenosis.   The right peroneal artery demonstrates no significant stenosis.   The left external iliac artery demonstrates no significant stenosis.   The left common femoral artery demonstrates no significant stenosis.   The left proximal deep femoral artery demonstrates no significant stenosis.   The left proximal superficial femoral artery demonstrates no significant stenosis.   The left mid superficial femoral artery demonstrates no significant stenosis.   The left distal superficial femoral artery demonstrates  no significant stenosis.   The left popliteal artery demonstrates no significant stenosis.   The left anterior tibial artery demonstrates no significant stenosis.   The left tibial/peroneal trunk artery demonstrates no significant stenosis.   The left peroneal artery demonstrates no significant stenosis.   Due to significant calcium unable to evaluate flow in the left posterior tibial artery.     Result Date: 1/22/2025    The right external iliac artery demonstrates no significant stenosis.   The right common femoral artery demonstrates no significant stenosis.   The right proximal deep femoral artery demonstrates no significant stenosis.   The right proximal superficial femoral artery demonstrates no significant stenosis.   The right mid superficial femoral artery demonstrates no significant stenosis.   The right distal superficial femoral artery demonstrates no significant stenosis.   The right popliteal artery demonstrates no significant stenosis.   The right anterior tibial artery demonstrates no significant stenosis.   The right tibial peroneal trunk artery demonstrates no significant stenosis.   The right posterior tibial artery demonstrates no significant stenosis.   The right peroneal artery demonstrates no significant stenosis.   The left external iliac artery demonstrates no significant stenosis.   The left common femoral artery demonstrates no significant stenosis.   The left proximal deep femoral artery demonstrates no significant stenosis.   The left proximal superficial femoral artery demonstrates no significant stenosis.   The left mid superficial femoral artery demonstrates no significant stenosis.   The left distal superficial femoral artery demonstrates no significant stenosis.   The left popliteal artery demonstrates no significant stenosis.   The left anterior tibial artery demonstrates no significant stenosis.   The left tibial/peroneal trunk artery demonstrates no significant stenosis.   The  left posterior tibial artery demonstrates no significant stenosis.   The left peroneal artery demonstrates no significant stenosis.          Current medications:  Scheduled Meds:albumin human, , ,   allopurinol, 200 mg, Oral, Daily  amiodarone, 200 mg, Oral, TID  apixaban, 2.5 mg, Oral, Q12H  atorvastatin, 10 mg, Oral, Daily  cefepime, 1,000 mg, Intravenous, Q24H  famotidine, 20 mg, Oral, Daily  insulin lispro, 2-9 Units, Subcutaneous, 4x Daily AC & at Bedtime  metoprolol tartrate, 25 mg, Oral, Q12H  metroNIDAZOLE, 500 mg, Intravenous, Q8H  NIFEdipine XL, 60 mg, Oral, Q24H  pantoprazole, 40 mg, Oral, Q AM  sodium chloride, 10 mL, Intravenous, Q12H  vancomycin (dosing per levels), , Not Applicable, Daily      Continuous Infusions:amiodarone, 1 mg/min, Last Rate: 1 mg/min (01/23/25 0340)  dilTIAZem, 5-15 mg/hr  Pharmacy to dose vancomycin,       PRN Meds:.  acetaminophen    albumin human    senna-docusate sodium **AND** polyethylene glycol **AND** bisacodyl **AND** bisacodyl    Calcium Replacement - Follow Nurse / BPA Driven Protocol    dextrose    dextrose    glucagon (human recombinant)    heparin (porcine)    HYDROmorphone    Magnesium Standard Dose Replacement - Follow Nurse / BPA Driven Protocol    melatonin    metoprolol tartrate    ondansetron    Pharmacy to dose vancomycin    Potassium Replacement - Follow Nurse / BPA Driven Protocol    [COMPLETED] Insert Peripheral IV **AND** sodium chloride    sodium chloride    sodium chloride    traMADol    Assessment & Plan   Assessment & Plan     Active Hospital Problems    Diagnosis  POA    **Generalized weakness [R53.1]  Yes    Chronic osteomyelitis of left foot [M86.672]  Unknown      Resolved Hospital Problems   No resolved problems to display.        Brief Hospital Course to date:  Sara Esparza is a 74 y.o. female w ESRD on HD, PAF on Eliquis, also HTN and DM admitted for several weeks generalized weakness and myalgias and multiple falls in setting of  lightheadedness- after admission she has spiked temps to 102.4; sepsis workup underway.      Sepsis, fever, leukocytosis, confusion  UTI:  cx mixed hanny    L calcaneal osteomyelitis    - RIJ tunneled dialysis cath - increases risk of bacteremia   - blood cx ordered   - broadened abx to vanc/cefepime/flagyl.    - ortho consulted- plan for debridement and wound vac placement tomorrow, 1/24/25  - MRSA + and + Morganella from wound site. Consulted ID, appreciate Dr. Barrios's assistance       Generalized weakness progressive x weeks, falls at home   - suspicion of overdiuresis and orthostasis ongoing, w superimposed sepsis now   - Ct chest/abd without acute findings   - normal TSH and  CK   - PT OT  - orthostatics when able   - considering SNF      Neck pain , C7 region   C5-6 canal stenosis  -  CT cspine - no fx  - history of oxycodone dependence; family requests avoiding opiates.  After discussion, trying ultram   -will consider C spine mri if pain persists     ESRD on HD:  HD on MWF     DMII:  A1C 4.9   - on sitagliptin at home. SSI.  May need to decrease dose.      Hx of HTN:   Will continue home medications for now - imdur, nifedipine    H/o Afib  Episode of Afib with RVR during HD  -- given IV metoprolol 5 mg x 1 to see if this would help  -- resumed home dose beta blocker (Coreg), but still not rate controlled. Given IV Dig yesterday with no resolution of RVR.  Cards consulted, started on Amiodarone gtt and changed to Metoprolol BID. Rate now controlled.   -- on Eliquis at home, holding for possible intervention for osteo    Elevated troponin  -- in setting of ESRD  -- troponin peaked at 132 on 1/19.  In last 24 hours, it peaked at 106.         Expected Discharge Location and Transportation:  snf   Expected Discharge tbd  Expected Discharge Date: 1/27/2025; Expected Discharge Time:      VTE Prophylaxis:  Pharmacologic VTE prophylaxis orders are present.         AM-PAC 6 Clicks Score (PT): 11 (01/22/25  2000)    CODE STATUS:   Code Status and Medical Interventions: CPR (Attempt to Resuscitate); Full Support   Ordered at: 01/19/25 0518     Level Of Support Discussed With:    Patient     Code Status (Patient has no pulse and is not breathing):    CPR (Attempt to Resuscitate)     Medical Interventions (Patient has pulse or is breathing):    Full Support       Heather Carlos MD  01/23/25

## 2025-01-23 NOTE — PROGRESS NOTES
"   LOS: 4 days    Patient Care Team:  Toribio Roberts MD as PCP - General (Internal Medicine)    Subjective   Seen and examined at bedside, more alert and awake today.     Objective     Vital Signs:  Blood pressure 135/84, pulse 88, temperature 98 °F (36.7 °C), temperature source Oral, resp. rate 18, height 170.2 cm (67\"), weight 108 kg (238 lb), SpO2 94%.      Intake/Output Summary (Last 24 hours) at 1/23/2025 1734  Last data filed at 1/23/2025 0900  Gross per 24 hour   Intake 240 ml   Output 300 ml   Net -60 ml        01/22 0701 - 01/23 0700  In: 300 [I.V.:300]  Out: 1120 [Urine:300]    Physical Exam:  GENERAL: WD WF, appears comfortable  NEURO:  No focal deficit  PSYCHIATRIC: Cooperative with PE  EYE: PE, no icterus, no conjunctivitis  ENT: omm dry, dentition intact,  Hearing intact  NECK:  No JVD discernable,  Trachea midline  CV: No edema, RRR  LUNGS:  Quiet,  Nonlabored resp.  Symmetrical expansion  ABDOMEN: Nondistended, soft nontender.  : No Corral, no palp bladder  SKIN: Warm and dry without rash  + RIJ TDC       Labs:  Results from last 7 days   Lab Units 01/23/25  0816 01/22/25  0652 01/21/25  1327   WBC 10*3/mm3 10.56 10.19 12.78*   HEMOGLOBIN g/dL 10.7* 10.1* 9.3*   PLATELETS 10*3/mm3 138* 140 151     Results from last 7 days   Lab Units 01/23/25  0816 01/22/25  0652 01/21/25  0432 01/20/25  0624 01/19/25  0403 01/18/25  2149   SODIUM mmol/L 133* 133* 134* 131* 133* 132*   POTASSIUM mmol/L 3.8 4.2 4.7 4.2 4.7 4.4   CHLORIDE mmol/L 97* 96* 94* 94* 94* 94*   CO2 mmol/L 23.0 22.0 24.0 19.0* 23.0 22.0   BUN mg/dL 40* 67* 52* 79* 58* 52*   CREATININE mg/dL 3.66* 5.49* 4.68* 6.51* 5.40* 5.02*   CALCIUM mg/dL 9.1 9.3 10.8* 10.3 10.6* 10.7*   PHOSPHORUS mg/dL  --  5.5*  --   --   --   --    MAGNESIUM mg/dL  --   --   --   --   --  2.3   ALBUMIN g/dL  --  2.4* 3.3* 3.0* 3.6 3.8     Results from last 7 days   Lab Units 01/22/25  0652   ALK PHOS U/L 79   BILIRUBIN mg/dL 0.2   ALT (SGPT) U/L 32   AST (SGOT) " U/L 24           A/P:    ESRD: On HD MWF at [facility] with NAL.  Hemodialysis tomorrow as per schedule.      HTN: Blood pressure stable today    Hyponatremia:  Due to underlying renal failure.  Adjust with HD.     Hyperkalemia: Resolved.  Adjust with HD     Metabolic acidosis:  Adjust with HD     Anemia: Hemoglobin above goal.  No CORNEL unless hemoglobin <10.5.    Bone mineralization: Calcium 9.3. getting HD with 3 Ca bath (? Will change to 2.5 Ca bath)    Hyperphosphatemia: phos level 5.5     Nutrition: Low potassium low Phos high-protein diet.  Renal vitamin.     Fever: On antibiotics.  Urine culture with mixed hanny.  Patient with foot wound.  Primary team continues to evaluate for source.  Dialysis catheter possible source.  May need removed if blood cultures positive.    High risk and complexity patient.    Ruel Giraldo MD  01/23/25  17:34 EST

## 2025-01-23 NOTE — PROGRESS NOTES
[facility]  IP Progress Note      Chief Complaint/Reason for service: A-fib RVR    Subjective   Subjective: The patient states she is feels better today.  She denies shortness of breath.  She complains of diffuse pain.  Even when you touch her she complains of pain.    Past medical, surgical, social and family history reviewed in the patient's electronic medical record.    Objective     Vital Sign Min/Max for last 24 hours  Temp  Min: 97.9 °F (36.6 °C)  Max: 98.6 °F (37 °C)   BP  Min: 74/54  Max: 156/71   Pulse  Min: 73  Max: 167   Resp  Min: 18  Max: 24   SpO2  Min: 90 %  Max: 99 %   Flow (L/min) (Oxygen Therapy)  Min: 3  Max: 3      Intake/Output Summary (Last 24 hours) at 1/23/2025 0818  Last data filed at 1/22/2025 1502  Gross per 24 hour   Intake 300 ml   Output 820 ml   Net -520 ml             Current Facility-Administered Medications:     acetaminophen (TYLENOL) tablet 650 mg, 650 mg, Oral, Q6H PRN, Leslye Fiore MD, 650 mg at 01/23/25 0538    albumin human 25 % IV SOLN  - ADS Override Pull, , , ,     allopurinol (ZYLOPRIM) tablet 200 mg, 200 mg, Oral, Daily, Leslye Fiore MD, 200 mg at 01/21/25 0856    amiodarone (PACERONE) tablet 200 mg, 200 mg, Oral, TID, Naren Escobar MD    amiodarone 360 mg in 200 mL D5W infusion, 1 mg/min, Intravenous, Continuous, Naren Escobar MD, Last Rate: 33.3 mL/hr at 01/23/25 0340, 1 mg/min at 01/23/25 0340    apixaban (ELIQUIS) tablet 2.5 mg, 2.5 mg, Oral, Q12H, Naren Escobar MD, 2.5 mg at 01/22/25 2047    atorvastatin (LIPITOR) tablet 10 mg, 10 mg, Oral, Daily, Heather Carlos MD    sennosides-docusate (PERICOLACE) 8.6-50 MG per tablet 2 tablet, 2 tablet, Oral, BID PRN **AND** polyethylene glycol (MIRALAX) packet 17 g, 17 g, Oral, Daily PRN **AND** bisacodyl (DULCOLAX) EC tablet 5 mg, 5 mg, Oral, Daily PRN **AND** bisacodyl (DULCOLAX) suppository 10 mg, 10 mg, Rectal, Daily PRN, Sebastian Menjivar MD     Calcium Replacement - Follow Nurse / BPA Driven Protocol, , Not Applicable, PRN, Sebastian Menjivar MD    cefepime 1000 mg IVPB in 100 mL NS (MBP), 1,000 mg, Intravenous, Q24H, Conrad Alvarez MD, 1,000 mg at 01/22/25 1316    dextrose (D50W) (25 g/50 mL) IV injection 25 g, 25 g, Intravenous, Q15 Min PRN, Sebastian Menjivar MD    dextrose (GLUTOSE) oral gel 15 g, 15 g, Oral, Q15 Min PRN, Sebastian Menjivar MD    dilTIAZem (CARDIZEM) 125 mg in 125 mL D5W infusion, 5-15 mg/hr, Intravenous, Continuous, Heather Carlos MD    famotidine (PEPCID) tablet 20 mg, 20 mg, Oral, Daily, Heather Carlos MD    glucagon (GLUCAGEN) injection 1 mg, 1 mg, Intramuscular, Q15 Min PRN, Sebastian Menjivar MD    heparin (porcine) injection 2,000 Units, 2,000 Units, Intracatheter, PRN, Woo Barnes MD, 2,000 Units at 01/22/25 1143    HYDROmorphone (DILAUDID) injection 0.25 mg, 0.25 mg, Intravenous, BID PRN, Leslye Fiore MD, 0.25 mg at 01/23/25 0626    Insulin Lispro (humaLOG) injection 2-9 Units, 2-9 Units, Subcutaneous, 4x Daily AC & at Bedtime, Sebastian Menjivar MD, 2 Units at 01/22/25 2048    Magnesium Standard Dose Replacement - Follow Nurse / BPA Driven Protocol, , Not Applicable, PRN, Sebastian Menjivar MD    melatonin tablet 2.5 mg, 2.5 mg, Oral, Nightly PRN, Sebastian Menjivar MD, 2.5 mg at 01/22/25 2047    metoprolol tartrate (LOPRESSOR) injection 5 mg, 5 mg, Intravenous, Q6H PRN, Francisco Sanders PA-C, 5 mg at 01/23/25 0329    metoprolol tartrate (LOPRESSOR) tablet 25 mg, 25 mg, Oral, Q12H, Naren Escobar MD    metroNIDAZOLE (FLAGYL) IVPB 500 mg, 500 mg, Intravenous, Q8H, Conrad Alvarez MD, Last Rate: 200 mL/hr at 01/22/25 2315, 500 mg at 01/22/25 2315    NIFEdipine XL (PROCARDIA XL) 24 hr tablet 60 mg, 60 mg, Oral, Q24H, Leslye Fiore MD, 60 mg at 01/21/25 0856    ondansetron (ZOFRAN) injection 4 mg, 4 mg, Intravenous, Q6H PRN, Sebastian Menjivar MD, 4 mg at 01/21/25 0537    pantoprazole  (PROTONIX) EC tablet 40 mg, 40 mg, Oral, Q AM, Heather Carlos MD, 40 mg at 01/23/25 0538    Pharmacy to dose vancomycin, , Not Applicable, Continuous PRN, Conrad Alvarez MD    Potassium Replacement - Follow Nurse / BPA Driven Protocol, , Not Applicable, PRN, Sebastian Menjivar MD    [COMPLETED] Insert Peripheral IV, , , Once **AND** sodium chloride 0.9 % flush 10 mL, 10 mL, Intravenous, PRN, Deacon Larose MD    sodium chloride 0.9 % flush 10 mL, 10 mL, Intravenous, Q12H, Sebastian Menjivar MD    sodium chloride 0.9 % flush 10 mL, 10 mL, Intravenous, PRN, Sebastian Menjivar MD    sodium chloride 0.9 % infusion 40 mL, 40 mL, Intravenous, PRN, Sebastian Menjivar MD    traMADol (ULTRAM) tablet 50 mg, 50 mg, Oral, Q12H PRN, Leslye Fiore MD, 50 mg at 01/22/25 5265    vancomycin (dosing per levels), , Not Applicable, Daily, Woo Wells, PharmD    Physical Exam: General Elderly female appears frail.  In bed at a 45 degree angle not dyspneic not tachypneic heart rate 80        HEENT: No JVP.  No icterus.       Respiratory: Equal bilateral symmetrical expansion clear anteriorly       Cardiovascular: Irregular rate and rhythm with systolic ejection murmur no edema       GI: Soft and flat       Lower Extremities: Has a bandage on her heel       Neuro: Facial expressions are symmetrical.  Appears weak       Skin: Warm and dry       Psych: Pleasant affect    Results Review: Heart rates are better blood pressure stable patient is MRSA positive.    Radiology Results:  Imaging Results (Last 72 Hours)       Procedure Component Value Units Date/Time    XR Chest 1 View [[id number]] Collected: 01/23/25 0246     Updated: 01/23/25 0250    Narrative:      XR CHEST 1 VW    Date of Exam: 1/23/2025 1:51 AM EST    Indication: chest pain    Comparison: 1/21/2025, 1/19/2025.    Findings:  Right internal jugular PermCath present with the tip at the cavoatrial junction. Low lung volumes. There are no airspace  consolidations. No pleural fluid. No pneumothorax. The pulmonary vasculature appears mildly indistinct. The cardiac and mediastinal   silhouette appear unremarkable. No acute osseous abnormality identified.      Impression:      Impression:  Questionable mild pulmonary edema pattern.        Electronically Signed: Cindy Fuller MD    1/23/2025 2:47 AM EST    Workstation ID: [id number]    CT Abdomen Pelvis Without Contrast [[id number]] Collected: 01/21/25 0526     Updated: 01/21/25 0534    Narrative:      CT ABDOMEN PELVIS WO CONTRAST, CT CHEST WO CONTRAST DIAGNOSTIC, CT LOWER EXTREMITY LEFT WO CONTRAST    Date of Exam: 1/21/2025 4:45 AM EST    Indication: Sepsis.    Comparison: None available.    Technique: Axial CT images were obtained of the chest, abdomen and pelvis as well as the left lower extremity without the administration of contrast. Reconstructed coronal and sagittal images were also obtained. Automated exposure control and iterative   construction methods were used.      Findings:  CT Chest:   Limited evaluation of the solid organs due to lack of intravenous contrast. The thyroid appears within normal limits. The trachea and the esophagus are unremarkable.  Heart size is normal. Atherosclerotic calcifications are present including within the   coronary arteries. Right internal jugular PermCath present with the tip at the cavoatrial junction. No mediastinal or hilar lymphadenopathy.  No significant pericardial effusion.  The aorta and great vessels appear within normal limits.  Pulmonary artery   is unremarkable.    There is no pneumothorax, pleural effusion or focal airspace consolidation. There are no suspicious lung nodules.  No significant pleural disease.  Central and segmental airways appear patent.      Subcutaneous fat and underlying musculature appear within normal limits.  There are no acute osseous abnormalities or destructive bone lesions.      CT Abdomen and Pelvis:  The liver appears normal in  size without focal abnormality. Spleen is normal size and appears homogeneous.  Stomach is nondistended.  No adrenal mass.  Kidneys appear unremarkable. Probable small hemorrhagic or proteinaceous cyst present on the right.   Mild scarring is seen surrounding the kidneys bilaterally. No hydronephrosis.  Urinary bladder is within normal limits.  There is no bowel distention.  No pneumatosis intestinalis, pneumoperitoneum or lymphadenopathy.  No significant ascites.  The   appendix appears within normal limits.  Gallbladder is nonvisualized and likely resected. There is diastases of the rectus abdominal musculature. No evidence of hernia. No significant stool burden. Diverticulosis of the left hemicolon present. No   significant inflammatory changes. Left-sided stimulator device present. Biliary tree is nondistended.  The pancreas appears homogeneous.  Abdominal vasculature is within normal limits.    Subcutaneous fat and underlying musculature appear within normal limits.  There are no acute osseous abnormalities or destructive bone lesions. Orthopedic hardware appears unremarkable with no evidence of acute hardware failure. No acute osseous   abnormality.    Left lower extremity: Patchy subcutaneous air is seen along the left calcaneal region with a draining wound seen extending to the plantar aspect of the calcaneus (series 8 image 80). Mild destructive changes are seen along the plantar aspect of the   calcaneus with interosseous air (series 8 image 77). Plantar enthesopathy is present within this region. No evidence of fracture. No evidence of dislocation. Diffuse soft tissue swelling is present along the plantar aspect of the foot. No definite   drainable collection identified though evaluation is limited due to lack of intravenous contrast. Wound appears to extend near the bone. No additional interosseous air identified. No tibiotalar joint effusion identified. Moderate degenerative changes.   Lisfranc ligament  region appears unremarkable with no evidence of widening.        Impression:      Impression:  1.No acute cardiopulmonary process. No acute intra-abdominal or intrapelvic process.  2.Draining wound seen along the plantar aspect of the left calcaneus with subcutaneous air and mild destructive changes seen along the plantar aspect of the calcaneus with interosseous air present consistent with osteomyelitis. No additional osseous   involvement. No definite drainable collection identified though evaluation is limited due to lack of intravenous contrast. Draining sinus tract likely present extending from the wound to the calcaneus.  3.Ancillary findings as described above.                Electronically Signed: Cindy Fuller MD    1/21/2025 5:31 AM EST    Workstation ID: [id number]    CT Chest Without Contrast Diagnostic [[id number]] Collected: 01/21/25 0526     Updated: 01/21/25 0534    Narrative:      CT ABDOMEN PELVIS WO CONTRAST, CT CHEST WO CONTRAST DIAGNOSTIC, CT LOWER EXTREMITY LEFT WO CONTRAST    Date of Exam: 1/21/2025 4:45 AM EST    Indication: Sepsis.    Comparison: None available.    Technique: Axial CT images were obtained of the chest, abdomen and pelvis as well as the left lower extremity without the administration of contrast. Reconstructed coronal and sagittal images were also obtained. Automated exposure control and iterative   construction methods were used.      Findings:  CT Chest:   Limited evaluation of the solid organs due to lack of intravenous contrast. The thyroid appears within normal limits. The trachea and the esophagus are unremarkable.  Heart size is normal. Atherosclerotic calcifications are present including within the   coronary arteries. Right internal jugular PermCath present with the tip at the cavoatrial junction. No mediastinal or hilar lymphadenopathy.  No significant pericardial effusion.  The aorta and great vessels appear within normal limits.  Pulmonary artery   is  unremarkable.    There is no pneumothorax, pleural effusion or focal airspace consolidation. There are no suspicious lung nodules.  No significant pleural disease.  Central and segmental airways appear patent.      Subcutaneous fat and underlying musculature appear within normal limits.  There are no acute osseous abnormalities or destructive bone lesions.      CT Abdomen and Pelvis:  The liver appears normal in size without focal abnormality. Spleen is normal size and appears homogeneous.  Stomach is nondistended.  No adrenal mass.  Kidneys appear unremarkable. Probable small hemorrhagic or proteinaceous cyst present on the right.   Mild scarring is seen surrounding the kidneys bilaterally. No hydronephrosis.  Urinary bladder is within normal limits.  There is no bowel distention.  No pneumatosis intestinalis, pneumoperitoneum or lymphadenopathy.  No significant ascites.  The   appendix appears within normal limits.  Gallbladder is nonvisualized and likely resected. There is diastases of the rectus abdominal musculature. No evidence of hernia. No significant stool burden. Diverticulosis of the left hemicolon present. No   significant inflammatory changes. Left-sided stimulator device present. Biliary tree is nondistended.  The pancreas appears homogeneous.  Abdominal vasculature is within normal limits.    Subcutaneous fat and underlying musculature appear within normal limits.  There are no acute osseous abnormalities or destructive bone lesions. Orthopedic hardware appears unremarkable with no evidence of acute hardware failure. No acute osseous   abnormality.    Left lower extremity: Patchy subcutaneous air is seen along the left calcaneal region with a draining wound seen extending to the plantar aspect of the calcaneus (series 8 image 80). Mild destructive changes are seen along the plantar aspect of the   calcaneus with interosseous air (series 8 image 77). Plantar enthesopathy is present within this region.  No evidence of fracture. No evidence of dislocation. Diffuse soft tissue swelling is present along the plantar aspect of the foot. No definite   drainable collection identified though evaluation is limited due to lack of intravenous contrast. Wound appears to extend near the bone. No additional interosseous air identified. No tibiotalar joint effusion identified. Moderate degenerative changes.   Lisfranc ligament region appears unremarkable with no evidence of widening.        Impression:      Impression:  1.No acute cardiopulmonary process. No acute intra-abdominal or intrapelvic process.  2.Draining wound seen along the plantar aspect of the left calcaneus with subcutaneous air and mild destructive changes seen along the plantar aspect of the calcaneus with interosseous air present consistent with osteomyelitis. No additional osseous   involvement. No definite drainable collection identified though evaluation is limited due to lack of intravenous contrast. Draining sinus tract likely present extending from the wound to the calcaneus.  3.Ancillary findings as described above.                Electronically Signed: Cindy Fuller MD    1/21/2025 5:31 AM EST    Workstation ID: [id number]    CT Lower Extremity Left Without Contrast [[id number]] Collected: 01/21/25 0526     Updated: 01/21/25 0534    Narrative:      CT ABDOMEN PELVIS WO CONTRAST, CT CHEST WO CONTRAST DIAGNOSTIC, CT LOWER EXTREMITY LEFT WO CONTRAST    Date of Exam: 1/21/2025 4:45 AM EST    Indication: Sepsis.    Comparison: None available.    Technique: Axial CT images were obtained of the chest, abdomen and pelvis as well as the left lower extremity without the administration of contrast. Reconstructed coronal and sagittal images were also obtained. Automated exposure control and iterative   construction methods were used.      Findings:  CT Chest:   Limited evaluation of the solid organs due to lack of intravenous contrast. The thyroid appears within normal  limits. The trachea and the esophagus are unremarkable.  Heart size is normal. Atherosclerotic calcifications are present including within the   coronary arteries. Right internal jugular PermCath present with the tip at the cavoatrial junction. No mediastinal or hilar lymphadenopathy.  No significant pericardial effusion.  The aorta and great vessels appear within normal limits.  Pulmonary artery   is unremarkable.    There is no pneumothorax, pleural effusion or focal airspace consolidation. There are no suspicious lung nodules.  No significant pleural disease.  Central and segmental airways appear patent.      Subcutaneous fat and underlying musculature appear within normal limits.  There are no acute osseous abnormalities or destructive bone lesions.      CT Abdomen and Pelvis:  The liver appears normal in size without focal abnormality. Spleen is normal size and appears homogeneous.  Stomach is nondistended.  No adrenal mass.  Kidneys appear unremarkable. Probable small hemorrhagic or proteinaceous cyst present on the right.   Mild scarring is seen surrounding the kidneys bilaterally. No hydronephrosis.  Urinary bladder is within normal limits.  There is no bowel distention.  No pneumatosis intestinalis, pneumoperitoneum or lymphadenopathy.  No significant ascites.  The   appendix appears within normal limits.  Gallbladder is nonvisualized and likely resected. There is diastases of the rectus abdominal musculature. No evidence of hernia. No significant stool burden. Diverticulosis of the left hemicolon present. No   significant inflammatory changes. Left-sided stimulator device present. Biliary tree is nondistended.  The pancreas appears homogeneous.  Abdominal vasculature is within normal limits.    Subcutaneous fat and underlying musculature appear within normal limits.  There are no acute osseous abnormalities or destructive bone lesions. Orthopedic hardware appears unremarkable with no evidence of acute  hardware failure. No acute osseous   abnormality.    Left lower extremity: Patchy subcutaneous air is seen along the left calcaneal region with a draining wound seen extending to the plantar aspect of the calcaneus (series 8 image 80). Mild destructive changes are seen along the plantar aspect of the   calcaneus with interosseous air (series 8 image 77). Plantar enthesopathy is present within this region. No evidence of fracture. No evidence of dislocation. Diffuse soft tissue swelling is present along the plantar aspect of the foot. No definite   drainable collection identified though evaluation is limited due to lack of intravenous contrast. Wound appears to extend near the bone. No additional interosseous air identified. No tibiotalar joint effusion identified. Moderate degenerative changes.   Lisfranc ligament region appears unremarkable with no evidence of widening.        Impression:      Impression:  1.No acute cardiopulmonary process. No acute intra-abdominal or intrapelvic process.  2.Draining wound seen along the plantar aspect of the left calcaneus with subcutaneous air and mild destructive changes seen along the plantar aspect of the calcaneus with interosseous air present consistent with osteomyelitis. No additional osseous   involvement. No definite drainable collection identified though evaluation is limited due to lack of intravenous contrast. Draining sinus tract likely present extending from the wound to the calcaneus.  3.Ancillary findings as described above.                Electronically Signed: Cindy Fuller MD    1/21/2025 5:31 AM EST    Workstation ID: [id number]    CT Cervical Spine Without Contrast [[id number]] Collected: 01/20/25 1616     Updated: 01/20/25 1628    Narrative:      CT CERVICAL SPINE WO CONTRAST    Date of Exam: 1/20/2025 2:29 PM EST    Indication: c7 pain after fall.    Comparison: None available.    Technique: Axial CT images were obtained of the cervical spine without contrast  administration.  Reconstructed coronal and sagittal images were also obtained. Automated exposure control and iterative construction methods were used.      Findings:  There is slight reversal of the normal cervical lordosis associated with advanced degenerative disc disease at C5-6 and C6-7. There is mild posterior subluxation of C5 with respect to both C4 and C6, likely as result of underlying degenerative disc   disease. There are prominent posterior osteophytes at both C5-6 and C6-7. No vertebral compression deformity is seen. No fracture is identified.     There is mild nuchal ligament calcification but no evidence of acute posterior spinous process avulsion. Prevertebral soft tissues appear normal. Facet joints appear normally aligned. Coronal images show extensive left-sided multilevel facet DJD and   milder facet degenerative change on the right, but no evidence of acute bony trauma.    Axial bone images show no evidence of skull base fracture. The dens and ring of C1 appear normally aligned and intact. No vertebral body fracture or posterior element fracture is identified. Soft tissue axial images show moderate, possibly marked canal   stenosis at C5-6 due to large posterior vertebral osteophytes, which may be chronic. No paraspinous hematoma, mass or inflammatory change is seen. There is extensive bilateral carotid bulb calcification.      Impression:      Impression:    1. Advanced C5-6 and C6-7 degenerative disc disease; suspected moderate or greater C5-6 canal stenosis due to posterior vertebral osteophytes, which may be chronic, as noted.    2. No evidence of acute cervical spine trauma is identified.        Electronically Signed: Celestino Aguirre MD    1/20/2025 4:25 PM EST    Workstation ID: [id number]            EKG: A-fib RVR, IVCD, poor R progression, QTc 45    ECHO: Previously known normal EF    Tele: Patient having runs of A-fib RVR with very fast rates    Assessment   Assessment/Plan: A-fib  RVR-continue IV amiodarone.  Will go ahead and start amiodarone 2 mg every 8 hours to load the patient up faster.  Will increase metoprolol to 25 mg every 12 hours.  Will hold Eliquis because of an apparent plan to do debridement tomorrow.  Blood pressure is tenuous I will decrease nifedipine to 30 mg daily.    Naren Escobar MD  01/23/25  08:18 EST

## 2025-01-23 NOTE — PLAN OF CARE
Goal Outcome Evaluation:  Plan of Care Reviewed With: patient   - VSS, 3L NC, A&Ox4  - A-fib on monitor. Pt is still on amio gtt. HR 70-80s.  - PRNs given for pain and nausea  - IV abx administered  - Fall and skin precautions in place  - No other complaints at this time      Progress: improving

## 2025-01-23 NOTE — PLAN OF CARE
Problem: Adult Inpatient Plan of Care  Goal: Plan of Care Review  Outcome: Progressing  Flowsheets (Taken 1/23/2025 0504)  Progress: improving  Outcome Evaluation: Pt is Alert to self, confused most the night, VSS, AFib on the monitor, resting on 3L nc. Pt has been very clammy/sweaty all shift, but hasn't had any fevers. Pt has been on the amio gtt since my arrival today. Pts HR has gotten up to 160-170s during the night, controlled by PRNs ordered by cards PA after first episode. Pt also complained of 7/10 chest pain during the night. EKG done, trop drawn, chest xray put in per NP, none of which raised any concern. One time order for IV dilauded was given to pt. Pts breathing does seem to be more labored from the beginning of shift, but does not require any more o2 and sat has looked good all night. No further complaints from the pt at this time. Will continue plan of care.  Plan of Care Reviewed With: patient  Goal: Patient-Specific Goal (Individualized)  Outcome: Progressing  Goal: Absence of Hospital-Acquired Illness or Injury  Outcome: Progressing  Intervention: Identify and Manage Fall Risk  Recent Flowsheet Documentation  Taken 1/23/2025 0400 by Cedrick Leonard RN  Safety Promotion/Fall Prevention:   activity supervised   assistive device/personal items within reach   clutter free environment maintained   fall prevention program maintained   nonskid shoes/slippers when out of bed   room organization consistent   safety round/check completed   toileting scheduled  Taken 1/23/2025 0200 by Cedrick Leonard, RN  Safety Promotion/Fall Prevention:   activity supervised   assistive device/personal items within reach   clutter free environment maintained   fall prevention program maintained   nonskid shoes/slippers when out of bed   room organization consistent   safety round/check completed   toileting scheduled  Taken 1/23/2025 0000 by Cedrick Leonard, RN  Safety Promotion/Fall Prevention:   activity  supervised   assistive device/personal items within reach   clutter free environment maintained   fall prevention program maintained   nonskid shoes/slippers when out of bed   room organization consistent   safety round/check completed   toileting scheduled  Taken 1/22/2025 2200 by Cedrick Leonard RN  Safety Promotion/Fall Prevention:   activity supervised   assistive device/personal items within reach   clutter free environment maintained   fall prevention program maintained   nonskid shoes/slippers when out of bed   room organization consistent   safety round/check completed   toileting scheduled  Taken 1/22/2025 2000 by Cedrick Leonard RN  Safety Promotion/Fall Prevention:   activity supervised   assistive device/personal items within reach   clutter free environment maintained   fall prevention program maintained   nonskid shoes/slippers when out of bed   room organization consistent   safety round/check completed   toileting scheduled  Intervention: Prevent Skin Injury  Recent Flowsheet Documentation  Taken 1/23/2025 0400 by Cedrick Leonard RN  Body Position:   weight shifting   turned   right  Skin Protection:   incontinence pads utilized   silicone foam dressing in place   transparent dressing maintained  Taken 1/23/2025 0200 by Cedrick Leonard RN  Body Position:   weight shifting   turned   left  Skin Protection:   incontinence pads utilized   silicone foam dressing in place   transparent dressing maintained  Taken 1/23/2025 0000 by Cedrick Leonard RN  Body Position:   weight shifting   supine   legs elevated  Skin Protection:   incontinence pads utilized   silicone foam dressing in place   transparent dressing maintained  Taken 1/22/2025 2200 by Cedrick Leonard RN  Body Position:   weight shifting   turned   right  Skin Protection:   incontinence pads utilized   silicone foam dressing in place   transparent dressing maintained  Taken 1/22/2025 2000 by Cedrick Leonard RN  Body Position:    weight shifting   turned   left  Skin Protection: (silicone dressings peeled back and skin assessed)   incontinence pads utilized   silicone foam dressing in place   transparent dressing maintained  Intervention: Prevent and Manage VTE (Venous Thromboembolism) Risk  Recent Flowsheet Documentation  Taken 1/22/2025 2000 by Cedrick Leonard RN  VTE Prevention/Management: (see MAR) other (see comments)  Intervention: Prevent Infection  Recent Flowsheet Documentation  Taken 1/23/2025 0400 by Cedrick Leonard RN  Infection Prevention:   environmental surveillance performed   rest/sleep promoted   single patient room provided  Taken 1/23/2025 0200 by Cedrick Leonard RN  Infection Prevention:   environmental surveillance performed   rest/sleep promoted   single patient room provided  Taken 1/23/2025 0000 by Cedrick Leonard RN  Infection Prevention:   environmental surveillance performed   rest/sleep promoted   single patient room provided  Taken 1/22/2025 2200 by Cedrick Leonard RN  Infection Prevention:   environmental surveillance performed   rest/sleep promoted   single patient room provided  Taken 1/22/2025 2000 by Cedrick Leonard RN  Infection Prevention:   environmental surveillance performed   rest/sleep promoted   single patient room provided  Goal: Optimal Comfort and Wellbeing  Outcome: Progressing  Intervention: Monitor Pain and Promote Comfort  Recent Flowsheet Documentation  Taken 1/23/2025 0400 by Cedrick Leonard RN  Pain Management Interventions:   no interventions per patient request   quiet environment facilitated   relaxation techniques promoted  Taken 1/23/2025 0230 by Cedrick Leonard RN  Pain Management Interventions:   no interventions per patient request   quiet environment facilitated   relaxation techniques promoted  Taken 1/23/2025 0200 by Cedrick Leonard RN  Pain Management Interventions: pain medication given  Taken 1/23/2025 0000 by Cedrick Leonard RN  Pain Management  Interventions:   no interventions per patient request   quiet environment facilitated   relaxation techniques promoted  Taken 1/22/2025 2325 by Cedrick Leonard RN  Pain Management Interventions:   pain medication given   quiet environment facilitated   relaxation techniques promoted  Taken 1/22/2025 2200 by Cedrick Leonard RN  Pain Management Interventions:   no interventions per patient request   quiet environment facilitated   relaxation techniques promoted  Taken 1/22/2025 2000 by Cedrick Leonard RN  Pain Management Interventions:   pain medication given   quiet environment facilitated   relaxation techniques promoted  Intervention: Provide Person-Centered Care  Recent Flowsheet Documentation  Taken 1/22/2025 2000 by Cedrick Leonard RN  Trust Relationship/Rapport:   care explained   choices provided   questions answered   reassurance provided   thoughts/feelings acknowledged  Goal: Readiness for Transition of Care  Outcome: Progressing     Problem: Skin Injury Risk Increased  Goal: Skin Health and Integrity  Outcome: Progressing  Intervention: Optimize Skin Protection  Recent Flowsheet Documentation  Taken 1/23/2025 0400 by Cedrick Leonard RN  Activity Management: activity encouraged  Pressure Reduction Techniques:   frequent weight shift encouraged   heels elevated off bed   positioned off wounds   pressure points protected   weight shift assistance provided  Head of Bed (HOB) Positioning: HOB elevated  Pressure Reduction Devices:   pressure-redistributing mattress utilized   positioning supports utilized   heel offloading device utilized   foam padding utilized  Skin Protection:   incontinence pads utilized   silicone foam dressing in place   transparent dressing maintained  Taken 1/23/2025 0200 by Cedrick Leonard RN  Activity Management: activity encouraged  Pressure Reduction Techniques:   frequent weight shift encouraged   heels elevated off bed   positioned off wounds   pressure points  protected   weight shift assistance provided  Head of Bed (HOB) Positioning: HOB elevated  Pressure Reduction Devices:   pressure-redistributing mattress utilized   positioning supports utilized   heel offloading device utilized   foam padding utilized  Skin Protection:   incontinence pads utilized   silicone foam dressing in place   transparent dressing maintained  Taken 1/23/2025 0000 by Cedrick Leonard RN  Activity Management: activity encouraged  Pressure Reduction Techniques:   frequent weight shift encouraged   heels elevated off bed   positioned off wounds   pressure points protected   weight shift assistance provided  Head of Bed ([facility]) Positioning: HOB elevated  Pressure Reduction Devices:   pressure-redistributing mattress utilized   positioning supports utilized   heel offloading device utilized   foam padding utilized  Skin Protection:   incontinence pads utilized   silicone foam dressing in place   transparent dressing maintained  Taken 1/22/2025 2200 by Cedrick Leonard RN  Activity Management: activity encouraged  Pressure Reduction Techniques:   frequent weight shift encouraged   heels elevated off bed   positioned off wounds   pressure points protected   weight shift assistance provided  Head of Bed ([facility]) Positioning: [facility] elevated  Pressure Reduction Devices:   pressure-redistributing mattress utilized   positioning supports utilized   heel offloading device utilized   foam padding utilized  Skin Protection:   incontinence pads utilized   silicone foam dressing in place   transparent dressing maintained  Taken 1/22/2025 2000 by Cedrick Leonard RN  Activity Management: activity encouraged  Pressure Reduction Techniques:   frequent weight shift encouraged   heels elevated off bed   positioned off wounds   pressure points protected   weight shift assistance provided  Head of Bed ([facility]) Positioning: HOB elevated  Pressure Reduction Devices:   pressure-redistributing mattress utilized   positioning  supports utilized   heel offloading device utilized   foam padding utilized  Skin Protection: (silicone dressings peeled back and skin assessed)   incontinence pads utilized   silicone foam dressing in place   transparent dressing maintained     Problem: Fall Injury Risk  Goal: Absence of Fall and Fall-Related Injury  Outcome: Progressing  Intervention: Identify and Manage Contributors  Recent Flowsheet Documentation  Taken 1/23/2025 0400 by Cedrick Leonard RN  Medication Review/Management: medications reviewed  Taken 1/23/2025 0200 by Cedrick Leonard RN  Medication Review/Management: medications reviewed  Taken 1/23/2025 0000 by Cedrick Leonard RN  Medication Review/Management: medications reviewed  Taken 1/22/2025 2200 by Cedrick Leonard RN  Medication Review/Management: medications reviewed  Taken 1/22/2025 2000 by Cedrick Leonard RN  Medication Review/Management: medications reviewed  Intervention: Promote Injury-Free Environment  Recent Flowsheet Documentation  Taken 1/23/2025 0400 by Cedrick Leonard RN  Safety Promotion/Fall Prevention:   activity supervised   assistive device/personal items within reach   clutter free environment maintained   fall prevention program maintained   nonskid shoes/slippers when out of bed   room organization consistent   safety round/check completed   toileting scheduled  Taken 1/23/2025 0200 by Cedrick Leonard RN  Safety Promotion/Fall Prevention:   activity supervised   assistive device/personal items within reach   clutter free environment maintained   fall prevention program maintained   nonskid shoes/slippers when out of bed   room organization consistent   safety round/check completed   toileting scheduled  Taken 1/23/2025 0000 by Cedrick Leonard RN  Safety Promotion/Fall Prevention:   activity supervised   assistive device/personal items within reach   clutter free environment maintained   fall prevention program maintained   nonskid shoes/slippers when out  of bed   room organization consistent   safety round/check completed   toileting scheduled  Taken 1/22/2025 2200 by Cedrick Leonard RN  Safety Promotion/Fall Prevention:   activity supervised   assistive device/personal items within reach   clutter free environment maintained   fall prevention program maintained   nonskid shoes/slippers when out of bed   room organization consistent   safety round/check completed   toileting scheduled  Taken 1/22/2025 2000 by Cedrick Leonard RN  Safety Promotion/Fall Prevention:   activity supervised   assistive device/personal items within reach   clutter free environment maintained   fall prevention program maintained   nonskid shoes/slippers when out of bed   room organization consistent   safety round/check completed   toileting scheduled     Problem: Comorbidity Management  Goal: Maintenance of Heart Failure Symptom Control  Outcome: Progressing  Intervention: Maintain Heart Failure Management  Recent Flowsheet Documentation  Taken 1/23/2025 0400 by Cedrick Leonard RN  Medication Review/Management: medications reviewed  Taken 1/23/2025 0200 by Cedrick Leonard RN  Medication Review/Management: medications reviewed  Taken 1/23/2025 0000 by Cedrick Leonard RN  Medication Review/Management: medications reviewed  Taken 1/22/2025 2200 by Cedrick Leonard RN  Medication Review/Management: medications reviewed  Taken 1/22/2025 2000 by Cedrick Leonard RN  Medication Review/Management: medications reviewed  Goal: Blood Pressure in Desired Range  Outcome: Progressing  Intervention: Maintain Blood Pressure Management  Recent Flowsheet Documentation  Taken 1/23/2025 0400 by Cedrick Leonard RN  Medication Review/Management: medications reviewed  Taken 1/23/2025 0200 by Cedrick Leonard RN  Medication Review/Management: medications reviewed  Taken 1/23/2025 0000 by Cedrick Leonard RN  Medication Review/Management: medications reviewed  Taken 1/22/2025 2200 by Cedrick Leonard  H., RN  Medication Review/Management: medications reviewed  Taken 1/22/2025 2000 by Cedrick Leonard RN  Medication Review/Management: medications reviewed     Problem: Hemodialysis  Goal: Safe, Effective Therapy Delivery  Outcome: Progressing  Intervention: Optimize Device Care and Function  Recent Flowsheet Documentation  Taken 1/23/2025 0400 by Cedrick Leonard RN  Medication Review/Management: medications reviewed  Taken 1/23/2025 0200 by Cedrick Leonard RN  Medication Review/Management: medications reviewed  Taken 1/23/2025 0000 by Cedrick Leonard RN  Medication Review/Management: medications reviewed  Taken 1/22/2025 2200 by Cedrick Leonard RN  Medication Review/Management: medications reviewed  Taken 1/22/2025 2000 by Cedrick Leonard RN  Medication Review/Management: medications reviewed  Goal: Effective Tissue Perfusion  Outcome: Progressing  Goal: Absence of Infection Signs and Symptoms  Outcome: Progressing  Intervention: Prevent or Manage Infection  Recent Flowsheet Documentation  Taken 1/23/2025 0400 by Cedrick Leonard RN  Infection Prevention:   environmental surveillance performed   rest/sleep promoted   single patient room provided  Taken 1/23/2025 0200 by Cedrick Leonard RN  Infection Prevention:   environmental surveillance performed   rest/sleep promoted   single patient room provided  Taken 1/23/2025 0000 by Cedrick Leonard RN  Infection Prevention:   environmental surveillance performed   rest/sleep promoted   single patient room provided  Taken 1/22/2025 2200 by Cedrick Leonard RN  Infection Prevention:   environmental surveillance performed   rest/sleep promoted   single patient room provided  Taken 1/22/2025 2000 by Cedrick Leonard RN  Infection Prevention:   environmental surveillance performed   rest/sleep promoted   single patient room provided   Goal Outcome Evaluation:  Plan of Care Reviewed With: patient        Progress: improving  Outcome Evaluation: Pt is Alert to  self, confused most the night, VSS, AFib on the monitor, resting on 3L nc. Pt has been very clammy/sweaty all shift, but hasn't had any fevers. Pt has been on the amio gtt since my arrival today. Pts HR has gotten up to 160-170s during the night, controlled by PRNs ordered by cards PA after first episode. Pt also complained of 7/10 chest pain during the night. EKG done, trop drawn, chest xray put in per NP, none of which raised any concern. One time order for IV dilauded was given to pt. Pts breathing does seem to be more labored from the beginning of shift, but does not require any more o2 and sat has looked good all night. No further complaints from the pt at this time. Will continue plan of care.

## 2025-01-24 ENCOUNTER — APPOINTMENT (OUTPATIENT)
Dept: NEPHROLOGY | Facility: HOSPITAL | Age: 75
DRG: 853 | End: 2025-01-24
Payer: MEDICARE

## 2025-01-24 ENCOUNTER — ANESTHESIA (OUTPATIENT)
Dept: PERIOP | Facility: HOSPITAL | Age: 75
End: 2025-01-24
Payer: MEDICARE

## 2025-01-24 PROBLEM — I48.0 PAROXYSMAL A-FIB: Status: ACTIVE | Noted: 2025-01-24

## 2025-01-24 PROBLEM — A41.02 SEPSIS DUE TO METHICILLIN RESISTANT STAPHYLOCOCCUS AUREUS (MRSA) WITH ENCEPHALOPATHY WITHOUT SEPTIC SHOCK: Status: ACTIVE | Noted: 2025-01-21

## 2025-01-24 PROBLEM — R65.20 SEPSIS DUE TO METHICILLIN RESISTANT STAPHYLOCOCCUS AUREUS (MRSA) WITH ENCEPHALOPATHY WITHOUT SEPTIC SHOCK: Status: ACTIVE | Noted: 2025-01-21

## 2025-01-24 PROBLEM — N18.6 ESRD (END STAGE RENAL DISEASE): Status: ACTIVE | Noted: 2024-09-13

## 2025-01-24 PROBLEM — G93.41 SEPSIS DUE TO METHICILLIN RESISTANT STAPHYLOCOCCUS AUREUS (MRSA) WITH ENCEPHALOPATHY WITHOUT SEPTIC SHOCK: Status: ACTIVE | Noted: 2025-01-21

## 2025-01-24 LAB
AMMONIA BLD-SCNC: 21 UMOL/L (ref 11–51)
ANION GAP SERPL CALCULATED.3IONS-SCNC: 13 MMOL/L (ref 5–15)
BACTERIA SPEC AEROBE CULT: ABNORMAL
BASOPHILS # BLD AUTO: 0.02 10*3/MM3 (ref 0–0.2)
BASOPHILS NFR BLD AUTO: 0.1 % (ref 0–1.5)
BUN SERPL-MCNC: 50 MG/DL (ref 8–23)
BUN/CREAT SERPL: 11.2 (ref 7–25)
CALCIUM SPEC-SCNC: 9.6 MG/DL (ref 8.6–10.5)
CHLORIDE SERPL-SCNC: 95 MMOL/L (ref 98–107)
CO2 SERPL-SCNC: 25 MMOL/L (ref 22–29)
CREAT SERPL-MCNC: 4.47 MG/DL (ref 0.57–1)
DEPRECATED RDW RBC AUTO: 48.8 FL (ref 37–54)
EGFRCR SERPLBLD CKD-EPI 2021: 9.8 ML/MIN/1.73
EOSINOPHIL # BLD AUTO: 0.02 10*3/MM3 (ref 0–0.4)
EOSINOPHIL NFR BLD AUTO: 0.1 % (ref 0.3–6.2)
ERYTHROCYTE [DISTWIDTH] IN BLOOD BY AUTOMATED COUNT: 13.5 % (ref 12.3–15.4)
GLUCOSE BLDC GLUCOMTR-MCNC: 104 MG/DL (ref 70–130)
GLUCOSE BLDC GLUCOMTR-MCNC: 124 MG/DL (ref 70–130)
GLUCOSE BLDC GLUCOMTR-MCNC: 132 MG/DL (ref 70–130)
GLUCOSE BLDC GLUCOMTR-MCNC: 150 MG/DL (ref 70–130)
GLUCOSE SERPL-MCNC: 167 MG/DL (ref 65–99)
GRAM STN SPEC: ABNORMAL
HCT VFR BLD AUTO: 32.7 % (ref 34–46.6)
HGB BLD-MCNC: 10.6 G/DL (ref 12–15.9)
IMM GRANULOCYTES # BLD AUTO: 0.18 10*3/MM3 (ref 0–0.05)
IMM GRANULOCYTES NFR BLD AUTO: 1.2 % (ref 0–0.5)
LYMPHOCYTES # BLD AUTO: 0.74 10*3/MM3 (ref 0.7–3.1)
LYMPHOCYTES NFR BLD AUTO: 4.9 % (ref 19.6–45.3)
MCH RBC QN AUTO: 31.5 PG (ref 26.6–33)
MCHC RBC AUTO-ENTMCNC: 32.4 G/DL (ref 31.5–35.7)
MCV RBC AUTO: 97.3 FL (ref 79–97)
MONOCYTES # BLD AUTO: 2.12 10*3/MM3 (ref 0.1–0.9)
MONOCYTES NFR BLD AUTO: 14.1 % (ref 5–12)
NEUTROPHILS NFR BLD AUTO: 11.91 10*3/MM3 (ref 1.7–7)
NEUTROPHILS NFR BLD AUTO: 79.6 % (ref 42.7–76)
NRBC BLD AUTO-RTO: 0 /100 WBC (ref 0–0.2)
PLATELET # BLD AUTO: 203 10*3/MM3 (ref 140–450)
PMV BLD AUTO: 10.5 FL (ref 6–12)
POTASSIUM SERPL-SCNC: 3.5 MMOL/L (ref 3.5–5.2)
POTASSIUM SERPL-SCNC: 4 MMOL/L (ref 3.5–5.2)
RBC # BLD AUTO: 3.36 10*6/MM3 (ref 3.77–5.28)
SODIUM SERPL-SCNC: 133 MMOL/L (ref 136–145)
VANCOMYCIN SERPL-MCNC: 19.5 MCG/ML (ref 5–40)
WBC NRBC COR # BLD AUTO: 14.99 10*3/MM3 (ref 3.4–10.8)

## 2025-01-24 PROCEDURE — 25010000002 ONDANSETRON PER 1 MG: Performed by: FAMILY MEDICINE

## 2025-01-24 PROCEDURE — 85025 COMPLETE CBC W/AUTO DIFF WBC: CPT | Performed by: INTERNAL MEDICINE

## 2025-01-24 PROCEDURE — 25010000002 ONDANSETRON PER 1 MG

## 2025-01-24 PROCEDURE — 87076 CULTURE ANAEROBE IDENT EACH: CPT | Performed by: ORTHOPAEDIC SURGERY

## 2025-01-24 PROCEDURE — 25010000002 AMIODARONE IN DEXTROSE 5% 360-4.14 MG/200ML-% SOLUTION: Performed by: ORTHOPAEDIC SURGERY

## 2025-01-24 PROCEDURE — 99232 SBSQ HOSP IP/OBS MODERATE 35: CPT | Performed by: INTERNAL MEDICINE

## 2025-01-24 PROCEDURE — 25010000002 SUGAMMADEX 200 MG/2ML SOLUTION: Performed by: ANESTHESIOLOGY

## 2025-01-24 PROCEDURE — 25010000002 ONDANSETRON PER 1 MG: Performed by: NURSE ANESTHETIST, CERTIFIED REGISTERED

## 2025-01-24 PROCEDURE — 82948 REAGENT STRIP/BLOOD GLUCOSE: CPT

## 2025-01-24 PROCEDURE — 80202 ASSAY OF VANCOMYCIN: CPT

## 2025-01-24 PROCEDURE — 25810000003 LACTATED RINGERS PER 1000 ML: Performed by: NURSE ANESTHETIST, CERTIFIED REGISTERED

## 2025-01-24 PROCEDURE — 25010000002 PROPOFOL 10 MG/ML EMULSION: Performed by: NURSE ANESTHETIST, CERTIFIED REGISTERED

## 2025-01-24 PROCEDURE — 25010000002 METRONIDAZOLE 500 MG/100ML SOLUTION: Performed by: ORTHOPAEDIC SURGERY

## 2025-01-24 PROCEDURE — 87102 FUNGUS ISOLATION CULTURE: CPT | Performed by: ORTHOPAEDIC SURGERY

## 2025-01-24 PROCEDURE — 25010000002 CEFEPIME PER 500 MG: Performed by: STUDENT IN AN ORGANIZED HEALTH CARE EDUCATION/TRAINING PROGRAM

## 2025-01-24 PROCEDURE — 25010000002 METRONIDAZOLE 500 MG/100ML SOLUTION: Performed by: STUDENT IN AN ORGANIZED HEALTH CARE EDUCATION/TRAINING PROGRAM

## 2025-01-24 PROCEDURE — 25010000002 HYDROMORPHONE PER 4 MG: Performed by: NURSE PRACTITIONER

## 2025-01-24 PROCEDURE — 0KBW0ZZ EXCISION OF LEFT FOOT MUSCLE, OPEN APPROACH: ICD-10-PCS | Performed by: ORTHOPAEDIC SURGERY

## 2025-01-24 PROCEDURE — 82140 ASSAY OF AMMONIA: CPT | Performed by: FAMILY MEDICINE

## 2025-01-24 PROCEDURE — 25010000002 VANCOMYCIN 1 G RECONSTITUTED SOLUTION 1 EACH VIAL

## 2025-01-24 PROCEDURE — 87015 SPECIMEN INFECT AGNT CONCNTJ: CPT | Performed by: ORTHOPAEDIC SURGERY

## 2025-01-24 PROCEDURE — 2W1MX6Z COMPRESSION OF LEFT LOWER EXTREMITY USING PRESSURE DRESSING: ICD-10-PCS | Performed by: ORTHOPAEDIC SURGERY

## 2025-01-24 PROCEDURE — 87075 CULTR BACTERIA EXCEPT BLOOD: CPT | Performed by: ORTHOPAEDIC SURGERY

## 2025-01-24 PROCEDURE — 87186 SC STD MICRODIL/AGAR DIL: CPT | Performed by: ORTHOPAEDIC SURGERY

## 2025-01-24 PROCEDURE — 87070 CULTURE OTHR SPECIMN AEROBIC: CPT | Performed by: ORTHOPAEDIC SURGERY

## 2025-01-24 PROCEDURE — 25010000002 HYDROMORPHONE PER 4 MG: Performed by: ORTHOPAEDIC SURGERY

## 2025-01-24 PROCEDURE — 25810000003 SODIUM CHLORIDE 0.9 % SOLUTION 250 ML FLEX CONT

## 2025-01-24 PROCEDURE — 25010000002 LIDOCAINE PF 1% 1 % SOLUTION: Performed by: NURSE ANESTHETIST, CERTIFIED REGISTERED

## 2025-01-24 PROCEDURE — 25010000002 FENTANYL CITRATE (PF) 100 MCG/2ML SOLUTION: Performed by: NURSE ANESTHETIST, CERTIFIED REGISTERED

## 2025-01-24 PROCEDURE — 87147 CULTURE TYPE IMMUNOLOGIC: CPT | Performed by: ORTHOPAEDIC SURGERY

## 2025-01-24 PROCEDURE — 25010000002 AMIODARONE IN DEXTROSE 5% 360-4.14 MG/200ML-% SOLUTION: Performed by: INTERNAL MEDICINE

## 2025-01-24 PROCEDURE — 80048 BASIC METABOLIC PNL TOTAL CA: CPT | Performed by: INTERNAL MEDICINE

## 2025-01-24 PROCEDURE — 84132 ASSAY OF SERUM POTASSIUM: CPT | Performed by: ORTHOPAEDIC SURGERY

## 2025-01-24 PROCEDURE — 25010000002 HEPARIN (PORCINE) PER 1000 UNITS: Performed by: INTERNAL MEDICINE

## 2025-01-24 PROCEDURE — 87205 SMEAR GRAM STAIN: CPT | Performed by: ORTHOPAEDIC SURGERY

## 2025-01-24 RX ORDER — HYDROMORPHONE HYDROCHLORIDE 1 MG/ML
0.5 INJECTION, SOLUTION INTRAMUSCULAR; INTRAVENOUS; SUBCUTANEOUS
Status: DISCONTINUED | OUTPATIENT
Start: 2025-01-24 | End: 2025-01-24

## 2025-01-24 RX ORDER — IPRATROPIUM BROMIDE AND ALBUTEROL SULFATE 2.5; .5 MG/3ML; MG/3ML
3 SOLUTION RESPIRATORY (INHALATION) ONCE AS NEEDED
Status: DISCONTINUED | OUTPATIENT
Start: 2025-01-24 | End: 2025-01-24

## 2025-01-24 RX ORDER — ROCURONIUM BROMIDE 10 MG/ML
INJECTION, SOLUTION INTRAVENOUS AS NEEDED
Status: DISCONTINUED | OUTPATIENT
Start: 2025-01-24 | End: 2025-01-24 | Stop reason: SURG

## 2025-01-24 RX ORDER — FENTANYL CITRATE 50 UG/ML
INJECTION, SOLUTION INTRAMUSCULAR; INTRAVENOUS
Status: DISPENSED
Start: 2025-01-24 | End: 2025-01-25

## 2025-01-24 RX ORDER — ALBUMIN (HUMAN) 12.5 G/50ML
25 SOLUTION INTRAVENOUS AS NEEDED
Status: ACTIVE | OUTPATIENT
Start: 2025-01-24 | End: 2025-02-07

## 2025-01-24 RX ORDER — HYDROMORPHONE HYDROCHLORIDE 1 MG/ML
0.25 INJECTION, SOLUTION INTRAMUSCULAR; INTRAVENOUS; SUBCUTANEOUS ONCE
Status: COMPLETED | OUTPATIENT
Start: 2025-01-24 | End: 2025-01-24

## 2025-01-24 RX ORDER — HYDROCODONE BITARTRATE AND ACETAMINOPHEN 5; 325 MG/1; MG/1
1 TABLET ORAL ONCE AS NEEDED
Status: DISCONTINUED | OUTPATIENT
Start: 2025-01-24 | End: 2025-01-24

## 2025-01-24 RX ORDER — PROPOFOL 10 MG/ML
VIAL (ML) INTRAVENOUS AS NEEDED
Status: DISCONTINUED | OUTPATIENT
Start: 2025-01-24 | End: 2025-01-24 | Stop reason: SURG

## 2025-01-24 RX ORDER — LIDOCAINE HYDROCHLORIDE 10 MG/ML
INJECTION, SOLUTION EPIDURAL; INFILTRATION; INTRACAUDAL; PERINEURAL AS NEEDED
Status: DISCONTINUED | OUTPATIENT
Start: 2025-01-24 | End: 2025-01-24 | Stop reason: SURG

## 2025-01-24 RX ORDER — OXYCODONE AND ACETAMINOPHEN 7.5; 325 MG/1; MG/1
1 TABLET ORAL EVERY 4 HOURS PRN
Status: DISCONTINUED | OUTPATIENT
Start: 2025-01-24 | End: 2025-01-24

## 2025-01-24 RX ORDER — ALUMINA, MAGNESIA, AND SIMETHICONE 2400; 2400; 240 MG/30ML; MG/30ML; MG/30ML
30 SUSPENSION ORAL ONCE
Status: COMPLETED | OUTPATIENT
Start: 2025-01-24 | End: 2025-01-24

## 2025-01-24 RX ORDER — ONDANSETRON 2 MG/ML
4 INJECTION INTRAMUSCULAR; INTRAVENOUS ONCE AS NEEDED
Status: COMPLETED | OUTPATIENT
Start: 2025-01-24 | End: 2025-01-24

## 2025-01-24 RX ORDER — BUPIVACAINE HCL/0.9 % NACL/PF 0.125 %
PLASTIC BAG, INJECTION (ML) EPIDURAL AS NEEDED
Status: DISCONTINUED | OUTPATIENT
Start: 2025-01-24 | End: 2025-01-24 | Stop reason: SURG

## 2025-01-24 RX ORDER — FENTANYL CITRATE 50 UG/ML
INJECTION, SOLUTION INTRAMUSCULAR; INTRAVENOUS AS NEEDED
Status: DISCONTINUED | OUTPATIENT
Start: 2025-01-24 | End: 2025-01-24 | Stop reason: SURG

## 2025-01-24 RX ORDER — POTASSIUM CHLORIDE 1500 MG/1
20 TABLET, EXTENDED RELEASE ORAL ONCE
Status: COMPLETED | OUTPATIENT
Start: 2025-01-24 | End: 2025-01-24

## 2025-01-24 RX ORDER — SODIUM CHLORIDE, SODIUM LACTATE, POTASSIUM CHLORIDE, CALCIUM CHLORIDE 600; 310; 30; 20 MG/100ML; MG/100ML; MG/100ML; MG/100ML
INJECTION, SOLUTION INTRAVENOUS CONTINUOUS PRN
Status: DISCONTINUED | OUTPATIENT
Start: 2025-01-24 | End: 2025-01-24 | Stop reason: SURG

## 2025-01-24 RX ORDER — SODIUM CHLORIDE 9 MG/ML
9 INJECTION, SOLUTION INTRAVENOUS AS NEEDED
Status: DISCONTINUED | OUTPATIENT
Start: 2025-01-24 | End: 2025-01-24

## 2025-01-24 RX ORDER — HYDRALAZINE HYDROCHLORIDE 20 MG/ML
5 INJECTION INTRAMUSCULAR; INTRAVENOUS
Status: DISCONTINUED | OUTPATIENT
Start: 2025-01-24 | End: 2025-01-24

## 2025-01-24 RX ORDER — SODIUM CHLORIDE 0.9 % (FLUSH) 0.9 %
3 SYRINGE (ML) INJECTION EVERY 12 HOURS SCHEDULED
Status: DISCONTINUED | OUTPATIENT
Start: 2025-01-24 | End: 2025-01-24

## 2025-01-24 RX ORDER — OXYCODONE HYDROCHLORIDE 5 MG/1
5 TABLET ORAL EVERY 4 HOURS PRN
Status: DISPENSED | OUTPATIENT
Start: 2025-01-24 | End: 2025-01-31

## 2025-01-24 RX ORDER — LABETALOL HYDROCHLORIDE 5 MG/ML
5 INJECTION, SOLUTION INTRAVENOUS
Status: DISCONTINUED | OUTPATIENT
Start: 2025-01-24 | End: 2025-01-24

## 2025-01-24 RX ORDER — SODIUM CHLORIDE 0.9 % (FLUSH) 0.9 %
3-10 SYRINGE (ML) INJECTION AS NEEDED
Status: DISCONTINUED | OUTPATIENT
Start: 2025-01-24 | End: 2025-01-24

## 2025-01-24 RX ORDER — FENTANYL CITRATE 50 UG/ML
50 INJECTION, SOLUTION INTRAMUSCULAR; INTRAVENOUS
Status: DISCONTINUED | OUTPATIENT
Start: 2025-01-24 | End: 2025-01-24

## 2025-01-24 RX ORDER — NALOXONE HCL 0.4 MG/ML
0.4 VIAL (ML) INJECTION AS NEEDED
Status: DISCONTINUED | OUTPATIENT
Start: 2025-01-24 | End: 2025-01-24

## 2025-01-24 RX ORDER — MAGNESIUM HYDROXIDE 1200 MG/15ML
LIQUID ORAL AS NEEDED
Status: DISCONTINUED | OUTPATIENT
Start: 2025-01-24 | End: 2025-01-24 | Stop reason: HOSPADM

## 2025-01-24 RX ORDER — PROMETHAZINE HYDROCHLORIDE 25 MG/1
25 TABLET ORAL ONCE AS NEEDED
Status: DISCONTINUED | OUTPATIENT
Start: 2025-01-24 | End: 2025-01-24

## 2025-01-24 RX ORDER — ONDANSETRON 2 MG/ML
INJECTION INTRAMUSCULAR; INTRAVENOUS AS NEEDED
Status: DISCONTINUED | OUTPATIENT
Start: 2025-01-24 | End: 2025-01-24 | Stop reason: SURG

## 2025-01-24 RX ORDER — ONDANSETRON 2 MG/ML
INJECTION INTRAMUSCULAR; INTRAVENOUS
Status: COMPLETED
Start: 2025-01-24 | End: 2025-01-24

## 2025-01-24 RX ORDER — SODIUM CHLORIDE, SODIUM LACTATE, POTASSIUM CHLORIDE, CALCIUM CHLORIDE 600; 310; 30; 20 MG/100ML; MG/100ML; MG/100ML; MG/100ML
9 INJECTION, SOLUTION INTRAVENOUS CONTINUOUS
Status: ACTIVE | OUTPATIENT
Start: 2025-01-24 | End: 2025-01-25

## 2025-01-24 RX ADMIN — FENTANYL CITRATE 50 MCG: 50 INJECTION, SOLUTION INTRAMUSCULAR; INTRAVENOUS at 17:23

## 2025-01-24 RX ADMIN — Medication 10 ML: at 20:26

## 2025-01-24 RX ADMIN — AMIODARONE HYDROCHLORIDE 200 MG: 200 TABLET ORAL at 16:38

## 2025-01-24 RX ADMIN — AMIODARONE HYDROCHLORIDE 200 MG: 200 TABLET ORAL at 12:41

## 2025-01-24 RX ADMIN — SODIUM CHLORIDE, POTASSIUM CHLORIDE, SODIUM LACTATE AND CALCIUM CHLORIDE: 600; 310; 30; 20 INJECTION, SOLUTION INTRAVENOUS at 16:50

## 2025-01-24 RX ADMIN — METRONIDAZOLE 500 MG: 500 INJECTION, SOLUTION INTRAVENOUS at 16:22

## 2025-01-24 RX ADMIN — TRAMADOL HYDROCHLORIDE 50 MG: 50 TABLET, COATED ORAL at 05:13

## 2025-01-24 RX ADMIN — METOPROLOL TARTRATE 25 MG: 25 TABLET, FILM COATED ORAL at 20:25

## 2025-01-24 RX ADMIN — HYDROMORPHONE HYDROCHLORIDE 0.25 MG: 1 INJECTION, SOLUTION INTRAMUSCULAR; INTRAVENOUS; SUBCUTANEOUS at 20:25

## 2025-01-24 RX ADMIN — SUGAMMADEX 200 MG: 100 INJECTION, SOLUTION INTRAVENOUS at 17:31

## 2025-01-24 RX ADMIN — ONDANSETRON 4 MG: 2 INJECTION INTRAMUSCULAR; INTRAVENOUS at 01:32

## 2025-01-24 RX ADMIN — HYDROMORPHONE HYDROCHLORIDE 0.25 MG: 1 INJECTION, SOLUTION INTRAMUSCULAR; INTRAVENOUS; SUBCUTANEOUS at 01:54

## 2025-01-24 RX ADMIN — AMIODARONE HYDROCHLORIDE 1 MG/MIN: 1.8 INJECTION, SOLUTION INTRAVENOUS at 12:41

## 2025-01-24 RX ADMIN — AMIODARONE HYDROCHLORIDE 1 MG/MIN: 1.8 INJECTION, SOLUTION INTRAVENOUS at 19:23

## 2025-01-24 RX ADMIN — ONDANSETRON 4 MG: 2 INJECTION INTRAMUSCULAR; INTRAVENOUS at 17:20

## 2025-01-24 RX ADMIN — NIFEDIPINE 30 MG: 30 TABLET, FILM COATED, EXTENDED RELEASE ORAL at 12:41

## 2025-01-24 RX ADMIN — Medication 100 MCG: at 17:22

## 2025-01-24 RX ADMIN — PROPOFOL 150 MG: 10 INJECTION, EMULSION INTRAVENOUS at 16:58

## 2025-01-24 RX ADMIN — HEPARIN SODIUM 2000 UNITS: 1000 INJECTION INTRAVENOUS; SUBCUTANEOUS at 10:47

## 2025-01-24 RX ADMIN — FENTANYL CITRATE 50 MCG: 50 INJECTION, SOLUTION INTRAMUSCULAR; INTRAVENOUS at 16:58

## 2025-01-24 RX ADMIN — PANTOPRAZOLE SODIUM 40 MG: 40 TABLET, DELAYED RELEASE ORAL at 05:13

## 2025-01-24 RX ADMIN — AMIODARONE HYDROCHLORIDE 200 MG: 200 TABLET ORAL at 20:25

## 2025-01-24 RX ADMIN — FAMOTIDINE 20 MG: 20 TABLET, FILM COATED ORAL at 12:41

## 2025-01-24 RX ADMIN — LIDOCAINE HYDROCHLORIDE 50 MG: 10 INJECTION, SOLUTION EPIDURAL; INFILTRATION; INTRACAUDAL; PERINEURAL at 16:58

## 2025-01-24 RX ADMIN — VANCOMYCIN HYDROCHLORIDE 1000 MG: 1 INJECTION, POWDER, LYOPHILIZED, FOR SOLUTION INTRAVENOUS at 16:23

## 2025-01-24 RX ADMIN — AMIODARONE HYDROCHLORIDE 1 MG/MIN: 1.8 INJECTION, SOLUTION INTRAVENOUS at 05:13

## 2025-01-24 RX ADMIN — Medication 200 MCG: at 17:06

## 2025-01-24 RX ADMIN — CEFEPIME HYDROCHLORIDE 1000 MG: 1 INJECTION, POWDER, FOR SOLUTION INTRAMUSCULAR; INTRAVENOUS at 14:07

## 2025-01-24 RX ADMIN — METRONIDAZOLE 500 MG: 500 INJECTION, SOLUTION INTRAVENOUS at 23:47

## 2025-01-24 RX ADMIN — ONDANSETRON 4 MG: 2 INJECTION INTRAMUSCULAR; INTRAVENOUS at 18:04

## 2025-01-24 RX ADMIN — ALLOPURINOL 200 MG: 100 TABLET ORAL at 12:42

## 2025-01-24 RX ADMIN — ALUMINUM HYDROXIDE, MAGNESIUM HYDROXIDE, DIMETHICONE 30 ML: 400; 400; 40 SUSPENSION ORAL at 09:26

## 2025-01-24 RX ADMIN — ATORVASTATIN CALCIUM 10 MG: 10 TABLET, FILM COATED ORAL at 12:41

## 2025-01-24 RX ADMIN — METOPROLOL TARTRATE 25 MG: 25 TABLET, FILM COATED ORAL at 12:41

## 2025-01-24 RX ADMIN — POTASSIUM CHLORIDE 20 MEQ: 1500 TABLET, EXTENDED RELEASE ORAL at 12:42

## 2025-01-24 RX ADMIN — ROCURONIUM BROMIDE 50 MG: 10 INJECTION INTRAVENOUS at 16:58

## 2025-01-24 NOTE — OP NOTE
[facility], [facility]  [address]  [phone]    OPERATIVE REPORT      PATIENT NAME:  Sara Esparza                            YOB: 1950       PREOP DIAGNOSIS:   Left  Left heel ulcer.    POSTOP DIAGNOSIS:   Same.    PROCEDURE:   Left  Left     95851:  Debridement of skin, subcutaneous tissue, fascia  09810:  Wound vacuum-assisted closure    SURGEON:     Rom Gonzalez MD    OPERATIVE TEAM:   Circulator: Francia Aponte RN; Katya Gardner RN  Scrub Person: Allen Carlos; Josiah Bolivar    ANESTHETIST:  Anesthesiologist: Wil Pearce MD; Andrea Walsh MD  CRNA: Katya Crow CRNA    ANESTHESIA:   Choice    ESTIM BLOOD LOSS:   < 30 mL    TOURNIQUET TIME:   See the OR record    FINDINGS:     Skin wound measured 2 cm x 2 cm.  The majority of the subcutaneous heel pad was necrotic, purulent appearing.  There is undermining 4 cm distally, 3 cm medially laterally and posteriorly.    IMPLANTS:     None    DRAINS:   Wound vacuum-assisted closure.    COMPLICATIONS:    None.    DISPOSITION:    Stable to recovery.     INDICATIONS:    74-year-old female with multiple medical comorbidities with left plantar heel ulcer, necrosis, CT scan consistent with subcutaneous gas, osteomyelitis.  She did not tolerate MRI.  I had a discussion with she and her  regarding further management.  She was adamant she did not wish to consider below-knee amputation.  After discussion of risk, benefits, alternatives, she wished to proceed with left heel debridement, irrigation, wound vacuum-assisted closure.  Risks of surgery were discussed which included but were not limited to pain, bleeding, infection, damage to adjacent structures, need for further surgery, wound healing complications, loss of limb and death.  The patient expressed verbal consent and written consent was obtained for the above procedure.    NARRATIVE:     Patient was identified in preoperative holding.  Operative  site was marked in indelible ink.  History, physical, consent were reviewed and updated.  Patient was surrendered to the anesthesia team and transported to the operative suite where anesthesia was induced.  Ipsilateral hip bump was placed.  Operative extremity was prepped and draped in the usual sterile fashion.  The operative team donned sterile gowns and gloves and a timeout was called.  All in attendance agreed regarding the patient's identity, proposed operative procedure, and operative site.    Utilizing a rongeur and 15 blade scalpel I debrided devitalized skin, subcutaneous tissue, and fascia.  This was an excisional debridement.  Deepest layer was muscle/tendon.  The resulting wound measured 2 cm by 2 cm by 3 cm.  There was undermining 4 cm distally, 3 cm medially laterally and posteriorly.  The calcaneal tuberosity was visible and palpable within the wound.    I took swab specimens of the wound bed which I sent for culture.    I copiously irrigated the wound with experience wound irrigant.    Wound vacuum-assisted closure device was applied which was noted to have good seal.  There is a large black sponge placed into the wound into the areas of under mining circumferentially.    Sterile dressing was applied.  Counts were correct x2.  There were no apparent complications.  I was present and scrubbed for the entire case.    Postoperatively plan for every 72 hour wound vacuum-assisted closure changes beginning next Monday.  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  She has minimal if any healthy subcutaneous tissue plantar to the calcaneal tuberosity.  She is at very high risk of potential need for below-knee amputation.    Rom Gonzalez Jr, MD  1/24/2025

## 2025-01-24 NOTE — ANESTHESIA PREPROCEDURE EVALUATION
Anesthesia Evaluation     Patient summary reviewed and Nursing notes reviewed                Airway   Mallampati: II  TM distance: >3 FB  Neck ROM: full  No difficulty expected  Dental - normal exam     Pulmonary - normal exam   (+) asthma,sleep apnea  Cardiovascular - normal exam    (+) hypertension, dysrhythmias (eliquis tx) Paroxysmal Atrial Fib    ROS comment:   Stress test 7/20: normal EF, no ischemia, low risk study    [facility] 1/25: mild coronary artery atherosclerosis    Neuro/Psych- negative ROS  GI/Hepatic/Renal/Endo    (+) morbid obesity, GERD, renal disease (dialysis today)- ESRD and dialysis, diabetes mellitus    Musculoskeletal (-) negative ROS    Abdominal  - normal exam    Bowel sounds: normal.   Substance History - negative use     OB/GYN negative ob/gyn ROS         Other                      Anesthesia Plan    ASA 4     general     intravenous induction     Anesthetic plan, risks, benefits, and alternatives have been provided, discussed and informed consent has been obtained with: patient.    Plan discussed with CRNA.    CODE STATUS:    Level Of Support Discussed With: Patient  Code Status (Patient has no pulse and is not breathing): CPR (Attempt to Resuscitate)  Medical Interventions (Patient has pulse or is breathing): Full Support

## 2025-01-24 NOTE — PROGRESS NOTES
Sara Esparza       LOS: 5 days   Patient Care Team:  Toribio Roberts MD as PCP - General (Internal Medicine)    Chief Complaint: Left heel ulcer    Subjective     Interval History:     Resting comfortably in bed this morning.  Alert and oriented x 3.  No acute events overnight.  Family present at bedside.    Review of Systems:      Gen- No fevers, chills  CV- No chest pain, palpitations  Resp- No cough, dyspnea  GI- No N/V/D, abd pain    Objective     Vital Signs  Vital Signs (last 24 hours)         01/22 0700  01/23 0659 01/23 0700 01/23 0827   Most Recent      Temp (°F) 97.9 -  98.6      98     98 (36.7) 01/23 0700    Heart Rate 73 -  167       99 01/23 0341    Resp 16 -  24      18     18 01/23 0700    BP 74/54 -  156/71       132/90 01/23 0341    SpO2 (%) 90 -  99       94 01/23 0341    Flow (L/min) (Oxygen Therapy)   3       3 01/23 0600              Physical Exam:     No acute distress.  Nonlabored respirations.  Regular rate and rhythm.  Abdomen nondistended.  Left lower extremity: Dressing present is clean, dry, intact.     Results Review:     I reviewed the patient's new clinical results.    Medication Review:   Hospital Medications (active)         Dose Frequency Start End    acetaminophen (TYLENOL) tablet 650 mg 650 mg Every 6 Hours PRN 1/19/2025 --    Admin Instructions: If given for fever, use fever parameter: fever greater than 100.4 °F  Based on patient request - if ordered for moderate or severe pain, provider allows for administration of a medication prescribed for a lower pain scale.    Do not exceed 4 grams of acetaminophen in a 24 hr period. Max dose of 2gm for AST/ALT greater than 120 units/L.    If given for pain, use the following pain scale:   Mild Pain = Pain Score of 1-3, CPOT 1-2  Moderate Pain = Pain Score of 4-6, CPOT 3-4  Severe Pain = Pain Score of 7-10, CPOT 5-8    Route: Oral    albumin human 25 % IV SOLN  - ADS Override Pull   1/22/2025 --    Admin Instructions: Created by  "cabinet override    Notes to Pharmacy: Created by cabinet nadiyaide    allopurinol (ZYLOPRIM) tablet 200 mg 200 mg Daily 1/19/2025 --    Admin Instructions: ([facility]) Take with food if GI upset occurs.    Route: Oral    amiodarone (PACERONE) tablet 200 mg 200 mg 3 Times Daily 1/23/2025 --    Admin Instructions: Avoid grapefruit juice while taking this medication.    Route: Oral    amiodarone 360 mg in 200 mL D5W infusion 1 mg/min Continuous 1/22/2025 --    Admin Instructions: Central line preferred, if unavailable use large bore IV access with frequent nurse monitoring of IV site.  IV med requiring filtration 0.22 micron inline filter.    Route: Intravenous    apixaban (ELIQUIS) tablet 2.5 mg 2.5 mg Every 12 Hours Scheduled 1/22/2025 --    Admin Instructions: Tablet may be crushed and suspended in 60 mL of water or D5W and immediately delivered via NG tube.  Avoid grapefruit juice.    Route: Oral    atorvastatin (LIPITOR) tablet 10 mg 10 mg Daily 1/22/2025 3/18/2025    Admin Instructions: Avoid grapefruit juice.    Route: Oral    bisacodyl (DULCOLAX) EC tablet 5 mg 5 mg Daily PRN 1/19/2025 --    Admin Instructions: Use if no bowel movement after 12 hours.  Swallow whole. Do not crush, split, or chew tablet.    Route: Oral    Linked Group 1: Placed in \"And\" Linked Group        bisacodyl (DULCOLAX) suppository 10 mg 10 mg Daily PRN 1/19/2025 --    Admin Instructions: Use if no bowel movement after 12 hours.  Hold for diarrhea    Route: Rectal    Linked Group 1: Placed in \"And\" Linked Group        Calcium Replacement - Follow Nurse / BPA Driven Protocol  As Needed 1/19/2025 --    Admin Instructions: Open Order & Select \"[facility] Electrolyte Replacement Protocol Algorithm\" to View Details    Route: Not Applicable    cefepime 1000 mg IVPB in 100 mL NS (MBP) 1,000 mg Every 24 Hours 1/22/2025 1/29/2025    Admin Instructions: Give after dialysis on dialysis days.     Route: Intravenous    dextrose (D50W) (25 g/50 mL) IV injection 25 " g 25 g Every 15 Minutes PRN 1/19/2025 --    Admin Instructions: Blood sugar less than 70; patient has IV access - Unresponsive, NPO or Unable To Safely Swallow    Route: Intravenous    dextrose (GLUTOSE) oral gel 15 g 15 g Every 15 Minutes PRN 1/19/2025 --    Admin Instructions: BS<70, Patient Alert, Is not NPO, Can safely swallow.    Route: Oral    dilTIAZem (CARDIZEM) 125 mg in 125 mL D5W infusion 5-15 mg/hr Continuous 1/22/2025 --    Admin Instructions: For heart rate - To maintain HR less than 120, initiate infusion at 5 mg/hr. Titrate up or down 2.5-5 mg/hr every 15 minutes to use the lowest dose possible. Maximum infusion rate of 15 mg/hr. Hold for SBP less than 100 mmHg or heart rate less than 60. Contact provider if unable to maintain HR less than 120 on maximum dose. Once Heart Rate target achieved obtain vitals a minimum of every 30 minutes. For patients not in critical care areas - obtain vitals a minimum of every 4 hours.   Caution: Look alike/sound alike drug alert.   Refrigerate.    Route: Intravenous    famotidine (PEPCID) tablet 20 mg 20 mg Daily 1/22/2025 --    Route: Oral    glucagon (GLUCAGEN) injection 1 mg 1 mg Every 15 Minutes PRN 1/19/2025 --    Admin Instructions: Blood Glucose Less Than 70 - Patient Without IV Access - Unresponsive, NPO or Unable To Safely Swallow  Reconstitute powder for injection by adding 1 mL of -supplied sterile diluent or sterile water for injection to a vial containing 1 mg of the drug, to provide solutions containing 1 mg/mL. Shake vial gently to dissolve.    Route: Intramuscular    heparin (porcine) injection 2,000 Units 2,000 Units As Needed 1/20/2025 --    Route: Intracatheter    HYDROmorphone (DILAUDID) injection 0.25 mg 0.25 mg 2 Times Daily PRN 1/20/2025 1/25/2025    Admin Instructions: Based on patient request - if ordered for moderate or severe pain, provider allows for administration of a medication prescribed for a lower pain scale.  If given  "for pain, use the following pain scale:  Mild Pain = Pain Score of 1-3, CPOT 1-2  Moderate Pain = Pain Score of 4-6, CPOT 3-4  Severe Pain = Pain Score of 7-10, CPOT 5-8    Route: Intravenous    Insulin Lispro (humaLOG) injection 2-9 Units 2-9 Units 4 Times Daily Before Meals & Nightly 1/19/2025 --    Admin Instructions: Correction Insulin - Moderate Dose (Total Insulin Dose 40-60 units/day, Average Weight Patient, Patient Taking Oral Hypoglycemic)    Blood Glucose 150-199 mg/dL - 2 units  Blood Glucose 200-249 mg/dL - 4 units  Blood Glucose 250-299 mg/dL - 6 units  Blood Glucose 300-349 mg/dL - 7 units  Blood Glucose 350-400 mg/dL - 8 units  Blood Glucose greater than 400 mg/dL - 9 units & Call Provider   Caution: Look alike/sound alike drug alert    Route: Subcutaneous    Magnesium Standard Dose Replacement - Follow Nurse / BPA Driven Protocol  As Needed 1/19/2025 --    Admin Instructions: Open Order & Select \"BHS Electrolyte Replacement Protocol Algorithm\" to View Details    Route: Not Applicable    melatonin tablet 2.5 mg 2.5 mg Nightly PRN 1/19/2025 --    Route: Oral    metoprolol tartrate (LOPRESSOR) injection 5 mg 5 mg Every 6 Hours PRN 1/22/2025 --    Admin Instructions: Hold for SBP less than 100, DBP less than 60, or heart rate less than 50. If a dose is held, please contact the provider.    Route: Intravenous    metoprolol tartrate (LOPRESSOR) tablet 25 mg 25 mg Every 12 Hours Scheduled 1/23/2025 --    Route: Oral    metroNIDAZOLE (FLAGYL) IVPB 500 mg 500 mg Every 8 Hours 1/21/2025 1/26/2025    Admin Instructions: Caution: Look alike/sound alike drug alert.  Do not refrigerate.    Route: Intravenous    NIFEdipine XL (PROCARDIA XL) 24 hr tablet 60 mg 60 mg Every 24 Hours Scheduled 1/19/2025 --    Admin Instructions: Hold for SBP less than 100, DBP less than 60.  Caution: Look alike/sound alike drug alert. Avoid grapefruit juice. Swallow whole. Do not crush, split or chew.    Route: Oral    ondansetron " "(ZOFRAN) injection 4 mg 4 mg Every 6 Hours PRN 1/19/2025 --    Admin Instructions: If BOTH ondansetron (ZOFRAN) and promethazine (PHENERGAN) are ordered use ondansetron first and THEN promethazine IF ondansetron is ineffective.    Route: Intravenous    pantoprazole (PROTONIX) EC tablet 40 mg 40 mg Every Early Morning 1/22/2025 --    Admin Instructions: Swallow whole; do not crush, split, or chew.    Route: Oral    Pharmacy to dose vancomycin  Continuous PRN 1/21/2025 1/28/2025    Route: Not Applicable    polyethylene glycol (MIRALAX) packet 17 g 17 g Daily PRN 1/19/2025 --    Admin Instructions: Use if no bowel movement after 12 hours. Mix in 6-8 ounces of water.  Use 4-8 ounces of water, tea, or juice for each 17 gram dose.    Route: Oral    Linked Group 1: Placed in \"And\" Linked Group        Potassium Replacement - Follow Nurse / BPA Driven Protocol  As Needed 1/19/2025 --    Admin Instructions: Open Order & Select \"BHS Electrolyte Replacement Protocol Algorithm\" to View Details    Route: Not Applicable    sennosides-docusate (PERICOLACE) 8.6-50 MG per tablet 2 tablet 2 tablet 2 Times Daily PRN 1/19/2025 --    Admin Instructions: Start bowel management regimen if patient has not had a bowel movement after 12 hours.    Route: Oral    Linked Group 1: Placed in \"And\" Linked Group        sodium chloride 0.9 % flush 10 mL 10 mL As Needed 1/19/2025 --    Route: Intravenous    Linked Group 2: Placed in \"And\" Linked Group        sodium chloride 0.9 % flush 10 mL 10 mL Every 12 Hours Scheduled 1/19/2025 --    Route: Intravenous    sodium chloride 0.9 % flush 10 mL 10 mL As Needed 1/19/2025 --    Route: Intravenous    sodium chloride 0.9 % infusion 40 mL 40 mL As Needed 1/19/2025 --    Admin Instructions: Following administration of an IV intermittent medication, flush line with 40mL NS at 100mL/hr.    Route: Intravenous    traMADol (ULTRAM) tablet 50 mg 50 mg Every 12 Hours PRN 1/20/2025 1/25/2025    Admin Instructions: " Based on patient request - if ordered for moderate or severe pain, provider allows for administration of a medication prescribed for a lower pain scale.      Caution: Look alike/sound alike drug alert    If given for pain, use the following pain scale:  Mild Pain = Pain Score of 1-3, CPOT 1-2  Moderate Pain = Pain Score of 4-6, CPOT 3-4  Severe Pain = Pain Score of 7-10, CPOT 5-8    Route: Oral    vancomycin (dosing per levels)  Daily 1/21/2025 1/28/2025    Admin Instructions: Pharmacy is dosing vancomycin per levels    Route: Not Applicable              Assessment & Plan     74-year-old female with multiple medical comorbidities, left heel wound, calcaneal osteomyelitis.      Generalized weakness    ESRD (end stage renal disease)    Type 2 diabetes mellitus with stage 5 chronic kidney disease not on chronic dialysis, without long-term current use of insulin    Chronic osteomyelitis of left foot    Paroxysmal A-fib    Sepsis due to methicillin resistant Staphylococcus aureus (MRSA) with encephalopathy without septic shock      CT scan of the left lower extremity demonstrates evidence of calcaneal osteomyelitis, subcutaneous air and sinus tract which extends to the calcaneus.    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    She did not tolerate MRI overnight.    I again discussed further management options with the patient and her family at bedside this morning.  We discussed surgical as well as nonsurgical management options.  Given her clinical exam findings, evidence of calcaneal osteomyelitis, anticipate she would benefit from minimum left heel irrigation, debridement, possible wound vacuum-assisted closure.  We also outlined the possibility of left below-knee amputation.  The patient is alert and oriented x 3 this morning, insist that she does not wish to consider left below-knee amputation at this  time.  I advised her that left heel irrigation, debridement, wound vacuum-assisted closure may not be curative and that she is at risk for persistent osteomyelitis, sepsis, or proximal amputation, loss of limb/life and she verbalized understanding.  She is at very high risk for limb loss.  She is adamant that she wishes to pursue limb salvage if at all possible, is agreeable to proceed with left heel irrigation, debridement, possible wound vacuum-assisted closure.    After discussion of risk, benefits, alternatives, she wishes to proceed with left heel debridement, irrigation, wound vacuum-assisted closure.  She is at very high risk of eventual need for below-knee amputation.  Risks of surgery were discussed which included but were not limited to pain, bleeding, infection, damage to adjacent structures, need for further surgery, wound healing complications, loss of limb and death.  The patient expressed verbal consent and written consent was obtained for the above procedure.    Rom Gonzalez Jr, MD  01/24/25  16:29 EST

## 2025-01-24 NOTE — PROGRESS NOTES
"[facility] Progress Note     LOS: 5 days   Patient Care Team:  Toribio Roberts MD as PCP - General (Internal Medicine)  PCP:  Toribio Roberts MD    Chief Complaint: Atrial fibrillation with rapid ventricular response    Subjective: Patient has chronic atrial fibrillation.  Her rates are controlled today on a combination of IV and oral amiodarone.  She feels drowsy after dialysis and also has some reproducible chest pain to palpation.  She is planned for debridement of her foot abscess today.      Review of Systems:   All systems have been reviewed and are negative with the exception of those mentioned above.      Objective:    Vital Sign Min/Max for last 24 hours  Temp  Min: 97.6 °F (36.4 °C)  Max: 98.5 °F (36.9 °C)   BP  Min: 121/78  Max: 147/65   Pulse  Min: 65  Max: 97   Resp  Min: 16  Max: 18   SpO2  Min: 91 %  Max: 100 %   No data recorded   No data recorded     Flowsheet Rows      Flowsheet Row First Filed Value   Admission Height 170.2 cm (67\") Documented at 01/19/2025 0307   Admission Weight 108 kg (238 lb 1.6 oz) Documented at 01/19/2025 0307            Telemetry: Rate controlled atrial fibrillation      Intake/Output Summary (Last 24 hours) at 1/24/2025 1330  Last data filed at 1/24/2025 1100  Gross per 24 hour   Intake 240 ml   Output 2080 ml   Net -1840 ml     Intake & Output (last 3 days)         01/21 0701  01/22 0700 01/22 0701  01/23 0700 01/23 0701  01/24 0700 01/24 0701  01/25 0700    P.O.   480     I.V. (mL/kg)  300 (2.8)      IV Piggyback 100       Total Intake(mL/kg) 100 (0.9) 300 (2.8) 480 (4.4)     Urine (mL/kg/hr) 700 (0.3) 300 (0.1) 100 (0)     Dialysis  820  1980    Total Output [id number]    Net -600 -820 +380 -1980            Urine Unmeasured Occurrence 1 x  1 x     Emesis Unmeasured Occurrence   1 x              Physical Exam:  Constitutional:       Appearance: Chronically ill-appearing.      Comments: Drowsy and " uncomfortable   Pulmonary:      Effort: Pulmonary effort is normal.   Chest:      Chest wall: Tender to palpatation.      Comments: Right chest dialysis catheter  Cardiovascular:      Normal rate. Irregular rhythm.      Comments: He also wrapped bilaterally  Edema:     Pretibial: bilateral 1+ edema of the pretibial area.         LABS/DIAGNOSTIC DATA:  Results from last 7 days   Lab Units 01/24/25  0728 01/23/25  0816 01/22/25  0652   WBC 10*3/mm3 14.99* 10.56 10.19   HEMOGLOBIN g/dL 10.6* 10.7* 10.1*   HEMATOCRIT % 32.7* 33.7* 32.3*   PLATELETS 10*3/mm3 203 138* 140     Lab Results   Lab Value Date/Time    TROPONINT 101 (C) 01/23/2025 0816    TROPONINT 106 (C) 01/23/2025 0159    TROPONINT 132 (C) 01/19/2025 0403    TROPONINT 128 (C) 01/18/2025 2259    TROPONINT 134 (C) 01/18/2025 2149    TROPONINT 89 (C) 01/16/2025 2011    TROPONINT 87 (C) 01/16/2025 1645         Results from last 7 days   Lab Units 01/24/25  0728 01/23/25  0816 01/22/25  0652 01/21/25  0432 01/20/25  0624   SODIUM mmol/L 133* 133* 133* 134* 131*   POTASSIUM mmol/L 3.5 3.8 4.2 4.7 4.2   CHLORIDE mmol/L 95* 97* 96* 94* 94*   CO2 mmol/L 25.0 23.0 22.0 24.0 19.0*   BUN mg/dL 50* 40* 67* 52* 79*   CREATININE mg/dL 4.47* 3.66* 5.49* 4.68* 6.51*   CALCIUM mg/dL 9.6 9.1 9.3 10.8* 10.3   BILIRUBIN mg/dL  --   --  0.2 0.3 0.3   ALK PHOS U/L  --   --  79 114 98   ALT (SGPT) U/L  --   --  32 49* 29   AST (SGOT) U/L  --   --  24 54* 35*   GLUCOSE mg/dL 167* 207* 149* 187* 173*     Results from last 7 days   Lab Units 01/19/25  0403   HEMOGLOBIN A1C % 4.90         Results from last 7 days   Lab Units 01/19/25  0403   TSH uIU/mL 1.250       Left heart catheterization January 2025  DIAGNOSES:   1. Mild coronary artery atherosclerosis.   2. Normal left ventricular filling pressure without gradient across   the aortic valve.       Medication Review:   albumin human, , ,   allopurinol, 200 mg, Oral, Daily  amiodarone, 200 mg, Oral, TID  [Held by provider] apixaban,  2.5 mg, Oral, Q12H  atorvastatin, 10 mg, Oral, Daily  cefepime, 1,000 mg, Intravenous, Q24H  famotidine, 20 mg, Oral, Daily  insulin lispro, 2-9 Units, Subcutaneous, 4x Daily AC & at Bedtime  metoprolol tartrate, 25 mg, Oral, Q12H  metroNIDAZOLE, 500 mg, Intravenous, Q8H  NIFEdipine XL, 30 mg, Oral, Q24H  pantoprazole, 40 mg, Oral, Q AM  sodium chloride, 10 mL, Intravenous, Q12H  vancomycin (dosing per levels), , Not Applicable, Daily  vancomycin, 1,000 mg, Intravenous, Once       amiodarone, 1 mg/min, Last Rate: 1 mg/min (01/24/25 1241)  dilTIAZem, 5-15 mg/hr  Pharmacy to dose vancomycin,            Generalized weakness    ESRD (end stage renal disease)    Type 2 diabetes mellitus with stage 5 chronic kidney disease not on chronic dialysis, without long-term current use of insulin    Chronic osteomyelitis of left foot    Paroxysmal A-fib    Sepsis due to methicillin resistant Staphylococcus aureus (MRSA) with encephalopathy without septic shock      Assessment/Plan:    Paroxysmal A-fib with rapid ventricular response  Patient was in sinus rhythm on 1/18  Remains in atrial fibrillation but rates are controlled  We will continue IV amiodarone until she is postoperative.  Likely can discontinue IV amiodarone tomorrow   continue oral amiodarone loading  Eliquis on hold for surgery today  Hypertension  Blood pressure fairly well-controlled.  Would not treat aggressively with upcoming surgery  ESRD on dialysis  Status post dialysis today  Elevated troponin in setting of ESRD  Heart catheterization earlier this month at [facility] with nonobstructive CAD  Sepsis and heel osteomyelitis  Jayme for debridement today  ID following and managing antibiotics  ID recommends follow-up echocardiogram.  Will order.  Blood cultures no growth so far    Cardiology will continue to follow      Getachew Castillo MD [facility]  01/24/25

## 2025-01-24 NOTE — PLAN OF CARE
Goal Outcome Evaluation:  Plan of Care Reviewed With: patient   - VSS, 3L NC  - A-fib on the monitor, rate controlled. Amio gtt still infusing.  - Pt only oriented to self after returning from dialysis.  - IV abx administered  - Fall and skin precautions in place  - No other complaints at this time      Progress: no change

## 2025-01-24 NOTE — PROGRESS NOTES
[facility] Medicine Services  PROGRESS NOTE    Patient Name: Sara Esparza  : 1950  MRN: [id number]    Date of Admission: 2025  Primary Care Physician: Toribio Roberts MD    Subjective   Subjective     CC: weakness, progressive    HPI:  Complains of dyspepsia this am- denies any nausea.  Otherwise no F/C.  Denies any issues overnight.     Objective   Objective     Vital Signs:   Temp:  [97.6 °F (36.4 °C)-98.5 °F (36.9 °C)] 97.6 °F (36.4 °C)  Heart Rate:  [65-97] 75  Resp:  [16-18] 16  BP: (120-147)/(51-92) 125/74  Flow (L/min) (Oxygen Therapy):  [2-3] 2     Physical Exam:  Gen:  awake, alert, seen in HD this am  Neuro: oriented, clear speech  HEENT:  NC/AT   Neck:  trach midline   Heart: RRR, no M/R/G  TDC:  no redness or tenderness   Lungs CTA  Abd:  Soft, nontender, no rebound or guarding, pos BS  Extrem:  No c/c/e.  Left heel wound:  purulent, malodorous with wound dressings in place.   R foot without lesions.       Results Reviewed:  LAB RESULTS:      Lab 25  0728 25  0816 25  0159 25  0652 25  1327 25  0446 25  0432 25  0624 25  0403   WBC 14.99* 10.56  --  10.19 12.78*  --   --  15.03* 14.36*   HEMOGLOBIN 10.6* 10.7*  --  10.1* 9.3*  --   --  11.1* 11.7*   HEMATOCRIT 32.7* 33.7*  --  32.3* 29.1*  --   --  33.9* 36.1   PLATELETS 203 138*  --  140 151  --   --  177 208   NEUTROS ABS 11.91* 8.35*  --  7.77* 10.18*  --   --  12.73* 12.42*   IMMATURE GRANS (ABS) 0.18* 0.07*  --  0.07* 0.08*  --   --  0.05 0.06*   LYMPHS ABS 0.74 0.49*  --  0.53* 0.62*  --   --  0.44* 0.41*   MONOS ABS 2.12* 1.62*  --  1.77* 1.89*  --   --  1.79* 1.46*   EOS ABS 0.02 0.02  --  0.03 0.00  --   --  0.00 0.00   MCV 97.3* 100.3*  --  100.6* 100.0*  --   --  96.9 98.1*   PROCALCITONIN  --   --   --   --   --   --  5.99*  --   --    LACTATE  --   --   --   --  1.3 4.1*  --   --   --    HSTROP T  --  101* 106*  --   --   --   --   --  132*          Lab 01/24/25  0728 01/23/25  0816 01/22/25  0652 01/21/25  0432 01/20/25 0624 01/19/25 0403 01/18/25 2149   SODIUM 133* 133* 133* 134* 131* 133* 132*   POTASSIUM 3.5 3.8 4.2 4.7 4.2 4.7 4.4   CHLORIDE 95* 97* 96* 94* 94* 94* 94*   CO2 25.0 23.0 22.0 24.0 19.0* 23.0 22.0   ANION GAP 13.0 13.0 15.0 16.0* 18.0* 16.0* 16.0*   BUN 50* 40* 67* 52* 79* 58* 52*   CREATININE 4.47* 3.66* 5.49* 4.68* 6.51* 5.40* 5.02*   EGFR 9.8* 12.4* 7.6* 9.2* 6.2* 7.8* 8.5*   GLUCOSE 167* 207* 149* 187* 173* 173* 167*   CALCIUM 9.6 9.1 9.3 10.8* 10.3 10.6* 10.7*   MAGNESIUM  --   --   --   --   --   --  2.3   PHOSPHORUS  --   --  5.5*  --   --   --   --    HEMOGLOBIN A1C  --   --   --   --   --  4.90  --    TSH  --   --   --   --   --  1.250  --          Lab 01/22/25  0652 01/21/25 0432 01/20/25 0624 01/19/25 0403 01/18/25 2149   TOTAL PROTEIN 4.5* 6.6 6.0 6.5 7.0   ALBUMIN 2.4* 3.3* 3.0* 3.6 3.8   GLOBULIN 2.1 3.3 3.0 2.9 3.2   ALT (SGPT) 32 49* 29 23 21   AST (SGOT) 24 54* 35* 32 33*   BILIRUBIN 0.2 0.3 0.3 0.6 0.5   ALK PHOS 79 114 98 96 90         Lab 01/23/25  0816 01/23/25  0159 01/19/25 0403 01/18/25 2259 01/18/25 2149   PROBNP  --   --   --   --  8,028.0*   HSTROP T 101* 106* 132* 128* 134*             Lab 01/19/25  0956   VITAMIN B 12 1,272*         Brief Urine Lab Results  (Last result in the past 365 days)        Color   Clarity   Blood   Leuk Est   Nitrite   Protein   CREAT   Urine HCG        01/19/25 1404 Yellow   Cloudy   Moderate (2+)   Moderate (2+)   Negative   >=300 mg/dL (3+)                   Microbiology Results Abnormal       Procedure Component Value - Date/Time    Wound Culture - Wound, Foot, Left [[id number]]  (Abnormal)  (Susceptibility) Collected: 01/20/25 1409    Lab Status: Final result Specimen: Wound from Foot, Left Updated: 01/24/25 0658     Wound Culture Scant growth (1+) Staphylococcus aureus, MRSA     Comment: Methicillin resistant Staphylococcus aureus, Patient may be an isolation risk.          Rare growth Morganella morganii ssp morganii     Comment:            Scant growth (1+) Normal Skin Kimber     Gram Stain Few (2+) WBCs seen      Many (4+) Gram positive cocci in pairs      Rare (1+) Gram negative bacilli      Rare (1+) Gram positive bacilli    Susceptibility        Staphylococcus aureus, MRSA      TAYLOR      Clindamycin Susceptible      Erythromycin Resistant      Oxacillin Resistant      Rifampin Susceptible      Tetracycline Susceptible      Trimethoprim + Sulfamethoxazole Susceptible      Vancomycin Susceptible                       Susceptibility        Morganella morganii ssp morganii      TAYLOR      Amoxicillin + Clavulanate Resistant      Ampicillin Resistant      Ampicillin + Sulbactam Resistant      Cefazolin (Non Urine) Resistant      Cefepime Susceptible  [1]       Cefotaxime Susceptible      Ceftazidime Susceptible  [1]       Gentamicin Susceptible      Levofloxacin Susceptible      Piperacillin + Tazobactam Susceptible      Tetracycline Susceptible      Trimethoprim + Sulfamethoxazole Susceptible                   [1]  Appended report. These results have been appended to a previously final verified report.                Susceptibility Comments       Morganella morganii ssp morganii    Cefotaxime susceptibility can be used as a surrogate for ceftriaxone susceptibility               MRSA Screen, PCR (Inpatient) - Swab, Nares [[id number]]  (Abnormal) Collected: 01/21/25 0614    Lab Status: Final result Specimen: Swab from Nares Updated: 01/21/25 0847     MRSA PCR Positive    Narrative:      The negative predictive value of this diagnostic test is high and should only be used to consider de-escalating anti-MRSA therapy. A positive result may indicate colonization with MRSA and must be correlated clinically.            XR Chest 1 View    Result Date: 1/23/2025  XR CHEST 1 VW Date of Exam: 1/23/2025 1:51 AM EST Indication: chest pain Comparison: 1/21/2025, 1/19/2025. Findings: Right  internal jugular PermCath present with the tip at the cavoatrial junction. Low lung volumes. There are no airspace consolidations. No pleural fluid. No pneumothorax. The pulmonary vasculature appears mildly indistinct. The cardiac and mediastinal silhouette appear unremarkable. No acute osseous abnormality identified.     Impression: Impression: Questionable mild pulmonary edema pattern. Electronically Signed: Cindy Fuller MD  1/23/2025 2:47 AM EST  Workstation ID: [id number]    Duplex Lower Extremity Art / Grafts - Bilateral CAR    Addendum Date: 1/22/2025      The right external iliac artery demonstrates no significant stenosis.   The right common femoral artery demonstrates no significant stenosis.   The right proximal deep femoral artery demonstrates no significant stenosis.   The right proximal superficial femoral artery demonstrates no significant stenosis.   The right mid superficial femoral artery demonstrates no significant stenosis.   The right distal superficial femoral artery demonstrates no significant stenosis.   The right popliteal artery demonstrates no significant stenosis.   The right anterior tibial artery demonstrates no significant stenosis.   The right tibial peroneal trunk artery demonstrates no significant stenosis.   The right posterior tibial artery demonstrates no significant stenosis.   The right peroneal artery demonstrates no significant stenosis.   The left external iliac artery demonstrates no significant stenosis.   The left common femoral artery demonstrates no significant stenosis.   The left proximal deep femoral artery demonstrates no significant stenosis.   The left proximal superficial femoral artery demonstrates no significant stenosis.   The left mid superficial femoral artery demonstrates no significant stenosis.   The left distal superficial femoral artery demonstrates no significant stenosis.   The left popliteal artery demonstrates no significant stenosis.   The left  anterior tibial artery demonstrates no significant stenosis.   The left tibial/peroneal trunk artery demonstrates no significant stenosis.   The left peroneal artery demonstrates no significant stenosis.   Due to significant calcium unable to evaluate flow in the left posterior tibial artery.     Result Date: 1/22/2025    The right external iliac artery demonstrates no significant stenosis.   The right common femoral artery demonstrates no significant stenosis.   The right proximal deep femoral artery demonstrates no significant stenosis.   The right proximal superficial femoral artery demonstrates no significant stenosis.   The right mid superficial femoral artery demonstrates no significant stenosis.   The right distal superficial femoral artery demonstrates no significant stenosis.   The right popliteal artery demonstrates no significant stenosis.   The right anterior tibial artery demonstrates no significant stenosis.   The right tibial peroneal trunk artery demonstrates no significant stenosis.   The right posterior tibial artery demonstrates no significant stenosis.   The right peroneal artery demonstrates no significant stenosis.   The left external iliac artery demonstrates no significant stenosis.   The left common femoral artery demonstrates no significant stenosis.   The left proximal deep femoral artery demonstrates no significant stenosis.   The left proximal superficial femoral artery demonstrates no significant stenosis.   The left mid superficial femoral artery demonstrates no significant stenosis.   The left distal superficial femoral artery demonstrates no significant stenosis.   The left popliteal artery demonstrates no significant stenosis.   The left anterior tibial artery demonstrates no significant stenosis.   The left tibial/peroneal trunk artery demonstrates no significant stenosis.   The left posterior tibial artery demonstrates no significant stenosis.   The left peroneal artery  demonstrates no significant stenosis.          Current medications:  Scheduled Meds:albumin human, , ,   allopurinol, 200 mg, Oral, Daily  amiodarone, 200 mg, Oral, TID  [Held by provider] apixaban, 2.5 mg, Oral, Q12H  atorvastatin, 10 mg, Oral, Daily  cefepime, 1,000 mg, Intravenous, Q24H  famotidine, 20 mg, Oral, Daily  insulin lispro, 2-9 Units, Subcutaneous, 4x Daily AC & at Bedtime  metoprolol tartrate, 25 mg, Oral, Q12H  metroNIDAZOLE, 500 mg, Intravenous, Q8H  NIFEdipine XL, 30 mg, Oral, Q24H  pantoprazole, 40 mg, Oral, Q AM  sodium chloride, 10 mL, Intravenous, Q12H  vancomycin (dosing per levels), , Not Applicable, Daily      Continuous Infusions:amiodarone, 1 mg/min, Last Rate: 1 mg/min (01/24/25 0513)  dilTIAZem, 5-15 mg/hr  Pharmacy to dose vancomycin,       PRN Meds:.  acetaminophen    albumin human    albumin human    senna-docusate sodium **AND** polyethylene glycol **AND** bisacodyl **AND** bisacodyl    Calcium Replacement - Follow Nurse / BPA Driven Protocol    dextrose    dextrose    glucagon (human recombinant)    heparin (porcine)    HYDROmorphone    Magnesium Standard Dose Replacement - Follow Nurse / BPA Driven Protocol    melatonin    metoprolol tartrate    ondansetron    Pharmacy to dose vancomycin    Potassium Replacement - Follow Nurse / BPA Driven Protocol    [COMPLETED] Insert Peripheral IV **AND** sodium chloride    sodium chloride    sodium chloride    traMADol    Assessment & Plan   Assessment & Plan     Active Hospital Problems    Diagnosis  POA    **Generalized weakness [R53.1]  Yes    Sepsis due to methicillin resistant Staphylococcus aureus (MRSA) with encephalopathy without septic shock [A41.02, R65.20, G93.41]  Unknown     Priority: High    Paroxysmal A-fib [I48.0]  Unknown     Priority: Medium    Chronic osteomyelitis of left foot [M86.672]  Unknown    Type 2 diabetes mellitus with stage 5 chronic kidney disease not on chronic dialysis, without long-term current use of insulin  [E11.22, N18.5]  Yes    ESRD (end stage renal disease) [N18.6]  Yes      Resolved Hospital Problems   No resolved problems to display.        Brief Hospital Course to date:  Sara Esparza is a 74 y.o. female w ESRD on HD, PAF on Eliquis, HTN and DM admitted for several weeks generalized weakness and myalgias and multiple falls in setting of lightheadedness- after admission she was found to be septic with temps up to 102.4F.     Sepsis, fever, leukocytosis, confusion  L calcaneal osteomyelitis    - RIJ tunneled dialysis cath - increases risk of bacteremia   - blood cx NGTD  - broadened abx to vanc/cefepime/flagyl.    - ortho consulted- plan for debridement and wound vac placement today, 1/24/25  - MRSA + and + Morganella from wound site. Consulted ID, appreciate Dr. Barrios's assistance   -- leukocytosis worse today but has been afebrile for 48 hours. Monitor closely       Generalized weakness progressive x weeks, falls at home   - suspicion of overdiuresis and orthostasis ongoing, w superimposed sepsis now   - Ct chest/abd without acute findings   - normal TSH and  CK   - PT OT  - orthostatics when able   - considering SNF      Neck pain , C7 region   C5-6 canal stenosis  -  CT cspine - no fx  - history of oxycodone dependence; family requests avoiding opiates.  After discussion, trying ultram   -will consider C spine MRI if pain persists     ESRD on HD:  HD on MWF     DMII:  A1C 4.9   - on sitagliptin at home. SSI.  May need to decrease dose.      Hx of HTN:   -- nifedipine dose decreased due to tenuous BPs    H/o Afib  Episode of Afib with RVR during HD  -- given IV metoprolol 5 mg x 1 to see if this would help  -- resumed home dose beta blocker (Coreg), but still not rate controlled.  Cards consulted, started on Amiodarone and changed to Metoprolol BID. Rate now controlled.   -- on Eliquis at home, holding for possible intervention for osteo    Elevated troponin  -- in setting of ESRD  -- troponin peaked at 132  on 1/19.     Dyspepsia  -- GI cocktail PRN         Expected Discharge Location and Transportation:  snf   Expected Discharge   Expected Discharge Date: 1/27/2025; Expected Discharge Time:      VTE Prophylaxis:  Pharmacologic VTE prophylaxis orders are present.         AM-PAC 6 Clicks Score (PT): 11 (01/23/25 2000)    CODE STATUS:   Code Status and Medical Interventions: CPR (Attempt to Resuscitate); Full Support   Ordered at: 01/19/25 0518     Level Of Support Discussed With:    Patient     Code Status (Patient has no pulse and is not breathing):    CPR (Attempt to Resuscitate)     Medical Interventions (Patient has pulse or is breathing):    Full Support       Heather Carlos MD  01/24/25

## 2025-01-24 NOTE — PROGRESS NOTES
"   LOS: 5 days    Patient Care Team:  Toribio Roberts MD as PCP - General (Internal Medicine)    Subjective     Seen on dialysis earlier today.  Denies chest pain or shortness of breath.   Tolerating dialysis well.  Patient scheduled for OR today.     Objective     Vital Signs:  Blood pressure 129/63, pulse 73, temperature 99.1 °F (37.3 °C), resp. rate 18, height 170.2 cm (67\"), weight 108 kg (238 lb), SpO2 97%.      Intake/Output Summary (Last 24 hours) at 1/24/2025 1815  Last data filed at 1/24/2025 1731  Gross per 24 hour   Intake 150 ml   Output 2080 ml   Net -1930 ml        01/23 0701 - 01/24 0700  In: 480 [P.O.:480]  Out: 100 [Urine:100]    Physical Exam:  GENERAL: WD WF, appears comfortable  NEURO:  No focal deficit  PSYCHIATRIC: Cooperative with PE  EYE: PE, no icterus, no conjunctivitis  ENT: omm dry, dentition intact,  Hearing intact  NECK:  No JVD discernable,  Trachea midline  CV: No edema, RRR  LUNGS:  Quiet,  Nonlabored resp.  Symmetrical expansion  ABDOMEN: Nondistended, soft nontender.  : No Corral, no palp bladder  SKIN: Warm and dry without rash  + RIJ TDC       Labs:  Results from last 7 days   Lab Units 01/24/25  0728 01/23/25  0816 01/22/25  0652   WBC 10*3/mm3 14.99* 10.56 10.19   HEMOGLOBIN g/dL 10.6* 10.7* 10.1*   PLATELETS 10*3/mm3 203 138* 140     Results from last 7 days   Lab Units 01/24/25  0728 01/23/25  0816 01/22/25  0652 01/21/25  0432 01/20/25  0624 01/19/25  0403 01/18/25  2149   SODIUM mmol/L 133* 133* 133* 134* 131* 133* 132*   POTASSIUM mmol/L 3.5 3.8 4.2 4.7 4.2 4.7 4.4   CHLORIDE mmol/L 95* 97* 96* 94* 94* 94* 94*   CO2 mmol/L 25.0 23.0 22.0 24.0 19.0* 23.0 22.0   BUN mg/dL 50* 40* 67* 52* 79* 58* 52*   CREATININE mg/dL 4.47* 3.66* 5.49* 4.68* 6.51* 5.40* 5.02*   CALCIUM mg/dL 9.6 9.1 9.3 10.8* 10.3 10.6* 10.7*   PHOSPHORUS mg/dL  --   --  5.5*  --   --   --   --    MAGNESIUM mg/dL  --   --   --   --   --   --  2.3   ALBUMIN g/dL  --   --  2.4* 3.3* 3.0* 3.6 3.8 "     Results from last 7 days   Lab Units 01/22/25  0652   ALK PHOS U/L 79   BILIRUBIN mg/dL 0.2   ALT (SGPT) U/L 32   AST (SGOT) U/L 24           A/P:    ESRD: On HD MWF at [facility] [place] [facility] with NAL.  Hemodialysis today as per schedule.      HTN: Blood pressure stable    Hyponatremia:  Due to underlying renal failure.  Adjust with HD.     Hyperkalemia: Resolved.  Adjust with HD     Metabolic acidosis:  Adjust with HD     Anemia: Hemoglobin above goal.  No CORNEL unless hemoglobin <10.5.    Bone mineralization: Calcium 9.6 getting HD with 3 Ca bath (? Will change to 2.5 Ca bath)    Hyperphosphatemia: Last phos level 5.5     Nutrition: Low potassium low Phos high-protein diet.  Renal vitamin.     Fever: On antibiotics.  Urine culture with mixed hanny.  Patient with foot wound.  Primary team continues to evaluate for source.  Dialysis catheter possible source.  May need removed if blood cultures positive.    High risk and complexity patient.    Ruel Giraldo MD  01/24/25  18:15 EST

## 2025-01-24 NOTE — CONSULTS
Inpatient Vascular Surgery Consult  Consult performed by: Iliana Rubalcava PA-C  Consult ordered by: Rom Gonzalez Jr., MD  Reason for consult: LEFT heel osteo  Assessment/Recommendations:   - defer LLE arteriogram/intervention until afib optimized  - wound care/weight bearing per Dr. Gonzalez          Patient Care Team:  Toribio Roberts MD as PCP - General (Internal Medicine)    Chief complaint: LEFT heel osteo    Subjective     History of Present Illness  74-year-old  lady, a patient of Dr. Toribio Roberts, who presented to [facility] with LEFT heel osteomyelitis.  Hospitalization has been complicated by A-fib with RVR.  Vascular surgery has been consulted in regards to abnormal arterial studies in setting of LEFT heel osteomyelitis by Dr. Gonzalez, orthopedics.  Arterial duplex demonstrates LEFT posterior tibial artery stenosis but with CONTRERAS greater than 1 bilaterally.  Patient underwent LEFT heel irrigation and debridement with Dr. Gonzalez 1/24/25. No prior history of PAD or known bleeding/clotting disorder.    Review of Systems   Constitutional:  Positive for fatigue.   Cardiovascular:  Positive for palpitations.   Skin:  Positive for wound.   All other systems reviewed and are negative.       Past Medical History:   Diagnosis Date    Anxiety     Arthritis     Asthma     allergy induced    Back pain     Depression     Diabetes mellitus     dx 10 years ago- checks fsbs most days    Diverticula of colon     GERD (gastroesophageal reflux disease)     Head ache     Hypertension     Sleep apnea     no longer needs cpap - approx. 10 years r/t weight loss   ,   Past Surgical History:   Procedure Laterality Date    BACK SURGERY      x 2    CARDIAC CATHETERIZATION      no intervention    CHOLECYSTECTOMY OPEN      COLONOSCOPY      2013    HYSTERECTOMY      REPLACEMENT TOTAL KNEE BILATERAL Bilateral     TONSILLECTOMY      VENTRAL/INCISIONAL HERNIA REPAIR N/A 6/6/2017    Procedure:  INCISIONAL HERNIA REPAIR LAPAROSCOPIC;  Surgeon: Conrad Hernandez MD;  Location: [facility];  Service:    , History reviewed. No pertinent family history.,   Social History     Socioeconomic History    Marital status:    Tobacco Use    Smoking status: Never    Smokeless tobacco: Never   Vaping Use    Vaping status: Never Used   Substance and Sexual Activity    Alcohol use: No    Drug use: No    Sexual activity: Defer     E-cigarette/Vaping    E-cigarette/Vaping Use Never User      E-cigarette/Vaping Substances     E-cigarette/Vaping Devices         ,   Medications Prior to Admission   Medication Sig Dispense Refill Last Dose/Taking    acetaminophen (TYLENOL) 500 MG tablet Take 1 tablet by mouth Every 6 (Six) Hours As Needed for Mild Pain.   Taking As Needed    allopurinol (ZYLOPRIM) 100 MG tablet Take 2 tablets every day by oral route.   Taking    apixaban (ELIQUIS) 2.5 MG tablet tablet Take 1 tablet by mouth Every 12 (Twelve) Hours. 180 tablet 3 Taking    atorvastatin (LIPITOR) 10 MG tablet Take 1 tablet by mouth Daily.   Taking    isosorbide mononitrate (IMDUR) 120 MG 24 hr tablet Take 1 tablet by mouth Daily for 360 days. 90 tablet 3 Taking    omeprazole (priLOSEC) 40 MG capsule Take 1 capsule by mouth Daily.   Taking    SITagliptin (JANUVIA) 25 MG tablet Take 1 tablet by mouth Daily. 90 tablet 3 Taking    albuterol sulfate  (90 Base) MCG/ACT inhaler Inhale 2 puffs Every 4 (Four) Hours As Needed for Wheezing or Shortness of Air.       carvedilol (COREG) 25 MG tablet Take 1 tablet by mouth Daily.       Cholecalciferol 50 MCG (2000 UT) capsule Take 1 capsule by mouth Daily.       famotidine (PEPCID) 20 MG tablet Take 1 tablet by mouth Daily.       multivitamin with minerals tablet tablet Take 1 tablet by mouth Daily.       NIFEdipine XL (PROCARDIA XL) 90 MG 24 hr tablet Take 1 tablet by mouth Daily.      , Scheduled Meds:  albumin human, , ,   allopurinol, 200 mg, Oral, Daily  amiodarone, 200 mg, Oral,  TID  [Held by provider] apixaban, 2.5 mg, Oral, Q12H  atorvastatin, 10 mg, Oral, Daily  cefepime, 1,000 mg, Intravenous, Q24H  famotidine, 20 mg, Oral, Daily  insulin lispro, 2-9 Units, Subcutaneous, 4x Daily AC & at Bedtime  metoprolol tartrate, 25 mg, Oral, Q12H  metroNIDAZOLE, 500 mg, Intravenous, Q8H  NIFEdipine XL, 30 mg, Oral, Q24H  pantoprazole, 40 mg, Oral, Q AM  sodium chloride, 10 mL, Intravenous, Q12H  vancomycin (dosing per levels), , Not Applicable, Daily  vancomycin, 1,000 mg, Intravenous, Once   , Continuous Infusions:  amiodarone, 1 mg/min, Last Rate: 1 mg/min (01/24/25 1241)  dilTIAZem, 5-15 mg/hr  Pharmacy to dose vancomycin,    , PRN Meds:    acetaminophen    albumin human    albumin human    senna-docusate sodium **AND** polyethylene glycol **AND** bisacodyl **AND** bisacodyl    Calcium Replacement - Follow Nurse / BPA Driven Protocol    dextrose    dextrose    glucagon (human recombinant)    heparin (porcine)    HYDROmorphone    Magnesium Standard Dose Replacement - Follow Nurse / BPA Driven Protocol    melatonin    metoprolol tartrate    ondansetron    Pharmacy to dose vancomycin    Potassium Replacement - Follow Nurse / BPA Driven Protocol    [COMPLETED] Insert Peripheral IV **AND** sodium chloride    sodium chloride    sodium chloride    traMADol, and Allergies:  Patient has no known allergies.    Objective      Vital Signs  Temp:  [97.6 °F (36.4 °C)-98.5 °F (36.9 °C)] 97.6 °F (36.4 °C)  Heart Rate:  [65-97] 80  Resp:  [16-18] 16  BP: (121-147)/(65-84) 140/70    Physical Exam  Vitals reviewed. Chaperone present:  at bedside.   Constitutional:       General: She is not in acute distress.     Appearance: Normal appearance. She is normal weight. She is not ill-appearing, toxic-appearing or diaphoretic.   HENT:      Head: Normocephalic and atraumatic.      Nose: Nose normal.      Mouth/Throat:      Mouth: Mucous membranes are moist.      Pharynx: Oropharynx is clear.   Eyes:       Extraocular Movements: Extraocular movements intact.      Conjunctiva/sclera: Conjunctivae normal.      Pupils: Pupils are equal, round, and reactive to light.   Cardiovascular:      Rate and Rhythm: Normal rate and regular rhythm.   Pulmonary:      Effort: Pulmonary effort is normal. No respiratory distress.   Abdominal:      General: Abdomen is flat.      Palpations: Abdomen is soft.   Musculoskeletal:      Cervical back: Normal range of motion and neck supple.   Skin:     Capillary Refill: Capillary refill takes 2 to 3 seconds.      Comments: LEFT foot wrapped with wound vac in place draining serosanguinous drainage   Neurological:      General: No focal deficit present.      Mental Status: She is alert and oriented to person, place, and time.   Psychiatric:         Mood and Affect: Mood normal.         Behavior: Behavior normal.         Results Review:    I reviewed the patient's new clinical results.  I reviewed the patient's new imaging results and agree with the interpretation.    01/24/25 BLE arterial duplex:  RIGHT multi x mPT 100% reconst plantar, LEFT multi x % ([facility])    01/24/25 CONTRERAS RIGHT 1.26, LEFT 1.3 ([facility])        Discussion  Vascular studies above reviewed.  Pt  has calcification within the LEFT PT which limits visualization of flow to the LEFT heel region.  She would benefit from arteriogram with LEFT intervention after her afib is optimized.          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     Signature: Kyle Ramirez MD

## 2025-01-24 NOTE — PROGRESS NOTES
Pharmacy Consult-Vancomycin Dosing  Sara Esparza is a  74 y.o. female receiving vancomycin therapy.     Indication:empiric  Consulting Provider: hospitalist   ID Consult:     Goal Trough: 15-20    Current Antimicrobial Therapy  Anti-Infectives (From admission, onward)      Ordered     Dose/Rate Route Frequency Start Stop    01/24/25 1230  vancomycin (VANCOCIN) 1,000 mg in sodium chloride 0.9 % 250 mL IVPB-VTB        Ordering Provider: Edmund Erwin PharmD    1,000 mg  250 mL/hr over 60 Minutes Intravenous Once 01/24/25 1600      01/22/25 1230  vancomycin 750 mg in sodium chloride 0.9 % 250 mL IVPB-VTB        Ordering Provider: Edmund Erwin PharmD    750 mg  333.3 mL/hr over 45 Minutes Intravenous Once 01/22/25 1400 01/22/25 1810    01/21/25 0519  cefepime 1000 mg IVPB in 100 mL NS (MBP)        Ordering Provider: Conrad Alvarez MD    1,000 mg  over 4 Hours Intravenous Every 24 Hours 01/22/25 0600 01/29/25 1359    01/21/25 0537  vancomycin (dosing per levels)        Ordering Provider: Woo Wells, PharmD     Not Applicable Daily 01/21/25 0900 01/28/25 0859    01/21/25 0519  cefepime 1000 mg IVPB in 100 mL NS (MBP)        Ordering Provider: Conrad Alvarez MD    1,000 mg  over 30 Minutes Intravenous Once 01/21/25 0615 01/21/25 0715    01/21/25 0505  metroNIDAZOLE (FLAGYL) IVPB 500 mg        Ordering Provider: Conrad Alvarez MD    500 mg  200 mL/hr over 30 Minutes Intravenous Every 8 Hours 01/21/25 0600 01/26/25 0759    01/21/25 0513  vancomycin 2250 mg/500 mL 0.9% NS IVPB ([facility])        Ordering Provider: Woo Wells, PharmD    20 mg/kg × 108 kg  over 135 Minutes Intravenous Once 01/21/25 0600 01/21/25 0950    01/21/25 0505  Pharmacy to dose vancomycin        Ordering Provider: Conard Alvarez MD     Not Applicable Continuous PRN 01/21/25 0500 01/28/25 0459    01/20/25 0738  cefTRIAXone (ROCEPHIN) 1,000 mg in sodium chloride 0.9 % 100 mL MBP  Status:  Discontinued        Ordering  "Provider: Leslye Fiore MD    1,000 mg  200 mL/hr over 30 Minutes Intravenous Every 24 Hours 01/20/25 0900 01/21/25 0519            Allergies  Allergies as of 01/19/2025    (No Known Allergies)       Labs    Results from last 7 days   Lab Units 01/24/25  0728 01/23/25  0816 01/22/25  0652   BUN mg/dL 50* 40* 67*   CREATININE mg/dL 4.47* 3.66* 5.49*       Results from last 7 days   Lab Units 01/24/25  0728 01/23/25  0816 01/22/25  0652   WBC 10*3/mm3 14.99* 10.56 10.19       Evaluation of Dosing     Last Dose Received in the ED/Outside Facility: none  Is Patient on Dialysis or Renal Replacement: yes    Ht - 170.2 cm (67\")  Wt - 108 kg (238 lb)    Estimated Creatinine Clearance: 13.8 mL/min (A) (by C-G formula based on SCr of 4.47 mg/dL (H)).    Intake & Output (last 3 days)         01/18 0701 01/19 0700 01/19 0701  01/20 0700 01/20 0701  01/21 0700    Urine (mL/kg/hr)  300 (0.1)     Stool  0     Dialysis   0    Total Output  300 0    Net  -300 0           Stool Unmeasured Occurrence  1 x             Microbiology and Radiology  Microbiology Results (last 10 days)       Procedure Component Value - Date/Time    Blood Culture - Blood, Arm, Right [[id number]]  (Normal) Collected: 01/21/25 1325    Lab Status: Preliminary result Specimen: Blood from Arm, Right Updated: 01/23/25 1445     Blood Culture No growth at 2 days    Respiratory Panel PCR w/COVID-19(SARS-CoV-2) [facility] In-House, NP Swab in UTM/VTM, 2 HR TAT - Swab, Nasopharynx [[id number]]  (Normal) Collected: 01/21/25 0614    Lab Status: Final result Specimen: Swab from Nasopharynx Updated: 01/21/25 0703     ADENOVIRUS, PCR Not Detected     Coronavirus 229E Not Detected     Coronavirus HKU1 Not Detected     Coronavirus NL63 Not Detected     Coronavirus OC43 Not Detected     COVID19 Not Detected     Human Metapneumovirus Not Detected     Human Rhinovirus/Enterovirus Not Detected     Influenza A PCR Not Detected     Influenza B PCR Not Detected     " Parainfluenza Virus 1 Not Detected     Parainfluenza Virus 2 Not Detected     Parainfluenza Virus 3 Not Detected     Parainfluenza Virus 4 Not Detected     RSV, PCR Not Detected     Bordetella pertussis pcr Not Detected     Bordetella parapertussis PCR Not Detected     Chlamydophila pneumoniae PCR Not Detected     Mycoplasma pneumo by PCR Not Detected    Narrative:      In the setting of a positive respiratory panel with a viral infection PLUS a negative procalcitonin without other underlying concern for bacterial infection, consider observing off antibiotics or discontinuation of antibiotics and continue supportive care. If the respiratory panel is positive for atypical bacterial infection (Bordetella pertussis, Chlamydophila pneumoniae, or Mycoplasma pneumoniae), consider antibiotic de-escalation to target atypical bacterial infection.    MRSA Screen, PCR (Inpatient) - Swab, Nares [[id number]]  (Abnormal) Collected: 01/21/25 0614    Lab Status: Final result Specimen: Swab from Nares Updated: 01/21/25 0847     MRSA PCR Positive    Narrative:      The negative predictive value of this diagnostic test is high and should only be used to consider de-escalating anti-MRSA therapy. A positive result may indicate colonization with MRSA and must be correlated clinically.    Wound Culture - Wound, Foot, Left [[id number]]  (Abnormal)  (Susceptibility) Collected: 01/20/25 1409    Lab Status: Final result Specimen: Wound from Foot, Left Updated: 01/24/25 0658     Wound Culture Scant growth (1+) Staphylococcus aureus, MRSA     Comment: Methicillin resistant Staphylococcus aureus, Patient may be an isolation risk.         Rare growth Morganella morganii ssp morganii     Comment:            Scant growth (1+) Normal Skin Kimber     Gram Stain Few (2+) WBCs seen      Many (4+) Gram positive cocci in pairs      Rare (1+) Gram negative bacilli      Rare (1+) Gram positive bacilli    Susceptibility        Staphylococcus aureus, MRSA       TAYLOR      Clindamycin 0.25 ug/ml Susceptible      Erythromycin >=8 ug/ml Resistant      Oxacillin >=4 ug/ml Resistant      Rifampin <=0.5 ug/ml Susceptible      Tetracycline <=1 ug/ml Susceptible      Trimethoprim + Sulfamethoxazole <=10 ug/ml Susceptible      Vancomycin 1 ug/ml Susceptible                       Susceptibility        Morganella morganii ssp morganii      TAYLOR      Amoxicillin + Clavulanate >=32 ug/ml Resistant      Ampicillin >=32 ug/ml Resistant      Ampicillin + Sulbactam >=32 ug/ml Resistant      Cefazolin (Non Urine) >=32 ug/ml Resistant      Cefepime 0.032 ug/ml Susceptible  [1]       Cefotaxime <=0.25 ug/ml Susceptible      Ceftazidime 0.094 ug/ml Susceptible  [1]       Gentamicin <=1 ug/ml Susceptible      Levofloxacin <=0.12 ug/ml Susceptible      Piperacillin + Tazobactam <=4 ug/ml Susceptible      Tetracycline <=1 ug/ml Susceptible      Trimethoprim + Sulfamethoxazole <=20 ug/ml Susceptible                   [1]  Appended report. These results have been appended to a previously final verified report.                Susceptibility Comments       Morganella morganii ssp morganii    Cefotaxime susceptibility can be used as a surrogate for ceftriaxone susceptibility               Urine Culture - Urine, Urine, Clean Catch [[id number]] Collected: 01/19/25 1404    Lab Status: Final result Specimen: Urine, Clean Catch Updated: 01/20/25 1116     Urine Culture 50,000 CFU/mL Mixed Kimber Isolated    Narrative:      Specimen contains mixed organisms of questionable pathogenicity suggestive of contamination. If symptoms persist, suggest recollection.  Colonization of the urinary tract without infection is common. Treatment is discouraged unless the patient is symptomatic, pregnant, or undergoing an invasive urologic procedure.            Vancomycin Levels:    Results from last 7 days   Lab Units 01/24/25  0728 01/22/25  0652   VANCOMYCIN RM mcg/mL 19.50 21.10                     Assessment/Plan:     1.  Pharmacy to dose vancomycin for Osteo. Goal -600 mg/L*hr.  2. Patient received a loading dose of vancomycin 2250mg IV on 1/21 @ 0735.  3. Will dose per levels based on dialysis schedule   4. Redosed 1/22 with 750 mg after dialysis. Level obtained 1/24 prior to dialysis is 19.5 mcg/mL. Will redose after dialysis with 1000 mg.   5. Assess vancomycin level on 1/27 @ 0600 to determine appropriateness of scheduled dosing.   4. Pharmacy will continue to monitor renal function, cultures and sensitivities, and clinical status to adjust regimen as necessary.      Edmund Erwin, PharmD  1/24/2025  12:30 EST

## 2025-01-24 NOTE — ANESTHESIA POSTPROCEDURE EVALUATION
Patient: Sara Esparza    Procedure Summary       Date: 01/24/25 Room / Location:  [facility]    Anesthesia Start: 1650 Anesthesia Stop: 1746    Procedures:       Left heel irrigation, debridement, possible wound vacuum assisted closure (Left: Foot)      PLACEMENT OF WOUND VAC (Left) Diagnosis:       Chronic osteomyelitis of left foot      (Chronic osteomyelitis of left foot [M86.672])    Surgeons: Rom Gonzalez Jr., MD Provider: Wil Pearce MD    Anesthesia Type: general ASA Status: 4            Anesthesia Type: general    Vitals  Vitals Value Taken Time   /71 01/24/25 1743   Temp     Pulse 76 01/24/25 1746   Resp     SpO2 100 % 01/24/25 1746   Vitals shown include unfiled device data.        Post Anesthesia Care and Evaluation    Patient location during evaluation: PACU  Patient participation: complete - patient participated  Level of consciousness: awake and alert  Pain management: adequate    Airway patency: patent  Anesthetic complications: No anesthetic complications  PONV Status: none  Cardiovascular status: hemodynamically stable and acceptable  Respiratory status: nonlabored ventilation, acceptable and nasal cannula  Hydration status: acceptable

## 2025-01-24 NOTE — PLAN OF CARE
Problem: Adult Inpatient Plan of Care  Goal: Plan of Care Review  Outcome: Progressing  Flowsheets (Taken 1/24/2025 0445)  Progress: improving  Outcome Evaluation: Pt is A&Ox3, not to situation, VSS, AFib on the monitor, resting on 3L nc. Pt has had a rough night, especially with pain. Pt had an MRI ordered that was pushed until night shift. Pt was unable to tolerate laying flat and on the hard surface. Pt was in so much pain she began to vomit. When returning to the floor, NP called and notified, and she put in a one time dose of dilauded. Once I gave this to the pt she was finally able to get some rest. Based on the last two nights, pts pain has not been controlled with the current orders in place. No further complaints from the pt at this time. Will continue plan of care.  Plan of Care Reviewed With: patient  Goal: Patient-Specific Goal (Individualized)  Outcome: Progressing  Goal: Absence of Hospital-Acquired Illness or Injury  Outcome: Progressing  Intervention: Identify and Manage Fall Risk  Recent Flowsheet Documentation  Taken 1/24/2025 0400 by Cedrick Leonard RN  Safety Promotion/Fall Prevention:   activity supervised   assistive device/personal items within reach   clutter free environment maintained   fall prevention program maintained   nonskid shoes/slippers when out of bed   room organization consistent   safety round/check completed   toileting scheduled  Taken 1/24/2025 0200 by Cedrick Leonard RN  Safety Promotion/Fall Prevention:   activity supervised   assistive device/personal items within reach   clutter free environment maintained   fall prevention program maintained   nonskid shoes/slippers when out of bed   room organization consistent   safety round/check completed   toileting scheduled  Taken 1/24/2025 0000 by Cedrick Leonard RN  Safety Promotion/Fall Prevention:   activity supervised   assistive device/personal items within reach   clutter free environment maintained   fall  prevention program maintained   nonskid shoes/slippers when out of bed   room organization consistent   safety round/check completed   toileting scheduled  Taken 1/23/2025 2200 by Cedrick Leonard RN  Safety Promotion/Fall Prevention:   activity supervised   assistive device/personal items within reach   clutter free environment maintained   fall prevention program maintained   nonskid shoes/slippers when out of bed   room organization consistent   safety round/check completed   toileting scheduled  Taken 1/23/2025 2000 by Cedrick Leonard RN  Safety Promotion/Fall Prevention:   activity supervised   assistive device/personal items within reach   clutter free environment maintained   fall prevention program maintained   nonskid shoes/slippers when out of bed   room organization consistent   safety round/check completed   toileting scheduled  Intervention: Prevent Skin Injury  Recent Flowsheet Documentation  Taken 1/24/2025 0400 by Cedrick Leonard RN  Body Position:   turned   supine   legs elevated  Skin Protection:   incontinence pads utilized   silicone foam dressing in place   transparent dressing maintained  Taken 1/24/2025 0200 by Cedrick Leonard RN  Body Position:   turned   right  Skin Protection:   incontinence pads utilized   silicone foam dressing in place   transparent dressing maintained  Taken 1/24/2025 0000 by Cedrick Leonard RN  Body Position:   turned   left  Skin Protection:   incontinence pads utilized   silicone foam dressing in place   transparent dressing maintained  Taken 1/23/2025 2200 by Cedrick Leonard RN  Body Position:   turned   supine   legs elevated  Skin Protection:   incontinence pads utilized   silicone foam dressing in place   transparent dressing maintained  Taken 1/23/2025 2000 by Cedrick Leonard RN  Body Position:   turned   right  Skin Protection: (silicone dressings peeled back and skin assessed)   incontinence pads utilized   silicone foam dressing in place    transparent dressing maintained  Intervention: Prevent and Manage VTE (Venous Thromboembolism) Risk  Recent Flowsheet Documentation  Taken 1/23/2025 2000 by Cedrick Leonard RN  VTE Prevention/Management: (see MAR)   other (see comments)   bilateral   SCDs (sequential compression devices) on  Intervention: Prevent Infection  Recent Flowsheet Documentation  Taken 1/24/2025 0400 by Cedrick Leonard RN  Infection Prevention:   environmental surveillance performed   rest/sleep promoted   single patient room provided  Taken 1/24/2025 0200 by Cedrick Leonard RN  Infection Prevention:   environmental surveillance performed   rest/sleep promoted   single patient room provided  Taken 1/24/2025 0000 by Cedrick Leonard RN  Infection Prevention:   environmental surveillance performed   rest/sleep promoted   single patient room provided  Taken 1/23/2025 2200 by Cedrick Leonard RN  Infection Prevention:   environmental surveillance performed   rest/sleep promoted   single patient room provided  Taken 1/23/2025 2000 by Cedrick Leonard RN  Infection Prevention:   environmental surveillance performed   rest/sleep promoted   single patient room provided  Goal: Optimal Comfort and Wellbeing  Outcome: Progressing  Intervention: Monitor Pain and Promote Comfort  Recent Flowsheet Documentation  Taken 1/24/2025 0400 by Cedrick Leonard RN  Pain Management Interventions:   no interventions per patient request   relaxation techniques promoted   quiet environment facilitated  Taken 1/24/2025 0224 by Cedrick Leonard RN  Pain Management Interventions:   quiet environment facilitated   relaxation techniques promoted   no interventions per patient request  Taken 1/24/2025 0154 by Cedrick Leonard RN  Pain Management Interventions:   pain medication given   quiet environment facilitated   relaxation techniques promoted  Taken 1/24/2025 0000 by Cedrick Leonard RN  Pain Management Interventions:   pain management plan reviewed with  patient/caregiver   quiet environment facilitated   relaxation techniques promoted  Taken 1/23/2025 2200 by Cedrick Leonard RN  Pain Management Interventions:   no interventions per patient request   quiet environment facilitated   relaxation techniques promoted  Taken 1/23/2025 2030 by Cedrick Leonard RN  Pain Management Interventions:   no interventions per patient request   quiet environment facilitated   relaxation techniques promoted  Taken 1/23/2025 2000 by Cedrick Leonard RN  Pain Management Interventions:   pain medication given   quiet environment facilitated   relaxation techniques promoted  Intervention: Provide Person-Centered Care  Recent Flowsheet Documentation  Taken 1/23/2025 2000 by Cedrick Leonard RN  Trust Relationship/Rapport:   care explained   choices provided   questions answered   reassurance provided   thoughts/feelings acknowledged  Goal: Readiness for Transition of Care  Outcome: Progressing     Problem: Skin Injury Risk Increased  Goal: Skin Health and Integrity  Outcome: Progressing  Intervention: Optimize Skin Protection  Recent Flowsheet Documentation  Taken 1/24/2025 0400 by Cedrick Leonard RN  Activity Management: activity encouraged  Pressure Reduction Techniques:   frequent weight shift encouraged   heels elevated off bed   positioned off wounds   pressure points protected   weight shift assistance provided  Head of Bed (HOB) Positioning: HOB elevated  Pressure Reduction Devices:   pressure-redistributing mattress utilized   positioning supports utilized   heel offloading device utilized   foam padding utilized  Skin Protection:   incontinence pads utilized   silicone foam dressing in place   transparent dressing maintained  Taken 1/24/2025 0200 by Cedrick Leonard RN  Activity Management: activity encouraged  Pressure Reduction Techniques:   frequent weight shift encouraged   heels elevated off bed   positioned off wounds   pressure points protected   weight shift  assistance provided  Head of Bed (HOB) Positioning: HOB elevated  Pressure Reduction Devices:   pressure-redistributing mattress utilized   positioning supports utilized   heel offloading device utilized   foam padding utilized  Skin Protection:   incontinence pads utilized   silicone foam dressing in place   transparent dressing maintained  Taken 1/24/2025 0000 by Cedrick Leonard RN  Activity Management: activity encouraged  Pressure Reduction Techniques:   frequent weight shift encouraged   heels elevated off bed   positioned off wounds   pressure points protected   weight shift assistance provided  Head of Bed (hospitals) Positioning: HOB elevated  Pressure Reduction Devices:   pressure-redistributing mattress utilized   positioning supports utilized   foam padding utilized   heel offloading device utilized  Skin Protection:   incontinence pads utilized   silicone foam dressing in place   transparent dressing maintained  Taken 1/23/2025 2200 by Cedrick Leonard RN  Activity Management: activity encouraged  Pressure Reduction Techniques:   frequent weight shift encouraged   heels elevated off bed   positioned off wounds   pressure points protected   weight shift assistance provided  Head of Bed (hospitals) Positioning: hospitals elevated  Pressure Reduction Devices:   pressure-redistributing mattress utilized   positioning supports utilized   foam padding utilized   heel offloading device utilized  Skin Protection:   incontinence pads utilized   silicone foam dressing in place   transparent dressing maintained  Taken 1/23/2025 2000 by Cedrick Leonard RN  Activity Management: activity encouraged  Pressure Reduction Techniques:   frequent weight shift encouraged   heels elevated off bed   positioned off wounds   pressure points protected   weight shift assistance provided  Head of Bed (hospitals) Positioning: HOB elevated  Pressure Reduction Devices:   pressure-redistributing mattress utilized   positioning supports utilized   heel  offloading device utilized   foam padding utilized  Skin Protection: (silicone dressings peeled back and skin assessed)   incontinence pads utilized   silicone foam dressing in place   transparent dressing maintained     Problem: Fall Injury Risk  Goal: Absence of Fall and Fall-Related Injury  Outcome: Progressing  Intervention: Identify and Manage Contributors  Recent Flowsheet Documentation  Taken 1/24/2025 0400 by Cedrick Leonard RN  Medication Review/Management: medications reviewed  Taken 1/24/2025 0200 by Cedrick Leonard RN  Medication Review/Management: medications reviewed  Taken 1/24/2025 0000 by Cedrick Leonard RN  Medication Review/Management: medications reviewed  Taken 1/23/2025 2200 by Cedrick Leonard RN  Medication Review/Management: medications reviewed  Taken 1/23/2025 2000 by Cedrick Leonard RN  Medication Review/Management: medications reviewed  Intervention: Promote Injury-Free Environment  Recent Flowsheet Documentation  Taken 1/24/2025 0400 by Cedrick Leonard RN  Safety Promotion/Fall Prevention:   activity supervised   assistive device/personal items within reach   clutter free environment maintained   fall prevention program maintained   nonskid shoes/slippers when out of bed   room organization consistent   safety round/check completed   toileting scheduled  Taken 1/24/2025 0200 by Cedrick Leonard RN  Safety Promotion/Fall Prevention:   activity supervised   assistive device/personal items within reach   clutter free environment maintained   fall prevention program maintained   nonskid shoes/slippers when out of bed   room organization consistent   safety round/check completed   toileting scheduled  Taken 1/24/2025 0000 by Cedrick Leonard RN  Safety Promotion/Fall Prevention:   activity supervised   assistive device/personal items within reach   clutter free environment maintained   fall prevention program maintained   nonskid shoes/slippers when out of bed   room  organization consistent   safety round/check completed   toileting scheduled  Taken 1/23/2025 2200 by Cedrick Leonard RN  Safety Promotion/Fall Prevention:   activity supervised   assistive device/personal items within reach   clutter free environment maintained   fall prevention program maintained   nonskid shoes/slippers when out of bed   room organization consistent   safety round/check completed   toileting scheduled  Taken 1/23/2025 2000 by Cedrick Leonard RN  Safety Promotion/Fall Prevention:   activity supervised   assistive device/personal items within reach   clutter free environment maintained   fall prevention program maintained   nonskid shoes/slippers when out of bed   room organization consistent   safety round/check completed   toileting scheduled     Problem: Comorbidity Management  Goal: Maintenance of Heart Failure Symptom Control  Outcome: Progressing  Intervention: Maintain Heart Failure Management  Recent Flowsheet Documentation  Taken 1/24/2025 0400 by Cedrick Leonard RN  Medication Review/Management: medications reviewed  Taken 1/24/2025 0200 by Cedrick Leonard RN  Medication Review/Management: medications reviewed  Taken 1/24/2025 0000 by Cedrick Leonard RN  Medication Review/Management: medications reviewed  Taken 1/23/2025 2200 by Cedrick Leonard RN  Medication Review/Management: medications reviewed  Taken 1/23/2025 2000 by Cedrick Leonard RN  Medication Review/Management: medications reviewed  Goal: Blood Pressure in Desired Range  Outcome: Progressing  Intervention: Maintain Blood Pressure Management  Recent Flowsheet Documentation  Taken 1/24/2025 0400 by Cedrick Leonard RN  Medication Review/Management: medications reviewed  Taken 1/24/2025 0200 by Cedrick Leonard RN  Medication Review/Management: medications reviewed  Taken 1/24/2025 0000 by Cedrick Leonard RN  Medication Review/Management: medications reviewed  Taken 1/23/2025 2200 by Cedrick Leonard  RN  Medication Review/Management: medications reviewed  Taken 1/23/2025 2000 by Cedrick Leonard RN  Medication Review/Management: medications reviewed     Problem: Hemodialysis  Goal: Safe, Effective Therapy Delivery  Outcome: Progressing  Intervention: Optimize Device Care and Function  Recent Flowsheet Documentation  Taken 1/24/2025 0400 by Cedrick Leonard RN  Medication Review/Management: medications reviewed  Taken 1/24/2025 0200 by Cedrick Leonard RN  Medication Review/Management: medications reviewed  Taken 1/24/2025 0000 by Cedrick Leonard RN  Medication Review/Management: medications reviewed  Taken 1/23/2025 2200 by Cedrick Leonard RN  Medication Review/Management: medications reviewed  Taken 1/23/2025 2000 by Cedrick Leonard RN  Medication Review/Management: medications reviewed  Goal: Effective Tissue Perfusion  Outcome: Progressing  Goal: Absence of Infection Signs and Symptoms  Outcome: Progressing  Intervention: Prevent or Manage Infection  Recent Flowsheet Documentation  Taken 1/24/2025 0400 by Cedrick Leonard RN  Infection Prevention:   environmental surveillance performed   rest/sleep promoted   single patient room provided  Taken 1/24/2025 0200 by Cedrick Leonard RN  Infection Prevention:   environmental surveillance performed   rest/sleep promoted   single patient room provided  Taken 1/24/2025 0000 by Cedrick Leonard RN  Infection Prevention:   environmental surveillance performed   rest/sleep promoted   single patient room provided  Taken 1/23/2025 2200 by Cedrick Leonard RN  Infection Prevention:   environmental surveillance performed   rest/sleep promoted   single patient room provided  Taken 1/23/2025 2000 by Cedrick Leonard RN  Infection Prevention:   environmental surveillance performed   rest/sleep promoted   single patient room provided   Goal Outcome Evaluation:  Plan of Care Reviewed With: patient        Progress: improving  Outcome Evaluation: Pt is A&Ox3, not to  situation, VSS, AFib on the monitor, resting on 3L nc. Pt has had a rough night, especially with pain. Pt had an MRI ordered that was pushed until night shift. Pt was unable to tolerate laying flat and on the hard surface. Pt was in so much pain she began to vomit. When returning to the floor, NP called and notified, and she put in a one time dose of dilauded. Once I gave this to the pt she was finally able to get some rest. Based on the last two nights, pts pain has not been controlled with the current orders in place. No further complaints from the pt at this time. Will continue plan of care.

## 2025-01-24 NOTE — CASE MANAGEMENT/SOCIAL WORK
Continued Stay Note   [facility]     Patient Name: Sara Esparza  MRN: [id number]  Today's Date: 1/24/2025    Admit Date: 1/19/2025    Plan: undecided   Discharge Plan       Row Name 01/24/25 0815       Plan    Plan undecided    Plan Comments I attempted to see this patient bedside, but she is in HD this morning. PT and OT still need to work with this patient and make recommendations. She will go for irrigation and debridement of her heel wound per MD note and may require a wound vac. CM will attempt to visit with patient and  later in the day. CM to follow.    1254  CM spoke with the patient and the  bedside after she returned from HD. Her pain level is better. They are awaiting her procedure. CM to follow.                   Discharge Codes    No documentation.                 Expected Discharge Date and Time       Expected Discharge Date Expected Discharge Time    Jan 27, 2025               Sharee Williamson RN

## 2025-01-24 NOTE — PLAN OF CARE
Problem: Hemodialysis  Goal: Safe, Effective Therapy Delivery  Outcome: Progressing  Goal: Effective Tissue Perfusion  Outcome: Progressing  Goal: Absence of Infection Signs and Symptoms  Outcome: Progressing   Goal Outcome Evaluation:         HD completed. UF 1980, Goal met. VS stable during tx. Tolerated tx well. Did c/o of some back pain at times. Some confusion noted as well. Access functioned well. Report to PAULIE Marlow

## 2025-01-24 NOTE — PROGRESS NOTES
[facility] Progress Note    Admission Date: 1/19/2025    Sara Esparza  1950  [id number]    Date: 1/24/2025    Antibiotics:  Anti-Infectives (From admission, onward)      Ordered     Dose/Rate Route Frequency Start Stop    01/22/25 1230  vancomycin 750 mg in sodium chloride 0.9 % 250 mL IVPB-VTB        Ordering Provider: Edmund Erwin, Amrita    750 mg  333.3 mL/hr over 45 Minutes Intravenous Once 01/22/25 1400 01/22/25 1810    01/21/25 0519  cefepime 1000 mg IVPB in 100 mL NS (MBP)        Ordering Provider: Conrad Alvarez MD    1,000 mg  over 4 Hours Intravenous Every 24 Hours 01/22/25 0600 01/29/25 1359    01/21/25 0537  vancomycin (dosing per levels)        Ordering Provider: Woo Wells, PharmD     Not Applicable Daily 01/21/25 0900 01/28/25 0859    01/21/25 0519  cefepime 1000 mg IVPB in 100 mL NS (MBP)        Ordering Provider: Conrad Alvarez MD    1,000 mg  over 30 Minutes Intravenous Once 01/21/25 0615 01/21/25 0715    01/21/25 0505  metroNIDAZOLE (FLAGYL) IVPB 500 mg        Ordering Provider: Conrad Alvarez MD    500 mg  200 mL/hr over 30 Minutes Intravenous Every 8 Hours 01/21/25 0600 01/26/25 0759    01/21/25 0513  vancomycin 2250 mg/500 mL 0.9% NS IVPB ([facility])        Ordering Provider: Woo Wells, PharmD    20 mg/kg × 108 kg  over 135 Minutes Intravenous Once 01/21/25 0600 01/21/25 0950    01/21/25 0505  Pharmacy to dose vancomycin        Ordering Provider: Conrad Alvarez MD     Not Applicable Continuous PRN 01/21/25 0500 01/28/25 0459    01/20/25 0738  cefTRIAXone (ROCEPHIN) 1,000 mg in sodium chloride 0.9 % 100 mL MBP  Status:  Discontinued        Ordering Provider: Leslye Fiore MD    1,000 mg  200 mL/hr over 30 Minutes Intravenous Every 24 Hours 01/20/25 0900 01/21/25 0519            Reason for Consultation: Sepsis and osteomyelitis of the left calcaneus     History of present illness:    Patient is a 74 y.o. 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  Evaluation in the ED included WBC 14.3 and PCT 5.9.  Subsequent lactic acid was 4.1 on 1/21.  After admission she became febrile to 101.9.  She was started on ceftriaxone and vancomycin.  Blood culture was sent yesterday afternoon.  Urinalysis showed pyuria.  Urine culture showed mixed hanny in the lab discarded the culture.  She was noted to have a left calcaneus wound.  CT scan of the chest, abdomen and pelvis were unremarkable for acute process.  CT scan of the left lower extremity showed a wound at the plantar aspect of the left calcaneus with subcutaneous and intraosseous air and destructive changes .  CT scan of the neck showed advanced C5-6 and C6-7 degenerative disc disease; suspected moderate or greater C5-6 canal stenosis due to posterior vertebral osteophytes.  Wound culture of the left heel is growing MRSA and a gram-negative ange.  At the time of my exam the patient is nonverbal other than moaning.  She does not follow commands.  Her  is at the bedside and is able to provide medical history although he is a little uncertain about timing of events such as when she started dialysis.      She had a dialysis catheter placed 9/13 which was replaced on 10/11.    1/23/2025.  Afebrile x 2 days.  She is considerably more alert today.  She remains on IV amiodarone for atrial fibrillation with RVR.  She complains of pain but does not localize it to any specific site.  Wound culture with MRSA and Morganella.  Blood culture negative.    1/24/2025.  Afebrile.  She is seen on dialysis today.  She she is alert and mildly confused but understands that she is scheduled to undergo surgery today.  In discussion with her  in the patient's room  he recounted that it was explained to her that she was at high risk for limb loss based on the extent of osteomyelitis involving her calcaneus.  MRI was attempted yesterday but aborted because of pain.      ROS    According to her  1/22 the main complaints at home have been neck pain, shortness of breath and generalized weakness.  Evidently neither the  nor the patient were aware of the chronic appearing left heel ulcer.  The patient has no new complaints to add to her 's history            PE:  Vital Signs  Temp  Min: 97.6 °F (36.4 °C)  Max: 98.5 °F (36.9 °C)  BP  Min: 121/78  Max: 147/65  Pulse  Min: 65  Max: 97  Resp  Min: 16  Max: 18  SpO2  Min: 91 %  Max: 100 %    GENERAL: Alert, appropriate, appears chronically ill  HEENT: Normocephalic, atraumatic.   No conjunctival injection. No icterus.  No oral labial lesions   NECK: No evidence of meningismusHEART: IRRR; No murmur, rubs, gallops.   LUNGS: Clear anteriorly, no wheezes or rhonchi appreciated  ABDOMEN: Abdomen bloated but soft.  No evidence of tenderness elicited with deep pressure throughout the abdomen.  Hypoactive bowel sounds.  EXT:  No cyanosis, clubbing or edema. No cord.  :  Without Corral catheter.  MSK: No joint effusions or erythema.  Bilateral knees appeared to be free of erythema or soft tissue swelling.  Left heel with a chronic appearing wound on the plantar aspect approximately 1 cm in diameter; positive surrounding erythema but no active drainage at the time of my exam but foul-smelling odor noted  SKIN: Warm and dry without cutaneous eruptions on Inspection/palpation.    NEURO: Oriented to PPT.    PSYCHIATRIC: Normal insight and judgment. Cooperative with PE       Laboratory Data    Results from last 7 days   Lab Units 01/24/25  0728 01/23/25  0816 01/22/25  0652   WBC 10*3/mm3 14.99* 10.56 10.19   HEMOGLOBIN g/dL 10.6* 10.7* 10.1*   HEMATOCRIT % 32.7* 33.7* 32.3*   PLATELETS 10*3/mm3 203 138* 140     Results from last  7 days   Lab Units 01/24/25  0728   SODIUM mmol/L 133*   POTASSIUM mmol/L 3.5   CHLORIDE mmol/L 95*   CO2 mmol/L 25.0   BUN mg/dL 50*   CREATININE mg/dL 4.47*   GLUCOSE mg/dL 167*   CALCIUM mg/dL 9.6     Results from last 7 days   Lab Units 01/22/25  0652   ALK PHOS U/L 79   BILIRUBIN mg/dL 0.2   ALT (SGPT) U/L 32   AST (SGOT) U/L 24               Estimated Creatinine Clearance: 13.8 mL/min (A) (by C-G formula based on SCr of 4.47 mg/dL (H)).      Microbiology:  MRSA and Morganella from foot wound    Radiology:  Imaging Results (Last 72 Hours)       Procedure Component Value Units Date/Time    XR Chest 1 View [[id number]] Collected: 01/23/25 0246     Updated: 01/23/25 0250    Narrative:      XR CHEST 1 VW    Date of Exam: 1/23/2025 1:51 AM EST    Indication: chest pain    Comparison: 1/21/2025, 1/19/2025.    Findings:  Right internal jugular PermCath present with the tip at the cavoatrial junction. Low lung volumes. There are no airspace consolidations. No pleural fluid. No pneumothorax. The pulmonary vasculature appears mildly indistinct. The cardiac and mediastinal   silhouette appear unremarkable. No acute osseous abnormality identified.      Impression:      Impression:  Questionable mild pulmonary edema pattern.        Electronically Signed: Cindy Fuller MD    1/23/2025 2:47 AM EST    Workstation ID: [id number]            I personally reviewed the radiographic studies       Impression:      -- Sepsis with leukocytosis, lactic acidosis, encephalopathy.  She has multiple potential sites of infection.  I would have concerned that she may have had bacteremia secondary to her left calcaneus osteomyelitis and could have infected her dialysis catheter which has now been in place for 102 days.  It appears that she has a UTI although unfortunately we do not have data from the micro lab.  In addition there would be concern that she has developed prosthetic knee infection although I could not appreciate evidence of this on  exam.  Another potential problem would be osteomyelitis of the cervical spine with her history of progressive neck pain.  The noncontrast CT scan was nondiagnostic but this would have limited value in completely excluding the possibility of cervical osteomyelitis     --Probable osteomyelitis of the left calcaneus.  MRSA and Morganella from wound culture     --ESRD on hemodialysis     --Diabetes mellitus A1c 4.9     --Pyuria, no culture data.  I suspect that she had a UTI which has responded to her current therapy with cefepime and vancomycin     --Progressive neck pain likely secondary to degenerative arthritis but the possibility of osteomyelitis should be considered.     --Paroxysmal atrial fibrillation     --Encephalopathy likely secondary to sepsis-significantly improved     PLAN/RECOMMENDATIONS:   Thank you for asking us to see Sara Esparza, I recommend the following:        -- Agree with cefepime and vancomycin while blood cultures are pending.  Metronidazole added for coverage of anaerobic pathogens in the foot     -- Consider MRI of the cervical spine if her neck pain progresses.  At present her pain appears to be improved     -- Follow-up wound cultures of the left heel.       -- Follow blood culture result although this is antibiotic modified.  Low threshold for repeat blood cultures when she is on dialysis particularly if she remains febrile     -- Orthopedic surgery is following and has ordered an MRI of the heel.  MRI ultimately canceled because she could not tolerate lying on the table    --Consider echo at some point, particularly if her blood culture is positive     Discussed at length with the patient and daughter we discussed the fact that although we do not have positive cultures of urine but her clinical improvement on cefepime and vancomycin suggest that she is on appropriate agents for this.       Bailee Barrios MD  1/24/2025

## 2025-01-24 NOTE — ANESTHESIA PROCEDURE NOTES
Airway  Urgency: elective    Date/Time: 1/24/2025 5:01 PM  Airway not difficult    General Information and Staff    Patient location during procedure: OR  CRNA/CAA: Katya Crow CRNA    Indications and Patient Condition  Indications for airway management: airway protection    Preoxygenated: yes  MILS not maintained throughout  Mask difficulty assessment: 1 - vent by mask    Final Airway Details  Final airway type: endotracheal airway      Successful airway: ETT  Cuffed: yes   Successful intubation technique: video laryngoscopy  Endotracheal tube insertion site: oral  Blade: Wally  Blade size: 3  ETT size (mm): 7.5  Cormack-Lehane Classification: grade I - full view of glottis  Placement verified by: chest auscultation and capnometry   Measured from: lips  ETT/EBT  to lips (cm): 20  Number of attempts at approach: 1  Assessment: lips, teeth, and gum same as pre-op and atraumatic intubation    Additional Comments  Negative epigastric sounds, Breath sound equal bilaterally with symmetric chest rise and fall

## 2025-01-25 ENCOUNTER — APPOINTMENT (OUTPATIENT)
Dept: CARDIOLOGY | Facility: HOSPITAL | Age: 75
DRG: 853 | End: 2025-01-25
Payer: MEDICARE

## 2025-01-25 LAB
ANION GAP SERPL CALCULATED.3IONS-SCNC: 14 MMOL/L (ref 5–15)
ASCENDING AORTA: 3.5 CM
AV MEAN PRESS GRAD SYS DOP V1V2: 5 MMHG
AV VMAX SYS DOP: 149.5 CM/SEC
BASOPHILS # BLD AUTO: 0.02 10*3/MM3 (ref 0–0.2)
BASOPHILS NFR BLD AUTO: 0.2 % (ref 0–1.5)
BH CV ECHO MEAS - AO MAX PG: 9 MMHG
BH CV ECHO MEAS - AO ROOT DIAM: 3.2 CM
BH CV ECHO MEAS - AO V2 VTI: 24.2 CM
BH CV ECHO MEAS - AVA(I,D): 2.16 CM2
BH CV ECHO MEAS - EDV(CUBED): 103.8 ML
BH CV ECHO MEAS - EDV(MOD-SP2): 102 ML
BH CV ECHO MEAS - EDV(MOD-SP4): 105 ML
BH CV ECHO MEAS - EF(MOD-SP2): 53.5 %
BH CV ECHO MEAS - EF(MOD-SP4): 55.4 %
BH CV ECHO MEAS - ESV(CUBED): 35.9 ML
BH CV ECHO MEAS - ESV(MOD-SP2): 47.4 ML
BH CV ECHO MEAS - ESV(MOD-SP4): 46.8 ML
BH CV ECHO MEAS - FS: 29.8 %
BH CV ECHO MEAS - IVS/LVPW: 1 CM
BH CV ECHO MEAS - IVSD: 1.3 CM
BH CV ECHO MEAS - LA DIMENSION: 4.5 CM
BH CV ECHO MEAS - LAT PEAK E' VEL: 12.1 CM/SEC
BH CV ECHO MEAS - LV MASS(C)D: 237.9 GRAMS
BH CV ECHO MEAS - LV MAX PG: 3.9 MMHG
BH CV ECHO MEAS - LV MEAN PG: 2 MMHG
BH CV ECHO MEAS - LV V1 MAX: 98.5 CM/SEC
BH CV ECHO MEAS - LV V1 VTI: 16.7 CM
BH CV ECHO MEAS - LVIDD: 4.7 CM
BH CV ECHO MEAS - LVIDS: 3.3 CM
BH CV ECHO MEAS - LVOT AREA: 3.1 CM2
BH CV ECHO MEAS - LVOT DIAM: 2 CM
BH CV ECHO MEAS - LVPWD: 1.3 CM
BH CV ECHO MEAS - MED PEAK E' VEL: 10.3 CM/SEC
BH CV ECHO MEAS - MR MAX PG: 76 MMHG
BH CV ECHO MEAS - MR MAX VEL: 436 CM/SEC
BH CV ECHO MEAS - MR MEAN PG: 51 MMHG
BH CV ECHO MEAS - MR MEAN VEL: 344 CM/SEC
BH CV ECHO MEAS - MR VTI: 120 CM
BH CV ECHO MEAS - MV DEC SLOPE: 394 CM/SEC2
BH CV ECHO MEAS - MV E MAX VEL: 137 CM/SEC
BH CV ECHO MEAS - MV MAX PG: 7.6 MMHG
BH CV ECHO MEAS - MV MEAN PG: 4 MMHG
BH CV ECHO MEAS - MV P1/2T: 105.6 MSEC
BH CV ECHO MEAS - MV V2 VTI: 30.2 CM
BH CV ECHO MEAS - MVA(P1/2T): 2.08 CM2
BH CV ECHO MEAS - MVA(VTI): 1.73 CM2
BH CV ECHO MEAS - PA ACC TIME: 0.09 SEC
BH CV ECHO MEAS - PA V2 MAX: 126 CM/SEC
BH CV ECHO MEAS - PI END-D VEL: 94.5 CM/SEC
BH CV ECHO MEAS - RAP SYSTOLE: 3 MMHG
BH CV ECHO MEAS - RVSP: 23 MMHG
BH CV ECHO MEAS - SV(LVOT): 52.3 ML
BH CV ECHO MEAS - SV(MOD-SP2): 54.6 ML
BH CV ECHO MEAS - SV(MOD-SP4): 58.2 ML
BH CV ECHO MEAS - TAPSE (>1.6): 1.63 CM
BH CV ECHO MEAS - TR MAX PG: 20.4 MMHG
BH CV ECHO MEAS - TR MAX VEL: 226 CM/SEC
BH CV ECHO MEASUREMENTS AVERAGE E/E' RATIO: 12.23
BH CV VAS BP RIGHT ARM: NORMAL MMHG
BH CV XLRA - RV BASE: 3.1 CM
BH CV XLRA - RV LENGTH: 7.9 CM
BH CV XLRA - RV MID: 2.6 CM
BH CV XLRA - TDI S': 12.6 CM/SEC
BUN SERPL-MCNC: 33 MG/DL (ref 8–23)
BUN/CREAT SERPL: 10.2 (ref 7–25)
CALCIUM SPEC-SCNC: 9.1 MG/DL (ref 8.6–10.5)
CHLORIDE SERPL-SCNC: 97 MMOL/L (ref 98–107)
CO2 SERPL-SCNC: 26 MMOL/L (ref 22–29)
CREAT SERPL-MCNC: 3.22 MG/DL (ref 0.57–1)
DEPRECATED RDW RBC AUTO: 51 FL (ref 37–54)
EGFRCR SERPLBLD CKD-EPI 2021: 14.5 ML/MIN/1.73
EOSINOPHIL # BLD AUTO: 0.04 10*3/MM3 (ref 0–0.4)
EOSINOPHIL NFR BLD AUTO: 0.4 % (ref 0.3–6.2)
ERYTHROCYTE [DISTWIDTH] IN BLOOD BY AUTOMATED COUNT: 13.9 % (ref 12.3–15.4)
GLUCOSE BLDC GLUCOMTR-MCNC: 122 MG/DL (ref 70–130)
GLUCOSE BLDC GLUCOMTR-MCNC: 138 MG/DL (ref 70–130)
GLUCOSE BLDC GLUCOMTR-MCNC: 155 MG/DL (ref 70–130)
GLUCOSE BLDC GLUCOMTR-MCNC: 165 MG/DL (ref 70–130)
GLUCOSE SERPL-MCNC: 197 MG/DL (ref 65–99)
HCT VFR BLD AUTO: 32.9 % (ref 34–46.6)
HGB BLD-MCNC: 10.4 G/DL (ref 12–15.9)
IMM GRANULOCYTES # BLD AUTO: 0.12 10*3/MM3 (ref 0–0.05)
IMM GRANULOCYTES NFR BLD AUTO: 1.2 % (ref 0–0.5)
LEFT ATRIUM VOLUME INDEX: 32.4 ML/M2
LV EF 2D ECHO EST: 55 %
LV EF BIPLANE MOD: 53.8 %
LYMPHOCYTES # BLD AUTO: 0.61 10*3/MM3 (ref 0.7–3.1)
LYMPHOCYTES NFR BLD AUTO: 6 % (ref 19.6–45.3)
MCH RBC QN AUTO: 31.3 PG (ref 26.6–33)
MCHC RBC AUTO-ENTMCNC: 31.6 G/DL (ref 31.5–35.7)
MCV RBC AUTO: 99.1 FL (ref 79–97)
MONOCYTES # BLD AUTO: 1.35 10*3/MM3 (ref 0.1–0.9)
MONOCYTES NFR BLD AUTO: 13.2 % (ref 5–12)
NEUTROPHILS NFR BLD AUTO: 79 % (ref 42.7–76)
NEUTROPHILS NFR BLD AUTO: 8.09 10*3/MM3 (ref 1.7–7)
NRBC BLD AUTO-RTO: 0 /100 WBC (ref 0–0.2)
PLATELET # BLD AUTO: 245 10*3/MM3 (ref 140–450)
PMV BLD AUTO: 10.4 FL (ref 6–12)
POTASSIUM SERPL-SCNC: 4.1 MMOL/L (ref 3.5–5.2)
RBC # BLD AUTO: 3.32 10*6/MM3 (ref 3.77–5.28)
SODIUM SERPL-SCNC: 137 MMOL/L (ref 136–145)
WBC NRBC COR # BLD AUTO: 10.23 10*3/MM3 (ref 3.4–10.8)

## 2025-01-25 PROCEDURE — 63710000001 INSULIN LISPRO (HUMAN) PER 5 UNITS: Performed by: ORTHOPAEDIC SURGERY

## 2025-01-25 PROCEDURE — 25010000002 HYDROMORPHONE PER 4 MG: Performed by: INTERNAL MEDICINE

## 2025-01-25 PROCEDURE — 85025 COMPLETE CBC W/AUTO DIFF WBC: CPT | Performed by: ORTHOPAEDIC SURGERY

## 2025-01-25 PROCEDURE — 25010000002 AMIODARONE IN DEXTROSE 5% 360-4.14 MG/200ML-% SOLUTION: Performed by: ORTHOPAEDIC SURGERY

## 2025-01-25 PROCEDURE — 93005 ELECTROCARDIOGRAM TRACING: CPT | Performed by: INTERNAL MEDICINE

## 2025-01-25 PROCEDURE — 99232 SBSQ HOSP IP/OBS MODERATE 35: CPT | Performed by: INTERNAL MEDICINE

## 2025-01-25 PROCEDURE — 25010000002 CEFEPIME PER 500 MG: Performed by: ORTHOPAEDIC SURGERY

## 2025-01-25 PROCEDURE — 25010000002 METRONIDAZOLE 500 MG/100ML SOLUTION: Performed by: ORTHOPAEDIC SURGERY

## 2025-01-25 PROCEDURE — 93010 ELECTROCARDIOGRAM REPORT: CPT | Performed by: INTERNAL MEDICINE

## 2025-01-25 PROCEDURE — 25010000002 ESMOLOL 2500 MG/250ML SOLUTION: Performed by: NURSE PRACTITIONER

## 2025-01-25 PROCEDURE — 93306 TTE W/DOPPLER COMPLETE: CPT | Performed by: INTERNAL MEDICINE

## 2025-01-25 PROCEDURE — 80048 BASIC METABOLIC PNL TOTAL CA: CPT | Performed by: ORTHOPAEDIC SURGERY

## 2025-01-25 PROCEDURE — 82948 REAGENT STRIP/BLOOD GLUCOSE: CPT

## 2025-01-25 PROCEDURE — 97161 PT EVAL LOW COMPLEX 20 MIN: CPT

## 2025-01-25 PROCEDURE — 93306 TTE W/DOPPLER COMPLETE: CPT

## 2025-01-25 PROCEDURE — 97605 NEG PRS WND THER DME<=50SQCM: CPT

## 2025-01-25 RX ORDER — ESMOLOL HYDROCHLORIDE 10 MG/ML
25-300 INJECTION, SOLUTION INTRAVENOUS
Status: DISCONTINUED | OUTPATIENT
Start: 2025-01-25 | End: 2025-01-28

## 2025-01-25 RX ORDER — HYDROMORPHONE HYDROCHLORIDE 1 MG/ML
0.25 INJECTION, SOLUTION INTRAMUSCULAR; INTRAVENOUS; SUBCUTANEOUS
Status: DISPENSED | OUTPATIENT
Start: 2025-01-25 | End: 2025-01-27

## 2025-01-25 RX ADMIN — AMIODARONE HYDROCHLORIDE 200 MG: 200 TABLET ORAL at 21:04

## 2025-01-25 RX ADMIN — TRAMADOL HYDROCHLORIDE 50 MG: 50 TABLET, COATED ORAL at 21:04

## 2025-01-25 RX ADMIN — ESMOLOL HYDROCHLORIDE 25 MCG/KG/MIN: 10 INJECTION INTRAVENOUS at 22:06

## 2025-01-25 RX ADMIN — AMIODARONE HYDROCHLORIDE 1 MG/MIN: 1.8 INJECTION, SOLUTION INTRAVENOUS at 00:56

## 2025-01-25 RX ADMIN — NIFEDIPINE 30 MG: 30 TABLET, FILM COATED, EXTENDED RELEASE ORAL at 09:38

## 2025-01-25 RX ADMIN — OXYCODONE 5 MG: 5 TABLET ORAL at 13:45

## 2025-01-25 RX ADMIN — CEFEPIME HYDROCHLORIDE 1000 MG: 1 INJECTION, POWDER, FOR SOLUTION INTRAMUSCULAR; INTRAVENOUS at 13:45

## 2025-01-25 RX ADMIN — OXYCODONE 5 MG: 5 TABLET ORAL at 22:57

## 2025-01-25 RX ADMIN — FAMOTIDINE 20 MG: 20 TABLET, FILM COATED ORAL at 09:35

## 2025-01-25 RX ADMIN — Medication 2.5 MG: at 21:04

## 2025-01-25 RX ADMIN — OXYCODONE 5 MG: 5 TABLET ORAL at 00:52

## 2025-01-25 RX ADMIN — METRONIDAZOLE 500 MG: 500 INJECTION, SOLUTION INTRAVENOUS at 18:38

## 2025-01-25 RX ADMIN — METRONIDAZOLE 500 MG: 500 INJECTION, SOLUTION INTRAVENOUS at 09:35

## 2025-01-25 RX ADMIN — HYDROMORPHONE HYDROCHLORIDE 0.25 MG: 1 INJECTION, SOLUTION INTRAMUSCULAR; INTRAVENOUS; SUBCUTANEOUS at 17:53

## 2025-01-25 RX ADMIN — AMIODARONE HYDROCHLORIDE 1 MG/MIN: 1.8 INJECTION, SOLUTION INTRAVENOUS at 07:35

## 2025-01-25 RX ADMIN — AMIODARONE HYDROCHLORIDE 200 MG: 200 TABLET ORAL at 09:34

## 2025-01-25 RX ADMIN — ALLOPURINOL 200 MG: 100 TABLET ORAL at 09:35

## 2025-01-25 RX ADMIN — HYDROMORPHONE HYDROCHLORIDE 0.25 MG: 1 INJECTION, SOLUTION INTRAMUSCULAR; INTRAVENOUS; SUBCUTANEOUS at 11:32

## 2025-01-25 RX ADMIN — AMIODARONE HYDROCHLORIDE 200 MG: 200 TABLET ORAL at 17:10

## 2025-01-25 RX ADMIN — METOPROLOL TARTRATE 25 MG: 25 TABLET, FILM COATED ORAL at 09:35

## 2025-01-25 RX ADMIN — TRAMADOL HYDROCHLORIDE 50 MG: 50 TABLET, COATED ORAL at 17:14

## 2025-01-25 RX ADMIN — METOPROLOL TARTRATE 25 MG: 25 TABLET, FILM COATED ORAL at 21:04

## 2025-01-25 RX ADMIN — INSULIN LISPRO 2 UNITS: 100 INJECTION, SOLUTION INTRAVENOUS; SUBCUTANEOUS at 17:10

## 2025-01-25 RX ADMIN — ATORVASTATIN CALCIUM 10 MG: 10 TABLET, FILM COATED ORAL at 09:35

## 2025-01-25 RX ADMIN — PANTOPRAZOLE SODIUM 40 MG: 40 TABLET, DELAYED RELEASE ORAL at 05:23

## 2025-01-25 RX ADMIN — AMIODARONE HYDROCHLORIDE 1 MG/MIN: 1.8 INJECTION, SOLUTION INTRAVENOUS at 13:08

## 2025-01-25 NOTE — PLAN OF CARE
Goal Outcome Evaluation:  Plan of Care Reviewed With: patient           Outcome Evaluation: PT Wound Care eval complete. Pt with NPWT to the L heel, placed in OR on 1/24/25. Wound vac appears to be functioning appropriately at this time. Anticipate vac change Monday.    Anticipated Discharge Disposition (PT): other (see comments) (will defer to PT mobility when appropriate. Will require skilled wound care upon discharge.)

## 2025-01-25 NOTE — PROGRESS NOTES
[facility] Progress Note    Admission Date: 1/19/2025    Sara Esparza  1950  [id number]    Date: 1/25/2025    Antibiotics:  Anti-Infectives (From admission, onward)      Ordered     Dose/Rate Route Frequency Start Stop    01/24/25 1230  vancomycin (VANCOCIN) 1,000 mg in sodium chloride 0.9 % 250 mL IVPB-VTB        Ordering Provider: Edmund Erwin PharmD    1,000 mg  250 mL/hr over 60 Minutes Intravenous Once 01/24/25 1600 01/24/25 1723    01/22/25 1230  vancomycin 750 mg in sodium chloride 0.9 % 250 mL IVPB-VTB        Ordering Provider: Edmund Erwin PharmD    750 mg  333.3 mL/hr over 45 Minutes Intravenous Once 01/22/25 1400 01/22/25 1810    01/21/25 0519  cefepime 1000 mg IVPB in 100 mL NS (MBP)        Ordering Provider: Rom Gonzalez Jr., MD    1,000 mg  over 4 Hours Intravenous Every 24 Hours 01/22/25 0600 01/29/25 1359    01/21/25 0537  vancomycin (dosing per levels)        Ordering Provider: Rom Gonzalez Jr., MD     Not Applicable Daily 01/21/25 0900 01/28/25 0859    01/21/25 0519  cefepime 1000 mg IVPB in 100 mL NS (MBP)        Ordering Provider: Conrad Alvarez MD    1,000 mg  over 30 Minutes Intravenous Once 01/21/25 0615 01/21/25 0715    01/21/25 0505  metroNIDAZOLE (FLAGYL) IVPB 500 mg        Ordering Provider: Rom Gonzalez Jr., MD    500 mg  200 mL/hr over 30 Minutes Intravenous Every 8 Hours 01/21/25 0600 01/26/25 0759    01/21/25 0513  vancomycin 2250 mg/500 mL 0.9% NS IVPB ([facility])        Ordering Provider: Woo Wells, PharmD    20 mg/kg × 108 kg  over 135 Minutes Intravenous Once 01/21/25 0600 01/21/25 0950    01/21/25 0505  Pharmacy to dose vancomycin        Ordering Provider: Rom Gonzalez Jr., MD     Not Applicable Continuous PRN 01/21/25 0500 01/28/25 0459    01/20/25 0738  cefTRIAXone (ROCEPHIN) 1,000 mg in sodium chloride 0.9 % 100 mL MBP  Status:  Discontinued        Ordering Provider: Leslye Fiore MD    1,000 mg  200 mL/hr over 30 Minutes Intravenous Every  24 Hours 01/20/25 0900 01/21/25 0519            Reason for Consultation: Sepsis and osteomyelitis of the left calcaneus     History of present illness:    Patient is a 74 y.o. 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  Evaluation in the ED included WBC 14.3 and PCT 5.9.  Subsequent lactic acid was 4.1 on 1/21.  After admission she became febrile to 101.9.  She was started on ceftriaxone and vancomycin.  Blood culture was sent yesterday afternoon.  Urinalysis showed pyuria.  Urine culture showed mixed hanny in the lab discarded the culture.  She was noted to have a left calcaneus wound.  CT scan of the chest, abdomen and pelvis were unremarkable for acute process.  CT scan of the left lower extremity showed a wound at the plantar aspect of the left calcaneus with subcutaneous and intraosseous air and destructive changes .  CT scan of the neck showed advanced C5-6 and C6-7 degenerative disc disease; suspected moderate or greater C5-6 canal stenosis due to posterior vertebral osteophytes.  Wound culture of the left heel is growing MRSA and a gram-negative ange.  At the time of my exam the patient is nonverbal other than moaning.  She does not follow commands.  Her  is at the bedside and is able to provide medical history although he is a little uncertain about timing of events such as when she started dialysis.      She had a dialysis catheter placed 9/13 which was replaced on 10/11.    1/23/2025.  Afebrile x 2 days.  She is considerably more alert today.  She remains on IV amiodarone for atrial fibrillation with RVR.  She complains of pain but does not localize it to any specific site.  Wound culture with MRSA and  Morganella.  Blood culture negative.    1/24/2025.  Afebrile.  She is seen on dialysis today.  She she is alert and mildly confused but understands that she is scheduled to undergo surgery today.  In discussion with her  in the patient's room he recounted that it was explained to her that she was at high risk for limb loss based on the extent of osteomyelitis involving her calcaneus.  MRI was attempted yesterday but aborted because of pain.    1/25/2025.  Afebrile.  Alert. Uncomfortable.  states that she is discouraged because she was found to have extensive osteo at surgery yesterday.  He states that he does not see how we will be possible for her to go home.      ROS    According to her  1/22 the main complaints at home have been neck pain, shortness of breath and generalized weakness.  Evidently neither the  nor the patient were aware of the chronic appearing left heel ulcer.  The patient has no new complaints to add to her 's history            PE:  Vital Signs  Temp  Min: 97.5 °F (36.4 °C)  Max: 99.1 °F (37.3 °C)  BP  Min: 118/75  Max: 144/60  Pulse  Min: 64  Max: 94  Resp  Min: 15  Max: 18  SpO2  Min: 90 %  Max: 99 %    GENERAL: Alert, appropriate, appears chronically ill  HEENT: Normocephalic, atraumatic.   No conjunctival injection. No icterus.  No oral labial lesions   NECK: No evidence of meningismusHEART: IRRR; No murmur, rubs, gallops.   LUNGS: Clear anteriorly, no wheezes or rhonchi appreciated  ABDOMEN: Abdomen bloated but soft.  No evidence of tenderness elicited with deep pressure throughout the abdomen.  Hypoactive bowel sounds.  EXT:  No cyanosis, clubbing or edema. No cord.  :  Without Corral catheter.  MSK: No joint effusions or erythema.  Bilateral knees appeared to be free of erythema or soft tissue swelling.  Left heel with a chronic appearing wound on the plantar aspect approximately 1 cm in diameter; positive surrounding erythema but no active drainage at  the time of my exam but foul-smelling odor noted  SKIN: Warm and dry without cutaneous eruptions on Inspection/palpation.    NEURO: Oriented to PPT.    PSYCHIATRIC: Normal insight and judgment. Cooperative with PE       Laboratory Data    Results from last 7 days   Lab Units 01/24/25  0728 01/23/25  0816 01/22/25  0652   WBC 10*3/mm3 14.99* 10.56 10.19   HEMOGLOBIN g/dL 10.6* 10.7* 10.1*   HEMATOCRIT % 32.7* 33.7* 32.3*   PLATELETS 10*3/mm3 203 138* 140     Results from last 7 days   Lab Units 01/24/25 2023 01/24/25  0728   SODIUM mmol/L  --  133*   POTASSIUM mmol/L 4.0 3.5   CHLORIDE mmol/L  --  95*   CO2 mmol/L  --  25.0   BUN mg/dL  --  50*   CREATININE mg/dL  --  4.47*   GLUCOSE mg/dL  --  167*   CALCIUM mg/dL  --  9.6     Results from last 7 days   Lab Units 01/22/25  0652   ALK PHOS U/L 79   BILIRUBIN mg/dL 0.2   ALT (SGPT) U/L 32   AST (SGOT) U/L 24               Estimated Creatinine Clearance: 13.8 mL/min (A) (by C-G formula based on SCr of 4.47 mg/dL (H)).      Microbiology:  MRSA and Morganella from foot wound    Radiology:  Imaging Results (Last 72 Hours)       Procedure Component Value Units Date/Time    XR Chest 1 View [[id number]] Collected: 01/23/25 0246     Updated: 01/23/25 0250    Narrative:      XR CHEST 1 VW    Date of Exam: 1/23/2025 1:51 AM EST    Indication: chest pain    Comparison: 1/21/2025, 1/19/2025.    Findings:  Right internal jugular PermCath present with the tip at the cavoatrial junction. Low lung volumes. There are no airspace consolidations. No pleural fluid. No pneumothorax. The pulmonary vasculature appears mildly indistinct. The cardiac and mediastinal   silhouette appear unremarkable. No acute osseous abnormality identified.      Impression:      Impression:  Questionable mild pulmonary edema pattern.        Electronically Signed: Cindy Fuller MD    1/23/2025 2:47 AM EST    Workstation ID: [id number]            I personally reviewed the radiographic studies       Impression:       -- Sepsis with leukocytosis, lactic acidosis, encephalopathy.  She has multiple potential sites of infection.  I would have concerned that she may have had bacteremia secondary to her left calcaneus osteomyelitis and could have infected her dialysis catheter which has now been in place for 102 days.  It appears that she has a UTI although unfortunately we do not have data from the micro lab.  In addition there would be concern that she has developed prosthetic knee infection although I could not appreciate evidence of this on exam.  Another potential problem would be osteomyelitis of the cervical spine with her history of progressive neck pain.  The noncontrast CT scan was nondiagnostic but this would have limited value in completely excluding the possibility of cervical osteomyelitis     --Probable osteomyelitis of the left calcaneus.  MRSA and Morganella from wound culture     --ESRD on hemodialysis     --Diabetes mellitus A1c 4.9     --Pyuria, no culture data.  I suspect that she had a UTI which has responded to her current therapy with cefepime and vancomycin     --Progressive neck pain likely secondary to degenerative arthritis but the possibility of osteomyelitis should be considered.     --Paroxysmal atrial fibrillation     --Encephalopathy likely secondary to sepsis-significantly improved     PLAN/RECOMMENDATIONS:   Thank you for asking us to see SaraMolly Esparza, I recommend the following:        -- Agree with cefepime and vancomycin while operative cultures are pending.  Metronidazole added for coverage of anaerobic pathogens in the foot        -- Follow blood culture result although this is antibiotic modified.  Low threshold for repeat blood cultures when she is on dialysis particularly if she remains febrile         Anticipate parenteral therapy to last until 3/7/2025 unless she opts to undergo BKA in the meantime.  I will adjust antibiotics based on operative culture results.  She is not a candidate  for PICC line and hopefully it will be possible to arrange for antibiotic therapy to be administered postdialysis.         Bailee Barrios MD  1/25/2025

## 2025-01-25 NOTE — PROGRESS NOTES
Sara Esparza       LOS: 6 days   Patient Care Team:  Toribio Roberts MD as PCP - General (Internal Medicine)    Chief Complaint: Left heel ulcer    Subjective     Interval History:     Resting comfortably in bed this morning.  Alert and oriented x 3.  No acute events overnight.  Family present at bedside.    Review of Systems:      Gen- No fevers, chills  CV- No chest pain, palpitations  Resp- No cough, dyspnea  GI- No N/V/D, abd pain    Objective     Vital Signs  Vital Signs (last 24 hours)         01/22 0700  01/23 0659 01/23 0700 01/23 0827   Most Recent      Temp (°F) 97.9 -  98.6      98     98 (36.7) 01/23 0700    Heart Rate 73 -  167       99 01/23 0341    Resp 16 -  24      18     18 01/23 0700    BP 74/54 -  156/71       132/90 01/23 0341    SpO2 (%) 90 -  99       94 01/23 0341    Flow (L/min) (Oxygen Therapy)   3       3 01/23 0600              Physical Exam:     No acute distress.  Nonlabored respirations.  Regular rate and rhythm.  Abdomen nondistended.  Left lower extremity: Dressing present is clean, dry, intact.  VAC with minimal serosanguinous drainage.     Results Review:     I reviewed the patient's new clinical results.    Medication Review:   Hospital Medications (active)         Dose Frequency Start End    acetaminophen (TYLENOL) tablet 650 mg 650 mg Every 6 Hours PRN 1/19/2025 --    Admin Instructions: If given for fever, use fever parameter: fever greater than 100.4 °F  Based on patient request - if ordered for moderate or severe pain, provider allows for administration of a medication prescribed for a lower pain scale.    Do not exceed 4 grams of acetaminophen in a 24 hr period. Max dose of 2gm for AST/ALT greater than 120 units/L.    If given for pain, use the following pain scale:   Mild Pain = Pain Score of 1-3, CPOT 1-2  Moderate Pain = Pain Score of 4-6, CPOT 3-4  Severe Pain = Pain Score of 7-10, CPOT 5-8    Route: Oral    albumin human 25 % IV SOLN  - ADS Override Pull    "1/22/2025 --    Admin Instructions: Created by cabinet override    Notes to Pharmacy: Created by cabinet override    allopurinol (ZYLOPRIM) tablet 200 mg 200 mg Daily 1/19/2025 --    Admin Instructions: ([facility]) Take with food if GI upset occurs.    Route: Oral    amiodarone (PACERONE) tablet 200 mg 200 mg 3 Times Daily 1/23/2025 --    Admin Instructions: Avoid grapefruit juice while taking this medication.    Route: Oral    amiodarone 360 mg in 200 mL D5W infusion 1 mg/min Continuous 1/22/2025 --    Admin Instructions: Central line preferred, if unavailable use large bore IV access with frequent nurse monitoring of IV site.  IV med requiring filtration 0.22 micron inline filter.    Route: Intravenous    apixaban (ELIQUIS) tablet 2.5 mg 2.5 mg Every 12 Hours Scheduled 1/22/2025 --    Admin Instructions: Tablet may be crushed and suspended in 60 mL of water or D5W and immediately delivered via NG tube.  Avoid grapefruit juice.    Route: Oral    atorvastatin (LIPITOR) tablet 10 mg 10 mg Daily 1/22/2025 3/18/2025    Admin Instructions: Avoid grapefruit juice.    Route: Oral    bisacodyl (DULCOLAX) EC tablet 5 mg 5 mg Daily PRN 1/19/2025 --    Admin Instructions: Use if no bowel movement after 12 hours.  Swallow whole. Do not crush, split, or chew tablet.    Route: Oral    Linked Group 1: Placed in \"And\" Linked Group        bisacodyl (DULCOLAX) suppository 10 mg 10 mg Daily PRN 1/19/2025 --    Admin Instructions: Use if no bowel movement after 12 hours.  Hold for diarrhea    Route: Rectal    Linked Group 1: Placed in \"And\" Linked Group        Calcium Replacement - Follow Nurse / BPA Driven Protocol  As Needed 1/19/2025 --    Admin Instructions: Open Order & Select \"[facility] Electrolyte Replacement Protocol Algorithm\" to View Details    Route: Not Applicable    cefepime 1000 mg IVPB in 100 mL NS (MBP) 1,000 mg Every 24 Hours 1/22/2025 1/29/2025    Admin Instructions: Give after dialysis on dialysis days.     Route: Intravenous "    dextrose (D50W) (25 g/50 mL) IV injection 25 g 25 g Every 15 Minutes PRN 1/19/2025 --    Admin Instructions: Blood sugar less than 70; patient has IV access - Unresponsive, NPO or Unable To Safely Swallow    Route: Intravenous    dextrose (GLUTOSE) oral gel 15 g 15 g Every 15 Minutes PRN 1/19/2025 --    Admin Instructions: BS<70, Patient Alert, Is not NPO, Can safely swallow.    Route: Oral    dilTIAZem (CARDIZEM) 125 mg in 125 mL D5W infusion 5-15 mg/hr Continuous 1/22/2025 --    Admin Instructions: For heart rate - To maintain HR less than 120, initiate infusion at 5 mg/hr. Titrate up or down 2.5-5 mg/hr every 15 minutes to use the lowest dose possible. Maximum infusion rate of 15 mg/hr. Hold for SBP less than 100 mmHg or heart rate less than 60. Contact provider if unable to maintain HR less than 120 on maximum dose. Once Heart Rate target achieved obtain vitals a minimum of every 30 minutes. For patients not in critical care areas - obtain vitals a minimum of every 4 hours.   Caution: Look alike/sound alike drug alert.   Refrigerate.    Route: Intravenous    famotidine (PEPCID) tablet 20 mg 20 mg Daily 1/22/2025 --    Route: Oral    glucagon (GLUCAGEN) injection 1 mg 1 mg Every 15 Minutes PRN 1/19/2025 --    Admin Instructions: Blood Glucose Less Than 70 - Patient Without IV Access - Unresponsive, NPO or Unable To Safely Swallow  Reconstitute powder for injection by adding 1 mL of -supplied sterile diluent or sterile water for injection to a vial containing 1 mg of the drug, to provide solutions containing 1 mg/mL. Shake vial gently to dissolve.    Route: Intramuscular    heparin (porcine) injection 2,000 Units 2,000 Units As Needed 1/20/2025 --    Route: Intracatheter    HYDROmorphone (DILAUDID) injection 0.25 mg 0.25 mg 2 Times Daily PRN 1/20/2025 1/25/2025    Admin Instructions: Based on patient request - if ordered for moderate or severe pain, provider allows for administration of a medication  "prescribed for a lower pain scale.  If given for pain, use the following pain scale:  Mild Pain = Pain Score of 1-3, CPOT 1-2  Moderate Pain = Pain Score of 4-6, CPOT 3-4  Severe Pain = Pain Score of 7-10, CPOT 5-8    Route: Intravenous    Insulin Lispro (humaLOG) injection 2-9 Units 2-9 Units 4 Times Daily Before Meals & Nightly 1/19/2025 --    Admin Instructions: Correction Insulin - Moderate Dose (Total Insulin Dose 40-60 units/day, Average Weight Patient, Patient Taking Oral Hypoglycemic)    Blood Glucose 150-199 mg/dL - 2 units  Blood Glucose 200-249 mg/dL - 4 units  Blood Glucose 250-299 mg/dL - 6 units  Blood Glucose 300-349 mg/dL - 7 units  Blood Glucose 350-400 mg/dL - 8 units  Blood Glucose greater than 400 mg/dL - 9 units & Call Provider   Caution: Look alike/sound alike drug alert    Route: Subcutaneous    Magnesium Standard Dose Replacement - Follow Nurse / BPA Driven Protocol  As Needed 1/19/2025 --    Admin Instructions: Open Order & Select \"[facility] Electrolyte Replacement Protocol Algorithm\" to View Details    Route: Not Applicable    melatonin tablet 2.5 mg 2.5 mg Nightly PRN 1/19/2025 --    Route: Oral    metoprolol tartrate (LOPRESSOR) injection 5 mg 5 mg Every 6 Hours PRN 1/22/2025 --    Admin Instructions: Hold for SBP less than 100, DBP less than 60, or heart rate less than 50. If a dose is held, please contact the provider.    Route: Intravenous    metoprolol tartrate (LOPRESSOR) tablet 25 mg 25 mg Every 12 Hours Scheduled 1/23/2025 --    Route: Oral    metroNIDAZOLE (FLAGYL) IVPB 500 mg 500 mg Every 8 Hours 1/21/2025 1/26/2025    Admin Instructions: Caution: Look alike/sound alike drug alert.  Do not refrigerate.    Route: Intravenous    NIFEdipine XL (PROCARDIA XL) 24 hr tablet 60 mg 60 mg Every 24 Hours Scheduled 1/19/2025 --    Admin Instructions: Hold for SBP less than 100, DBP less than 60.  Caution: Look alike/sound alike drug alert. Avoid grapefruit juice. Swallow whole. Do not crush, " "split or chew.    Route: Oral    ondansetron (ZOFRAN) injection 4 mg 4 mg Every 6 Hours PRN 1/19/2025 --    Admin Instructions: If BOTH ondansetron (ZOFRAN) and promethazine (PHENERGAN) are ordered use ondansetron first and THEN promethazine IF ondansetron is ineffective.    Route: Intravenous    pantoprazole (PROTONIX) EC tablet 40 mg 40 mg Every Early Morning 1/22/2025 --    Admin Instructions: Swallow whole; do not crush, split, or chew.    Route: Oral    Pharmacy to dose vancomycin  Continuous PRN 1/21/2025 1/28/2025    Route: Not Applicable    polyethylene glycol (MIRALAX) packet 17 g 17 g Daily PRN 1/19/2025 --    Admin Instructions: Use if no bowel movement after 12 hours. Mix in 6-8 ounces of water.  Use 4-8 ounces of water, tea, or juice for each 17 gram dose.    Route: Oral    Linked Group 1: Placed in \"And\" Linked Group        Potassium Replacement - Follow Nurse / BPA Driven Protocol  As Needed 1/19/2025 --    Admin Instructions: Open Order & Select \"BHS Electrolyte Replacement Protocol Algorithm\" to View Details    Route: Not Applicable    sennosides-docusate (PERICOLACE) 8.6-50 MG per tablet 2 tablet 2 tablet 2 Times Daily PRN 1/19/2025 --    Admin Instructions: Start bowel management regimen if patient has not had a bowel movement after 12 hours.    Route: Oral    Linked Group 1: Placed in \"And\" Linked Group        sodium chloride 0.9 % flush 10 mL 10 mL As Needed 1/19/2025 --    Route: Intravenous    Linked Group 2: Placed in \"And\" Linked Group        sodium chloride 0.9 % flush 10 mL 10 mL Every 12 Hours Scheduled 1/19/2025 --    Route: Intravenous    sodium chloride 0.9 % flush 10 mL 10 mL As Needed 1/19/2025 --    Route: Intravenous    sodium chloride 0.9 % infusion 40 mL 40 mL As Needed 1/19/2025 --    Admin Instructions: Following administration of an IV intermittent medication, flush line with 40mL NS at 100mL/hr.    Route: Intravenous    traMADol (ULTRAM) tablet 50 mg 50 mg Every 12 Hours PRN " 1/20/2025 1/25/2025    Admin Instructions: Based on patient request - if ordered for moderate or severe pain, provider allows for administration of a medication prescribed for a lower pain scale.      Caution: Look alike/sound alike drug alert    If given for pain, use the following pain scale:  Mild Pain = Pain Score of 1-3, CPOT 1-2  Moderate Pain = Pain Score of 4-6, CPOT 3-4  Severe Pain = Pain Score of 7-10, CPOT 5-8    Route: Oral    vancomycin (dosing per levels)  Daily 1/21/2025 1/28/2025    Admin Instructions: Pharmacy is dosing vancomycin per levels    Route: Not Applicable              Assessment & Plan     74-year-old female with multiple medical comorbidities, left heel wound, calcaneal osteomyelitis status post debridement, irrigation, wound vacuum assisted closure 1/24/25.      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  I think overall her prognosis is poor.  Plan for every 72 hour wound vacuum-assisted closure changes beginning Monday, January 27.  Follow cultures.  If she is able to tolerate MRI with wound vacuum-assisted closure device removed, I think this would provide more prognostic information.    Rom Gonzalez Jr, MD  01/25/25  08:06 EST

## 2025-01-25 NOTE — PROGRESS NOTES
"   LOS: 6 days    Patient Care Team:  Toribio Roberts MD as PCP - General (Internal Medicine)    Subjective     Seen and examined at bedside.  Very tearful.  Family member present.   No major overnight issues per nurse.  She had wound debridement yesterday.     Objective     Vital Signs:  Blood pressure 131/81, pulse 92, temperature 99 °F (37.2 °C), temperature source Oral, resp. rate 18, height 170.2 cm (67.01\"), weight 108 kg (238 lb 1.6 oz), SpO2 94%.      Intake/Output Summary (Last 24 hours) at 1/25/2025 1732  Last data filed at 1/25/2025 1339  Gross per 24 hour   Intake --   Output 50 ml   Net -50 ml        01/24 0701 - 01/25 0700  In: 150 [I.V.:150]  Out: 1980     Physical Exam:  GENERAL: WD WF, appears comfortable  NEURO:  No focal deficit  PSYCHIATRIC: Cooperative with PE  EYE: PE, no icterus, no conjunctivitis  ENT: omm dry, dentition intact,  Hearing intact  NECK:  No JVD discernable,  Trachea midline  CV: No edema, RRR  LUNGS:  Quiet,  Nonlabored resp.  Symmetrical expansion  ABDOMEN: Nondistended, soft nontender.  : No [place], no palp bladder  SKIN: Warm and dry without rash  + RIJ TDC       Labs:  Results from last 7 days   Lab Units 01/25/25  0945 01/24/25  0728 01/23/25  0816   WBC 10*3/mm3 10.23 14.99* 10.56   HEMOGLOBIN g/dL 10.4* 10.6* 10.7*   PLATELETS 10*3/mm3 245 203 138*     Results from last 7 days   Lab Units 01/25/25  0945 01/24/25  2023 01/24/25  0728 01/23/25  0816 01/22/25  0652 01/21/25  0432 01/20/25  0624 01/19/25  0403 01/18/25  2149   SODIUM mmol/L 137  --  133* 133* 133* 134* 131* 133* 132*   POTASSIUM mmol/L 4.1 4.0 3.5 3.8 4.2 4.7 4.2 4.7 4.4   CHLORIDE mmol/L 97*  --  95* 97* 96* 94* 94* 94* 94*   CO2 mmol/L 26.0  --  25.0 23.0 22.0 24.0 19.0* 23.0 22.0   BUN mg/dL 33*  --  50* 40* 67* 52* 79* 58* 52*   CREATININE mg/dL 3.22*  --  4.47* 3.66* 5.49* 4.68* 6.51* 5.40* 5.02*   CALCIUM mg/dL 9.1  --  9.6 9.1 9.3 10.8* 10.3 10.6* 10.7*   PHOSPHORUS mg/dL  --   --   --   --  " 5.5*  --   --   --   --    MAGNESIUM mg/dL  --   --   --   --   --   --   --   --  2.3   ALBUMIN g/dL  --   --   --   --  2.4* 3.3* 3.0* 3.6 3.8     Results from last 7 days   Lab Units 01/22/25  0652   ALK PHOS U/L 79   BILIRUBIN mg/dL 0.2   ALT (SGPT) U/L 32   AST (SGOT) U/L 24           A/P:    ESRD: [place] [facility] [place].  No need for hemodialysis today.      HTN: Blood pressure stable    Hyponatremia:  Due to underlying renal failure.  Adjust with HD.     Hyperkalemia: Resolved.  Adjust with HD     Metabolic acidosis:  Adjust with HD     Anemia: Hemoglobin above goal.  No CORNEL unless hemoglobin <10.5.    Bone mineralization: Calcium 9.6 getting HD with 3 Ca bath (? Will change to 2.5 Ca bath)    Hyperphosphatemia: Last phos level 5.5     Nutrition: Low potassium low Phos high-protein diet.  Renal vitamin.     Fever: On antibiotics.  Urine culture with mixed hanny.  Patient with foot wound.  Primary team continues to evaluate for source.  Dialysis catheter possible source.  May need removed if blood cultures positive.    High risk and complexity patient.    Ruel Giraldo MD  01/25/25  17:32 EST

## 2025-01-25 NOTE — PROGRESS NOTES
[facility] Medicine Services  PROGRESS NOTE    Patient Name: Sara Esparza  : 1950  MRN: [id number]    Date of Admission: 2025  Primary Care Physician: Toribio Roberts MD    Subjective   Subjective     CC: weakness, progressive    HPI:  Tearful this am as she may have to have her foot amputated.  A lot of family present at bedside.     Objective   Objective     Vital Signs:   Temp:  [97.5 °F (36.4 °C)-99.1 °F (37.3 °C)] 97.5 °F (36.4 °C)  Heart Rate:  [64-94] 89  Resp:  [15-18] 16  BP: (118-144)/(56-83) 144/60  Flow (L/min) (Oxygen Therapy):  [1-3] 2     Physical Exam:  Gen:  awake, alert  Neuro: oriented, clear speech  HEENT:  NC/AT   Neck:  trach midline   Heart: RRR, no M/R/G  TDC:  no redness or tenderness   Lungs CTA  Abd:  Soft, nontender, no rebound or guarding, pos BS  Extrem:  No c/c/e.  Left foot dressing and wound vac in place.   R foot without lesions.       Results Reviewed:  LAB RESULTS:      Lab 25  0728 25  0816 25  0159 25  0652 25  1327 25  0446 25  0432 25  0624 25  0403   WBC 14.99* 10.56  --  10.19 12.78*  --   --  15.03* 14.36*   HEMOGLOBIN 10.6* 10.7*  --  10.1* 9.3*  --   --  11.1* 11.7*   HEMATOCRIT 32.7* 33.7*  --  32.3* 29.1*  --   --  33.9* 36.1   PLATELETS 203 138*  --  140 151  --   --  177 208   NEUTROS ABS 11.91* 8.35*  --  7.77* 10.18*  --   --  12.73* 12.42*   IMMATURE GRANS (ABS) 0.18* 0.07*  --  0.07* 0.08*  --   --  0.05 0.06*   LYMPHS ABS 0.74 0.49*  --  0.53* 0.62*  --   --  0.44* 0.41*   MONOS ABS 2.12* 1.62*  --  1.77* 1.89*  --   --  1.79* 1.46*   EOS ABS 0.02 0.02  --  0.03 0.00  --   --  0.00 0.00   MCV 97.3* 100.3*  --  100.6* 100.0*  --   --  96.9 98.1*   PROCALCITONIN  --   --   --   --   --   --  5.99*  --   --    LACTATE  --   --   --   --  1.3 4.1*  --   --   --    HSTROP T  --  101* 106*  --   --   --   --   --  132*         Lab 253 25  0728  01/23/25  0816 01/22/25  0652 01/21/25 0432 01/20/25 0624 01/19/25 0403 01/18/25  2149   SODIUM  --  133* 133* 133* 134* 131* 133* 132*   POTASSIUM 4.0 3.5 3.8 4.2 4.7 4.2 4.7 4.4   CHLORIDE  --  95* 97* 96* 94* 94* 94* 94*   CO2  --  25.0 23.0 22.0 24.0 19.0* 23.0 22.0   ANION GAP  --  13.0 13.0 15.0 16.0* 18.0* 16.0* 16.0*   BUN  --  50* 40* 67* 52* 79* 58* 52*   CREATININE  --  4.47* 3.66* 5.49* 4.68* 6.51* 5.40* 5.02*   EGFR  --  9.8* 12.4* 7.6* 9.2* 6.2* 7.8* 8.5*   GLUCOSE  --  167* 207* 149* 187* 173* 173* 167*   CALCIUM  --  9.6 9.1 9.3 10.8* 10.3 10.6* 10.7*   MAGNESIUM  --   --   --   --   --   --   --  2.3   PHOSPHORUS  --   --   --  5.5*  --   --   --   --    HEMOGLOBIN A1C  --   --   --   --   --   --  4.90  --    TSH  --   --   --   --   --   --  1.250  --          Lab 01/22/25 0652 01/21/25 0432 01/20/25 0624 01/19/25 0403 01/18/25 2149   TOTAL PROTEIN 4.5* 6.6 6.0 6.5 7.0   ALBUMIN 2.4* 3.3* 3.0* 3.6 3.8   GLOBULIN 2.1 3.3 3.0 2.9 3.2   ALT (SGPT) 32 49* 29 23 21   AST (SGOT) 24 54* 35* 32 33*   BILIRUBIN 0.2 0.3 0.3 0.6 0.5   ALK PHOS 79 114 98 96 90         Lab 01/23/25 0816 01/23/25 0159 01/19/25  0403 01/18/25  2259 01/18/25  2149   PROBNP  --   --   --   --  8,028.0*   HSTROP T 101* 106* 132* 128* 134*             Lab 01/19/25  0956   VITAMIN B 12 1,272*         Brief Urine Lab Results  (Last result in the past 365 days)        Color   Clarity   Blood   Leuk Est   Nitrite   Protein   CREAT   Urine HCG        01/19/25 1404 Yellow   Cloudy   Moderate (2+)   Moderate (2+)   Negative   >=300 mg/dL (3+)                   Microbiology Results Abnormal       Procedure Component Value - Date/Time    Wound Culture - Wound, Foot, Left [[id number]]  (Abnormal)  (Susceptibility) Collected: 01/20/25 1409    Lab Status: Final result Specimen: Wound from Foot, Left Updated: 01/24/25 0658     Wound Culture Scant growth (1+) Staphylococcus aureus, MRSA     Comment: Methicillin resistant Staphylococcus  aureus, Patient may be an isolation risk.         Rare growth Morganella morganii ssp morganii     Comment:            Scant growth (1+) Normal Skin Kimber     Gram Stain Few (2+) WBCs seen      Many (4+) Gram positive cocci in pairs      Rare (1+) Gram negative bacilli      Rare (1+) Gram positive bacilli    Susceptibility        Staphylococcus aureus, MRSA      TAYLOR      Clindamycin Susceptible      Erythromycin Resistant      Oxacillin Resistant      Rifampin Susceptible      Tetracycline Susceptible      Trimethoprim + Sulfamethoxazole Susceptible      Vancomycin Susceptible                       Susceptibility        Morganella morganii ssp morganii      TAYLOR      Amoxicillin + Clavulanate Resistant      Ampicillin Resistant      Ampicillin + Sulbactam Resistant      Cefazolin (Non Urine) Resistant      Cefepime Susceptible  [1]       Cefotaxime Susceptible      Ceftazidime Susceptible  [1]       Gentamicin Susceptible      Levofloxacin Susceptible      Piperacillin + Tazobactam Susceptible      Tetracycline Susceptible      Trimethoprim + Sulfamethoxazole Susceptible                   [1]  Appended report. These results have been appended to a previously final verified report.                Susceptibility Comments       Morganella morganii ssp morganii    Cefotaxime susceptibility can be used as a surrogate for ceftriaxone susceptibility               MRSA Screen, PCR (Inpatient) - Swab, Nares [[id number]]  (Abnormal) Collected: 01/21/25 0614    Lab Status: Final result Specimen: Swab from Nares Updated: 01/21/25 0829     MRSA PCR Positive    Narrative:      The negative predictive value of this diagnostic test is high and should only be used to consider de-escalating anti-MRSA therapy. A positive result may indicate colonization with MRSA and must be correlated clinically.            No radiology results from the last 24 hrs        Current medications:  Scheduled Meds:albumin human, , ,   allopurinol, 200 mg,  Oral, Daily  amiodarone, 200 mg, Oral, TID  [Held by provider] apixaban, 2.5 mg, Oral, Q12H  atorvastatin, 10 mg, Oral, Daily  cefepime, 1,000 mg, Intravenous, Q24H  famotidine, 20 mg, Oral, Daily  insulin lispro, 2-9 Units, Subcutaneous, 4x Daily AC & at Bedtime  metoprolol tartrate, 25 mg, Oral, Q12H  metroNIDAZOLE, 500 mg, Intravenous, Q8H  NIFEdipine XL, 30 mg, Oral, Q24H  pantoprazole, 40 mg, Oral, Q AM  sodium chloride, 10 mL, Intravenous, Q12H  vancomycin (dosing per levels), , Not Applicable, Daily      Continuous Infusions:amiodarone, 1 mg/min, Last Rate: 1 mg/min (01/25/25 0735)  dilTIAZem, 5-15 mg/hr  lactated ringers, 9 mL/hr  Pharmacy to dose vancomycin,       PRN Meds:.  acetaminophen    albumin human    albumin human    senna-docusate sodium **AND** polyethylene glycol **AND** bisacodyl **AND** bisacodyl    Calcium Replacement - Follow Nurse / BPA Driven Protocol    dextrose    dextrose    glucagon (human recombinant)    heparin (porcine)    HYDROmorphone    Magnesium Standard Dose Replacement - Follow Nurse / BPA Driven Protocol    melatonin    metoprolol tartrate    ondansetron    oxyCODONE    Pharmacy to dose vancomycin    Potassium Replacement - Follow Nurse / BPA Driven Protocol    [COMPLETED] Insert Peripheral IV **AND** sodium chloride    sodium chloride    sodium chloride    traMADol    Assessment & Plan   Assessment & Plan     Active Hospital Problems    Diagnosis  POA    **Generalized weakness [R53.1]  Yes    Sepsis due to methicillin resistant Staphylococcus aureus (MRSA) with encephalopathy without septic shock [A41.02, R65.20, G93.41]  Unknown     Priority: High    Paroxysmal A-fib [I48.0]  Unknown     Priority: Medium    Chronic osteomyelitis of left foot [M86.672]  Unknown    Type 2 diabetes mellitus with stage 5 chronic kidney disease not on chronic dialysis, without long-term current use of insulin [E11.22, N18.5]  Yes    ESRD (end stage renal disease) [N18.6]  Yes      Resolved  Hospital Problems   No resolved problems to display.        Brief Hospital Course to date:  Sara Esparza is a 74 y.o. female w ESRD on HD, PAF on Eliquis, HTN and DM admitted for several weeks generalized weakness and myalgias and multiple falls in setting of lightheadedness- after admission she was found to be septic with temps up to 102.4F.     Sepsis, fever, leukocytosis, confusion  L calcaneal osteomyelitis    - RIJ tunneled dialysis cath - increases risk of bacteremia   - blood cx NGTD  - broadened abx to vanc/cefepime/flagyl.    - ortho consulted- s/p debridement and wound vac placement, 1/24/25. May ultimately require amputation, pt aware.   - MRSA + and + Morganella from wound site. Consulted ID, appreciate Dr. Barrios's assistance   -- leukocytosis worse 1/24 but has been afebrile for 48 hours. Monitor closely       Generalized weakness progressive x weeks, falls at home   - suspicion of overdiuresis and orthostasis ongoing, w superimposed sepsis now   - Ct chest/abd without acute findings   - normal TSH and  CK   - PT OT  - orthostatics when able   - considering SNF      Neck pain , C7 region   C5-6 canal stenosis  -  CT cspine - no fx  - history of oxycodone dependence; family requests avoiding opiates.  After discussion, trying ultram   -will consider C spine MRI if pain persists     ESRD on HD:  HD on MWF     DMII:  A1C 4.9   - on sitagliptin at home. SSI.  May need to decrease dose.      Hx of HTN:   -- nifedipine dose decreased due to tenuous BPs    H/o Afib  Episode of Afib with RVR during HD  -- given IV metoprolol 5 mg x 1 to see if this would help  -- resumed home dose beta blocker (Coreg), but still not rate controlled.  Cards consulted, started on Amiodarone and changed to Metoprolol BID. Rate now controlled.   -- on Eliquis at home, holding for possible intervention for osteo    Elevated troponin  -- in setting of ESRD  -- troponin peaked at 132 on 1/19.   -- per chart review, had [facility] recently  at [facility] with non-obstructive CAD    Dyspepsia  -- GI cocktail PRN         Expected Discharge Location and Transportation:  snf   Expected Discharge   Expected Discharge Date: 1/29/2025; Expected Discharge Time:      VTE Prophylaxis:  Pharmacologic VTE prophylaxis orders are present.         AM-PAC 6 Clicks Score (PT): 11 (01/24/25 2025)    CODE STATUS:   Code Status and Medical Interventions: CPR (Attempt to Resuscitate); Full Support   Ordered at: 01/19/25 0518     Level Of Support Discussed With:    Patient     Code Status (Patient has no pulse and is not breathing):    CPR (Attempt to Resuscitate)     Medical Interventions (Patient has pulse or is breathing):    Full Support       Heather Carlos MD  01/25/25

## 2025-01-25 NOTE — PROGRESS NOTES
"  [facility] at [facility]   Inpatient Progress Note       LOS: 6 days   Patient Care Team:  Toribio Roberts MD as PCP - General (Internal Medicine)    Chief Complaint: Atrial fibrillation    Subjective     Interval History:   Chronic paroxysmal atrial fibrillation.  Rates are better controlled today.  Tearful due to upcoming debridement      Review of Systems:   Pertinent positives noted in history, exam, and assessment. Otherwise reviewed and negative.      Objective     Vitals:  Blood pressure 144/60, pulse 89, temperature 97.5 °F (36.4 °C), temperature source Oral, resp. rate 16, height 170.2 cm (67\"), weight 108 kg (238 lb), SpO2 92%.     Intake/Output Summary (Last 24 hours) at 1/25/2025 1048  Last data filed at 1/25/2025 0330  Gross per 24 hour   Intake 150 ml   Output 1980 ml   Net -1830 ml     Vitals reviewed.   Constitutional:       Appearance: Well-developed. Acutely ill-appearing.   Neck:      Thyroid: No thyromegaly.      Vascular: No carotid bruit or JVD.   Pulmonary:      Breath sounds: Normal breath sounds.   Cardiovascular:      Irregularly irregular rhythm.      No gallop. No S3 and S4 gallop.   Pulses:     Intact distal pulses.      Carotid: 2+ bilaterally.     Radial: 2+ bilaterally.  Edema:     Peripheral edema present.     Ankle: bilateral 1+ edema of the ankle.  Abdominal:      General: Bowel sounds are normal.      Palpations: Abdomen is soft. There is no abdominal mass.      Tenderness: There is no abdominal tenderness.   Musculoskeletal:         General: No deformity.      Extremities: No clubbing present.Skin:     General: Skin is warm and dry.      Findings: No rash.   Neurological:      Mental Status: Alert and oriented to person, place, and time.            Results Review:     I reviewed the patient's new clinical results.    Results from last 7 days   Lab Units 01/25/25  0945   WBC 10*3/mm3 10.23   HEMOGLOBIN g/dL 10.4*   HEMATOCRIT % 32.9*   PLATELETS 10*3/mm3 245 "     Results from last 7 days   Lab Units 01/25/25  0945 01/23/25  0816 01/22/25  0652   SODIUM mmol/L 137   < > 133*   POTASSIUM mmol/L 4.1   < > 4.2   CHLORIDE mmol/L 97*   < > 96*   CO2 mmol/L 26.0   < > 22.0   BUN mg/dL 33*   < > 67*   CREATININE mg/dL 3.22*   < > 5.49*   CALCIUM mg/dL 9.1   < > 9.3   BILIRUBIN mg/dL  --   --  0.2   ALK PHOS U/L  --   --  79   ALT (SGPT) U/L  --   --  32   AST (SGOT) U/L  --   --  24   GLUCOSE mg/dL 197*   < > 149*    < > = values in this interval not displayed.     Results from last 7 days   Lab Units 01/25/25  0945   SODIUM mmol/L 137   POTASSIUM mmol/L 4.1   CHLORIDE mmol/L 97*   CO2 mmol/L 26.0   BUN mg/dL 33*   CREATININE mg/dL 3.22*   GLUCOSE mg/dL 197*   CALCIUM mg/dL 9.1         Lab Results   Lab Value Date/Time    TROPONINT 101 (C) 01/23/2025 0816    TROPONINT 106 (C) 01/23/2025 0159    TROPONINT 132 (C) 01/19/2025 0403    TROPONINT 128 (C) 01/18/2025 2259    TROPONINT 134 (C) 01/18/2025 2149    TROPONINT 89 (C) 01/16/2025 2011    TROPONINT 87 (C) 01/16/2025 1645     Results from last 7 days   Lab Units 01/19/25  0403   TSH uIU/mL 1.250         Results from last 7 days   Lab Units 01/18/25  2149   PROBNP pg/mL 8,028.0*     Results from last 7 days   Lab Units 01/23/25  0816 01/23/25  0159 01/19/25  0403   HSTROP T ng/L 101* 106* 132*         Tele: A-fib with controlled rates    Assessment:      Generalized weakness    ESRD (end stage renal disease)    Type 2 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  Likely can discontinue IV  amiodarone tomorrow   continue oral amiodarone loading  Eliquis on hold for surgery today  Hypertension  Blood pressure fairly well-controlled.  Would not treat aggressively with upcoming surgery  ESRD on dialysis  Status post dialysis today  Elevated troponin in setting of ESRD  Heart catheterization earlier this month at [facility] with nonobstructive CAD  Sepsis and heel osteomyelitis  Jayme for debridement today  ID following and managing antibiotics  ID recommends follow-up echocardiogram.  Will order.  Blood cultures no growth so far    Plan:  Atrial fibrillation is chronic in nature, rates are controlled.  Continue current medical therapy.  If surgical debridement needed, no reason to delay.  Resume anticoagulants postoperatively  Posture troponins with normal heart cath earlier this month.  No further intervention necessary.  We will see again on Monday please call over the weekend if any questions    Kevin Headley MD    Dictated utilizing Dragon dictation

## 2025-01-25 NOTE — THERAPY WOUND CARE TREATMENT
Acute Care - Wound/Debridement Initial Evaluation and Treatment Note   Erin     Patient Name: Sara Esparza  : 1950  MRN: [id number]  Today's Date: 2025                Admit Date: 2025    Visit Dx:    ICD-10-CM ICD-9-CM   1. Generalized weakness  R53.1 780.79   2. Frequent falls  R29.6 V15.88   3. Chronic kidney disease, unspecified CKD stage  N18.9 585.9   4. Chronic anemia  D64.9 285.9   5. Type 2 diabetes mellitus with stage 5 chronic kidney disease not on chronic dialysis, without long-term current use of insulin  E11.22 250.40    N18.5 585.5   6. Chronic osteomyelitis of left foot  M86.672 730.17       Patient Active Problem List   Diagnosis    Postoperative abdominal hernia    ESRD (end stage renal disease)    Type 2 diabetes mellitus with stage 5 chronic kidney disease not on chronic dialysis, without long-term current use of insulin    Hypokalemia    Malnutrition    Severe malnutrition    Generalized weakness    Chronic osteomyelitis of left foot    Paroxysmal A-fib    Sepsis due to methicillin resistant Staphylococcus aureus (MRSA) with encephalopathy without septic shock        Past Medical History:   Diagnosis Date    Anxiety     Arthritis     Asthma     allergy induced    Back pain     Depression     Diabetes mellitus     dx 10 years ago- checks fsbs most days    Diverticula of colon     GERD (gastroesophageal reflux disease)     Head ache     Hypertension     Sleep apnea     no longer needs cpap - approx. 10 years r/t weight loss        Past Surgical History:   Procedure Laterality Date    BACK SURGERY      x 2    CARDIAC CATHETERIZATION      no intervention    CHOLECYSTECTOMY OPEN      COLONOSCOPY      2013    HYSTERECTOMY      REPLACEMENT TOTAL KNEE BILATERAL Bilateral     TONSILLECTOMY      VENTRAL/INCISIONAL HERNIA REPAIR N/A 2017    Procedure: INCISIONAL HERNIA REPAIR LAPAROSCOPIC;  Surgeon: Conrad Hernandez MD;  Location: [facility];  Service:            Wound 25  2000 Left posterior plantar pressure injury (Active)   Pressure Injury Stage U 01/25/25 0400   Dressing Appearance intact;dry 01/25/25 0400   Closure Adhesive bandage 01/25/25 0400   Base white 01/25/25 0400   Periwound redness;warm;macerated 01/25/25 0400   Periwound Temperature warm 01/25/25 0400   Drainage Characteristics/Odor malodorous;purulent 01/25/25 0400   Drainage Amount scant 01/25/25 0400   Dressing Care silicone border foam 01/25/25 0400       Wound 01/19/25 2000 Left lower leg (Active)   Dressing Appearance open to air 01/25/25 0400   Closure Sutures 01/25/25 0400   Base maroon/purple 01/25/25 0400   Drainage Amount none 01/25/25 0400       Wound 01/24/25 Left posterior heel (Active)   Dressing Appearance dry;intact 01/25/25 1339   Closure Unable to assess 01/25/25 0400   Base unable to visualize 01/25/25 1339   Drainage Characteristics/Odor sanguineous 01/25/25 1339   Drainage Amount none 01/25/25 0400   Dressing Care dressing applied 01/25/25 0000   Wound Output (mL) 50 01/25/25 1339       NPWT (Negative Pressure Wound Therapy) 01/24/25 1743 Left heel (Active)   Therapy Setting continuous therapy 01/25/25 1339   Dressing unable to visualize 01/25/25 0600   Pressure Setting 125 mmHg 01/25/25 1339         WOUND DEBRIDEMENT                     PT Assessment (Last 12 Hours)       PT Evaluation and Treatment       Row Name 01/25/25 1339          Physical Therapy Time and Intention    Subjective Information complains of;pain  -MC     Document Type wound care;evaluation  -     Mode of Treatment physical therapy;individual therapy  -       Row Name 01/25/25 1339          General Information    Patient Profile Reviewed yes  -     Patient Observations cooperative;alert;agree to therapy  -       Row Name 01/25/25 133          Pain    Pretreatment Pain Rating 4/10  -     Posttreatment Pain Rating 4/10  -     Pain Side/Orientation generalized  -     Pain Management Interventions positioning  techniques utilized  -     Response to Pain Interventions no change per patient report  -       Row Name 01/25/25 1339          Cognition    Orientation Status (Cognition) oriented x 4  -       Row Name             Wound 01/19/25 2000 Left posterior plantar pressure injury    Wound - Properties Group Placement Date: 01/19/25  -AM Placement Time: 2000 -AM Side: Left  -AM Orientation: posterior  -AM Location: plantar  -AM Primary Wound Type: Pressure inj  -AM Type: pressure injury  -AM Present on Original Admission: Y  -AM Additional Comments: Left heel  -AM    Retired Wound - Properties Group Placement Date: 01/19/25  -AM Placement Time: 2000 -AM Present on Original Admission: Y  -AM Side: Left  -AM Orientation: posterior  -AM Location: plantar  -AM Primary Wound Type: Pressure inj  -AM Additional Comments: Left heel  -AM Type: pressure injury  -AM    Retired Wound - Properties Group Placement Date: 01/19/25  -AM Placement Time: 2000 -AM Present on Original Admission: Y  -AM Side: Left  -AM Orientation: posterior  -AM Location: plantar  -AM Primary Wound Type: Pressure inj  -AM Additional Comments: Left heel  -AM Type: pressure injury  -AM    Retired Wound - Properties Group Date first assessed: 01/19/25  -AM Time first assessed: 2000 -AM Present on Original Admission: Y  -AM Side: Left  -AM Location: plantar  -AM Primary Wound Type: Pressure inj  -AM Additional Comments: Left heel  -AM Type: pressure injury  -AM      Row Name             Wound 01/19/25 2000 Left lower leg    Wound - Properties Group Placement Date: 01/19/25  -AM Placement Time: 2000 -AM Side: Left  -AM Orientation: lower  -AM Location: leg  -AM Primary Wound Type: Abrasion  -AM Present on Original Admission: Y  -AM    Retired Wound - Properties Group Placement Date: 01/19/25  -AM Placement Time: 2000 -AM Present on Original Admission: Y  -AM Side: Left  -AM Orientation: lower  -AM Location: leg  -AM Primary Wound Type: Abrasion  -AM     Retired Wound - Properties Group Placement Date: 01/19/25  -AM Placement Time: 2000 -AM Present on Original Admission: Y  -AM Side: Left  -AM Orientation: lower  -AM Location: leg  -AM Primary Wound Type: Abrasion  -AM    Retired Wound - Properties Group Date first assessed: 01/19/25  -AM Time first assessed: 2000 -AM Present on Original Admission: Y  -AM Side: Left  -AM Location: leg  -AM Primary Wound Type: Abrasion  -AM      Row Name 01/25/25 1339          Wound 01/24/25 Left posterior heel    Wound - Properties Group Placement Date: 01/24/25  -AS Side: Left  -AS Orientation: posterior  -AS Location: heel  -AS Primary Wound Type: Incision  -AS    Dressing Appearance dry;intact  -MC     Base unable to visualize  -     Drainage Characteristics/Odor sanguineous  -     Wound Output (mL) 50  -MC     Retired Wound - Properties Group Placement Date: 01/24/25  -AS Side: Left  -AS Orientation: posterior  -AS Location: heel  -AS Primary Wound Type: Incision  -AS    Retired Wound - Properties Group Placement Date: 01/24/25  -AS Side: Left  -AS Orientation: posterior  -AS Location: heel  -AS Primary Wound Type: Incision  -AS    Retired Wound - Properties Group Date first assessed: 01/24/25  -AS Side: Left  -AS Location: heel  -AS Primary Wound Type: Incision  -AS      Row Name 01/25/25 1339          NPWT (Negative Pressure Wound Therapy) 01/24/25 1743 Left heel    NPWT (Negative Pressure Wound Therapy) - Properties Group Placement Date: 01/24/25  -ML Placement Time: 1743  -ML Location: Left heel  -ML Additional Comments: Placed in OR, present on arrival to PACU  -ML    Therapy Setting continuous therapy  -     Pressure Setting 125 mmHg  -     Retired NPWT (Negative Pressure Wound Therapy) - Properties Group Placement Date: 01/24/25  -ML Placement Time: 1743  -ML Location: Left heel  -ML Additional Comments: Placed in OR, present on arrival to PACU  -ML    Retired NPWT (Negative Pressure Wound Therapy) - Properties  Group Placement Date: 01/24/25  -ML Placement Time: 1743  -ML Location: Left heel  -ML Additional Comments: Placed in OR, present on arrival to PACU  -ML    Retired NPWT (Negative Pressure Wound Therapy) - Properties Group Placement Date: 01/24/25  -ML Placement Time: 1743  -ML Location: Left heel  -ML Additional Comments: Placed in OR, present on arrival to PACU  -ML      Row Name 01/25/25 0838          Coping    Observed Emotional State calm;cooperative  -     Verbalized Emotional State acceptance  -       Row Name 01/25/25 3954          Plan of Care Review    Plan of Care Reviewed With patient  -     Outcome Evaluation PT Wound Care eval complete. Pt with NPWT to the L heel, placed in OR on 1/24/25. Wound vac appears to be functioning appropriately at this time. Anticipate vac change Monday.  -       Row Name 01/25/25 1933          Positioning and Restraints    Pre-Treatment Position in bed  -     Post Treatment Position bed  -     In Bed supine;call light within reach;encouraged to call for assist;with family/caregiver;heels elevated  -       Row Name 01/25/25 4304          Therapy Assessment/Plan (PT)    Patient/Family Therapy Goals Statement (PT) heal wound  -     Rehab Potential (PT) fair  -     Criteria for Skilled Interventions Met (PT) yes;meets criteria;skilled treatment is necessary  -     Predicted Duration of Therapy Intervention (PT) 2 weeks  -       Row Name 01/25/25 6834          PT Evaluation Complexity    History, PT Evaluation Complexity 1-2 personal factors and/or comorbidities  -     Examination of Body Systems (PT Eval Complexity) 1-2 elements  -     Clinical Presentation (PT Evaluation Complexity) stable  -     Clinical Decision Making (PT Evaluation Complexity) low complexity  -     Overall Complexity (PT Evaluation Complexity) low complexity  -       Row Name 01/25/25 3536          Physical Therapy Goals    Wound Management Goal Selection (PT) wound  management, PT goal 1;wound management, PT goal 2  -       Row Name 01/25/25 1339          Wound Management Goal 1 (PT)    Wound Management Goal (Wound Goal 1, PT) Pt will demonstrate independence with vac troubleshooting.  -MC     Time Frame (Wound Goal 1, PT) short-term goal (STG);2 days  -       Row Name 01/25/25 1339          Wound Management Goal 2 (PT)    Wound Management Goal (Wound Goal 2, PT) Pt will demonstrate 10% reduction in wound area to indicate healing progress.  -     Time Frame (Wound Goal 2, PT) long-term goal (LTG);2 weeks  -               User Key  (r) = Recorded By, (t) = Taken By, (c) = Cosigned By      Initials Name Provider Type    Colleen Woodard, PT Physical Therapist    AM Katya Cancino, RN Registered Nurse    AS Katya Gardner, RN Registered Nurse    Jane Schafer RN Registered Nurse                  Physical Therapy Education        No education to display                    Recommendation and Plan  Anticipated Discharge Disposition (PT): other (see comments) (will defer to PT mobility when appropriate. Will require skilled wound care upon discharge.)  Planned Therapy Interventions (PT): wound care, patient/family education  Plan of Care Reviewed With: patient           Outcome Evaluation: PT Wound Care eval complete. Pt with NPWT to the L heel, placed in OR on 1/24/25. Wound vac appears to be functioning appropriately at this time. Anticipate vac change Monday.  Plan of Care Reviewed With: patient            Time Calculation   PT Charges       Row Name 01/25/25 1345             Time Calculation    Start Time 1015  -MC      PT Goal Re-Cert Due Date 02/04/25  -MC         Untimed Charges    PT Eval/Re-eval Minutes 25  -MC      Wound Care 32210 Neg Press (DME) wound TO 50 sqcm  -MC      48305-Ciu Pressure wound to 50 sqcm 10  -MC         Total Minutes    Untimed Charges Total Minutes 35  -MC       Total Minutes 35  -MC                User Key  (r) = Recorded By,  (t) = Taken By, (c) = Cosigned By      Initials Name Provider Type    Colleen Woodard, PT Physical Therapist                            PT G-Codes  AM-PAC 6 Clicks Score (PT): 11       Colleen Phillips, PT  1/25/2025

## 2025-01-25 NOTE — PLAN OF CARE
Problem: Adult Inpatient Plan of Care  Goal: Plan of Care Review  Outcome: Progressing  Flowsheets (Taken 1/25/2025 0552)  Progress: improving  Outcome Evaluation: Pt is A&Ox3, disoriented to place at times (saying shes at [place]), VSS, AFib on the monitor, resting on 2L nc. Pt has had a better shift tonight than the last to nights. Pain was controlled by ordered PRNs. Wound vac still in place on R heel. No further complaints at this time. Will continue plan of care.  Plan of Care Reviewed With: patient  Goal: Patient-Specific Goal (Individualized)  Outcome: Progressing  Goal: Absence of Hospital-Acquired Illness or Injury  Outcome: Progressing  Intervention: Identify and Manage Fall Risk  Recent Flowsheet Documentation  Taken 1/25/2025 0400 by Cedrick Leonard RN  Safety Promotion/Fall Prevention:   activity supervised   assistive device/personal items within reach   clutter free environment maintained   fall prevention program maintained   nonskid shoes/slippers when out of bed   room organization consistent   safety round/check completed   toileting scheduled  Taken 1/25/2025 0200 by Cedrick Leonard RN  Safety Promotion/Fall Prevention:   activity supervised   assistive device/personal items within reach   clutter free environment maintained   fall prevention program maintained   nonskid shoes/slippers when out of bed   room organization consistent   safety round/check completed   toileting scheduled  Taken 1/25/2025 0000 by Cedrick Leonard RN  Safety Promotion/Fall Prevention:   activity supervised   assistive device/personal items within reach   clutter free environment maintained   fall prevention program maintained   nonskid shoes/slippers when out of bed   room organization consistent   safety round/check completed   toileting scheduled  Taken 1/24/2025 2200 by Cedrick Leonard RN  Safety Promotion/Fall Prevention:   activity supervised   assistive device/personal items within reach   clutter free  environment maintained   fall prevention program maintained   nonskid shoes/slippers when out of bed   room organization consistent   safety round/check completed   toileting scheduled  Taken 1/24/2025 2025 by Cerdick Leonard RN  Safety Promotion/Fall Prevention:   activity supervised   assistive device/personal items within reach   clutter free environment maintained   fall prevention program maintained   nonskid shoes/slippers when out of bed   room organization consistent   safety round/check completed   toileting scheduled  Intervention: Prevent Skin Injury  Recent Flowsheet Documentation  Taken 1/25/2025 0400 by Cedrick Leonard RN  Body Position:   turned   left  Skin Protection:   incontinence pads utilized   silicone foam dressing in place   transparent dressing maintained  Taken 1/25/2025 0200 by Cedrick Leonard RN  Body Position:   turned   supine   legs elevated  Skin Protection:   incontinence pads utilized   silicone foam dressing in place   transparent dressing maintained  Taken 1/25/2025 0000 by Cedrick Leonard RN  Body Position:   turned   right  Skin Protection:   incontinence pads utilized   silicone foam dressing in place   transparent dressing maintained  Taken 1/24/2025 2200 by Cedrick Leonard RN  Body Position:   turned   left  Skin Protection:   incontinence pads utilized   silicone foam dressing in place   transparent dressing maintained  Taken 1/24/2025 2025 by Cedrick Leonard RN  Body Position:   turned   supine   legs elevated  Skin Protection: (Silicone dressings peeled back and skin assessed)   incontinence pads utilized   silicone foam dressing in place   transparent dressing maintained  Intervention: Prevent and Manage VTE (Venous Thromboembolism) Risk  Recent Flowsheet Documentation  Taken 1/24/2025 2025 by Cedrick Leonard RN  VTE Prevention/Management: (SEE MAR)   other (see comments)   bilateral   SCDs (sequential compression devices) on  Intervention: Prevent  Infection  Recent Flowsheet Documentation  Taken 1/25/2025 0400 by Cedrick Leonard RN  Infection Prevention:   environmental surveillance performed   rest/sleep promoted   single patient room provided  Taken 1/25/2025 0200 by Cedrick Leonard RN  Infection Prevention:   environmental surveillance performed   rest/sleep promoted   single patient room provided  Taken 1/25/2025 0000 by Cedrick Leonard RN  Infection Prevention:   environmental surveillance performed   rest/sleep promoted   single patient room provided  Taken 1/24/2025 2200 by Cedrick Leonard RN  Infection Prevention:   environmental surveillance performed   rest/sleep promoted   single patient room provided  Taken 1/24/2025 2025 by Cedrick Leonard RN  Infection Prevention:   environmental surveillance performed   rest/sleep promoted   single patient room provided  Goal: Optimal Comfort and Wellbeing  Outcome: Progressing  Intervention: Monitor Pain and Promote Comfort  Recent Flowsheet Documentation  Taken 1/25/2025 0400 by Cedrick Leonard RN  Pain Management Interventions:   no interventions per patient request   quiet environment facilitated   relaxation techniques promoted  Taken 1/25/2025 0200 by Cedrick Leonard RN  Pain Management Interventions:   no interventions per patient request   quiet environment facilitated   relaxation techniques promoted  Taken 1/25/2025 0122 by Cedrick Leonard RN  Pain Management Interventions:   no interventions per patient request   quiet environment facilitated   relaxation techniques promoted  Taken 1/25/2025 0052 by Cedrick Leonard RN  Pain Management Interventions:   pain medication given   quiet environment facilitated   relaxation techniques promoted  Taken 1/25/2025 0000 by Cedrick Leonard RN  Pain Management Interventions:   no interventions per patient request   quiet environment facilitated   relaxation techniques promoted  Taken 1/24/2025 2200 by Cedrick Leonard RN  Pain Management  Interventions:   no interventions per patient request   quiet environment facilitated   relaxation techniques promoted  Taken 1/24/2025 2055 by Cedrick Leonard RN  Pain Management Interventions:   no interventions per patient request   relaxation techniques promoted   quiet environment facilitated  Taken 1/24/2025 2025 by Cedrick Leonard RN  Pain Management Interventions:   pain medication given   quiet environment facilitated   relaxation techniques promoted  Intervention: Provide Person-Centered Care  Recent Flowsheet Documentation  Taken 1/24/2025 2025 by Cedrick Leonard RN  Trust Relationship/Rapport:   care explained   choices provided   questions answered   reassurance provided   thoughts/feelings acknowledged  Goal: Readiness for Transition of Care  Outcome: Progressing     Problem: Skin Injury Risk Increased  Goal: Skin Health and Integrity  Outcome: Progressing  Intervention: Optimize Skin Protection  Recent Flowsheet Documentation  Taken 1/25/2025 0400 by Cedrick Leonard RN  Activity Management: activity minimized  Pressure Reduction Techniques:   frequent weight shift encouraged   heels elevated off bed   positioned off wounds   pressure points protected   weight shift assistance provided  Head of Bed (HOB) Positioning: HOB elevated  Pressure Reduction Devices:   pressure-redistributing mattress utilized   positioning supports utilized   heel offloading device utilized   foam padding utilized  Skin Protection:   incontinence pads utilized   silicone foam dressing in place   transparent dressing maintained  Taken 1/25/2025 0200 by Cedrick Leonard RN  Activity Management: activity minimized  Pressure Reduction Techniques:   frequent weight shift encouraged   heels elevated off bed   positioned off wounds   pressure points protected   weight shift assistance provided  Head of Bed (HOB) Positioning: HOB elevated  Pressure Reduction Devices:   pressure-redistributing mattress utilized   positioning  supports utilized   heel offloading device utilized   foam padding utilized  Skin Protection:   incontinence pads utilized   silicone foam dressing in place   transparent dressing maintained  Taken 1/25/2025 0000 by Cedrick Leonard RN  Activity Management: activity minimized  Pressure Reduction Techniques:   frequent weight shift encouraged   heels elevated off bed   positioned off wounds   pressure points protected   weight shift assistance provided  Head of Bed (HOB) Positioning: HOB elevated  Pressure Reduction Devices:   pressure-redistributing mattress utilized   positioning supports utilized   heel offloading device utilized   foam padding utilized  Skin Protection:   incontinence pads utilized   silicone foam dressing in place   transparent dressing maintained  Taken 1/24/2025 2200 by Cedrick Leonard RN  Activity Management: activity encouraged  Pressure Reduction Techniques:   frequent weight shift encouraged   heels elevated off bed   positioned off wounds   pressure points protected   weight shift assistance provided  Head of Bed (HOB) Positioning: HOB elevated  Pressure Reduction Devices:   pressure-redistributing mattress utilized   positioning supports utilized   heel offloading device utilized   foam padding utilized  Skin Protection:   incontinence pads utilized   silicone foam dressing in place   transparent dressing maintained  Taken 1/24/2025 2025 by Cedrick Leonard RN  Activity Management: activity encouraged  Pressure Reduction Techniques:   frequent weight shift encouraged   heels elevated off bed   positioned off wounds   pressure points protected   weight shift assistance provided  Head of Bed (HOB) Positioning: HOB elevated  Pressure Reduction Devices:   pressure-redistributing mattress utilized   positioning supports utilized   heel offloading device utilized   foam padding utilized  Skin Protection: (Silicone dressings peeled back and skin assessed)   incontinence pads utilized    silicone foam dressing in place   transparent dressing maintained     Problem: Fall Injury Risk  Goal: Absence of Fall and Fall-Related Injury  Outcome: Progressing  Intervention: Identify and Manage Contributors  Recent Flowsheet Documentation  Taken 1/25/2025 0400 by Cedrick Leonard RN  Medication Review/Management: medications reviewed  Taken 1/25/2025 0200 by Cedrick Leonard RN  Medication Review/Management: medications reviewed  Taken 1/25/2025 0000 by Cedrick Leonard RN  Medication Review/Management: medications reviewed  Taken 1/24/2025 2200 by Cedrick Leonard RN  Medication Review/Management: medications reviewed  Taken 1/24/2025 2025 by Cedrick Leonard RN  Medication Review/Management: medications reviewed  Intervention: Promote Injury-Free Environment  Recent Flowsheet Documentation  Taken 1/25/2025 0400 by Cedrick Leonard RN  Safety Promotion/Fall Prevention:   activity supervised   assistive device/personal items within reach   clutter free environment maintained   fall prevention program maintained   nonskid shoes/slippers when out of bed   room organization consistent   safety round/check completed   toileting scheduled  Taken 1/25/2025 0200 by Cedrick Leonard RN  Safety Promotion/Fall Prevention:   activity supervised   assistive device/personal items within reach   clutter free environment maintained   fall prevention program maintained   nonskid shoes/slippers when out of bed   room organization consistent   safety round/check completed   toileting scheduled  Taken 1/25/2025 0000 by Cedrick Leonard RN  Safety Promotion/Fall Prevention:   activity supervised   assistive device/personal items within reach   clutter free environment maintained   fall prevention program maintained   nonskid shoes/slippers when out of bed   room organization consistent   safety round/check completed   toileting scheduled  Taken 1/24/2025 2200 by Cedrick Leonard RN  Safety Promotion/Fall Prevention:    activity supervised   assistive device/personal items within reach   clutter free environment maintained   fall prevention program maintained   nonskid shoes/slippers when out of bed   room organization consistent   safety round/check completed   toileting scheduled  Taken 1/24/2025 2025 by Cedrick Leonard RN  Safety Promotion/Fall Prevention:   activity supervised   assistive device/personal items within reach   clutter free environment maintained   fall prevention program maintained   nonskid shoes/slippers when out of bed   room organization consistent   safety round/check completed   toileting scheduled     Problem: Comorbidity Management  Goal: Maintenance of Heart Failure Symptom Control  Outcome: Progressing  Intervention: Maintain Heart Failure Management  Recent Flowsheet Documentation  Taken 1/25/2025 0400 by Cedrick Leonard RN  Medication Review/Management: medications reviewed  Taken 1/25/2025 0200 by Cedrick Leonard RN  Medication Review/Management: medications reviewed  Taken 1/25/2025 0000 by Cedrick Leonard RN  Medication Review/Management: medications reviewed  Taken 1/24/2025 2200 by Cedrick Leonard RN  Medication Review/Management: medications reviewed  Taken 1/24/2025 2025 by Cedrick Leonard RN  Medication Review/Management: medications reviewed  Goal: Blood Pressure in Desired Range  Outcome: Progressing  Intervention: Maintain Blood Pressure Management  Recent Flowsheet Documentation  Taken 1/25/2025 0400 by Cedrick Leonard RN  Medication Review/Management: medications reviewed  Taken 1/25/2025 0200 by Cedrick Leonard RN  Medication Review/Management: medications reviewed  Taken 1/25/2025 0000 by Cedrick Leonard RN  Medication Review/Management: medications reviewed  Taken 1/24/2025 2200 by Cedrick Leonard RN  Medication Review/Management: medications reviewed  Taken 1/24/2025 2025 by Cedrick Leonard RN  Medication Review/Management: medications reviewed     Problem:  Hemodialysis  Goal: Safe, Effective Therapy Delivery  Outcome: Progressing  Intervention: Optimize Device Care and Function  Recent Flowsheet Documentation  Taken 1/25/2025 0400 by Cedrick Leonard RN  Medication Review/Management: medications reviewed  Taken 1/25/2025 0200 by Cedrick Leonard RN  Medication Review/Management: medications reviewed  Taken 1/25/2025 0000 by Cedrick Leonard RN  Medication Review/Management: medications reviewed  Taken 1/24/2025 2200 by Cedrick Leonard RN  Medication Review/Management: medications reviewed  Taken 1/24/2025 2025 by Cedrick Leonard RN  Medication Review/Management: medications reviewed  Goal: Effective Tissue Perfusion  Outcome: Progressing  Goal: Absence of Infection Signs and Symptoms  Outcome: Progressing  Intervention: Prevent or Manage Infection  Recent Flowsheet Documentation  Taken 1/25/2025 0400 by Cedrick Leonard RN  Infection Prevention:   environmental surveillance performed   rest/sleep promoted   single patient room provided  Taken 1/25/2025 0200 by Cedrick Leonard RN  Infection Prevention:   environmental surveillance performed   rest/sleep promoted   single patient room provided  Taken 1/25/2025 0000 by Cedrick Leonard RN  Infection Prevention:   environmental surveillance performed   rest/sleep promoted   single patient room provided  Taken 1/24/2025 2200 by Cedrick Leonard RN  Infection Prevention:   environmental surveillance performed   rest/sleep promoted   single patient room provided  Taken 1/24/2025 2025 by Cedrick Leonard RN  Infection Prevention:   environmental surveillance performed   rest/sleep promoted   single patient room provided   Goal Outcome Evaluation:  Plan of Care Reviewed With: patient        Progress: improving  Outcome Evaluation: Pt is A&Ox3, disoriented to place at times [place] [facility], AFib on the monitor, resting on 2L nc. Pt has had a better shift tonight than the last to nights. Pain was  controlled by ordered PRNs. Wound vac still in place on R heel. No further complaints at this time. Will continue plan of care.

## 2025-01-26 LAB
ANION GAP SERPL CALCULATED.3IONS-SCNC: 12 MMOL/L (ref 5–15)
BACTERIA SPEC AEROBE CULT: NORMAL
BASOPHILS # BLD AUTO: 0.02 10*3/MM3 (ref 0–0.2)
BASOPHILS NFR BLD AUTO: 0.2 % (ref 0–1.5)
BUN SERPL-MCNC: 52 MG/DL (ref 8–23)
BUN/CREAT SERPL: 10.7 (ref 7–25)
CALCIUM SPEC-SCNC: 9.1 MG/DL (ref 8.6–10.5)
CHLORIDE SERPL-SCNC: 92 MMOL/L (ref 98–107)
CO2 SERPL-SCNC: 22 MMOL/L (ref 22–29)
CREAT SERPL-MCNC: 4.88 MG/DL (ref 0.57–1)
DEPRECATED RDW RBC AUTO: 53.1 FL (ref 37–54)
EGFRCR SERPLBLD CKD-EPI 2021: 8.8 ML/MIN/1.73
EOSINOPHIL # BLD AUTO: 0.11 10*3/MM3 (ref 0–0.4)
EOSINOPHIL NFR BLD AUTO: 0.9 % (ref 0.3–6.2)
ERYTHROCYTE [DISTWIDTH] IN BLOOD BY AUTOMATED COUNT: 14.4 % (ref 12.3–15.4)
GLUCOSE BLDC GLUCOMTR-MCNC: 122 MG/DL (ref 70–130)
GLUCOSE BLDC GLUCOMTR-MCNC: 135 MG/DL (ref 70–130)
GLUCOSE BLDC GLUCOMTR-MCNC: 148 MG/DL (ref 70–130)
GLUCOSE BLDC GLUCOMTR-MCNC: 201 MG/DL (ref 70–130)
GLUCOSE SERPL-MCNC: 148 MG/DL (ref 65–99)
HCT VFR BLD AUTO: 32.3 % (ref 34–46.6)
HGB BLD-MCNC: 10.3 G/DL (ref 12–15.9)
IMM GRANULOCYTES # BLD AUTO: 0.19 10*3/MM3 (ref 0–0.05)
IMM GRANULOCYTES NFR BLD AUTO: 1.6 % (ref 0–0.5)
LYMPHOCYTES # BLD AUTO: 1.04 10*3/MM3 (ref 0.7–3.1)
LYMPHOCYTES NFR BLD AUTO: 8.7 % (ref 19.6–45.3)
MCH RBC QN AUTO: 31.8 PG (ref 26.6–33)
MCHC RBC AUTO-ENTMCNC: 31.9 G/DL (ref 31.5–35.7)
MCV RBC AUTO: 99.7 FL (ref 79–97)
MONOCYTES # BLD AUTO: 1.55 10*3/MM3 (ref 0.1–0.9)
MONOCYTES NFR BLD AUTO: 13 % (ref 5–12)
NEUTROPHILS NFR BLD AUTO: 75.6 % (ref 42.7–76)
NEUTROPHILS NFR BLD AUTO: 9.04 10*3/MM3 (ref 1.7–7)
NRBC BLD AUTO-RTO: 0 /100 WBC (ref 0–0.2)
PLATELET # BLD AUTO: 250 10*3/MM3 (ref 140–450)
PMV BLD AUTO: 10.5 FL (ref 6–12)
POTASSIUM SERPL-SCNC: 3.7 MMOL/L (ref 3.5–5.2)
QT INTERVAL: 346 MS
QTC INTERVAL: 509 MS
RBC # BLD AUTO: 3.24 10*6/MM3 (ref 3.77–5.28)
SODIUM SERPL-SCNC: 126 MMOL/L (ref 136–145)
WBC NRBC COR # BLD AUTO: 11.95 10*3/MM3 (ref 3.4–10.8)

## 2025-01-26 PROCEDURE — 97605 NEG PRS WND THER DME<=50SQCM: CPT

## 2025-01-26 PROCEDURE — 80048 BASIC METABOLIC PNL TOTAL CA: CPT | Performed by: INTERNAL MEDICINE

## 2025-01-26 PROCEDURE — 99232 SBSQ HOSP IP/OBS MODERATE 35: CPT | Performed by: INTERNAL MEDICINE

## 2025-01-26 PROCEDURE — 25010000002 HYDROMORPHONE PER 4 MG: Performed by: INTERNAL MEDICINE

## 2025-01-26 PROCEDURE — 82948 REAGENT STRIP/BLOOD GLUCOSE: CPT

## 2025-01-26 PROCEDURE — 85025 COMPLETE CBC W/AUTO DIFF WBC: CPT | Performed by: INTERNAL MEDICINE

## 2025-01-26 PROCEDURE — 63710000001 INSULIN LISPRO (HUMAN) PER 5 UNITS: Performed by: ORTHOPAEDIC SURGERY

## 2025-01-26 PROCEDURE — 25010000002 CEFEPIME PER 500 MG: Performed by: ORTHOPAEDIC SURGERY

## 2025-01-26 PROCEDURE — 25010000002 METRONIDAZOLE 500 MG/100ML SOLUTION: Performed by: ORTHOPAEDIC SURGERY

## 2025-01-26 RX ADMIN — ATORVASTATIN CALCIUM 10 MG: 10 TABLET, FILM COATED ORAL at 08:17

## 2025-01-26 RX ADMIN — AMIODARONE HYDROCHLORIDE 200 MG: 200 TABLET ORAL at 08:17

## 2025-01-26 RX ADMIN — AMIODARONE HYDROCHLORIDE 200 MG: 200 TABLET ORAL at 15:07

## 2025-01-26 RX ADMIN — AMIODARONE HYDROCHLORIDE 200 MG: 200 TABLET ORAL at 20:50

## 2025-01-26 RX ADMIN — HYDROMORPHONE HYDROCHLORIDE 0.25 MG: 1 INJECTION, SOLUTION INTRAMUSCULAR; INTRAVENOUS; SUBCUTANEOUS at 00:01

## 2025-01-26 RX ADMIN — BISACODYL 5 MG: 5 TABLET, COATED ORAL at 15:07

## 2025-01-26 RX ADMIN — ACETAMINOPHEN 650 MG: 325 TABLET ORAL at 06:05

## 2025-01-26 RX ADMIN — METOPROLOL TARTRATE 25 MG: 25 TABLET, FILM COATED ORAL at 08:17

## 2025-01-26 RX ADMIN — HYDROMORPHONE HYDROCHLORIDE 0.25 MG: 1 INJECTION, SOLUTION INTRAMUSCULAR; INTRAVENOUS; SUBCUTANEOUS at 04:20

## 2025-01-26 RX ADMIN — HYDROMORPHONE HYDROCHLORIDE 0.25 MG: 1 INJECTION, SOLUTION INTRAMUSCULAR; INTRAVENOUS; SUBCUTANEOUS at 18:09

## 2025-01-26 RX ADMIN — INSULIN LISPRO 4 UNITS: 100 INJECTION, SOLUTION INTRAVENOUS; SUBCUTANEOUS at 18:04

## 2025-01-26 RX ADMIN — FAMOTIDINE 20 MG: 20 TABLET, FILM COATED ORAL at 08:18

## 2025-01-26 RX ADMIN — CEFEPIME HYDROCHLORIDE 1000 MG: 1 INJECTION, POWDER, FOR SOLUTION INTRAMUSCULAR; INTRAVENOUS at 13:00

## 2025-01-26 RX ADMIN — ALLOPURINOL 200 MG: 100 TABLET ORAL at 08:17

## 2025-01-26 RX ADMIN — METOPROLOL TARTRATE 25 MG: 25 TABLET, FILM COATED ORAL at 20:50

## 2025-01-26 RX ADMIN — NIFEDIPINE 30 MG: 30 TABLET, FILM COATED, EXTENDED RELEASE ORAL at 08:17

## 2025-01-26 RX ADMIN — OXYCODONE 5 MG: 5 TABLET ORAL at 20:50

## 2025-01-26 RX ADMIN — METRONIDAZOLE 500 MG: 500 INJECTION, SOLUTION INTRAVENOUS at 00:01

## 2025-01-26 RX ADMIN — OXYCODONE 5 MG: 5 TABLET ORAL at 15:07

## 2025-01-26 RX ADMIN — SENNOSIDES AND DOCUSATE SODIUM 2 TABLET: 50; 8.6 TABLET ORAL at 12:03

## 2025-01-26 RX ADMIN — Medication 10 ML: at 08:20

## 2025-01-26 RX ADMIN — POLYETHYLENE GLYCOL 3350 17 G: 17 POWDER, FOR SOLUTION ORAL at 12:03

## 2025-01-26 RX ADMIN — PANTOPRAZOLE SODIUM 40 MG: 40 TABLET, DELAYED RELEASE ORAL at 06:05

## 2025-01-26 RX ADMIN — HYDROMORPHONE HYDROCHLORIDE 0.25 MG: 1 INJECTION, SOLUTION INTRAMUSCULAR; INTRAVENOUS; SUBCUTANEOUS at 06:36

## 2025-01-26 NOTE — PLAN OF CARE
"  Problem: Adult Inpatient Plan of Care  Goal: Plan of Care Review  Outcome: Not Progressing  Flowsheets (Taken 1/25/2025 2023)  Outcome Evaluation: VSS, 2L NC. Alert most of shift when not sleeping. Oriented to self, family, and place w  confusion to time and situation. Flagged as \"Simple Sepsis\" due to confusion and abd pain. MD made aware. Pt reported more severe pain of her back toward end of shift. Hospitalist adjusted pain medication frequency. Poor tolerance to repositoning r/t pain. Family at bedside. L foot dressing and wound vac in place. Poor oral intake. No urinary output noted (HD pt). Skin precautions in place. Turned q 2 hr. Heel elevated.  Plan of Care Reviewed With: patient  Goal: Patient-Specific Goal (Individualized)  Outcome: Not Progressing  Goal: Absence of Hospital-Acquired Illness or Injury  Outcome: Not Progressing  Intervention: Identify and Manage Fall Risk  Recent Flowsheet Documentation  Taken 1/25/2025 1753 by Natividad Bah RN  Safety Promotion/Fall Prevention:   activity supervised   fall prevention program maintained   nonskid shoes/slippers when out of bed   safety round/check completed  Taken 1/25/2025 1600 by Natividad Bah RN  Safety Promotion/Fall Prevention:   activity supervised   fall prevention program maintained   nonskid shoes/slippers when out of bed   safety round/check completed  Taken 1/25/2025 1400 by Natividad Bah RN  Safety Promotion/Fall Prevention:   activity supervised   fall prevention program maintained   nonskid shoes/slippers when out of bed   safety round/check completed  Taken 1/25/2025 1200 by Natividad Bah RN  Safety Promotion/Fall Prevention:   activity supervised   fall prevention program maintained   nonskid shoes/slippers when out of bed   safety round/check completed  Taken 1/25/2025 1000 by Natividad Bah RN  Safety Promotion/Fall Prevention:   activity supervised   fall prevention program maintained   nonskid shoes/slippers when out of bed   " safety round/check completed  Taken 1/25/2025 0800 by Natividad Bah RN  Safety Promotion/Fall Prevention:   activity supervised   fall prevention program maintained   nonskid shoes/slippers when out of bed   safety round/check completed  Intervention: Prevent Skin Injury  Recent Flowsheet Documentation  Taken 1/25/2025 1753 by Natividad Bah RN  Body Position:   turned   right  Skin Protection:   incontinence pads utilized   silicone foam dressing in place  Taken 1/25/2025 1600 by Natividad Bah RN  Body Position:   turned   left  Skin Protection:   incontinence pads utilized   silicone foam dressing in place  Taken 1/25/2025 1400 by Natividad Bah RN  Skin Protection:   incontinence pads utilized   silicone foam dressing in place  Taken 1/25/2025 1200 by Natividad Bah RN  Body Position:   turned   left  Skin Protection:   incontinence pads utilized   silicone foam dressing in place  Taken 1/25/2025 1000 by Natividad Bah RN  Skin Protection:   incontinence pads utilized   silicone foam dressing in place  Taken 1/25/2025 0800 by Natividad Bah RN  Skin Protection: (silicone dressings removed, skin assessed, new dressings applied)   incontinence pads utilized   silicone foam dressing in place  Intervention: Prevent and Manage VTE (Venous Thromboembolism) Risk  Recent Flowsheet Documentation  Taken 1/25/2025 1753 by Natividad Bah RN  VTE Prevention/Management:   bilateral   SCDs (sequential compression devices) on  Taken 1/25/2025 1600 by Natividad Bah RN  VTE Prevention/Management:   bilateral   SCDs (sequential compression devices) on  Taken 1/25/2025 1400 by Natividad Bah RN  VTE Prevention/Management:   bilateral   SCDs (sequential compression devices) on  Taken 1/25/2025 0800 by Natividad Bah RN  VTE Prevention/Management: (Eliquis on hold) other (see comments)  Goal: Optimal Comfort and Wellbeing  Outcome: Not Progressing  Intervention: Monitor Pain and Promote Comfort  Recent Flowsheet  "Documentation  Taken 1/25/2025 1850 by Natividad Bah RN  Pain Management Interventions: pain management plan reviewed with patient/caregiver  Taken 1/25/2025 1753 by Natividad Bah RN  Pain Management Interventions: pain medication given  Taken 1/25/2025 1714 by Natividad Bah RN  Pain Management Interventions: pain medication given  Taken 1/25/2025 1415 by Natividad Bah RN  Pain Management Interventions: pain management plan reviewed with patient/caregiver  Taken 1/25/2025 1345 by Natividad Bah RN  Pain Management Interventions: pain medication given  Taken 1/25/2025 1200 by Natividad Bah RN  Pain Management Interventions: pain management plan reviewed with patient/caregiver  Taken 1/25/2025 1132 by Natividad Bah RN  Pain Management Interventions: pain medication given  Taken 1/25/2025 0800 by Natividad Bah RN  Pain Management Interventions: pain management plan reviewed with patient/caregiver  Intervention: Provide Person-Centered Care  Recent Flowsheet Documentation  Taken 1/25/2025 0800 by Natividad Bah RN  Trust Relationship/Rapport:   care explained   thoughts/feelings acknowledged  Goal: Readiness for Transition of Care  Outcome: Not Progressing   Goal Outcome Evaluation:  Plan of Care Reviewed With: patient           Outcome Evaluation: VSS, 2L NC. Alert most of shift when not sleeping. Oriented to self, family, and place w  confusion to time and situation. Flagged as \"Simple Sepsis\" due to confusion and abd pain. MD made aware. Pt reported more severe pain of her back toward end of shift. Hospitalist adjusted pain medication frequency. Poor tolerance to repositoning r/t pain. Family at bedside. L foot dressing and wound vac in place. Poor oral intake. No urinary output noted (HD pt). Skin precautions in place. Turned q 2 hr. Heel elevated.                             "

## 2025-01-26 NOTE — PLAN OF CARE
Goal Outcome Evaluation:  Plan of Care Reviewed With: patient, spouse        Progress: no change  Outcome Evaluation: Wound vac intact with no complaints or changes. Anticipate change tomorrow. Per conversation with patient, anticipate need for pre-medicating for pain.    Anticipated Discharge Disposition (PT): other (see comments) (will defer to PT mobility when appropriate. Will require skilled wound care upon discharge.)

## 2025-01-26 NOTE — PLAN OF CARE
Goal Outcome Evaluation:    Neuro intact. Turning Q2. Anuric this shift. Plan for HD tomorrow. No BM in 8 days, PRNs given. Remains afib, rate controlled. Sats >90% on 1L NC. Plan for wound vac change tomorrow.

## 2025-01-26 NOTE — PROGRESS NOTES
"   LOS: 7 days    Patient Care Team:  Toribio Roberts MD as PCP - General (Internal Medicine)    Subjective     Seen and examined at bedside.   present.   Patient reports feeling much better today.     Objective     Vital Signs:  Blood pressure 134/73, pulse 75, temperature 98.2 °F (36.8 °C), temperature source Oral, resp. rate 18, height 170.2 cm (67.01\"), weight 108 kg (238 lb 1.6 oz), SpO2 95%.      Intake/Output Summary (Last 24 hours) at 1/26/2025 1817  Last data filed at 1/26/2025 1700  Gross per 24 hour   Intake 720 ml   Output 50 ml   Net 670 ml        01/25 0701 - 01/26 0700  In: -   Out: 50     Physical Exam:  GENERAL: WD WF, appears comfortable  NEURO:  No focal deficit  PSYCHIATRIC: Cooperative with PE  EYE: PE, no icterus, no conjunctivitis  ENT: omm dry, dentition intact,  Hearing intact  NECK:  No JVD discernable,  Trachea midline  CV: No edema, RRR  LUNGS:  Quiet,  Nonlabored resp.  Symmetrical expansion  ABDOMEN: Nondistended, soft nontender.  : No Cor[place], no palp bladder  SKIN: Warm and dry without rash  + RIJ TDC    Labs:  Results from last 7 days   Lab Units 01/25/25  0945 01/24/25  0728 01/23/25  0816   WBC 10*3/mm3 10.23 14.99* 10.56   HEMOGLOBIN g/dL 10.4* 10.6* 10.7*   PLATELETS 10*3/mm3 245 203 138*     Results from last 7 days   Lab Units 01/25/25  0945 01/24/25 2023 01/24/25  0728 01/23/25  0816 01/22/25  0652 01/21/25  0432 01/20/25  0624   SODIUM mmol/L 137  --  133* 133* 133* 134* 131*   POTASSIUM mmol/L 4.1 4.0 3.5 3.8 4.2 4.7 4.2   CHLORIDE mmol/L 97*  --  95* 97* 96* 94* 94*   CO2 mmol/L 26.0  --  25.0 23.0 22.0 24.0 19.0*   BUN mg/dL 33*  --  50* 40* 67* 52* 79*   CREATININE mg/dL 3.22*  --  4.47* 3.66* 5.49* 4.68* 6.51*   CALCIUM mg/dL 9.1  --  9.6 9.1 9.3 10.8* 10.3   PHOSPHORUS mg/dL  --   --   --   --  5.5*  --   --    ALBUMIN g/dL  --   --   --   --  2.4* 3.3* 3.0*     Results from last 7 days   Lab Units 01/22/25  0652   ALK PHOS U/L 79   BILIRUBIN mg/dL 0.2 "   ALT (SGPT) U/L 32   AST (SGOT) U/L 24           A/P:    ESRD: On HD MWF at [facility] with NAL.  No need for hemodialysis today.   -Hemodialysis tomorrow as per schedule     HTN: Blood pressure stable    Hyponatremia:  Due to underlying renal failure.  Adjust with HD.     Hyperkalemia: Resolved.  Adjust with HD     Metabolic acidosis:  Adjust with HD     Anemia: Hemoglobin above goal.  No CORNEL unless hemoglobin <10.5.    Hyperphosphatemia: Last phos level 5.5     Nutrition: Low potassium low Phos high-protein diet.  Renal vitamin.     Fever: On antibiotics.  Urine culture with mixed hanny.  Patient with foot wound.  Primary team continues to evaluate for source.     High risk and complexity patient.    Ruel Giraldo MD  01/26/25  18:17 EST

## 2025-01-26 NOTE — PLAN OF CARE
Goal Outcome Evaluation:  Plan of Care Reviewed With: patient        Progress: no change  Outcome Evaluation: VSS. Afib on the monitor. 2-4LNC. A&Ox2. Pt disoriented to time and situation. Fall and skin precautions in place. Q2hr turns. Wound vac and dressing in place on LLE. IV flagyl administered per orders. PRNs administered per pt request for pain control. Plan of care discussed with patient and spouse at bedside who expresses understanding. Pt resting comfortably at this time with no further complaints. Call light within reach.    Pt flipped rhythms during the shift sustaining 130s-140s on the monitor. EKG obtained showing Afib RVR. Cards notified. Amiodarone gtt stopped and switched to esmolol gtt per TRISTEN Freeman. Esmolol gtt stopped at 0215. HR and BP WNL.

## 2025-01-26 NOTE — PROGRESS NOTES
"  [facility] at [facility]   Inpatient Progress Note       LOS: 7 days   Patient Care Team:  Toribio Roberts MD as PCP - General (Internal Medicine)    Chief Complaint: Atrial fibrillation    Subjective     Interval History:   Patient relaxing comfortably.  Had elevated heart rate in A-fib necessitating initiation of esmolol drip.  Returned to normal within a few hours.  In retrospect patient's  notes during the time of excitement with the patient with lots of family visiting.  Patient was asymptomatic during this.      Review of Systems:   Pertinent positives noted in history, exam, and assessment. Otherwise reviewed and negative.      Objective     Vitals:  Blood pressure 129/65, pulse 77, temperature 98.2 °F (36.8 °C), temperature source Oral, resp. rate 18, height 170.2 cm (67.01\"), weight 108 kg (238 lb 1.6 oz), SpO2 93%.     Intake/Output Summary (Last 24 hours) at 1/26/2025 1029  Last data filed at 1/26/2025 0957  Gross per 24 hour   Intake 240 ml   Output 100 ml   Net 140 ml     Vitals reviewed.   Constitutional:       Appearance: Well-developed and not in distress.   Neck:      Thyroid: No thyromegaly.      Vascular: No carotid bruit or JVD.   Pulmonary:      Breath sounds: Normal breath sounds.   Cardiovascular:      Irregularly irregular rhythm.      No gallop. No S3 and S4 gallop.   Pulses:     Intact distal pulses.      Carotid: 2+ bilaterally.     Radial: 2+ bilaterally.  Edema:     Peripheral edema present.     Ankle: bilateral 1+ edema of the ankle.  Abdominal:      General: Bowel sounds are normal.      Palpations: Abdomen is soft. There is no abdominal mass.      Tenderness: There is no abdominal tenderness.   Musculoskeletal:         General: No deformity.      Extremities: No clubbing present.     Comments: Left heel bandaged Skin:     General: Skin is warm and dry.      Findings: No rash.   Neurological:      Mental Status: Alert and oriented to person, place, and " time.            Results Review:     I reviewed the patient's new clinical results.    Results from last 7 days   Lab Units 01/25/25  0945   WBC 10*3/mm3 10.23   HEMOGLOBIN g/dL 10.4*   HEMATOCRIT % 32.9*   PLATELETS 10*3/mm3 245     Results from last 7 days   Lab Units 01/25/25  0945 01/23/25  0816 01/22/25  0652   SODIUM mmol/L 137   < > 133*   POTASSIUM mmol/L 4.1   < > 4.2   CHLORIDE mmol/L 97*   < > 96*   CO2 mmol/L 26.0   < > 22.0   BUN mg/dL 33*   < > 67*   CREATININE mg/dL 3.22*   < > 5.49*   CALCIUM mg/dL 9.1   < > 9.3   BILIRUBIN mg/dL  --   --  0.2   ALK PHOS U/L  --   --  79   ALT (SGPT) U/L  --   --  32   AST (SGOT) U/L  --   --  24   GLUCOSE mg/dL 197*   < > 149*    < > = values in this interval not displayed.     Results from last 7 days   Lab Units 01/25/25  0945   SODIUM mmol/L 137   POTASSIUM mmol/L 4.1   CHLORIDE mmol/L 97*   CO2 mmol/L 26.0   BUN mg/dL 33*   CREATININE mg/dL 3.22*   GLUCOSE mg/dL 197*   CALCIUM mg/dL 9.1         Lab Results   Lab Value Date/Time    TROPONINT 101 (C) 01/23/2025 0816    TROPONINT 106 (C) 01/23/2025 0159    TROPONINT 132 (C) 01/19/2025 0403    TROPONINT 128 (C) 01/18/2025 2259    TROPONINT 134 (C) 01/18/2025 2149    TROPONINT 89 (C) 01/16/2025 2011    TROPONINT 87 (C) 01/16/2025 1645                     Results from last 7 days   Lab Units 01/23/25  0816 01/23/25  0159   HSTROP T ng/L 101* 106*         Tele: A-fib with controlled rates    Assessment:      Generalized weakness    ESRD (end stage renal disease)    Type 2 diabetes mellitus with stage 5 chronic kidney disease not on chronic dialysis, without long-term current use of insulin    Chronic osteomyelitis of left foot    Paroxysmal A-fib    Sepsis due to methicillin resistant Staphylococcus aureus (MRSA) with encephalopathy without septic shock      Paroxysmal A-fib with rapid ventricular response  Patient was in sinus rhythm on 1/18  Remains in atrial fibrillation but rates are controlled  We will continue IV  amiodarone until she is postoperative.  Likely can discontinue IV amiodarone tomorrow   continue oral amiodarone loading  Eliquis on hold   Hypertension  Blood pressure fairly well-controlled.  Would not treat aggressively with upcoming surgery  ESRD on dialysis  Status post dialysis today  Elevated troponin in setting of ESRD  Heart catheterization earlier this month at [facility] with nonobstructive CAD  Sepsis and heel osteomyelitis  Status postdebridement/24  ID following and managing antibiotics  ID recommends follow-up echocardiogram.  LVEF was normal.  No endocarditis seen.  Blood cultures no growth so far    Plan:  Atrial fibrillation is chronic in nature, rates are controlled.  Continue current medical therapy.  Transition to recent rate last evening, now back to normal.  Would not change medical therapy at this time.  If surgical debridement needed, no reason to delay.  Resume anticoagulants postoperatively      Kevin Headley MD    Dictated utilizing Dragon dictation

## 2025-01-26 NOTE — THERAPY WOUND CARE TREATMENT
Acute Care - Wound/Debridement Treatment Note   [place]     Patient Name: Sara Esparza  : 1950  MRN: [id number]  Today's Date: 2025                Admit Date: 2025    Visit Dx:    ICD-10-CM ICD-9-CM   1. Generalized weakness  R53.1 780.79   2. Frequent falls  R29.6 V15.88   3. Chronic kidney disease, unspecified CKD stage  N18.9 585.9   4. Chronic anemia  D64.9 285.9   5. Type 2 diabetes mellitus with stage 5 chronic kidney disease not on chronic dialysis, without long-term current use of insulin  E11.22 250.40    N18.5 585.5   6. Chronic osteomyelitis of left foot  M86.672 730.17       Patient Active Problem List   Diagnosis    Postoperative abdominal hernia    ESRD (end stage renal disease)    Type 2 diabetes mellitus with stage 5 chronic kidney disease not on chronic dialysis, without long-term current use of insulin    Hypokalemia    Malnutrition    Severe malnutrition    Generalized weakness    Chronic osteomyelitis of left foot    Paroxysmal A-fib    Sepsis due to methicillin resistant Staphylococcus aureus (MRSA) with encephalopathy without septic shock        Past Medical History:   Diagnosis Date    Anxiety     Arthritis     Asthma     allergy induced    Back pain     Depression     Diabetes mellitus     dx 10 years ago- checks fsbs most days    Diverticula of colon     GERD (gastroesophageal reflux disease)     Head ache     Hypertension     Sleep apnea     no longer needs cpap - approx. 10 years r/t weight loss        Past Surgical History:   Procedure Laterality Date    BACK SURGERY      x 2    CARDIAC CATHETERIZATION      no intervention    CHOLECYSTECTOMY OPEN      COLONOSCOPY          HYSTERECTOMY      REPLACEMENT TOTAL KNEE BILATERAL Bilateral     TONSILLECTOMY      VENTRAL/INCISIONAL HERNIA REPAIR N/A 2017    Procedure: INCISIONAL HERNIA REPAIR LAPAROSCOPIC;  Surgeon: Conrad Hernandez MD;  Location: [facility];  Service:            Wound 25 Left posterior  plantar pressure injury (Active)   Pressure Injury Stage U 01/25/25 2000   Dressing Appearance dry;intact;no drainage 01/26/25 0800   Closure Unable to assess 01/26/25 0800   Base unable to visualize 01/26/25 0800   Periwound dry;intact 01/26/25 0800   Periwound Temperature warm 01/26/25 0800   Periwound Skin Turgor firm 01/26/25 0800   Drainage Amount scant 01/25/25 2000   Care, Wound negative pressure wound therapy 01/26/25 0800   Dressing Care gauze;elastic bandage 01/26/25 0800       Wound 01/19/25 2000 Left lower leg (Active)   Dressing Appearance open to air;no drainage;dry;intact 01/25/25 2000   Closure Open to air 01/25/25 2000   Base maroon/purple;blanchable;pink 01/25/25 2000   Drainage Amount none 01/25/25 2000       NPWT (Negative Pressure Wound Therapy) 01/24/25 1743 Left heel (Active)   Therapy Setting continuous therapy 01/26/25 0957   Dressing unable to visualize 01/25/25 2000   Pressure Setting 125 mmHg 01/26/25 0957         WOUND DEBRIDEMENT                     PT Assessment (Last 12 Hours)       PT Evaluation and Treatment       Row Name 01/26/25 0957          Physical Therapy Time and Intention    Subjective Information no complaints  -     Document Type wound care;therapy note (daily note)  -     Mode of Treatment physical therapy;individual therapy  -       Row Name 01/26/25 0957          General Information    Patient Observations alert;cooperative;agree to therapy  -       Row Name 01/26/25 0957          Pain    Pretreatment Pain Rating 0/10 - no pain  -     Posttreatment Pain Rating 0/10 - no pain  -       Row Name 01/26/25 0957          Cognition    Orientation Status (Cognition) oriented x 4  -       Row Name             Wound 01/19/25 2000 Left posterior plantar pressure injury    Wound - Properties Group Placement Date: 01/19/25  -AM Placement Time: 2000 -AM Side: Left  -AM Orientation: posterior  -AM Location: plantar  -AM Primary Wound Type: Pressure inj  -AM Type:  pressure injury  -AM Present on Original Admission: Y  -AM Additional Comments: Left heel  -AM    Retired Wound - Properties Group Placement Date: 01/19/25 -AM Placement Time: 2000 -AM Present on Original Admission: Y  -AM Side: Left  -AM Orientation: posterior  -AM Location: plantar  -AM Primary Wound Type: Pressure inj  -AM Additional Comments: Left heel  -AM Type: pressure injury  -AM    Retired Wound - Properties Group Placement Date: 01/19/25 -AM Placement Time: 2000 -AM Present on Original Admission: Y  -AM Side: Left  -AM Orientation: posterior  -AM Location: plantar  -AM Primary Wound Type: Pressure inj  -AM Additional Comments: Left heel  -AM Type: pressure injury  -AM    Retired Wound - Properties Group Date first assessed: 01/19/25 -AM Time first assessed: 2000 -AM Present on Original Admission: Y  -AM Side: Left  -AM Location: plantar  -AM Primary Wound Type: Pressure inj  -AM Additional Comments: Left heel  -AM Type: pressure injury  -AM      Row Name             Wound 01/19/25 2000 Left lower leg    Wound - Properties Group Placement Date: 01/19/25 -AM Placement Time: 2000 -AM Side: Left  -AM Orientation: lower  -AM Location: leg  -AM Primary Wound Type: Abrasion  -AM Present on Original Admission: Y  -AM    Retired Wound - Properties Group Placement Date: 01/19/25 -AM Placement Time: 2000 -AM Present on Original Admission: Y  -AM Side: Left  -AM Orientation: lower  -AM Location: leg  -AM Primary Wound Type: Abrasion  -AM    Retired Wound - Properties Group Placement Date: 01/19/25 -AM Placement Time: 2000 -AM Present on Original Admission: Y  -AM Side: Left  -AM Orientation: lower  -AM Location: leg  -AM Primary Wound Type: Abrasion  -AM    Retired Wound - Properties Group Date first assessed: 01/19/25  -AM Time first assessed: 2000 -AM Present on Original Admission: Y  -AM Side: Left  -AM Location: leg  -AM Primary Wound Type: Abrasion  -AM      Row Name 01/26/25 0957          [REMOVED]  Wound 01/24/25 Left posterior heel    Wound - Properties Group Placement Date: 01/24/25  -AS Side: Left  -AS Orientation: posterior  -AS Location: heel  -AS Primary Wound Type: Incision  -AS Removal Date: 01/26/25  -KT Removal Time: 0700  -KT    Dressing Appearance dry;intact  -MC     Base unable to visualize  -     Wound Output (mL) 50  -MC     Retired Wound - Properties Group Placement Date: 01/24/25  -AS Side: Left  -AS Orientation: posterior  -AS Location: heel  -AS Primary Wound Type: Incision  -AS Removal Date: 01/26/25  -KT Removal Time: 0700  -KT    Retired Wound - Properties Group Placement Date: 01/24/25  -AS Side: Left  -AS Orientation: posterior  -AS Location: heel  -AS Primary Wound Type: Incision  -AS Removal Date: 01/26/25  -KT Removal Time: 0700  -KT    Retired Wound - Properties Group Date first assessed: 01/24/25  -AS Side: Left  -AS Location: heel  -AS Primary Wound Type: Incision  -AS Resolution Date: 01/26/25  -KT Resolution Time: 0700  -KT      Row Name 01/26/25 0957          NPWT (Negative Pressure Wound Therapy) 01/24/25 1743 Left heel    NPWT (Negative Pressure Wound Therapy) - Properties Group Placement Date: 01/24/25  -ML Placement Time: 1743  -ML Location: Left heel  -ML Additional Comments: Placed in OR, present on arrival to PACU  -ML    Therapy Setting continuous therapy  -     Pressure Setting 125 mmHg  -     Retired NPWT (Negative Pressure Wound Therapy) - Properties Group Placement Date: 01/24/25  -ML Placement Time: 1743  -ML Location: Left heel  -ML Additional Comments: Placed in OR, present on arrival to PACU  -ML    Retired NPWT (Negative Pressure Wound Therapy) - Properties Group Placement Date: 01/24/25  -ML Placement Time: 1743  -ML Location: Left heel  -ML Additional Comments: Placed in OR, present on arrival to PACU  -ML    Retired NPWT (Negative Pressure Wound Therapy) - Properties Group Placement Date: 01/24/25  -ML Placement Time: 1743  -ML Location: Left heel  -ML  Additional Comments: Placed in OR, present on arrival to PACU  -      Row Name 01/26/25 0957          Coping    Observed Emotional State calm;cooperative  -     Verbalized Emotional State acceptance  -       Row Name 01/26/25 0957          Plan of Care Review    Plan of Care Reviewed With patient;spouse  -     Progress no change  -     Outcome Evaluation Wound vac intact with no complaints or changes. Anticipate change tomorrow. Per conversation with patient, anticipate need for pre-medicating for pain.  -       Row Name 01/26/25 0957          Positioning and Restraints    Pre-Treatment Position in bed  -     Post Treatment Position bed  -     In Bed supine;call light within reach;encouraged to call for assist;with family/caregiver  -               User Key  (r) = Recorded By, (t) = Taken By, (c) = Cosigned By      Initials Name Provider Type    Colleen Woodard, PT Physical Therapist    AM Katya Cancino, RN Registered Nurse    Leonor Orantes RN Registered Nurse    AS Katya Gardner, RN Registered Nurse    Jane Schafer RN Registered Nurse                  Physical Therapy Education        No education to display                    Recommendation and Plan  Anticipated Discharge Disposition (PT): other (see comments) (will defer to PT mobility when appropriate. Will require skilled wound care upon discharge.)  Planned Therapy Interventions (PT): wound care, patient/family education  Plan of Care Reviewed With: patient, spouse   Progress: no change       Progress: no change  Outcome Evaluation: Wound vac intact with no complaints or changes. Anticipate change tomorrow. Per conversation with patient, anticipate need for pre-medicating for pain.  Plan of Care Reviewed With: patient, spouse            Time Calculation   PT Charges       Row Name 01/26/25 1000             Time Calculation    Start Time 0908  -      PT Goal Re-Cert Due Date 02/04/25  -         Untimed Charges     41309-Xck Pressure wound to 50 sqcm 10  -MC         Total Minutes    Untimed Charges Total Minutes 10  -MC       Total Minutes 10  -MC                User Key  (r) = Recorded By, (t) = Taken By, (c) = Cosigned By      Initials Name Provider Type    Colleen Woodard, PT Physical Therapist                      Therapy Charges for Today       Code Description Service Date Service Provider Modifiers Qty    34735684714 HC PT EVAL LOW COMPLEXITY 2 1/25/2025 Colleen Phillips, PT GP 1    25056740799 HC PT NEG PRESS WOUND TO 50SQCM DME1 1/25/2025 Colleen Phillips, PT  1              PT G-Codes  AM-PAC 6 Clicks Score (PT): 6       Colleen Phillips, PT  1/26/2025

## 2025-01-26 NOTE — PROGRESS NOTES
[facility] Medicine Services  PROGRESS NOTE    Patient Name: Sara Esparza  : 1950  MRN: [id number]    Date of Admission: 2025  Primary Care Physician: Toribio Roberts MD    Subjective   Subjective     CC: weakness, progressive    HPI:  Lethargic this am.   Objective   Objective     Vital Signs:   Temp:  [98.1 °F (36.7 °C)-99 °F (37.2 °C)] 98.1 °F (36.7 °C)  Heart Rate:  [] 69  Resp:  [18] 18  BP: (117-154)/(58-97) 142/77  Flow (L/min) (Oxygen Therapy):  [1-4] 1     Physical Exam:  Gen:  awake, alert  Neuro: oriented, clear speech  HEENT:  NC/AT   Neck:  trach midline   Heart: RRR, no M/R/G  TDC:  no redness or tenderness   Lungs CTA  Abd:  Soft, nontender, no rebound or guarding, pos BS  Extrem:  No c/c/e.  Left foot dressing and wound vac in place.   R foot without lesions.       Results Reviewed:  LAB RESULTS:      Lab 25  0945 25  0728 25  0816 25  0159 25  0652 25  1327 25  0446 25  0432   WBC 10.23 14.99* 10.56  --  10.19 12.78*  --   --    HEMOGLOBIN 10.4* 10.6* 10.7*  --  10.1* 9.3*  --   --    HEMATOCRIT 32.9* 32.7* 33.7*  --  32.3* 29.1*  --   --    PLATELETS 245 203 138*  --  140 151  --   --    NEUTROS ABS 8.09* 11.91* 8.35*  --  7.77* 10.18*  --   --    IMMATURE GRANS (ABS) 0.12* 0.18* 0.07*  --  0.07* 0.08*  --   --    LYMPHS ABS 0.61* 0.74 0.49*  --  0.53* 0.62*  --   --    MONOS ABS 1.35* 2.12* 1.62*  --  1.77* 1.89*  --   --    EOS ABS 0.04 0.02 0.02  --  0.03 0.00  --   --    MCV 99.1* 97.3* 100.3*  --  100.6* 100.0*  --   --    PROCALCITONIN  --   --   --   --   --   --   --  5.99*   LACTATE  --   --   --   --   --  1.3 4.1*  --    HSTROP T  --   --  101* 106*  --   --   --   --          Lab 25  0945 25  0728 25  0816 25  0652 25  0432   SODIUM 137  --  133* 133* 133* 134*   POTASSIUM 4.1 4.0 3.5 3.8 4.2 4.7   CHLORIDE 97*  --  95* 97* 96* 94*   CO2 26.0   --  25.0 23.0 22.0 24.0   ANION GAP 14.0  --  13.0 13.0 15.0 16.0*   BUN 33*  --  50* 40* 67* 52*   CREATININE 3.22*  --  4.47* 3.66* 5.49* 4.68*   EGFR 14.5*  --  9.8* 12.4* 7.6* 9.2*   GLUCOSE 197*  --  167* 207* 149* 187*   CALCIUM 9.1  --  9.6 9.1 9.3 10.8*   PHOSPHORUS  --   --   --   --  5.5*  --          Lab 01/22/25  0652 01/21/25  0432 01/20/25  0624   TOTAL PROTEIN 4.5* 6.6 6.0   ALBUMIN 2.4* 3.3* 3.0*   GLOBULIN 2.1 3.3 3.0   ALT (SGPT) 32 49* 29   AST (SGOT) 24 54* 35*   BILIRUBIN 0.2 0.3 0.3   ALK PHOS 79 114 98         Lab 01/23/25  0816 01/23/25  0159   HSTROP T 101* 106*                   Brief Urine Lab Results  (Last result in the past 365 days)        Color   Clarity   Blood   Leuk Est   Nitrite   Protein   CREAT   Urine HCG        01/19/25 1404 Yellow   Cloudy   Moderate (2+)   Moderate (2+)   Negative   >=300 mg/dL (3+)                   Microbiology Results Abnormal       Procedure Component Value - Date/Time    Body Fluid Culture - Surgical Site, Foot, Left [[id number]]  (Abnormal) Collected: 01/24/25 1754    Lab Status: Preliminary result Specimen: Surgical Site from Foot, Left Updated: 01/26/25 0905     Body Fluid Culture Light growth (2+) Staphylococcus aureus, MRSA     Comment:   Methicillin resistant Staphylococcus aureus, Patient may be an isolation risk.        Gram Stain Rare (1+) WBCs seen      No organisms seen    Narrative:      Not a body fluid.    Wound Culture - Wound, Foot, Left [[id number]]  (Abnormal)  (Susceptibility) Collected: 01/20/25 1409    Lab Status: Final result Specimen: Wound from Foot, Left Updated: 01/24/25 0658     Wound Culture Scant growth (1+) Staphylococcus aureus, MRSA     Comment: Methicillin resistant Staphylococcus aureus, Patient may be an isolation risk.         Rare growth Morganella morganii ssp morganii     Comment:            Scant growth (1+) Normal Skin Kimber     Gram Stain Few (2+) WBCs seen      Many (4+) Gram positive cocci in pairs      Rare  (1+) Gram negative bacilli      Rare (1+) Gram positive bacilli    Susceptibility        Staphylococcus aureus, MRSA      TAYLOR      Clindamycin Susceptible      Erythromycin Resistant      Oxacillin Resistant      Rifampin Susceptible      Tetracycline Susceptible      Trimethoprim + Sulfamethoxazole Susceptible      Vancomycin Susceptible                       Susceptibility        Morganella morganii ssp morganii      TAYLOR      Amoxicillin + Clavulanate Resistant      Ampicillin Resistant      Ampicillin + Sulbactam Resistant      Cefazolin (Non Urine) Resistant      Cefepime Susceptible  [1]       Cefotaxime Susceptible      Ceftazidime Susceptible  [1]       Gentamicin Susceptible      Levofloxacin Susceptible      Piperacillin + Tazobactam Susceptible      Tetracycline Susceptible      Trimethoprim + Sulfamethoxazole Susceptible                   [1]  Appended report. These results have been appended to a previously final verified report.                Susceptibility Comments       Morganella morganii ssp morganii    Cefotaxime susceptibility can be used as a surrogate for ceftriaxone susceptibility               MRSA Screen, PCR (Inpatient) - Swab, Nares [[id number]]  (Abnormal) Collected: 01/21/25 0614    Lab Status: Final result Specimen: Swab from Nares Updated: 01/21/25 0806     MRSA PCR Positive    Narrative:      The negative predictive value of this diagnostic test is high and should only be used to consider de-escalating anti-MRSA therapy. A positive result may indicate colonization with MRSA and must be correlated clinically.            No radiology results from the last 24 hrs    Results for orders placed during the hospital encounter of 01/19/25    Adult Transthoracic Echo Complete W/ Cont if Necessary Per Protocol    Interpretation Summary    Left ventricular systolic function is normal. Estimated left ventricular EF = 55%    Left ventricular wall thickness is consistent with moderate concentric  hypertrophy.    No hemodynamically significant valvular heart disease      Current medications:  Scheduled Meds:albumin human, , ,   allopurinol, 200 mg, Oral, Daily  amiodarone, 200 mg, Oral, TID  [Held by provider] apixaban, 2.5 mg, Oral, Q12H  atorvastatin, 10 mg, Oral, Daily  cefepime, 1,000 mg, Intravenous, Q24H  famotidine, 20 mg, Oral, Daily  insulin lispro, 2-9 Units, Subcutaneous, 4x Daily AC & at Bedtime  metoprolol tartrate, 25 mg, Oral, Q12H  NIFEdipine XL, 30 mg, Oral, Q24H  pantoprazole, 40 mg, Oral, Q AM  sodium chloride, 10 mL, Intravenous, Q12H  vancomycin (dosing per levels), , Not Applicable, Daily      Continuous Infusions:dilTIAZem, 5-15 mg/hr  esmolol,  mcg/kg/min, Last Rate: Stopped (01/26/25 0215)  Pharmacy to dose vancomycin,       PRN Meds:.  acetaminophen    albumin human    albumin human    senna-docusate sodium **AND** polyethylene glycol **AND** bisacodyl **AND** bisacodyl    Calcium Replacement - Follow Nurse / BPA Driven Protocol    dextrose    dextrose    glucagon (human recombinant)    heparin (porcine)    HYDROmorphone    Magnesium Standard Dose Replacement - Follow Nurse / BPA Driven Protocol    melatonin    metoprolol tartrate    ondansetron    oxyCODONE    Pharmacy to dose vancomycin    Potassium Replacement - Follow Nurse / BPA Driven Protocol    [COMPLETED] Insert Peripheral IV **AND** sodium chloride    sodium chloride    sodium chloride    traMADol    Assessment & Plan   Assessment & Plan     Active Hospital Problems    Diagnosis  POA    **Generalized weakness [R53.1]  Yes    Sepsis due to methicillin resistant Staphylococcus aureus (MRSA) with encephalopathy without septic shock [A41.02, R65.20, G93.41]  Unknown     Priority: High    Paroxysmal A-fib [I48.0]  Unknown     Priority: Medium    Chronic osteomyelitis of left foot [M86.672]  Unknown    Type 2 diabetes mellitus with stage 5 chronic kidney disease not on chronic dialysis, without long-term current use of  insulin [E11.22, N18.5]  Yes    ESRD (end stage renal disease) [N18.6]  Yes      Resolved Hospital Problems   No resolved problems to display.        Brief Hospital Course to date:  Sara Esparza is a 74 y.o. female w ESRD on HD, PAF on Eliquis, HTN and DM admitted for several weeks generalized weakness and myalgias and multiple falls in setting of lightheadedness- after admission she was found to be septic with temps up to 102.4F.     Sepsis, fever, leukocytosis, confusion  L calcaneal osteomyelitis    - RIJ tunneled dialysis cath - increases risk of bacteremia   - blood cx NGTD  - continue vanc/cefepime, now off Flagyl    - ortho consulted- s/p debridement and wound vac placement, 1/24/25. May ultimately require amputation, pt aware.   - MRSA + and + Morganella from wound site. Consulted ID, appreciate Dr. Barrios's assistance   -- leukocytosis improving, fevers have resolved       Generalized weakness progressive x weeks, falls at home   - suspicion of overdiuresis and orthostasis ongoing, w superimposed sepsis now   - Ct chest/abd without acute findings   - normal TSH and  CK   - PT OT  - considering SNF      Neck pain , C7 region   C5-6 canal stenosis  -  CT cspine - no fx  - history of oxycodone dependence; family requests avoiding opiates.  After discussion, trying ultram   -will consider C spine MRI if pain persists     ESRD on HD:  HD on MWF     DMII:  A1C 4.9   - on sitagliptin at home. SSI.  May need to decrease dose.      Hx of HTN:   -- nifedipine dose decreased due to tenuous BPs    H/o Afib  Episode of Afib with RVR during HD  -- given IV metoprolol 5 mg x 1 to see if this would help  -- resumed home dose beta blocker (Coreg), but still not rate controlled.  Cards consulted, started on Amiodarone and changed to Metoprolol BID. Rate now controlled.   -- on Eliquis at home, holding for possible intervention for osteo    Elevated troponin  -- in setting of ESRD  -- troponin peaked at 132 on 1/19.   --  per chart review, had LHC [facility] with non-obstructive CAD    Dyspepsia  -- GI cocktail PRN         Expected Discharge Location and Transportation:  snf   Expected Discharge   Expected Discharge Date: 1/29/2025; Expected Discharge Time:      VTE Prophylaxis:  Pharmacologic & mechanical VTE prophylaxis orders are present.         AM-PAC 6 Clicks Score (PT): 6 (01/26/25 0800)    CODE STATUS:   Code Status and Medical Interventions: CPR (Attempt to Resuscitate); Full Support   Ordered at: 01/19/25 0518     Level Of Support Discussed With:    Patient     Code Status (Patient has no pulse and is not breathing):    CPR (Attempt to Resuscitate)     Medical Interventions (Patient has pulse or is breathing):    Full Support       Heather Carlos MD  01/26/25

## 2025-01-27 ENCOUNTER — APPOINTMENT (OUTPATIENT)
Dept: NEPHROLOGY | Facility: HOSPITAL | Age: 75
DRG: 853 | End: 2025-01-27
Payer: MEDICARE

## 2025-01-27 PROBLEM — I70.222 CRITICAL LIMB ISCHEMIA OF LEFT LOWER EXTREMITY: Status: ACTIVE | Noted: 2025-01-19

## 2025-01-27 LAB
ANION GAP SERPL CALCULATED.3IONS-SCNC: 13 MMOL/L (ref 5–15)
BACTERIA FLD CULT: ABNORMAL
BASOPHILS # BLD AUTO: 0.03 10*3/MM3 (ref 0–0.2)
BASOPHILS NFR BLD AUTO: 0.3 % (ref 0–1.5)
BUN SERPL-MCNC: 56 MG/DL (ref 8–23)
BUN/CREAT SERPL: 11.4 (ref 7–25)
CALCIUM SPEC-SCNC: 9.1 MG/DL (ref 8.6–10.5)
CHLORIDE SERPL-SCNC: 97 MMOL/L (ref 98–107)
CO2 SERPL-SCNC: 24 MMOL/L (ref 22–29)
CREAT SERPL-MCNC: 4.93 MG/DL (ref 0.57–1)
DEPRECATED RDW RBC AUTO: 52.9 FL (ref 37–54)
EGFRCR SERPLBLD CKD-EPI 2021: 8.7 ML/MIN/1.73
EOSINOPHIL # BLD AUTO: 0.07 10*3/MM3 (ref 0–0.4)
EOSINOPHIL NFR BLD AUTO: 0.7 % (ref 0.3–6.2)
ERYTHROCYTE [DISTWIDTH] IN BLOOD BY AUTOMATED COUNT: 14.5 % (ref 12.3–15.4)
GLUCOSE BLDC GLUCOMTR-MCNC: 115 MG/DL (ref 70–130)
GLUCOSE BLDC GLUCOMTR-MCNC: 121 MG/DL (ref 70–130)
GLUCOSE BLDC GLUCOMTR-MCNC: 123 MG/DL (ref 70–130)
GLUCOSE SERPL-MCNC: 129 MG/DL (ref 65–99)
GRAM STN SPEC: ABNORMAL
GRAM STN SPEC: ABNORMAL
HCT VFR BLD AUTO: 31.2 % (ref 34–46.6)
HGB BLD-MCNC: 10 G/DL (ref 12–15.9)
IMM GRANULOCYTES # BLD AUTO: 0.18 10*3/MM3 (ref 0–0.05)
IMM GRANULOCYTES NFR BLD AUTO: 1.7 % (ref 0–0.5)
LYMPHOCYTES # BLD AUTO: 0.88 10*3/MM3 (ref 0.7–3.1)
LYMPHOCYTES NFR BLD AUTO: 8.4 % (ref 19.6–45.3)
MCH RBC QN AUTO: 31.7 PG (ref 26.6–33)
MCHC RBC AUTO-ENTMCNC: 32.1 G/DL (ref 31.5–35.7)
MCV RBC AUTO: 99 FL (ref 79–97)
MONOCYTES # BLD AUTO: 1.23 10*3/MM3 (ref 0.1–0.9)
MONOCYTES NFR BLD AUTO: 11.7 % (ref 5–12)
NEUTROPHILS NFR BLD AUTO: 77.2 % (ref 42.7–76)
NEUTROPHILS NFR BLD AUTO: 8.12 10*3/MM3 (ref 1.7–7)
NRBC BLD AUTO-RTO: 0 /100 WBC (ref 0–0.2)
PLATELET # BLD AUTO: 238 10*3/MM3 (ref 140–450)
PMV BLD AUTO: 10.5 FL (ref 6–12)
POTASSIUM SERPL-SCNC: 3.8 MMOL/L (ref 3.5–5.2)
RBC # BLD AUTO: 3.15 10*6/MM3 (ref 3.77–5.28)
SODIUM SERPL-SCNC: 134 MMOL/L (ref 136–145)
VANCOMYCIN SERPL-MCNC: 19.3 MCG/ML (ref 5–40)
WBC NRBC COR # BLD AUTO: 10.51 10*3/MM3 (ref 3.4–10.8)

## 2025-01-27 PROCEDURE — 25010000002 VANCOMYCIN 1 G RECONSTITUTED SOLUTION 1 EACH VIAL

## 2025-01-27 PROCEDURE — 25010000002 CEFEPIME PER 500 MG: Performed by: ORTHOPAEDIC SURGERY

## 2025-01-27 PROCEDURE — 25010000002 CEFTRIAXONE PER 250 MG

## 2025-01-27 PROCEDURE — 80048 BASIC METABOLIC PNL TOTAL CA: CPT | Performed by: INTERNAL MEDICINE

## 2025-01-27 PROCEDURE — 25010000002 HYDROMORPHONE PER 4 MG: Performed by: INTERNAL MEDICINE

## 2025-01-27 PROCEDURE — 85025 COMPLETE CBC W/AUTO DIFF WBC: CPT | Performed by: INTERNAL MEDICINE

## 2025-01-27 PROCEDURE — 99232 SBSQ HOSP IP/OBS MODERATE 35: CPT | Performed by: INTERNAL MEDICINE

## 2025-01-27 PROCEDURE — 25810000003 SODIUM CHLORIDE 0.9 % SOLUTION 250 ML FLEX CONT

## 2025-01-27 PROCEDURE — 25010000002 HEPARIN (PORCINE) PER 1000 UNITS: Performed by: ORTHOPAEDIC SURGERY

## 2025-01-27 PROCEDURE — 80202 ASSAY OF VANCOMYCIN: CPT | Performed by: ORTHOPAEDIC SURGERY

## 2025-01-27 PROCEDURE — 82948 REAGENT STRIP/BLOOD GLUCOSE: CPT

## 2025-01-27 PROCEDURE — 25010000002 DIPHENHYDRAMINE PER 50 MG: Performed by: PHYSICIAN ASSISTANT

## 2025-01-27 RX ORDER — LACTULOSE 10 G/15ML
30 SOLUTION ORAL
Status: DISPENSED | OUTPATIENT
Start: 2025-01-27 | End: 2025-01-27

## 2025-01-27 RX ORDER — HYDROMORPHONE HYDROCHLORIDE 1 MG/ML
0.25 INJECTION, SOLUTION INTRAMUSCULAR; INTRAVENOUS; SUBCUTANEOUS
Status: DISCONTINUED | OUTPATIENT
Start: 2025-01-27 | End: 2025-01-28

## 2025-01-27 RX ORDER — DIPHENHYDRAMINE HYDROCHLORIDE 50 MG/ML
12.5 INJECTION INTRAMUSCULAR; INTRAVENOUS ONCE
Status: COMPLETED | OUTPATIENT
Start: 2025-01-27 | End: 2025-01-27

## 2025-01-27 RX ADMIN — ALLOPURINOL 200 MG: 100 TABLET ORAL at 18:50

## 2025-01-27 RX ADMIN — HYDROMORPHONE HYDROCHLORIDE 0.25 MG: 1 INJECTION, SOLUTION INTRAMUSCULAR; INTRAVENOUS; SUBCUTANEOUS at 09:23

## 2025-01-27 RX ADMIN — HYDROMORPHONE HYDROCHLORIDE 0.25 MG: 1 INJECTION, SOLUTION INTRAMUSCULAR; INTRAVENOUS; SUBCUTANEOUS at 23:30

## 2025-01-27 RX ADMIN — NIFEDIPINE 30 MG: 30 TABLET, FILM COATED, EXTENDED RELEASE ORAL at 18:58

## 2025-01-27 RX ADMIN — FAMOTIDINE 20 MG: 20 TABLET, FILM COATED ORAL at 18:50

## 2025-01-27 RX ADMIN — HYDROMORPHONE HYDROCHLORIDE 0.25 MG: 1 INJECTION, SOLUTION INTRAMUSCULAR; INTRAVENOUS; SUBCUTANEOUS at 02:56

## 2025-01-27 RX ADMIN — ATORVASTATIN CALCIUM 10 MG: 10 TABLET, FILM COATED ORAL at 18:50

## 2025-01-27 RX ADMIN — AMIODARONE HYDROCHLORIDE 200 MG: 200 TABLET ORAL at 16:55

## 2025-01-27 RX ADMIN — LACTULOSE 30 G: 20 SOLUTION ORAL at 20:14

## 2025-01-27 RX ADMIN — AMIODARONE HYDROCHLORIDE 200 MG: 200 TABLET ORAL at 22:01

## 2025-01-27 RX ADMIN — TRAMADOL HYDROCHLORIDE 50 MG: 50 TABLET, COATED ORAL at 04:55

## 2025-01-27 RX ADMIN — Medication 10 ML: at 22:07

## 2025-01-27 RX ADMIN — POLYETHYLENE GLYCOL 3350 17 G: 17 POWDER, FOR SOLUTION ORAL at 09:34

## 2025-01-27 RX ADMIN — VANCOMYCIN HYDROCHLORIDE 1000 MG: 1 INJECTION, POWDER, LYOPHILIZED, FOR SOLUTION INTRAVENOUS at 17:57

## 2025-01-27 RX ADMIN — PANTOPRAZOLE SODIUM 40 MG: 40 TABLET, DELAYED RELEASE ORAL at 04:55

## 2025-01-27 RX ADMIN — SODIUM CHLORIDE 2000 MG: 900 INJECTION INTRAVENOUS at 20:14

## 2025-01-27 RX ADMIN — DIPHENHYDRAMINE HYDROCHLORIDE 12.5 MG: 50 INJECTION INTRAMUSCULAR; INTRAVENOUS at 00:30

## 2025-01-27 RX ADMIN — HEPARIN SODIUM 2000 UNITS: 1000 INJECTION INTRAVENOUS; SUBCUTANEOUS at 14:32

## 2025-01-27 RX ADMIN — CEFEPIME HYDROCHLORIDE 1000 MG: 1 INJECTION, POWDER, FOR SOLUTION INTRAMUSCULAR; INTRAVENOUS at 17:59

## 2025-01-27 RX ADMIN — METOPROLOL TARTRATE 25 MG: 25 TABLET, FILM COATED ORAL at 22:01

## 2025-01-27 RX ADMIN — OXYCODONE 5 MG: 5 TABLET ORAL at 22:10

## 2025-01-27 RX ADMIN — OXYCODONE 5 MG: 5 TABLET ORAL at 16:55

## 2025-01-27 RX ADMIN — HYDROMORPHONE HYDROCHLORIDE 0.25 MG: 1 INJECTION, SOLUTION INTRAMUSCULAR; INTRAVENOUS; SUBCUTANEOUS at 11:40

## 2025-01-27 NOTE — PROGRESS NOTES
[facility]  Vascular Surgery Progress Note    Patient Name: Sara Esparza  : 1950  MRN: [id number]  Date of admission: 2025  Surgical Procedures Since Admission:  Procedure(s):  HEEL IRRIGATION AND DEBRIDEMENT LEFT  PLACEMENT OF WOUND VAC  Surgeon:  Rom Gonzalez Jr., MD  Status:  3 Days Post-Op  -------------------    Procedure(s):  AORTAGRAM WITH LEFT EXTREMITY INTERVENTION AND ALL OTHER INDICATED PROCEDURES  Surgeon:  Kyle Ramirez MD  Status:  * No surgery found *  -------------------    Subjective   Subjective     Subjective: No acute events overnight. Improved mentation and afib now controlled. Reports acute on chronic low back pain which is uncontrolled currently. No other complaints or concerns. Patient and  would like LEFT lower extremity vascular intervention to be preformed while inpatient.       Objective   Objective     Vitals:   Temp:  [98.1 °F (36.7 °C)-98.8 °F (37.1 °C)] 98.7 °F (37.1 °C)  Heart Rate:  [61-83] 66  Resp:  [18-20] 20  BP: (125-164)/(68-77) 150/73  Flow (L/min) (Oxygen Therapy):  [1-2] 2  Output by Drain (mL) 25 0701 - 25 1900 25 1901 - 25 0700 25 0701 - 25 1015 Range Total   External Urinary Catheter  0  0       Physical Exam  Vitals reviewed. Exam conducted with a chaperone present ( at bedside).   Constitutional:       General: She is not in acute distress.     Appearance: Normal appearance. She is ill-appearing (chronically ill appearing). She is not toxic-appearing or diaphoretic.   HENT:      Head: Normocephalic and atraumatic.      Right Ear: External ear normal.      Left Ear: External ear normal.      Nose: Nose normal.      Mouth/Throat:      Mouth: Mucous membranes are moist.   Eyes:      Extraocular Movements: Extraocular movements intact.      Conjunctiva/sclera: Conjunctivae normal.   Cardiovascular:      Rate and Rhythm: Normal rate.      Pulses:           Femoral  pulses are 1+ on the right side and 1+ on the left side.       Dorsalis pedis pulses are 0 on the left side.        Posterior tibial pulses are 0 on the left side.   Pulmonary:      Effort: Pulmonary effort is normal. No respiratory distress.   Abdominal:      General: There is no distension.      Palpations: Abdomen is soft.      Tenderness: There is no abdominal tenderness.   Musculoskeletal:         General: No swelling or tenderness.      Cervical back: Normal range of motion and neck supple.   Skin:     General: Skin is warm.      Comments: Left foot dressing and wound vac in place   Neurological:      General: No focal deficit present.      Mental Status: She is alert and oriented to person, place, and time. Mental status is at baseline.      Motor: No weakness.   Psychiatric:         Mood and Affect: Mood normal.         Behavior: Behavior normal.         Thought Content: Thought content normal.         Judgment: Judgment normal.           Result Review    Lab Results   Component Value Date    WBC 10.51 01/27/2025    HGB 10.0 (L) 01/27/2025    HCT 31.2 (L) 01/27/2025    MCV 99.0 (H) 01/27/2025     01/27/2025     Lab Results   Component Value Date    GLUCOSE 129 (H) 01/27/2025    CALCIUM 9.1 01/27/2025     (L) 01/27/2025    K 3.8 01/27/2025    CO2 24.0 01/27/2025    CL 97 (L) 01/27/2025    BUN 56 (H) 01/27/2025    CREATININE 4.93 (H) 01/27/2025    EGFR 8.7 (L) 01/27/2025    BCR 11.4 01/27/2025    ANIONGAP 13.0 01/27/2025     Adult Transthoracic Echo Complete W/ Cont if Necessary Per Protocol    Left ventricular systolic function is normal. Estimated left   ventricular EF = 55%    Left ventricular wall thickness is consistent with moderate concentric   hypertrophy.    No hemodynamically significant valvular heart disease          Assessment & Plan   Assessment / Plan     Brief Patient Summary:  Sara Esparza is a 74 y.o. 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 She  would benefit from left lower extremity angiogram to optimize arterial flow and wound healing. Afib and mentation have improved over the weekend and appear stable at this time.     Plan:   - scheduled for LEFT lower extremity angiogram Monday 2/3/25 with Dr. Ramirez. NPO and consent ordered.   - Eliquis to be held 48hr prior to procedure   - wound care and weight bearing recommendations per orthopedics   - pain control per primary/orthopedics   - vascular to follow peripherally and reassess Friday. Please contact on call provider with questions or concerns.     Plan of care reviewed with the patient and her  who verbalized their understanding and agreement. Case reviewed with Dr. Ramirez.     Iliana Rubalcava PA-C  1/27/25   10:22 EST

## 2025-01-27 NOTE — PROGRESS NOTES
Pharmacy Consult-Vancomycin Dosing  Sara Esparza is a  74 y.o. female receiving vancomycin therapy.     Indication:empiric  Consulting Provider: hospitalist   ID Consult: Yes    Goal Trough: 15-20 mcg/mL    Current Antimicrobial Therapy  Anti-Infectives (From admission, onward)      Ordered     Dose/Rate Route Frequency Start Stop    01/24/25 1230  vancomycin (VANCOCIN) 1,000 mg in sodium chloride 0.9 % 250 mL IVPB-VTB        Ordering Provider: Edmund Erwin PharmD    1,000 mg  250 mL/hr over 60 Minutes Intravenous Once 01/24/25 1600 01/24/25 1723    01/22/25 1230  vancomycin 750 mg in sodium chloride 0.9 % 250 mL IVPB-VTB        Ordering Provider: Edmund Erwin PharmD    750 mg  333.3 mL/hr over 45 Minutes Intravenous Once 01/22/25 1400 01/22/25 1810    01/21/25 0519  cefepime 1000 mg IVPB in 100 mL NS (MBP)        Ordering Provider: Rom Gonzalez Jr., MD    1,000 mg  over 4 Hours Intravenous Every 24 Hours 01/22/25 0600 01/29/25 1359    01/21/25 0537  vancomycin (dosing per levels)        Ordering Provider: Rom Gonzalez Jr., MD     Not Applicable Daily 01/21/25 0900 01/28/25 0859    01/21/25 0519  cefepime 1000 mg IVPB in 100 mL NS (MBP)        Ordering Provider: Conrad Alvarez MD    1,000 mg  over 30 Minutes Intravenous Once 01/21/25 0615 01/21/25 0715    01/21/25 0505  metroNIDAZOLE (FLAGYL) IVPB 500 mg        Ordering Provider: Rom Gonzalez Jr., MD    500 mg  200 mL/hr over 30 Minutes Intravenous Every 8 Hours 01/21/25 0600 01/26/25 0759    01/21/25 0513  vancomycin 2250 mg/500 mL 0.9% NS IVPB ([facility])        Ordering Provider: Woo Wells, PharmD    20 mg/kg × 108 kg  over 135 Minutes Intravenous Once 01/21/25 0600 01/21/25 0950    01/21/25 0505  Pharmacy to dose vancomycin        Ordering Provider: Rom Gonzalez Jr., MD     Not Applicable Continuous PRN 01/21/25 0500 01/28/25 0459    01/20/25 0738  cefTRIAXone (ROCEPHIN) 1,000 mg in sodium chloride 0.9 % 100 mL MBP  Status:   "Discontinued        Ordering Provider: Leslye Fiore MD    1,000 mg  200 mL/hr over 30 Minutes Intravenous Every 24 Hours 01/20/25 0900 01/21/25 0519            Allergies  Allergies as of 01/19/2025    (No Known Allergies)       Labs    Results from last 7 days   Lab Units 01/27/25  0636 01/26/25  2246 01/25/25  0945   BUN mg/dL 56* 52* 33*   CREATININE mg/dL 4.93* 4.88* 3.22*       Results from last 7 days   Lab Units 01/27/25  0636 01/26/25  2247 01/25/25  0945   WBC 10*3/mm3 10.51 11.95* 10.23       Evaluation of Dosing     Last Dose Received in the ED/Outside Facility: none  Is Patient on Dialysis or Renal Replacement: yes    Ht - 170.2 cm (67.01\")  Wt - 108 kg (238 lb 1.6 oz)    Estimated Creatinine Clearance: 12.5 mL/min (A) (by C-G formula based on SCr of 4.93 mg/dL (H)).    Intake & Output (last 3 days)         01/24 0701 01/25 0700 01/25 0701  01/26 0700 01/26 0701  01/27 0700 01/27 0701  01/28 0700    P.O.   720     I.V. (mL/kg) 150 (1.4)       Total Intake(mL/kg) 150 (1.4)  720 (6.7)     Urine (mL/kg/hr) 0 (0)  0 (0)     Wound  50 50     Dialysis 1980       Total Output 1980 50 50     Net -1830 -50 +670             Urine Unmeasured Occurrence  2 x 1 x             Microbiology and Radiology  Microbiology Results (last 10 days)       Procedure Component Value - Date/Time    Body Fluid Culture - Surgical Site, Foot, Left [[id number]]  (Abnormal)  (Susceptibility) Collected: 01/24/25 1754    Lab Status: Final result Specimen: Surgical Site from Foot, Left Updated: 01/27/25 0642     Body Fluid Culture Light growth (2+) Staphylococcus aureus, MRSA     Comment:   Methicillin resistant Staphylococcus aureus, Patient may be an isolation risk.        Gram Stain Rare (1+) WBCs seen      No organisms seen    Narrative:      Not a body fluid.    Susceptibility        Staphylococcus aureus, MRSA      TAYLOR      Clindamycin 0.25 ug/ml Susceptible      Erythromycin >=8 ug/ml Resistant      Oxacillin >=4 ug/ml Resistant  "     Rifampin <=0.5 ug/ml Susceptible      Tetracycline <=1 ug/ml Susceptible      Trimethoprim + Sulfamethoxazole <=10 ug/ml Susceptible      Vancomycin 1 ug/ml Susceptible                           Blood Culture - Blood, Arm, Right [[id number]]  (Normal) Collected: 01/21/25 1325    Lab Status: Final result Specimen: Blood from Arm, Right Updated: 01/26/25 1445     Blood Culture No growth at 5 days    Respiratory Panel PCR w/COVID-19(SARS-CoV-2) KATHLEEN/SINDY/CAT/PAD/COR/ROXIE In-House, NP Swab in UTM/VTM, 2 HR TAT - Swab, Nasopharynx [[id number]]  (Normal) Collected: 01/21/25 0614    Lab Status: Final result Specimen: Swab from Nasopharynx Updated: 01/21/25 0703     ADENOVIRUS, PCR Not Detected     Coronavirus 229E Not Detected     Coronavirus HKU1 Not Detected     Coronavirus NL63 Not Detected     Coronavirus OC43 Not Detected     COVID19 Not Detected     Human Metapneumovirus Not Detected     Human Rhinovirus/Enterovirus Not Detected     Influenza A PCR Not Detected     Influenza B PCR Not Detected     Parainfluenza Virus 1 Not Detected     Parainfluenza Virus 2 Not Detected     Parainfluenza Virus 3 Not Detected     Parainfluenza Virus 4 Not Detected     RSV, PCR Not Detected     Bordetella pertussis pcr Not Detected     Bordetella parapertussis PCR Not Detected     Chlamydophila pneumoniae PCR Not Detected     Mycoplasma pneumo by PCR Not Detected    Narrative:      In the setting of a positive respiratory panel with a viral infection PLUS a negative procalcitonin without other underlying concern for bacterial infection, consider observing off antibiotics or discontinuation of antibiotics and continue supportive care. If the respiratory panel is positive for atypical bacterial infection (Bordetella pertussis, Chlamydophila pneumoniae, or Mycoplasma pneumoniae), consider antibiotic de-escalation to target atypical bacterial infection.    MRSA Screen, PCR (Inpatient) - Swab, Nares [[id number]]  (Abnormal) Collected:  01/21/25 0614    Lab Status: Final result Specimen: Swab from Nares Updated: 01/21/25 0847     MRSA PCR Positive    Narrative:      The negative predictive value of this diagnostic test is high and should only be used to consider de-escalating anti-MRSA therapy. A positive result may indicate colonization with MRSA and must be correlated clinically.    Wound Culture - Wound, Foot, Left [[id number]]  (Abnormal)  (Susceptibility) Collected: 01/20/25 1409    Lab Status: Final result Specimen: Wound from Foot, Left Updated: 01/24/25 0658     Wound Culture Scant growth (1+) Staphylococcus aureus, MRSA     Comment: Methicillin resistant Staphylococcus aureus, Patient may be an isolation risk.         Rare growth Morganella morganii ssp morganii     Comment:            Scant growth (1+) Normal Skin Kimber     Gram Stain Few (2+) WBCs seen      Many (4+) Gram positive cocci in pairs      Rare (1+) Gram negative bacilli      Rare (1+) Gram positive bacilli    Susceptibility        Staphylococcus aureus, MRSA      TAYLOR      Clindamycin 0.25 ug/ml Susceptible      Erythromycin >=8 ug/ml Resistant      Oxacillin >=4 ug/ml Resistant      Rifampin <=0.5 ug/ml Susceptible      Tetracycline <=1 ug/ml Susceptible      Trimethoprim + Sulfamethoxazole <=10 ug/ml Susceptible      Vancomycin 1 ug/ml Susceptible                       Susceptibility        Morganella morganii ssp morganii      TAYLOR      Amoxicillin + Clavulanate >=32 ug/ml Resistant      Ampicillin >=32 ug/ml Resistant      Ampicillin + Sulbactam >=32 ug/ml Resistant      Cefazolin (Non Urine) >=32 ug/ml Resistant      Cefepime 0.032 ug/ml Susceptible  [1]       Cefotaxime <=0.25 ug/ml Susceptible      Ceftazidime 0.094 ug/ml Susceptible  [1]       Gentamicin <=1 ug/ml Susceptible      Levofloxacin <=0.12 ug/ml Susceptible      Piperacillin + Tazobactam <=4 ug/ml Susceptible      Tetracycline <=1 ug/ml Susceptible      Trimethoprim + Sulfamethoxazole <=20 ug/ml Susceptible                    [1]  Appended report. These results have been appended to a previously final verified report.                Susceptibility Comments       Morganella morganii ssp morganii    Cefotaxime susceptibility can be used as a surrogate for ceftriaxone susceptibility               Urine Culture - Urine, Urine, Clean Catch [[id number]] Collected: 01/19/25 1404    Lab Status: Final result Specimen: Urine, Clean Catch Updated: 01/20/25 1116     Urine Culture 50,000 CFU/mL Mixed Kimber Isolated    Narrative:      Specimen contains mixed organisms of questionable pathogenicity suggestive of contamination. If symptoms persist, suggest recollection.  Colonization of the urinary tract without infection is common. Treatment is discouraged unless the patient is symptomatic, pregnant, or undergoing an invasive urologic procedure.            Vancomycin Levels:    Results from last 7 days   Lab Units 01/27/25  0636 01/24/25  0728 01/22/25  0652   VANCOMYCIN RM mcg/mL 19.30 19.50 21.10                     Assessment/Plan:    Pharmacy to dose vancomycin for Osteo. Goal trough 15-20 mcg/mL. Patient with hx of ESRD on HD MWF and currently being dosed per levels.  Vancomycin random level this morning 19.3 mcg/mL prior to HD. Will plan to redose with vancomycin 1000 mg after HD today.  Will plan to clarify intended duration of vancomycin prior to ordering another level. Will likely plan on ordering a pre-HD level Wednesday, this will need to be ordered.  Pharmacy will continue to monitor renal function, cultures and sensitivities, and clinical status to adjust regimen as necessary.    Ricardo Chappell, FlorinD, BCPS  1/27/2025  09:22 EST

## 2025-01-27 NOTE — PLAN OF CARE
Goal Outcome Evaluation:  Plan of Care Reviewed With: patient        Progress: no change  Outcome Evaluation: VSS on 2L nasal cannula. Patient mostly confused through the night, momentarily seemingly more oriented and expressed sadness over situtation and severe pain in back, prn iv pain meds given, patient resting in bed at this time. One time dose IV benadryl given for c/o itching, symptoms relieved per pt. Hemodialysis today. Wound vac in place, due to be changed today.  at bedside.

## 2025-01-27 NOTE — PROGRESS NOTES
[facility] Progress Note    Admission Date: 1/19/2025    Sara Esparza  1950  [id number]    Date: 1/27/2025    Antibiotics:  Anti-Infectives (From admission, onward)      Ordered     Dose/Rate Route Frequency Start Stop    01/27/25 0920  vancomycin (VANCOCIN) 1,000 mg in sodium chloride 0.9 % 250 mL IVPB-VTB        Ordering Provider: Ricardo Chappell, PharmD    1,000 mg  250 mL/hr over 60 Minutes Intravenous Once 01/27/25 1600      01/27/25 0949  Pharmacy to dose vancomycin        Ordering Provider: Bailee Barrios MD     Not Applicable Continuous PRN 01/27/25 0948 02/10/25 0947    01/27/25 0920  vancomycin (dosing per levels)        Ordering Provider: Ricardo Chappell, PharmD     Not Applicable Daily 01/27/25 0920 02/11/25 0859    01/24/25 1230  vancomycin (VANCOCIN) 1,000 mg in sodium chloride 0.9 % 250 mL IVPB-VTB        Ordering Provider: Edmund Erwin PharmD    1,000 mg  250 mL/hr over 60 Minutes Intravenous Once 01/24/25 1600 01/24/25 1723    01/22/25 1230  vancomycin 750 mg in sodium chloride 0.9 % 250 mL IVPB-VTB        Ordering Provider: Edmund Erwin PharmD    750 mg  333.3 mL/hr over 45 Minutes Intravenous Once 01/22/25 1400 01/22/25 1810    01/21/25 0519  cefepime 1000 mg IVPB in 100 mL NS (MBP)        Ordering Provider: Rom Gonzalez Jr., MD    1,000 mg  over 4 Hours Intravenous Every 24 Hours 01/22/25 0600 01/29/25 1359    01/21/25 0537  vancomycin (dosing per levels)  Status:  Discontinued        Ordering Provider: Rom Gonzalez Jr., MD     Not Applicable Daily 01/21/25 0900 01/27/25 1320    01/21/25 0519  cefepime 1000 mg IVPB in 100 mL NS (MBP)        Ordering Provider: Conrad Alvarez MD    1,000 mg  over 30 Minutes Intravenous Once 01/21/25 0615 01/21/25 0715    01/21/25 0505  metroNIDAZOLE (FLAGYL) IVPB 500 mg        Ordering Provider: Rom Gonzalez Jr., MD    500 mg  200 mL/hr over 30 Minutes Intravenous Every 8 Hours 01/21/25 0600 01/26/25 0759    01/21/25 0513   vancomycin 2250 mg/500 mL 0.9% NS IVPB ([facility])        Ordering Provider: Woo Wells, PharmD    20 mg/kg × 108 kg  over 135 Minutes Intravenous Once 01/21/25 0600 01/21/25 0950    01/21/25 0505  Pharmacy to dose vancomycin  Status:  Discontinued        Ordering Provider: Rom Gonzalez Jr., MD     Not Applicable Continuous PRN 01/21/25 0500 01/27/25 1318    01/20/25 0738  cefTRIAXone (ROCEPHIN) 1,000 mg in sodium chloride 0.9 % 100 mL MBP  Status:  Discontinued        Ordering Provider: Leslye Fiore MD    1,000 mg  200 mL/hr over 30 Minutes Intravenous Every 24 Hours 01/20/25 0900 01/21/25 0519            Reason for Consultation: Sepsis and osteomyelitis of the left calcaneus     History of present illness:    Patient is a 74 y.o. 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  Evaluation in the ED included WBC 14.3 and PCT 5.9.  Subsequent lactic acid was 4.1 on 1/21.  After admission she became febrile to 101.9.  She was started on ceftriaxone and vancomycin.  Blood culture was sent yesterday afternoon.  Urinalysis showed pyuria.  Urine culture showed mixed hanny in the lab discarded the culture.  She was noted to have a left calcaneus wound.  CT scan of the chest, abdomen and pelvis were unremarkable for acute process.  CT scan of the left lower extremity showed a wound at the plantar aspect of the left calcaneus with subcutaneous and intraosseous air and destructive changes .  CT scan of the neck showed advanced C5-6 and C6-7 degenerative disc disease; suspected moderate or greater C5-6 canal stenosis due to posterior vertebral osteophytes.  Wound culture of the left heel is growing MRSA and a gram-negative ange.  At  the time of my exam the patient is nonverbal other than moaning.  She does not follow commands.  Her  is at the bedside and is able to provide medical history although he is a little uncertain about timing of events such as when she started dialysis.      She had a dialysis catheter placed 9/13 which was replaced on 10/11.    1/23/2025.  Afebrile x 2 days.  She is considerably more alert today.  She remains on IV amiodarone for atrial fibrillation with RVR.  She complains of pain but does not localize it to any specific site.  Wound culture with MRSA and Morganella.  Blood culture negative.    1/24/2025.  Afebrile.  She is seen on dialysis today.  She she is alert and mildly confused but understands that she is scheduled to undergo surgery today.  In discussion with her  in the patient's room he recounted that it was explained to her that she was at high risk for limb loss based on the extent of osteomyelitis involving her calcaneus.  MRI was attempted yesterday but aborted because of pain.    1/25/2025.  Afebrile.  Alert. Uncomfortable.  states that she is discouraged because she was found to have extensive osteo at surgery yesterday.  He states that he does not see how we will be possible for her to go home.    1/26/25 Afebrile, appears more lucid. Appears more comfortable although she is scratching torso and upper extremities; the  states this is a chronic problem  MRSA from op cultures  1/27/25 Afebrile. Seen on HD. No new c/o  Note plans for arteriogram  MRSA from op cultures      ROS    According to her  1/22 the main complaints at home have been neck pain, shortness of breath and generalized weakness.  Evidently neither the  nor the patient were aware of the chronic appearing left heel ulcer.  The patient has no new complaints to add to her 's history            PE:  Vital Signs  Temp  Min: 98.2 °F (36.8 °C)  Max: 98.8 °F (37.1 °C)  BP  Min: 111/88  Max:  165/72  Pulse  Min: 61  Max: 83  Resp  Min: 18  Max: 20  SpO2  Min: 89 %  Max: 100 %    GENERAL: Alert, appropriate, appears chronically ill  HEENT: Normocephalic, atraumatic.   No conjunctival injection. No icterus.  No oral labial lesions   NECK: No evidence of meningismusHEART: IRRR; No murmur, rubs, gallops.   LUNGS: Clear anteriorly, no wheezes or rhonchi appreciated  ABDOMEN: Abdomen bloated but soft.  No evidence of tenderness elicited with deep pressure throughout the abdomen.  Hypoactive bowel sounds.  EXT:  No cyanosis, clubbing or edema. No cord.  :  Without Corral catheter.  MSK: No joint effusions or erythema.  Bilateral knees appeared to be free of erythema or soft tissue swelling.  Left heel with a chronic appearing wound on the plantar aspect approximately 1 cm in diameter; positive surrounding erythema but no active drainage at the time of my exam but foul-smelling odor noted  SKIN: Warm and dry without cutaneous eruptions on Inspection/palpation.    NEURO: Oriented to PPT.    PSYCHIATRIC: Normal insight and judgment. Cooperative with PE       Laboratory Data    Results from last 7 days   Lab Units 01/27/25  0636 01/26/25  2247 01/25/25  0945   WBC 10*3/mm3 10.51 11.95* 10.23   HEMOGLOBIN g/dL 10.0* 10.3* 10.4*   HEMATOCRIT % 31.2* 32.3* 32.9*   PLATELETS 10*3/mm3 238 250 245     Results from last 7 days   Lab Units 01/27/25  0636   SODIUM mmol/L 134*   POTASSIUM mmol/L 3.8   CHLORIDE mmol/L 97*   CO2 mmol/L 24.0   BUN mg/dL 56*   CREATININE mg/dL 4.93*   GLUCOSE mg/dL 129*   CALCIUM mg/dL 9.1     Results from last 7 days   Lab Units 01/22/25  0652   ALK PHOS U/L 79   BILIRUBIN mg/dL 0.2   ALT (SGPT) U/L 32   AST (SGOT) U/L 24               Estimated Creatinine Clearance: 12.5 mL/min (A) (by C-G formula based on SCr of 4.93 mg/dL (H)).      Microbiology:  MRSA and Morganella from foot wound    Radiology:  Imaging Results (Last 72 Hours)       ** No results found for the last 72 hours. **             I personally reviewed the radiographic studies       Impression:      -- Sepsis with leukocytosis, lactic acidosis, encephalopathy.  She has multiple potential sites of infection.  I would have concerned that she may have had bacteremia secondary to her left calcaneus osteomyelitis and could have infected her dialysis catheter which has now been in place for 102 days.  It appears that she has a UTI although unfortunately we do not have data from the micro lab.  In addition there would be concern that she has developed prosthetic knee infection although I could not appreciate evidence of this on exam.  Another potential problem would be osteomyelitis of the cervical spine with her history of progressive neck pain.  The noncontrast CT scan was nondiagnostic but this would have limited value in completely excluding the possibility of cervical osteomyelitis     --Probable osteomyelitis of the left calcaneus.  MRSA and Morganella from wound culture     --ESRD on hemodialysis     --Diabetes mellitus A1c 4.9     --Pyuria, no culture data.  I suspect that she had a UTI which has responded to her current therapy with cefepime and vancomycin     --Progressive neck pain likely secondary to degenerative arthritis but the possibility of osteomyelitis should be considered.     --Paroxysmal atrial fibrillation     --Encephalopathy likely secondary to sepsis-significantly improved     PLAN/RECOMMENDATIONS:   Thank you for asking us to see SaraMolly Esparza, I recommend the following:        -- Continue vancomycin to 3/7    -- continue ceftriaxone while all cultures pending and she is undergoing vascular procedures        -- Follow blood culture result although this is antibiotic modified.  Low threshold for repeat blood cultures when she is on dialysis particularly if she remains febrile         Anticipate parenteral therapy with vancomycin to last until 3/7/2025 unless she opts to undergo BKA in the meantime.  I will adjust  antibiotics based on operative culture results.  She is not a candidate for PICC line and hopefully it will be possible to arrange for antibiotic therapy to be administered postdialysis.    Discussed with pharmacy. The susceptibility of morganella to cefotaxime is predictive of susceptibility to ceftriaxone. It is not certain that the the Morganella from from preop culture is a pathogen but I will treat with ceftriaxone as she is undergoing aggressive procedures     Bailee Barrios MD  1/27/2025

## 2025-01-27 NOTE — PROGRESS NOTES
[facility] Progress Note    Admission Date: 1/19/2025    Sara Esparza  1950  [id number]    Date: 1/26/2025    Antibiotics:  Anti-Infectives (From admission, onward)      Ordered     Dose/Rate Route Frequency Start Stop    01/24/25 1230  vancomycin (VANCOCIN) 1,000 mg in sodium chloride 0.9 % 250 mL IVPB-VTB        Ordering Provider: Edmund Erwin PharmD    1,000 mg  250 mL/hr over 60 Minutes Intravenous Once 01/24/25 1600 01/24/25 1723    01/22/25 1230  vancomycin 750 mg in sodium chloride 0.9 % 250 mL IVPB-VTB        Ordering Provider: Edmund Erwin PharmD    750 mg  333.3 mL/hr over 45 Minutes Intravenous Once 01/22/25 1400 01/22/25 1810    01/21/25 0519  cefepime 1000 mg IVPB in 100 mL NS (MBP)        Ordering Provider: Rom Gonzalez Jr., MD    1,000 mg  over 4 Hours Intravenous Every 24 Hours 01/22/25 0600 01/29/25 1359    01/21/25 0537  vancomycin (dosing per levels)        Ordering Provider: Rom Gonzalez Jr., MD     Not Applicable Daily 01/21/25 0900 01/28/25 0859    01/21/25 0519  cefepime 1000 mg IVPB in 100 mL NS (MBP)        Ordering Provider: Conrad Alvarez MD    1,000 mg  over 30 Minutes Intravenous Once 01/21/25 0615 01/21/25 0715    01/21/25 0505  metroNIDAZOLE (FLAGYL) IVPB 500 mg        Ordering Provider: Rom Gonzalez Jr., MD    500 mg  200 mL/hr over 30 Minutes Intravenous Every 8 Hours 01/21/25 0600 01/26/25 0759    01/21/25 0513  vancomycin 2250 mg/500 mL 0.9% NS IVPB ([facility])        Ordering Provider: Woo Wells, PharmD    20 mg/kg × 108 kg  over 135 Minutes Intravenous Once 01/21/25 0600 01/21/25 0950    01/21/25 0505  Pharmacy to dose vancomycin        Ordering Provider: Rom Gonzalez Jr., MD     Not Applicable Continuous PRN 01/21/25 0500 01/28/25 0459    01/20/25 0738  cefTRIAXone (ROCEPHIN) 1,000 mg in sodium chloride 0.9 % 100 mL MBP  Status:  Discontinued        Ordering Provider: Leslye Fiore MD    1,000 mg  200 mL/hr over 30 Minutes Intravenous Every  24 Hours 01/20/25 0900 01/21/25 0519            Reason for Consultation: Sepsis and osteomyelitis of the left calcaneus     History of present illness:    Patient is a 74 y.o. 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  Evaluation in the ED included WBC 14.3 and PCT 5.9.  Subsequent lactic acid was 4.1 on 1/21.  After admission she became febrile to 101.9.  She was started on ceftriaxone and vancomycin.  Blood culture was sent yesterday afternoon.  Urinalysis showed pyuria.  Urine culture showed mixed hanny in the lab discarded the culture.  She was noted to have a left calcaneus wound.  CT scan of the chest, abdomen and pelvis were unremarkable for acute process.  CT scan of the left lower extremity showed a wound at the plantar aspect of the left calcaneus with subcutaneous and intraosseous air and destructive changes .  CT scan of the neck showed advanced C5-6 and C6-7 degenerative disc disease; suspected moderate or greater C5-6 canal stenosis due to posterior vertebral osteophytes.  Wound culture of the left heel is growing MRSA and a gram-negative ange.  At the time of my exam the patient is nonverbal other than moaning.  She does not follow commands.  Her  is at the bedside and is able to provide medical history although he is a little uncertain about timing of events such as when she started dialysis.      She had a dialysis catheter placed 9/13 which was replaced on 10/11.    1/23/2025.  Afebrile x 2 days.  She is considerably more alert today.  She remains on IV amiodarone for atrial fibrillation with RVR.  She complains of pain but does not localize it to any specific site.  Wound culture with MRSA and  Morganella.  Blood culture negative.    1/24/2025.  Afebrile.  She is seen on dialysis today.  She she is alert and mildly confused but understands that she is scheduled to undergo surgery today.  In discussion with her  in the patient's room he recounted that it was explained to her that she was at high risk for limb loss based on the extent of osteomyelitis involving her calcaneus.  MRI was attempted yesterday but aborted because of pain.    1/25/2025.  Afebrile.  Alert. Uncomfortable.  states that she is discouraged because she was found to have extensive osteo at surgery yesterday.  He states that he does not see how we will be possible for her to go home.    1/26/25 Afebrile, appears more lucid. Appears more comfortable although she is scratching torso and upper extremities; the  states this is a chronic problem  MRSA from op cultures      ROS    According to her  1/22 the main complaints at home have been neck pain, shortness of breath and generalized weakness.  Evidently neither the  nor the patient were aware of the chronic appearing left heel ulcer.  The patient has no new complaints to add to her 's history            PE:  Vital Signs  Temp  Min: 98.1 °F (36.7 °C)  Max: 98.2 °F (36.8 °C)  BP  Min: 117/58  Max: 154/65  Pulse  Min: 61  Max: 144  Resp  Min: 18  Max: 18  SpO2  Min: 83 %  Max: 96 %    GENERAL: Alert, appropriate, appears chronically ill  HEENT: Normocephalic, atraumatic.   No conjunctival injection. No icterus.  No oral labial lesions   NECK: No evidence of meningismusHEART: IRRR; No murmur, rubs, gallops.   LUNGS: Clear anteriorly, no wheezes or rhonchi appreciated  ABDOMEN: Abdomen bloated but soft.  No evidence of tenderness elicited with deep pressure throughout the abdomen.  Hypoactive bowel sounds.  EXT:  No cyanosis, clubbing or edema. No cord.  :  Without Corral catheter.  MSK: No joint effusions or erythema.  Bilateral knees appeared to be  free of erythema or soft tissue swelling.  Left heel with a chronic appearing wound on the plantar aspect approximately 1 cm in diameter; positive surrounding erythema but no active drainage at the time of my exam but foul-smelling odor noted  SKIN: Warm and dry without cutaneous eruptions on Inspection/palpation.    NEURO: Oriented to PPT.    PSYCHIATRIC: Normal insight and judgment. Cooperative with PE       Laboratory Data    Results from last 7 days   Lab Units 01/25/25  0945 01/24/25  0728 01/23/25  0816   WBC 10*3/mm3 10.23 14.99* 10.56   HEMOGLOBIN g/dL 10.4* 10.6* 10.7*   HEMATOCRIT % 32.9* 32.7* 33.7*   PLATELETS 10*3/mm3 245 203 138*     Results from last 7 days   Lab Units 01/25/25  0945   SODIUM mmol/L 137   POTASSIUM mmol/L 4.1   CHLORIDE mmol/L 97*   CO2 mmol/L 26.0   BUN mg/dL 33*   CREATININE mg/dL 3.22*   GLUCOSE mg/dL 197*   CALCIUM mg/dL 9.1     Results from last 7 days   Lab Units 01/22/25  0652   ALK PHOS U/L 79   BILIRUBIN mg/dL 0.2   ALT (SGPT) U/L 32   AST (SGOT) U/L 24               Estimated Creatinine Clearance: 19.1 mL/min (A) (by C-G formula based on SCr of 3.22 mg/dL (H)).      Microbiology:  MRSA and Morganella from foot wound    Radiology:  Imaging Results (Last 72 Hours)       ** No results found for the last 72 hours. **            I personally reviewed the radiographic studies       Impression:      -- Sepsis with leukocytosis, lactic acidosis, encephalopathy.  She has multiple potential sites of infection.  I would have concerned that she may have had bacteremia secondary to her left calcaneus osteomyelitis and could have infected her dialysis catheter which has now been in place for 102 days.  It appears that she has a UTI although unfortunately we do not have data from the micro lab.  In addition there would be concern that she has developed prosthetic knee infection although I could not appreciate evidence of this on exam.  Another potential problem would be osteomyelitis of  the cervical spine with her history of progressive neck pain.  The noncontrast CT scan was nondiagnostic but this would have limited value in completely excluding the possibility of cervical osteomyelitis     --Probable osteomyelitis of the left calcaneus.  MRSA and Morganella from wound culture     --ESRD on hemodialysis     --Diabetes mellitus A1c 4.9     --Pyuria, no culture data.  I suspect that she had a UTI which has responded to her current therapy with cefepime and vancomycin     --Progressive neck pain likely secondary to degenerative arthritis but the possibility of osteomyelitis should be considered.     --Paroxysmal atrial fibrillation     --Encephalopathy likely secondary to sepsis-significantly improved     PLAN/RECOMMENDATIONS:   Thank you for asking us to see Sara Esparza, I recommend the following:        -- Agree with cefepime and vancomycin while operative cultures are pending.  Metronidazole added for coverage of anaerobic pathogens in the foot    -- If no gram negative rods or anaerobes from op cultures I will request post HD vanc to 3/7/25        -- Follow blood culture result although this is antibiotic modified.  Low threshold for repeat blood cultures when she is on dialysis particularly if she remains febrile         Anticipate parenteral therapy to last until 3/7/2025 unless she opts to undergo BKA in the meantime.  I will adjust antibiotics based on operative culture results.  She is not a candidate for PICC line and hopefully it will be possible to arrange for antibiotic therapy to be administered postdialysis.         Bailee Barrios MD  1/26/2025

## 2025-01-27 NOTE — CASE MANAGEMENT/SOCIAL WORK
Continued Stay Note  [facility]     Patient Name: Sara Esparza  MRN: [id number]  Today's Date: 1/27/2025    Admit Date: 1/19/2025    Plan: TBD   Discharge Plan       Row Name 01/27/25 1117       Plan    Plan TBD    Patient/Family in Agreement with Plan other (see comments)    Plan Comments Pt was discussed in Medical Rounds today. Per Vascular surgery note, pt is scheduled for an Angiogram on 2/3/25. D/C plan is TBD.  will continue to follow.                   Discharge Codes    No documentation.                 Expected Discharge Date and Time       Expected Discharge Date Expected Discharge Time    Jan 29, 2025               Saray Melo RN

## 2025-01-27 NOTE — PROGRESS NOTES
Sara Esparza  [id number]  1950   LOS: 8 days   Patient Care Team:  Toribio Roberts MD as PCP - General (Internal Medicine)    Chief Complaint:  ANOCA / ESRD / OBESITY & DEBILITY / AFIB / HTN / MRSA SEPSIS & HEEL OSTEOMYELITIS / SERIAL ELEVATED HS TROPONINS & ABNORMAL ACCEPTABLE ECHO / HYPONATREMIA    Subjective     Patient states she slept poorly due to intractable diffuse back pain.  She states this is an exacerbation of her chronic osteoarthritis involving her spine.  She additionally notes severe constipation and obstipation.  No anginal type chest discomfort, increased tachypnea/dyspnea, nausea, emesis, or abdominal pain.  No headache or focal motor-sensory changes.  She is unaware of atrial fibrillation.  Normal room air oximetry (95%).    Objective     Vital Sign Min/Max for last 24 hours  Temp  Min: 98.1 °F (36.7 °C)  Max: 98.8 °F (37.1 °C)   BP  Min: 125/76  Max: 164/70   Pulse  Min: 61  Max: 88   Resp  Min: 18  Max: 20   SpO2  Min: 89 %  Max: 96 %               01/22/25  1732 01/25/25  1139   Weight: 108 kg (238 lb) 108 kg (238 lb 1.6 oz)         Intake/Output Summary (Last 24 hours) at 1/27/2025 0659  Last data filed at 1/27/2025 0300  Gross per 24 hour   Intake 720 ml   Output 50 ml   Net 670 ml       Physical Exam:     General Appearance:    Alert, cooperative, in mild distress   Lungs:   Diffuse diminished breath sounds without adventitial sounds,respirations regular, even and unlabored    Heart:    Regular and normal rate, normal S1 and S2, grade 1/6 systolic murmur, no gallop, no rub, no click   Abdomen:  Extremities:   Soft, nontender, bowel sounds audible x4  1+ generalized edema edema, normal range of motion   Pulses:   Pulses palpable and equal bilaterally      Results Review:   Results from last 7 days   Lab Units 01/26/25  2246 01/25/25  0945 01/24/25 2023 01/24/25  0728   SODIUM mmol/L 126* 137  --  133*   POTASSIUM mmol/L 3.7 4.1 4.0 3.5   CHLORIDE mmol/L 92* 97*  --  95*   CO2  mmol/L 22.0 26.0  --  25.0   BUN mg/dL 52* 33*  --  50*   CREATININE mg/dL 4.88* 3.22*  --  4.47*   GLUCOSE mg/dL 148* 197*  --  167*   CALCIUM mg/dL 9.1 9.1  --  9.6     Results from last 7 days   Lab Units 01/26/25  2247 01/25/25  0945 01/24/25  0728   WBC 10*3/mm3 11.95* 10.23 14.99*   HEMOGLOBIN g/dL 10.3* 10.4* 10.6*   HEMATOCRIT % 32.3* 32.9* 32.7*   PLATELETS 10*3/mm3 250 245 203     Results from last 7 days   Lab Units 01/23/25  0816 01/23/25  0159   HSTROP T ng/L 101* 106*     NO NEW CXR / EKG.    Medication Review: REVIEWED; NO DRIPS.    Assessment & Plan     Normal sinus rhythm with labile hypertension.  Stable mild anemia with persistent leukocytosis and improved serum sodium level.  Uncertain whether back pain may relate to possible discitis versus new injury.  Patient is on a myriad of medications for pain control which is not helping as well as additional medication for bowel hygiene without improvement; will defer to hospitalist service for assistance in this regard.  May need to consider enema.  Would continue current cardiac medications at this time and monitor closely.    Discussed with patient, RN, and .      Generalized weakness    ESRD (end stage renal disease)    Type 2 diabetes mellitus with stage 5 chronic kidney disease not on chronic dialysis, without long-term current use of insulin    Chronic osteomyelitis of left foot    Paroxysmal A-fib    Sepsis due to methicillin resistant Staphylococcus aureus (MRSA) with encephalopathy without septic shock    01/27/25  06:59 EST

## 2025-01-27 NOTE — PROGRESS NOTES
[facility] Medicine Services  PROGRESS NOTE    Patient Name: Sara Esparza  : 1950  MRN: [id number]    Date of Admission: 2025  Primary Care Physician: Toribio Roberts MD    Subjective   Subjective     CC: weakness, progressive    HPI:   Complains of constipation since she's been here despite bowel regimen. Has had flatus (yesterday).   Objective   Objective     Vital Signs:   Temp:  [98.2 °F (36.8 °C)-98.8 °F (37.1 °C)] 98.7 °F (37.1 °C)  Heart Rate:  [61-83] 63  Resp:  [18-20] 20  BP: (125-164)/(68-76) 150/73  Flow (L/min) (Oxygen Therapy):  [1-2] 2     Physical Exam:  Gen:  awake, alert  Neuro: oriented, clear speech  HEENT:  NC/AT   Neck:  trach midline   Heart: RRR, no M/R/G  TDC:  no redness or tenderness   Lungs CTA  Abd:  Soft, nontender, no rebound or guarding, pos BS  Extrem:  No c/c/e.  Left foot dressing and wound vac in place.   R foot without lesions.       Results Reviewed:  LAB RESULTS:      Lab 25  0636 25  2247 25  0945 25  0728 25  0816 25  0159 25  0652 25  1327 25  0446 25  0432   WBC 10.51 11.95* 10.23 14.99* 10.56  --    < > 12.78*  --   --    HEMOGLOBIN 10.0* 10.3* 10.4* 10.6* 10.7*  --    < > 9.3*  --   --    HEMATOCRIT 31.2* 32.3* 32.9* 32.7* 33.7*  --    < > 29.1*  --   --    PLATELETS 238 250 245 203 138*  --    < > 151  --   --    NEUTROS ABS 8.12* 9.04* 8.09* 11.91* 8.35*  --    < > 10.18*  --   --    IMMATURE GRANS (ABS) 0.18* 0.19* 0.12* 0.18* 0.07*  --    < > 0.08*  --   --    LYMPHS ABS 0.88 1.04 0.61* 0.74 0.49*  --    < > 0.62*  --   --    MONOS ABS 1.23* 1.55* 1.35* 2.12* 1.62*  --    < > 1.89*  --   --    EOS ABS 0.07 0.11 0.04 0.02 0.02  --    < > 0.00  --   --    MCV 99.0* 99.7* 99.1* 97.3* 100.3*  --    < > 100.0*  --   --    PROCALCITONIN  --   --   --   --   --   --   --   --   --  5.99*   LACTATE  --   --   --   --   --   --   --  1.3 4.1*  --    HSTROP T  --   --    --   --  101* 106*  --   --   --   --     < > = values in this interval not displayed.         Lab 01/27/25  0636 01/26/25  2246 01/25/25  0945 01/24/25 2023 01/24/25  0728 01/23/25  0816 01/22/25  0652   SODIUM 134* 126* 137  --  133* 133* 133*   POTASSIUM 3.8 3.7 4.1 4.0 3.5 3.8 4.2   CHLORIDE 97* 92* 97*  --  95* 97* 96*   CO2 24.0 22.0 26.0  --  25.0 23.0 22.0   ANION GAP 13.0 12.0 14.0  --  13.0 13.0 15.0   BUN 56* 52* 33*  --  50* 40* 67*   CREATININE 4.93* 4.88* 3.22*  --  4.47* 3.66* 5.49*   EGFR 8.7* 8.8* 14.5*  --  9.8* 12.4* 7.6*   GLUCOSE 129* 148* 197*  --  167* 207* 149*   CALCIUM 9.1 9.1 9.1  --  9.6 9.1 9.3   PHOSPHORUS  --   --   --   --   --   --  5.5*         Lab 01/22/25  0652 01/21/25  0432   TOTAL PROTEIN 4.5* 6.6   ALBUMIN 2.4* 3.3*   GLOBULIN 2.1 3.3   ALT (SGPT) 32 49*   AST (SGOT) 24 54*   BILIRUBIN 0.2 0.3   ALK PHOS 79 114         Lab 01/23/25  0816 01/23/25  0159   HSTROP T 101* 106*                   Brief Urine Lab Results  (Last result in the past 365 days)        Color   Clarity   Blood   Leuk Est   Nitrite   Protein   CREAT   Urine HCG        01/19/25 1404 Yellow   Cloudy   Moderate (2+)   Moderate (2+)   Negative   >=300 mg/dL (3+)                   Microbiology Results Abnormal       Procedure Component Value - Date/Time    Body Fluid Culture - Surgical Site, Foot, Left [[id number]]  (Abnormal)  (Susceptibility) Collected: 01/24/25 7936    Lab Status: Final result Specimen: Surgical Site from Foot, Left Updated: 01/27/25 0642     Body Fluid Culture Light growth (2+) Staphylococcus aureus, MRSA     Comment:   Methicillin resistant Staphylococcus aureus, Patient may be an isolation risk.        Gram Stain Rare (1+) WBCs seen      No organisms seen    Narrative:      Not a body fluid.    Susceptibility        Staphylococcus aureus, MRSA      TAYLOR      Clindamycin Susceptible      Erythromycin Resistant      Oxacillin Resistant      Rifampin Susceptible      Tetracycline Susceptible       Trimethoprim + Sulfamethoxazole Susceptible      Vancomycin Susceptible                           Wound Culture - Wound, Foot, Left [[id number]]  (Abnormal)  (Susceptibility) Collected: 01/20/25 1409    Lab Status: Final result Specimen: Wound from Foot, Left Updated: 01/24/25 0658     Wound Culture Scant growth (1+) Staphylococcus aureus, MRSA     Comment: Methicillin resistant Staphylococcus aureus, Patient may be an isolation risk.         Rare growth Morganella morganii ssp morganii     Comment:            Scant growth (1+) Normal Skin Kimber     Gram Stain Few (2+) WBCs seen      Many (4+) Gram positive cocci in pairs      Rare (1+) Gram negative bacilli      Rare (1+) Gram positive bacilli    Susceptibility        Staphylococcus aureus, MRSA      TAYLOR      Clindamycin Susceptible      Erythromycin Resistant      Oxacillin Resistant      Rifampin Susceptible      Tetracycline Susceptible      Trimethoprim + Sulfamethoxazole Susceptible      Vancomycin Susceptible                       Susceptibility        Morganella morganii ssp morganii      TAYLOR      Amoxicillin + Clavulanate Resistant      Ampicillin Resistant      Ampicillin + Sulbactam Resistant      Cefazolin (Non Urine) Resistant      Cefepime Susceptible  [1]       Cefotaxime Susceptible      Ceftazidime Susceptible  [1]       Gentamicin Susceptible      Levofloxacin Susceptible      Piperacillin + Tazobactam Susceptible      Tetracycline Susceptible      Trimethoprim + Sulfamethoxazole Susceptible                   [1]  Appended report. These results have been appended to a previously final verified report.                Susceptibility Comments       Morganella morganii ssp morganii    Cefotaxime susceptibility can be used as a surrogate for ceftriaxone susceptibility               MRSA Screen, PCR (Inpatient) - Swab, Nares [[id number]]  (Abnormal) Collected: 01/21/25 0614    Lab Status: Final result Specimen: Swab from Nares Updated: 01/21/25 0843      MRSA PCR Positive    Narrative:      The negative predictive value of this diagnostic test is high and should only be used to consider de-escalating anti-MRSA therapy. A positive result may indicate colonization with MRSA and must be correlated clinically.            No radiology results from the last 24 hrs    Results for orders placed during the hospital encounter of 01/19/25    Adult Transthoracic Echo Complete W/ Cont if Necessary Per Protocol    Interpretation Summary    Left ventricular systolic function is normal. Estimated left ventricular EF = 55%    Left ventricular wall thickness is consistent with moderate concentric hypertrophy.    No hemodynamically significant valvular heart disease      Current medications:  Scheduled Meds:albumin human, , ,   allopurinol, 200 mg, Oral, Daily  amiodarone, 200 mg, Oral, TID  [Held by provider] apixaban, 2.5 mg, Oral, Q12H  atorvastatin, 10 mg, Oral, Daily  cefepime, 1,000 mg, Intravenous, Q24H  famotidine, 20 mg, Oral, Daily  insulin lispro, 2-9 Units, Subcutaneous, 4x Daily AC & at Bedtime  lactulose, 30 g, Oral, Q1H  metoprolol tartrate, 25 mg, Oral, Q12H  NIFEdipine XL, 30 mg, Oral, Q24H  pantoprazole, 40 mg, Oral, Q AM  sodium chloride, 10 mL, Intravenous, Q12H  vancomycin (dosing per levels), , Not Applicable, Daily  vancomycin (dosing per levels), , Not Applicable, Daily  vancomycin, 1,000 mg, Intravenous, Once      Continuous Infusions:dilTIAZem, 5-15 mg/hr  esmolol,  mcg/kg/min, Last Rate: Stopped (01/26/25 0215)  Pharmacy Consult - Pharmacy to dose,   Pharmacy to dose vancomycin,   Pharmacy to dose vancomycin,       PRN Meds:.  acetaminophen    albumin human    albumin human    senna-docusate sodium **AND** polyethylene glycol **AND** bisacodyl **AND** bisacodyl    Calcium Replacement - Follow Nurse / BPA Driven Protocol    dextrose    dextrose    glucagon (human recombinant)    heparin (porcine)    HYDROmorphone    Magnesium Standard Dose Replacement  - Follow Nurse / BPA Driven Protocol    melatonin    metoprolol tartrate    ondansetron    oxyCODONE    Pharmacy Consult - Pharmacy to dose    Pharmacy to dose vancomycin    Pharmacy to dose vancomycin    Potassium Replacement - Follow Nurse / BPA Driven Protocol    [COMPLETED] Insert Peripheral IV **AND** sodium chloride    sodium chloride    sodium chloride    traMADol    Assessment & Plan   Assessment & Plan     Active Hospital Problems    Diagnosis  POA    **Generalized weakness [R53.1]  Yes    Sepsis due to methicillin resistant Staphylococcus aureus (MRSA) with encephalopathy without septic shock [A41.02, R65.20, G93.41]  Unknown     Priority: High    Paroxysmal A-fib [I48.0]  Unknown     Priority: Medium    Chronic osteomyelitis of left foot [M86.672]  Unknown    Critical limb ischemia of left lower extremity [I70.222]  Unknown    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        Brief Hospital Course to date:  Sara Esparza is a 74 y.o. female w ESRD on HD, PAF on Eliquis, HTN and DM admitted for several weeks generalized weakness and myalgias and multiple falls in setting of lightheadedness- after admission she was found to be septic with temps up to 102.4F.     Sepsis, fever, leukocytosis, confusion  L calcaneal osteomyelitis   Left posterior tibial artery occlusion    - RIJ tunneled dialysis cath - increases risk of bacteremia   - blood cx NGTD  - continue vanc/cefepime, now off Flagyl    - ortho consulted- s/p debridement and wound vac placement, 1/24/25. May ultimately require amputation, pt aware.   -- plan for LLE angiogram on 2/3/25 by Dr. Ramirez per Vascular Surgery consult note from today   - MRSA + and + Morganella from wound site. Consulted ID, appreciate Dr. Barrios's assistance    -- leukocytosis improving, fevers have resolved       Generalized weakness progressive x weeks, falls at home   - suspicion of overdiuresis and orthostasis ongoing, w superimposed sepsis now   - Ct chest/abd without acute findings   - normal TSH and  CK   - PT OT  - considering SNF      Neck pain , C7 region   C5-6 canal stenosis  -  CT cspine - no fx  - history of oxycodone dependence; family requests avoiding opiates.  Has tolerated low dose opiates while here     ESRD on HD:  HD on MWF     DMII:  A1C 4.9   - on sitagliptin at home. SSI.  May need to decrease dose.      Hx of HTN:   -- nifedipine dose decreased due to tenuous BPs    H/o Afib  Episode of Afib with RVR during HD  -- given IV metoprolol 5 mg x 1 to see if this would help  -- resumed home dose beta blocker (Coreg), but still not rate controlled.  Cards consulted, started on Amiodarone and changed to Metoprolol BID. Rate now controlled.   -- on Eliquis at home, holding for possible interventions    Elevated troponin  -- in setting of ESRD  -- troponin peaked at 132 on 1/19.   -- per chart review, had LHC recently at [facility] with non-obstructive CAD    Dyspepsia  -- GI cocktail PRN    Constipation  -- likely due to opiates. Not improving despite bowel regimen  -- will trial Lactulose Q1 hour x 3 doses or until BM today          Expected Discharge Location and Transportation:  [facility]   Expected Discharge   Expected Discharge Date: 2/6/2025; Expected Discharge Time:      VTE Prophylaxis:  Pharmacologic & mechanical VTE prophylaxis orders are present.         AM-PAC 6 Clicks Score (PT): 7 (01/26/25 2050)    CODE STATUS:   Code Status and Medical Interventions: CPR (Attempt to Resuscitate); Full Support   Ordered at: 01/19/25 0518     Level Of Support Discussed With:    Patient     Code Status (Patient has no pulse and is not breathing):    CPR (Attempt to Resuscitate)     Medical Interventions (Patient has pulse or is breathing):    Full Support        Heather Carlos MD  01/27/25

## 2025-01-27 NOTE — PLAN OF CARE
Goal Outcome Evaluation:Scheduled HD completed. Pt tolerated well. Goal reached. Pt's pain was improved with intervention of 0.25mg dilaudid. Blood reinfused. Call report to PAULIE Buchanan      Problem: Hemodialysis  Goal: Safe, Effective Therapy Delivery  Outcome: Progressing  Goal: Effective Tissue Perfusion  Outcome: Progressing  Goal: Absence of Infection Signs and Symptoms  Outcome: Progressing

## 2025-01-27 NOTE — PROGRESS NOTES
"   LOS: 8 days    Patient Care Team:  Toribio Roberts MD as PCP - General (Internal Medicine)    Subjective     Seen on dialysis, tolerating well.   Denies chest pain or shortness of breath.     Objective     Vital Signs:  Blood pressure 163/74, pulse 67, temperature 98.3 °F (36.8 °C), temperature source Axillary, resp. rate 20, height 170.2 cm (67.01\"), weight 108 kg (238 lb 1.6 oz), SpO2 100%.      Intake/Output Summary (Last 24 hours) at 1/27/2025 1341  Last data filed at 1/27/2025 0900  Gross per 24 hour   Intake 480 ml   Output 0 ml   Net 480 ml        01/26 0701 - 01/27 0700  In: 720 [P.O.:720]  Out: 50     Physical Exam:  GENERAL: WD WF, appears comfortable  NEURO:  No focal deficit  PSYCHIATRIC: Cooperative with PE  EYE: PE, no icterus, no conjunctivitis  ENT: omm dry, dentition intact  NECK:  No JVD discernable,  Trachea midline  CV: No edema, RRR  LUNGS:  Quiet,  Nonlabored resp.  Symmetrical expansion  ABDOMEN: Nondistended, soft nontender.  : No Cor[place], no palp bladder  SKIN: Warm and dry without rash  Left foot dressed with wound vac  + RIJ TDC    Labs:  Results from last 7 days   Lab Units 01/27/25  0636 01/26/25 2247 01/25/25  0945   WBC 10*3/mm3 10.51 11.95* 10.23   HEMOGLOBIN g/dL 10.0* 10.3* 10.4*   PLATELETS 10*3/mm3 238 250 245     Results from last 7 days   Lab Units 01/27/25  0636 01/26/25  2246 01/25/25  0945 01/24/25 2023 01/24/25  0728 01/23/25  0816 01/22/25  0652 01/21/25  0432   SODIUM mmol/L 134* 126* 137  --  133*   < > 133* 134*   POTASSIUM mmol/L 3.8 3.7 4.1 4.0 3.5   < > 4.2 4.7   CHLORIDE mmol/L 97* 92* 97*  --  95*   < > 96* 94*   CO2 mmol/L 24.0 22.0 26.0  --  25.0   < > 22.0 24.0   BUN mg/dL 56* 52* 33*  --  50*   < > 67* 52*   CREATININE mg/dL 4.93* 4.88* 3.22*  --  4.47*   < > 5.49* 4.68*   CALCIUM mg/dL 9.1 9.1 9.1  --  9.6   < > 9.3 10.8*   PHOSPHORUS mg/dL  --   --   --   --   --   --  5.5*  --    ALBUMIN g/dL  --   --   --   --   --   --  2.4* 3.3*    < > = values " in this interval not displayed.     Results from last 7 days   Lab Units 01/22/25  0652   ALK PHOS U/L 79   BILIRUBIN mg/dL 0.2   ALT (SGPT) U/L 32   AST (SGOT) U/L 24           A/P:    ESRD: On HD MWF at [facility] with NAL.  No need for hemodialysis today.   -Hemodialysis today as per schedule     HTN: Blood pressure stable    Hyponatremia:  Due to underlying renal failure.  Adjust with HD.     Hyperkalemia: Resolved.  Adjust with HD     Metabolic acidosis:  Adjust with HD     Anemia: Hemoglobin above goal.  No CORNEL unless hemoglobin <10.5.    Hyperphosphatemia: Last phos level 5.5     Nutrition: Low potassium low Phos high-protein diet.  Renal vitamin.     Fever: On antibiotics.  Urine culture with mixed hanny.  Patient with foot wound.  S/p debridement and wound VAC.     High risk and complexity patient.    Ruel Giraldo MD  01/27/25  13:41 EST

## 2025-01-28 LAB
ANION GAP SERPL CALCULATED.3IONS-SCNC: 12 MMOL/L (ref 5–15)
BASOPHILS # BLD AUTO: 0.01 10*3/MM3 (ref 0–0.2)
BASOPHILS NFR BLD AUTO: 0.1 % (ref 0–1.5)
BUN SERPL-MCNC: 27 MG/DL (ref 8–23)
BUN/CREAT SERPL: 7.8 (ref 7–25)
CALCIUM SPEC-SCNC: 9.2 MG/DL (ref 8.6–10.5)
CHLORIDE SERPL-SCNC: 99 MMOL/L (ref 98–107)
CO2 SERPL-SCNC: 27 MMOL/L (ref 22–29)
CREAT SERPL-MCNC: 3.45 MG/DL (ref 0.57–1)
DEPRECATED RDW RBC AUTO: 52.7 FL (ref 37–54)
EGFRCR SERPLBLD CKD-EPI 2021: 13.3 ML/MIN/1.73
EOSINOPHIL # BLD AUTO: 0.01 10*3/MM3 (ref 0–0.4)
EOSINOPHIL NFR BLD AUTO: 0.1 % (ref 0.3–6.2)
ERYTHROCYTE [DISTWIDTH] IN BLOOD BY AUTOMATED COUNT: 14.5 % (ref 12.3–15.4)
GLUCOSE BLDC GLUCOMTR-MCNC: 117 MG/DL (ref 70–130)
GLUCOSE BLDC GLUCOMTR-MCNC: 118 MG/DL (ref 70–130)
GLUCOSE BLDC GLUCOMTR-MCNC: 129 MG/DL (ref 70–130)
GLUCOSE BLDC GLUCOMTR-MCNC: 144 MG/DL (ref 70–130)
GLUCOSE BLDC GLUCOMTR-MCNC: 161 MG/DL (ref 70–130)
GLUCOSE SERPL-MCNC: 201 MG/DL (ref 65–99)
HCT VFR BLD AUTO: 31.3 % (ref 34–46.6)
HGB BLD-MCNC: 9.8 G/DL (ref 12–15.9)
IMM GRANULOCYTES # BLD AUTO: 0.13 10*3/MM3 (ref 0–0.05)
IMM GRANULOCYTES NFR BLD AUTO: 1.4 % (ref 0–0.5)
LYMPHOCYTES # BLD AUTO: 0.49 10*3/MM3 (ref 0.7–3.1)
LYMPHOCYTES NFR BLD AUTO: 5.1 % (ref 19.6–45.3)
MCH RBC QN AUTO: 31.3 PG (ref 26.6–33)
MCHC RBC AUTO-ENTMCNC: 31.3 G/DL (ref 31.5–35.7)
MCV RBC AUTO: 100 FL (ref 79–97)
MONOCYTES # BLD AUTO: 0.94 10*3/MM3 (ref 0.1–0.9)
MONOCYTES NFR BLD AUTO: 9.8 % (ref 5–12)
NEUTROPHILS NFR BLD AUTO: 7.99 10*3/MM3 (ref 1.7–7)
NEUTROPHILS NFR BLD AUTO: 83.5 % (ref 42.7–76)
NRBC BLD AUTO-RTO: 0 /100 WBC (ref 0–0.2)
PLATELET # BLD AUTO: 244 10*3/MM3 (ref 140–450)
PMV BLD AUTO: 10.3 FL (ref 6–12)
POTASSIUM SERPL-SCNC: 4 MMOL/L (ref 3.5–5.2)
RBC # BLD AUTO: 3.13 10*6/MM3 (ref 3.77–5.28)
SODIUM SERPL-SCNC: 138 MMOL/L (ref 136–145)
WBC NRBC COR # BLD AUTO: 9.57 10*3/MM3 (ref 3.4–10.8)

## 2025-01-28 PROCEDURE — 82948 REAGENT STRIP/BLOOD GLUCOSE: CPT

## 2025-01-28 PROCEDURE — 85025 COMPLETE CBC W/AUTO DIFF WBC: CPT | Performed by: INTERNAL MEDICINE

## 2025-01-28 PROCEDURE — 25010000002 CEFTRIAXONE PER 250 MG

## 2025-01-28 PROCEDURE — 25010000002 HYDROMORPHONE PER 4 MG: Performed by: INTERNAL MEDICINE

## 2025-01-28 PROCEDURE — 99232 SBSQ HOSP IP/OBS MODERATE 35: CPT | Performed by: HOSPITALIST

## 2025-01-28 PROCEDURE — 97597 DBRDMT OPN WND 1ST 20 CM/<: CPT

## 2025-01-28 PROCEDURE — 97605 NEG PRS WND THER DME<=50SQCM: CPT

## 2025-01-28 PROCEDURE — 25010000002 HYDROMORPHONE PER 4 MG: Performed by: HOSPITALIST

## 2025-01-28 PROCEDURE — 63710000001 INSULIN LISPRO (HUMAN) PER 5 UNITS: Performed by: ORTHOPAEDIC SURGERY

## 2025-01-28 PROCEDURE — 99232 SBSQ HOSP IP/OBS MODERATE 35: CPT | Performed by: INTERNAL MEDICINE

## 2025-01-28 PROCEDURE — 80048 BASIC METABOLIC PNL TOTAL CA: CPT | Performed by: INTERNAL MEDICINE

## 2025-01-28 RX ORDER — HYDROMORPHONE HYDROCHLORIDE 1 MG/ML
0.25 INJECTION, SOLUTION INTRAMUSCULAR; INTRAVENOUS; SUBCUTANEOUS EVERY 4 HOURS PRN
Status: DISPENSED | OUTPATIENT
Start: 2025-01-28 | End: 2025-01-29

## 2025-01-28 RX ADMIN — APIXABAN 2.5 MG: 2.5 TABLET, FILM COATED ORAL at 20:35

## 2025-01-28 RX ADMIN — HYDROMORPHONE HYDROCHLORIDE 0.25 MG: 1 INJECTION, SOLUTION INTRAMUSCULAR; INTRAVENOUS; SUBCUTANEOUS at 16:53

## 2025-01-28 RX ADMIN — AMIODARONE HYDROCHLORIDE 200 MG: 200 TABLET ORAL at 16:53

## 2025-01-28 RX ADMIN — AMIODARONE HYDROCHLORIDE 200 MG: 200 TABLET ORAL at 09:24

## 2025-01-28 RX ADMIN — HYDROMORPHONE HYDROCHLORIDE 0.25 MG: 1 INJECTION, SOLUTION INTRAMUSCULAR; INTRAVENOUS; SUBCUTANEOUS at 23:21

## 2025-01-28 RX ADMIN — HYDROMORPHONE HYDROCHLORIDE 0.25 MG: 1 INJECTION, SOLUTION INTRAMUSCULAR; INTRAVENOUS; SUBCUTANEOUS at 10:50

## 2025-01-28 RX ADMIN — METOPROLOL TARTRATE 25 MG: 25 TABLET, FILM COATED ORAL at 20:35

## 2025-01-28 RX ADMIN — Medication 10 ML: at 20:36

## 2025-01-28 RX ADMIN — NIFEDIPINE 30 MG: 30 TABLET, FILM COATED, EXTENDED RELEASE ORAL at 09:24

## 2025-01-28 RX ADMIN — OXYCODONE 5 MG: 5 TABLET ORAL at 05:20

## 2025-01-28 RX ADMIN — OXYCODONE 5 MG: 5 TABLET ORAL at 13:57

## 2025-01-28 RX ADMIN — AMIODARONE HYDROCHLORIDE 200 MG: 200 TABLET ORAL at 20:35

## 2025-01-28 RX ADMIN — SODIUM CHLORIDE 2000 MG: 900 INJECTION INTRAVENOUS at 20:35

## 2025-01-28 RX ADMIN — INSULIN LISPRO 2 UNITS: 100 INJECTION, SOLUTION INTRAVENOUS; SUBCUTANEOUS at 11:44

## 2025-01-28 RX ADMIN — OXYCODONE 5 MG: 5 TABLET ORAL at 09:24

## 2025-01-28 RX ADMIN — FAMOTIDINE 20 MG: 20 TABLET, FILM COATED ORAL at 09:24

## 2025-01-28 RX ADMIN — METOPROLOL TARTRATE 25 MG: 25 TABLET, FILM COATED ORAL at 09:24

## 2025-01-28 RX ADMIN — ALLOPURINOL 200 MG: 100 TABLET ORAL at 09:24

## 2025-01-28 RX ADMIN — APIXABAN 2.5 MG: 2.5 TABLET, FILM COATED ORAL at 13:57

## 2025-01-28 RX ADMIN — OXYCODONE 5 MG: 5 TABLET ORAL at 22:14

## 2025-01-28 RX ADMIN — PANTOPRAZOLE SODIUM 40 MG: 40 TABLET, DELAYED RELEASE ORAL at 05:20

## 2025-01-28 RX ADMIN — ATORVASTATIN CALCIUM 10 MG: 10 TABLET, FILM COATED ORAL at 09:24

## 2025-01-28 NOTE — PLAN OF CARE
Goal Outcome Evaluation:  Plan of Care Reviewed With: patient        Progress: improving  Outcome Evaluation: VSS on 2L NC. A&O x4. PRNs given for neck/back pain. PT wound care changed wound vac dressing.  Fall and skin precautions in place. Will continue plan of care.            Problem: Adult Inpatient Plan of Care  Goal: Plan of Care Review  Outcome: Progressing  Flowsheets (Taken 1/28/2025 1812)  Progress: improving  Outcome Evaluation: VSS on 2L NC. A&O x4. PRNs given for neck/back pain. PT wound care changed wound vac dressing.  Fall and skin precautions in place. Will continue plan of care.  Plan of Care Reviewed With: patient  Goal: Patient-Specific Goal (Individualized)  Outcome: Progressing  Goal: Absence of Hospital-Acquired Illness or Injury  Outcome: Progressing  Intervention: Identify and Manage Fall Risk  Recent Flowsheet Documentation  Taken 1/28/2025 1800 by Fernando Vera RN  Safety Promotion/Fall Prevention: safety round/check completed  Taken 1/28/2025 1600 by Fernando Vera RN  Safety Promotion/Fall Prevention: safety round/check completed  Taken 1/28/2025 1400 by Fernando Vera RN  Safety Promotion/Fall Prevention: safety round/check completed  Taken 1/28/2025 1200 by Fernando Vera RN  Safety Promotion/Fall Prevention: safety round/check completed  Taken 1/28/2025 1000 by Fernando Vera RN  Safety Promotion/Fall Prevention: safety round/check completed  Taken 1/28/2025 0800 by Fernando Vera RN  Safety Promotion/Fall Prevention: safety round/check completed  Intervention: Prevent Skin Injury  Recent Flowsheet Documentation  Taken 1/28/2025 1800 by Fernando Vera RN  Body Position:   supine   legs elevated  Skin Protection:   incontinence pads utilized   silicone foam dressing in place  Taken 1/28/2025 1600 by Fernando Vera RN  Body Position:   supine   legs elevated  Skin Protection:   incontinence pads utilized   silicone foam dressing in place  Taken 1/28/2025 1400 by Fernando Vera RN  Skin  Protection:   incontinence pads utilized   silicone foam dressing in place  Taken 1/28/2025 1200 by Fernando Vera RN  Skin Protection:   incontinence pads utilized   silicone foam dressing in place  Taken 1/28/2025 1000 by Fernando Vera RN  Skin Protection:   incontinence pads utilized   silicone foam dressing in place  Taken 1/28/2025 0900 by Fernando Vera RN  Skin Protection: (silicone foam dressings applied to coccyx and R heel)   silicone foam dressing in place   incontinence pads utilized  Taken 1/28/2025 0800 by Fernando Vera RN  Skin Protection: incontinence pads utilized  Intervention: Prevent and Manage VTE (Venous Thromboembolism) Risk  Recent Flowsheet Documentation  Taken 1/28/2025 0800 by Fernando Vera RN  VTE Prevention/Management:   bilateral   SCDs (sequential compression devices) on  Goal: Optimal Comfort and Wellbeing  Outcome: Progressing  Intervention: Monitor Pain and Promote Comfort  Recent Flowsheet Documentation  Taken 1/28/2025 1653 by Fernando Vera RN  Pain Management Interventions: pain medication given  Taken 1/28/2025 1357 by Fernando Vera RN  Pain Management Interventions: pain medication given  Taken 1/28/2025 1050 by Fernando Vera RN  Pain Management Interventions: pain medication given  Taken 1/28/2025 0900 by Fernando Vera RN  Pain Management Interventions: pain medication given  Intervention: Provide Person-Centered Care  Recent Flowsheet Documentation  Taken 1/28/2025 0800 by Fernando Vera RN  Trust Relationship/Rapport:   care explained   choices provided  Goal: Readiness for Transition of Care  Outcome: Progressing     Problem: Skin Injury Risk Increased  Goal: Skin Health and Integrity  Outcome: Progressing  Intervention: Optimize Skin Protection  Recent Flowsheet Documentation  Taken 1/28/2025 1800 by Fernando Vera RN  Activity Management: activity encouraged  Pressure Reduction Techniques:   frequent weight shift encouraged   heels elevated off bed   pressure points  protected   weight shift assistance provided  Head of Bed ([facility]) Positioning: [facility] elevated  Pressure Reduction Devices:   pressure-redistributing mattress utilized   positioning supports utilized   foam padding utilized  Skin Protection:   incontinence pads utilized   silicone foam dressing in place  Taken 1/28/2025 1600 by Fernando Vera RN  Activity Management: activity encouraged  Pressure Reduction Techniques:   frequent weight shift encouraged   heels elevated off bed   pressure points protected   weight shift assistance provided  Head of Bed ([facility]) Positioning: [facility] elevated  Pressure Reduction Devices:   pressure-redistributing mattress utilized   positioning supports utilized   foam padding utilized  Skin Protection:   incontinence pads utilized   silicone foam dressing in place  Taken 1/28/2025 1400 by Fernando Vera RN  Activity Management: activity encouraged  Pressure Reduction Techniques:   frequent weight shift encouraged   heels elevated off bed   pressure points protected   weight shift assistance provided  Head of Bed ([facility]) Positioning: [facility] elevated  Pressure Reduction Devices:   pressure-redistributing mattress utilized   positioning supports utilized   foam padding utilized  Skin Protection:   incontinence pads utilized   silicone foam dressing in place  Taken 1/28/2025 1200 by Fernando Vera RN  Activity Management: activity encouraged  Pressure Reduction Techniques:   frequent weight shift encouraged   heels elevated off bed   pressure points protected   weight shift assistance provided  Head of Bed ([facility]) Positioning: [facility] elevated  Pressure Reduction Devices:   pressure-redistributing mattress utilized   positioning supports utilized   foam padding utilized  Skin Protection:   incontinence pads utilized   silicone foam dressing in place  Taken 1/28/2025 1000 by Fernando Vera RN  Activity Management: activity encouraged  Pressure Reduction Techniques:   frequent weight shift encouraged   heels elevated  off bed   pressure points protected   weight shift assistance provided  Pressure Reduction Devices:   pressure-redistributing mattress utilized   positioning supports utilized   foam padding utilized  Skin Protection:   incontinence pads utilized   silicone foam dressing in place  Taken 1/28/2025 0900 by Fernando Vera RN  Skin Protection: (silicone foam dressings applied to coccyx and R heel)   silicone foam dressing in place   incontinence pads utilized  Taken 1/28/2025 0800 by Fernando Vera RN  Activity Management: activity encouraged  Pressure Reduction Techniques:   frequent weight shift encouraged   heels elevated off bed   positioned off wounds   pressure points protected   weight shift assistance provided  Head of Bed ([facility]) Positioning: [facility] elevated  Pressure Reduction Devices:   pressure-redistributing mattress utilized   positioning supports utilized   foam padding utilized  Skin Protection: incontinence pads utilized     Problem: Fall Injury Risk  Goal: Absence of Fall and Fall-Related Injury  Outcome: Progressing  Intervention: Identify and Manage Contributors  Recent Flowsheet Documentation  Taken 1/28/2025 0800 by Fernando Vera RN  Medication Review/Management: medications reviewed  Intervention: Promote Injury-Free Environment  Recent Flowsheet Documentation  Taken 1/28/2025 1800 by Fernando Vera RN  Safety Promotion/Fall Prevention: safety round/check completed  Taken 1/28/2025 1600 by Fernando Vera RN  Safety Promotion/Fall Prevention: safety round/check completed  Taken 1/28/2025 1400 by Fernando Vera RN  Safety Promotion/Fall Prevention: safety round/check completed  Taken 1/28/2025 1200 by Fernando Vera RN  Safety Promotion/Fall Prevention: safety round/check completed  Taken 1/28/2025 1000 by Fernando Vera RN  Safety Promotion/Fall Prevention: safety round/check completed  Taken 1/28/2025 0800 by Fernando Vera RN  Safety Promotion/Fall Prevention: safety round/check completed     Problem:  Comorbidity Management  Goal: Maintenance of Heart Failure Symptom Control  Outcome: Progressing  Intervention: Maintain Heart Failure Management  Recent Flowsheet Documentation  Taken 1/28/2025 0800 by Fernando Vera RN  Medication Review/Management: medications reviewed  Goal: Blood Pressure in Desired Range  Outcome: Progressing  Intervention: Maintain Blood Pressure Management  Recent Flowsheet Documentation  Taken 1/28/2025 0800 by Fernando Vera RN  Medication Review/Management: medications reviewed     Problem: Hemodialysis  Goal: Safe, Effective Therapy Delivery  Outcome: Progressing  Intervention: Optimize Device Care and Function  Recent Flowsheet Documentation  Taken 1/28/2025 0800 by Fernando Vera RN  Medication Review/Management: medications reviewed  Goal: Effective Tissue Perfusion  Outcome: Progressing  Goal: Absence of Infection Signs and Symptoms  Outcome: Progressing     Problem: Sepsis/Septic Shock  Goal: Optimal Coping  Outcome: Progressing  Intervention: Support Patient and Family Response  Recent Flowsheet Documentation  Taken 1/28/2025 0800 by Fernando Vera RN  Family/Support System Care: support provided  Goal: Absence of Bleeding  Outcome: Progressing  Goal: Blood Glucose Level Within Target Range  Outcome: Progressing  Goal: Absence of Infection Signs and Symptoms  Outcome: Progressing  Intervention: Promote Recovery  Recent Flowsheet Documentation  Taken 1/28/2025 1800 by Fernando Vera RN  Activity Management: activity encouraged  Taken 1/28/2025 1600 by Fernando Vera RN  Activity Management: activity encouraged  Taken 1/28/2025 1400 by Fernando Vera RN  Activity Management: activity encouraged  Taken 1/28/2025 1200 by Fernando Vera RN  Activity Management: activity encouraged  Taken 1/28/2025 1000 by Fernando Vera RN  Activity Management: activity encouraged  Taken 1/28/2025 0800 by Fernando Vera RN  Activity Management: activity encouraged  Goal: Optimal Nutrition Delivery  Outcome:  Progressing

## 2025-01-28 NOTE — PROGRESS NOTES
[facility] Progress Note    Admission Date: 1/19/2025    Sara Esparza  1950  [id number]    Date: 1/28/2025    Antibiotics:  Anti-Infectives (From admission, onward)      Ordered     Dose/Rate Route Frequency Start Stop    01/27/25 1850  cefTRIAXone (ROCEPHIN) 2,000 mg in sodium chloride 0.9 % 100 mL MBP        Ordering Provider: Camden Ashford RPH    2,000 mg  200 mL/hr over 30 Minutes Intravenous Every 24 Hours 01/27/25 2000 02/05/25 1959 01/27/25 0920  vancomycin (VANCOCIN) 1,000 mg in sodium chloride 0.9 % 250 mL IVPB-VTB        Ordering Provider: Ricardo Chappell, PharmD    1,000 mg  250 mL/hr over 60 Minutes Intravenous Once 01/27/25 1600 01/27/25 1857    01/27/25 0949  Pharmacy to dose vancomycin        Ordering Provider: Bailee Barrios MD     Not Applicable Continuous PRN 01/27/25 0948 02/10/25 0947    01/27/25 0920  vancomycin (dosing per levels)        Ordering Provider: Ricardo Chappell, PharmD     Not Applicable Daily 01/27/25 0920 02/11/25 0859    01/24/25 1230  vancomycin (VANCOCIN) 1,000 mg in sodium chloride 0.9 % 250 mL IVPB-VTB        Ordering Provider: Edmund Erwin, PharmDARI    1,000 mg  250 mL/hr over 60 Minutes Intravenous Once 01/24/25 1600 01/24/25 1723    01/22/25 1230  vancomycin 750 mg in sodium chloride 0.9 % 250 mL IVPB-VTB        Ordering Provider: Edmund Erwin, PharmD    750 mg  333.3 mL/hr over 45 Minutes Intravenous Once 01/22/25 1400 01/22/25 1810    01/21/25 0519  cefepime 1000 mg IVPB in 100 mL NS (MBP)  Status:  Discontinued        Ordering Provider: Rom Gonzalez Jr., MD    1,000 mg  over 4 Hours Intravenous Every 24 Hours 01/22/25 0600 01/27/25 1851    01/21/25 0537  vancomycin (dosing per levels)  Status:  Discontinued        Ordering Provider: Rom Gonzalez Jr., MD     Not Applicable Daily 01/21/25 0900 01/27/25 1320    01/21/25 0519  cefepime 1000 mg IVPB in 100 mL NS (MBP)        Ordering Provider: Conrad Alvarez MD    1,000 mg  over 30 Minutes  Intravenous Once 01/21/25 0615 01/21/25 0715    01/21/25 0505  metroNIDAZOLE (FLAGYL) IVPB 500 mg        Ordering Provider: Rom Gonzalez Jr., MD    500 mg  200 mL/hr over 30 Minutes Intravenous Every 8 Hours 01/21/25 0600 01/26/25 0759    01/21/25 0513  vancomycin 2250 mg/500 mL 0.9% NS IVPB ([facility])        Ordering Provider: Woo Wells, PharmD    20 mg/kg × 108 kg  over 135 Minutes Intravenous Once 01/21/25 0600 01/21/25 0950    01/21/25 0505  Pharmacy to dose vancomycin  Status:  Discontinued        Ordering Provider: Rom Gonzalez Jr., MD     Not Applicable Continuous PRN 01/21/25 0500 01/27/25 1318    01/20/25 0738  cefTRIAXone (ROCEPHIN) 1,000 mg in sodium chloride 0.9 % 100 mL MBP  Status:  Discontinued        Ordering Provider: Leslye Fiore MD    1,000 mg  200 mL/hr over 30 Minutes Intravenous Every 24 Hours 01/20/25 0900 01/21/25 0519            Reason for Consultation: Sepsis and osteomyelitis of the left calcaneus     History of present illness:    Patient is a 74 y.o. 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  Evaluation in the ED included WBC 14.3 and PCT 5.9.  Subsequent lactic acid was 4.1 on 1/21.  After admission she became febrile to 101.9.  She was started on ceftriaxone and vancomycin.  Blood culture was sent yesterday afternoon.  Urinalysis showed pyuria.  Urine culture showed mixed hanny in the lab discarded the culture.  She was noted to have a left calcaneus wound.  CT scan of the chest, abdomen and pelvis were unremarkable for acute process.  CT scan of the left lower extremity showed a wound at the plantar aspect of the left calcaneus with subcutaneous and intraosseous air and  destructive changes .  CT scan of the neck showed advanced C5-6 and C6-7 degenerative disc disease; suspected moderate or greater C5-6 canal stenosis due to posterior vertebral osteophytes.  Wound culture of the left heel is growing MRSA and a gram-negative nage.  At the time of my exam the patient is nonverbal other than moaning.  She does not follow commands.  Her  is at the bedside and is able to provide medical history although he is a little uncertain about timing of events such as when she started dialysis.      She had a dialysis catheter placed 9/13 which was replaced on 10/11.    1/23/2025.  Afebrile x 2 days.  She is considerably more alert today.  She remains on IV amiodarone for atrial fibrillation with RVR.  She complains of pain but does not localize it to any specific site.  Wound culture with MRSA and Morganella.  Blood culture negative.    1/24/2025.  Afebrile.  She is seen on dialysis today.  She she is alert and mildly confused but understands that she is scheduled to undergo surgery today.  In discussion with her  in the patient's room he recounted that it was explained to her that she was at high risk for limb loss based on the extent of osteomyelitis involving her calcaneus.  MRI was attempted yesterday but aborted because of pain.    1/25/2025.  Afebrile.  Alert. Uncomfortable.  states that she is discouraged because she was found to have extensive osteo at surgery yesterday.  He states that he does not see how we will be possible for her to go home.    1/26/25 Afebrile, appears more lucid. Appears more comfortable although she is scratching torso and upper extremities; the  states this is a chronic problem  MRSA from op cultures  1/27/25 Afebrile. Seen on HD. No new c/o  Note plans for arteriogram  MRSA from op cultures    1/28/25  Afebrile, alert but seems unable to remember that she is scheduled for vascular evaluation next week. I reminded her that Dr Gonzalez is  very concerned Re: ability to salvage her left leg and she acknowledged this. Her  at the bedside appears to understand all of these issues.      ROS    According to her  1/22 the main complaints at home have been neck pain, shortness of breath and generalized weakness.  Evidently neither the  nor the patient were aware of the chronic appearing left heel ulcer.  The patient has no new complaints to add to her 's history            PE:  Vital Signs  Temp  Min: 98.1 °F (36.7 °C)  Max: 98.3 °F (36.8 °C)  BP  Min: 111/88  Max: 174/75  Pulse  Min: 58  Max: 94  Resp  Min: 18  Max: 21  SpO2  Min: 90 %  Max: 100 %    GENERAL: Alert, appropriate, appears chronically ill  HEENT: Normocephalic, atraumatic.   No conjunctival injection. No icterus.  No oral labial lesions   NECK: No evidence of meningismus  HEART: IRRR; No murmur, rubs, gallops.   LUNGS: Clear anteriorly, no wheezes or rhonchi appreciated  ABDOMEN: Abdomen bloated but soft.  No evidence of tenderness elicited with deep pressure throughout the abdomen.  Hypoactive bowel sounds.  EXT:  No cyanosis, clubbing or edema. No cord.  :  Without Corral catheter.  MSK: No joint effusions or erythema.  Bilateral knees appeared to be free of erythema or soft tissue swelling.  Left heel with a chronic appearing wound on the plantar aspect approximately 1 cm in diameter; positive surrounding erythema but no active drainage at the time of my exam but foul-smelling odor noted  SKIN: Warm and dry without cutaneous eruptions on Inspection/palpation.    NEURO: Oriented to PPT.    PSYCHIATRIC: Normal insight and judgment. Cooperative with PE       Laboratory Data    Results from last 7 days   Lab Units 01/28/25  0941 01/27/25  0636 01/26/25  2247   WBC 10*3/mm3 9.57 10.51 11.95*   HEMOGLOBIN g/dL 9.8* 10.0* 10.3*   HEMATOCRIT % 31.3* 31.2* 32.3*   PLATELETS 10*3/mm3 244 238 250     Results from last 7 days   Lab Units 01/27/25  0636   SODIUM mmol/L 134*    POTASSIUM mmol/L 3.8   CHLORIDE mmol/L 97*   CO2 mmol/L 24.0   BUN mg/dL 56*   CREATININE mg/dL 4.93*   GLUCOSE mg/dL 129*   CALCIUM mg/dL 9.1     Results from last 7 days   Lab Units 01/22/25  0652   ALK PHOS U/L 79   BILIRUBIN mg/dL 0.2   ALT (SGPT) U/L 32   AST (SGOT) U/L 24               Estimated Creatinine Clearance: 12.5 mL/min (A) (by C-G formula based on SCr of 4.93 mg/dL (H)).      Microbiology:  MRSA and Morganella from foot wound    Radiology:  Imaging Results (Last 72 Hours)       ** No results found for the last 72 hours. **            I personally reviewed the radiographic studies       Impression:      -- Sepsis with leukocytosis, lactic acidosis, encephalopathy.  She has multiple potential sites of infection.  I would have concerned that she may have had bacteremia secondary to her left calcaneus osteomyelitis and could have infected her dialysis catheter which has now been in place for 102 days.  It appears that she has a UTI although unfortunately we do not have data from the micro lab.  In addition there would be concern that she has developed prosthetic knee infection although I could not appreciate evidence of this on exam.  Another potential problem would be osteomyelitis of the cervical spine with her history of progressive neck pain.  The noncontrast CT scan was nondiagnostic but this would have limited value in completely excluding the possibility of cervical osteomyelitis     --Probable osteomyelitis of the left calcaneus.  MRSA and Morganella from wound culture     --ESRD on hemodialysis     --Diabetes mellitus A1c 4.9     --Pyuria, no culture data.  I suspect that she had a UTI which has responded to her current therapy with cefepime and vancomycin     --Progressive neck pain likely secondary to degenerative arthritis but the possibility of osteomyelitis should be considered.     --Paroxysmal atrial fibrillation     --Encephalopathy likely secondary to sepsis-significantly improved      PLAN/RECOMMENDATIONS:   Thank you for asking us to see Sara Esparza, I recommend the following:        -- Continue vancomycin to 3/7    -- continue ceftriaxone while all cultures pending and she is undergoing vascular procedures        -- Follow blood culture result although this is antibiotic modified.  Low threshold for repeat blood cultures when she is on dialysis particularly if she remains febrile         Anticipate parenteral therapy with vancomycin to last until 3/7/2025 unless she opts to undergo BKA in the meantime.  I will adjust antibiotics based on operative culture results.  She is not a candidate for PICC line and hopefully it will be possible to arrange for antibiotic therapy to be administered postdialysis.    Discussed with pharmacy. The susceptibility of morganella to cefotaxime is predictive of susceptibility to ceftriaxone. It is not certain that the the Morganella from from preop culture is a pathogen but I will treat with ceftriaxone as she is undergoing aggressive procedures     Partners covering for me to 2/10/25    Bailee Barrios MD  1/28/2025

## 2025-01-28 NOTE — PROGRESS NOTES
[facility] Medicine Services  PROGRESS NOTE    Patient Name: Sara Esparza  : 1950  MRN: [id number]    Date of Admission: 2025  Primary Care Physician: Toribio Roberts MD    Subjective   Subjective     CC:  F/U weakness, osteomyelitis    HPI:  Seen this morning. Complains of back pain.  concerned her spinal stimulator is not on - says it was turned off for an MRI? Follows with a Dr. Cancino with pain management.      Objective   Objective     Vital Signs:   Temp:  [98.1 °F (36.7 °C)-98.3 °F (36.8 °C)] 98.2 °F (36.8 °C)  Heart Rate:  [58-95] 66  Resp:  [16-18] 16  BP: (147-174)/(64-81) 156/64  Flow (L/min) (Oxygen Therapy):  [2] 2     Physical Exam:  Gen-no acute distress  HENT-NCAT, mucous membranes moist  CV-RRR, S1 S2 normal, no m/r/g  Resp-CTAB, no wheezes or rales  Abd-soft, NT, ND, +BS  Ext-left foot in dressing/wound vac in place  Neuro-A&Ox3, no focal deficits  Skin-no rashes  Psych-appropriate mood      Results Reviewed:  LAB RESULTS:      Lab 25  0941 25  0636 25  2247 25  0945 25  0728 25  0816 25  0159   WBC 9.57 10.51 11.95* 10.23 14.99* 10.56  --    HEMOGLOBIN 9.8* 10.0* 10.3* 10.4* 10.6* 10.7*  --    HEMATOCRIT 31.3* 31.2* 32.3* 32.9* 32.7* 33.7*  --    PLATELETS 244 238 250 245 203 138*  --    NEUTROS ABS 7.99* 8.12* 9.04* 8.09* 11.91* 8.35*  --    IMMATURE GRANS (ABS) 0.13* 0.18* 0.19* 0.12* 0.18* 0.07*  --    LYMPHS ABS 0.49* 0.88 1.04 0.61* 0.74 0.49*  --    MONOS ABS 0.94* 1.23* 1.55* 1.35* 2.12* 1.62*  --    EOS ABS 0.01 0.07 0.11 0.04 0.02 0.02  --    .0* 99.0* 99.7* 99.1* 97.3* 100.3*  --    HSTROP T  --   --   --   --   --  101* 106*         Lab 25  0941 25  0636 25  2246 25  0945 25  2023 25  0728 25  0816 25  0652   SODIUM 138 134* 126* 137  --  133*   < > 133*   POTASSIUM 4.0 3.8 3.7 4.1 4.0 3.5   < > 4.2   CHLORIDE 99 97* 92* 97*  --  95*   <  > 96*   CO2 27.0 24.0 22.0 26.0  --  25.0   < > 22.0   ANION GAP 12.0 13.0 12.0 14.0  --  13.0   < > 15.0   BUN 27* 56* 52* 33*  --  50*   < > 67*   CREATININE 3.45* 4.93* 4.88* 3.22*  --  4.47*   < > 5.49*   EGFR 13.3* 8.7* 8.8* 14.5*  --  9.8*   < > 7.6*   GLUCOSE 201* 129* 148* 197*  --  167*   < > 149*   CALCIUM 9.2 9.1 9.1 9.1  --  9.6   < > 9.3   PHOSPHORUS  --   --   --   --   --   --   --  5.5*    < > = values in this interval not displayed.         Lab 01/22/25  0652   TOTAL PROTEIN 4.5*   ALBUMIN 2.4*   GLOBULIN 2.1   ALT (SGPT) 32   AST (SGOT) 24   BILIRUBIN 0.2   ALK PHOS 79         Lab 01/23/25  0816 01/23/25  0159   HSTROP T 101* 106*                 Brief Urine Lab Results  (Last result in the past 365 days)        Color   Clarity   Blood   Leuk Est   Nitrite   Protein   CREAT   Urine HCG        01/19/25 1404 Yellow   Cloudy   Moderate (2+)   Moderate (2+)   Negative   >=300 mg/dL (3+)                   Microbiology Results Abnormal       Procedure Component Value - Date/Time    Body Fluid Culture - Surgical Site, Foot, Left [[id number]]  (Abnormal)  (Susceptibility) Collected: 01/24/25 1754    Lab Status: Final result Specimen: Surgical Site from Foot, Left Updated: 01/27/25 0642     Body Fluid Culture Light growth (2+) Staphylococcus aureus, MRSA     Comment:   Methicillin resistant Staphylococcus aureus, Patient may be an isolation risk.        Gram Stain Rare (1+) WBCs seen      No organisms seen    Narrative:      Not a body fluid.    Susceptibility        Staphylococcus aureus, MRSA      TAYLOR      Clindamycin Susceptible      Erythromycin Resistant      Oxacillin Resistant      Rifampin Susceptible      Tetracycline Susceptible      Trimethoprim + Sulfamethoxazole Susceptible      Vancomycin Susceptible                           Wound Culture - Wound, Foot, Left [[id number]]  (Abnormal)  (Susceptibility) Collected: 01/20/25 1409    Lab Status: Final result Specimen: Wound from Foot, Left Updated:  01/24/25 0658     Wound Culture Scant growth (1+) Staphylococcus aureus, MRSA     Comment: Methicillin resistant Staphylococcus aureus, Patient may be an isolation risk.         Rare growth Morganella morganii ssp morganii     Comment:            Scant growth (1+) Normal Skin Kimber     Gram Stain Few (2+) WBCs seen      Many (4+) Gram positive cocci in pairs      Rare (1+) Gram negative bacilli      Rare (1+) Gram positive bacilli    Susceptibility        Staphylococcus aureus, MRSA      TAYLOR      Clindamycin Susceptible      Erythromycin Resistant      Oxacillin Resistant      Rifampin Susceptible      Tetracycline Susceptible      Trimethoprim + Sulfamethoxazole Susceptible      Vancomycin Susceptible                       Susceptibility        Morganella morganii ssp morganii      TAYLOR      Amoxicillin + Clavulanate Resistant      Ampicillin Resistant      Ampicillin + Sulbactam Resistant      Cefazolin (Non Urine) Resistant      Cefepime Susceptible  [1]       Cefotaxime Susceptible      Ceftazidime Susceptible  [1]       Gentamicin Susceptible      Levofloxacin Susceptible      Piperacillin + Tazobactam Susceptible      Tetracycline Susceptible      Trimethoprim + Sulfamethoxazole Susceptible                   [1]  Appended report. These results have been appended to a previously final verified report.                Susceptibility Comments       Morganella morganii ssp morganii    Cefotaxime susceptibility can be used as a surrogate for ceftriaxone susceptibility               MRSA Screen, PCR (Inpatient) - Swab, Nares [[id number]]  (Abnormal) Collected: 01/21/25 0614    Lab Status: Final result Specimen: Swab from Nares Updated: 01/21/25 0847     MRSA PCR Positive    Narrative:      The negative predictive value of this diagnostic test is high and should only be used to consider de-escalating anti-MRSA therapy. A positive result may indicate colonization with MRSA and must be correlated clinically.            No  radiology results from the last 24 hrs    Results for orders placed during the hospital encounter of 01/19/25    Adult Transthoracic Echo Complete W/ Cont if Necessary Per Protocol    Interpretation Summary    Left ventricular systolic function is normal. Estimated left ventricular EF = 55%    Left ventricular wall thickness is consistent with moderate concentric hypertrophy.    No hemodynamically significant valvular heart disease      Current medications:  Scheduled Meds:albumin human, , ,   allopurinol, 200 mg, Oral, Daily  amiodarone, 200 mg, Oral, TID  apixaban, 2.5 mg, Oral, Q12H  atorvastatin, 10 mg, Oral, Daily  cefTRIAXone, 2,000 mg, Intravenous, Q24H  famotidine, 20 mg, Oral, Daily  insulin lispro, 2-9 Units, Subcutaneous, 4x Daily AC & at Bedtime  metoprolol tartrate, 25 mg, Oral, Q12H  NIFEdipine XL, 30 mg, Oral, Q24H  pantoprazole, 40 mg, Oral, Q AM  sodium chloride, 10 mL, Intravenous, Q12H  vancomycin (dosing per levels), , Not Applicable, Daily      Continuous Infusions:Pharmacy Consult - Pharmacy to dose,   Pharmacy to dose vancomycin,       PRN Meds:.  acetaminophen    albumin human    albumin human    senna-docusate sodium **AND** polyethylene glycol **AND** bisacodyl **AND** bisacodyl    Calcium Replacement - Follow Nurse / BPA Driven Protocol    dextrose    dextrose    glucagon (human recombinant)    heparin (porcine)    HYDROmorphone    Magnesium Standard Dose Replacement - Follow Nurse / BPA Driven Protocol    melatonin    metoprolol tartrate    ondansetron    oxyCODONE    Pharmacy Consult - Pharmacy to dose    Pharmacy to dose vancomycin    Potassium Replacement - Follow Nurse / BPA Driven Protocol    [COMPLETED] Insert Peripheral IV **AND** sodium chloride    sodium chloride    sodium chloride    traMADol    Assessment & Plan   Assessment & Plan     Active Hospital Problems    Diagnosis  POA    **Generalized weakness [R53.1]  Yes    Paroxysmal A-fib [I48.0]  Unknown    Sepsis due to  methicillin resistant Staphylococcus aureus (MRSA) with encephalopathy without septic shock [A41.02, R65.20, G93.41]  Unknown    Chronic osteomyelitis of left foot [M86.672]  Unknown    Critical limb ischemia of left lower extremity [I70.222]  Unknown    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        Brief Hospital Course to date:  Sara Esparza is a 74 y.o. female with hx of ESRD on HD, PAF on Eliquis, HTN, and DM admitted for several weeks of generalized weakness, myalgias, and multiple falls in setting of lightheadedness.   After admission she was found to be septic with temps up to 102.4. Further workup revealed left calcaneal osteomyelitis. S/P debridement and wound vac placement 1/24/25. Vascular Surgery planning for LLE angiogram 2/3/25. Infectious Disease following for antibiotic management.     This patient's problems and plans were partially entered by my partner and updated as appropriate by me 01/28/25. Copied text in this note has been reviewed and is accurate as of today's date.      Sepsis, fever, leukocytosis, confusion  L calcaneal osteomyelitis   Left posterior tibial artery occlusion   - CT LLE without contrast 1/21/25: draining wound seen along plantar aspect of left calcaneus with surrounding changes consistent with osteomyelitis  - Ortho consulted - s/p debridement and wound vac placement 1/24/25 by Dr. Gonzalez. May ultimately require amputation, patient aware.   - Plan for LLE angiogram on 2/3/25 by Dr. Ramirez/Vascular Surgery   - MRSA and Morganella in cultures from wound site, ID following, continue Vanc and Rocephin for now  - Leukocytosis and fevers resolved     Generalized weakness, progressive x weeks  Falls at home   - Suspicion of overdiuresis and orthostasis  ongoing, with superimposed sepsis/infection now   - CT chest/abd/pelvis without acute findings   - Normal TSH and  CK   - PT/OT following, patient considering SNF      Neck pain, C7 region   C5-6 canal stenosis  Chronic back pain s/p spinal stimulator  - CT C-spine - no fractures  - History of oxycodone dependence; family requested avoiding opiates however due to uncontrolled pain, have initiated low dose oxycodone here which she has tolerated well thus far   - Plan to stop IV Dilaudid after 24 hours  - Recommended to patient/ to reach out to her pain management doctor's office as they have questions regarding her spinal stimulator     ESRD on HD  - Has a tunneled right IJ dialysis catheter  - Nephrology following  - HD on MWF     DMII,  HbA1c 4.9  - On sitagliptin at home  - SSI here, adjust as needed      HTN   - Nifedipine dose decreased due to tenuous BP's     Hx of Afib  Episode of Afib with RVR during HD  - Resumed home dose beta blocker (Coreg), but still was not rate controlled - Cardiology consulted, started on Amiodarone and changed to Metoprolol BID. Rate now controlled.   - Resume Eliquis today, need to hold 48 hours prior to angiogram 2/3/25, discussed with pharmacist who will coordinate this      Elevated troponin  - In setting of ESRD  - Troponin peaked at 132 on 1/19/25  - Per chart review, had LHC [facility] with non-obstructive CAD     Dyspepsia  - GI cocktail PRN     Constipation  - Likely due to opiates  - Improved with bowel regimen and addition of Lactulose        Expected Discharge Location and Transportation: home vs rehab  Expected Discharge   Expected Discharge Date: 2/6/2025; Expected Discharge Time:      VTE Prophylaxis:  Pharmacologic & mechanical VTE prophylaxis orders are present.         AM-PAC 6 Clicks Score (PT): 7 (01/28/25 0800)    CODE STATUS:   Code Status and Medical Interventions: CPR (Attempt to Resuscitate); Full Support   Ordered at: 01/19/25 7086      Level Of Support Discussed With:    Patient     Code Status (Patient has no pulse and is not breathing):    CPR (Attempt to Resuscitate)     Medical Interventions (Patient has pulse or is breathing):    Full Support       Sabrina Oconnor MD  01/28/25

## 2025-01-28 NOTE — PLAN OF CARE
Goal Outcome Evaluation:  Plan of Care Reviewed With: patient, spouse           Outcome Evaluation: wound vac dressing changed with no issues noted. Wound with no s/s of infection at this point with moderate exposure of bone / fascia / tendon. PT will cont with wound vac to help encourage granulation and improve healing potential.

## 2025-01-28 NOTE — PROGRESS NOTES
Sara Esparza  [id number]  1950   LOS: 9 days   Patient Care Team:  Toribio Roberts MD as PCP - General (Internal Medicine)    Chief Complaint:  ANOCA / ESRD / OBESITY & DEBILITY / AFIB / HTN / MRSA SEPSIS & HEEL OSTEOMYELITIS / SERIAL ELEVATED HS TROPONINS & ABNORMAL ACCEPTABLE ECHO / HYPONATREMIA / CHRONIC PAIN SYNDROME    Subjective     Comfortable this morning semisupine in bed on 2 L/min nasal cannula (oximetry 96%).  No anginal type chest discomfort, increased tachypnea/dyspnea, nausea, emesis, abdominal pain, headache, or focal motor-sensory changes.  Much more comfort this morning and relates back pain is much less notable.  She did have a bowel movement with lactulose regimen yesterday.  Acceptable appetite.  No current complaints.  Tolerated hemodialysis yesterday afternoon well.    Objective     Vital Sign Min/Max for last 24 hours  Temp  Min: 98.1 °F (36.7 °C)  Max: 98.3 °F (36.8 °C)   BP  Min: 111/88  Max: 174/75   Pulse  Min: 58  Max: 94   Resp  Min: 18  Max: 21   SpO2  Min: 90 %  Max: 100 %               01/22/25  1732 01/25/25  1139   Weight: 108 kg (238 lb) 108 kg (238 lb 1.6 oz)         Intake/Output Summary (Last 24 hours) at 1/28/2025 0724  Last data filed at 1/27/2025 2044  Gross per 24 hour   Intake 340 ml   Output 2070 ml   Net -1730 ml       Physical Exam:     General Appearance:    Alert, cooperative, in no acute distress   Lungs:   Overall diminished breath sounds without adventitial sounds/dullness,respirations regular, even and                unlabored    Heart:    Regular and normal rate, normal S1 and S2, 1/6 systolic     murmur, no gallop, no rub, no click   Abdomen:  Extremities:   Soft, nontender, bowel sounds audible x4  1+ generalized edema, normal range of motion   Pulses:   Pulses palpable and equal bilaterally      Results Review:   Results from last 7 days   Lab Units 01/27/25  0636 01/26/25  2246 01/25/25  0952   SODIUM mmol/L 134* 126* 137   POTASSIUM mmol/L 3.8  3.7 4.1   CHLORIDE mmol/L 97* 92* 97*   CO2 mmol/L 24.0 22.0 26.0   BUN mg/dL 56* 52* 33*   CREATININE mg/dL 4.93* 4.88* 3.22*   GLUCOSE mg/dL 129* 148* 197*   CALCIUM mg/dL 9.1 9.1 9.1     Results from last 7 days   Lab Units 01/27/25  0636 01/26/25  2247 01/25/25  0945   WBC 10*3/mm3 10.51 11.95* 10.23   HEMOGLOBIN g/dL 10.0* 10.3* 10.4*   HEMATOCRIT % 31.2* 32.3* 32.9*   PLATELETS 10*3/mm3 238 250 245     Results from last 7 days   Lab Units 01/23/25  0816 01/23/25  0159   HSTROP T ng/L 101* 106*     ACCU-CHEKS: [id number] MGs/DL.    NO NEW CXR / EKG / LAB RESULTS.    Medication Review: REVIEWED; NO DRIPS.    Assessment & Plan     Acceptable hemodynamics and persistent normal sinus rhythm.  Would continue current cardiac medications.  Would defer MPS/LHC.      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

## 2025-01-28 NOTE — THERAPY WOUND CARE TREATMENT
Acute Care - Wound/Debridement Treatment Note  [facility]     Patient Name: Sara Esparza  : 1950  MRN: [id number]  Today's Date: 2025                Admit Date: 2025    Visit Dx:    ICD-10-CM ICD-9-CM   1. Generalized weakness  R53.1 780.79   2. Frequent falls  R29.6 V15.88   3. Chronic kidney disease, unspecified CKD stage  N18.9 585.9   4. Chronic anemia  D64.9 285.9   5. Type 2 diabetes mellitus with stage 5 chronic kidney disease not on chronic dialysis, without long-term current use of insulin  E11.22 250.40    N18.5 585.5   6. Chronic osteomyelitis of left foot  M86.672 730.17   7. Critical limb ischemia of left lower extremity  I70.222 440.22       Patient Active Problem List   Diagnosis    Postoperative abdominal hernia    ESRD (end stage renal disease)    Type 2 diabetes mellitus with stage 5 chronic kidney disease not on chronic dialysis, without long-term current use of insulin    Hypokalemia    Malnutrition    Severe malnutrition    Generalized weakness    Chronic osteomyelitis of left foot    Paroxysmal A-fib    Sepsis due to methicillin resistant Staphylococcus aureus (MRSA) with encephalopathy without septic shock    Critical limb ischemia of left lower extremity        Past Medical History:   Diagnosis Date    Anxiety     Arthritis     Asthma     allergy induced    Back pain     Depression     Diabetes mellitus     dx 10 years ago- checks fsbs most days    Diverticula of colon     GERD (gastroesophageal reflux disease)     Head ache     Hypertension     Sleep apnea     no longer needs cpap - approx. 10 years r/t weight loss        Past Surgical History:   Procedure Laterality Date    BACK SURGERY      x 2    CARDIAC CATHETERIZATION      no intervention    CHOLECYSTECTOMY OPEN      COLONOSCOPY      2013    FOOT IRRIGATION, DEBRIDEMENT AND REPAIR Left 2025    Procedure: HEEL IRRIGATION AND DEBRIDEMENT LEFT;  Surgeon: Rom Gonzalez Jr., MD;  Location: [facility];  Service:  Orthopedics;  Laterality: Left;    HYSTERECTOMY      PLACEMENT OF WOUND VAC Left 1/24/2025    Procedure: PLACEMENT OF WOUND VAC;  Surgeon: Rom Gonzalez Jr., MD;  Location:  [facility];  Service: Orthopedics;  Laterality: Left;    REPLACEMENT TOTAL KNEE BILATERAL Bilateral     TONSILLECTOMY      VENTRAL/INCISIONAL HERNIA REPAIR N/A 6/6/2017    Procedure: INCISIONAL HERNIA REPAIR LAPAROSCOPIC;  Surgeon: Conrad Hernandez MD;  Location:  [facility];  Service:            Wound 01/19/25 2000 Left posterior plantar pressure injury (Active)   Wound Image   01/28/25 1045   Dressing Appearance intact 01/28/25 1045   Closure Unable to assess 01/28/25 0800   Base moist;pink;red;subcutaneous;yellow;exposed structure 01/28/25 1045   Periwound dry;intact 01/28/25 1045   Periwound Temperature warm 01/28/25 1045   Periwound Skin Turgor firm 01/28/25 1045   Wound Length (cm) 1.8 cm 01/28/25 1045   Wound Width (cm) 2 cm 01/28/25 1045   Wound Depth (cm) 1.8 cm 01/28/25 1045   Wound Surface Area (cm^2) 3.6 cm^2 01/28/25 1045   Wound Volume (cm^3) 6.48 cm^3 01/28/25 1045   Undermining [Depth (cm)/Location] 2cm undermining circumferentially 01/28/25 1045   Drainage Characteristics/Odor serosanguineous;sanguineous 01/28/25 1045   Drainage Amount small 01/28/25 1045   Care, Wound irrigated with;wound cleanser;debrided;negative pressure wound therapy 01/28/25 1045   Dressing Care dressing changed 01/28/25 1045       Wound 01/19/25 2000 Left lower leg (Active)   Dressing Appearance open to air;no drainage 01/28/25 0800   Closure None;Open to air 01/28/25 0800   Base maroon/purple 01/28/25 0800       NPWT (Negative Pressure Wound Therapy) 01/24/25 1743 Left heel (Active)   Therapy Setting continuous therapy 01/28/25 1045   Dressing foam, black 01/28/25 1045   Contact Layer other (see comments) 01/28/25 1045   Pressure Setting 125 mmHg 01/28/25 1045   Sponges Inserted 1 01/28/25 1045   Sponges Removed 1 01/28/25 1045   Finger sweep complete Yes  01/28/25 1045         WOUND DEBRIDEMENT  Total area of Debridement: ~2cm2  Debridement Site 1  Location- Site 1: L heel wound and periwound area  Selective Debridement- Site 1: Wound Surface <20cmsq  Instruments- Site 1: tweezers, scissors  Excised Tissue Description- Site 1: minimum, slough, other (comment) (callused periwound tissue.)  Bleeding- Site 1: none               PT Assessment (Last 12 Hours)       PT Evaluation and Treatment       Row Name 01/28/25 1045          Physical Therapy Time and Intention    Subjective Information complains of;weakness;fatigue;pain  -     Document Type therapy note (daily note);wound care  -     Mode of Treatment individual therapy;physical therapy  -       Row Name 01/28/25 1045          Pain    Pain Management Interventions premedicated for activity;positioning techniques utilized  -     Additional Documentation Pain Scale: FACES Pre/Post-Treatment (Group)  -       Row Name 01/28/25 1045          Pain Scale: FACES Pre/Post-Treatment    Pain: FACES Scale, Pretreatment 4-->hurts little more  -     Posttreatment Pain Rating 4-->hurts little more  -       Row Name 01/28/25 1045          Wound 01/19/25 2000 Left posterior plantar pressure injury    Wound - Properties Group Placement Date: 01/19/25  -AM Placement Time: 2000 -AM Side: Left  -AM Orientation: posterior  -AM Location: plantar  -AM Primary Wound Type: Pressure inj  -AM Type: pressure injury  -AM Present on Original Admission: Y  -AM Additional Comments: Left heel  -AM    Wound Image Images linked: 1  -MF     Dressing Appearance intact  -     Base moist;pink;red;subcutaneous;yellow;exposed structure  -     Periwound dry;intact  -     Periwound Temperature warm  -     Periwound Skin Turgor firm  -     Wound Length (cm) 1.8 cm  -     Wound Width (cm) 2 cm  -MF     Wound Depth (cm) 1.8 cm  -MF     Wound Surface Area (cm^2) 3.6 cm^2  -MF     Wound Volume (cm^3) 6.48 cm^3  -MF     Undermining [Depth  (cm)/Location] 2cm undermining circumferentially  -     Drainage Characteristics/Odor serosanguineous;sanguineous  -MF     Drainage Amount small  -MF     Care, Wound irrigated with;wound cleanser;debrided;negative pressure wound therapy  -     Dressing Care dressing changed  -     Retired Wound - Properties Group Placement Date: 01/19/25  -AM Placement Time: 2000 -AM Present on Original Admission: Y  -AM Side: Left  -AM Orientation: posterior  -AM Location: plantar  -AM Primary Wound Type: Pressure inj  -AM Additional Comments: Left heel  -AM Type: pressure injury  -AM    Retired Wound - Properties Group Placement Date: 01/19/25  -AM Placement Time: 2000 -AM Present on Original Admission: Y  -AM Side: Left  -AM Orientation: posterior  -AM Location: plantar  -AM Primary Wound Type: Pressure inj  -AM Additional Comments: Left heel  -AM Type: pressure injury  -AM    Retired Wound - Properties Group Date first assessed: 01/19/25  -AM Time first assessed: 2000 -AM Present on Original Admission: Y  -AM Side: Left  -AM Location: plantar  -AM Primary Wound Type: Pressure inj  -AM Additional Comments: Left heel  -AM Type: pressure injury  -AM      Row Name             Wound 01/19/25 2000 Left lower leg    Wound - Properties Group Placement Date: 01/19/25 -AM Placement Time: 2000 -AM Side: Left  -AM Orientation: lower  -AM Location: leg  -AM Primary Wound Type: Abrasion  -AM Present on Original Admission: Y  -AM    Retired Wound - Properties Group Placement Date: 01/19/25 -AM Placement Time: 2000 -AM Present on Original Admission: Y  -AM Side: Left  -AM Orientation: lower  -AM Location: leg  -AM Primary Wound Type: Abrasion  -AM    Retired Wound - Properties Group Placement Date: 01/19/25  -AM Placement Time: 2000 -AM Present on Original Admission: Y  -AM Side: Left  -AM Orientation: lower  -AM Location: leg  -AM Primary Wound Type: Abrasion  -AM    Retired Wound - Properties Group Date first assessed: 01/19/25   -AM Time first assessed: 2000  -AM Present on Original Admission: Y  -AM Side: Left  -AM Location: leg  -AM Primary Wound Type: Abrasion  -AM      Row Name 01/28/25 1045          NPWT (Negative Pressure Wound Therapy) 01/24/25 1743 Left heel    NPWT (Negative Pressure Wound Therapy) - Properties Group Placement Date: 01/24/25  -ML Placement Time: 1743  -ML Location: Left heel  -ML Additional Comments: Placed in OR, present on arrival to PACU  -ML    Therapy Setting continuous therapy  -MF     Dressing foam, black  -MF     Contact Layer other (see comments)  dwight Ag  -MF     Pressure Setting 125 mmHg  -MF     Sponges Inserted 1  -MF     Sponges Removed 1  -MF     Finger sweep complete Yes  -MF     Retired NPWT (Negative Pressure Wound Therapy) - Properties Group Placement Date: 01/24/25  -ML Placement Time: 1743  -ML Location: Left heel  -ML Additional Comments: Placed in OR, present on arrival to PACU  -ML    Retired NPWT (Negative Pressure Wound Therapy) - Properties Group Placement Date: 01/24/25  -ML Placement Time: 1743  -ML Location: Left heel  -ML Additional Comments: Placed in OR, present on arrival to PACU  -ML    Retired NPWT (Negative Pressure Wound Therapy) - Properties Group Placement Date: 01/24/25  -ML Placement Time: 1743  -ML Location: Left heel  -ML Additional Comments: Placed in OR, present on arrival to PACU  -ML      Row Name 01/28/25 1045          Coping    Observed Emotional State anxious  -     Verbalized Emotional State anxiety  -     Trust Relationship/Rapport care explained  -       Row Name 01/28/25 1048          Plan of Care Review    Plan of Care Reviewed With patient;spouse  -     Outcome Evaluation wound vac dressing changed with no issues noted. Wound with no s/s of infection at this point with moderate exposure of bone / fascia / tendon. PT will cont with wound vac to help encourage granulation and improve healing potential.  -       Row Name 01/28/25 104           Positioning and Restraints    Pre-Treatment Position in bed  -MF     Post Treatment Position bed  -MF     In Bed supine;call light within reach  -MF               User Key  (r) = Recorded By, (t) = Taken By, (c) = Cosigned By      Initials Name Provider Type    Cody Young, PT Physical Therapist    Katya Flores, RN Registered Nurse    Jane Schafer RN Registered Nurse                  Physical Therapy Education        No education to display                    Recommendation and Plan  Anticipated Discharge Disposition (PT): other (see comments) (will defer to PT mobility when appropriate. Will require skilled wound care upon discharge.)  Planned Therapy Interventions (PT): wound care, patient/family education  Plan of Care Reviewed With: patient, spouse           Outcome Evaluation: wound vac dressing changed with no issues noted. Wound with no s/s of infection at this point with moderate exposure of bone / fascia / tendon. PT will cont with wound vac to help encourage granulation and improve healing potential.  Plan of Care Reviewed With: patient, spouse            Time Calculation   PT Charges       Row Name 01/28/25 1045             Time Calculation    Start Time 1045  -MF      PT Goal Re-Cert Due Date 02/04/25  -MF         Untimed Charges    Wound Care 56875 Selective debridement  -      63276-Nuugdprvs debridement 10  -MF      51173-Pop Pressure wound to 50 sqcm 25  -MF         Total Minutes    Untimed Charges Total Minutes 35  -MF       Total Minutes 35  -MF                User Key  (r) = Recorded By, (t) = Taken By, (c) = Cosigned By      Initials Name Provider Type    Cody Young, PT Physical Therapist                            PT G-Codes  AM-PAC 6 Clicks Score (PT): 7       Cody Mehta, PT  1/28/2025

## 2025-01-28 NOTE — PROGRESS NOTES
"   LOS: 9 days    Patient Care Team:  Toribio Roberts MD as PCP - General (Internal Medicine)    Subjective     Chart reviewed.  No new events no added distress.  Family in the room.    Objective     Vital Signs:  Blood pressure 156/64, pulse 67, temperature 98.2 °F (36.8 °C), temperature source Oral, resp. rate 16, height 170.2 cm (67.01\"), weight 108 kg (238 lb 1.6 oz), SpO2 92%.      Intake/Output Summary (Last 24 hours) at 1/28/2025 1846  Last data filed at 1/27/2025 2044  Gross per 24 hour   Intake 100 ml   Output --   Net 100 ml        01/27 0701 - 01/28 0700  In: 340 [P.O.:240]  Out: 2070 [Urine:60]    Physical Exam:  GENERAL: Awake alert oriented  female lying comfortably in bed.  NEURO:  No focal deficit  PSYCHIATRIC: Cooperative with PE  EYE: PERR.  NECK:  No JVD discernable,  Trachea midline  CV: Positive edema, RRR  LUNGS:  Quiet,  Nonlabored resp.  Symmetrical expansion  ABDOMEN: Nondistended, soft nontender.  : No [place], no palp bladder  SKIN: Warm and dry without rash  Left foot dressed with wound vac  + RIJ TDC    Labs:  Results from last 7 days   Lab Units 01/28/25  0941 01/27/25  0636 01/26/25  2247   WBC 10*3/mm3 9.57 10.51 11.95*   HEMOGLOBIN g/dL 9.8* 10.0* 10.3*   PLATELETS 10*3/mm3 244 238 250     Results from last 7 days   Lab Units 01/28/25  0941 01/27/25  0636 01/26/25  2246 01/25/25  0945 01/23/25  0816 01/22/25  0652   SODIUM mmol/L 138 134* 126* 137   < > 133*   POTASSIUM mmol/L 4.0 3.8 3.7 4.1   < > 4.2   CHLORIDE mmol/L 99 97* 92* 97*   < > 96*   CO2 mmol/L 27.0 24.0 22.0 26.0   < > 22.0   BUN mg/dL 27* 56* 52* 33*   < > 67*   CREATININE mg/dL 3.45* 4.93* 4.88* 3.22*   < > 5.49*   CALCIUM mg/dL 9.2 9.1 9.1 9.1   < > 9.3   PHOSPHORUS mg/dL  --   --   --   --   --  5.5*   ALBUMIN g/dL  --   --   --   --   --  2.4*    < > = values in this interval not displayed.     Results from last 7 days   Lab Units 01/22/25  0652   ALK PHOS U/L 79   BILIRUBIN mg/dL 0.2   ALT (SGPT) " U/L 32   AST (SGOT) U/L 24           A/P:    1.  ESRD: On HD MWF at [facility] with NAL.  No need for hemodialysis today.   -Hemodialysis today as per schedule.  Tunnel catheter in place     2.  HTN: Blood pressure stable    3.  Hyponatremia:  Due to underlying renal failure.  Adjust with HD.     4.  Hyperkalemia: Resolved.  Adjust with HD     5.  Metabolic acidosis:  Adjust with HD     6.  Anemia: Hemoglobin above goal.  No CORNEL unless hemoglobin <10.5.    7.  Hyperphosphatemia: Last phos level 5.5     8.  Nutrition: Low potassium low Phos high-protein diet.  Renal vitamin.     9.  Fever: On antibiotics.  Urine culture with mixed hanny.  Patient with foot wound.  S/p debridement and wound VAC.     High risk complex patient multiple medical problems.    Angelica Baldwin MD  01/28/25  18:46 EST

## 2025-01-28 NOTE — PLAN OF CARE
Problem: Adult Inpatient Plan of Care  Goal: Plan of Care Review  Outcome: Progressing  Flowsheets (Taken 1/28/2025 0438)  Progress: improving  Outcome Evaluation: VSS on 2L O2. Given PRN meds for pain. Pt given lactulose and has had multiple large BM's. Abx given as ordered. Pt has been in sinus rhythm.  Plan of Care Reviewed With: patient  Goal: Patient-Specific Goal (Individualized)  Outcome: Progressing  Goal: Absence of Hospital-Acquired Illness or Injury  Outcome: Progressing  Intervention: Identify and Manage Fall Risk  Recent Flowsheet Documentation  Taken 1/28/2025 0400 by Cristal Johnson, RN  Safety Promotion/Fall Prevention:   assistive device/personal items within reach   clutter free environment maintained   fall prevention program maintained   gait belt   lighting adjusted   nonskid shoes/slippers when out of bed   room organization consistent   safety round/check completed   toileting scheduled  Taken 1/28/2025 0200 by Cristal Johnson, RN  Safety Promotion/Fall Prevention:   assistive device/personal items within reach   clutter free environment maintained   fall prevention program maintained   gait belt   lighting adjusted   nonskid shoes/slippers when out of bed   room organization consistent   safety round/check completed   toileting scheduled  Taken 1/28/2025 0000 by Cristal Johnson, RN  Safety Promotion/Fall Prevention:   assistive device/personal items within reach   clutter free environment maintained   fall prevention program maintained   gait belt   lighting adjusted   nonskid shoes/slippers when out of bed   room organization consistent   safety round/check completed   toileting scheduled  Taken 1/27/2025 2200 by Cristal Johnson, RN  Safety Promotion/Fall Prevention:   assistive device/personal items within reach   clutter free environment maintained   fall prevention program maintained   gait belt   lighting adjusted   nonskid shoes/slippers when out of bed   room organization  consistent   safety round/check completed   toileting scheduled  Taken 1/27/2025 2000 by Cristal Johnson RN  Safety Promotion/Fall Prevention:   assistive device/personal items within reach   clutter free environment maintained   fall prevention program maintained   gait belt   lighting adjusted   nonskid shoes/slippers when out of bed   room organization consistent   safety round/check completed   toileting scheduled  Intervention: Prevent Skin Injury  Recent Flowsheet Documentation  Taken 1/28/2025 0400 by Cristal Johnson RN  Body Position:   turned   left  Skin Protection:   incontinence pads utilized   protective footwear used   silicone foam dressing in place   transparent dressing maintained  Taken 1/28/2025 0200 by Cristal Johnson RN  Body Position:   turned   right  Skin Protection:   incontinence pads utilized   protective footwear used   silicone foam dressing in place   transparent dressing maintained  Taken 1/28/2025 0000 by Cristal Johnson RN  Body Position:   turned   left  Skin Protection:   incontinence pads utilized   protective footwear used   silicone foam dressing in place   transparent dressing maintained  Taken 1/27/2025 2200 by Cristal Johnson RN  Body Position:   supine   legs elevated  Skin Protection:   incontinence pads utilized   protective footwear used   silicone foam dressing in place   transparent dressing maintained  Taken 1/27/2025 2000 by Cristal Johnson RN  Body Position: supine  Skin Protection: (silicone dressings pulled back and skin assessed)   incontinence pads utilized   protective footwear used   silicone foam dressing in place   transparent dressing maintained   other (see comments)  Intervention: Prevent and Manage VTE (Venous Thromboembolism) Risk  Recent Flowsheet Documentation  Taken 1/28/2025 0400 by Cristal Johnson RN  VTE Prevention/Management:   bilateral   SCDs (sequential compression devices) on  Taken 1/28/2025 0200 by Alex  PAULIE Bee  VTE Prevention/Management:   bilateral   SCDs (sequential compression devices) on  Taken 1/28/2025 0000 by Cristal Johnson RN  VTE Prevention/Management:   bilateral   SCDs (sequential compression devices) on  Taken 1/27/2025 2200 by Cristal Johnson RN  VTE Prevention/Management:   bilateral   SCDs (sequential compression devices) on  Taken 1/27/2025 2000 by Cristal Johnson RN  VTE Prevention/Management:   bilateral   SCDs (sequential compression devices) on  Intervention: Prevent Infection  Recent Flowsheet Documentation  Taken 1/28/2025 0400 by Cristal Johnson RN  Infection Prevention:   environmental surveillance performed   hand hygiene promoted   personal protective equipment utilized   rest/sleep promoted  Taken 1/28/2025 0200 by Cristal Johnson RN  Infection Prevention:   environmental surveillance performed   personal protective equipment utilized   hand hygiene promoted   rest/sleep promoted  Taken 1/28/2025 0000 by Cristal Johnson RN  Infection Prevention:   environmental surveillance performed   hand hygiene promoted   personal protective equipment utilized   rest/sleep promoted  Taken 1/27/2025 2200 by Cristal Johnson RN  Infection Prevention:   environmental surveillance performed   hand hygiene promoted   personal protective equipment utilized   rest/sleep promoted  Taken 1/27/2025 2000 by Cristal Johnson RN  Infection Prevention:   environmental surveillance performed   hand hygiene promoted   rest/sleep promoted   personal protective equipment utilized  Goal: Optimal Comfort and Wellbeing  Outcome: Progressing  Intervention: Monitor Pain and Promote Comfort  Recent Flowsheet Documentation  Taken 1/27/2025 2210 by Cristal Johnson RN  Pain Management Interventions: pain medication given  Goal: Readiness for Transition of Care  Outcome: Progressing     Problem: Skin Injury Risk Increased  Goal: Skin Health and Integrity  Outcome:  Progressing  Intervention: Optimize Skin Protection  Recent Flowsheet Documentation  Taken 1/28/2025 0400 by Cristal Johnson RN  Activity Management: activity encouraged  Pressure Reduction Techniques:   frequent weight shift encouraged   heels elevated off bed   pressure points protected   weight shift assistance provided  Head of Bed (HOB) Positioning: HOB elevated  Pressure Reduction Devices:   foam padding utilized   positioning supports utilized   pressure-redistributing mattress utilized  Skin Protection:   incontinence pads utilized   protective footwear used   silicone foam dressing in place   transparent dressing maintained  Taken 1/28/2025 0200 by Cristal Johnson RN  Activity Management: activity encouraged  Pressure Reduction Techniques:   frequent weight shift encouraged   heels elevated off bed   pressure points protected   weight shift assistance provided  Head of Bed (HOB) Positioning: HOB elevated  Pressure Reduction Devices:   foam padding utilized   positioning supports utilized   pressure-redistributing mattress utilized  Skin Protection:   incontinence pads utilized   protective footwear used   silicone foam dressing in place   transparent dressing maintained  Taken 1/28/2025 0000 by Cristal Johnson RN  Activity Management: activity encouraged  Pressure Reduction Techniques:   frequent weight shift encouraged   heels elevated off bed   pressure points protected   weight shift assistance provided  Head of Bed (HOB) Positioning: HOB elevated  Pressure Reduction Devices:   foam padding utilized   positioning supports utilized   pressure-redistributing mattress utilized  Skin Protection:   incontinence pads utilized   protective footwear used   silicone foam dressing in place   transparent dressing maintained  Taken 1/27/2025 2200 by Cristal Johnson RN  Activity Management: activity encouraged  Pressure Reduction Techniques:   frequent weight shift encouraged   heels elevated off  bed   pressure points protected   weight shift assistance provided  Head of Bed (HOB) Positioning: [facility] elevated  Pressure Reduction Devices:   foam padding utilized   positioning supports utilized   pressure-redistributing mattress utilized  Skin Protection:   incontinence pads utilized   protective footwear used   silicone foam dressing in place   transparent dressing maintained  Taken 1/27/2025 2000 by Cristal Johnson RN  Activity Management: activity encouraged  Pressure Reduction Techniques:   frequent weight shift encouraged   heels elevated off bed   pressure points protected   weight shift assistance provided  Head of Bed (HOB) Positioning: [facility] elevated  Pressure Reduction Devices:   foam padding utilized   positioning supports utilized   pressure-redistributing mattress utilized  Skin Protection: (silicone dressings pulled back and skin assessed)   incontinence pads utilized   protective footwear used   silicone foam dressing in place   transparent dressing maintained   other (see comments)     Problem: Fall Injury Risk  Goal: Absence of Fall and Fall-Related Injury  Outcome: Progressing  Intervention: Identify and Manage Contributors  Recent Flowsheet Documentation  Taken 1/28/2025 0400 by Cristal Johnson, RN  Medication Review/Management: medications reviewed  Taken 1/28/2025 0200 by Cristal Johnson, RN  Medication Review/Management: medications reviewed  Taken 1/28/2025 0000 by Cristal Johnson, RN  Medication Review/Management: medications reviewed  Taken 1/27/2025 2000 by Cristal Johnson, RN  Medication Review/Management: medications reviewed  Intervention: Promote Injury-Free Environment  Recent Flowsheet Documentation  Taken 1/28/2025 0400 by Cristal Johnson, RN  Safety Promotion/Fall Prevention:   assistive device/personal items within reach   clutter free environment maintained   fall prevention program maintained   gait belt   lighting adjusted   nonskid shoes/slippers when out of  bed   room organization consistent   safety round/check completed   toileting scheduled  Taken 1/28/2025 0200 by Cristal Johnson, RN  Safety Promotion/Fall Prevention:   assistive device/personal items within reach   clutter free environment maintained   fall prevention program maintained   gait belt   lighting adjusted   nonskid shoes/slippers when out of bed   room organization consistent   safety round/check completed   toileting scheduled  Taken 1/28/2025 0000 by Cristal Johnson, RN  Safety Promotion/Fall Prevention:   assistive device/personal items within reach   clutter free environment maintained   fall prevention program maintained   gait belt   lighting adjusted   nonskid shoes/slippers when out of bed   room organization consistent   safety round/check completed   toileting scheduled  Taken 1/27/2025 2200 by Cristal Johnson, RN  Safety Promotion/Fall Prevention:   assistive device/personal items within reach   clutter free environment maintained   fall prevention program maintained   gait belt   lighting adjusted   nonskid shoes/slippers when out of bed   room organization consistent   safety round/check completed   toileting scheduled  Taken 1/27/2025 2000 by Cristal Johnson, RN  Safety Promotion/Fall Prevention:   assistive device/personal items within reach   clutter free environment maintained   fall prevention program maintained   gait belt   lighting adjusted   nonskid shoes/slippers when out of bed   room organization consistent   safety round/check completed   toileting scheduled     Problem: Comorbidity Management  Goal: Maintenance of Heart Failure Symptom Control  Outcome: Progressing  Intervention: Maintain Heart Failure Management  Recent Flowsheet Documentation  Taken 1/28/2025 0400 by Cristal Johnson, RN  Medication Review/Management: medications reviewed  Taken 1/28/2025 0200 by Cristal Johnson, RN  Medication Review/Management: medications reviewed  Taken 1/28/2025 0000  by Cristal Johnson RN  Medication Review/Management: medications reviewed  Taken 1/27/2025 2000 by Cristal Johnson RN  Medication Review/Management: medications reviewed  Goal: Blood Pressure in Desired Range  Outcome: Progressing  Intervention: Maintain Blood Pressure Management  Recent Flowsheet Documentation  Taken 1/28/2025 0400 by Cristal Johnson RN  Medication Review/Management: medications reviewed  Taken 1/28/2025 0200 by Cristal Johnson RN  Medication Review/Management: medications reviewed  Taken 1/28/2025 0000 by Cristal Johnson RN  Medication Review/Management: medications reviewed  Taken 1/27/2025 2000 by Cristal Johnson RN  Medication Review/Management: medications reviewed     Problem: Hemodialysis  Goal: Safe, Effective Therapy Delivery  Outcome: Progressing  Intervention: Optimize Device Care and Function  Recent Flowsheet Documentation  Taken 1/28/2025 0400 by Cristal Johnson RN  Medication Review/Management: medications reviewed  Taken 1/28/2025 0200 by Cristal Johnson RN  Medication Review/Management: medications reviewed  Taken 1/28/2025 0000 by Cristal Johnson RN  Medication Review/Management: medications reviewed  Taken 1/27/2025 2000 by Cristal Johsnon RN  Medication Review/Management: medications reviewed  Goal: Effective Tissue Perfusion  Outcome: Progressing  Goal: Absence of Infection Signs and Symptoms  Outcome: Progressing  Intervention: Prevent or Manage Infection  Recent Flowsheet Documentation  Taken 1/28/2025 0400 by Cristal Johnson RN  Infection Prevention:   environmental surveillance performed   hand hygiene promoted   personal protective equipment utilized   rest/sleep promoted  Taken 1/28/2025 0200 by Cristal Johnson RN  Infection Prevention:   environmental surveillance performed   personal protective equipment utilized   hand hygiene promoted   rest/sleep promoted  Taken 1/28/2025 0000 by Cristal Johnson RN  Infection  Prevention:   environmental surveillance performed   hand hygiene promoted   personal protective equipment utilized   rest/sleep promoted  Taken 1/27/2025 2200 by Cristal Johnson RN  Infection Prevention:   environmental surveillance performed   hand hygiene promoted   personal protective equipment utilized   rest/sleep promoted  Taken 1/27/2025 2000 by Cristal Johnson RN  Infection Prevention:   environmental surveillance performed   hand hygiene promoted   rest/sleep promoted   personal protective equipment utilized     Problem: Sepsis/Septic Shock  Goal: Optimal Coping  Outcome: Progressing  Goal: Absence of Bleeding  Outcome: Progressing  Goal: Blood Glucose Level Within Target Range  Outcome: Progressing  Goal: Absence of Infection Signs and Symptoms  Outcome: Progressing  Intervention: Initiate Sepsis Management  Recent Flowsheet Documentation  Taken 1/28/2025 0400 by Cristal Johnson RN  Infection Prevention:   environmental surveillance performed   hand hygiene promoted   personal protective equipment utilized   rest/sleep promoted  Taken 1/28/2025 0200 by Cristal Johnson RN  Infection Prevention:   environmental surveillance performed   personal protective equipment utilized   hand hygiene promoted   rest/sleep promoted  Taken 1/28/2025 0000 by Cristal Johnson RN  Infection Prevention:   environmental surveillance performed   hand hygiene promoted   personal protective equipment utilized   rest/sleep promoted  Taken 1/27/2025 2200 by Cristal Johnson RN  Infection Prevention:   environmental surveillance performed   hand hygiene promoted   personal protective equipment utilized   rest/sleep promoted  Taken 1/27/2025 2000 by Cristal Johnson RN  Infection Prevention:   environmental surveillance performed   hand hygiene promoted   rest/sleep promoted   personal protective equipment utilized  Intervention: Promote Recovery  Recent Flowsheet Documentation  Taken 1/28/2025 0400 by  Cristal Johnson, RN  Activity Management: activity encouraged  Taken 1/28/2025 0200 by Cristal Johnson, RN  Activity Management: activity encouraged  Taken 1/28/2025 0000 by Cristal Johnson, RN  Activity Management: activity encouraged  Taken 1/27/2025 2200 by Cristal Johnson, RN  Activity Management: activity encouraged  Taken 1/27/2025 2000 by Cristal Johnson, RN  Activity Management: activity encouraged  Goal: Optimal Nutrition Delivery  Outcome: Progressing   Goal Outcome Evaluation:  Plan of Care Reviewed With: patient        Progress: improving  Outcome Evaluation: VSS on 2L O2. Given PRN meds for pain. Pt given lactulose and has had multiple large BM's. Abx given as ordered. Pt has been in sinus rhythm.

## 2025-01-29 ENCOUNTER — APPOINTMENT (OUTPATIENT)
Dept: NEPHROLOGY | Facility: HOSPITAL | Age: 75
DRG: 853 | End: 2025-01-29
Payer: MEDICARE

## 2025-01-29 LAB
GLUCOSE BLDC GLUCOMTR-MCNC: 111 MG/DL (ref 70–130)
GLUCOSE BLDC GLUCOMTR-MCNC: 126 MG/DL (ref 70–130)
GLUCOSE BLDC GLUCOMTR-MCNC: 140 MG/DL (ref 70–130)
GLUCOSE BLDC GLUCOMTR-MCNC: 166 MG/DL (ref 70–130)
VANCOMYCIN SERPL-MCNC: 21.2 MCG/ML (ref 5–40)

## 2025-01-29 PROCEDURE — 25810000003 SODIUM CHLORIDE 0.9 % SOLUTION 250 ML FLEX CONT

## 2025-01-29 PROCEDURE — 82948 REAGENT STRIP/BLOOD GLUCOSE: CPT

## 2025-01-29 PROCEDURE — 63710000001 INSULIN LISPRO (HUMAN) PER 5 UNITS: Performed by: ORTHOPAEDIC SURGERY

## 2025-01-29 PROCEDURE — 25010000002 CEFTRIAXONE PER 250 MG

## 2025-01-29 PROCEDURE — 97605 NEG PRS WND THER DME<=50SQCM: CPT

## 2025-01-29 PROCEDURE — 25010000002 HEPARIN (PORCINE) PER 1000 UNITS: Performed by: ORTHOPAEDIC SURGERY

## 2025-01-29 PROCEDURE — 25010000002 EPOETIN ALFA PER 1000 UNITS: Performed by: INTERNAL MEDICINE

## 2025-01-29 PROCEDURE — 25010000002 VANCOMYCIN 1 G RECONSTITUTED SOLUTION 1 EACH VIAL

## 2025-01-29 PROCEDURE — 80202 ASSAY OF VANCOMYCIN: CPT

## 2025-01-29 PROCEDURE — 99232 SBSQ HOSP IP/OBS MODERATE 35: CPT | Performed by: INTERNAL MEDICINE

## 2025-01-29 PROCEDURE — 99232 SBSQ HOSP IP/OBS MODERATE 35: CPT | Performed by: HOSPITALIST

## 2025-01-29 RX ORDER — AMIODARONE HYDROCHLORIDE 200 MG/1
200 TABLET ORAL EVERY 12 HOURS SCHEDULED
Status: DISCONTINUED | OUTPATIENT
Start: 2025-01-29 | End: 2025-02-05

## 2025-01-29 RX ADMIN — ATORVASTATIN CALCIUM 10 MG: 10 TABLET, FILM COATED ORAL at 12:40

## 2025-01-29 RX ADMIN — AMIODARONE HYDROCHLORIDE 200 MG: 200 TABLET ORAL at 12:40

## 2025-01-29 RX ADMIN — APIXABAN 2.5 MG: 2.5 TABLET, FILM COATED ORAL at 12:40

## 2025-01-29 RX ADMIN — TRAMADOL HYDROCHLORIDE 50 MG: 50 TABLET, COATED ORAL at 01:07

## 2025-01-29 RX ADMIN — OXYCODONE 5 MG: 5 TABLET ORAL at 23:05

## 2025-01-29 RX ADMIN — SODIUM CHLORIDE 2000 MG: 900 INJECTION INTRAVENOUS at 20:15

## 2025-01-29 RX ADMIN — Medication 2.5 MG: at 01:06

## 2025-01-29 RX ADMIN — HEPARIN SODIUM 2000 UNITS: 1000 INJECTION INTRAVENOUS; SUBCUTANEOUS at 12:14

## 2025-01-29 RX ADMIN — METOPROLOL TARTRATE 25 MG: 25 TABLET, FILM COATED ORAL at 12:40

## 2025-01-29 RX ADMIN — METOPROLOL TARTRATE 25 MG: 25 TABLET, FILM COATED ORAL at 20:15

## 2025-01-29 RX ADMIN — FAMOTIDINE 20 MG: 20 TABLET, FILM COATED ORAL at 12:40

## 2025-01-29 RX ADMIN — OXYCODONE 5 MG: 5 TABLET ORAL at 07:53

## 2025-01-29 RX ADMIN — INSULIN LISPRO 2 UNITS: 100 INJECTION, SOLUTION INTRAVENOUS; SUBCUTANEOUS at 20:16

## 2025-01-29 RX ADMIN — Medication 10 ML: at 20:16

## 2025-01-29 RX ADMIN — ERYTHROPOIETIN 6000 UNITS: 3000 INJECTION, SOLUTION INTRAVENOUS; SUBCUTANEOUS at 14:16

## 2025-01-29 RX ADMIN — VANCOMYCIN HYDROCHLORIDE 1000 MG: 1 INJECTION, POWDER, LYOPHILIZED, FOR SOLUTION INTRAVENOUS at 17:06

## 2025-01-29 RX ADMIN — NIFEDIPINE 30 MG: 30 TABLET, FILM COATED, EXTENDED RELEASE ORAL at 09:06

## 2025-01-29 RX ADMIN — TRAMADOL HYDROCHLORIDE 50 MG: 50 TABLET, COATED ORAL at 21:54

## 2025-01-29 RX ADMIN — AMIODARONE HYDROCHLORIDE 200 MG: 200 TABLET ORAL at 20:15

## 2025-01-29 RX ADMIN — PANTOPRAZOLE SODIUM 40 MG: 40 TABLET, DELAYED RELEASE ORAL at 05:50

## 2025-01-29 RX ADMIN — APIXABAN 2.5 MG: 2.5 TABLET, FILM COATED ORAL at 20:16

## 2025-01-29 RX ADMIN — ALLOPURINOL 200 MG: 100 TABLET ORAL at 12:40

## 2025-01-29 NOTE — CASE MANAGEMENT/SOCIAL WORK
Continued Stay Note  [facility]     Patient Name: Sara Esparza  MRN: [id number]  Today's Date: 1/29/2025    Admit Date: 1/19/2025    Plan: undecided   Discharge Plan       Row Name 01/29/25 0808       Plan    Plan undecided    Patient/Family in Agreement with Plan yes    Plan Comments I met with this patient and her  bedside. PT and OT still need to work with this patient. She has a wound vac in place. Per ID MD, they may want her to receive IV antibiotics after HD until 3/7. They do not want her to receive a PICC. She is scheduled for an angiogram on 2/3. CM to follow.                   Discharge Codes    No documentation.                 Expected Discharge Date and Time       Expected Discharge Date Expected Discharge Time    Feb 6, 2025               Sharee Williamson RN

## 2025-01-29 NOTE — PROGRESS NOTES
[facility] Progress Note    Admission Date: 1/19/2025    Sara Esparza  1950  [id number]    Date: 1/29/2025    Antibiotics:  Anti-Infectives (From admission, onward)      Ordered     Dose/Rate Route Frequency Start Stop    01/27/25 1850  cefTRIAXone (ROCEPHIN) 2,000 mg in sodium chloride 0.9 % 100 mL MBP        Ordering Provider: Camden Ashford RPH    2,000 mg  200 mL/hr over 30 Minutes Intravenous Every 24 Hours 01/27/25 2000 02/05/25 1959 01/27/25 0920  vancomycin (VANCOCIN) 1,000 mg in sodium chloride 0.9 % 250 mL IVPB-VTB        Ordering Provider: Ricardo Chappell, PharmD    1,000 mg  250 mL/hr over 60 Minutes Intravenous Once 01/27/25 1600 01/27/25 1857    01/27/25 0949  Pharmacy to dose vancomycin        Ordering Provider: Bailee Barrios MD     Not Applicable Continuous PRN 01/27/25 0948 02/10/25 0947    01/27/25 0920  vancomycin (dosing per levels)        Ordering Provider: Ricardo Chappell, PharmD     Not Applicable Daily 01/27/25 0920 02/11/25 0859    01/24/25 1230  vancomycin (VANCOCIN) 1,000 mg in sodium chloride 0.9 % 250 mL IVPB-VTB        Ordering Provider: Edmund Erwin, PharmDARI    1,000 mg  250 mL/hr over 60 Minutes Intravenous Once 01/24/25 1600 01/24/25 1723    01/22/25 1230  vancomycin 750 mg in sodium chloride 0.9 % 250 mL IVPB-VTB        Ordering Provider: Edmund Erwin, PharmD    750 mg  333.3 mL/hr over 45 Minutes Intravenous Once 01/22/25 1400 01/22/25 1810    01/21/25 0519  cefepime 1000 mg IVPB in 100 mL NS (MBP)  Status:  Discontinued        Ordering Provider: Rom Gonzalez Jr., MD    1,000 mg  over 4 Hours Intravenous Every 24 Hours 01/22/25 0600 01/27/25 1851    01/21/25 0537  vancomycin (dosing per levels)  Status:  Discontinued        Ordering Provider: Rom Gonzalez Jr., MD     Not Applicable Daily 01/21/25 0900 01/27/25 1320    01/21/25 0519  cefepime 1000 mg IVPB in 100 mL NS (MBP)        Ordering Provider: Conrad Alvarez MD    1,000 mg  over 30 Minutes  Intravenous Once 01/21/25 0615 01/21/25 0715    01/21/25 0505  metroNIDAZOLE (FLAGYL) IVPB 500 mg        Ordering Provider: Rom Gonzalez Jr., MD    500 mg  200 mL/hr over 30 Minutes Intravenous Every 8 Hours 01/21/25 0600 01/26/25 0759    01/21/25 0513  vancomycin 2250 mg/500 mL 0.9% NS IVPB ([facility])        Ordering Provider: Woo Wells, PharmD    20 mg/kg × 108 kg  over 135 Minutes Intravenous Once 01/21/25 0600 01/21/25 0950    01/21/25 0505  Pharmacy to dose vancomycin  Status:  Discontinued        Ordering Provider: Rom Gonzalez Jr., MD     Not Applicable Continuous PRN 01/21/25 0500 01/27/25 1318    01/20/25 0738  cefTRIAXone (ROCEPHIN) 1,000 mg in sodium chloride 0.9 % 100 mL MBP  Status:  Discontinued        Ordering Provider: Leslye Fiore MD    1,000 mg  200 mL/hr over 30 Minutes Intravenous Every 24 Hours 01/20/25 0900 01/21/25 0519            Reason for Consultation: Sepsis and osteomyelitis of the left calcaneus     History of present illness:    1/22/25: Patient is a 74 y.o. 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  Evaluation in the ED included WBC 14.3 and PCT 5.9.  Subsequent lactic acid was 4.1 on 1/21.  After admission she became febrile to 101.9.  She was started on ceftriaxone and vancomycin.  Blood culture was sent yesterday afternoon.  Urinalysis showed pyuria.  Urine culture showed mixed hanny in the lab discarded the culture.  She was noted to have a left calcaneus wound.  CT scan of the chest, abdomen and pelvis were unremarkable for acute process.  CT scan of the left lower extremity showed a wound at the plantar aspect of the left calcaneus with subcutaneous and intraosseous air and  destructive changes .  CT scan of the neck showed advanced C5-6 and C6-7 degenerative disc disease; suspected moderate or greater C5-6 canal stenosis due to posterior vertebral osteophytes.  Wound culture of the left heel is growing MRSA and a gram-negative ange.  At the time of my exam the patient is nonverbal other than moaning.  She does not follow commands.  Her  is at the bedside and is able to provide medical history although he is a little uncertain about timing of events such as when she started dialysis.      She had a dialysis catheter placed 9/13 which was replaced on 10/11.    1/23/2025.  Afebrile x 2 days.  She is considerably more alert today.  She remains on IV amiodarone for atrial fibrillation with RVR.  She complains of pain but does not localize it to any specific site.  Wound culture with MRSA and Morganella.  Blood culture negative.    1/24/2025.  Afebrile.  She is seen on dialysis today.  She she is alert and mildly confused but understands that she is scheduled to undergo surgery today.  In discussion with her  in the patient's room he recounted that it was explained to her that she was at high risk for limb loss based on the extent of osteomyelitis involving her calcaneus.  MRI was attempted yesterday but aborted because of pain.    1/25/2025.  Afebrile.  Alert. Uncomfortable.  states that she is discouraged because she was found to have extensive osteo at surgery yesterday.  He states that he does not see how we will be possible for her to go home.    1/26/25 Afebrile, appears more lucid. Appears more comfortable although she is scratching torso and upper extremities; the  states this is a chronic problem  MRSA from op cultures  1/27/25 Afebrile. Seen on HD. No new c/o  Note plans for arteriogram  MRSA from op cultures    1/28/25  Afebrile, alert but seems unable to remember that she is scheduled for vascular evaluation next week. I reminded her that Dr Gonzalez is  very concerned Re: ability to salvage her left leg and she acknowledged this. Her  at the bedside appears to understand all of these issues.     1/29/2025:  She remains afebrile.  Left foot culture from 1/24 is growing MRSA.  Vancomycin random level is 21.2.  White blood cell count yesterday was 9.6.   CT scan of 1/21 revealed a draining wound along the plantar aspect of the left calcaneus with subcutaneous air and destructive changes and intraosseous air along the plantar aspect of the calcaneus consistent with osteomyelitis.  She is scheduled for left lower extremity angiogram on 2/3/2025.  Photo of her wound from 1/28 reveals exposed bone.  She complains of neck/back pain.            PE:  Vital Signs  Temp  Min: 98.2 °F (36.8 °C)  Max: 98.4 °F (36.9 °C)  BP  Min: 147/81  Max: 167/55  Pulse  Min: 57  Max: 95  Resp  Min: 16  Max: 18  SpO2  Min: 87 %  Max: 96 %    GENERAL: Alert, appropriate, appears chronically ill  HEENT: Normocephalic, atraumatic.   No conjunctival injection. No icterus.  No oral labial lesions   NECK: No evidence of meningismus  HEART: IRRR; No murmur, rubs, gallops.   LUNGS: Clear anteriorly, no wheezes or rhonchi appreciated  ABDOMEN: Abdomen bloated but soft.  No evidence of tenderness elicited with deep pressure throughout the abdomen.   :  Without Corral catheter.  MSK:   Photo of her wound from 1/28 at the time of her wound VAC change reveals exposed bone.  SKIN: Warm and dry without cutaneous eruptions on Inspection/palpation.    NEURO: Oriented to PPT.    PSYCHIATRIC: Normal insight and judgment. Cooperative with PE       Laboratory Data    Results from last 7 days   Lab Units 01/28/25  0941 01/27/25  0636 01/26/25  2247   WBC 10*3/mm3 9.57 10.51 11.95*   HEMOGLOBIN g/dL 9.8* 10.0* 10.3*   HEMATOCRIT % 31.3* 31.2* 32.3*   PLATELETS 10*3/mm3 244 238 250     Results from last 7 days   Lab Units 01/28/25  0941   SODIUM mmol/L 138   POTASSIUM mmol/L 4.0   CHLORIDE mmol/L 99   CO2  mmol/L 27.0   BUN mg/dL 27*   CREATININE mg/dL 3.45*   GLUCOSE mg/dL 201*   CALCIUM mg/dL 9.2                     Estimated Creatinine Clearance: 17.8 mL/min (A) (by C-G formula based on SCr of 3.45 mg/dL (H)).      Microbiology:  MRSA and Morganella from foot wound    Radiology:  Imaging Results (Last 72 Hours)       ** No results found for the last 72 hours. **            I read her CT scan images of 1/21.      Impression:   1.  Left calcaneal osteomyelitis-with MRSA/Morganella on superficial cultures and MRSA and deep cultures.  MRSA is the likely pathogen.  This is not a salvageable foot.  2.  Sepsis-improved.  This is likely secondary to her left foot infection  3.  Pyuria-with mixed hanny on culture  4.  End-stage renal disease-on hemodialysis  5.  Type 2 diabetes mellitus  6.  Paroxysmal atrial fibrillation  7.  Toxic/metabolic encephalopathy-improved   8.  Leukocytosis/neutrophilia-improved  9.  Peripheral vascular disease-scheduled for vascular intervention on 2/3  10.  Cervical spinal stenosis          PLAN/RECOMMENDATIONS:  -- Continue vancomycin   -- Continue ceftriaxone   -- Continue wound care.  I discussed her complex situation with  Cody Bailey and PT wound care and we will examine her wound together on 1/31 when her wound VAC is changed    I discussed her complex situation with Dr. Gonzalez today.  I discussed her complex situation with her  today and saw her while she was at dialysis.  I also discussed her complex situation with the patient herself.  We discussed the fact that her foot is not salvageable and she will ultimately require an amputation.  We discussed the fact that prolonged antibiotic therapy will not resolve this process and may result in substantial antibiotic-related adverse side effects such as ototoxicity.  I will plan to examine her foot on 1/31 with Cody Mehta when he removes the wound VAC.  I spent over 40 minutes on her complex care today.    This visit  included the following complex service elements:  Complex medical decision-making associated with antimicrobial prescribing.  In-depth chart review with high level synthesis for complex diagnoses.  Managed infection treatment protocol associated with transitions of care for this complex patient.  Counseled patients, family members, and/or caregivers regarding antimicrobial stewardship and antibiotic resistance.         Teja Blue MD  1/29/2025

## 2025-01-29 NOTE — PROGRESS NOTES
"   LOS: 10 days    Patient Care Team:  Toribio Roberts MD as PCP - General (Internal Medicine)    Subjective     Seen on HD tolerating treatment well. UF ~ 3 liter. Appears lethargic and slow but responding appropriately     Objective     Vital Signs:  Blood pressure 151/70, pulse 56, temperature 98.3 °F (36.8 °C), temperature source Axillary, resp. rate 26, height 170.2 cm (67.01\"), weight 108 kg (238 lb 1.6 oz), SpO2 95%.      Intake/Output Summary (Last 24 hours) at 1/29/2025 1010  Last data filed at 1/28/2025 2300  Gross per 24 hour   Intake --   Output 175 ml   Net -175 ml        01/28 0701 - 01/29 0700  In: -   Out: 175 [Urine:175]    Physical Exam:  GENERAL: Awake alert oriented  female lying comfortably in bed.  NEURO:  No focal deficit  PSYCHIATRIC: Cooperative with PE  EYE: PERR.  NECK:  No JVD discernable,  Trachea midline  CV: Positive edema, RRR  LUNGS:  Quiet,  Nonlabored resp.  Symmetrical expansion  ABDOMEN: Nondistended, soft nontender.  : No Corral, no palp bladder  SKIN: Warm and dry without rash  Left foot dressed with wound vac  + RIJ TDC    Labs:  Results from last 7 days   Lab Units 01/28/25  0941 01/27/25  0636 01/26/25  2247   WBC 10*3/mm3 9.57 10.51 11.95*   HEMOGLOBIN g/dL 9.8* 10.0* 10.3*   PLATELETS 10*3/mm3 244 238 250     Results from last 7 days   Lab Units 01/28/25  0941 01/27/25  0636 01/26/25  2246 01/25/25  0945   SODIUM mmol/L 138 134* 126* 137   POTASSIUM mmol/L 4.0 3.8 3.7 4.1   CHLORIDE mmol/L 99 97* 92* 97*   CO2 mmol/L 27.0 24.0 22.0 26.0   BUN mg/dL 27* 56* 52* 33*   CREATININE mg/dL 3.45* 4.93* 4.88* 3.22*   CALCIUM mg/dL 9.2 9.1 9.1 9.1                 A/P:    1.  ESRD: [place] [facility] [place]  No need for hemodialysis today.   -Hemodialysis today as per schedule.  Tunnel catheter in place     2.  HTN: Blood pressure stable    3.  Hyponatremia: Resolved. Due to underlying renal failure.  Adjust with HD.     4.  Hyperkalemia: Resolved.  Adjust with " HD     5.  Metabolic acidosis:  Adjust with HD     6.  Anemia: Started back on CORNEL on HD days.    7.  Hyperphosphatemia: Last phos level 5.5     8.  Nutrition: Low potassium, low Phos high-protein diet.  Renal vitamin.     9.  PVD: Possible infected wound On antibiotics.  Urine culture with mixed hanny.  Patient with foot wound.  S/p debridement and wound VAC.     High risk complex patient multiple medical problems.    Wilian Gonzalez MD  01/29/25  10:10 EST

## 2025-01-29 NOTE — PROGRESS NOTES
[facility]  IP Progress Note      Chief Complaint/Reason for service: #1 PAF    Subjective   Subjective: Patient is awake and alert.  She is complaining of diffuse pain requesting pain medicine.  She denies shortness of breath    Past medical, surgical, social and family history reviewed in the patient's electronic medical record.    Objective     Vital Sign Min/Max for last 24 hours  Temp  Min: 98.2 °F (36.8 °C)  Max: 98.4 °F (36.9 °C)   BP  Min: 147/81  Max: 167/55   Pulse  Min: 57  Max: 95   Resp  Min: 16  Max: 18   SpO2  Min: 87 %  Max: 96 %   Flow (L/min) (Oxygen Therapy)  Min: 2  Max: 2      Intake/Output Summary (Last 24 hours) at 1/29/2025 0728  Last data filed at 1/28/2025 2300  Gross per 24 hour   Intake --   Output 175 ml   Net -175 ml             Current Facility-Administered Medications:     acetaminophen (TYLENOL) tablet 650 mg, 650 mg, Oral, Q6H PRN, Rom Gonzalez Jr., MD, 650 mg at 01/26/25 0605    albumin human 25 % IV SOLN  - ADS Override Pull, , , ,     albumin human 25 % IV SOLN 25 g, 25 g, Intravenous, PRN, Rom Gonzalez Jr., MD    allopurinol (ZYLOPRIM) tablet 200 mg, 200 mg, Oral, Daily, Rom Gonzalez Jr., MD, 200 mg at 01/28/25 0924    amiodarone (PACERONE) tablet 200 mg, 200 mg, Oral, TID, Rom Gonzalez Jr., MD, 200 mg at 01/28/25 2035    apixaban (ELIQUIS) tablet 2.5 mg, 2.5 mg, Oral, Q12H, Sabrina Oconnor MD, 2.5 mg at 01/28/25 2035    atorvastatin (LIPITOR) tablet 10 mg, 10 mg, Oral, Daily, Rom Gonzalez Jr., MD, 10 mg at 01/28/25 0924    sennosides-docusate (PERICOLACE) 8.6-50 MG per tablet 2 tablet, 2 tablet, Oral, BID PRN, 2 tablet at 01/26/25 1203 **AND** polyethylene glycol (MIRALAX) packet 17 g, 17 g, Oral, Daily PRN, 17 g at 01/27/25 0934 **AND** bisacodyl (DULCOLAX) EC tablet 5 mg, 5 mg, Oral, Daily PRN, 5 mg at 01/26/25 1507 **AND** bisacodyl (DULCOLAX) suppository 10 mg, 10 mg, Rectal, Daily PRN, Rom Gonzalez Jr.,  MD    Calcium Replacement - Follow Nurse / BPA Driven Protocol, , Not Applicable, PRN, Rom Gonzalez Jr., MD    cefTRIAXone (ROCEPHIN) 2,000 mg in sodium chloride 0.9 % 100 mL MBP, 2,000 mg, Intravenous, Q24H, Camden Ashford, [facility], Last Rate: 200 mL/hr at 01/28/25 2035, 2,000 mg at 01/28/25 2035    dextrose (D50W) (25 g/50 mL) IV injection 25 g, 25 g, Intravenous, Q15 Min PRN, Rom Gonzalez Jr., MD    dextrose (GLUTOSE) oral gel 15 g, 15 g, Oral, Q15 Min PRN, Rom Gonzalez Jr., MD    famotidine (PEPCID) tablet 20 mg, 20 mg, Oral, Daily, Rom Gonzalez Jr., MD, 20 mg at 01/28/25 0924    glucagon (GLUCAGEN) injection 1 mg, 1 mg, Intramuscular, Q15 Min PRN, Rom Gonzalez Jr., MD    heparin (porcine) injection 2,000 Units, 2,000 Units, Intracatheter, PRN, Rom Gonzalez Jr., MD, 2,000 Units at 01/27/25 1432    HYDROmorphone (DILAUDID) injection 0.25 mg, 0.25 mg, Intravenous, Q4H PRN, Sabrina Oconnor MD, 0.25 mg at 01/28/25 2321    Insulin Lispro (humaLOG) injection 2-9 Units, 2-9 Units, Subcutaneous, 4x Daily AC & at Bedtime, Rom Gonzalez Jr., MD, 2 Units at 01/28/25 1144    Magnesium Standard Dose Replacement - Follow Nurse / BPA Driven Protocol, , Not Applicable, PRN, Rom Gonzalez Jr., MD    melatonin tablet 2.5 mg, 2.5 mg, Oral, Nightly PRN, Rom Gonzalez Jr., MD, 2.5 mg at 01/29/25 0106    metoprolol tartrate (LOPRESSOR) injection 5 mg, 5 mg, Intravenous, Q6H PRN, Rom Gonzalez Jr., MD, 5 mg at 01/23/25 0329    metoprolol tartrate (LOPRESSOR) tablet 25 mg, 25 mg, Oral, Q12H, Rom Gonzalez Jr., MD, 25 mg at 01/28/25 2035    NIFEdipine XL (PROCARDIA XL) 24 hr tablet 30 mg, 30 mg, Oral, Q24H, Rom Gonzalez Jr., MD, 30 mg at 01/28/25 0924    ondansetron (ZOFRAN) injection 4 mg, 4 mg, Intravenous, Q6H PRN, Rom Gonzalez Jr., MD, 4 mg at 01/24/25 0132    oxyCODONE (ROXICODONE) immediate release tablet 5 mg, 5 mg, Oral, Q4H PRN, Rom Gonzalez Jr., MD, 5 mg at 01/28/25  2214    pantoprazole (PROTONIX) EC tablet 40 mg, 40 mg, Oral, Q AM, Rom Gonzalez Jr., MD, 40 mg at 01/29/25 0550    Pharmacy Consult - Pharmacy to dose, , Not Applicable, Continuous PRNSophie Elizabeth A, MD    Pharmacy to dose vancomycin, , Not Applicable, Continuous PRSophie BARAHONA Elizabeth A, MD    Potassium Replacement - Follow Nurse / BPA Driven Protocol, , Not Applicable, PRNCarlos David A Jr., MD    [COMPLETED] Insert Peripheral IV, , , Once **AND** sodium chloride 0.9 % flush 10 mL, 10 mL, Intravenous, PRN, Rom Gonzalez Jr., MD    sodium chloride 0.9 % flush 10 mL, 10 mL, Intravenous, Q12H, Rom Gonzalez Jr., MD, 10 mL at 01/28/25 2036    sodium chloride 0.9 % flush 10 mL, 10 mL, Intravenous, PRN, Rom Gonzalez Jr., MD    sodium chloride 0.9 % infusion 40 mL, 40 mL, Intravenous, PRN, Rom Gonzalez Jr., MD    traMADol (ULTRAM) tablet 50 mg, 50 mg, Oral, Q4H PRN, Rom Gonzalez Jr., MD, 50 mg at 01/29/25 0107    vancomycin (dosing per levels), , Not Applicable, Daily, Ricardo Chappell, PharmD    Physical Exam: General Elderly female in bed not dyspneic not tachypneic but uncomfortable from pain.        HEENT: Dialysis catheter is noted.  Nasal O2 is present       Respiratory: Equal bilateral symmetrical expansion without wheezes or rhonchi       Cardiovascular: Regular rate and rhythm with a systolic ejection murmur       GI: Soft and flat       Lower Extremities: Status post left heel surgery       Neuro: Facial expression symmetrical moves all 4 extremities       Skin: Warm and dry       Psych: Flat affect    Results Review: No laboratory data available for review.  Heart rate 60-70.  Blood pressures 1 47-1 67    Radiology Results:  Imaging Results (Last 72 Hours)       ** No results found for the last 72 hours. **            EKG: Sinus rhythm    ECHO: Preserved EF    Tele: Rhythm is sinus with no recurrent A-fib, no significant pauses or bradycardia    Assessment    Assessment/Plan: PAF-patient's rhythm is now well-controlled on p.o. amiodarone.  She is on Eliquis therapy as well.  Today is day 7 of amiodarone 200 mg every 8 hours, therefore I will decrease the dose to 2 mg every 12 hours.    Naren Escobar MD  01/29/25  07:28 EST

## 2025-01-29 NOTE — PLAN OF CARE
Goal Outcome Evaluation:  Plan of Care Reviewed With: patient      - VSS, 2L NC, A&Ox4 before dialysis. Afterwards patient is disoriented to time, place, and situation.   - PRNs given for pain  - Wound vac in place  - IV abx administered  - Fall and skin precautions in place  - No other complaints at this time     Progress: no change      Problem: Pain Acute  Goal: Optimal Pain Control and Function  1/29/2025 1835 by Ele Devi RN  Outcome: Progressing  1/29/2025 1536 by Ele Devi RN  Outcome: Progressing  Intervention: Optimize Psychosocial Wellbeing  Recent Flowsheet Documentation  Taken 1/29/2025 1240 by Ele Devi RN  Diversional Activities: television  Intervention: Develop Pain Management Plan  Recent Flowsheet Documentation  Taken 1/29/2025 1240 by Ele Devi RN  Pain Management Interventions:   pillow support provided   position adjusted  Intervention: Prevent or Manage Pain  Recent Flowsheet Documentation  Taken 1/29/2025 1800 by Ele Devi RN  Medication Review/Management: medications reviewed  Taken 1/29/2025 1600 by Ele Devi RN  Medication Review/Management: medications reviewed  Taken 1/29/2025 1417 by Ele Devi, RN  Medication Review/Management: medications reviewed  Taken 1/29/2025 1240 by Ele Devi RN  Medication Review/Management: medications reviewed     Problem: Comorbidity Management  Goal: Blood Glucose Level Within Target Range  Outcome: Progressing  Intervention: Monitor and Manage Glycemia  Recent Flowsheet Documentation  Taken 1/29/2025 1800 by Ele Devi RN  Medication Review/Management: medications reviewed  Taken 1/29/2025 1600 by Ele Devi, RN  Medication Review/Management: medications reviewed  Taken 1/29/2025 1417 by Ele Devi RN  Medication Review/Management: medications reviewed  Taken 1/29/2025 1240 by Ele Devi, RN  Medication Review/Management: medications reviewed     Problem: Skin Injury Risk Increased  Goal: Skin  Health and Integrity  Outcome: Progressing  Intervention: Optimize Skin Protection  Recent Flowsheet Documentation  Taken 1/29/2025 1800 by Ele Devi RN  Activity Management: activity encouraged  Pressure Reduction Techniques:   frequent weight shift encouraged   heels elevated off bed   positioned off wounds   pressure points protected  Head of Bed (HOB) Positioning: HOB elevated  Pressure Reduction Devices:   heel offloading device utilized   positioning supports utilized   pressure-redistributing mattress utilized  Skin Protection:   incontinence pads utilized   silicone foam dressing in place   skin sealant/moisture barrier applied   transparent dressing maintained  Taken 1/29/2025 1600 by Ele Devi RN  Activity Management: activity encouraged  Pressure Reduction Techniques:   frequent weight shift encouraged   heels elevated off bed   positioned off wounds   pressure points protected  Head of Bed (HOB) Positioning: [facility] elevated  Pressure Reduction Devices:   heel offloading device utilized   positioning supports utilized   pressure-redistributing mattress utilized  Skin Protection:   incontinence pads utilized   silicone foam dressing in place   skin sealant/moisture barrier applied   transparent dressing maintained  Taken 1/29/2025 1417 by Ele Devi RN  Activity Management: activity encouraged  Pressure Reduction Techniques:   frequent weight shift encouraged   heels elevated off bed   positioned off wounds   pressure points protected  Head of Bed (HOB) Positioning: [facility] elevated  Pressure Reduction Devices:   heel offloading device utilized   positioning supports utilized   pressure-redistributing mattress utilized  Skin Protection:   incontinence pads utilized   silicone foam dressing in place   skin sealant/moisture barrier applied   transparent dressing maintained  Taken 1/29/2025 1240 by Ele Devi RN  Activity Management: activity encouraged  Pressure Reduction Techniques:    frequent weight shift encouraged   heels elevated off bed   pressure points protected   positioned off wounds  Head of Bed (HOB) Positioning: HOB elevated  Pressure Reduction Devices:   heel offloading device utilized   positioning supports utilized   pressure-redistributing mattress utilized  Skin Protection: (silicone dressings peeled back, skin assessed)   incontinence pads utilized   silicone foam dressing in place   skin sealant/moisture barrier applied   transparent dressing maintained     Problem: Fall Injury Risk  Goal: Absence of Fall and Fall-Related Injury  Outcome: Progressing  Intervention: Identify and Manage Contributors  Recent Flowsheet Documentation  Taken 1/29/2025 1800 by Ele Devi RN  Medication Review/Management: medications reviewed  Taken 1/29/2025 1600 by Ele Devi RN  Medication Review/Management: medications reviewed  Taken 1/29/2025 1417 by Ele Devi RN  Medication Review/Management: medications reviewed  Taken 1/29/2025 1240 by Ele Devi RN  Medication Review/Management: medications reviewed  Intervention: Promote Injury-Free Environment  Recent Flowsheet Documentation  Taken 1/29/2025 1800 by Ele Devi RN  Safety Promotion/Fall Prevention:   activity supervised   assistive device/personal items within reach   clutter free environment maintained   fall prevention program maintained   lighting adjusted   mobility aid in reach   nonskid shoes/slippers when out of bed   room organization consistent   safety round/check completed  Taken 1/29/2025 1600 by Ele Devi RN  Safety Promotion/Fall Prevention:   activity supervised   assistive device/personal items within reach   clutter free environment maintained   fall prevention program maintained   lighting adjusted   mobility aid in reach   nonskid shoes/slippers when out of bed   room organization consistent   safety round/check completed  Taken 1/29/2025 1417 by Ele Devi RN  Safety Promotion/Fall  Prevention:   activity supervised   assistive device/personal items within reach   clutter free environment maintained   fall prevention program maintained   lighting adjusted   mobility aid in reach   nonskid shoes/slippers when out of bed   room organization consistent   safety round/check completed  Taken 1/29/2025 1240 by Ele Devi, RN  Safety Promotion/Fall Prevention:   activity supervised   assistive device/personal items within reach   clutter free environment maintained   fall prevention program maintained   lighting adjusted   mobility aid in reach   nonskid shoes/slippers when out of bed   room organization consistent   safety round/check completed  Taken 1/29/2025 1000 by Ele Devi, RN  Safety Promotion/Fall Prevention: patient off unit  Taken 1/29/2025 0800 by Ele Deiv, RN  Safety Promotion/Fall Prevention: patient off unit

## 2025-01-29 NOTE — PROGRESS NOTES
"          Clinical Nutrition Assessment     Patient Name: Sara Esparza  YOB: 1950  MRN: [id number]  Date of Encounter: 01/29/25 13:11 EST  Admission date: 1/19/2025  Reason for Visit: Follow-up protocol    Assessment   Nutrition Assessment   Admission Diagnosis:  Generalized weakness [R53.1]    Problem List:    Generalized weakness    ESRD (end stage renal disease)    Type 2 diabetes mellitus with stage 5 chronic kidney disease not on chronic dialysis, without long-term current use of insulin    Chronic osteomyelitis of left foot    Paroxysmal A-fib    Sepsis due to methicillin resistant Staphylococcus aureus (MRSA) with encephalopathy without septic shock    Critical limb ischemia of left lower extremity      PMH:   She  has a past medical history of Anxiety, Arthritis, Asthma, Back pain, Depression, Diabetes mellitus, Diverticula of colon, GERD (gastroesophageal reflux disease), Head ache, Hypertension, and Sleep apnea.    PSH:  She  has a past surgical history that includes Replacement total knee bilateral (Bilateral); Cholecystectomy open; Tonsillectomy; Hysterectomy; Back surgery; Cardiac catheterization; Colonoscopy; ventral/incisional hernia repair (N/A, 6/6/2017); Foot Irrigation, Debridement and Repair (Left, 1/24/2025); and PLACEMENT OF WOUND VAC (Left, 1/24/2025).    Applicable Nutrition History:   Skin integrity  DM ulcer to L plantar heel  WOC following    Anthropometrics     Height: Height: 170.2 cm (67.01\")  Last Filed Weight: Weight: 108 kg (238 lb 1.6 oz) (01/25/25 1139)  Method: Weight Method: Stated  BMI: BMI (Calculated): 37.3    UBW:     Weight      Weight (kg) Weight (lbs) Weight Method   1/6/2024 88.905 kg  196 lb  Stated    3/26/2024 93.441 kg  206 lb  Stated    9/13/2024 76.204 kg  168 lb  Bed scale    10/11/2024 75.751 kg  167 lb  Stated    10/12/2024 75.751 kg  167 lb  Stated    1/16/2025 107.502 kg  237 lb  Stated    1/18/2025 107.502 kg  237 lb     1/19/2025 108 kg  238 " lb 1.6 oz  Stated      Weight change:  unable to evaluate 2/2 on HD with fluid fluctuations    Nutrition Focused Physical Exam    Date:  1/20       Unable to perform due to Pt unable to participate at time of visit     Subjective   Reported/Observed/Food/Nutrition Related History:   1/29  Pt was RATNA for HD at time of visit. Per RN, cognitive improvements. L posterior plantar PI with wound vac in place - ? POC being discussed including potential need for amputation. BG control improved over past several days. LBM 1/28.    1/20  Pt laying in bed returning from imaging at time of visit, unable to contribute to recent nutrition hx - all information obtained from spouse. Endorsed pt having a good appetite prior to admission - ate 2-3 meals daily. After last admission c/o taste changes but had resolved. Apparently no c/o of return of taste changes - ? Present in acute illness. Chronic HD 3x/wk - per dialysis estimated DW 77kg however ? If using prior lower BW. Spouse reports pt tolerating soup well and requesting focus on softer foods. Pt dislikes ONS - agreeable to try alternative. NKFA    Dislikes chicken    Current Nutrition Prescription   PO: Diet: Cardiac, Diabetic; Healthy Heart (2-3 Na+); Consistent Carbohydrate; Fluid Consistency: Thin (IDDSI 0)  NPO Diet NPO Type: Sips with Meds  Oral Nutrition Supplement: N/A  Intake:  41.67% x last 6 meals documented    Assessment & Plan   Nutrition Diagnosis   Date:  1/29 Updated:  Problem Inadequate oral intake   Etiology Acute illness   Signs/Symptoms <50% at meals   Status: New    Date:  1/20            Updated:  1/29  Problem Predicted inadequate nutrient intake    Etiology AMS   Signs/Symptoms <25% since admission   Status: Discontinued    Goal:   Nutrition to support treatment and Increase intake      Nutrition Intervention      Follow treatment progress, Care plan reviewed, Encourage intake    Encouraged PO intake at meals  Continue Magic Cup 1x daily  Hesitant to  provide Jose due to renal fxn    Monitoring/Evaluation:   Per protocol, I&O, PO intake, Pertinent labs, Weight, Symptoms, POC/GOC    Bailee Becerra, MS,RD,LD  Time Spent: 15min

## 2025-01-29 NOTE — PLAN OF CARE
Goal Outcome Evaluation:Scheduled HD completed. Pt tolerated well. Goal reached. Blood reinfused. Called report to PAULIE Piedra      Problem: Hemodialysis  Goal: Safe, Effective Therapy Delivery  Outcome: Progressing  Goal: Effective Tissue Perfusion  Outcome: Progressing  Goal: Absence of Infection Signs and Symptoms  Outcome: Progressing

## 2025-01-29 NOTE — PROGRESS NOTES
[facility]  PROGRESS NOTE    Patient Name: Sara Esparza  : 1950  MRN: [id number]    Date of Admission: 2025  Primary Care Physician: Toribio Roberts MD    Subjective   Subjective     CC:  F/U weakness, osteomyelitis    HPI:  Seen this morning in dialysis. Feels better today. Just received some pain medication. Says she will only do amputation as a last resort. Expresses goal of quality of life and time with her family.       Objective   Objective     Vital Signs:   Temp:  [98.2 °F (36.8 °C)-98.4 °F (36.9 °C)] 98.3 °F (36.8 °C)  Heart Rate:  [53-95] 56  Resp:  [14-26] 26  BP: (147-167)/(55-81) 150/68  Flow (L/min) (Oxygen Therapy):  [2] 2     Physical Exam:  Gen-no acute distress  HENT-NCAT, mucous membranes moist  CV-RRR, S1 S2 normal, no m/r/g  Resp-CTAB, no wheezes or rales  Abd-soft, NT, ND, +BS  Ext-left foot in dressing/wound vac in place  Neuro-A&Ox3, no focal deficits  Skin-no rashes  Psych-appropriate mood  No change from 25      Results Reviewed:  LAB RESULTS:      Lab 25  0941 25  0636 25  2247 25  0945 25  0728 25  0816 25  0816 25  0159   WBC 9.57 10.51 11.95* 10.23 14.99*   < > 10.56  --    HEMOGLOBIN 9.8* 10.0* 10.3* 10.4* 10.6*   < > 10.7*  --    HEMATOCRIT 31.3* 31.2* 32.3* 32.9* 32.7*   < > 33.7*  --    PLATELETS 244 238 250 245 203   < > 138*  --    NEUTROS ABS 7.99* 8.12* 9.04* 8.09* 11.91*   < > 8.35*  --    IMMATURE GRANS (ABS) 0.13* 0.18* 0.19* 0.12* 0.18*   < > 0.07*  --    LYMPHS ABS 0.49* 0.88 1.04 0.61* 0.74   < > 0.49*  --    MONOS ABS 0.94* 1.23* 1.55* 1.35* 2.12*   < > 1.62*  --    EOS ABS 0.01 0.07 0.11 0.04 0.02   < > 0.02  --    .0* 99.0* 99.7* 99.1* 97.3*   < > 100.3*  --    HSTROP T  --   --   --   --   --   --  101* 106*    < > = values in this interval not displayed.         Lab 25  0941 25  0636 25  2246 25  0945 25  01/24/25  0728   SODIUM 138 134* 126* 137  --  133*   POTASSIUM 4.0 3.8 3.7 4.1 4.0 3.5   CHLORIDE 99 97* 92* 97*  --  95*   CO2 27.0 24.0 22.0 26.0  --  25.0   ANION GAP 12.0 13.0 12.0 14.0  --  13.0   BUN 27* 56* 52* 33*  --  50*   CREATININE 3.45* 4.93* 4.88* 3.22*  --  4.47*   EGFR 13.3* 8.7* 8.8* 14.5*  --  9.8*   GLUCOSE 201* 129* 148* 197*  --  167*   CALCIUM 9.2 9.1 9.1 9.1  --  9.6               Lab 01/23/25  0816 01/23/25  0159   HSTROP T 101* 106*                 Brief Urine Lab Results  (Last result in the past 365 days)        Color   Clarity   Blood   Leuk Est   Nitrite   Protein   CREAT   Urine HCG        01/19/25 1404 Yellow   Cloudy   Moderate (2+)   Moderate (2+)   Negative   >=300 mg/dL (3+)                   Microbiology Results Abnormal       Procedure Component Value - Date/Time    Body Fluid Culture - Surgical Site, Foot, Left [[id number]]  (Abnormal)  (Susceptibility) Collected: 01/24/25 1754    Lab Status: Final result Specimen: Surgical Site from Foot, Left Updated: 01/27/25 0642     Body Fluid Culture Light growth (2+) Staphylococcus aureus, MRSA     Comment:   Methicillin resistant Staphylococcus aureus, Patient may be an isolation risk.        Gram Stain Rare (1+) WBCs seen      No organisms seen    Narrative:      Not a body fluid.    Susceptibility        Staphylococcus aureus, MRSA      TAYLOR      Clindamycin Susceptible      Erythromycin Resistant      Oxacillin Resistant      Rifampin Susceptible      Tetracycline Susceptible      Trimethoprim + Sulfamethoxazole Susceptible      Vancomycin Susceptible                           Wound Culture - Wound, Foot, Left [[id number]]  (Abnormal)  (Susceptibility) Collected: 01/20/25 1409    Lab Status: Final result Specimen: Wound from Foot, Left Updated: 01/24/25 0658     Wound Culture Scant growth (1+) Staphylococcus aureus, MRSA     Comment: Methicillin resistant Staphylococcus aureus, Patient may be an isolation risk.         Rare growth  Morganella morganii ssp morganii     Comment:            Scant growth (1+) Normal Skin Kimber     Gram Stain Few (2+) WBCs seen      Many (4+) Gram positive cocci in pairs      Rare (1+) Gram negative bacilli      Rare (1+) Gram positive bacilli    Susceptibility        Staphylococcus aureus, MRSA      TAYLOR      Clindamycin Susceptible      Erythromycin Resistant      Oxacillin Resistant      Rifampin Susceptible      Tetracycline Susceptible      Trimethoprim + Sulfamethoxazole Susceptible      Vancomycin Susceptible                       Susceptibility        Morganella morganii ssp morganii      TAYLOR      Amoxicillin + Clavulanate Resistant      Ampicillin Resistant      Ampicillin + Sulbactam Resistant      Cefazolin (Non Urine) Resistant      Cefepime Susceptible  [1]       Cefotaxime Susceptible      Ceftazidime Susceptible  [1]       Gentamicin Susceptible      Levofloxacin Susceptible      Piperacillin + Tazobactam Susceptible      Tetracycline Susceptible      Trimethoprim + Sulfamethoxazole Susceptible                   [1]  Appended report. These results have been appended to a previously final verified report.                Susceptibility Comments       Morganella morganii ssp morganii    Cefotaxime susceptibility can be used as a surrogate for ceftriaxone susceptibility               MRSA Screen, PCR (Inpatient) - Swab, Nares [[id number]]  (Abnormal) Collected: 01/21/25 0614    Lab Status: Final result Specimen: Swab from Nares Updated: 01/21/25 0894     MRSA PCR Positive    Narrative:      The negative predictive value of this diagnostic test is high and should only be used to consider de-escalating anti-MRSA therapy. A positive result may indicate colonization with MRSA and must be correlated clinically.            No radiology results from the last 24 hrs    Results for orders placed during the hospital encounter of 01/19/25    Adult Transthoracic Echo Complete W/ Cont if Necessary Per  Protocol    Interpretation Summary    Left ventricular systolic function is normal. Estimated left ventricular EF = 55%    Left ventricular wall thickness is consistent with moderate concentric hypertrophy.    No hemodynamically significant valvular heart disease      Current medications:  Scheduled Meds:albumin human, , ,   allopurinol, 200 mg, Oral, Daily  amiodarone, 200 mg, Oral, Q12H  apixaban, 2.5 mg, Oral, Q12H  atorvastatin, 10 mg, Oral, Daily  cefTRIAXone, 2,000 mg, Intravenous, Q24H  epoetin gracia/gracia-epbx, 6,000 Units, Subcutaneous, Once per day on Monday Wednesday Friday  famotidine, 20 mg, Oral, Daily  insulin lispro, 2-9 Units, Subcutaneous, 4x Daily AC & at Bedtime  metoprolol tartrate, 25 mg, Oral, Q12H  NIFEdipine XL, 30 mg, Oral, Q24H  pantoprazole, 40 mg, Oral, Q AM  sodium chloride, 10 mL, Intravenous, Q12H  vancomycin (dosing per levels), , Not Applicable, Daily      Continuous Infusions:Pharmacy Consult - Pharmacy to dose,   Pharmacy to dose vancomycin,       PRN Meds:.  acetaminophen    albumin human    albumin human    senna-docusate sodium **AND** polyethylene glycol **AND** bisacodyl **AND** bisacodyl    Calcium Replacement - Follow Nurse / BPA Driven Protocol    dextrose    dextrose    glucagon (human recombinant)    heparin (porcine)    HYDROmorphone    Magnesium Standard Dose Replacement - Follow Nurse / BPA Driven Protocol    melatonin    metoprolol tartrate    ondansetron    oxyCODONE    Pharmacy Consult - Pharmacy to dose    Pharmacy to dose vancomycin    Potassium Replacement - Follow Nurse / BPA Driven Protocol    [COMPLETED] Insert Peripheral IV **AND** sodium chloride    sodium chloride    sodium chloride    traMADol    Assessment & Plan   Assessment & Plan     Active Hospital Problems    Diagnosis  POA    **Generalized weakness [R53.1]  Yes    Paroxysmal A-fib [I48.0]  Unknown    Sepsis due to methicillin resistant Staphylococcus aureus (MRSA) with encephalopathy without septic  shock [A41.02, R65.20, G93.41]  Unknown    Chronic osteomyelitis of left foot [M86.672]  Unknown    Critical limb ischemia of left lower extremity [I70.222]  Unknown    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        Brief Hospital Course to date:  Sara Esparza is a 74 y.o. female with hx of ESRD on HD, PAF on Eliquis, HTN, and DM admitted for several weeks of generalized weakness, myalgias, and multiple falls in setting of lightheadedness.   After admission she was found to be septic with temps up to 102.4. Further workup revealed left calcaneal osteomyelitis. S/P debridement and wound vac placement 1/24/25. Vascular Surgery planning for LLE angiogram 2/3/25. Infectious Disease following for antibiotic management.     This patient's problems and plans were partially entered by my partner and updated as appropriate by me 01/29/25. Copied text in this note has been reviewed and is accurate as of today's date.      Sepsis, fever, leukocytosis, confusion  L calcaneal osteomyelitis   Left posterior tibial artery occlusion   - CT LLE without contrast 1/21/25: draining wound seen along plantar aspect of left calcaneus with surrounding changes consistent with osteomyelitis  - Ortho consulted - s/p debridement and wound vac placement 1/24/25 by Dr. Gonzalez. May ultimately require amputation, patient aware.   - Plan for LLE angiogram on 2/3/25 by Dr. Ramirez/Vascular Surgery   - MRSA and Morganella in cultures from wound site, ID following, continue Vanc and Rocephin for now  - Leukocytosis and fevers resolved     Generalized weakness, progressive x weeks  Falls at home   - Suspicion of overdiuresis and orthostasis ongoing, with superimposed sepsis/infection now   - CT chest/abd/pelvis without acute  findings   - Normal TSH and CK   - PT/OT following, patient considering SNF      Neck pain, C7 region   C5-6 canal stenosis  Chronic back pain s/p spinal stimulator  - CT C-spine - no fractures  - History of oxycodone dependence; family requested avoiding opiates however due to uncontrolled pain, have initiated low dose oxycodone here which she has tolerated well thus far   - Plan to stop IV Dilaudid today, may need to restart at some point if further surgeries planned  - Recommended to patient/ to reach out to her pain management doctor's office as they have questions regarding her spinal stimulator     ESRD on HD  - Has a tunneled right IJ dialysis catheter  - Nephrology following  - HD on MWF     DMII,  HbA1c 4.9  - On sitagliptin at home  - SSI here, adjust as needed      HTN   - Nifedipine dose decreased due to tenuous BP's, now improved, could consider increasing dose again if needed     Hx of Afib  Episode of Afib with RVR during HD  - Resumed home dose beta blocker (Coreg), but still was not rate controlled - Cardiology consulted, started on Amiodarone and changed to Metoprolol BID. Rate now controlled.   - Resumed Eliquis 1/28/25, need to hold 48 hours prior to angiogram 2/3/25, discussed with pharmacist who will coordinate this      Elevated troponin  - In setting of ESRD  - Troponin peaked at 132 on 1/19/25  - Per chart review, had LHC recently at [facility] with non-obstructive CAD     Dyspepsia  - GI cocktail PRN     Constipation  - Likely due to opiates  - Improved with bowel regimen and addition of Lactulose        Expected Discharge Location and Transportation: home vs rehab  Expected Discharge   Expected Discharge Date: 2/6/2025; Expected Discharge Time:      VTE Prophylaxis:  Pharmacologic & mechanical VTE prophylaxis orders are present.         AM-PAC 6 Clicks Score (PT): 7 (01/28/25 2000)    CODE STATUS:   Code Status and Medical Interventions: CPR (Attempt to Resuscitate); Full Support    Ordered at: 01/19/25 0518     Level Of Support Discussed With:    Patient     Code Status (Patient has no pulse and is not breathing):    CPR (Attempt to Resuscitate)     Medical Interventions (Patient has pulse or is breathing):    Full Support       Sabrina Oconnor MD  01/29/25

## 2025-01-29 NOTE — PROGRESS NOTES
Pharmacy Consult-Vancomycin Dosing  Sara Esparza is a  74 y.o. female receiving vancomycin therapy.     Indication: Osteomyelitis  Consulting Provider: hospitalist   ID Consult: Yes    Goal Trough: 15-20 mcg/mL    Current Antimicrobial Therapy  Anti-Infectives (From admission, onward)      Ordered     Dose/Rate Route Frequency Start Stop    01/27/25 1850  cefTRIAXone (ROCEPHIN) 2,000 mg in sodium chloride 0.9 % 100 mL MBP        Ordering Provider: Camden Ashford RPH    2,000 mg  200 mL/hr over 30 Minutes Intravenous Every 24 Hours 01/27/25 2000 02/05/25 1959    01/27/25 0920  vancomycin (VANCOCIN) 1,000 mg in sodium chloride 0.9 % 250 mL IVPB-VTB        Ordering Provider: Ricardo Chappell, PharmD    1,000 mg  250 mL/hr over 60 Minutes Intravenous Once 01/27/25 1600 01/27/25 1857    01/27/25 0949  Pharmacy to dose vancomycin        Ordering Provider: Bailee Barrios MD     Not Applicable Continuous PRN 01/27/25 0948 02/10/25 0947    01/27/25 0920  vancomycin (dosing per levels)        Ordering Provider: Ricardo Chappell, PharmD     Not Applicable Daily 01/27/25 0920 02/11/25 0859    01/24/25 1230  vancomycin (VANCOCIN) 1,000 mg in sodium chloride 0.9 % 250 mL IVPB-VTB        Ordering Provider: Edmund Erwin, Amrita    1,000 mg  250 mL/hr over 60 Minutes Intravenous Once 01/24/25 1600 01/24/25 1723    01/22/25 1230  vancomycin 750 mg in sodium chloride 0.9 % 250 mL IVPB-VTB        Ordering Provider: Edmund Erwin, PharmD    750 mg  333.3 mL/hr over 45 Minutes Intravenous Once 01/22/25 1400 01/22/25 1810    01/21/25 0519  cefepime 1000 mg IVPB in 100 mL NS (MBP)  Status:  Discontinued        Ordering Provider: Rom Gonzalez Jr., MD    1,000 mg  over 4 Hours Intravenous Every 24 Hours 01/22/25 0600 01/27/25 1851    01/21/25 0537  vancomycin (dosing per levels)  Status:  Discontinued        Ordering Provider: Rom Gonzalez Jr., MD     Not Applicable Daily 01/21/25 0900 01/27/25 1320    01/21/25 0519  " cefepime 1000 mg IVPB in 100 mL NS (MBP)        Ordering Provider: Conrad Alvarez MD    1,000 mg  over 30 Minutes Intravenous Once 01/21/25 0615 01/21/25 0715    01/21/25 0505  metroNIDAZOLE (FLAGYL) IVPB 500 mg        Ordering Provider: Rom Gonzalez Jr., MD    500 mg  200 mL/hr over 30 Minutes Intravenous Every 8 Hours 01/21/25 0600 01/26/25 0759    01/21/25 0513  vancomycin 2250 mg/500 mL 0.9% NS IVPB ([facility])        Ordering Provider: Woo Wells, PharmD    20 mg/kg × 108 kg  over 135 Minutes Intravenous Once 01/21/25 0600 01/21/25 0950    01/21/25 0505  Pharmacy to dose vancomycin  Status:  Discontinued        Ordering Provider: Rom Gonzalez Jr., MD     Not Applicable Continuous PRN 01/21/25 0500 01/27/25 1318    01/20/25 0738  cefTRIAXone (ROCEPHIN) 1,000 mg in sodium chloride 0.9 % 100 mL MBP  Status:  Discontinued        Ordering Provider: Leslye Fiore MD    1,000 mg  200 mL/hr over 30 Minutes Intravenous Every 24 Hours 01/20/25 0900 01/21/25 0519            Allergies  Allergies as of 01/19/2025    (No Known Allergies)       Labs    Results from last 7 days   Lab Units 01/28/25  0941 01/27/25  0636 01/26/25  2246   BUN mg/dL 27* 56* 52*   CREATININE mg/dL 3.45* 4.93* 4.88*       Results from last 7 days   Lab Units 01/28/25  0941 01/27/25  0636 01/26/25  2247   WBC 10*3/mm3 9.57 10.51 11.95*       Evaluation of Dosing     Last Dose Received in the ED/Outside Facility: none  Is Patient on Dialysis or Renal Replacement: yes    Ht - 170.2 cm (67.01\")  Wt - 108 kg (238 lb 1.6 oz)    Estimated Creatinine Clearance: 17.8 mL/min (A) (by C-G formula based on SCr of 3.45 mg/dL (H)).    Intake & Output (last 3 days)         01/26 0701 01/27 0700 01/27 0701  01/28 0700 01/28 0701 01/29 0700 01/29 0701 01/30 0700    P.O. 720 240      IV Piggyback  100      Total Intake(mL/kg) 720 (6.7) 340 (3.1)      Urine (mL/kg/hr) 0 (0) 60 (0) 175 (0.1)     Wound 50       Dialysis  2010      Total Output 50 2070 " 175     Net +670 -1730 -175             Urine Unmeasured Occurrence 1 x 1 x 2 x     Stool Unmeasured Occurrence  4 x 2 x             Microbiology and Radiology  Microbiology Results (last 10 days)       Procedure Component Value - Date/Time    Body Fluid Culture - Surgical Site, Foot, Left [[id number]]  (Abnormal)  (Susceptibility) Collected: 01/24/25 1754    Lab Status: Final result Specimen: Surgical Site from Foot, Left Updated: 01/27/25 0642     Body Fluid Culture Light growth (2+) Staphylococcus aureus, MRSA     Comment:   Methicillin resistant Staphylococcus aureus, Patient may be an isolation risk.        Gram Stain Rare (1+) WBCs seen      No organisms seen    Narrative:      Not a body fluid.    Susceptibility        Staphylococcus aureus, MRSA      TAYLOR      Clindamycin 0.25 ug/ml Susceptible      Erythromycin >=8 ug/ml Resistant      Oxacillin >=4 ug/ml Resistant      Rifampin <=0.5 ug/ml Susceptible      Tetracycline <=1 ug/ml Susceptible      Trimethoprim + Sulfamethoxazole <=10 ug/ml Susceptible      Vancomycin 1 ug/ml Susceptible                           Blood Culture - Blood, Arm, Right [[id number]]  (Normal) Collected: 01/21/25 1325    Lab Status: Final result Specimen: Blood from Arm, Right Updated: 01/26/25 1445     Blood Culture No growth at 5 days    Respiratory Panel PCR w/COVID-19(SARS-CoV-2) [facility]/SINDY/[facility] In-House, NP Swab in UTM/VTM, 2 HR TAT - Swab, Nasopharynx [[id number]]  (Normal) Collected: 01/21/25 0614    Lab Status: Final result Specimen: Swab from Nasopharynx Updated: 01/21/25 0703     ADENOVIRUS, PCR Not Detected     Coronavirus 229E Not Detected     Coronavirus HKU1 Not Detected     Coronavirus NL63 Not Detected     Coronavirus OC43 Not Detected     COVID19 Not Detected     Human Metapneumovirus Not Detected     Human Rhinovirus/Enterovirus Not Detected     Influenza A PCR Not Detected     Influenza B PCR Not Detected     Parainfluenza Virus 1 Not Detected     Parainfluenza  Virus 2 Not Detected     Parainfluenza Virus 3 Not Detected     Parainfluenza Virus 4 Not Detected     RSV, PCR Not Detected     Bordetella pertussis pcr Not Detected     Bordetella parapertussis PCR Not Detected     Chlamydophila pneumoniae PCR Not Detected     Mycoplasma pneumo by PCR Not Detected    Narrative:      In the setting of a positive respiratory panel with a viral infection PLUS a negative procalcitonin without other underlying concern for bacterial infection, consider observing off antibiotics or discontinuation of antibiotics and continue supportive care. If the respiratory panel is positive for atypical bacterial infection (Bordetella pertussis, Chlamydophila pneumoniae, or Mycoplasma pneumoniae), consider antibiotic de-escalation to target atypical bacterial infection.    MRSA Screen, PCR (Inpatient) - Swab, Nares [[id number]]  (Abnormal) Collected: 01/21/25 0614    Lab Status: Final result Specimen: Swab from Nares Updated: 01/21/25 0847     MRSA PCR Positive    Narrative:      The negative predictive value of this diagnostic test is high and should only be used to consider de-escalating anti-MRSA therapy. A positive result may indicate colonization with MRSA and must be correlated clinically.    Wound Culture - Wound, Foot, Left [[id number]]  (Abnormal)  (Susceptibility) Collected: 01/20/25 1409    Lab Status: Final result Specimen: Wound from Foot, Left Updated: 01/24/25 0658     Wound Culture Scant growth (1+) Staphylococcus aureus, MRSA     Comment: Methicillin resistant Staphylococcus aureus, Patient may be an isolation risk.         Rare growth Morganella morganii ssp morganii     Comment:            Scant growth (1+) Normal Skin Kimber     Gram Stain Few (2+) WBCs seen      Many (4+) Gram positive cocci in pairs      Rare (1+) Gram negative bacilli      Rare (1+) Gram positive bacilli    Susceptibility        Staphylococcus aureus, MRSA      TAYLOR      Clindamycin 0.25 ug/ml Susceptible       Erythromycin >=8 ug/ml Resistant      Oxacillin >=4 ug/ml Resistant      Rifampin <=0.5 ug/ml Susceptible      Tetracycline <=1 ug/ml Susceptible      Trimethoprim + Sulfamethoxazole <=10 ug/ml Susceptible      Vancomycin 1 ug/ml Susceptible                       Susceptibility        Morganella morganii ssp morganii      TAYLOR      Amoxicillin + Clavulanate >=32 ug/ml Resistant      Ampicillin >=32 ug/ml Resistant      Ampicillin + Sulbactam >=32 ug/ml Resistant      Cefazolin (Non Urine) >=32 ug/ml Resistant      Cefepime 0.032 ug/ml Susceptible  [1]       Cefotaxime <=0.25 ug/ml Susceptible      Ceftazidime 0.094 ug/ml Susceptible  [1]       Gentamicin <=1 ug/ml Susceptible      Levofloxacin <=0.12 ug/ml Susceptible      Piperacillin + Tazobactam <=4 ug/ml Susceptible      Tetracycline <=1 ug/ml Susceptible      Trimethoprim + Sulfamethoxazole <=20 ug/ml Susceptible                   [1]  Appended report. These results have been appended to a previously final verified report.                Susceptibility Comments       Morganella morganii ssp morganii    Cefotaxime susceptibility can be used as a surrogate for ceftriaxone susceptibility               Urine Culture - Urine, Urine, Clean Catch [[id number]] Collected: 01/19/25 1404    Lab Status: Final result Specimen: Urine, Clean Catch Updated: 01/20/25 1116     Urine Culture 50,000 CFU/mL Mixed Kimber Isolated    Narrative:      Specimen contains mixed organisms of questionable pathogenicity suggestive of contamination. If symptoms persist, suggest recollection.  Colonization of the urinary tract without infection is common. Treatment is discouraged unless the patient is symptomatic, pregnant, or undergoing an invasive urologic procedure.            Vancomycin Levels:    Results from last 7 days   Lab Units 01/29/25  0641 01/27/25  0636 01/24/25  0728   VANCOMYCIN RM mcg/mL 21.20 19.30 19.50                     Assessment/Plan:    Pharmacy to dose vancomycin for  osteomyelitis. Goal trough 15-20 mcg/mL. Patient with hx of ESRD on HD MWF and vancomycin currently being dosed per levels.  Vancomycin level today 21.2 mcg/mL prior to HD. Patient currently receiving HD. Will plan to order a maintenance dose of vancomycin 1000 mg qMWF after HD.   Will recheck level prior to HD Monday 2/3 if patient still admitted. Vancomycin planned to continue to 3/7 per Dr Barrios.  Pharmacy will continue to monitor renal function, cultures and sensitivities, and clinical status to adjust regimen as necessary.    Ricardo Chappell, PharmD, BCPS  1/29/2025  10:51 EST

## 2025-01-29 NOTE — PLAN OF CARE
Problem: Adult Inpatient Plan of Care  Goal: Plan of Care Review  Outcome: Progressing  Flowsheets (Taken 1/29/2025 0346)  Progress: no change  Outcome Evaluation: VSS on 2L NC. Pt given PRN pain meds for complaints of back pain. Wound vac in place. Skin, Fall, and DVT precautions in place. Plan for angiogram on 2/3.  Plan of Care Reviewed With: patient  Goal: Patient-Specific Goal (Individualized)  Outcome: Progressing  Goal: Absence of Hospital-Acquired Illness or Injury  Outcome: Progressing  Intervention: Identify and Manage Fall Risk  Recent Flowsheet Documentation  Taken 1/29/2025 0200 by Cristal Johnson, RN  Safety Promotion/Fall Prevention:   assistive device/personal items within reach   clutter free environment maintained   fall prevention program maintained   gait belt   lighting adjusted   nonskid shoes/slippers when out of bed   room organization consistent   safety round/check completed   toileting scheduled  Taken 1/29/2025 0000 by Cristal Johnson, RN  Safety Promotion/Fall Prevention:   assistive device/personal items within reach   clutter free environment maintained   fall prevention program maintained   gait belt   lighting adjusted   nonskid shoes/slippers when out of bed   room organization consistent   safety round/check completed   toileting scheduled  Taken 1/28/2025 2200 by Cristal Johnson, RN  Safety Promotion/Fall Prevention:   assistive device/personal items within reach   clutter free environment maintained   fall prevention program maintained   gait belt   lighting adjusted   nonskid shoes/slippers when out of bed   room organization consistent   safety round/check completed   toileting scheduled  Taken 1/28/2025 2000 by Cristal Johnson, RN  Safety Promotion/Fall Prevention:   assistive device/personal items within reach   clutter free environment maintained   fall prevention program maintained   gait belt   lighting adjusted   nonskid shoes/slippers when out of bed   room  organization consistent   safety round/check completed   toileting scheduled  Intervention: Prevent Skin Injury  Recent Flowsheet Documentation  Taken 1/29/2025 0200 by Cristal Johnson RN  Skin Protection:   incontinence pads utilized   protective footwear used   silicone foam dressing in place   transparent dressing maintained  Taken 1/29/2025 0000 by Cristal Johnson RN  Body Position:   turned   left  Skin Protection:   incontinence pads utilized   protective footwear used   silicone foam dressing in place   transparent dressing maintained  Taken 1/28/2025 2200 by Cristal Johnson RN  Body Position:   right   side-lying  Skin Protection:   incontinence pads utilized   protective footwear used   silicone foam dressing in place   transparent dressing maintained  Taken 1/28/2025 2000 by Cristal Johnson RN  Body Position:   supine   legs elevated  Skin Protection:   incontinence pads utilized   protective footwear used   silicone foam dressing in place   transparent dressing maintained  Intervention: Prevent and Manage VTE (Venous Thromboembolism) Risk  Recent Flowsheet Documentation  Taken 1/29/2025 0200 by Cristal Johnson RN  VTE Prevention/Management:   bilateral   SCDs (sequential compression devices) on  Taken 1/29/2025 0000 by Cristal Johnson RN  VTE Prevention/Management:   bilateral   SCDs (sequential compression devices) on  Taken 1/28/2025 2200 by Cristal Johnson RN  VTE Prevention/Management:   bilateral   SCDs (sequential compression devices) on  Taken 1/28/2025 2000 by Cristal Johnson RN  VTE Prevention/Management:   bilateral   SCDs (sequential compression devices) on  Intervention: Prevent Infection  Recent Flowsheet Documentation  Taken 1/29/2025 0200 by Cristal Johnson RN  Infection Prevention:   environmental surveillance performed   hand hygiene promoted   personal protective equipment utilized   rest/sleep promoted  Taken 1/29/2025 0000 by Cristal Johnson  RN  Infection Prevention:   environmental surveillance performed   hand hygiene promoted   personal protective equipment utilized   rest/sleep promoted  Taken 1/28/2025 2200 by Cristal Johnson RN  Infection Prevention:   environmental surveillance performed   hand hygiene promoted   personal protective equipment utilized   rest/sleep promoted  Taken 1/28/2025 2000 by Cristal Johnson RN  Infection Prevention:   environmental surveillance performed   hand hygiene promoted   personal protective equipment utilized   rest/sleep promoted  Goal: Optimal Comfort and Wellbeing  Outcome: Progressing  Intervention: Monitor Pain and Promote Comfort  Recent Flowsheet Documentation  Taken 1/28/2025 2321 by Cristal Johnson RN  Pain Management Interventions: pain medication given  Taken 1/28/2025 2214 by Cristal Johnson RN  Pain Management Interventions: pain medication given  Goal: Readiness for Transition of Care  Outcome: Progressing     Problem: Skin Injury Risk Increased  Goal: Skin Health and Integrity  Outcome: Progressing  Intervention: Optimize Skin Protection  Recent Flowsheet Documentation  Taken 1/29/2025 0200 by Cristal Johnson RN  Activity Management: activity encouraged  Pressure Reduction Techniques:   frequent weight shift encouraged   pressure points protected   weight shift assistance provided  Pressure Reduction Devices:   foam padding utilized   positioning supports utilized   pressure-redistributing mattress utilized  Skin Protection:   incontinence pads utilized   protective footwear used   silicone foam dressing in place   transparent dressing maintained  Taken 1/29/2025 0000 by Cristal Johnson RN  Activity Management: activity encouraged  Pressure Reduction Techniques:   frequent weight shift encouraged   heels elevated off bed   rest period provided between sit times   weight shift assistance provided  Head of Bed (HOB) Positioning: HOB elevated  Pressure Reduction Devices:   foam  padding utilized   positioning supports utilized   pressure-redistributing mattress utilized  Skin Protection:   incontinence pads utilized   protective footwear used   silicone foam dressing in place   transparent dressing maintained  Taken 1/28/2025 2200 by Cristal Johnson RN  Activity Management: activity encouraged  Pressure Reduction Techniques:   frequent weight shift encouraged   pressure points protected   weight shift assistance provided  Head of Bed (HOB) Positioning: HOB elevated  Pressure Reduction Devices:   foam padding utilized   positioning supports utilized   pressure-redistributing mattress utilized  Skin Protection:   incontinence pads utilized   protective footwear used   silicone foam dressing in place   transparent dressing maintained  Taken 1/28/2025 2000 by Cristal Johnson RN  Activity Management: activity encouraged  Pressure Reduction Techniques:   frequent weight shift encouraged   heels elevated off bed   rest period provided between sit times   weight shift assistance provided  Head of Bed (HOB) Positioning: HOB elevated  Pressure Reduction Devices:   foam padding utilized   positioning supports utilized   pressure-redistributing mattress utilized  Skin Protection:   incontinence pads utilized   protective footwear used   silicone foam dressing in place   transparent dressing maintained     Problem: Fall Injury Risk  Goal: Absence of Fall and Fall-Related Injury  Outcome: Progressing  Intervention: Identify and Manage Contributors  Recent Flowsheet Documentation  Taken 1/29/2025 0200 by Cristal Johnson, RN  Medication Review/Management: medications reviewed  Taken 1/29/2025 0000 by Cristal Johnson RN  Medication Review/Management: medications reviewed  Taken 1/28/2025 2200 by Cristal Johnson RN  Medication Review/Management: medications reviewed  Taken 1/28/2025 2000 by Cristal Johnson, RN  Medication Review/Management: medications reviewed  Intervention: Promote  Injury-Free Environment  Recent Flowsheet Documentation  Taken 1/29/2025 0200 by Cristal Johnson RN  Safety Promotion/Fall Prevention:   assistive device/personal items within reach   clutter free environment maintained   fall prevention program maintained   gait belt   lighting adjusted   nonskid shoes/slippers when out of bed   room organization consistent   safety round/check completed   toileting scheduled  Taken 1/29/2025 0000 by Cristal Johnson RN  Safety Promotion/Fall Prevention:   assistive device/personal items within reach   clutter free environment maintained   fall prevention program maintained   gait belt   lighting adjusted   nonskid shoes/slippers when out of bed   room organization consistent   safety round/check completed   toileting scheduled  Taken 1/28/2025 2200 by Cristal Johnson RN  Safety Promotion/Fall Prevention:   assistive device/personal items within reach   clutter free environment maintained   fall prevention program maintained   gait belt   lighting adjusted   nonskid shoes/slippers when out of bed   room organization consistent   safety round/check completed   toileting scheduled  Taken 1/28/2025 2000 by Cristal Johnson RN  Safety Promotion/Fall Prevention:   assistive device/personal items within reach   clutter free environment maintained   fall prevention program maintained   gait belt   lighting adjusted   nonskid shoes/slippers when out of bed   room organization consistent   safety round/check completed   toileting scheduled     Problem: Comorbidity Management  Goal: Maintenance of Heart Failure Symptom Control  Outcome: Progressing  Intervention: Maintain Heart Failure Management  Recent Flowsheet Documentation  Taken 1/29/2025 0200 by Crsital Johnson, RN  Medication Review/Management: medications reviewed  Taken 1/29/2025 0000 by Cristal Johnson RN  Medication Review/Management: medications reviewed  Taken 1/28/2025 2200 by Cristal Johnson  RN  Medication Review/Management: medications reviewed  Taken 1/28/2025 2000 by Cristal Johnson RN  Medication Review/Management: medications reviewed  Goal: Blood Pressure in Desired Range  Outcome: Progressing  Intervention: Maintain Blood Pressure Management  Recent Flowsheet Documentation  Taken 1/29/2025 0200 by Cristal Johnson RN  Medication Review/Management: medications reviewed  Taken 1/29/2025 0000 by Cristal Johnson RN  Medication Review/Management: medications reviewed  Taken 1/28/2025 2200 by Cristal Johnson RN  Medication Review/Management: medications reviewed  Taken 1/28/2025 2000 by Cristal Johnson RN  Medication Review/Management: medications reviewed     Problem: Hemodialysis  Goal: Safe, Effective Therapy Delivery  Outcome: Progressing  Intervention: Optimize Device Care and Function  Recent Flowsheet Documentation  Taken 1/29/2025 0200 by Cristal Johnson RN  Medication Review/Management: medications reviewed  Taken 1/29/2025 0000 by Cristal Johnson RN  Medication Review/Management: medications reviewed  Taken 1/28/2025 2200 by Cristal Johnson RN  Medication Review/Management: medications reviewed  Taken 1/28/2025 2000 by Cristal Johnson RN  Medication Review/Management: medications reviewed  Goal: Effective Tissue Perfusion  Outcome: Progressing  Goal: Absence of Infection Signs and Symptoms  Outcome: Progressing  Intervention: Prevent or Manage Infection  Recent Flowsheet Documentation  Taken 1/29/2025 0200 by Cristal Johnson RN  Infection Prevention:   environmental surveillance performed   hand hygiene promoted   personal protective equipment utilized   rest/sleep promoted  Taken 1/29/2025 0000 by Cristal Johnson RN  Infection Prevention:   environmental surveillance performed   hand hygiene promoted   personal protective equipment utilized   rest/sleep promoted  Taken 1/28/2025 2200 by Cristal Johnson RN  Infection Prevention:   environmental  surveillance performed   hand hygiene promoted   personal protective equipment utilized   rest/sleep promoted  Taken 1/28/2025 2000 by Cristal Johnson, RN  Infection Prevention:   environmental surveillance performed   hand hygiene promoted   personal protective equipment utilized   rest/sleep promoted     Problem: Sepsis/Septic Shock  Goal: Optimal Coping  Outcome: Progressing  Goal: Absence of Bleeding  Outcome: Progressing  Goal: Blood Glucose Level Within Target Range  Outcome: Progressing  Goal: Absence of Infection Signs and Symptoms  Outcome: Progressing  Intervention: Initiate Sepsis Management  Recent Flowsheet Documentation  Taken 1/29/2025 0200 by Cristal Johnson RN  Infection Prevention:   environmental surveillance performed   hand hygiene promoted   personal protective equipment utilized   rest/sleep promoted  Taken 1/29/2025 0000 by Cristal Johnson RN  Infection Prevention:   environmental surveillance performed   hand hygiene promoted   personal protective equipment utilized   rest/sleep promoted  Taken 1/28/2025 2200 by Cristal Johnson RN  Infection Prevention:   environmental surveillance performed   hand hygiene promoted   personal protective equipment utilized   rest/sleep promoted  Taken 1/28/2025 2000 by Cristal Johnson RN  Infection Prevention:   environmental surveillance performed   hand hygiene promoted   personal protective equipment utilized   rest/sleep promoted  Intervention: Promote Recovery  Recent Flowsheet Documentation  Taken 1/29/2025 0200 by Cristal Johnson, RN  Activity Management: activity encouraged  Taken 1/29/2025 0000 by Cristal Johnson, RN  Activity Management: activity encouraged  Taken 1/28/2025 2200 by Cristal Johnson, RN  Activity Management: activity encouraged  Taken 1/28/2025 2000 by Cristal Johnson, RN  Activity Management: activity encouraged  Goal: Optimal Nutrition Delivery  Outcome: Progressing   Goal Outcome Evaluation:  Plan of  Care Reviewed With: patient        Progress: no change  Outcome Evaluation: VSS on 2L NC. Pt given PRN pain meds for complaints of back pain. Wound vac in place. Skin, Fall, and DVT precautions in place. Plan for angiogram on 2/3.

## 2025-01-30 ENCOUNTER — APPOINTMENT (OUTPATIENT)
Dept: MRI IMAGING | Facility: HOSPITAL | Age: 75
DRG: 853 | End: 2025-01-30
Payer: MEDICARE

## 2025-01-30 LAB
BACTERIA SPEC ANAEROBE CULT: ABNORMAL
GLUCOSE BLDC GLUCOMTR-MCNC: 135 MG/DL (ref 70–130)
GLUCOSE BLDC GLUCOMTR-MCNC: 137 MG/DL (ref 70–130)
GLUCOSE BLDC GLUCOMTR-MCNC: 144 MG/DL (ref 70–130)
GLUCOSE BLDC GLUCOMTR-MCNC: 150 MG/DL (ref 70–130)

## 2025-01-30 PROCEDURE — 63710000001 INSULIN LISPRO (HUMAN) PER 5 UNITS: Performed by: ORTHOPAEDIC SURGERY

## 2025-01-30 PROCEDURE — 25010000002 ONDANSETRON PER 1 MG: Performed by: ORTHOPAEDIC SURGERY

## 2025-01-30 PROCEDURE — 99232 SBSQ HOSP IP/OBS MODERATE 35: CPT | Performed by: HOSPITALIST

## 2025-01-30 PROCEDURE — 25010000002 CEFTRIAXONE PER 250 MG

## 2025-01-30 PROCEDURE — 99232 SBSQ HOSP IP/OBS MODERATE 35: CPT | Performed by: INTERNAL MEDICINE

## 2025-01-30 PROCEDURE — 97605 NEG PRS WND THER DME<=50SQCM: CPT

## 2025-01-30 PROCEDURE — 82948 REAGENT STRIP/BLOOD GLUCOSE: CPT

## 2025-01-30 RX ADMIN — APIXABAN 2.5 MG: 2.5 TABLET, FILM COATED ORAL at 09:27

## 2025-01-30 RX ADMIN — Medication 10 ML: at 09:28

## 2025-01-30 RX ADMIN — AMIODARONE HYDROCHLORIDE 200 MG: 200 TABLET ORAL at 20:39

## 2025-01-30 RX ADMIN — INSULIN LISPRO 2 UNITS: 100 INJECTION, SOLUTION INTRAVENOUS; SUBCUTANEOUS at 20:40

## 2025-01-30 RX ADMIN — AMIODARONE HYDROCHLORIDE 200 MG: 200 TABLET ORAL at 09:27

## 2025-01-30 RX ADMIN — Medication 12.5 MG: at 09:27

## 2025-01-30 RX ADMIN — ONDANSETRON 4 MG: 2 INJECTION INTRAMUSCULAR; INTRAVENOUS at 15:43

## 2025-01-30 RX ADMIN — Medication 12.5 MG: at 20:39

## 2025-01-30 RX ADMIN — Medication 2.5 MG: at 00:15

## 2025-01-30 RX ADMIN — APIXABAN 2.5 MG: 2.5 TABLET, FILM COATED ORAL at 20:39

## 2025-01-30 RX ADMIN — ALLOPURINOL 200 MG: 100 TABLET ORAL at 09:27

## 2025-01-30 RX ADMIN — ACETAMINOPHEN 650 MG: 325 TABLET ORAL at 16:59

## 2025-01-30 RX ADMIN — OXYCODONE 5 MG: 5 TABLET ORAL at 14:21

## 2025-01-30 RX ADMIN — OXYCODONE 5 MG: 5 TABLET ORAL at 20:39

## 2025-01-30 RX ADMIN — PANTOPRAZOLE SODIUM 40 MG: 40 TABLET, DELAYED RELEASE ORAL at 05:47

## 2025-01-30 RX ADMIN — TRAMADOL HYDROCHLORIDE 50 MG: 50 TABLET, COATED ORAL at 15:40

## 2025-01-30 RX ADMIN — ATORVASTATIN CALCIUM 10 MG: 10 TABLET, FILM COATED ORAL at 09:27

## 2025-01-30 RX ADMIN — TRAMADOL HYDROCHLORIDE 50 MG: 50 TABLET, COATED ORAL at 04:38

## 2025-01-30 RX ADMIN — FAMOTIDINE 20 MG: 20 TABLET, FILM COATED ORAL at 09:26

## 2025-01-30 RX ADMIN — ACETAMINOPHEN 650 MG: 325 TABLET ORAL at 00:15

## 2025-01-30 RX ADMIN — OXYCODONE 5 MG: 5 TABLET ORAL at 09:44

## 2025-01-30 RX ADMIN — SODIUM CHLORIDE 2000 MG: 900 INJECTION INTRAVENOUS at 20:45

## 2025-01-30 RX ADMIN — NIFEDIPINE 30 MG: 30 TABLET, FILM COATED, EXTENDED RELEASE ORAL at 09:26

## 2025-01-30 RX ADMIN — Medication 10 ML: at 20:45

## 2025-01-30 NOTE — PROGRESS NOTES
[facility]  Vascular Surgery Progress Note    Patient Name: Sara Esparza  : 1950  MRN: [id number]  Date of admission: 2025  Surgical Procedures Since Admission:  Procedure(s):  HEEL IRRIGATION AND DEBRIDEMENT LEFT  PLACEMENT OF WOUND VAC  Surgeon:  Rom Gonzalez Jr., MD  Status:  6 Days Post-Op  -------------------    Subjective   Subjective     Subjective: Patient is alert and lying in bed with family at bedside. She denies any new concerns or acute events overnight. She is scheduled for LLE angiogram 2/3/25 with Dr. Ramirez and remains agreeable to proceed.     Objective   Objective     Vitals:   Temp:  [97.8 °F (36.6 °C)-98.5 °F (36.9 °C)] 97.8 °F (36.6 °C)  Heart Rate:  [46-69] 55  Resp:  [16-23] 16  BP: (141-160)/(57-84) 144/64  Flow (L/min) (Oxygen Therapy):  [2] 2  Output by Drain (mL) 25 0701 - 25 1900 25 1901 - 25 0700 25 0701 - 25 1029 Range Total   External Urinary Catheter  150  150       Physical Exam  Vitals reviewed. Exam conducted with a chaperone present ( at bedside).   Constitutional:       General: She is not in acute distress.     Appearance: Normal appearance. She is ill-appearing (chronically ill appearing). She is not toxic-appearing or diaphoretic.   Eyes:      Extraocular Movements: Extraocular movements intact.      Conjunctiva/sclera: Conjunctivae normal.   Cardiovascular:      Rate and Rhythm: Normal rate.      Pulses:           Femoral pulses are 1+ on the right side and 1+ on the left side.       Dorsalis pedis pulses are 0 on the left side.        Posterior tibial pulses are 0 on the left side.   Pulmonary:      Effort: Pulmonary effort is normal. No respiratory distress.   Musculoskeletal:         General: No swelling or tenderness.      Cervical back: Normal range of motion and neck supple.   Skin:     General: Skin is warm.      Comments: Left foot dressing and wound vac in place    Neurological:      General: No focal deficit present.      Mental Status: She is alert and oriented to person, place, and time. Mental status is at baseline.      Motor: No weakness.   Psychiatric:         Mood and Affect: Mood normal.         Behavior: Behavior normal.         Thought Content: Thought content normal.         Judgment: Judgment normal.           Result Review    Lab Results   Component Value Date    WBC 9.57 01/28/2025    HGB 9.8 (L) 01/28/2025    HCT 31.3 (L) 01/28/2025    .0 (H) 01/28/2025     01/28/2025     Lab Results   Component Value Date    GLUCOSE 201 (H) 01/28/2025    CALCIUM 9.2 01/28/2025     01/28/2025    K 4.0 01/28/2025    CO2 27.0 01/28/2025    CL 99 01/28/2025    BUN 27 (H) 01/28/2025    CREATININE 3.45 (H) 01/28/2025    EGFR 13.3 (L) 01/28/2025    BCR 7.8 01/28/2025    ANIONGAP 12.0 01/28/2025     Adult Transthoracic Echo Complete W/ Cont if Necessary Per Protocol    Left ventricular systolic function is normal. Estimated left   ventricular EF = 55%    Left ventricular wall thickness is consistent with moderate concentric   hypertrophy.    No hemodynamically significant valvular heart disease          Assessment & Plan   Assessment / Plan     Brief Patient Summary:  Sara Esparza is a 74 y.o. 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 She would benefit from left lower extremity angiogram to optimize arterial flow and wound healing. Afib and mentation have improved and appear stable at this time.     Plan:   - scheduled for LEFT lower extremity angiogram Monday 2/3/25 with Dr. Ramirez. NPO and consent ordered.   - Eliquis to be held 48hr prior to procedure   - wound care and weight bearing recommendations per orthopedics   - pain control per primary/orthopedics   - Patient is stable from a vascular standpoint and will not be seen over the weekend. All questions regarding surgery were answered today to the best of my ability. Please  reach out to the surgeon on call for any questions.     Plan of care reviewed with the patient and her  who verbalized their understanding and agreement. Case reviewed with Dr. Ramirez.     Chanel Padgett PA-C  1/30/2025  10:29 EST

## 2025-01-30 NOTE — PROGRESS NOTES
[facility] Medicine Services  PROGRESS NOTE    Patient Name: Sara Esparza  : 1950  MRN: [id number]    Date of Admission: 2025  Primary Care Physician: Toribio Roberts MD    Subjective   Subjective     CC:  F/U weakness, osteomyelitis    HPI:  Seen this morning,  at bedside. She has no major complaints at this time.       Objective   Objective     Vital Signs:   Temp:  [97.8 °F (36.6 °C)-98.5 °F (36.9 °C)] 97.8 °F (36.6 °C)  Heart Rate:  [46-66] 59  Resp:  [16] 16  BP: (144-159)/(56-70) 150/56  Flow (L/min) (Oxygen Therapy):  [2] 2     Physical Exam:  Gen-no acute distress  HENT-NCAT, mucous membranes moist  CV-RRR, S1 S2 normal, no m/r/g  Resp-CTAB, no wheezes or rales  Abd-soft, NT, ND, +BS  Ext-left foot in dressing/wound vac in place  Neuro-A&Ox3, no focal deficits  Skin-no rashes  Psych-appropriate mood  No change from 25      Results Reviewed:  LAB RESULTS:      Lab 25  0941 25  0636 25  2247 25  0945 25  0728   WBC 9.57 10.51 11.95* 10.23 14.99*   HEMOGLOBIN 9.8* 10.0* 10.3* 10.4* 10.6*   HEMATOCRIT 31.3* 31.2* 32.3* 32.9* 32.7*   PLATELETS 244 238 250 245 203   NEUTROS ABS 7.99* 8.12* 9.04* 8.09* 11.91*   IMMATURE GRANS (ABS) 0.13* 0.18* 0.19* 0.12* 0.18*   LYMPHS ABS 0.49* 0.88 1.04 0.61* 0.74   MONOS ABS 0.94* 1.23* 1.55* 1.35* 2.12*   EOS ABS 0.01 0.07 0.11 0.04 0.02   .0* 99.0* 99.7* 99.1* 97.3*         Lab 25  0941 25  0636 256 25  0945 25  0728   SODIUM 138 134* 126* 137  --  133*   POTASSIUM 4.0 3.8 3.7 4.1 4.0 3.5   CHLORIDE 99 97* 92* 97*  --  95*   CO2 27.0 24.0 22.0 26.0  --  25.0   ANION GAP 12.0 13.0 12.0 14.0  --  13.0   BUN 27* 56* 52* 33*  --  50*   CREATININE 3.45* 4.93* 4.88* 3.22*  --  4.47*   EGFR 13.3* 8.7* 8.8* 14.5*  --  9.8*   GLUCOSE 201* 129* 148* 197*  --  167*   CALCIUM 9.2 9.1 9.1 9.1  --  9.6                             Brief Urine Lab  Results  (Last result in the past 365 days)        Color   Clarity   Blood   Leuk Est   Nitrite   Protein   CREAT   Urine HCG        01/19/25 1404 Yellow   Cloudy   Moderate (2+)   Moderate (2+)   Negative   >=300 mg/dL (3+)                   Microbiology Results Abnormal       Procedure Component Value - Date/Time    Body Fluid Culture - Surgical Site, Foot, Left [[id number]]  (Abnormal)  (Susceptibility) Collected: 01/24/25 1754    Lab Status: Final result Specimen: Surgical Site from Foot, Left Updated: 01/27/25 0642     Body Fluid Culture Light growth (2+) Staphylococcus aureus, MRSA     Comment:   Methicillin resistant Staphylococcus aureus, Patient may be an isolation risk.        Gram Stain Rare (1+) WBCs seen      No organisms seen    Narrative:      Not a body fluid.    Susceptibility        Staphylococcus aureus, MRSA      TAYLOR      Clindamycin Susceptible      Erythromycin Resistant      Oxacillin Resistant      Rifampin Susceptible      Tetracycline Susceptible      Trimethoprim + Sulfamethoxazole Susceptible      Vancomycin Susceptible                           Wound Culture - Wound, Foot, Left [[id number]]  (Abnormal)  (Susceptibility) Collected: 01/20/25 1409    Lab Status: Final result Specimen: Wound from Foot, Left Updated: 01/24/25 0658     Wound Culture Scant growth (1+) Staphylococcus aureus, MRSA     Comment: Methicillin resistant Staphylococcus aureus, Patient may be an isolation risk.         Rare growth Morganella morganii ssp morganii     Comment:            Scant growth (1+) Normal Skin Kimber     Gram Stain Few (2+) WBCs seen      Many (4+) Gram positive cocci in pairs      Rare (1+) Gram negative bacilli      Rare (1+) Gram positive bacilli    Susceptibility        Staphylococcus aureus, MRSA      TAYLOR      Clindamycin Susceptible      Erythromycin Resistant      Oxacillin Resistant      Rifampin Susceptible      Tetracycline Susceptible      Trimethoprim + Sulfamethoxazole Susceptible       Vancomycin Susceptible                       Susceptibility        Morganella morganii ssp morganii      TAYLOR      Amoxicillin + Clavulanate Resistant      Ampicillin Resistant      Ampicillin + Sulbactam Resistant      Cefazolin (Non Urine) Resistant      Cefepime Susceptible  [1]       Cefotaxime Susceptible      Ceftazidime Susceptible  [1]       Gentamicin Susceptible      Levofloxacin Susceptible      Piperacillin + Tazobactam Susceptible      Tetracycline Susceptible      Trimethoprim + Sulfamethoxazole Susceptible                   [1]  Appended report. These results have been appended to a previously final verified report.                Susceptibility Comments       Morganella morganii ssp morganii    Cefotaxime susceptibility can be used as a surrogate for ceftriaxone susceptibility               MRSA Screen, PCR (Inpatient) - Swab, Nares [[id number]]  (Abnormal) Collected: 01/21/25 0614    Lab Status: Final result Specimen: Swab from Nares Updated: 01/21/25 0858     MRSA PCR Positive    Narrative:      The negative predictive value of this diagnostic test is high and should only be used to consider de-escalating anti-MRSA therapy. A positive result may indicate colonization with MRSA and must be correlated clinically.            No radiology results from the last 24 hrs    Results for orders placed during the hospital encounter of 01/19/25    Adult Transthoracic Echo Complete W/ Cont if Necessary Per Protocol    Interpretation Summary    Left ventricular systolic function is normal. Estimated left ventricular EF = 55%    Left ventricular wall thickness is consistent with moderate concentric hypertrophy.    No hemodynamically significant valvular heart disease      Current medications:  Scheduled Meds:albumin human, , ,   allopurinol, 200 mg, Oral, Daily  amiodarone, 200 mg, Oral, Q12H  apixaban, 2.5 mg, Oral, Q12H  atorvastatin, 10 mg, Oral, Daily  cefTRIAXone, 2,000 mg, Intravenous, Q24H  epoetin  gracia/gracia-epbx, 6,000 Units, Subcutaneous, Once per day on Monday Wednesday Friday  famotidine, 20 mg, Oral, Daily  insulin lispro, 2-9 Units, Subcutaneous, 4x Daily AC & at Bedtime  metoprolol tartrate, 12.5 mg, Oral, Q12H  NIFEdipine XL, 30 mg, Oral, Q24H  pantoprazole, 40 mg, Oral, Q AM  sodium chloride, 10 mL, Intravenous, Q12H  vancomycin, 1,000 mg, Intravenous, Once per day on Monday Wednesday Friday      Continuous Infusions:Pharmacy to dose vancomycin,       PRN Meds:.  acetaminophen    albumin human    albumin human    senna-docusate sodium **AND** polyethylene glycol **AND** bisacodyl **AND** bisacodyl    Calcium Replacement - Follow Nurse / BPA Driven Protocol    dextrose    dextrose    glucagon (human recombinant)    heparin (porcine)    Magnesium Standard Dose Replacement - Follow Nurse / BPA Driven Protocol    melatonin    metoprolol tartrate    ondansetron    oxyCODONE    Pharmacy to dose vancomycin    Potassium Replacement - Follow Nurse / BPA Driven Protocol    [COMPLETED] Insert Peripheral IV **AND** sodium chloride    sodium chloride    sodium chloride    traMADol    Assessment & Plan   Assessment & Plan     Active Hospital Problems    Diagnosis  POA    **Generalized weakness [R53.1]  Yes    Paroxysmal A-fib [I48.0]  Unknown    Sepsis due to methicillin resistant Staphylococcus aureus (MRSA) with encephalopathy without septic shock [A41.02, R65.20, G93.41]  Unknown    Chronic osteomyelitis of left foot [M86.672]  Unknown    Critical limb ischemia of left lower extremity [I70.222]  Unknown    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        Brief Hospital Course to date:  Sara Esparza is a 74 y.o. female with hx of ESRD on HD, PAF on Eliquis, HTN, and DM admitted for several weeks  of generalized weakness, myalgias, and multiple falls in setting of lightheadedness.   After admission she was found to be septic with temps up to 102.4. Further workup revealed left calcaneal osteomyelitis. S/P debridement and wound vac placement 1/24/25. Vascular Surgery planning for LLE angiogram 2/3/25. Infectious Disease following for antibiotic management.     This patient's problems and plans were partially entered by my partner and updated as appropriate by me 01/30/25. Copied text in this note has been reviewed and is accurate as of today's date.      Sepsis, fever, leukocytosis, confusion  L calcaneal osteomyelitis   Left posterior tibial artery occlusion   - CT LLE without contrast 1/21/25: draining wound seen along plantar aspect of left calcaneus with surrounding changes consistent with osteomyelitis  - Ortho consulted - s/p debridement and wound vac placement 1/24/25 by Dr. Gonzalez. May ultimately require amputation, patient aware.   - Plan for LLE angiogram on 2/3/25 by Dr. Ramirez/Vascular Surgery   - MRSA and Morganella in cultures from wound site, ID following, continue Vanc and Rocephin for now  - Leukocytosis and fevers resolved     Generalized weakness, progressive x weeks  Falls at home   - Suspicion of overdiuresis and orthostasis ongoing, with superimposed sepsis/infection now   - CT chest/abd/pelvis without acute findings   - Normal TSH and CK   - PT/OT following, patient considering SNF      Neck pain, C7 region   C5-6 canal stenosis  Chronic back pain s/p spinal stimulator  - CT C-spine - no fractures  - History of oxycodone dependence; family initially requested avoiding opiates however due to uncontrolled pain, initiated low dose oxycodone here which she has tolerated well thus far   - IV Dilaudid discontinued, but may need to restart at some point if further surgeries planned  - Recommended to patient/ to reach out to her pain management doctor's office as they have questions  regarding her spinal stimulator     ESRD on HD  - Has a tunneled right IJ dialysis catheter  - Nephrology following  - HD on MWF     DMII, HbA1c 4.9  - On sitagliptin at home  - SSI here, adjust as needed      HTN   - Nifedipine dose decreased due to tenuous BP's, now improved, could consider increasing dose again if needed     Hx of Afib  Episode of Afib with RVR during HD  - Resumed home dose beta blocker (Coreg), but still was not rate controlled - Cardiology consulted, started on Amiodarone and changed to Metoprolol BID. Rate now controlled.   - Resumed Eliquis 1/28/25, need to hold 48 hours prior to angiogram 2/3/25, discussed with pharmacist who will coordinate this      Elevated troponin  - In setting of ESRD  - Troponin peaked at 132 on 1/19/25  - Per chart review, had LHC [facility] at [facility] with non-obstructive CAD     Dyspepsia  - GI cocktail PRN     Constipation  - Likely due to opiates  - Improved with bowel regimen and addition of Lactulose        Expected Discharge Location and Transportation: home vs rehab  Expected Discharge   Expected Discharge Date: 2/6/2025; Expected Discharge Time:      VTE Prophylaxis:  Pharmacologic & mechanical VTE prophylaxis orders are present.         AM-PAC 6 Clicks Score (PT): 7 (01/30/25 0800)    CODE STATUS:   Code Status and Medical Interventions: CPR (Attempt to Resuscitate); Full Support   Ordered at: 01/19/25 0518     Level Of Support Discussed With:    Patient     Code Status (Patient has no pulse and is not breathing):    CPR (Attempt to Resuscitate)     Medical Interventions (Patient has pulse or is breathing):    Full Support       Sabrina Oconnor MD  01/30/25

## 2025-01-30 NOTE — PLAN OF CARE
Goal Outcome Evaluation:  Plan of Care Reviewed With: patient           A&O, VSS. Pt has c/o back pain off and on today. Po pain medication has been administered. Family at bedside.  Pt about 25% of meal trays w family encouragement. Wound vac and dressing in place on L foot. Plan for procedure on 2/3/25. Silicone dressings in places. SCDs on.  Problem: Adult Inpatient Plan of Care  Goal: Absence of Hospital-Acquired Illness or Injury  Intervention: Identify and Manage Fall Risk  Recent Flowsheet Documentation  Taken 1/30/2025 1600 by Natividad Bah RN  Safety Promotion/Fall Prevention:   activity supervised   fall prevention program maintained   nonskid shoes/slippers when out of bed   safety round/check completed  Taken 1/30/2025 1400 by Natividad Bah RN  Safety Promotion/Fall Prevention:   activity supervised   fall prevention program maintained   nonskid shoes/slippers when out of bed   safety round/check completed  Taken 1/30/2025 1200 by Natividad Bah RN  Safety Promotion/Fall Prevention:   activity supervised   fall prevention program maintained   nonskid shoes/slippers when out of bed   safety round/check completed  Taken 1/30/2025 1000 by Natividad Bah RN  Safety Promotion/Fall Prevention:   activity supervised   fall prevention program maintained   nonskid shoes/slippers when out of bed   safety round/check completed  Taken 1/30/2025 0800 by Natividad Bah RN  Safety Promotion/Fall Prevention:   activity supervised   fall prevention program maintained   nonskid shoes/slippers when out of bed   safety round/check completed  Intervention: Prevent Skin Injury  Recent Flowsheet Documentation  Taken 1/30/2025 1400 by Natividad Bah RN  Body Position:   turned   left  Skin Protection:   incontinence pads utilized   silicone foam dressing in place  Taken 1/30/2025 1200 by Natividad Bah RN  Body Position:   turned   right  Skin Protection:   incontinence pads utilized   silicone foam dressing in place  Taken  1/30/2025 1000 by Natividad Bah RN  Skin Protection:   silicone foam dressing in place   incontinence pads utilized  Taken 1/30/2025 0800 by Natividad Bah RN  Body Position:   turned   left  Skin Protection: (skin under silicone dressings assessed)   incontinence pads utilized   silicone foam dressing in place  Intervention: Prevent and Manage VTE (Venous Thromboembolism) Risk  Recent Flowsheet Documentation  Taken 1/30/2025 1400 by Natividad Bah RN  VTE Prevention/Management:   bilateral   SCDs (sequential compression devices) on  Taken 1/30/2025 1200 by Natividad Bah RN  VTE Prevention/Management:   bilateral   SCDs (sequential compression devices) on  Taken 1/30/2025 1000 by Natividad Bah RN  VTE Prevention/Management:   bilateral   SCDs (sequential compression devices) on  Taken 1/30/2025 0800 by Natividad Bah RN  VTE Prevention/Management:   bilateral   SCDs (sequential compression devices) on  Goal: Optimal Comfort and Wellbeing  Intervention: Monitor Pain and Promote Comfort  Recent Flowsheet Documentation  Taken 1/30/2025 1540 by Natividad Bah RN  Pain Management Interventions: pain medication given  Taken 1/30/2025 1500 by Natividad Bah RN  Pain Management Interventions: pain management plan reviewed with patient/caregiver  Taken 1/30/2025 1421 by Natividad Bah RN  Pain Management Interventions: pain medication given  Taken 1/30/2025 1400 by Natividad Bah RN  Pain Management Interventions: pain management plan reviewed with patient/caregiver  Taken 1/30/2025 1200 by Natividad Bah RN  Pain Management Interventions: pain management plan reviewed with patient/caregiver  Taken 1/30/2025 1015 by Natividad Bah RN  Pain Management Interventions: pain management plan reviewed with patient/caregiver  Taken 1/30/2025 0944 by Natividad Bah RN  Pain Management Interventions: pain medication given  Taken 1/30/2025 0800 by Natividad Bah RN  Pain Management Interventions: pain medication given  Intervention:  Provide Person-Centered Care  Recent Flowsheet Documentation  Taken 1/30/2025 0800 by Natividad Bah, RN  Trust Relationship/Rapport:   care explained   thoughts/feelings acknowledged

## 2025-01-30 NOTE — PROGRESS NOTES
[facility]  IP Progress Note      Chief Complaint/Reason for service: #1 PAF RVR    Subjective   Subjective: Patient is awake and alert.  She denies any dyspnea.  She appears very comfortable and not in pain as she was yesterday.    Past medical, surgical, social and family history reviewed in the patient's electronic medical record.    Objective     Vital Sign Min/Max for last 24 hours  Temp  Min: 97.8 °F (36.6 °C)  Max: 98.5 °F (36.9 °C)   BP  Min: 141/71  Max: 166/65   Pulse  Min: 53  Max: 69   Resp  Min: 16  Max: 26   SpO2  Min: 91 %  Max: 100 %   Flow (L/min) (Oxygen Therapy)  Min: 2  Max: 2      Intake/Output Summary (Last 24 hours) at 1/30/2025 0721  Last data filed at 1/30/2025 0345  Gross per 24 hour   Intake 125 ml   Output 3190 ml   Net -3065 ml             Current Facility-Administered Medications:     acetaminophen (TYLENOL) tablet 650 mg, 650 mg, Oral, Q6H PRN, Rom Gonzalez Jr., MD, 650 mg at 01/30/25 0015    albumin human 25 % IV SOLN  - ADS Override Pull, , , ,     albumin human 25 % IV SOLN 25 g, 25 g, Intravenous, PRN, Rom Gonzalez Jr., MD    allopurinol (ZYLOPRIM) tablet 200 mg, 200 mg, Oral, Daily, Rom Gonzalez Jr., MD, 200 mg at 01/29/25 1240    amiodarone (PACERONE) tablet 200 mg, 200 mg, Oral, Q12H, Naren Escobar MD, 200 mg at 01/29/25 2015    apixaban (ELIQUIS) tablet 2.5 mg, 2.5 mg, Oral, Q12H, Naren Escobar MD, 2.5 mg at 01/29/25 2016    atorvastatin (LIPITOR) tablet 10 mg, 10 mg, Oral, Daily, Rom Gonzalez Jr., MD, 10 mg at 01/29/25 1240    sennosides-docusate (PERICOLACE) 8.6-50 MG per tablet 2 tablet, 2 tablet, Oral, BID PRN, 2 tablet at 01/26/25 1203 **AND** polyethylene glycol (MIRALAX) packet 17 g, 17 g, Oral, Daily PRN, 17 g at 01/27/25 0934 **AND** bisacodyl (DULCOLAX) EC tablet 5 mg, 5 mg, Oral, Daily PRN, 5 mg at 01/26/25 1507 **AND** bisacodyl (DULCOLAX) suppository 10 mg, 10 mg, Rectal, Daily PRN, Gonzalez  Rom BROWN Jr., MD    Calcium Replacement - Follow Nurse / BPA Driven Protocol, , Not Applicable, PRN, Rom Gonzalez Jr., MD    cefTRIAXone (ROCEPHIN) 2,000 mg in sodium chloride 0.9 % 100 mL MBP, 2,000 mg, Intravenous, Q24H, [place], [facility], Last Rate: 200 mL/hr at 01/29/25 2015, 2,000 mg at 01/29/25 2015    dextrose (D50W) (25 g/50 mL) IV injection 25 g, 25 g, Intravenous, Q15 Min PRN, Rom Gonzalez Jr., MD    dextrose (GLUTOSE) oral gel 15 g, 15 g, Oral, Q15 Min PRN, Rom Gonzalez Jr., MD    epoetin gracia (EPOGEN,PROCRIT) injection 6,000 Units, 6,000 Units, Subcutaneous, Once per day on Monday Wednesday Friday, Wilian Gonzalez MD, 6,000 Units at 01/29/25 1416    famotidine (PEPCID) tablet 20 mg, 20 mg, Oral, Daily, Rom Gonzalez Jr., MD, 20 mg at 01/29/25 1240    glucagon (GLUCAGEN) injection 1 mg, 1 mg, Intramuscular, Q15 Min PRN, Rom Gonzalez Jr., MD    heparin (porcine) injection 2,000 Units, 2,000 Units, Intracatheter, PRN, Rom Gonzalez Jr., MD, 2,000 Units at 01/29/25 1214    Insulin Lispro (humaLOG) injection 2-9 Units, 2-9 Units, Subcutaneous, 4x Daily AC & at Bedtime, Rom Gonzalez Jr., MD, 2 Units at 01/29/25 2016    Magnesium Standard Dose Replacement - Follow Nurse / BPA Driven Protocol, , Not Applicable, PRN, Rom Gonzalez Jr., MD    melatonin tablet 2.5 mg, 2.5 mg, Oral, Nightly PRN, Rom Gonzalez Jr., MD, 2.5 mg at 01/30/25 0015    metoprolol tartrate (LOPRESSOR) half tablet 12.5 mg, 12.5 mg, Oral, Q12H, Naren Escobar MD    metoprolol tartrate (LOPRESSOR) injection 5 mg, 5 mg, Intravenous, Q6H PRN, Rom Gonzalez Jr., MD, 5 mg at 01/23/25 0329    NIFEdipine XL (PROCARDIA XL) 24 hr tablet 30 mg, 30 mg, Oral, Q24H, Rom Gonzalez Jr., MD, 30 mg at 01/29/25 0906    ondansetron (ZOFRAN) injection 4 mg, 4 mg, Intravenous, Q6H PRN, Rom Gonzalez Jr., MD, 4 mg at 01/24/25 0132    oxyCODONE (ROXICODONE) immediate release tablet 5 mg, 5 mg, Oral, Q4H  PRN, Rom Gonzalez Jr., MD, 5 mg at 01/29/25 2305    pantoprazole (PROTONIX) EC tablet 40 mg, 40 mg, Oral, Q AM, Rom Gonzalez Jr., MD, 40 mg at 01/30/25 0547    Pharmacy to dose vancomycin, , Not Applicable, Continuous PRN, Bailee Barrios MD    Potassium Replacement - Follow Nurse / BPA Driven Protocol, , Not Applicable, PRN, Rom Gonzalez Jr., MD    [COMPLETED] Insert Peripheral IV, , , Once **AND** sodium chloride 0.9 % flush 10 mL, 10 mL, Intravenous, PRN, Rom Gonzalez Jr., MD    sodium chloride 0.9 % flush 10 mL, 10 mL, Intravenous, Q12H, Rom Gonzalez Jr., MD, 10 mL at 01/29/25 2016    sodium chloride 0.9 % flush 10 mL, 10 mL, Intravenous, PRN, Rom Gonzalez Jr., MD    sodium chloride 0.9 % infusion 40 mL, 40 mL, Intravenous, PRN, Rom Gonzalez Jr., MD    traMADol (ULTRAM) tablet 50 mg, 50 mg, Oral, Q4H PRN, Rom Gonzalez Jr., MD, 50 mg at 01/30/25 0438    vancomycin (VANCOCIN) 1,000 mg in sodium chloride 0.9 % 250 mL IVPB-VTB, 1,000 mg, Intravenous, Once per day on Monday Wednesday Friday, Ricardo Chappell, PharmD, Last Rate: 250 mL/hr at 01/29/25 1706, 1,000 mg at 01/29/25 1706    Physical Exam: General Pleasant elderly female in bed not dyspneic not tachypneic heart rate 58        HEENT: No JVP.  Dialysis catheter noted on the right       Respiratory: Equal bilateral symmetrical expansion clear to auscultation anteriorly bilaterally       Cardiovascular: Regular rate and rhythm with a grade 3 over systolic ejection murmur       GI: Soft and flat       Lower Extremities: Bandages are present on the left heel       Neuro: Facial expressions are symmetrical moves all 4 extremities       Skin: Warm and dry       Psych: Pleasant affect    Results Review: No blood work is available for review this morning.  Heart rates 53-62.  Blood pressures 140s to 160    Radiology Results:  Imaging Results (Last 72 Hours)       ** No results found for the last 72 hours. **            EKG:  Last EKG done on 125 showed A-fib RVR with IVCD    ECHO: Preserved EF    Tele: Baseline rhythm is sinus bradycardia.  Heart rates in the 40s were recorded during sleep hours.  There was an isolated 9 beat run of PAT.  No recurrent A-fib.    Assessment   Assessment/Plan: PAF-overall the rhythm is well-controlled.  Yesterday amiodarone dose was reduced.  She is having heart rates mostly in the 50s and even some in the 40s during sleep hours.  Because of this I will decrease metoprolol to 12.5 twice daily.  Continue Eliquis therapy but Eliquis will need to be held prior to her scheduled procedure on Monday    Naren Escobar MD  01/30/25  07:21 EST

## 2025-01-30 NOTE — PROGRESS NOTES
Sara Esparza       LOS: 11 days   Patient Care Team:  Toriibo Roberts MD as PCP - General (Internal Medicine)    Chief Complaint: Left heel ulcer    Subjective     Interval History:     Resting comfortably in bed this morning.  Alert and oriented x 3.  No acute events overnight.  Family present at bedside.    Review of Systems:      Gen- No fevers, chills  CV- No chest pain, palpitations  Resp- No cough, dyspnea  GI- No N/V/D, abd pain    Objective     Vital Signs  Vital Signs (last 24 hours)         01/22 0700  01/23 0659 01/23 0700 01/23 0827   Most Recent      Temp (°F) 97.9 -  98.6      98     98 (36.7) 01/23 0700    Heart Rate 73 -  167       99 01/23 0341    Resp 16 -  24      18     18 01/23 0700    BP 74/54 -  156/71       132/90 01/23 0341    SpO2 (%) 90 -  99       94 01/23 0341    Flow (L/min) (Oxygen Therapy)   3       3 01/23 0600              Physical Exam:     No acute distress.  Nonlabored respirations.  Regular rate and rhythm.  Abdomen nondistended.  Left lower extremity: Dressing present is clean, dry, intact.  VAC with minimal serosanguinous drainage.     Results Review:     I reviewed the patient's new clinical results.    Medication Review:   Hospital Medications (active)         Dose Frequency Start End    acetaminophen (TYLENOL) tablet 650 mg 650 mg Every 6 Hours PRN 1/19/2025 --    Admin Instructions: If given for fever, use fever parameter: fever greater than 100.4 °F  Based on patient request - if ordered for moderate or severe pain, provider allows for administration of a medication prescribed for a lower pain scale.    Do not exceed 4 grams of acetaminophen in a 24 hr period. Max dose of 2gm for AST/ALT greater than 120 units/L.    If given for pain, use the following pain scale:   Mild Pain = Pain Score of 1-3, CPOT 1-2  Moderate Pain = Pain Score of 4-6, CPOT 3-4  Severe Pain = Pain Score of 7-10, CPOT 5-8    Route: Oral    albumin human 25 % IV SOLN  - ADS Override Pull    "1/22/2025 --    Admin Instructions: Created by cabinet override    Notes to Pharmacy: Created by cabinet override    allopurinol (ZYLOPRIM) tablet 200 mg 200 mg Daily 1/19/2025 --    Admin Instructions: ([facility]) Take with food if GI upset occurs.    Route: Oral    amiodarone (PACERONE) tablet 200 mg 200 mg 3 Times Daily 1/23/2025 --    Admin Instructions: Avoid grapefruit juice while taking this medication.    Route: Oral    amiodarone 360 mg in 200 mL D5W infusion 1 mg/min Continuous 1/22/2025 --    Admin Instructions: Central line preferred, if unavailable use large bore IV access with frequent nurse monitoring of IV site.  IV med requiring filtration 0.22 micron inline filter.    Route: Intravenous    apixaban (ELIQUIS) tablet 2.5 mg 2.5 mg Every 12 Hours Scheduled 1/22/2025 --    Admin Instructions: Tablet may be crushed and suspended in 60 mL of water or D5W and immediately delivered via NG tube.  Avoid grapefruit juice.    Route: Oral    atorvastatin (LIPITOR) tablet 10 mg 10 mg Daily 1/22/2025 3/18/2025    Admin Instructions: Avoid grapefruit juice.    Route: Oral    bisacodyl (DULCOLAX) EC tablet 5 mg 5 mg Daily PRN 1/19/2025 --    Admin Instructions: Use if no bowel movement after 12 hours.  Swallow whole. Do not crush, split, or chew tablet.    Route: Oral    Linked Group 1: Placed in \"And\" Linked Group        bisacodyl (DULCOLAX) suppository 10 mg 10 mg Daily PRN 1/19/2025 --    Admin Instructions: Use if no bowel movement after 12 hours.  Hold for diarrhea    Route: Rectal    Linked Group 1: Placed in \"And\" Linked Group        Calcium Replacement - Follow Nurse / BPA Driven Protocol  As Needed 1/19/2025 --    Admin Instructions: Open Order & Select \"[facility] Electrolyte Replacement Protocol Algorithm\" to View Details    Route: Not Applicable    cefepime 1000 mg IVPB in 100 mL NS (MBP) 1,000 mg Every 24 Hours 1/22/2025 1/29/2025    Admin Instructions: Give after dialysis on dialysis days.     Route: Intravenous "    dextrose (D50W) (25 g/50 mL) IV injection 25 g 25 g Every 15 Minutes PRN 1/19/2025 --    Admin Instructions: Blood sugar less than 70; patient has IV access - Unresponsive, NPO or Unable To Safely Swallow    Route: Intravenous    dextrose (GLUTOSE) oral gel 15 g 15 g Every 15 Minutes PRN 1/19/2025 --    Admin Instructions: BS<70, Patient Alert, Is not NPO, Can safely swallow.    Route: Oral    dilTIAZem (CARDIZEM) 125 mg in 125 mL D5W infusion 5-15 mg/hr Continuous 1/22/2025 --    Admin Instructions: For heart rate - To maintain HR less than 120, initiate infusion at 5 mg/hr. Titrate up or down 2.5-5 mg/hr every 15 minutes to use the lowest dose possible. Maximum infusion rate of 15 mg/hr. Hold for SBP less than 100 mmHg or heart rate less than 60. Contact provider if unable to maintain HR less than 120 on maximum dose. Once Heart Rate target achieved obtain vitals a minimum of every 30 minutes. For patients not in critical care areas - obtain vitals a minimum of every 4 hours.   Caution: Look alike/sound alike drug alert.   Refrigerate.    Route: Intravenous    famotidine (PEPCID) tablet 20 mg 20 mg Daily 1/22/2025 --    Route: Oral    glucagon (GLUCAGEN) injection 1 mg 1 mg Every 15 Minutes PRN 1/19/2025 --    Admin Instructions: Blood Glucose Less Than 70 - Patient Without IV Access - Unresponsive, NPO or Unable To Safely Swallow  Reconstitute powder for injection by adding 1 mL of -supplied sterile diluent or sterile water for injection to a vial containing 1 mg of the drug, to provide solutions containing 1 mg/mL. Shake vial gently to dissolve.    Route: Intramuscular    heparin (porcine) injection 2,000 Units 2,000 Units As Needed 1/20/2025 --    Route: Intracatheter    HYDROmorphone (DILAUDID) injection 0.25 mg 0.25 mg 2 Times Daily PRN 1/20/2025 1/25/2025    Admin Instructions: Based on patient request - if ordered for moderate or severe pain, provider allows for administration of a medication  "prescribed for a lower pain scale.  If given for pain, use the following pain scale:  Mild Pain = Pain Score of 1-3, CPOT 1-2  Moderate Pain = Pain Score of 4-6, CPOT 3-4  Severe Pain = Pain Score of 7-10, CPOT 5-8    Route: Intravenous    Insulin Lispro (humaLOG) injection 2-9 Units 2-9 Units 4 Times Daily Before Meals & Nightly 1/19/2025 --    Admin Instructions: Correction Insulin - Moderate Dose (Total Insulin Dose 40-60 units/day, Average Weight Patient, Patient Taking Oral Hypoglycemic)    Blood Glucose 150-199 mg/dL - 2 units  Blood Glucose 200-249 mg/dL - 4 units  Blood Glucose 250-299 mg/dL - 6 units  Blood Glucose 300-349 mg/dL - 7 units  Blood Glucose 350-400 mg/dL - 8 units  Blood Glucose greater than 400 mg/dL - 9 units & Call Provider   Caution: Look alike/sound alike drug alert    Route: Subcutaneous    Magnesium Standard Dose Replacement - Follow Nurse / BPA Driven Protocol  As Needed 1/19/2025 --    Admin Instructions: Open Order & Select \"[facility] Electrolyte Replacement Protocol Algorithm\" to View Details    Route: Not Applicable    melatonin tablet 2.5 mg 2.5 mg Nightly PRN 1/19/2025 --    Route: Oral    metoprolol tartrate (LOPRESSOR) injection 5 mg 5 mg Every 6 Hours PRN 1/22/2025 --    Admin Instructions: Hold for SBP less than 100, DBP less than 60, or heart rate less than 50. If a dose is held, please contact the provider.    Route: Intravenous    metoprolol tartrate (LOPRESSOR) tablet 25 mg 25 mg Every 12 Hours Scheduled 1/23/2025 --    Route: Oral    metroNIDAZOLE (FLAGYL) IVPB 500 mg 500 mg Every 8 Hours 1/21/2025 1/26/2025    Admin Instructions: Caution: Look alike/sound alike drug alert.  Do not refrigerate.    Route: Intravenous    NIFEdipine XL (PROCARDIA XL) 24 hr tablet 60 mg 60 mg Every 24 Hours Scheduled 1/19/2025 --    Admin Instructions: Hold for SBP less than 100, DBP less than 60.  Caution: Look alike/sound alike drug alert. Avoid grapefruit juice. Swallow whole. Do not crush, " "split or chew.    Route: Oral    ondansetron (ZOFRAN) injection 4 mg 4 mg Every 6 Hours PRN 1/19/2025 --    Admin Instructions: If BOTH ondansetron (ZOFRAN) and promethazine (PHENERGAN) are ordered use ondansetron first and THEN promethazine IF ondansetron is ineffective.    Route: Intravenous    pantoprazole (PROTONIX) EC tablet 40 mg 40 mg Every Early Morning 1/22/2025 --    Admin Instructions: Swallow whole; do not crush, split, or chew.    Route: Oral    Pharmacy to dose vancomycin  Continuous PRN 1/21/2025 1/28/2025    Route: Not Applicable    polyethylene glycol (MIRALAX) packet 17 g 17 g Daily PRN 1/19/2025 --    Admin Instructions: Use if no bowel movement after 12 hours. Mix in 6-8 ounces of water.  Use 4-8 ounces of water, tea, or juice for each 17 gram dose.    Route: Oral    Linked Group 1: Placed in \"And\" Linked Group        Potassium Replacement - Follow Nurse / BPA Driven Protocol  As Needed 1/19/2025 --    Admin Instructions: Open Order & Select \"BHS Electrolyte Replacement Protocol Algorithm\" to View Details    Route: Not Applicable    sennosides-docusate (PERICOLACE) 8.6-50 MG per tablet 2 tablet 2 tablet 2 Times Daily PRN 1/19/2025 --    Admin Instructions: Start bowel management regimen if patient has not had a bowel movement after 12 hours.    Route: Oral    Linked Group 1: Placed in \"And\" Linked Group        sodium chloride 0.9 % flush 10 mL 10 mL As Needed 1/19/2025 --    Route: Intravenous    Linked Group 2: Placed in \"And\" Linked Group        sodium chloride 0.9 % flush 10 mL 10 mL Every 12 Hours Scheduled 1/19/2025 --    Route: Intravenous    sodium chloride 0.9 % flush 10 mL 10 mL As Needed 1/19/2025 --    Route: Intravenous    sodium chloride 0.9 % infusion 40 mL 40 mL As Needed 1/19/2025 --    Admin Instructions: Following administration of an IV intermittent medication, flush line with 40mL NS at 100mL/hr.    Route: Intravenous    traMADol (ULTRAM) tablet 50 mg 50 mg Every 12 Hours PRN " 1/20/2025 1/25/2025    Admin Instructions: Based on patient request - if ordered for moderate or severe pain, provider allows for administration of a medication prescribed for a lower pain scale.      Caution: Look alike/sound alike drug alert    If given for pain, use the following pain scale:  Mild Pain = Pain Score of 1-3, CPOT 1-2  Moderate Pain = Pain Score of 4-6, CPOT 3-4  Severe Pain = Pain Score of 7-10, CPOT 5-8    Route: Oral    vancomycin (dosing per levels)  Daily 1/21/2025 1/28/2025    Admin Instructions: Pharmacy is dosing vancomycin per levels    Route: Not Applicable              Assessment & Plan     74-year-old female with multiple medical comorbidities, left heel wound, calcaneal osteomyelitis status post debridement, irrigation, wound vacuum assisted closure 1/24/25.      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  She is scheduled to undergo vascular intervention on Monday.  I had a discussion with she and her  again today, they are wanting to continue to pursue limb salvage.  I think the overall prognosis is poor.  I think vascular intervention would be beneficial both for attempted healing of the heel wound and in the event of potential future below-knee amputation.    Rom Gonzalez Jr, MD  01/30/25  14:17 EST

## 2025-01-30 NOTE — PROGRESS NOTES
"   LOS: 11 days    Patient Care Team:  Toribio Roberts MD as PCP - General (Internal Medicine)    Subjective     HD yesterday  is concerned patient feels v week at the end of treatment. We discussed about home dialysis option    Objective     Vital Signs:  Blood pressure 150/56, pulse 57, temperature 97.8 °F (36.6 °C), temperature source Oral, resp. rate 16, height 170.2 cm (67.01\"), weight 108 kg (238 lb 1.6 oz), SpO2 99%.      Intake/Output Summary (Last 24 hours) at 1/30/2025 1636  Last data filed at 1/30/2025 1300  Gross per 24 hour   Intake 605 ml   Output 150 ml   Net 455 ml        01/29 0701 - 01/30 0700  In: 125 [P.O.:125]  Out: 3190 [Urine:150]    Physical Exam:  GENERAL: Awake alert oriented  female lying comfortably in bed.  NEURO:  No focal deficit  PSYCHIATRIC: Cooperative with PE  EYE: PERR.  NECK:  No JVD discernable,  Trachea midline  CV: Positive edema, RRR  LUNGS:  Quiet,  Nonlabored resp.  Symmetrical expansion  ABDOMEN: Nondistended, soft nontender.  : No [place], no palp bladder  SKIN: Warm and dry without rash  Left foot dressed with wound vac  + RIJ TDC    Labs:  Results from last 7 days   Lab Units 01/28/25  0941 01/27/25  0636 01/26/25  2247   WBC 10*3/mm3 9.57 10.51 11.95*   HEMOGLOBIN g/dL 9.8* 10.0* 10.3*   PLATELETS 10*3/mm3 244 238 250     Results from last 7 days   Lab Units 01/28/25  0941 01/27/25  0636 01/26/25  2246 01/25/25  0945   SODIUM mmol/L 138 134* 126* 137   POTASSIUM mmol/L 4.0 3.8 3.7 4.1   CHLORIDE mmol/L 99 97* 92* 97*   CO2 mmol/L 27.0 24.0 22.0 26.0   BUN mg/dL 27* 56* 52* 33*   CREATININE mg/dL 3.45* 4.93* 4.88* 3.22*   CALCIUM mg/dL 9.2 9.1 9.1 9.1                 A/P:    1.  ESRD: On HD MWF at [facility] with NAL.  No need for hemodialysis today.   -Hemodialysis today as per schedule.  Tunnel catheter in place. She is a good candidate for home dialysis. Received education in the past. Would like to re-consider as outpatient.      2.  HTN: Blood " pressure stable    3.  Hyponatremia: Resolved. Due to underlying renal failure.  Adjust with HD.     4.  Hyperkalemia: Resolved.  Adjust with HD     5.  Metabolic acidosis:  Adjust with HD     6.  Anemia: Started back on CORNEL on HD days.    7.  Hyperphosphatemia: Last phos level 5.5     8.  Nutrition: Low potassium, low Phos high-protein diet.  Renal vitamin.     9.  PVD: Possible infected wound On antibiotics.  Urine culture with mixed hanny.  Patient with foot wound.  S/p debridement and wound VAC.     High risk complex patient multiple medical problems.    Wilian Gonzalez MD  01/30/25  16:36 EST

## 2025-01-30 NOTE — PLAN OF CARE
Problem: Adult Inpatient Plan of Care  Goal: Absence of Hospital-Acquired Illness or Injury  Intervention: Prevent Skin Injury  Recent Flowsheet Documentation  Taken 1/30/2025 0400 by Schilder, Claire, RN  Body Position:   turned   right  Skin Protection: transparent dressing maintained  Taken 1/30/2025 0200 by Schilder, Claire, RN  Skin Protection: transparent dressing maintained  Taken 1/30/2025 0000 by Schilder, Claire, RN  Skin Protection: transparent dressing maintained  Taken 1/29/2025 2200 by Schilder, Claire, RN  Skin Protection: transparent dressing maintained  Taken 1/29/2025 2000 by Schilder, Claire, RN  Skin Protection: transparent dressing maintained   Goal Outcome Evaluation:                   Problem: Adult Inpatient Plan of Care  Goal: Absence of Hospital-Acquired Illness or Injury  Intervention: Prevent Infection  Recent Flowsheet Documentation  Taken 1/30/2025 0400 by Schilder, Claire, RN  Infection Prevention:   rest/sleep promoted   single patient room provided   environmental surveillance performed  Taken 1/30/2025 0200 by Schilder, Claire, RN  Infection Prevention:   rest/sleep promoted   single patient room provided   environmental surveillance performed  Taken 1/30/2025 0000 by Schilder, Claire, RN  Infection Prevention:   rest/sleep promoted   single patient room provided   environmental surveillance performed  Taken 1/29/2025 2200 by Schilder, Claire, RN  Infection Prevention:   rest/sleep promoted   single patient room provided   environmental surveillance performed  Taken 1/29/2025 2000 by Schilder, Claire, RN  Infection Prevention:   rest/sleep promoted   single patient room provided   environmental surveillance performed

## 2025-01-30 NOTE — PROGRESS NOTES
[facility] Progress Note    Admission Date: 1/19/2025    Sara Esparza  1950  [id number]    Date: 1/30/2025    Antibiotics:  Anti-Infectives (From admission, onward)      Ordered     Dose/Rate Route Frequency Start Stop    01/29/25 1059  vancomycin (VANCOCIN) 1,000 mg in sodium chloride 0.9 % 250 mL IVPB-VTB        Ordering Provider: Ricardo Chappell, Amrita    1,000 mg  250 mL/hr over 60 Minutes Intravenous Once per day on Monday Wednesday Friday 01/29/25 1600 03/10/25 1559    01/27/25 1850  cefTRIAXone (ROCEPHIN) 2,000 mg in sodium chloride 0.9 % 100 mL MBP        Ordering Provider: Camden Ashford RPH    2,000 mg  200 mL/hr over 30 Minutes Intravenous Every 24 Hours 01/27/25 2000 02/05/25 1959    01/27/25 0920  vancomycin (VANCOCIN) 1,000 mg in sodium chloride 0.9 % 250 mL IVPB-VTB        Ordering Provider: Ricardo Chappell, PharmD    1,000 mg  250 mL/hr over 60 Minutes Intravenous Once 01/27/25 1600 01/27/25 1857    01/27/25 0949  Pharmacy to dose vancomycin        Ordering Provider: Bailee Barrios MD     Not Applicable Continuous PRN 01/27/25 0948 02/10/25 0947    01/27/25 0920  vancomycin (dosing per levels)  Status:  Discontinued        Ordering Provider: Ricardo Chappell, PharmD     Not Applicable Daily 01/27/25 0920 01/29/25 1059    01/24/25 1230  vancomycin (VANCOCIN) 1,000 mg in sodium chloride 0.9 % 250 mL IVPB-VTB        Ordering Provider: Edmund Erwin PharmD    1,000 mg  250 mL/hr over 60 Minutes Intravenous Once 01/24/25 1600 01/24/25 1723    01/22/25 1230  vancomycin 750 mg in sodium chloride 0.9 % 250 mL IVPB-VTB        Ordering Provider: Edmund Erwin PharmD    750 mg  333.3 mL/hr over 45 Minutes Intravenous Once 01/22/25 1400 01/22/25 1810    01/21/25 0519  cefepime 1000 mg IVPB in 100 mL NS (MBP)  Status:  Discontinued        Ordering Provider: Rom Gonzalez Jr., MD    1,000 mg  over 4 Hours Intravenous Every 24 Hours 01/22/25 0600 01/27/25 1851    01/21/25 0537  vancomycin  (dosing per levels)  Status:  Discontinued        Ordering Provider: Rom Gonzalez Jr., MD     Not Applicable Daily 01/21/25 0900 01/27/25 1320    01/21/25 0519  cefepime 1000 mg IVPB in 100 mL NS (MBP)        Ordering Provider: Conrad Alvarez MD    1,000 mg  over 30 Minutes Intravenous Once 01/21/25 0615 01/21/25 0715    01/21/25 0505  metroNIDAZOLE (FLAGYL) IVPB 500 mg        Ordering Provider: Rom Gonzalez Jr., MD    500 mg  200 mL/hr over 30 Minutes Intravenous Every 8 Hours 01/21/25 0600 01/26/25 0759    01/21/25 0513  vancomycin 2250 mg/500 mL 0.9% NS IVPB ([facility])        Ordering Provider: Woo Wells, PharmD    20 mg/kg × 108 kg  over 135 Minutes Intravenous Once 01/21/25 0600 01/21/25 0950    01/21/25 0505  Pharmacy to dose vancomycin  Status:  Discontinued        Ordering Provider: Rom Gonzalez Jr., MD     Not Applicable Continuous PRN 01/21/25 0500 01/27/25 1318    01/20/25 0738  cefTRIAXone (ROCEPHIN) 1,000 mg in sodium chloride 0.9 % 100 mL MBP  Status:  Discontinued        Ordering Provider: Leslye Fiore MD    1,000 mg  200 mL/hr over 30 Minutes Intravenous Every 24 Hours 01/20/25 0900 01/21/25 0519            Reason for Consultation: Sepsis and osteomyelitis of the left calcaneus     History of present illness:    1/22/25: Patient is a 74 y.o. 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  Evaluation in the ED included WBC 14.3 and PCT 5.9.  Subsequent lactic acid was 4.1 on 1/21.  After admission she became febrile to 101.9.  She was started on ceftriaxone and vancomycin.  Blood culture was sent yesterday afternoon.  Urinalysis showed pyuria.  Urine culture showed mixed hanny in the lab  discarded the culture.  She was noted to have a left calcaneus wound.  CT scan of the chest, abdomen and pelvis were unremarkable for acute process.  CT scan of the left lower extremity showed a wound at the plantar aspect of the left calcaneus with subcutaneous and intraosseous air and destructive changes .  CT scan of the neck showed advanced C5-6 and C6-7 degenerative disc disease; suspected moderate or greater C5-6 canal stenosis due to posterior vertebral osteophytes.  Wound culture of the left heel is growing MRSA and a gram-negative ange.  At the time of my exam the patient is nonverbal other than moaning.  She does not follow commands.  Her  is at the bedside and is able to provide medical history although he is a little uncertain about timing of events such as when she started dialysis.      She had a dialysis catheter placed 9/13 which was replaced on 10/11.    1/23/2025.  Afebrile x 2 days.  She is considerably more alert today.  She remains on IV amiodarone for atrial fibrillation with RVR.  She complains of pain but does not localize it to any specific site.  Wound culture with MRSA and Morganella.  Blood culture negative.    1/24/2025.  Afebrile.  She is seen on dialysis today.  She she is alert and mildly confused but understands that she is scheduled to undergo surgery today.  In discussion with her  in the patient's room he recounted that it was explained to her that she was at high risk for limb loss based on the extent of osteomyelitis involving her calcaneus.  MRI was attempted yesterday but aborted because of pain.    1/25/2025.  Afebrile.  Alert. Uncomfortable.  states that she is discouraged because she was found to have extensive osteo at surgery yesterday.  He states that he does not see how we will be possible for her to go home.    1/26/25 Afebrile, appears more lucid. Appears more comfortable although she is scratching torso and upper extremities; the  states  this is a chronic problem  MRSA from op cultures  1/27/25 Afebrile. Seen on HD. No new c/o  Note plans for arteriogram  MRSA from op cultures    1/28/25  Afebrile, alert but seems unable to remember that she is scheduled for vascular evaluation next week. I reminded her that Dr Gonzalez is very concerned Re: ability to salvage her left leg and she acknowledged this. Her  at the bedside appears to understand all of these issues.     1/29/2025:  She remains afebrile.  Left foot culture from 1/24 is growing MRSA.  Vancomycin random level is 21.2.  White blood cell count yesterday was 9.6.   CT scan of 1/21 revealed a draining wound along the plantar aspect of the left calcaneus with subcutaneous air and destructive changes and intraosseous air along the plantar aspect of the calcaneus consistent with osteomyelitis.  She is scheduled for left lower extremity angiogram on 2/3/2025.  Photo of her wound from 1/28 reveals exposed bone.  She complains of neck/back pain.      1/30/2025: She remains afebrile.  Random vancomycin level yesterday was 21.2.  She denies uncontrolled pain.  She denies nausea and vomiting.   She is more willing to consider amputation      PE:  Vital Signs  Temp  Min: 97.8 °F (36.6 °C)  Max: 98.5 °F (36.9 °C)  BP  Min: 141/71  Max: 166/65  Pulse  Min: 53  Max: 69  Resp  Min: 16  Max: 26  SpO2  Min: 91 %  Max: 100 %    GENERAL: Alert, appropriate, appears chronically ill  HEENT: Normocephalic, atraumatic.   No conjunctival injection. No icterus.  No oral labial lesions   NECK: No evidence of meningismus  HEART: IRRR; No murmur, rubs, gallops.   LUNGS: Clear anteriorly, no wheezes or rhonchi appreciated  ABDOMEN: Abdomen bloated but soft.  No evidence of tenderness elicited with deep pressure throughout the abdomen.   :  Without Corral catheter.  MSK:   Photo of her wound from 1/28 at the time of her wound VAC change reveals exposed bone.  SKIN: Warm and dry without cutaneous eruptions on  Inspection/palpation.    NEURO: Oriented to PPT.    PSYCHIATRIC: Normal insight and judgment. Cooperative with PE       Laboratory Data    Results from last 7 days   Lab Units 01/28/25  0941 01/27/25  0636 01/26/25  2247   WBC 10*3/mm3 9.57 10.51 11.95*   HEMOGLOBIN g/dL 9.8* 10.0* 10.3*   HEMATOCRIT % 31.3* 31.2* 32.3*   PLATELETS 10*3/mm3 244 238 250     Results from last 7 days   Lab Units 01/28/25  0941   SODIUM mmol/L 138   POTASSIUM mmol/L 4.0   CHLORIDE mmol/L 99   CO2 mmol/L 27.0   BUN mg/dL 27*   CREATININE mg/dL 3.45*   GLUCOSE mg/dL 201*   CALCIUM mg/dL 9.2                     Estimated Creatinine Clearance: 17.8 mL/min (A) (by C-G formula based on SCr of 3.45 mg/dL (H)).      Microbiology:  MRSA and Morganella from foot wound    Radiology:  Imaging Results (Last 72 Hours)       ** No results found for the last 72 hours. **            I read her CT scan images of 1/21.      Impression:   1.  Left calcaneal osteomyelitis-with MRSA/Morganella on superficial cultures and MRSA and deep cultures.  MRSA is the likely pathogen.  This is not a salvageable foot.  2.  Sepsis-improved.  This is likely secondary to her left foot infection  3.  Pyuria-with mixed hanny on culture  4.  End-stage renal disease-on hemodialysis  5.  Type 2 diabetes mellitus  6.  Paroxysmal atrial fibrillation  7.  Toxic/metabolic encephalopathy-improved   8.  Leukocytosis/neutrophilia-improved  9.  Peripheral vascular disease-scheduled for vascular intervention on 2/3  10.  Cervical spinal stenosis          PLAN/RECOMMENDATIONS:  -- Continue vancomycin   -- Continue ceftriaxone   -- Continue wound care  -- Consider proceeding with a left BKA    I will plan to examine her foot on 1/31 with Cody Mehta when he removes the wound VAC.    I discussed potential side effects of vancomycin with her today including ototoxicity.  I discussed this complex situation again with her and her  today  I discussed her situation with Dr. Oconnor  today    This visit included the following complex service elements:  Complex medical decision-making associated with antimicrobial prescribing.  In-depth chart review with high level synthesis for complex diagnoses.  Managed infection treatment protocol associated with transitions of care for this complex patient.  Counseled patients, family members, and/or caregivers regarding antimicrobial stewardship and antibiotic resistance.          Teja Blue MD  1/30/2025

## 2025-01-31 ENCOUNTER — APPOINTMENT (OUTPATIENT)
Dept: NEPHROLOGY | Facility: HOSPITAL | Age: 75
DRG: 853 | End: 2025-01-31
Payer: MEDICARE

## 2025-01-31 LAB
ANION GAP SERPL CALCULATED.3IONS-SCNC: 9 MMOL/L (ref 5–15)
BUN SERPL-MCNC: 36 MG/DL (ref 8–23)
BUN/CREAT SERPL: 8.2 (ref 7–25)
CALCIUM SPEC-SCNC: 9.4 MG/DL (ref 8.6–10.5)
CHLORIDE SERPL-SCNC: 97 MMOL/L (ref 98–107)
CO2 SERPL-SCNC: 25 MMOL/L (ref 22–29)
CREAT SERPL-MCNC: 4.39 MG/DL (ref 0.57–1)
DEPRECATED RDW RBC AUTO: 52.4 FL (ref 37–54)
EGFRCR SERPLBLD CKD-EPI 2021: 10 ML/MIN/1.73
ERYTHROCYTE [DISTWIDTH] IN BLOOD BY AUTOMATED COUNT: 14.1 % (ref 12.3–15.4)
GLUCOSE BLDC GLUCOMTR-MCNC: 110 MG/DL (ref 70–130)
GLUCOSE BLDC GLUCOMTR-MCNC: 128 MG/DL (ref 70–130)
GLUCOSE BLDC GLUCOMTR-MCNC: 145 MG/DL (ref 70–130)
GLUCOSE SERPL-MCNC: 132 MG/DL (ref 65–99)
HCT VFR BLD AUTO: 32.7 % (ref 34–46.6)
HGB BLD-MCNC: 10.1 G/DL (ref 12–15.9)
MCH RBC QN AUTO: 31.3 PG (ref 26.6–33)
MCHC RBC AUTO-ENTMCNC: 30.9 G/DL (ref 31.5–35.7)
MCV RBC AUTO: 101.2 FL (ref 79–97)
PLATELET # BLD AUTO: 271 10*3/MM3 (ref 140–450)
PMV BLD AUTO: 10.5 FL (ref 6–12)
POTASSIUM SERPL-SCNC: 4.3 MMOL/L (ref 3.5–5.2)
RBC # BLD AUTO: 3.23 10*6/MM3 (ref 3.77–5.28)
SODIUM SERPL-SCNC: 131 MMOL/L (ref 136–145)
WBC NRBC COR # BLD AUTO: 11.55 10*3/MM3 (ref 3.4–10.8)

## 2025-01-31 PROCEDURE — 97597 DBRDMT OPN WND 1ST 20 CM/<: CPT

## 2025-01-31 PROCEDURE — 25010000002 CEFTRIAXONE PER 250 MG

## 2025-01-31 PROCEDURE — 25010000002 EPOETIN ALFA PER 1000 UNITS: Performed by: INTERNAL MEDICINE

## 2025-01-31 PROCEDURE — 25810000003 SODIUM CHLORIDE 0.9 % SOLUTION 250 ML FLEX CONT

## 2025-01-31 PROCEDURE — 25010000002 ONDANSETRON PER 1 MG: Performed by: ORTHOPAEDIC SURGERY

## 2025-01-31 PROCEDURE — 85027 COMPLETE CBC AUTOMATED: CPT | Performed by: HOSPITALIST

## 2025-01-31 PROCEDURE — 80048 BASIC METABOLIC PNL TOTAL CA: CPT | Performed by: HOSPITALIST

## 2025-01-31 PROCEDURE — 97605 NEG PRS WND THER DME<=50SQCM: CPT

## 2025-01-31 PROCEDURE — 25010000002 VANCOMYCIN 1 G RECONSTITUTED SOLUTION 1 EACH VIAL

## 2025-01-31 PROCEDURE — 99232 SBSQ HOSP IP/OBS MODERATE 35: CPT | Performed by: INTERNAL MEDICINE

## 2025-01-31 PROCEDURE — 99232 SBSQ HOSP IP/OBS MODERATE 35: CPT | Performed by: HOSPITALIST

## 2025-01-31 PROCEDURE — 82948 REAGENT STRIP/BLOOD GLUCOSE: CPT

## 2025-01-31 RX ORDER — LIDOCAINE 4 G/G
2 PATCH TOPICAL
Status: DISCONTINUED | OUTPATIENT
Start: 2025-01-31 | End: 2025-02-06

## 2025-01-31 RX ADMIN — LIDOCAINE 2 PATCH: 4 PATCH TOPICAL at 12:35

## 2025-01-31 RX ADMIN — APIXABAN 2.5 MG: 2.5 TABLET, FILM COATED ORAL at 20:44

## 2025-01-31 RX ADMIN — ATORVASTATIN CALCIUM 10 MG: 10 TABLET, FILM COATED ORAL at 10:41

## 2025-01-31 RX ADMIN — APIXABAN 2.5 MG: 2.5 TABLET, FILM COATED ORAL at 10:41

## 2025-01-31 RX ADMIN — ONDANSETRON 4 MG: 2 INJECTION INTRAMUSCULAR; INTRAVENOUS at 10:42

## 2025-01-31 RX ADMIN — OXYCODONE 5 MG: 5 TABLET ORAL at 05:37

## 2025-01-31 RX ADMIN — SODIUM CHLORIDE 2000 MG: 900 INJECTION INTRAVENOUS at 20:43

## 2025-01-31 RX ADMIN — OXYCODONE 5 MG: 5 TABLET ORAL at 12:34

## 2025-01-31 RX ADMIN — VANCOMYCIN HYDROCHLORIDE 1000 MG: 1 INJECTION, POWDER, LYOPHILIZED, FOR SOLUTION INTRAVENOUS at 17:14

## 2025-01-31 RX ADMIN — FAMOTIDINE 20 MG: 20 TABLET, FILM COATED ORAL at 10:42

## 2025-01-31 RX ADMIN — OXYCODONE 5 MG: 5 TABLET ORAL at 18:50

## 2025-01-31 RX ADMIN — AMIODARONE HYDROCHLORIDE 200 MG: 200 TABLET ORAL at 10:42

## 2025-01-31 RX ADMIN — ERYTHROPOIETIN 6000 UNITS: 3000 INJECTION, SOLUTION INTRAVENOUS; SUBCUTANEOUS at 14:55

## 2025-01-31 RX ADMIN — Medication 10 ML: at 20:44

## 2025-01-31 RX ADMIN — TRAMADOL HYDROCHLORIDE 50 MG: 50 TABLET, COATED ORAL at 23:35

## 2025-01-31 RX ADMIN — PANTOPRAZOLE SODIUM 40 MG: 40 TABLET, DELAYED RELEASE ORAL at 05:38

## 2025-01-31 RX ADMIN — NIFEDIPINE 30 MG: 30 TABLET, FILM COATED, EXTENDED RELEASE ORAL at 10:40

## 2025-01-31 RX ADMIN — Medication 10 ML: at 10:43

## 2025-01-31 RX ADMIN — AMIODARONE HYDROCHLORIDE 200 MG: 200 TABLET ORAL at 20:44

## 2025-01-31 RX ADMIN — ALLOPURINOL 200 MG: 100 TABLET ORAL at 10:41

## 2025-01-31 NOTE — PLAN OF CARE
Goal Outcome Evaluation:  Plan of Care Reviewed With: patient, spouse           Outcome Evaluation: wound vac to L heel changed today with ID in to assess wound and discuss plan with family. Pt and family to discuss POC and at this point, PT will cont with wound vac until decision made about long term wound management.

## 2025-01-31 NOTE — CASE MANAGEMENT/SOCIAL WORK
Continued Stay Note   Erin     Patient Name: Sara Esparza  MRN: [id number]  Today's Date: 1/31/2025    Admit Date: 1/19/2025    Plan: undecided   Discharge Plan       Row Name 01/31/25 0812       Plan    Plan undecided    Patient/Family in Agreement with Plan yes    Plan Comments I met with this patient's  bedside as the patient is in HD. PT and OT still need to work with this patient and make recommendations. She is scheduled for an angiogram on 2/3.  to follow.                   Discharge Codes    No documentation.                 Expected Discharge Date and Time       Expected Discharge Date Expected Discharge Time    Feb 6, 2025               Sharee Williamson RN

## 2025-01-31 NOTE — NURSING NOTE
ISAIAS bed ordered from [facility].    Sensory Perception: 3-->slightly limited  Moisture: 3-->occasionally moist  Activity: 3-->walks occasionally  Mobility: 1-->completely immobile  Nutrition: 2-->probably inadequate  Friction and Shear: 2-->potential problem  Brad Score: 14 (01/31/25 1025)    HIGH risk for skin breakdown and pressure injury development.    Implement pressure injury prevention protocol throughout hospitalization.    WOC will sign off.  Please reconsult if new issues arise.    Jeff Vick RN, BSN, CWOCN  Wound, Ostomy and Continence (WOC) Department  [facility]

## 2025-01-31 NOTE — PROGRESS NOTES
[facility]  IP Progress Note      Chief Complaint/Reason for service: #1 PAF RVR    Subjective   Subjective: The patient was sent to dialysis.   is in the room said she was little disoriented this morning but otherwise had a good night.  The patient is up in dialysis now and she is awake and alert and oriented and doing well.  She denies chest pain or shortness of breath    Past medical, surgical, social and family history reviewed in the patient's electronic medical record.    Objective     Vital Sign Min/Max for last 24 hours  Temp  Min: 97.6 °F (36.4 °C)  Max: 98.4 °F (36.9 °C)   BP  Min: 144/64  Max: 158/63   Pulse  Min: 50  Max: 64   Resp  Min: 16  Max: 16   SpO2  Min: 90 %  Max: 100 %   Flow (L/min) (Oxygen Therapy)  Min: 2  Max: 2      Intake/Output Summary (Last 24 hours) at 1/31/2025 0743  Last data filed at 1/30/2025 2300  Gross per 24 hour   Intake 960 ml   Output --   Net 960 ml             Current Facility-Administered Medications:     acetaminophen (TYLENOL) tablet 650 mg, 650 mg, Oral, Q6H PRN, Rom Gonzalez Jr., MD, 650 mg at 01/30/25 1659    albumin human 25 % IV SOLN  - ADS Override Pull, , , ,     albumin human 25 % IV SOLN 25 g, 25 g, Intravenous, PRN, Rom Gonzalez Jr., MD    allopurinol (ZYLOPRIM) tablet 200 mg, 200 mg, Oral, Daily, Rom Gonzalez Jr., MD, 200 mg at 01/30/25 0927    amiodarone (PACERONE) tablet 200 mg, 200 mg, Oral, Q12H, Naren Escobar MD, 200 mg at 01/30/25 2039    apixaban (ELIQUIS) tablet 2.5 mg, 2.5 mg, Oral, Q12H, Naren Escobar MD, 2.5 mg at 01/30/25 2039    atorvastatin (LIPITOR) tablet 10 mg, 10 mg, Oral, Daily, Rom Gonzalez Jr., MD, 10 mg at 01/30/25 0927    sennosides-docusate (PERICOLACE) 8.6-50 MG per tablet 2 tablet, 2 tablet, Oral, BID PRN, 2 tablet at 01/26/25 1203 **AND** polyethylene glycol (MIRALAX) packet 17 g, 17 g, Oral, Daily PRN, 17 g at 01/27/25 0934 **AND** bisacodyl (DULCOLAX)  EC tablet 5 mg, 5 mg, Oral, Daily PRN, 5 mg at 01/26/25 1507 **AND** bisacodyl (DULCOLAX) suppository 10 mg, 10 mg, Rectal, Daily PRN, Rom Gonzalez Jr., MD    Calcium Replacement - Follow Nurse / BPA Driven Protocol, , Not Applicable, PRN, Rom Gonzalez Jr., MD    cefTRIAXone (ROCEPHIN) 2,000 mg in sodium chloride 0.9 % 100 mL MBP, 2,000 mg, Intravenous, Q24H, Camden Ashford, [facility], Last Rate: 200 mL/hr at 01/30/25 2045, 2,000 mg at 01/30/25 2045    dextrose (D50W) (25 g/50 mL) IV injection 25 g, 25 g, Intravenous, Q15 Min PRN, Rom Gonzalez Jr., MD    dextrose (GLUTOSE) oral gel 15 g, 15 g, Oral, Q15 Min PRN, Rom Gonzalez Jr., MD    epoetin gracia (EPOGEN,PROCRIT) injection 6,000 Units, 6,000 Units, Subcutaneous, Once per day on Monday Wednesday Friday, Wilian Gonzalez MD, 6,000 Units at 01/29/25 1416    famotidine (PEPCID) tablet 20 mg, 20 mg, Oral, Daily, Rom Gonzalez Jr., MD, 20 mg at 01/30/25 0926    glucagon (GLUCAGEN) injection 1 mg, 1 mg, Intramuscular, Q15 Min PRN, Rom Gonzalez Jr., MD    heparin (porcine) injection 2,000 Units, 2,000 Units, Intracatheter, PRN, Rom Gonzalez Jr., MD, 2,000 Units at 01/29/25 1214    Insulin Lispro (humaLOG) injection 2-9 Units, 2-9 Units, Subcutaneous, 4x Daily AC & at Bedtime, Rom Gonzalez Jr., MD, 2 Units at 01/30/25 2040    Magnesium Standard Dose Replacement - Follow Nurse / BPA Driven Protocol, , Not Applicable, PRN, Rom Gonzalez Jr., MD    melatonin tablet 2.5 mg, 2.5 mg, Oral, Nightly PRN, Rom Gonzalez Jr., MD, 2.5 mg at 01/30/25 0015    metoprolol tartrate (LOPRESSOR) half tablet 12.5 mg, 12.5 mg, Oral, Q12H, Naren Escobar MD, 12.5 mg at 01/30/25 2039    metoprolol tartrate (LOPRESSOR) injection 5 mg, 5 mg, Intravenous, Q6H PRN, Rom Gonzalez Jr., MD, 5 mg at 01/23/25 0329    NIFEdipine XL (PROCARDIA XL) 24 hr tablet 30 mg, 30 mg, Oral, Q24H, Rom Gonzalez Jr., MD, 30 mg at 01/30/25 0900    ondansetron  (ZOFRAN) injection 4 mg, 4 mg, Intravenous, Q6H PRN, Rom Gonzalez Jr., MD, 4 mg at 01/30/25 1543    oxyCODONE (ROXICODONE) immediate release tablet 5 mg, 5 mg, Oral, Q4H PRN, Rom Gonzalez Jr., MD, 5 mg at 01/31/25 0537    pantoprazole (PROTONIX) EC tablet 40 mg, 40 mg, Oral, Q AM, Rom Gonzalez Jr., MD, 40 mg at 01/31/25 0538    Pharmacy to dose vancomycin, , Not Applicable, Continuous PRN, Bailee Barrios MD    Potassium Replacement - Follow Nurse / BPA Driven Protocol, , Not Applicable, PRN, Rom Gonzalez Jr., MD    [COMPLETED] Insert Peripheral IV, , , Once **AND** sodium chloride 0.9 % flush 10 mL, 10 mL, Intravenous, PRN, Rom Gonzalez Jr., MD    sodium chloride 0.9 % flush 10 mL, 10 mL, Intravenous, Q12H, Rom Gonzalez Jr., MD, 10 mL at 01/30/25 2045    sodium chloride 0.9 % flush 10 mL, 10 mL, Intravenous, PRN, Rom Gonzalez Jr., MD    sodium chloride 0.9 % infusion 40 mL, 40 mL, Intravenous, PRN, Rom Gonzalez Jr., MD    traMADol (ULTRAM) tablet 50 mg, 50 mg, Oral, Q4H PRN, Rom Gonzalez Jr., MD, 50 mg at 01/30/25 1540    vancomycin (VANCOCIN) 1,000 mg in sodium chloride 0.9 % 250 mL IVPB-VTB, 1,000 mg, Intravenous, Once per day on Monday Wednesday Friday, Ricardo Chappell, PharmD, Last Rate: 250 mL/hr at 01/29/25 1706, 1,000 mg at 01/29/25 1706    Physical Exam: General Pleasant well-developed female heart rate in the 60s setting in bed getting dialysis        HEENT: No JVP       Respiratory: Equal bilateral symmetrical expansion clear auscultation anteriorly       Cardiovascular regular rate and rhythm       GI: Soft and flat              Neuro: Facial expression symmetrical moves all 4 extremities       Skin: Warm and dry       Psych: Pleasant affect    Results Review: Heart rates in the 50s.  Blood pressures 1 47-1 53 no blood work available for review    Radiology Results:  Imaging Results (Last 72 Hours)       ** No results found for the last 72 hours. **             EKG:     ECHO: Normal EF    Tele: Sinus bradycardia.  No recurrent A-fib    Assessment   Assessment/Plan: PAF RVR-the patient is maintaining sinus rhythm on amiodarone.  Anticoagulation will be placed on hold because the patient could have a procedure Monday  Since heart rates in the upper 40s low 50s I will discontinue metoprolol.  Resume Eliquis when okay with other services following her procedure.  Will see again on Sunday however contact us if there is any issues on Saturday    Naren Escobar MD  01/31/25  07:43 EST

## 2025-01-31 NOTE — PROGRESS NOTES
"   LOS: 12 days    Patient Care Team:  Toribio Roberts MD as PCP - General (Internal Medicine)    Subjective     Seen on HD. Not feeling well. C/o pain in her buttocks. Also c/o nausea. Treatment stopped at 2 hr. Will re-eval tomorrow     Objective     Vital Signs:  Blood pressure 161/69, pulse 61, temperature 98.9 °F (37.2 °C), temperature source Axillary, resp. rate 16, height 170.2 cm (67.01\"), weight 108 kg (238 lb 1.6 oz), SpO2 96%.      Intake/Output Summary (Last 24 hours) at 1/31/2025 1049  Last data filed at 1/30/2025 2300  Gross per 24 hour   Intake 720 ml   Output --   Net 720 ml        01/30 0701 - 01/31 0700  In: 960 [P.O.:960]  Out: -     Physical Exam:  GENERAL: Awake alert oriented  female lying comfortably in bed.  NEURO:  No focal deficit  PSYCHIATRIC: Cooperative with PE  EYE: PERR.  NECK:  No JVD discernable,  Trachea midline  CV: Positive edema, RRR  LUNGS:  Quiet,  Nonlabored resp.  Symmetrical expansion  ABDOMEN: Nondistended, soft nontender.  : No Corral, no palp bladder  SKIN: Warm and dry without rash  Left foot dressed with wound vac  + RIJ TDC    Labs:  Results from last 7 days   Lab Units 01/31/25  0726 01/28/25  0941 01/27/25  0636   WBC 10*3/mm3 11.55* 9.57 10.51   HEMOGLOBIN g/dL 10.1* 9.8* 10.0*   PLATELETS 10*3/mm3 271 244 238     Results from last 7 days   Lab Units 01/31/25  0726 01/28/25  0941 01/27/25  0636 01/26/25  2246   SODIUM mmol/L 131* 138 134* 126*   POTASSIUM mmol/L 4.3 4.0 3.8 3.7   CHLORIDE mmol/L 97* 99 97* 92*   CO2 mmol/L 25.0 27.0 24.0 22.0   BUN mg/dL 36* 27* 56* 52*   CREATININE mg/dL 4.39* 3.45* 4.93* 4.88*   CALCIUM mg/dL 9.4 9.2 9.1 9.1                 A/P:    1.  ESRD: On HD MWF at [facility] SW with NAL.   -Hemodialysis today as per schedule.  Tunnel catheter in place. She is a good candidate for home dialysis. Received education in the past. Would like to re-consider as outpatient.      2.  HTN: Blood pressure stable    3.  Hyponatremia: " Resolved. Due to underlying renal failure.  Adjust with HD.     4.  Hyperkalemia: Resolved.  Adjust with HD     5.  Metabolic acidosis:  Adjust with HD     6.  Anemia: Started back on CORNEL on HD days.    7.  Hyperphosphatemia: Last phos level 5.5. Not on binders.      8.  Nutrition: Low potassium, low Phos high-protein diet.  Renal vitamin.     9.  PVD: Possible infected wound On antibiotics.  Urine culture with mixed hanny.  Patient with foot wound.  S/p debridement and wound VAC.     - Recs  Short treatment today. Will re-eval tomorrow     High risk complex patient multiple medical problems.    Wilian Gonzalez MD  01/31/25  10:49 EST

## 2025-01-31 NOTE — PLAN OF CARE
Goal Outcome Evaluation:  Plan of Care Reviewed With: patient           A&O, VSS. HD this AM. Pt returned to floor drowsy and sleeping between care. Oriented to self and location, confusion to time and situation before reorientation. Spouse at bedside. Very little oral intake. Skin precautions in place. Specialty bed in place. Pt turned q 2 hrs. Wound Vac dressing change by PT wound today. Angiogram scheduled for  2/3/25.  Problem: Adult Inpatient Plan of Care  Goal: Absence of Hospital-Acquired Illness or Injury  Intervention: Identify and Manage Fall Risk  Recent Flowsheet Documentation  Taken 1/31/2025 1800 by Natividad Bah RN  Safety Promotion/Fall Prevention:   activity supervised   fall prevention program maintained   nonskid shoes/slippers when out of bed   safety round/check completed  Taken 1/31/2025 1600 by Natividad Bah RN  Safety Promotion/Fall Prevention:   activity supervised   fall prevention program maintained   nonskid shoes/slippers when out of bed   safety round/check completed  Taken 1/31/2025 1400 by Natividad Bah RN  Safety Promotion/Fall Prevention:   activity supervised   fall prevention program maintained   nonskid shoes/slippers when out of bed   safety round/check completed  Taken 1/31/2025 1200 by Natividad Bah RN  Safety Promotion/Fall Prevention:   activity supervised   fall prevention program maintained   nonskid shoes/slippers when out of bed   safety round/check completed  Taken 1/31/2025 1025 by Natividad Bah RN  Safety Promotion/Fall Prevention:   activity supervised   fall prevention program maintained   nonskid shoes/slippers when out of bed   safety round/check completed  Taken 1/31/2025 1000 by Natividad Bah RN  Safety Promotion/Fall Prevention: (at dialysis) patient off unit  Taken 1/31/2025 0800 by Natividad Bah RN  Safety Promotion/Fall Prevention: (at dialysis) patient off unit  Intervention: Prevent Skin Injury  Recent Flowsheet Documentation  Taken 1/31/2025 1800  by Natividad Bah RN  Body Position:   turned   right  Skin Protection:   incontinence pads utilized   silicone foam dressing in place  Taken 1/31/2025 1600 by Natividad Bah RN  Body Position:   turned   left  Skin Protection:   incontinence pads utilized   silicone foam dressing in place  Taken 1/31/2025 1400 by Natividad Bah RN  Body Position:   turned   right  Skin Protection:   silicone foam dressing in place   incontinence pads utilized  Taken 1/31/2025 1200 by Natividad Bah RN  Body Position:   turned   left  Skin Protection:   silicone foam dressing in place   incontinence pads utilized  Taken 1/31/2025 1025 by Natividad Bah RN  Body Position:   turned   right  Skin Protection: (silicone dressings pulled back and skin assessed)   incontinence pads utilized   silicone foam dressing in place  Intervention: Prevent and Manage VTE (Venous Thromboembolism) Risk  Recent Flowsheet Documentation  Taken 1/31/2025 1025 by Natividad Bah RN  VTE Prevention/Management:   bilateral   SCDs (sequential compression devices) on  Goal: Optimal Comfort and Wellbeing  Intervention: Monitor Pain and Promote Comfort  Recent Flowsheet Documentation  Taken 1/31/2025 1800 by Natividad Bah RN  Pain Management Interventions: medication offered but refused  Taken 1/31/2025 1600 by Natividad Bah RN  Pain Management Interventions:   pain management plan reviewed with patient/caregiver   position adjusted   pillow support provided   unnecessary movement minimized  Taken 1/31/2025 1234 by Natividad Bah RN  Pain Management Interventions: (Lidocaine patches placed and po meds given)   pain medication given   other (see comments)  Taken 1/31/2025 1200 by Natividad Bah RN  Pain Management Interventions: pain management plan reviewed with patient/caregiver  Taken 1/31/2025 1025 by Natividad Bah RN  Pain Management Interventions: pain management plan reviewed with patient/caregiver  Intervention: Provide Person-Centered Care  Recent  Flowsheet Documentation  Taken 1/31/2025 1025 by Natividad Bah, RN  Trust Relationship/Rapport:   care explained   thoughts/feelings acknowledged

## 2025-01-31 NOTE — THERAPY WOUND CARE TREATMENT
Acute Care - Wound/Debridement Treatment Note  [facility]     Patient Name: Sara Esparza  : 1950  MRN: [id number]  Today's Date: 2025                Admit Date: 2025    Visit Dx:    ICD-10-CM ICD-9-CM   1. Generalized weakness  R53.1 780.79   2. Frequent falls  R29.6 V15.88   3. Chronic kidney disease, unspecified CKD stage  N18.9 585.9   4. Chronic anemia  D64.9 285.9   5. Type 2 diabetes mellitus with stage 5 chronic kidney disease not on chronic dialysis, without long-term current use of insulin  E11.22 250.40    N18.5 585.5   6. Chronic osteomyelitis of left foot  M86.672 730.17   7. Critical limb ischemia of left lower extremity  I70.222 440.22       Patient Active Problem List   Diagnosis    Postoperative abdominal hernia    ESRD (end stage renal disease)    Type 2 diabetes mellitus with stage 5 chronic kidney disease not on chronic dialysis, without long-term current use of insulin    Hypokalemia    Malnutrition    Severe malnutrition    Generalized weakness    Chronic osteomyelitis of left foot    Paroxysmal A-fib    Sepsis due to methicillin resistant Staphylococcus aureus (MRSA) with encephalopathy without septic shock    Critical limb ischemia of left lower extremity        Past Medical History:   Diagnosis Date    Anxiety     Arthritis     Asthma     allergy induced    Back pain     Depression     Diabetes mellitus     dx 10 years ago- checks fsbs most days    Diverticula of colon     GERD (gastroesophageal reflux disease)     Head ache     Hypertension     Sleep apnea     no longer needs cpap - approx. 10 years r/t weight loss        Past Surgical History:   Procedure Laterality Date    BACK SURGERY      x 2    CARDIAC CATHETERIZATION      no intervention    CHOLECYSTECTOMY OPEN      COLONOSCOPY      2013    FOOT IRRIGATION, DEBRIDEMENT AND REPAIR Left 2025    Procedure: HEEL IRRIGATION AND DEBRIDEMENT LEFT;  Surgeon: Rom Gonzalez Jr., MD;  Location: [facility];  Service:  Orthopedics;  Laterality: Left;    HYSTERECTOMY      PLACEMENT OF WOUND VAC Left 1/24/2025    Procedure: PLACEMENT OF WOUND VAC;  Surgeon: Rom Gonzalez Jr., MD;  Location:  [facility];  Service: Orthopedics;  Laterality: Left;    REPLACEMENT TOTAL KNEE BILATERAL Bilateral     TONSILLECTOMY      VENTRAL/INCISIONAL HERNIA REPAIR N/A 6/6/2017    Procedure: INCISIONAL HERNIA REPAIR LAPAROSCOPIC;  Surgeon: Conrad Hernandez MD;  Location:  [facility];  Service:            Wound 01/19/25 2000 Left posterior plantar pressure injury (Active)   Wound Image   01/31/25 1300   Dressing Appearance intact;moist drainage 01/31/25 1300   Closure Unable to assess 01/31/25 0400   Base moist;pink;red;slough;subcutaneous;yellow;exposed structure 01/31/25 1300   Periwound dry;intact 01/31/25 1300   Periwound Temperature warm 01/31/25 1300   Periwound Skin Turgor firm 01/31/25 1300   Drainage Characteristics/Odor serosanguineous 01/31/25 1300   Drainage Amount scant 01/31/25 1300   Care, Wound irrigated with;wound cleanser;debrided;negative pressure wound therapy 01/31/25 1300   Dressing Care dressing changed 01/31/25 1300   Periwound Care dry periwound area maintained 01/31/25 0400       Wound 01/19/25 2000 Left lower leg (Active)   Closure Open to air 01/31/25 1025   Base maroon/purple 01/31/25 1025   Periwound dry;intact 01/31/25 0000   Periwound Temperature warm 01/31/25 0400   Periwound Skin Turgor soft 01/31/25 0400   Drainage Amount none 01/31/25 0400   Dressing Care open to air 01/31/25 1025   Periwound Care dry periwound area maintained 01/31/25 0400       Wound 01/28/25 0900 Right posterior elbow (Active)   Closure Adhesive bandage 01/31/25 1025   Base pink;moist 01/31/25 1025   Periwound moist 01/31/25 0400   Periwound Temperature warm 01/31/25 0400   Periwound Skin Turgor soft 01/31/25 0400   Drainage Amount none 01/31/25 0400   Dressing Care other (see comments) 01/31/25 1025   Periwound Care dry periwound area maintained  01/31/25 0400       NPWT (Negative Pressure Wound Therapy) 01/24/25 1743 Left heel (Active)   Therapy Setting continuous therapy 01/31/25 1300   Dressing foam, black 01/31/25 1300   Contact Layer other (see comments) 01/31/25 1300   Pressure Setting 125 mmHg 01/31/25 1300   Sponges Inserted 1 01/31/25 1300   Sponges Removed 1 01/31/25 1300   Finger sweep complete Yes 01/31/25 1300         WOUND DEBRIDEMENT  Total area of Debridement: ~3cm2  Debridement Site 1  Location- Site 1: L heel wound and periwound area  Selective Debridement- Site 1: Wound Surface <20cmsq  Instruments- Site 1: tweezers, scissors  Excised Tissue Description- Site 1: minimum, slough  Bleeding- Site 1: none               PT Assessment (Last 12 Hours)       PT Evaluation and Treatment       Row Name 01/31/25 1300          Physical Therapy Time and Intention    Subjective Information complains of;weakness;fatigue;pain  -MF     Document Type therapy note (daily note);wound care  -     Mode of Treatment individual therapy;physical therapy  -       Row Name 01/31/25 1300          Pain    Pain Location back  -       Row Name 01/31/25 1300          Pain Scale: FACES Pre/Post-Treatment    Pain: FACES Scale, Pretreatment 4-->hurts little more  -MF     Posttreatment Pain Rating 4-->hurts little more  -MF       Row Name 01/31/25 1300          Wound 01/19/25 2000 Left posterior plantar pressure injury    Wound - Properties Group Placement Date: 01/19/25 -AM Placement Time: 2000 -AM Side: Left  -AM Orientation: posterior  -AM Location: plantar  -AM Primary Wound Type: Pressure inj  -AM Type: pressure injury  -AM Present on Original Admission: Y  -AM Additional Comments: Left heel  -AM    Wound Image Images linked: 1  -MF     Dressing Appearance intact;moist drainage  -MF     Base moist;pink;red;slough;subcutaneous;yellow;exposed structure  -MF     Periwound dry;intact  -MF     Periwound Temperature warm  -MF     Periwound Skin Turgor firm  -MF      Drainage Characteristics/Odor serosanguineous  -     Drainage Amount scant  -     Care, Wound irrigated with;wound cleanser;debrided;negative pressure wound therapy  -     Dressing Care dressing changed  -     Retired Wound - Properties Group Placement Date: 01/19/25 -AM Placement Time: 2000 -AM Present on Original Admission: Y  -AM Side: Left  -AM Orientation: posterior  -AM Location: plantar  -AM Primary Wound Type: Pressure inj  -AM Additional Comments: Left heel  -AM Type: pressure injury  -AM    Retired Wound - Properties Group Placement Date: 01/19/25 -AM Placement Time: 2000 -AM Present on Original Admission: Y  -AM Side: Left  -AM Orientation: posterior  -AM Location: plantar  -AM Primary Wound Type: Pressure inj  -AM Additional Comments: Left heel  -AM Type: pressure injury  -AM    Retired Wound - Properties Group Date first assessed: 01/19/25 -AM Time first assessed: 2000 -AM Present on Original Admission: Y  -AM Side: Left  -AM Location: plantar  -AM Primary Wound Type: Pressure inj  -AM Additional Comments: Left heel  -AM Type: pressure injury  -AM      Row Name             Wound 01/19/25 2000 Left lower leg    Wound - Properties Group Placement Date: 01/19/25 -AM Placement Time: 2000 -AM Side: Left  -AM Orientation: lower  -AM Location: leg  -AM Primary Wound Type: Abrasion  -AM Present on Original Admission: Y  -AM    Retired Wound - Properties Group Placement Date: 01/19/25 -AM Placement Time: 2000 -AM Present on Original Admission: Y  -AM Side: Left  -AM Orientation: lower  -AM Location: leg  -AM Primary Wound Type: Abrasion  -AM    Retired Wound - Properties Group Placement Date: 01/19/25 -AM Placement Time: 2000 -AM Present on Original Admission: Y  -AM Side: Left  -AM Orientation: lower  -AM Location: leg  -AM Primary Wound Type: Abrasion  -AM    Retired Wound - Properties Group Date first assessed: 01/19/25 -AM Time first assessed: 2000 -AM Present on Original Admission: Y   -AM Side: Left  -AM Location: leg  -AM Primary Wound Type: Abrasion  -AM      Row Name             Wound 01/28/25 0900 Right posterior elbow    Wound - Properties Group Placement Date: 01/28/25  -AL Placement Time: 0900  -AL Side: Right  -AL Orientation: posterior  -AL Location: elbow  -AL Primary Wound Type: Skin tear  -AL Present on Original Admission: Y  -AL, scab was present on admission; scab has now peeled off and is bleeding     Retired Wound - Properties Group Placement Date: 01/28/25  -AL Placement Time: 0900  -AL Present on Original Admission: Y  -AL, scab was present on admission; scab has now peeled off and is bleeding  Side: Right  -AL Orientation: posterior  -AL Location: elbow  -AL Primary Wound Type: Skin tear  -AL    Retired Wound - Properties Group Placement Date: 01/28/25  -AL Placement Time: 0900  -AL Present on Original Admission: Y  -AL, scab was present on admission; scab has now peeled off and is bleeding  Side: Right  -AL Orientation: posterior  -AL Location: elbow  -AL Primary Wound Type: Skin tear  -AL    Retired Wound - Properties Group Date first assessed: 01/28/25  -AL Time first assessed: 0900  -AL Present on Original Admission: Y  -AL, scab was present on admission; scab has now peeled off and is bleeding  Side: Right  -AL Location: elbow  -AL Primary Wound Type: Skin tear  -AL      Row Name 01/31/25 1300          NPWT (Negative Pressure Wound Therapy) 01/24/25 1743 Left heel    NPWT (Negative Pressure Wound Therapy) - Properties Group Placement Date: 01/24/25  -ML Placement Time: 1743  -ML Location: Left heel  -ML Additional Comments: Placed in OR, present on arrival to PACU  -ML    Therapy Setting continuous therapy  -MF     Dressing foam, black  -MF     Contact Layer other (see comments)  dwight Ag  -MF     Pressure Setting 125 mmHg  -MF     Sponges Inserted 1  -MF     Sponges Removed 1  -MF     Finger sweep complete Yes  -MF     Retired NPWT (Negative Pressure Wound Therapy) -  Properties Group Placement Date: 01/24/25  -ML Placement Time: 1743  -ML Location: Left heel  -ML Additional Comments: Placed in OR, present on arrival to PACU  -ML    Retired NPWT (Negative Pressure Wound Therapy) - Properties Group Placement Date: 01/24/25  -ML Placement Time: 1743  -ML Location: Left heel  -ML Additional Comments: Placed in OR, present on arrival to PACU  -ML    Retired NPWT (Negative Pressure Wound Therapy) - Properties Group Placement Date: 01/24/25  -ML Placement Time: 1743  -ML Location: Left heel  -ML Additional Comments: Placed in OR, present on arrival to PACU  -ML      Row Name 01/31/25 1300          Coping    Observed Emotional State calm;cooperative  -     Verbalized Emotional State acceptance  -     Trust Relationship/Rapport care explained  -       Row Name 01/31/25 1300          Plan of Care Review    Plan of Care Reviewed With patient;spouse  -MF     Outcome Evaluation wound vac to L heel changed today with ID in to assess wound and discuss plan with family. Pt and family to discuss POC and at this point, PT will cont with wound vac until decision made about long term wound management.  -       Row Name 01/31/25 1300          Positioning and Restraints    Pre-Treatment Position in bed  -     Post Treatment Position bed  -MF     In Bed supine;call light within reach  -               User Key  (r) = Recorded By, (t) = Taken By, (c) = Cosigned By      Initials Name Provider Type    Cody Young, PT Physical Therapist    Katya Flores, RN Registered Nurse    AM Katya Cancino, RN Registered Nurse    Fernando Tariq, RN Registered Nurse    Jane Schafer RN Registered Nurse                  Physical Therapy Education        No education to display                    Recommendation and Plan  Anticipated Discharge Disposition (PT): other (see comments) (will defer to PT mobility when appropriate. Will require skilled wound care upon discharge.)  Planned  Therapy Interventions (PT): wound care, patient/family education  Plan of Care Reviewed With: patient, spouse           Outcome Evaluation: wound vac to L heel changed today with ID in to assess wound and discuss plan with family. Pt and family to discuss POC and at this point, PT will cont with wound vac until decision made about long term wound management.  Plan of Care Reviewed With: patient, spouse            Time Calculation   PT Charges       Row Name 01/31/25 1300             Time Calculation    Start Time 1300  -MF      PT Goal Re-Cert Due Date 02/04/25  -MF         Untimed Charges    77186-Ssdvgajdw debridement 10  -MF      48380-Lfe Pressure wound to 50 sqcm 25  -MF         Total Minutes    Untimed Charges Total Minutes 35  -MF       Total Minutes 35  -MF                User Key  (r) = Recorded By, (t) = Taken By, (c) = Cosigned By      Initials Name Provider Type    Cody Young, PT Physical Therapist                            PT G-Codes  AM-PAC 6 Clicks Score (PT): 9       Cody Mehta, PT  1/31/2025

## 2025-01-31 NOTE — PROGRESS NOTES
[facility] Progress Note    Admission Date: 1/19/2025    Sara Esparza  1950  [id number]    Date: 1/31/2025    Antibiotics:  Anti-Infectives (From admission, onward)      Ordered     Dose/Rate Route Frequency Start Stop    01/29/25 1059  vancomycin (VANCOCIN) 1,000 mg in sodium chloride 0.9 % 250 mL IVPB-VTB        Ordering Provider: Ricardo Chappell, Amrita    1,000 mg  250 mL/hr over 60 Minutes Intravenous Once per day on Monday Wednesday Friday 01/29/25 1600 03/10/25 1559    01/27/25 1850  cefTRIAXone (ROCEPHIN) 2,000 mg in sodium chloride 0.9 % 100 mL MBP        Ordering Provider: Camden Ashford RPH    2,000 mg  200 mL/hr over 30 Minutes Intravenous Every 24 Hours 01/27/25 2000 02/05/25 1959    01/27/25 0920  vancomycin (VANCOCIN) 1,000 mg in sodium chloride 0.9 % 250 mL IVPB-VTB        Ordering Provider: Ricardo Chappell, PharmD    1,000 mg  250 mL/hr over 60 Minutes Intravenous Once 01/27/25 1600 01/27/25 1857    01/27/25 0949  Pharmacy to dose vancomycin        Ordering Provider: Bailee Barrios MD     Not Applicable Continuous PRN 01/27/25 0948 02/10/25 0947    01/27/25 0920  vancomycin (dosing per levels)  Status:  Discontinued        Ordering Provider: Ricardo Chappell, PharmD     Not Applicable Daily 01/27/25 0920 01/29/25 1059    01/24/25 1230  vancomycin (VANCOCIN) 1,000 mg in sodium chloride 0.9 % 250 mL IVPB-VTB        Ordering Provider: Edmund Erwin PharmD    1,000 mg  250 mL/hr over 60 Minutes Intravenous Once 01/24/25 1600 01/24/25 1723    01/22/25 1230  vancomycin 750 mg in sodium chloride 0.9 % 250 mL IVPB-VTB        Ordering Provider: Edmund Erwin PharmD    750 mg  333.3 mL/hr over 45 Minutes Intravenous Once 01/22/25 1400 01/22/25 1810    01/21/25 0519  cefepime 1000 mg IVPB in 100 mL NS (MBP)  Status:  Discontinued        Ordering Provider: Rom Gonzalez Jr., MD    1,000 mg  over 4 Hours Intravenous Every 24 Hours 01/22/25 0600 01/27/25 1851    01/21/25 0537  vancomycin  (dosing per levels)  Status:  Discontinued        Ordering Provider: Rom Gonzalez Jr., MD     Not Applicable Daily 01/21/25 0900 01/27/25 1320    01/21/25 0519  cefepime 1000 mg IVPB in 100 mL NS (MBP)        Ordering Provider: Conrad Alvarez MD    1,000 mg  over 30 Minutes Intravenous Once 01/21/25 0615 01/21/25 0715    01/21/25 0505  metroNIDAZOLE (FLAGYL) IVPB 500 mg        Ordering Provider: Rom Gonzalez Jr., MD    500 mg  200 mL/hr over 30 Minutes Intravenous Every 8 Hours 01/21/25 0600 01/26/25 0759    01/21/25 0513  vancomycin 2250 mg/500 mL 0.9% NS IVPB ([facility])        Ordering Provider: Woo Wells, PharmD    20 mg/kg × 108 kg  over 135 Minutes Intravenous Once 01/21/25 0600 01/21/25 0950    01/21/25 0505  Pharmacy to dose vancomycin  Status:  Discontinued        Ordering Provider: Rom Gonzalez Jr., MD     Not Applicable Continuous PRN 01/21/25 0500 01/27/25 1318    01/20/25 0738  cefTRIAXone (ROCEPHIN) 1,000 mg in sodium chloride 0.9 % 100 mL MBP  Status:  Discontinued        Ordering Provider: Leslye Fiore MD    1,000 mg  200 mL/hr over 30 Minutes Intravenous Every 24 Hours 01/20/25 0900 01/21/25 0519            Reason for Consultation: Sepsis and osteomyelitis of the left calcaneus     History of present illness:    1/22/25: Patient is a 74 y.o. 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  Evaluation in the ED included WBC 14.3 and PCT 5.9.  Subsequent lactic acid was 4.1 on 1/21.  After admission she became febrile to 101.9.  She was started on ceftriaxone and vancomycin.  Blood culture was sent yesterday afternoon.  Urinalysis showed pyuria.  Urine culture showed mixed hanny in the lab  discarded the culture.  She was noted to have a left calcaneus wound.  CT scan of the chest, abdomen and pelvis were unremarkable for acute process.  CT scan of the left lower extremity showed a wound at the plantar aspect of the left calcaneus with subcutaneous and intraosseous air and destructive changes .  CT scan of the neck showed advanced C5-6 and C6-7 degenerative disc disease; suspected moderate or greater C5-6 canal stenosis due to posterior vertebral osteophytes.  Wound culture of the left heel is growing MRSA and a gram-negative ange.  At the time of my exam the patient is nonverbal other than moaning.  She does not follow commands.  Her  is at the bedside and is able to provide medical history although he is a little uncertain about timing of events such as when she started dialysis.      She had a dialysis catheter placed 9/13 which was replaced on 10/11.    1/23/2025.  Afebrile x 2 days.  She is considerably more alert today.  She remains on IV amiodarone for atrial fibrillation with RVR.  She complains of pain but does not localize it to any specific site.  Wound culture with MRSA and Morganella.  Blood culture negative.    1/24/2025.  Afebrile.  She is seen on dialysis today.  She she is alert and mildly confused but understands that she is scheduled to undergo surgery today.  In discussion with her  in the patient's room he recounted that it was explained to her that she was at high risk for limb loss based on the extent of osteomyelitis involving her calcaneus.  MRI was attempted yesterday but aborted because of pain.    1/25/2025.  Afebrile.  Alert. Uncomfortable.  states that she is discouraged because she was found to have extensive osteo at surgery yesterday.  He states that he does not see how we will be possible for her to go home.    1/26/25 Afebrile, appears more lucid. Appears more comfortable although she is scratching torso and upper extremities; the  states  this is a chronic problem  MRSA from op cultures  1/27/25 Afebrile. Seen on HD. No new c/o  Note plans for arteriogram  MRSA from op cultures    1/28/25  Afebrile, alert but seems unable to remember that she is scheduled for vascular evaluation next week. I reminded her that Dr Gonzalez is very concerned Re: ability to salvage her left leg and she acknowledged this. Her  at the bedside appears to understand all of these issues.     1/29/2025:  She remains afebrile.  Left foot culture from 1/24 is growing MRSA.  Vancomycin random level is 21.2.  White blood cell count yesterday was 9.6.   CT scan of 1/21 revealed a draining wound along the plantar aspect of the left calcaneus with subcutaneous air and destructive changes and intraosseous air along the plantar aspect of the calcaneus consistent with osteomyelitis.  She is scheduled for left lower extremity angiogram on 2/3/2025.  Photo of her wound from 1/28 reveals exposed bone.  She complains of neck/back pain.      1/30/2025: She remains afebrile.  Random vancomycin level yesterday was 21.2.  She denies uncontrolled pain.  She denies nausea and vomiting.   She is more willing to consider amputation    1/31/25: She has remained afebrile.  She is scheduled to undergo vascular intervention on Monday.   Anaerobic culture from 1/24 grew cutibacterium acnes.  I examined her with Cody Mehta and PT wound care today.  She denies uncontrolled pain.  She denies nausea and vomiting    PE:  Vital Signs  Temp  Min: 97.6 °F (36.4 °C)  Max: 98.4 °F (36.9 °C)  BP  Min: 144/64  Max: 158/63  Pulse  Min: 50  Max: 64  Resp  Min: 16  Max: 16  SpO2  Min: 90 %  Max: 100 %    GENERAL: Alert, appropriate, appears chronically ill  HEENT: Normocephalic, atraumatic.   No conjunctival injection. No icterus.  No oral labial lesions   NECK: No evidence of meningismus  HEART: IRRR; No murmur, rubs, gallops.   LUNGS: Clear anteriorly, no wheezes or rhonchi appreciated  ABDOMEN: Abdomen  bloated but soft.  No evidence of tenderness elicited with deep pressure throughout the abdomen.   :  Without Corral catheter.  MSK:   She has a dime sized wound  on her plantar surface of the left calcaneus which extends down to exposed, partially desiccated bone.  SKIN: Warm and dry without cutaneous eruptions on Inspection/palpation.    NEURO: Oriented to PPT.    PSYCHIATRIC: Normal insight and judgment. Cooperative with PE       Laboratory Data    Results from last 7 days   Lab Units 01/28/25  0941 01/27/25  0636 01/26/25  2247   WBC 10*3/mm3 9.57 10.51 11.95*   HEMOGLOBIN g/dL 9.8* 10.0* 10.3*   HEMATOCRIT % 31.3* 31.2* 32.3*   PLATELETS 10*3/mm3 244 238 250     Results from last 7 days   Lab Units 01/28/25  0941   SODIUM mmol/L 138   POTASSIUM mmol/L 4.0   CHLORIDE mmol/L 99   CO2 mmol/L 27.0   BUN mg/dL 27*   CREATININE mg/dL 3.45*   GLUCOSE mg/dL 201*   CALCIUM mg/dL 9.2                     Estimated Creatinine Clearance: 17.8 mL/min (A) (by C-G formula based on SCr of 3.45 mg/dL (H)).      Microbiology:  MRSA and Morganella from foot wound    Radiology:  Imaging Results (Last 72 Hours)       ** No results found for the last 72 hours. **            I read her CT scan images of 1/21.      Impression:   1.  Left calcaneal osteomyelitis-with MRSA/Morganella on superficial cultures and MRSA and deep cultures.  MRSA is the likely pathogen.  This is not a salvageable foot.   2.  Sepsis-improved.  This is likely secondary to her left foot infection  3.  Pyuria-with mixed hanny on culture  4.  End-stage renal disease-on hemodialysis  5.  Type 2 diabetes mellitus  6.  Paroxysmal atrial fibrillation  7.  Toxic/metabolic encephalopathy-improved   8.  Leukocytosis/neutrophilia-improved  9.  Peripheral vascular disease-scheduled for vascular intervention on 2/3  10.  Cervical spinal stenosis  11.  Peripheral vascular disease-she is scheduled for vascular intervention on 2/3       PLAN/RECOMMENDATIONS:  -- Continue  vancomycin   -- Continue ceftriaxone   -- Continue wound care  -- Proceed with a left BKA  -- Vascular intervention per Dr. Ramirez on Monday    I saw her in conjunction with Cody Mehta in PT wound care today.  We discussed her complex situation.  I discussed this complex situation in detail with her and her  today along with her son via telephone.  Her foot is not salvageable.  Her calcaneus is exposed and partially desiccated.   If she declines amputation, she will be at risk for a flare of MRSA infection with secondary metastatic infection.   We discussed the potential risks of antibiotic toxicity including ototoxicity from vancomycin.  She should proceed with a left BKA.  Her left foot will number be a walking platform again.  She has a better chance of ambulating in the future on a prosthesis.  I spent over 45 minutes on her complex care today.    This visit included the following complex service elements:  Complex medical decision-making associated with antimicrobial prescribing.  In-depth chart review with high level synthesis for complex diagnoses.  Managed infection treatment protocol associated with transitions of care for this complex patient.  Counseled patients, family members, and/or caregivers regarding antimicrobial stewardship and antibiotic resistance.      Teja Blue MD  1/31/2025

## 2025-01-31 NOTE — PLAN OF CARE
Goal Outcome Evaluation:         VSS. 2L NC. A&Ox4. NSR on the monitor. PRN PO pain meds adminstered once per Pt. Request. Pt is resting comfortably. No further complaints at this time.

## 2025-01-31 NOTE — PROGRESS NOTES
[facility] Medicine Services  PROGRESS NOTE    Patient Name: Sara Esparza  : 1950  MRN: [id number]    Date of Admission: 2025  Primary Care Physician: Toribio Roberts MD    Subjective   Subjective     CC:  F/U weakness, osteomyelitis    HPI:  Seen this morning, only tolerated two hours of dialysis due to nausea and back pain. Feels better currently.      Objective   Objective     Vital Signs:   Temp:  [97.6 °F (36.4 °C)-98.9 °F (37.2 °C)] 98.9 °F (37.2 °C)  Heart Rate:  [51-67] 61  Resp:  [16-19] 16  BP: (144-168)/(61-84) 161/69  Flow (L/min) (Oxygen Therapy):  [2] 2     Physical Exam:  Gen-looks more fatigued compared to yesterday  HENT-NCAT, mucous membranes moist  CV-RRR, S1 S2 normal, no m/r/g  Resp-CTAB, no wheezes or rales  Abd-soft, NT, ND, +BS  Ext-left foot in dressing/wound vac in place  Neuro-A&Ox3, no focal deficits  Skin-no rashes  Psych-appropriate mood      Results Reviewed:  LAB RESULTS:      Lab 25  0726 25  0941 25  0636 25  2247 25  0945   WBC 11.55* 9.57 10.51 11.95* 10.23   HEMOGLOBIN 10.1* 9.8* 10.0* 10.3* 10.4*   HEMATOCRIT 32.7* 31.3* 31.2* 32.3* 32.9*   PLATELETS 271 244 238 250 245   NEUTROS ABS  --  7.99* 8.12* 9.04* 8.09*   IMMATURE GRANS (ABS)  --  0.13* 0.18* 0.19* 0.12*   LYMPHS ABS  --  0.49* 0.88 1.04 0.61*   MONOS ABS  --  0.94* 1.23* 1.55* 1.35*   EOS ABS  --  0.01 0.07 0.11 0.04   .2* 100.0* 99.0* 99.7* 99.1*         Lab 25  0726 25  0941 25  0636 25  2246 25  0945   SODIUM 131* 138 134* 126* 137   POTASSIUM 4.3 4.0 3.8 3.7 4.1   CHLORIDE 97* 99 97* 92* 97*   CO2 25.0 27.0 24.0 22.0 26.0   ANION GAP 9.0 12.0 13.0 12.0 14.0   BUN 36* 27* 56* 52* 33*   CREATININE 4.39* 3.45* 4.93* 4.88* 3.22*   EGFR 10.0* 13.3* 8.7* 8.8* 14.5*   GLUCOSE 132* 201* 129* 148* 197*   CALCIUM 9.4 9.2 9.1 9.1 9.1                             Brief Urine Lab Results  (Last result in the past  365 days)        Color   Clarity   Blood   Leuk Est   Nitrite   Protein   CREAT   Urine HCG        01/19/25 1404 Yellow   Cloudy   Moderate (2+)   Moderate (2+)   Negative   >=300 mg/dL (3+)                   Microbiology Results Abnormal       Procedure Component Value - Date/Time    Anaerobic Culture - Surgical Site, Foot, Left [[id number]]  (Abnormal) Collected: 01/24/25 1754    Lab Status: Final result Specimen: Surgical Site from Foot, Left Updated: 01/30/25 1515     Anaerobic Culture Cutibacterium acnes    Body Fluid Culture - Surgical Site, Foot, Left [[id number]]  (Abnormal)  (Susceptibility) Collected: 01/24/25 1754    Lab Status: Final result Specimen: Surgical Site from Foot, Left Updated: 01/27/25 0642     Body Fluid Culture Light growth (2+) Staphylococcus aureus, MRSA     Comment:   Methicillin resistant Staphylococcus aureus, Patient may be an isolation risk.        Gram Stain Rare (1+) WBCs seen      No organisms seen    Narrative:      Not a body fluid.    Susceptibility        Staphylococcus aureus, MRSA      TAYLOR      Clindamycin Susceptible      Erythromycin Resistant      Oxacillin Resistant      Rifampin Susceptible      Tetracycline Susceptible      Trimethoprim + Sulfamethoxazole Susceptible      Vancomycin Susceptible                           Wound Culture - Wound, Foot, Left [[id number]]  (Abnormal)  (Susceptibility) Collected: 01/20/25 1409    Lab Status: Final result Specimen: Wound from Foot, Left Updated: 01/24/25 0658     Wound Culture Scant growth (1+) Staphylococcus aureus, MRSA     Comment: Methicillin resistant Staphylococcus aureus, Patient may be an isolation risk.         Rare growth Morganella morganii ssp morganii     Comment:            Scant growth (1+) Normal Skin Kimber     Gram Stain Few (2+) WBCs seen      Many (4+) Gram positive cocci in pairs      Rare (1+) Gram negative bacilli      Rare (1+) Gram positive bacilli    Susceptibility        Staphylococcus aureus, MRSA       TAYLOR      Clindamycin Susceptible      Erythromycin Resistant      Oxacillin Resistant      Rifampin Susceptible      Tetracycline Susceptible      Trimethoprim + Sulfamethoxazole Susceptible      Vancomycin Susceptible                       Susceptibility        Morganella morganii ssp morganii      TAYLOR      Amoxicillin + Clavulanate Resistant      Ampicillin Resistant      Ampicillin + Sulbactam Resistant      Cefazolin (Non Urine) Resistant      Cefepime Susceptible  [1]       Cefotaxime Susceptible      Ceftazidime Susceptible  [1]       Gentamicin Susceptible      Levofloxacin Susceptible      Piperacillin + Tazobactam Susceptible      Tetracycline Susceptible      Trimethoprim + Sulfamethoxazole Susceptible                   [1]  Appended report. These results have been appended to a previously final verified report.                Susceptibility Comments       Morganella morganii ssp morganii    Cefotaxime susceptibility can be used as a surrogate for ceftriaxone susceptibility               MRSA Screen, PCR (Inpatient) - Swab, Nares [[id number]]  (Abnormal) Collected: 01/21/25 0614    Lab Status: Final result Specimen: Swab from Nares Updated: 01/21/25 0818     MRSA PCR Positive    Narrative:      The negative predictive value of this diagnostic test is high and should only be used to consider de-escalating anti-MRSA therapy. A positive result may indicate colonization with MRSA and must be correlated clinically.            No radiology results from the last 24 hrs    Results for orders placed during the hospital encounter of 01/19/25    Adult Transthoracic Echo Complete W/ Cont if Necessary Per Protocol    Interpretation Summary    Left ventricular systolic function is normal. Estimated left ventricular EF = 55%    Left ventricular wall thickness is consistent with moderate concentric hypertrophy.    No hemodynamically significant valvular heart disease      Current medications:  Scheduled Meds:albumin human,  , ,   allopurinol, 200 mg, Oral, Daily  amiodarone, 200 mg, Oral, Q12H  apixaban, 2.5 mg, Oral, Q12H  atorvastatin, 10 mg, Oral, Daily  cefTRIAXone, 2,000 mg, Intravenous, Q24H  epoetin gracia/gracia-epbx, 6,000 Units, Subcutaneous, Once per day on Monday Wednesday Friday  famotidine, 20 mg, Oral, Daily  insulin lispro, 2-9 Units, Subcutaneous, 4x Daily AC & at Bedtime  Lidocaine, 2 patch, Transdermal, Q24H  NIFEdipine XL, 30 mg, Oral, Q24H  pantoprazole, 40 mg, Oral, Q AM  sodium chloride, 10 mL, Intravenous, Q12H  vancomycin, 1,000 mg, Intravenous, Once per day on Monday Wednesday Friday      Continuous Infusions:Pharmacy to dose vancomycin,       PRN Meds:.  acetaminophen    albumin human    albumin human    senna-docusate sodium **AND** polyethylene glycol **AND** bisacodyl **AND** bisacodyl    Calcium Replacement - Follow Nurse / BPA Driven Protocol    dextrose    dextrose    glucagon (human recombinant)    heparin (porcine)    Magnesium Standard Dose Replacement - Follow Nurse / BPA Driven Protocol    melatonin    metoprolol tartrate    ondansetron    oxyCODONE    Pharmacy to dose vancomycin    Potassium Replacement - Follow Nurse / BPA Driven Protocol    [COMPLETED] Insert Peripheral IV **AND** sodium chloride    sodium chloride    sodium chloride    traMADol    Assessment & Plan   Assessment & Plan     Active Hospital Problems    Diagnosis  POA    **Generalized weakness [R53.1]  Yes    Paroxysmal A-fib [I48.0]  Unknown    Sepsis due to methicillin resistant Staphylococcus aureus (MRSA) with encephalopathy without septic shock [A41.02, R65.20, G93.41]  Unknown    Chronic osteomyelitis of left foot [M86.672]  Unknown    Critical limb ischemia of left lower extremity [I70.222]  Unknown    Type 2 diabetes mellitus with stage 5 chronic kidney disease not on chronic dialysis, without long-term current use of insulin [E11.22, N18.5]  Yes    ESRD (end stage renal disease) [N18.6]  Yes      Resolved Hospital Problems    No resolved problems to display.        Brief Hospital Course to date:  Sara Esparza is a 74 y.o. female with hx of ESRD on HD, PAF on Eliquis, HTN, and DM admitted for several weeks of generalized weakness, myalgias, and multiple falls in setting of lightheadedness.   After admission she was found to be septic with temps up to 102.4. Further workup revealed left calcaneal osteomyelitis. S/P debridement and wound vac placement 1/24/25. Vascular Surgery planning for LLE angiogram 2/3/25. Infectious Disease following for antibiotic management.     This patient's problems and plans were partially entered by my partner and updated as appropriate by me 01/31/25. Copied text in this note has been reviewed and is accurate as of today's date.      Sepsis, fever, leukocytosis, confusion  L calcaneal osteomyelitis   Left posterior tibial artery occlusion   - CT LLE without contrast 1/21/25: draining wound seen along plantar aspect of left calcaneus with surrounding changes consistent with osteomyelitis  - Ortho consulted - s/p debridement and wound vac placement 1/24/25 by Dr. Gonzalez. May ultimately require amputation, patient aware.   - Plan for LLE angiogram on 2/3/25 by Dr. Ramirez/Vascular Surgery   - MRSA and Morganella in cultures from wound site, ID following, continue Vanc and Rocephin for now  - Leukocytosis and fevers resolved     Generalized weakness, progressive x weeks  Falls at home   - Suspicion of overdiuresis and orthostasis ongoing, with superimposed sepsis/infection now   - CT chest/abd/pelvis without acute findings   - Normal TSH and CK   - PT/OT following, patient considering SNF      Neck pain, C7 region   C5-6 canal stenosis  Chronic back pain s/p spinal stimulator  - CT C-spine - no fractures  - History of oxycodone dependence; family initially requested avoiding opiates however due to uncontrolled pain, initiated low dose oxycodone here which she has tolerated well thus far   - IV Dilaudid  discontinued, but may need to restart at some point if further surgeries planned  - Add Lidocaine patches today  - Recommended to patient/ to reach out to her pain management doctor's office as they have questions regarding her spinal stimulator     ESRD on HD  - Has a tunneled right IJ dialysis catheter  - Nephrology following  - HD on MWF     DMII, HbA1c 4.9  - On sitagliptin at home  - SSI here, adjust as needed      HTN   - Nifedipine dose decreased due to tenuous BP's, now improved, could consider increasing dose again if needed     Hx of Afib  Episode of Afib with RVR during HD  - Resumed home dose beta blocker (Coreg), but still was not rate controlled - Cardiology consulted, started on Amiodarone and changed to Metoprolol BID. Rate now controlled and occasionally running low - Metoprolol discontinued per Cardiology 1/31/25  - Resumed Eliquis 1/28/25, need to hold 48 hours prior to angiogram 2/3/25, discussed with pharmacist who will coordinate this      Elevated troponin  - In setting of ESRD  - Troponin peaked at 132 on 1/19/25  - Per chart review, had LHC recently at [facility] with non-obstructive CAD     Dyspepsia  - GI cocktail PRN     Constipation  - Likely due to opiates  - Improved with bowel regimen and addition of Lactulose        Expected Discharge Location and Transportation: home vs rehab  Expected Discharge   Expected Discharge Date: 2/6/2025; Expected Discharge Time:      VTE Prophylaxis:  Pharmacologic & mechanical VTE prophylaxis orders are present.         AM-PAC 6 Clicks Score (PT): 9 (01/31/25 1025)    CODE STATUS:   Code Status and Medical Interventions: CPR (Attempt to Resuscitate); Full Support   Ordered at: 01/19/25 0518     Level Of Support Discussed With:    Patient     Code Status (Patient has no pulse and is not breathing):    CPR (Attempt to Resuscitate)     Medical Interventions (Patient has pulse or is breathing):    Full Support       Sabrina Oconnor MD  01/31/25

## 2025-01-31 NOTE — PLAN OF CARE
Problem: Hemodialysis  Goal: Safe, Effective Therapy Delivery  Outcome: Progressing  Goal: Effective Tissue Perfusion  Outcome: Progressing  Goal: Absence of Infection Signs and Symptoms  Outcome: Progressing   Goal Outcome Evaluation:            HD tx ended 30 minutes early due to patient demand, Dr. Gonzalez aware, @ bedside and ordered d/c tx early. Blood reinfused. Report given to primary RN Natividad.     Fluid removal: 1.1L

## 2025-02-01 LAB
ALBUMIN SERPL-MCNC: 2.5 G/DL (ref 3.5–5.2)
ANION GAP SERPL CALCULATED.3IONS-SCNC: 9 MMOL/L (ref 5–15)
BUN SERPL-MCNC: 29 MG/DL (ref 8–23)
BUN/CREAT SERPL: 7.6 (ref 7–25)
CALCIUM SPEC-SCNC: 9.1 MG/DL (ref 8.6–10.5)
CHLORIDE SERPL-SCNC: 96 MMOL/L (ref 98–107)
CO2 SERPL-SCNC: 25 MMOL/L (ref 22–29)
CREAT SERPL-MCNC: 3.84 MG/DL (ref 0.57–1)
EGFRCR SERPLBLD CKD-EPI 2021: 11.7 ML/MIN/1.73
GLUCOSE BLDC GLUCOMTR-MCNC: 117 MG/DL (ref 70–130)
GLUCOSE BLDC GLUCOMTR-MCNC: 119 MG/DL (ref 70–130)
GLUCOSE BLDC GLUCOMTR-MCNC: 125 MG/DL (ref 70–130)
GLUCOSE BLDC GLUCOMTR-MCNC: 140 MG/DL (ref 70–130)
GLUCOSE SERPL-MCNC: 152 MG/DL (ref 65–99)
PHOSPHATE SERPL-MCNC: 4.3 MG/DL (ref 2.5–4.5)
POTASSIUM SERPL-SCNC: 4.4 MMOL/L (ref 3.5–5.2)
SODIUM SERPL-SCNC: 130 MMOL/L (ref 136–145)
VANCOMYCIN SERPL-MCNC: 30.7 MCG/ML (ref 5–40)

## 2025-02-01 PROCEDURE — 99232 SBSQ HOSP IP/OBS MODERATE 35: CPT | Performed by: INTERNAL MEDICINE

## 2025-02-01 PROCEDURE — 80069 RENAL FUNCTION PANEL: CPT | Performed by: INTERNAL MEDICINE

## 2025-02-01 PROCEDURE — 80202 ASSAY OF VANCOMYCIN: CPT

## 2025-02-01 PROCEDURE — 82948 REAGENT STRIP/BLOOD GLUCOSE: CPT

## 2025-02-01 PROCEDURE — 25010000002 CEFTRIAXONE PER 250 MG

## 2025-02-01 PROCEDURE — 25010000002 HEPARIN (PORCINE) PER 1000 UNITS: Performed by: ORTHOPAEDIC SURGERY

## 2025-02-01 PROCEDURE — 97605 NEG PRS WND THER DME<=50SQCM: CPT

## 2025-02-01 PROCEDURE — 99232 SBSQ HOSP IP/OBS MODERATE 35: CPT | Performed by: HOSPITALIST

## 2025-02-01 RX ORDER — OXYCODONE HYDROCHLORIDE 5 MG/1
5 TABLET ORAL EVERY 4 HOURS PRN
Status: DISPENSED | OUTPATIENT
Start: 2025-02-01 | End: 2025-02-11

## 2025-02-01 RX ORDER — NIFEDIPINE 60 MG/1
60 TABLET, EXTENDED RELEASE ORAL
Status: DISCONTINUED | OUTPATIENT
Start: 2025-02-01 | End: 2025-02-08

## 2025-02-01 RX ORDER — OXYCODONE HYDROCHLORIDE 5 MG/1
5 TABLET ORAL EVERY 4 HOURS PRN
Status: DISCONTINUED | OUTPATIENT
Start: 2025-02-01 | End: 2025-02-01

## 2025-02-01 RX ADMIN — OXYCODONE HYDROCHLORIDE 5 MG: 5 TABLET ORAL at 20:15

## 2025-02-01 RX ADMIN — ALLOPURINOL 200 MG: 100 TABLET ORAL at 08:43

## 2025-02-01 RX ADMIN — OXYCODONE HYDROCHLORIDE 5 MG: 5 TABLET ORAL at 14:04

## 2025-02-01 RX ADMIN — AMIODARONE HYDROCHLORIDE 200 MG: 200 TABLET ORAL at 08:43

## 2025-02-01 RX ADMIN — AMIODARONE HYDROCHLORIDE 200 MG: 200 TABLET ORAL at 20:10

## 2025-02-01 RX ADMIN — Medication 10 ML: at 08:43

## 2025-02-01 RX ADMIN — PANTOPRAZOLE SODIUM 40 MG: 40 TABLET, DELAYED RELEASE ORAL at 04:47

## 2025-02-01 RX ADMIN — Medication 10 ML: at 20:13

## 2025-02-01 RX ADMIN — SENNOSIDES AND DOCUSATE SODIUM 2 TABLET: 50; 8.6 TABLET ORAL at 08:43

## 2025-02-01 RX ADMIN — ATORVASTATIN CALCIUM 10 MG: 10 TABLET, FILM COATED ORAL at 08:43

## 2025-02-01 RX ADMIN — TRAMADOL HYDROCHLORIDE 50 MG: 50 TABLET, COATED ORAL at 09:45

## 2025-02-01 RX ADMIN — TRAMADOL HYDROCHLORIDE 50 MG: 50 TABLET, COATED ORAL at 04:46

## 2025-02-01 RX ADMIN — FAMOTIDINE 20 MG: 20 TABLET, FILM COATED ORAL at 08:43

## 2025-02-01 RX ADMIN — HEPARIN SODIUM 2000 UNITS: 1000 INJECTION INTRAVENOUS; SUBCUTANEOUS at 10:31

## 2025-02-01 RX ADMIN — SODIUM CHLORIDE 2000 MG: 900 INJECTION INTRAVENOUS at 20:10

## 2025-02-01 RX ADMIN — LIDOCAINE 2 PATCH: 4 PATCH TOPICAL at 08:42

## 2025-02-01 RX ADMIN — Medication 2.5 MG: at 20:10

## 2025-02-01 NOTE — PROGRESS NOTES
[facility] Progress Note    Admission Date: 1/19/2025    Sara Esparza  1950  [id number]    Date: 2/1/2025    Antibiotics:  Anti-Infectives (From admission, onward)      Ordered     Dose/Rate Route Frequency Start Stop    01/29/25 1059  vancomycin (VANCOCIN) 1,000 mg in sodium chloride 0.9 % 250 mL IVPB-VTB        Ordering Provider: Ricardo Chappell, Amrita    1,000 mg  250 mL/hr over 60 Minutes Intravenous Once per day on Monday Wednesday Friday 01/29/25 1600 03/10/25 1559    01/27/25 1850  cefTRIAXone (ROCEPHIN) 2,000 mg in sodium chloride 0.9 % 100 mL MBP        Ordering Provider: Camden Ashford RPH    2,000 mg  200 mL/hr over 30 Minutes Intravenous Every 24 Hours 01/27/25 2000 02/05/25 1959    01/27/25 0920  vancomycin (VANCOCIN) 1,000 mg in sodium chloride 0.9 % 250 mL IVPB-VTB        Ordering Provider: Ricardo Chappell, PharmD    1,000 mg  250 mL/hr over 60 Minutes Intravenous Once 01/27/25 1600 01/27/25 1857    01/27/25 0949  Pharmacy to dose vancomycin        Ordering Provider: Bailee Barrios MD     Not Applicable Continuous PRN 01/27/25 0948 02/10/25 0947    01/27/25 0920  vancomycin (dosing per levels)  Status:  Discontinued        Ordering Provider: Ricardo Chappell, PharmD     Not Applicable Daily 01/27/25 0920 01/29/25 1059    01/24/25 1230  vancomycin (VANCOCIN) 1,000 mg in sodium chloride 0.9 % 250 mL IVPB-VTB        Ordering Provider: Edmund Erwin PharmD    1,000 mg  250 mL/hr over 60 Minutes Intravenous Once 01/24/25 1600 01/24/25 1723    01/22/25 1230  vancomycin 750 mg in sodium chloride 0.9 % 250 mL IVPB-VTB        Ordering Provider: Edmund Erwin PharmD    750 mg  333.3 mL/hr over 45 Minutes Intravenous Once 01/22/25 1400 01/22/25 1810    01/21/25 0519  cefepime 1000 mg IVPB in 100 mL NS (MBP)  Status:  Discontinued        Ordering Provider: Rom Gonzalez Jr., MD    1,000 mg  over 4 Hours Intravenous Every 24 Hours 01/22/25 0600 01/27/25 1851    01/21/25 0537  vancomycin  (dosing per levels)  Status:  Discontinued        Ordering Provider: Rom Gonzalez Jr., MD     Not Applicable Daily 01/21/25 0900 01/27/25 1320    01/21/25 0519  cefepime 1000 mg IVPB in 100 mL NS (MBP)        Ordering Provider: Conrad Alvarez MD    1,000 mg  over 30 Minutes Intravenous Once 01/21/25 0615 01/21/25 0715    01/21/25 0505  metroNIDAZOLE (FLAGYL) IVPB 500 mg        Ordering Provider: Rom Gonzalez Jr., MD    500 mg  200 mL/hr over 30 Minutes Intravenous Every 8 Hours 01/21/25 0600 01/26/25 0759    01/21/25 0513  vancomycin 2250 mg/500 mL 0.9% NS IVPB ([facility])        Ordering Provider: Woo Wells, PharmD    20 mg/kg × 108 kg  over 135 Minutes Intravenous Once 01/21/25 0600 01/21/25 0950    01/21/25 0505  Pharmacy to dose vancomycin  Status:  Discontinued        Ordering Provider: Rom Gonzalez Jr., MD     Not Applicable Continuous PRN 01/21/25 0500 01/27/25 1318    01/20/25 0738  cefTRIAXone (ROCEPHIN) 1,000 mg in sodium chloride 0.9 % 100 mL MBP  Status:  Discontinued        Ordering Provider: Leslye Fiore MD    1,000 mg  200 mL/hr over 30 Minutes Intravenous Every 24 Hours 01/20/25 0900 01/21/25 0519            Reason for Consultation: Sepsis and osteomyelitis of the left calcaneus     History of present illness:    1/22/25: Patient is a 74 y.o. 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  Evaluation in the ED included WBC 14.3 and PCT 5.9.  Subsequent lactic acid was 4.1 on 1/21.  After admission she became febrile to 101.9.  She was started on ceftriaxone and vancomycin.  Blood culture was sent yesterday afternoon.  Urinalysis showed pyuria.  Urine culture showed mixed hanny in the lab  discarded the culture.  She was noted to have a left calcaneus wound.  CT scan of the chest, abdomen and pelvis were unremarkable for acute process.  CT scan of the left lower extremity showed a wound at the plantar aspect of the left calcaneus with subcutaneous and intraosseous air and destructive changes .  CT scan of the neck showed advanced C5-6 and C6-7 degenerative disc disease; suspected moderate or greater C5-6 canal stenosis due to posterior vertebral osteophytes.  Wound culture of the left heel is growing MRSA and a gram-negative ange.  At the time of my exam the patient is nonverbal other than moaning.  She does not follow commands.  Her  is at the bedside and is able to provide medical history although he is a little uncertain about timing of events such as when she started dialysis.      She had a dialysis catheter placed 9/13 which was replaced on 10/11.    1/23/2025.  Afebrile x 2 days.  She is considerably more alert today.  She remains on IV amiodarone for atrial fibrillation with RVR.  She complains of pain but does not localize it to any specific site.  Wound culture with MRSA and Morganella.  Blood culture negative.    1/24/2025.  Afebrile.  She is seen on dialysis today.  She she is alert and mildly confused but understands that she is scheduled to undergo surgery today.  In discussion with her  in the patient's room he recounted that it was explained to her that she was at high risk for limb loss based on the extent of osteomyelitis involving her calcaneus.  MRI was attempted yesterday but aborted because of pain.    1/25/2025.  Afebrile.  Alert. Uncomfortable.  states that she is discouraged because she was found to have extensive osteo at surgery yesterday.  He states that he does not see how we will be possible for her to go home.    1/26/25 Afebrile, appears more lucid. Appears more comfortable although she is scratching torso and upper extremities; the  states  this is a chronic problem  MRSA from op cultures  1/27/25 Afebrile. Seen on HD. No new c/o  Note plans for arteriogram  MRSA from op cultures    1/28/25  Afebrile, alert but seems unable to remember that she is scheduled for vascular evaluation next week. I reminded her that Dr Gonzalez is very concerned Re: ability to salvage her left leg and she acknowledged this. Her  at the bedside appears to understand all of these issues.     1/29/2025:  She remains afebrile.  Left foot culture from 1/24 is growing MRSA.  Vancomycin random level is 21.2.  White blood cell count yesterday was 9.6.   CT scan of 1/21 revealed a draining wound along the plantar aspect of the left calcaneus with subcutaneous air and destructive changes and intraosseous air along the plantar aspect of the calcaneus consistent with osteomyelitis.  She is scheduled for left lower extremity angiogram on 2/3/2025.  Photo of her wound from 1/28 reveals exposed bone.  She complains of neck/back pain.      1/30/2025: She remains afebrile.  Random vancomycin level yesterday was 21.2.  She denies uncontrolled pain.  She denies nausea and vomiting.   She is more willing to consider amputation    1/31/25: She has remained afebrile.  She is scheduled to undergo vascular intervention on Monday.   Anaerobic culture from 1/24 grew cutibacterium acnes.  I examined her with Cody Mehta and PT wound care today.  She denies uncontrolled pain.  She denies nausea and vomiting    2/1/2025:  She remains afebrile.  White blood cell count yesterday was 11.6.  She has now decided she will proceed with a left BKA.  She denies nausea, vomiting, and diarrhea.    PE:  Vital Signs  Temp  Min: 97.5 °F (36.4 °C)  Max: 98.9 °F (37.2 °C)  BP  Min: 144/63  Max: 173/67  Pulse  Min: 51  Max: 73  Resp  Min: 16  Max: 19  SpO2  Min: 90 %  Max: 98 %    GENERAL: Alert, appropriate, appears chronically ill  HEENT: Normocephalic, atraumatic.   No conjunctival injection. No icterus.   No oral labial lesions   NECK: No evidence of meningismus  HEART: No murmur, rubs, gallops.   LUNGS: Clear anteriorly, no wheezes or rhonchi appreciated  ABDOMEN: Abdomen bloated but soft.  No evidence of tenderness elicited with deep pressure throughout the abdomen.   :  Without Corral catheter.  MSK:  Left heel has a wound VAC in place  SKIN: Warm and dry without cutaneous eruptions on Inspection/palpation.    NEURO: Oriented to PPT.    PSYCHIATRIC: Normal insight and judgment. Cooperative with PE       Laboratory Data    Results from last 7 days   Lab Units 01/31/25  0726 01/28/25  0941 01/27/25  0636   WBC 10*3/mm3 11.55* 9.57 10.51   HEMOGLOBIN g/dL 10.1* 9.8* 10.0*   HEMATOCRIT % 32.7* 31.3* 31.2*   PLATELETS 10*3/mm3 271 244 238     Results from last 7 days   Lab Units 01/31/25  0726   SODIUM mmol/L 131*   POTASSIUM mmol/L 4.3   CHLORIDE mmol/L 97*   CO2 mmol/L 25.0   BUN mg/dL 36*   CREATININE mg/dL 4.39*   GLUCOSE mg/dL 132*   CALCIUM mg/dL 9.4                     Estimated Creatinine Clearance: 14 mL/min (A) (by C-G formula based on SCr of 4.39 mg/dL (H)).      Microbiology:  MRSA and Morganella from foot wound    Radiology:  Imaging Results (Last 72 Hours)       ** No results found for the last 72 hours. **            I read her CT scan images of 1/21.      Impression:   1.  Left calcaneal osteomyelitis-with MRSA/Morganella on superficial cultures and MRSA and deep cultures.  MRSA is the likely pathogen.  This is not a salvageable foot.   2.  Sepsis-improved.   3.  Pyuria-with mixed hanny on culture  4.  End-stage renal disease-on hemodialysis  5.  Type 2 diabetes mellitus  6.  Paroxysmal atrial fibrillation  7.  Toxic/metabolic encephalopathy-improved   8.  Leukocytosis/neutrophilia-improved  9.  Peripheral vascular disease-scheduled for vascular intervention on 2/3  10.  Cervical spinal stenosis  11.  Peripheral vascular disease-she is scheduled for vascular intervention on 2/3        PLAN/RECOMMENDATIONS:  -- Continue vancomycin   -- Continue ceftriaxone   -- Continue wound care  -- Proceed with a left BKA  -- Vascular intervention per Dr. Ramirez on Monday    I discussed her complex situation again with her and her  today.  I discussed her complex situation with Dr. Oconnor.  Dr. Oconnor will discuss the patient's decision to proceed with a left BKA with Dr. Ramirez    This visit included the following complex service elements:  Complex medical decision-making associated with antimicrobial prescribing.  In-depth chart review with high level synthesis for complex diagnoses.  Managed infection treatment protocol associated with transitions of care for this complex patient.        Teja Blue MD  2/1/2025

## 2025-02-01 NOTE — PROGRESS NOTES
[facility] Medicine Services  PROGRESS NOTE    Patient Name: Sara Esparza  : 1950  MRN: [id number]    Date of Admission: 2025  Primary Care Physician: Toribio Roberts MD    Subjective   Subjective     CC:  F/U weakness, osteomyelitis    HPI:  Seen this morning, no major complaints aside from her back pain. She is leaning toward moving forward with amputation. Says she just wants to get back to her family and spend time with them and not have to be in and out of the hospital with ongoing issues related to her foot.     She also reports intermittent tremors in her legs that have been ongoing for about a year. She thinks she may have restless legs. Notes they are worse after dialysis.       Objective   Objective     Vital Signs:   Temp:  [97.5 °F (36.4 °C)-98.4 °F (36.9 °C)] 98.3 °F (36.8 °C)  Heart Rate:  [51-73] 60  Resp:  [16-18] 16  BP: (156-173)/(60-69) 170/69  Flow (L/min) (Oxygen Therapy):  [0-2] 2     Physical Exam:  Gen-no acute distress  HENT-NCAT, mucous membranes moist  CV-RRR, S1 S2 normal, no m/r/g  Resp-CTAB, no wheezes or rales  Abd-soft, NT, ND, +BS  Ext-left foot in dressing/wound vac in place  Neuro-A&Ox3, no focal deficits  Skin-no rashes  Psych-appropriate mood  No change from 25      Results Reviewed:  LAB RESULTS:      Lab 25  0726 25  0941 25  0636 25  2247   WBC 11.55* 9.57 10.51 11.95*   HEMOGLOBIN 10.1* 9.8* 10.0* 10.3*   HEMATOCRIT 32.7* 31.3* 31.2* 32.3*   PLATELETS 271 244 238 250   NEUTROS ABS  --  7.99* 8.12* 9.04*   IMMATURE GRANS (ABS)  --  0.13* 0.18* 0.19*   LYMPHS ABS  --  0.49* 0.88 1.04   MONOS ABS  --  0.94* 1.23* 1.55*   EOS ABS  --  0.01 0.07 0.11   .2* 100.0* 99.0* 99.7*         Lab 25  1025 25  0726 25  0941 25  0636 25  2246   SODIUM 130* 131* 138 134* 126*   POTASSIUM 4.4 4.3 4.0 3.8 3.7   CHLORIDE 96* 97* 99 97* 92*   CO2 25.0 25.0 27.0 24.0 22.0   ANION GAP  9.0 9.0 12.0 13.0 12.0   BUN 29* 36* 27* 56* 52*   CREATININE 3.84* 4.39* 3.45* 4.93* 4.88*   EGFR 11.7* 10.0* 13.3* 8.7* 8.8*   GLUCOSE 152* 132* 201* 129* 148*   CALCIUM 9.1 9.4 9.2 9.1 9.1   PHOSPHORUS 4.3  --   --   --   --          Lab 02/01/25  1025   ALBUMIN 2.5*                         Brief Urine Lab Results  (Last result in the past 365 days)        Color   Clarity   Blood   Leuk Est   Nitrite   Protein   CREAT   Urine HCG        01/19/25 1404 Yellow   Cloudy   Moderate (2+)   Moderate (2+)   Negative   >=300 mg/dL (3+)                   Microbiology Results Abnormal       Procedure Component Value - Date/Time    Anaerobic Culture - Surgical Site, Foot, Left [[id number]]  (Abnormal) Collected: 01/24/25 1754    Lab Status: Final result Specimen: Surgical Site from Foot, Left Updated: 01/30/25 1515     Anaerobic Culture Cutibacterium acnes    Body Fluid Culture - Surgical Site, Foot, Left [[id number]]  (Abnormal)  (Susceptibility) Collected: 01/24/25 1754    Lab Status: Final result Specimen: Surgical Site from Foot, Left Updated: 01/27/25 0642     Body Fluid Culture Light growth (2+) Staphylococcus aureus, MRSA     Comment:   Methicillin resistant Staphylococcus aureus, Patient may be an isolation risk.        Gram Stain Rare (1+) WBCs seen      No organisms seen    Narrative:      Not a body fluid.    Susceptibility        Staphylococcus aureus, MRSA      TAYLOR      Clindamycin Susceptible      Erythromycin Resistant      Oxacillin Resistant      Rifampin Susceptible      Tetracycline Susceptible      Trimethoprim + Sulfamethoxazole Susceptible      Vancomycin Susceptible                           Wound Culture - Wound, Foot, Left [[id number]]  (Abnormal)  (Susceptibility) Collected: 01/20/25 1409    Lab Status: Final result Specimen: Wound from Foot, Left Updated: 01/24/25 0658     Wound Culture Scant growth (1+) Staphylococcus aureus, MRSA     Comment: Methicillin resistant Staphylococcus aureus, Patient may  be an isolation risk.         Rare growth Morganella morganii ssp morganii     Comment:            Scant growth (1+) Normal Skin Kimber     Gram Stain Few (2+) WBCs seen      Many (4+) Gram positive cocci in pairs      Rare (1+) Gram negative bacilli      Rare (1+) Gram positive bacilli    Susceptibility        Staphylococcus aureus, MRSA      TAYLOR      Clindamycin Susceptible      Erythromycin Resistant      Oxacillin Resistant      Rifampin Susceptible      Tetracycline Susceptible      Trimethoprim + Sulfamethoxazole Susceptible      Vancomycin Susceptible                       Susceptibility        Morganella morganii ssp morganii      TAYLOR      Amoxicillin + Clavulanate Resistant      Ampicillin Resistant      Ampicillin + Sulbactam Resistant      Cefazolin (Non Urine) Resistant      Cefepime Susceptible  [1]       Cefotaxime Susceptible      Ceftazidime Susceptible  [1]       Gentamicin Susceptible      Levofloxacin Susceptible      Piperacillin + Tazobactam Susceptible      Tetracycline Susceptible      Trimethoprim + Sulfamethoxazole Susceptible                   [1]  Appended report. These results have been appended to a previously final verified report.                Susceptibility Comments       Morganella morganii ssp morganii    Cefotaxime susceptibility can be used as a surrogate for ceftriaxone susceptibility               MRSA Screen, PCR (Inpatient) - Swab, Nares [[id number]]  (Abnormal) Collected: 01/21/25 0614    Lab Status: Final result Specimen: Swab from Nares Updated: 01/21/25 0877     MRSA PCR Positive    Narrative:      The negative predictive value of this diagnostic test is high and should only be used to consider de-escalating anti-MRSA therapy. A positive result may indicate colonization with MRSA and must be correlated clinically.            No radiology results from the last 24 hrs    Results for orders placed during the hospital encounter of 01/19/25    Adult Transthoracic Echo Complete  W/ Cont if Necessary Per Protocol    Interpretation Summary    Left ventricular systolic function is normal. Estimated left ventricular EF = 55%    Left ventricular wall thickness is consistent with moderate concentric hypertrophy.    No hemodynamically significant valvular heart disease      Current medications:  Scheduled Meds:albumin human, , ,   allopurinol, 200 mg, Oral, Daily  amiodarone, 200 mg, Oral, Q12H  atorvastatin, 10 mg, Oral, Daily  cefTRIAXone, 2,000 mg, Intravenous, Q24H  epoetin gracia/gracia-epbx, 6,000 Units, Subcutaneous, Once per day on Monday Wednesday Friday  famotidine, 20 mg, Oral, Daily  insulin lispro, 2-9 Units, Subcutaneous, 4x Daily AC & at Bedtime  Lidocaine, 2 patch, Transdermal, Q24H  NIFEdipine XL, 60 mg, Oral, Q24H  pantoprazole, 40 mg, Oral, Q AM  sodium chloride, 10 mL, Intravenous, Q12H  vancomycin (dosing per levels), , Not Applicable, Daily      Continuous Infusions:Pharmacy to dose vancomycin,       PRN Meds:.  acetaminophen    albumin human    albumin human    senna-docusate sodium **AND** polyethylene glycol **AND** bisacodyl **AND** bisacodyl    Calcium Replacement - Follow Nurse / BPA Driven Protocol    dextrose    dextrose    glucagon (human recombinant)    heparin (porcine)    Magnesium Standard Dose Replacement - Follow Nurse / BPA Driven Protocol    melatonin    metoprolol tartrate    ondansetron    oxyCODONE    Pharmacy to dose vancomycin    Potassium Replacement - Follow Nurse / BPA Driven Protocol    [COMPLETED] Insert Peripheral IV **AND** sodium chloride    sodium chloride    sodium chloride    traMADol    Assessment & Plan   Assessment & Plan     Active Hospital Problems    Diagnosis  POA    **Generalized weakness [R53.1]  Yes    Paroxysmal A-fib [I48.0]  Unknown    Sepsis due to methicillin resistant Staphylococcus aureus (MRSA) with encephalopathy without septic shock [A41.02, R65.20, G93.41]  Unknown    Chronic osteomyelitis of left foot [M86.672]  Unknown     Critical limb ischemia of left lower extremity [I70.222]  Unknown    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        Brief Hospital Course to date:  Sara Esparza is a 74 y.o. female with hx of ESRD on HD, PAF on Eliquis, HTN, and DM admitted for several weeks of generalized weakness, myalgias, and multiple falls in setting of lightheadedness.   After admission she was found to be septic with temps up to 102.4. Further workup revealed left calcaneal osteomyelitis. S/P debridement and wound vac placement 1/24/25. Vascular Surgery planning for LLE angiogram 2/3/25. Infectious Disease following for antibiotic management.     This patient's problems and plans were partially entered by my partner and updated as appropriate by me 02/01/25. Copied text in this note has been reviewed and is accurate as of today's date.      Sepsis, fever, leukocytosis, confusion  L calcaneal osteomyelitis   Left posterior tibial artery occlusion   - CT LLE without contrast 1/21/25: draining wound seen along plantar aspect of left calcaneus with surrounding changes consistent with osteomyelitis  - Ortho consulted - s/p debridement and wound vac placement 1/24/25 by Dr. Gonzalez. May ultimately require amputation, patient aware - 2/1/25 she is now agreeable to proceed with amputation to have the best chance at quality of life.   - Plan for LLE angiogram on 2/3/25 by Dr. Ramirez/Vascular Surgery, however patient now agreeable to amputation - notified Vascular team today about her decision, have also notified Dr. Gonzalez as I am not sure who would be the one to perform amputation   - MRSA and Morganella in cultures from wound site, ID following, continue Vanc and Rocephin for now  - Leukocytosis and fevers resolved  - PT/OT has  not seen since admission - re-consulted 2/1/25     Generalized weakness, progressive x weeks  Falls at home   - Suspicion of overdiuresis and orthostasis ongoing, with superimposed sepsis/infection now   - CT chest/abd/pelvis without acute findings   - Normal TSH and CK   - PT/OT following, patient considering SNF      Neck pain, C7 region   C5-6 canal stenosis  Chronic back pain s/p spinal stimulator  - CT C-spine - no fractures  - History of oxycodone dependence; family initially requested avoiding opiates however due to uncontrolled pain, initiated low dose oxycodone here which she has tolerated well thus far; also has PRN Tramadol in place  - IV Dilaudid discontinued, but may need to restart at some point if further surgeries planned  - Added Lidocaine patches 1/31/25  - Recommended to patient/ to reach out to her pain management doctor's office as they have questions regarding her spinal stimulator     ESRD on HD  - Has a tunneled right IJ dialysis catheter  - Nephrology following  - HD on MWF     DMII, HbA1c 4.9  - On sitagliptin at home  - SSI here, adjust as needed      HTN   - Nifedipine dose decreased due to tenuous BP's, now improved, increased to 60 mg daily on 2/1/25     Hx of Afib  Episode of Afib with RVR during HD  - Resumed home dose beta blocker (Coreg), but still was not rate controlled - Cardiology consulted, started on Amiodarone and changed to Metoprolol BID. Rate now controlled and occasionally running low - Metoprolol discontinued per Cardiology 1/31/25  - Resumed Eliquis 1/28/25, need to hold 48 hours prior to angiogram 2/3/25, discussed with pharmacist who will coordinate this      Elevated troponin  - In setting of ESRD  - Troponin peaked at 132 on 1/19/25  - Per chart review, had LHC recently at [facility] with non-obstructive CAD     Dyspepsia  - GI cocktail PRN     Constipation  - Likely due to opiates  - Improved with bowel regimen and addition of Lactulose        Expected  Discharge Location and Transportation: home vs rehab  Expected Discharge   Expected Discharge Date: 2/6/2025; Expected Discharge Time:      VTE Prophylaxis:  Pharmacologic & mechanical VTE prophylaxis orders are present.         AM-PAC 6 Clicks Score (PT): 9 (02/01/25 0800)    CODE STATUS:   Code Status and Medical Interventions: CPR (Attempt to Resuscitate); Full Support   Ordered at: 01/19/25 0518     Level Of Support Discussed With:    Patient     Code Status (Patient has no pulse and is not breathing):    CPR (Attempt to Resuscitate)     Medical Interventions (Patient has pulse or is breathing):    Full Support       Sabrina Oconnor MD  02/01/25

## 2025-02-01 NOTE — PLAN OF CARE
Problem: Pain Acute  Goal: Optimal Pain Control and Function  2/1/2025 1857 by Sudarshan Hartman RN  Outcome: Progressing  2/1/2025 1855 by Sudarshan Hartman RN  Outcome: Progressing  Intervention: Optimize Psychosocial Wellbeing  Recent Flowsheet Documentation  Taken 2/1/2025 1615 by Sudarshan Hartman RN  Supportive Measures:   active listening utilized   goal-setting facilitated  Diversional Activities:   television   smartphone  Intervention: Develop Pain Management Plan  Recent Flowsheet Documentation  Taken 2/1/2025 1800 by Sudarshan Hartman RN  Pain Management Interventions:   pillow support provided   position adjusted   quiet environment facilitated   relaxation techniques promoted  Taken 2/1/2025 1615 by Sudarshan Hartman RN  Pain Management Interventions:   pillow support provided   position adjusted   quiet environment facilitated   relaxation techniques promoted  Intervention: Prevent or Manage Pain  Recent Flowsheet Documentation  Taken 2/1/2025 1800 by Sudarshan Hartman RN  Medication Review/Management: medications reviewed  Taken 2/1/2025 1615 by Sudarshan Hartman RN  Sensory Stimulation Regulation:   auditory stimulation minimized   care clustered   lighting decreased  Bowel Elimination Promotion:   adequate fluid intake promoted   commode/bedpan at bedside   privacy promoted  Sleep/Rest Enhancement:   awakenings minimized   noise level reduced   regular sleep/rest pattern promoted   relaxation techniques promoted  Medication Review/Management: medications reviewed     Problem: Comorbidity Management  Goal: Blood Glucose Level Within Target Range  2/1/2025 1857 by Sudarshan Hartman RN  Outcome: Progressing  2/1/2025 1855 by Sudarshan Hartman RN  Outcome: Progressing  Intervention: Monitor and Manage Glycemia  Recent Flowsheet Documentation  Taken 2/1/2025 1800 by Sudarshan Hartman RN  Medication  Review/Management: medications reviewed  Taken 2/1/2025 1615 by Sudarshan Hartman RN  Medication Review/Management: medications reviewed     Problem: Skin Injury Risk Increased  Goal: Skin Health and Integrity  2/1/2025 1857 by Sudarshan Hartman RN  Outcome: Progressing  2/1/2025 1855 by Sudarshan Hartman RN  Outcome: Progressing  Intervention: Optimize Skin Protection  Recent Flowsheet Documentation  Taken 2/1/2025 1800 by Sudarshan Hartman RN  Activity Management: activity encouraged  Pressure Reduction Techniques:   frequent weight shift encouraged   heels elevated off bed   weight shift assistance provided  Head of Bed (HOB) Positioning: HOB elevated  Pressure Reduction Devices:   heel offloading device utilized   positioning supports utilized   pressure-redistributing mattress utilized  Skin Protection:   drying agents applied   incontinence pads utilized   pulse oximeter probe site changed   silicone foam dressing in place   transparent dressing maintained  Taken 2/1/2025 1615 by Sudarshan Hartman RN  Activity Management: activity encouraged  Pressure Reduction Techniques:   frequent weight shift encouraged   heels elevated off bed   weight shift assistance provided  Head of Bed (HOB) Positioning: HOB elevated  Pressure Reduction Devices:   heel offloading device utilized   positioning supports utilized   pressure-redistributing mattress utilized  Skin Protection:   drying agents applied   pectin skin barriers applied   pulse oximeter probe site changed   silicone foam dressing in place   transparent dressing maintained  Taken 2/1/2025 1001 by Sudarshan Hartman RN  Head of Bed (HOB) Positioning: HOB elevated     Problem: Fall Injury Risk  Goal: Absence of Fall and Fall-Related Injury  2/1/2025 1857 by Sudarshan Hartman RN  Outcome: Progressing  2/1/2025 1855 by Sudarshan Hartman RN  Outcome: Progressing  Intervention: Identify and Manage  Contributors  Recent Flowsheet Documentation  Taken 2/1/2025 1800 by Sudarshan Hartman, RN  Medication Review/Management: medications reviewed  Taken 2/1/2025 1615 by Sudarshan Hartman, RN  Medication Review/Management: medications reviewed  Intervention: Promote Injury-Free Environment  Recent Flowsheet Documentation  Taken 2/1/2025 1800 by Sudarshan Hartman, RN  Safety Promotion/Fall Prevention:   activity supervised   assistive device/personal items within reach   clutter free environment maintained   elopement precautions   fall prevention program maintained   gait belt   lighting adjusted   muscle strengthening facilitated   nonskid shoes/slippers when out of bed   room organization consistent   safety round/check completed   toileting scheduled  Taken 2/1/2025 1615 by Sudarshan Hartman, RN  Safety Promotion/Fall Prevention:   activity supervised   assistive device/personal items within reach   clutter free environment maintained   elopement precautions   fall prevention program maintained   gait belt   lighting adjusted   muscle strengthening facilitated   nonskid shoes/slippers when out of bed   room organization consistent   safety round/check completed   toileting scheduled     Problem: Hemodialysis  Goal: Safe, Effective Therapy Delivery  2/1/2025 1857 by Sudarshan Hartman RN  Outcome: Progressing  2/1/2025 1855 by Sudarshan Hartman RN  Outcome: Progressing  Intervention: Optimize Device Care and Function  Recent Flowsheet Documentation  Taken 2/1/2025 1800 by Sudarshan Hartman, RN  Medication Review/Management: medications reviewed  Taken 2/1/2025 1615 by Sudarshan Hartman, RN  Medication Review/Management: medications reviewed  Goal: Effective Tissue Perfusion  2/1/2025 1857 by Sudarshan Hartman, RN  Outcome: Progressing  2/1/2025 1855 by Sudarshan Hartman, RN  Outcome: Progressing  Goal: Absence of Infection Signs and  Symptoms  2/1/2025 1857 by Sudarshan Hartman, RN  Outcome: Progressing  2/1/2025 1855 by Sudarshan Hartman RN  Outcome: Progressing  Intervention: Prevent or Manage Infection  Recent Flowsheet Documentation  Taken 2/1/2025 1800 by Sudarshan Hartman, RN  Infection Prevention:   rest/sleep promoted   single patient room provided  Taken 2/1/2025 1615 by Sudarshan Hartman, RN  Infection Prevention:   rest/sleep promoted   single patient room provided     Problem: Adult Inpatient Plan of Care  Goal: Plan of Care Review  Outcome: Progressing  Flowsheets (Taken 2/1/2025 1855)  Progress: no change  Outcome Evaluation: Patient has had a decent shift, awake for most of today. VSS, and patient has been alert and oriented to person, place, and situation. Patient has asked for pain medications twice today for back and neck pain.  at bedside. Nursing staff will continue to monitor and assess the patient.  Plan of Care Reviewed With:   patient   spouse  Goal: Patient-Specific Goal (Individualized)  Outcome: Progressing  Flowsheets (Taken 2/1/2025 1857)  Patient/Family-Specific Goals (Include Timeframe): Monitor and assess for pain q2hrs and prn  Goal: Absence of Hospital-Acquired Illness or Injury  Outcome: Progressing  Intervention: Identify and Manage Fall Risk  Recent Flowsheet Documentation  Taken 2/1/2025 1800 by Sudarshan Hartman, RN  Safety Promotion/Fall Prevention:   activity supervised   assistive device/personal items within reach   clutter free environment maintained   elopement precautions   fall prevention program maintained   gait belt   lighting adjusted   muscle strengthening facilitated   nonskid shoes/slippers when out of bed   room organization consistent   safety round/check completed   toileting scheduled  Taken 2/1/2025 1615 by Sudarshan Hartman, RN  Safety Promotion/Fall Prevention:   activity supervised   assistive device/personal items within reach    clutter free environment maintained   elopement precautions   fall prevention program maintained   gait belt   lighting adjusted   muscle strengthening facilitated   nonskid shoes/slippers when out of bed   room organization consistent   safety round/check completed   toileting scheduled  Intervention: Prevent Skin Injury  Recent Flowsheet Documentation  Taken 2/1/2025 1800 by Sudarshan Hartman RN  Body Position:   turned   supine  Skin Protection:   drying agents applied   incontinence pads utilized   pulse oximeter probe site changed   silicone foam dressing in place   transparent dressing maintained  Taken 2/1/2025 1615 by Sudarshan Hartman RN  Body Position:   turned   left  Skin Protection:   drying agents applied   pectin skin barriers applied   pulse oximeter probe site changed   silicone foam dressing in place   transparent dressing maintained  Taken 2/1/2025 1001 by Sudarshan Hartman RN  Body Position:   turned   left  Intervention: Prevent and Manage VTE (Venous Thromboembolism) Risk  Recent Flowsheet Documentation  Taken 2/1/2025 1615 by Sudarshan Hartman RN  VTE Prevention/Management:   bilateral   SCDs (sequential compression devices) on  Intervention: Prevent Infection  Recent Flowsheet Documentation  Taken 2/1/2025 1800 by Sudarshan Hartman RN  Infection Prevention:   rest/sleep promoted   single patient room provided  Taken 2/1/2025 1615 by Sudarshan Hartman RN  Infection Prevention:   rest/sleep promoted   single patient room provided  Goal: Optimal Comfort and Wellbeing  Outcome: Progressing  Intervention: Monitor Pain and Promote Comfort  Recent Flowsheet Documentation  Taken 2/1/2025 1800 by Sudarshan Hartman RN  Pain Management Interventions:   pillow support provided   position adjusted   quiet environment facilitated   relaxation techniques promoted  Taken 2/1/2025 1615 by Sudarshan Hartman RN  Pain Management  Interventions:   pillow support provided   position adjusted   quiet environment facilitated   relaxation techniques promoted  Intervention: Provide Person-Centered Care  Recent Flowsheet Documentation  Taken 2/1/2025 1615 by Sudarshan Hartman, RN  Trust Relationship/Rapport:   care explained   choices provided   questions answered   questions encouraged  Goal: Readiness for Transition of Care  Outcome: Progressing   Goal Outcome Evaluation:  Plan of Care Reviewed With: patient, spouse        Progress: no change  Outcome Evaluation: Patient has had a decent shift, awake for most of today. VSS, and patient has been alert and oriented to person, place, and situation. Patient has asked for pain medications twice today for back and neck pain.  at bedside. Nursing staff will continue to monitor and assess the patient.

## 2025-02-01 NOTE — PROGRESS NOTES
[facility]  INPATIENT PROGRESS NOTE         [facility] 2F    2/1/2025      PATIENT IDENTIFICATION:   Name:  Sara Esparza      MRN:  [id number]     74 y.o.  female             Reason for visit: Atrial fibrillation      SUBJECTIVE:   Maintaining sinus rhythm, overall feels somewhat better but still some shortness of breath, main concern is her left lower extremity blockage she is hopeful that it can be opened.     OBJECTIVE:  Vitals:    02/01/25 0700 02/01/25 0900 02/01/25 1103 02/01/25 1300   BP: 159/64  170/69    BP Location: Right arm  Right arm    Patient Position: Lying  Lying    Pulse: 62 61 60 61   Resp: 16  16    Temp: 97.9 °F (36.6 °C)  98.3 °F (36.8 °C)    TempSrc: Oral  Oral    SpO2: 98% 98% 93% 93%   Weight:       Height:               Body mass index is 37.28 kg/m².    Intake/Output Summary (Last 24 hours) at 2/1/2025 1528  Last data filed at 2/1/2025 1200  Gross per 24 hour   Intake 500 ml   Output --   Net 500 ml       Telemetry: Personally reviewed, normal sinus rhythm, no arrhythmia     Exam:  General Appearance:   well developed  Chronically ill-appearing no acute distress  Neck:  thyroid not enlarged  supple  Respiratory:  no respiratory distress  normal breath sounds  no rales  Cardiovascular:  no jugular venous distention  regular rhythm  apical impulse normal  S1 normal, S2 normal  no S3, no S4   no murmur  no rub, no thrill  carotid pulses normal; no bruit  pedal pulses normal  lower extremity edema: 1+ bilateral  Skin:   warm, dry      No Known Allergies  Scheduled meds:       albumin human, , ,   allopurinol, 200 mg, Oral, Daily  amiodarone, 200 mg, Oral, Q12H  atorvastatin, 10 mg, Oral, Daily  cefTRIAXone, 2,000 mg, Intravenous, Q24H  epoetin gracia/gracia-epbx, 6,000 Units, Subcutaneous, Once per day on Monday Wednesday Friday  famotidine, 20 mg, Oral, Daily  insulin lispro, 2-9 Units, Subcutaneous, 4x Daily AC & at Bedtime  Lidocaine, 2  "patch, Transdermal, Q24H  NIFEdipine XL, 60 mg, Oral, Q24H  pantoprazole, 40 mg, Oral, Q AM  sodium chloride, 10 mL, Intravenous, Q12H  vancomycin (dosing per levels), , Not Applicable, Daily      IV meds:                      Pharmacy to dose vancomycin,       Data Review:  Results from last 7 days   Lab Units 02/01/25  1025 01/31/25  0726 01/28/25  0941 01/27/25  0636   SODIUM mmol/L 130* 131* 138 134*   BUN mg/dL 29* 36* 27* 56*   CREATININE mg/dL 3.84* 4.39* 3.45* 4.93*   GLUCOSE mg/dL 152* 132* 201* 129*     Results from last 7 days   Lab Units 01/31/25  0726 01/28/25  0941 01/27/25  0636 01/26/25  2247   WBC 10*3/mm3 11.55* 9.57 10.51 11.95*   HEMOGLOBIN g/dL 10.1* 9.8* 10.0* 10.3*             No results found for: \"DIGOXIN\"   Lab Results   Component Value Date    TSH 1.250 01/19/2025           Invalid input(s): \"LDLCALC\"    Estimated Creatinine Clearance: 16 mL/min (A) (by C-G formula based on SCr of 3.84 mg/dL (H)).        Imaging (last 24 hr):   I personally reviewed the most recent chest x-ray and other pertinent imaging studies.  Results for orders placed during the hospital encounter of 01/19/25    XR Chest 1 View    Narrative  XR CHEST 1 VW    Date of Exam: 1/23/2025 1:51 AM EST    Indication: chest pain    Comparison: 1/21/2025, 1/19/2025.    Findings:  Right internal jugular PermCath present with the tip at the cavoatrial junction. Low lung volumes. There are no airspace consolidations. No pleural fluid. No pneumothorax. The pulmonary vasculature appears mildly indistinct. The cardiac and mediastinal  silhouette appear unremarkable. No acute osseous abnormality identified.    Impression  Impression:  Questionable mild pulmonary edema pattern.        Electronically Signed: Cindy Fuller MD  1/23/2025 2:47 AM EST  Workstation ID: [id number]        Last ECHO:  Results for orders placed during the hospital encounter of 01/19/25    Adult Transthoracic Echo Complete W/ Cont if Necessary Per " Protocol    Interpretation Summary    Left ventricular systolic function is normal. Estimated left ventricular EF = 55%    Left ventricular wall thickness is consistent with moderate concentric hypertrophy.    No hemodynamically significant valvular heart disease        PROBLEM LIST:     Generalized weakness    ESRD (end stage renal disease)    Type 2 diabetes mellitus with stage 5 chronic kidney disease not on chronic dialysis, without long-term current use of insulin    Chronic osteomyelitis of left foot    Paroxysmal A-fib    Sepsis due to methicillin resistant Staphylococcus aureus (MRSA) with encephalopathy without septic shock    Critical limb ischemia of left lower extremity        ASSESSMENT/PLAN:  1.  Paroxysmal atrial fibrillation:  Initially had A-fib with RVR, converted with amiodarone, anticoagulation on hold due to left lower extremity angiography by vascular on Monday.  Continue amiodarone at current dose    Resume Eliquis treatment dose, 5 mg twice daily after procedure    2.  Hypertension:  Goal blood pressure less than 140/90  Currently well-controlled, continue nifedipine     3.  Mixed hyperlipidemia:  Goal DL less than 70 given PAD  Continue atorvastatin at current dose    4.  PAD:  Possible occlusion of the left PTA by arterial duplex, angiogram and possible intervention on Monday    5.  ESRD on HD:  Per nephrology    6.  Left calcaneal osteomyelitis:  Antibiotics and wound VAC with debridement recently by Dr. Gonzalez.  Patient adamantly wants to avoid amputation, but understands it may ultimately be necessary.    Discussed with  patient's family      Naren Huang MD  2/1/2025    15:28 EST

## 2025-02-01 NOTE — PROGRESS NOTES
"   LOS: 13 days    Patient Care Team:  Toribio Roberts MD as PCP - General (Internal Medicine)    Subjective     Hd yesterday only 2 hr treatment due to nausea and back pain. She thinks she is getting stronger.     Objective     Vital Signs:  Blood pressure 170/69, pulse 61, temperature 98.3 °F (36.8 °C), temperature source Oral, resp. rate 16, height 170.2 cm (67.01\"), weight 108 kg (238 lb 1.6 oz), SpO2 93%.      Intake/Output Summary (Last 24 hours) at 2/1/2025 1506  Last data filed at 2/1/2025 1200  Gross per 24 hour   Intake 500 ml   Output --   Net 500 ml        01/31 0701 - 02/01 0700  In: 300   Out: 1420     Physical Exam:  GENERAL: Awake alert oriented  female lying comfortably in bed.  NEURO:  No focal deficit  PSYCHIATRIC: Cooperative with PE  EYE: PERR.  NECK:  No JVD discernable,  Trachea midline  CV: Positive edema, RRR  LUNGS:  Quiet,  Nonlabored resp.  Symmetrical expansion  ABDOMEN: Nondistended, soft nontender.  : No [place], no palp bladder  SKIN: Warm and dry without rash  Left foot dressed with wound vac  + RIJ TDC    Labs:  Results from last 7 days   Lab Units 01/31/25  0726 01/28/25  0941 01/27/25  0636   WBC 10*3/mm3 11.55* 9.57 10.51   HEMOGLOBIN g/dL 10.1* 9.8* 10.0*   PLATELETS 10*3/mm3 271 244 238     Results from last 7 days   Lab Units 02/01/25  1025 01/31/25  0726 01/28/25  0941 01/27/25  0636   SODIUM mmol/L 130* 131* 138 134*   POTASSIUM mmol/L 4.4 4.3 4.0 3.8   CHLORIDE mmol/L 96* 97* 99 97*   CO2 mmol/L 25.0 25.0 27.0 24.0   BUN mg/dL 29* 36* 27* 56*   CREATININE mg/dL 3.84* 4.39* 3.45* 4.93*   CALCIUM mg/dL 9.1 9.4 9.2 9.1   PHOSPHORUS mg/dL 4.3  --   --   --    ALBUMIN g/dL 2.5*  --   --   --                  A/P:    1.  ESRD: On HD MWF at [facility] with NAL.   -Hemodialysis today as per schedule.  Tunnel catheter in place. She is a good candidate for home dialysis. Received education in the past. Would like to re-consider as outpatient.      2.  HTN: Blood pressure " stable    3.  Hyponatremia: Resolved. Due to underlying renal failure.  Adjust with HD.     4.  Hyperkalemia: Resolved.  Adjust with HD     5.  Metabolic acidosis:  Adjust with HD     6.  Anemia: Started back on CORNEL on HD days.    7.  Hyperphosphatemia: Last phos level 5.5. Not on binders.      8.  Nutrition: Low potassium, low Phos high-protein diet.  Renal vitamin.     9.  PVD: Possible infected wound On antibiotics.  Urine culture with mixed hanny.  Patient with foot wound.  S/p debridement and wound VAC.     - Recs  Labs reviewed stable. Ana any cp or sob. Will hold off on HD today     High risk complex patient multiple medical problems.    Wilian Gonzalez MD  02/01/25  15:06 EST

## 2025-02-01 NOTE — PLAN OF CARE
VSS. A/Ox2. Pleasantly confused. 2L NC.  SR on tele. Complaints of pain, given PRN Tramadol with relief. IV ABX administered per MAR. Skin interventions continued. Wound Vac in place. SCDS in place, bed alarm on.       Goal Outcome Evaluation:              Problem: Pain Acute  Goal: Optimal Pain Control and Function  Outcome: Progressing     Problem: Comorbidity Management  Goal: Blood Glucose Level Within Target Range  Outcome: Progressing     Problem: Skin Injury Risk Increased  Goal: Skin Health and Integrity  Outcome: Progressing  Intervention: Optimize Skin Protection  Recent Flowsheet Documentation  Taken 2/1/2025 0400 by Katya Valiente RN  Activity Management: activity minimized  Pressure Reduction Techniques:   heels elevated off bed   pressure points protected  Head of Bed (HOB) Positioning: \[facility]\"" elevated  Pressure Reduction Devices: specialty bed utilized  Skin Protection:   incontinence pads utilized   silicone foam dressing in place   skin sealant/moisture barrier applied  Taken 2/1/2025 0200 by Katya Valiente RN  Activity Management: activity minimized  Pressure Reduction Techniques:   heels elevated off bed   positioned off wounds   pressure points protected  Head of Bed (HOB) Positioning: \[facility]\"" lowered  Pressure Reduction Devices: specialty bed utilized  Skin Protection:   incontinence pads utilized   skin sealant/moisture barrier applied   silicone foam dressing in place  Taken 2/1/2025 0000 by Katya Valiente, RN  Activity Management: activity minimized  Pressure Reduction Techniques:   positioned off wounds   pressure points protected  Head of Bed (HOB) Positioning: \[facility]\"" elevated  Pressure Reduction Devices: specialty bed utilized  Skin Protection:   incontinence pads utilized   skin sealant/moisture barrier applied  Taken 1/31/2025 2200 by Katya Valiente, RN  Activity Management: activity minimized  Pressure Reduction Techniques:   positioned off wounds   pressure points protected  Head  of Bed (HOB) Positioning: HOB elevated  Pressure Reduction Devices: specialty bed utilized  Skin Protection:   silicone foam dressing in place   skin sealant/moisture barrier applied   other (see comments)  Taken 1/31/2025 1953 by Katya Valiente, RN  Activity Management: activity minimized  Pressure Reduction Techniques:   positioned off wounds   pressure points protected  Head of Bed (HOB) Positioning: [facility] [place] elevated  Pressure Reduction Devices: specialty bed utilized  Skin Protection: silicone foam dressing in place     Problem: Fall Injury Risk  Goal: Absence of Fall and Fall-Related Injury  Outcome: Progressing  Intervention: Promote Injury-Free Environment  Recent Flowsheet Documentation  Taken 2/1/2025 0400 by Katya Valiente, RN  Safety Promotion/Fall Prevention:   activity supervised   assistive device/personal items within reach   clutter free environment maintained   fall prevention program maintained   nonskid shoes/slippers when out of bed   room organization consistent   safety round/check completed   toileting scheduled  Taken 2/1/2025 0200 by Katya Valiente, RN  Safety Promotion/Fall Prevention:   activity supervised   assistive device/personal items within reach   clutter free environment maintained   fall prevention program maintained   nonskid shoes/slippers when out of bed   room organization consistent   safety round/check completed   toileting scheduled  Taken 1/31/2025 2200 by Katya Valiente, RN  Safety Promotion/Fall Prevention:   activity supervised   assistive device/personal items within reach   clutter free environment maintained   fall prevention program maintained   nonskid shoes/slippers when out of bed   room organization consistent   safety round/check completed   toileting scheduled  Taken 1/31/2025 1953 by aKtya Valiente, RN  Safety Promotion/Fall Prevention:   activity supervised   assistive device/personal items within reach   clutter free environment maintained   fall  prevention program maintained   nonskid shoes/slippers when out of bed   room organization consistent   safety round/check completed   toileting scheduled     Problem: Hemodialysis  Goal: Safe, Effective Therapy Delivery  Outcome: Progressing  Goal: Effective Tissue Perfusion  Outcome: Progressing  Goal: Absence of Infection Signs and Symptoms  Outcome: Progressing

## 2025-02-01 NOTE — PROGRESS NOTES
Pharmacy Consult-Vancomycin Dosing  Sara Esparza is a  74 y.o. female receiving vancomycin therapy.     Indication: Osteomyelitis  Consulting Provider: hospitalist   ID Consult: Yes    Goal Trough: 15-20 mcg/mL    Current Antimicrobial Therapy  Anti-Infectives (From admission, onward)      Ordered     Dose/Rate Route Frequency Start Stop    01/29/25 1059  vancomycin (VANCOCIN) 1,000 mg in sodium chloride 0.9 % 250 mL IVPB-VTB        Ordering Provider: Ricardo Chappell, PharmD    1,000 mg  250 mL/hr over 60 Minutes Intravenous Once per day on Monday Wednesday Friday 01/29/25 1600 03/10/25 1559    01/27/25 1850  cefTRIAXone (ROCEPHIN) 2,000 mg in sodium chloride 0.9 % 100 mL MBP        Ordering Provider: Camden Ashford RPH    2,000 mg  200 mL/hr over 30 Minutes Intravenous Every 24 Hours 01/27/25 2000 02/05/25 1959    01/27/25 0920  vancomycin (VANCOCIN) 1,000 mg in sodium chloride 0.9 % 250 mL IVPB-VTB        Ordering Provider: Ricardo Chappell, PharmD    1,000 mg  250 mL/hr over 60 Minutes Intravenous Once 01/27/25 1600 01/27/25 1857    01/27/25 0949  Pharmacy to dose vancomycin        Ordering Provider: Bailee Barrios MD     Not Applicable Continuous PRN 01/27/25 0948 02/10/25 0947    01/27/25 0920  vancomycin (dosing per levels)  Status:  Discontinued        Ordering Provider: Ricardo Chappell, PharmD     Not Applicable Daily 01/27/25 0920 01/29/25 1059    01/24/25 1230  vancomycin (VANCOCIN) 1,000 mg in sodium chloride 0.9 % 250 mL IVPB-VTB        Ordering Provider: Edmund Erwin, Amrita    1,000 mg  250 mL/hr over 60 Minutes Intravenous Once 01/24/25 1600 01/24/25 1723    01/22/25 1230  vancomycin 750 mg in sodium chloride 0.9 % 250 mL IVPB-VTB        Ordering Provider: Edmund Erwin, Amrita    750 mg  333.3 mL/hr over 45 Minutes Intravenous Once 01/22/25 1400 01/22/25 1810    01/21/25 0519  cefepime 1000 mg IVPB in 100 mL NS (MBP)  Status:  Discontinued        Ordering Provider: Rom Gonzalez  "MD Gino    1,000 mg  over 4 Hours Intravenous Every 24 Hours 01/22/25 0600 01/27/25 1851    01/21/25 0537  vancomycin (dosing per levels)  Status:  Discontinued        Ordering Provider: Rom Gonzalez Jr., MD     Not Applicable Daily 01/21/25 0900 01/27/25 1320    01/21/25 0519  cefepime 1000 mg IVPB in 100 mL NS (MBP)        Ordering Provider: Conrad Alvarez MD    1,000 mg  over 30 Minutes Intravenous Once 01/21/25 0615 01/21/25 0715    01/21/25 0505  metroNIDAZOLE (FLAGYL) IVPB 500 mg        Ordering Provider: Rom Gonzalez Jr., MD    500 mg  200 mL/hr over 30 Minutes Intravenous Every 8 Hours 01/21/25 0600 01/26/25 0759    01/21/25 0513  vancomycin 2250 mg/500 mL 0.9% NS IVPB ([facility])        Ordering Provider: Woo Wells, PharmD    20 mg/kg × 108 kg  over 135 Minutes Intravenous Once 01/21/25 0600 01/21/25 0950    01/21/25 0505  Pharmacy to dose vancomycin  Status:  Discontinued        Ordering Provider: Rom Gonzalez Jr., MD     Not Applicable Continuous PRN 01/21/25 0500 01/27/25 1318    01/20/25 0738  cefTRIAXone (ROCEPHIN) 1,000 mg in sodium chloride 0.9 % 100 mL MBP  Status:  Discontinued        Ordering Provider: Leslye Fiore MD    1,000 mg  200 mL/hr over 30 Minutes Intravenous Every 24 Hours 01/20/25 0900 01/21/25 0519            Allergies  Allergies as of 01/19/2025    (No Known Allergies)       Labs    Results from last 7 days   Lab Units 02/01/25  1025 01/31/25  0726 01/28/25  0941   BUN mg/dL 29* 36* 27*   CREATININE mg/dL 3.84* 4.39* 3.45*       Results from last 7 days   Lab Units 01/31/25  0726 01/28/25  0941 01/27/25  0636   WBC 10*3/mm3 11.55* 9.57 10.51       Evaluation of Dosing     Last Dose Received in the ED/Outside Facility: none  Is Patient on Dialysis or Renal Replacement: yes    Ht - 170.2 cm (67.01\")  Wt - 108 kg (238 lb 1.6 oz)    Estimated Creatinine Clearance: 16 mL/min (A) (by C-G formula based on SCr of 3.84 mg/dL (H)).    Intake & Output (last 3 days)         " 01/29 0701  01/30 0700 01/30 0701  01/31 0700 01/31 0701  02/01 0700 02/01 0701  02/02 0700    P.O. 125 960  500    Other   300     Total Intake(mL/kg) 125 (1.2) 960 (8.9) 300 (2.8) 500 (4.6)    Urine (mL/kg/hr) 150 (0.1)       Dialysis 3040  1420     Total Output 3190  1420     Net -3065 +960 -1120 +500            Urine Unmeasured Occurrence  1 x 1 x             Microbiology and Radiology  Microbiology Results (last 10 days)       Procedure Component Value - Date/Time    Anaerobic Culture - Surgical Site, Foot, Left [[id number]]  (Abnormal) Collected: 01/24/25 1754    Lab Status: Final result Specimen: Surgical Site from Foot, Left Updated: 01/30/25 1515     Anaerobic Culture Cutibacterium acnes    Fungus Culture - Surgical Site, Foot, Left [[id number]] Collected: 01/24/25 1754    Lab Status: Preliminary result Specimen: Surgical Site from Foot, Left Updated: 01/31/25 2315     Fungus Culture No fungus isolated at 1 week    Body Fluid Culture - Surgical Site, Foot, Left [[id number]]  (Abnormal)  (Susceptibility) Collected: 01/24/25 1754    Lab Status: Final result Specimen: Surgical Site from Foot, Left Updated: 01/27/25 0642     Body Fluid Culture Light growth (2+) Staphylococcus aureus, MRSA     Comment:   Methicillin resistant Staphylococcus aureus, Patient may be an isolation risk.        Gram Stain Rare (1+) WBCs seen      No organisms seen    Narrative:      Not a body fluid.    Susceptibility        Staphylococcus aureus, MRSA      TAYLOR      Clindamycin 0.25 ug/ml Susceptible      Erythromycin >=8 ug/ml Resistant      Oxacillin >=4 ug/ml Resistant      Rifampin <=0.5 ug/ml Susceptible      Tetracycline <=1 ug/ml Susceptible      Trimethoprim + Sulfamethoxazole <=10 ug/ml Susceptible      Vancomycin 1 ug/ml Susceptible                                   Vancomycin Levels:    Results from last 7 days   Lab Units 02/01/25  1025 01/29/25  0641 01/27/25  0636   VANCOMYCIN RM mcg/mL 30.70 21.20 19.30                      Assessment/Plan:    Pharmacy to dose vancomycin for osteomyelitis. Goal trough 15-20 mcg/mL. Patient with hx of ESRD on HD MWF. Patient currently ordered a maintenance dose of vancomycin 1000 mg qMWF after HD.  Vancomycin level drawn this AM supratherapeutic at 30.7 mcg/mL. HD cut short yesterday ~30 minutes early. Plan as of yesterday was for HD to occur again today and then to draw another vancomycin level after HD today. I pushed up level to this AM to allow for level to return while first shift present and prior to HD. However now per RN, Nephrology (awaiting note from them today) has elected to NOT have patient get HD today based on labs drawn at same time as vancomycin level.  Since plan is now for patient to not get HD today, will now dose vancomycin per levels with vancomycin level planned for 2/3 at AM labs. Prior maintenance dose also dc'd.  Pharmacy will continue to monitor renal function, cultures and sensitivities, and clinical status to adjust regimen as necessary.    Ricardo Chappell, PharmD, BCPS  2/1/2025  13:29 EST

## 2025-02-02 ENCOUNTER — ANESTHESIA EVENT (OUTPATIENT)
Dept: PERIOP | Facility: HOSPITAL | Age: 75
End: 2025-02-02
Payer: MEDICARE

## 2025-02-02 LAB
ANION GAP SERPL CALCULATED.3IONS-SCNC: 13 MMOL/L (ref 5–15)
BUN SERPL-MCNC: 38 MG/DL (ref 8–23)
BUN/CREAT SERPL: 7.5 (ref 7–25)
CALCIUM SPEC-SCNC: 9.2 MG/DL (ref 8.6–10.5)
CHLORIDE SERPL-SCNC: 98 MMOL/L (ref 98–107)
CO2 SERPL-SCNC: 21 MMOL/L (ref 22–29)
CREAT SERPL-MCNC: 5.05 MG/DL (ref 0.57–1)
DEPRECATED RDW RBC AUTO: 51.1 FL (ref 37–54)
EGFRCR SERPLBLD CKD-EPI 2021: 8.4 ML/MIN/1.73
ERYTHROCYTE [DISTWIDTH] IN BLOOD BY AUTOMATED COUNT: 13.8 % (ref 12.3–15.4)
GLUCOSE BLDC GLUCOMTR-MCNC: 112 MG/DL (ref 70–130)
GLUCOSE BLDC GLUCOMTR-MCNC: 121 MG/DL (ref 70–130)
GLUCOSE BLDC GLUCOMTR-MCNC: 131 MG/DL (ref 70–130)
GLUCOSE BLDC GLUCOMTR-MCNC: 148 MG/DL (ref 70–130)
GLUCOSE SERPL-MCNC: 132 MG/DL (ref 65–99)
HCT VFR BLD AUTO: 31.4 % (ref 34–46.6)
HGB BLD-MCNC: 9.8 G/DL (ref 12–15.9)
MCH RBC QN AUTO: 31.4 PG (ref 26.6–33)
MCHC RBC AUTO-ENTMCNC: 31.2 G/DL (ref 31.5–35.7)
MCV RBC AUTO: 100.6 FL (ref 79–97)
PLATELET # BLD AUTO: 246 10*3/MM3 (ref 140–450)
PMV BLD AUTO: 9.8 FL (ref 6–12)
POTASSIUM SERPL-SCNC: 5.2 MMOL/L (ref 3.5–5.2)
RBC # BLD AUTO: 3.12 10*6/MM3 (ref 3.77–5.28)
SODIUM SERPL-SCNC: 132 MMOL/L (ref 136–145)
WBC NRBC COR # BLD AUTO: 7.9 10*3/MM3 (ref 3.4–10.8)

## 2025-02-02 PROCEDURE — 99232 SBSQ HOSP IP/OBS MODERATE 35: CPT | Performed by: NURSE PRACTITIONER

## 2025-02-02 PROCEDURE — 99232 SBSQ HOSP IP/OBS MODERATE 35: CPT | Performed by: INTERNAL MEDICINE

## 2025-02-02 PROCEDURE — 85027 COMPLETE CBC AUTOMATED: CPT | Performed by: HOSPITALIST

## 2025-02-02 PROCEDURE — 80048 BASIC METABOLIC PNL TOTAL CA: CPT | Performed by: HOSPITALIST

## 2025-02-02 PROCEDURE — 82948 REAGENT STRIP/BLOOD GLUCOSE: CPT

## 2025-02-02 PROCEDURE — 97605 NEG PRS WND THER DME<=50SQCM: CPT

## 2025-02-02 RX ORDER — SODIUM CHLORIDE 0.9 % (FLUSH) 0.9 %
10 SYRINGE (ML) INJECTION EVERY 12 HOURS SCHEDULED
Status: CANCELLED | OUTPATIENT
Start: 2025-02-02

## 2025-02-02 RX ORDER — SODIUM CHLORIDE 0.9 % (FLUSH) 0.9 %
10 SYRINGE (ML) INJECTION AS NEEDED
Status: CANCELLED | OUTPATIENT
Start: 2025-02-02

## 2025-02-02 RX ORDER — FAMOTIDINE 20 MG/1
20 TABLET, FILM COATED ORAL ONCE
Status: CANCELLED | OUTPATIENT
Start: 2025-02-02 | End: 2025-02-02

## 2025-02-02 RX ADMIN — ATORVASTATIN CALCIUM 10 MG: 10 TABLET, FILM COATED ORAL at 09:57

## 2025-02-02 RX ADMIN — TRAMADOL HYDROCHLORIDE 50 MG: 50 TABLET, COATED ORAL at 03:21

## 2025-02-02 RX ADMIN — ALLOPURINOL 200 MG: 100 TABLET ORAL at 09:57

## 2025-02-02 RX ADMIN — OXYCODONE HYDROCHLORIDE 5 MG: 5 TABLET ORAL at 14:52

## 2025-02-02 RX ADMIN — PANTOPRAZOLE SODIUM 40 MG: 40 TABLET, DELAYED RELEASE ORAL at 06:26

## 2025-02-02 RX ADMIN — Medication 10 ML: at 09:57

## 2025-02-02 RX ADMIN — FAMOTIDINE 20 MG: 20 TABLET, FILM COATED ORAL at 09:57

## 2025-02-02 RX ADMIN — Medication 10 ML: at 20:43

## 2025-02-02 RX ADMIN — SENNOSIDES AND DOCUSATE SODIUM 2 TABLET: 50; 8.6 TABLET ORAL at 14:52

## 2025-02-02 RX ADMIN — LIDOCAINE 2 PATCH: 4 PATCH TOPICAL at 09:57

## 2025-02-02 RX ADMIN — Medication 2.5 MG: at 20:41

## 2025-02-02 RX ADMIN — AMIODARONE HYDROCHLORIDE 200 MG: 200 TABLET ORAL at 20:41

## 2025-02-02 RX ADMIN — NIFEDIPINE 60 MG: 60 TABLET, EXTENDED RELEASE ORAL at 09:57

## 2025-02-02 RX ADMIN — AMIODARONE HYDROCHLORIDE 200 MG: 200 TABLET ORAL at 09:57

## 2025-02-02 NOTE — PLAN OF CARE
Problem: Pain Acute  Goal: Optimal Pain Control and Function  Outcome: Progressing  Intervention: Optimize Psychosocial Wellbeing  Recent Flowsheet Documentation  Taken 2/2/2025 0800 by Sudarshan Hartman RN  Supportive Measures:   active listening utilized   goal-setting facilitated  Diversional Activities:   smartphone   television  Intervention: Develop Pain Management Plan  Recent Flowsheet Documentation  Taken 2/2/2025 1600 by Sudarshan Hartman RN  Pain Management Interventions:   pillow support provided   position adjusted   quiet environment facilitated   relaxation techniques promoted  Taken 2/2/2025 1522 by Sudarshan Hartman RN  Pain Management Interventions:   pillow support provided   position adjusted   quiet environment facilitated   relaxation techniques promoted  Taken 2/2/2025 1452 by Sudarshan Hartman RN  Pain Management Interventions:   pain medication given   pillow support provided   position adjusted   quiet environment facilitated   relaxation techniques promoted  Taken 2/2/2025 1400 by Sudarshan Hartman RN  Pain Management Interventions:   pillow support provided   position adjusted   quiet environment facilitated   relaxation techniques promoted  Taken 2/2/2025 1200 by Sudarshan Hartman RN  Pain Management Interventions:   pillow support provided   position adjusted   quiet environment facilitated   relaxation techniques promoted  Taken 2/2/2025 0800 by Sudarshan Hartman RN  Pain Management Interventions:   pillow support provided   position adjusted   quiet environment facilitated   relaxation techniques promoted  Intervention: Prevent or Manage Pain  Recent Flowsheet Documentation  Taken 2/2/2025 1600 by Sudarshan Hartman RN  Medication Review/Management: medications reviewed  Taken 2/2/2025 1400 by Sudarshan Hartman RN  Medication Review/Management: medications reviewed  Taken 2/2/2025 1200 by Devan  PAULIE Heaton  Medication Review/Management: medications reviewed  Taken 2/2/2025 1000 by Sudarshan Hartman RN  Medication Review/Management: medications reviewed  Taken 2/2/2025 0800 by Sudarshan Hartman RN  Sensory Stimulation Regulation:   auditory stimulation minimized   care clustered   lighting decreased  Bowel Elimination Promotion:   adequate fluid intake promoted   commode/bedpan at bedside   privacy promoted  Sleep/Rest Enhancement:   awakenings minimized   noise level reduced   regular sleep/rest pattern promoted   relaxation techniques promoted  Medication Review/Management: medications reviewed     Problem: Comorbidity Management  Goal: Blood Glucose Level Within Target Range  Outcome: Progressing  Intervention: Monitor and Manage Glycemia  Recent Flowsheet Documentation  Taken 2/2/2025 1600 by Sudarshan Hartman RN  Medication Review/Management: medications reviewed  Taken 2/2/2025 1400 by Sudarshan Hartman RN  Medication Review/Management: medications reviewed  Taken 2/2/2025 1200 by Sudarshan Hartman RN  Medication Review/Management: medications reviewed  Taken 2/2/2025 1000 by Sudarshan Hartman RN  Medication Review/Management: medications reviewed  Taken 2/2/2025 0800 by Sudarshan Hartman RN  Medication Review/Management: medications reviewed     Problem: Skin Injury Risk Increased  Goal: Skin Health and Integrity  Outcome: Progressing  Intervention: Optimize Skin Protection  Recent Flowsheet Documentation  Taken 2/2/2025 1600 by Sudarshan Hartman RN  Activity Management: activity encouraged  Pressure Reduction Techniques:   frequent weight shift encouraged   heels elevated off bed   weight shift assistance provided  Head of Bed (HOB) Positioning: HOB elevated  Pressure Reduction Devices:   foam padding utilized   heel offloading device utilized   positioning supports utilized   pressure-redistributing mattress utilized    specialty bed utilized  Skin Protection:   drying agents applied   incontinence pads utilized   pulse oximeter probe site changed   silicone foam dressing in place   transparent dressing maintained  Taken 2/2/2025 1400 by Sudarshan Hartman RN  Activity Management: activity encouraged  Pressure Reduction Techniques:   frequent weight shift encouraged   heels elevated off bed   weight shift assistance provided  Head of Bed (HOB) Positioning: HOB elevated  Pressure Reduction Devices:   foam padding utilized   heel offloading device utilized   positioning supports utilized   pressure-redistributing mattress utilized   specialty bed utilized  Skin Protection:   drying agents applied   incontinence pads utilized   pulse oximeter probe site changed   silicone foam dressing in place   transparent dressing maintained  Taken 2/2/2025 1200 by Sudarshan Hartman RN  Activity Management: activity encouraged  Pressure Reduction Techniques:   frequent weight shift encouraged   heels elevated off bed   weight shift assistance provided  Head of Bed (HOB) Positioning: HOB elevated  Pressure Reduction Devices:   foam padding utilized   heel offloading device utilized   positioning supports utilized   pressure-redistributing mattress utilized   specialty bed utilized  Skin Protection:   drying agents applied   incontinence pads utilized   pulse oximeter probe site changed   silicone foam dressing in place   transparent dressing maintained  Taken 2/2/2025 1000 by Sudarshan Hartman RN  Activity Management: activity encouraged  Pressure Reduction Techniques:   frequent weight shift encouraged   heels elevated off bed   weight shift assistance provided  Head of Bed (HOB) Positioning: HOB elevated  Pressure Reduction Devices:   foam padding utilized   heel offloading device utilized   positioning supports utilized   pressure-redistributing mattress utilized  Skin Protection:   drying agents applied   incontinence  pads utilized   pulse oximeter probe site changed   silicone foam dressing in place   transparent dressing maintained  Taken 2/2/2025 0800 by Sudarshan Hartman, RN  Activity Management: activity encouraged  Pressure Reduction Techniques:   frequent weight shift encouraged   heels elevated off bed   weight shift assistance provided  Head of Bed (HOB) Positioning: HOB elevated  Pressure Reduction Devices:   foam padding utilized   heel offloading device utilized   positioning supports utilized   pressure-redistributing mattress utilized  Skin Protection: (Silicone dressings on bilateral heels and coccyx pulled back to assess skin integrity, and replaced)   drying agents applied   incontinence pads utilized   pulse oximeter probe site changed   silicone foam dressing in place   transparent dressing maintained     Problem: Fall Injury Risk  Goal: Absence of Fall and Fall-Related Injury  Outcome: Progressing  Intervention: Identify and Manage Contributors  Recent Flowsheet Documentation  Taken 2/2/2025 1600 by Sudarshan Hartman RN  Medication Review/Management: medications reviewed  Taken 2/2/2025 1400 by Sudarshan Hartman RN  Medication Review/Management: medications reviewed  Taken 2/2/2025 1200 by Sudarshan Hartman RN  Medication Review/Management: medications reviewed  Taken 2/2/2025 1000 by Sudarshan Hartman RN  Medication Review/Management: medications reviewed  Taken 2/2/2025 0800 by Sudarshan Hartman RN  Medication Review/Management: medications reviewed  Intervention: Promote Injury-Free Environment  Recent Flowsheet Documentation  Taken 2/2/2025 1600 by Sudarshan Hartman, RN  Safety Promotion/Fall Prevention:   activity supervised   assistive device/personal items within reach   clutter free environment maintained   elopement precautions   fall prevention program maintained   gait belt   lighting adjusted   muscle strengthening facilitated   nonskid  shoes/slippers when out of bed   room organization consistent   safety round/check completed   toileting scheduled  Taken 2/2/2025 1400 by Sudarshan Hartman, RN  Safety Promotion/Fall Prevention:   activity supervised   assistive device/personal items within reach   clutter free environment maintained   elopement precautions   fall prevention program maintained   gait belt   lighting adjusted   muscle strengthening facilitated   nonskid shoes/slippers when out of bed   room organization consistent   safety round/check completed   toileting scheduled  Taken 2/2/2025 1200 by Sudarshan Hartman, RN  Safety Promotion/Fall Prevention:   activity supervised   assistive device/personal items within reach   clutter free environment maintained   elopement precautions   fall prevention program maintained   gait belt   lighting adjusted   muscle strengthening facilitated   nonskid shoes/slippers when out of bed   room organization consistent   safety round/check completed   toileting scheduled  Taken 2/2/2025 1000 by Sudarshan Hartman, RN  Safety Promotion/Fall Prevention:   activity supervised   assistive device/personal items within reach   clutter free environment maintained   elopement precautions   fall prevention program maintained   gait belt   lighting adjusted   muscle strengthening facilitated   nonskid shoes/slippers when out of bed   room organization consistent   safety round/check completed   toileting scheduled  Taken 2/2/2025 0800 by Sudarshan Hartman, RN  Safety Promotion/Fall Prevention:   activity supervised   assistive device/personal items within reach   clutter free environment maintained   elopement precautions   fall prevention program maintained   gait belt   lighting adjusted   muscle strengthening facilitated   nonskid shoes/slippers when out of bed   room organization consistent   safety round/check completed   toileting scheduled     Problem: Hemodialysis  Goal: Safe,  Effective Therapy Delivery  Outcome: Progressing  Intervention: Optimize Device Care and Function  Recent Flowsheet Documentation  Taken 2/2/2025 1600 by Sudarshan Hartman RN  Medication Review/Management: medications reviewed  Taken 2/2/2025 1400 by Sudarshan Hartman RN  Medication Review/Management: medications reviewed  Taken 2/2/2025 1200 by Sudarshan Hartman RN  Medication Review/Management: medications reviewed  Taken 2/2/2025 1000 by Sudarshan Hartman RN  Medication Review/Management: medications reviewed  Taken 2/2/2025 0800 by Sudarshan Hartman RN  Medication Review/Management: medications reviewed  Goal: Effective Tissue Perfusion  Outcome: Progressing  Goal: Absence of Infection Signs and Symptoms  Outcome: Progressing  Intervention: Prevent or Manage Infection  Recent Flowsheet Documentation  Taken 2/2/2025 1600 by Sudarshan Hartman RN  Infection Prevention:   rest/sleep promoted   single patient room provided  Taken 2/2/2025 1400 by Sudarshan Hartman RN  Infection Prevention:   rest/sleep promoted   single patient room provided  Taken 2/2/2025 1200 by Sudarshan Hartman RN  Infection Prevention:   rest/sleep promoted   single patient room provided  Taken 2/2/2025 1000 by Sudarshan Hartman RN  Infection Prevention:   rest/sleep promoted   single patient room provided  Taken 2/2/2025 0800 by Sudarshan Hartman RN  Infection Prevention:   rest/sleep promoted   single patient room provided     Problem: Adult Inpatient Plan of Care  Goal: Plan of Care Review  Outcome: Progressing  Flowsheets (Taken 2/2/2025 1853)  Progress: no change  Outcome Evaluation: Patient has had a decent shift, awake for most of today. VSS, and patient has been alert and oriented to person, place, and situation. Patient has asked for pain medications once for abdominal, neck, and back pain.  at bedside. NPO tonight at midnight for upcoming  angiogram. Dialysis planned for tomorrow too. Nursing staff will continue to monitor and assess the patient.  Plan of Care Reviewed With:   patient   spouse  Goal: Patient-Specific Goal (Individualized)  Outcome: Progressing  Flowsheets (Taken 2/2/2025 1853)  Patient/Family-Specific Goals (Include Timeframe): Monitor and assess for pain q2hrs and prn  Goal: Absence of Hospital-Acquired Illness or Injury  Outcome: Progressing  Intervention: Identify and Manage Fall Risk  Recent Flowsheet Documentation  Taken 2/2/2025 1600 by Sudarshan Hartman RN  Safety Promotion/Fall Prevention:   activity supervised   assistive device/personal items within reach   clutter free environment maintained   elopement precautions   fall prevention program maintained   gait belt   lighting adjusted   muscle strengthening facilitated   nonskid shoes/slippers when out of bed   room organization consistent   safety round/check completed   toileting scheduled  Taken 2/2/2025 1400 by Sudarshan Hartman RN  Safety Promotion/Fall Prevention:   activity supervised   assistive device/personal items within reach   clutter free environment maintained   elopement precautions   fall prevention program maintained   gait belt   lighting adjusted   muscle strengthening facilitated   nonskid shoes/slippers when out of bed   room organization consistent   safety round/check completed   toileting scheduled  Taken 2/2/2025 1200 by Sudarshan Hartman, RN  Safety Promotion/Fall Prevention:   activity supervised   assistive device/personal items within reach   clutter free environment maintained   elopement precautions   fall prevention program maintained   gait belt   lighting adjusted   muscle strengthening facilitated   nonskid shoes/slippers when out of bed   room organization consistent   safety round/check completed   toileting scheduled  Taken 2/2/2025 1000 by Sudarshan Hartman, RN  Safety Promotion/Fall Prevention:    activity supervised   assistive device/personal items within reach   clutter free environment maintained   elopement precautions   fall prevention program maintained   gait belt   lighting adjusted   muscle strengthening facilitated   nonskid shoes/slippers when out of bed   room organization consistent   safety round/check completed   toileting scheduled  Taken 2/2/2025 0800 by Sudarshan Hartman RN  Safety Promotion/Fall Prevention:   activity supervised   assistive device/personal items within reach   clutter free environment maintained   elopement precautions   fall prevention program maintained   gait belt   lighting adjusted   muscle strengthening facilitated   nonskid shoes/slippers when out of bed   room organization consistent   safety round/check completed   toileting scheduled  Intervention: Prevent Skin Injury  Recent Flowsheet Documentation  Taken 2/2/2025 1600 by Sudarshan Hartman RN  Body Position:   turned   right  Skin Protection:   drying agents applied   incontinence pads utilized   pulse oximeter probe site changed   silicone foam dressing in place   transparent dressing maintained  Taken 2/2/2025 1400 by Sudarshan Hartman RN  Body Position:   turned   left  Skin Protection:   drying agents applied   incontinence pads utilized   pulse oximeter probe site changed   silicone foam dressing in place   transparent dressing maintained  Taken 2/2/2025 1200 by Sudarshan Hartman RN  Skin Protection:   drying agents applied   incontinence pads utilized   pulse oximeter probe site changed   silicone foam dressing in place   transparent dressing maintained  Taken 2/2/2025 1000 by Sudarshan Hartman RN  Body Position:   turned   left  Skin Protection:   drying agents applied   incontinence pads utilized   pulse oximeter probe site changed   silicone foam dressing in place   transparent dressing maintained  Taken 2/2/2025 0800 by Sudarshan Hartman RN  Body  Position:   turned   right  Skin Protection: (Silicone dressings on bilateral heels and coccyx pulled back to assess skin integrity, and replaced)   drying agents applied   incontinence pads utilized   pulse oximeter probe site changed   silicone foam dressing in place   transparent dressing maintained  Intervention: Prevent and Manage VTE (Venous Thromboembolism) Risk  Recent Flowsheet Documentation  Taken 2/2/2025 0800 by Sudarshan Hartman RN  VTE Prevention/Management:   bilateral   SCDs (sequential compression devices) on  Intervention: Prevent Infection  Recent Flowsheet Documentation  Taken 2/2/2025 1600 by Sudarshan Hartman RN  Infection Prevention:   rest/sleep promoted   single patient room provided  Taken 2/2/2025 1400 by Sudarshan Hartman RN  Infection Prevention:   rest/sleep promoted   single patient room provided  Taken 2/2/2025 1200 by Sudarshan Hartman RN  Infection Prevention:   rest/sleep promoted   single patient room provided  Taken 2/2/2025 1000 by Sudarshan Hartman RN  Infection Prevention:   rest/sleep promoted   single patient room provided  Taken 2/2/2025 0800 by Sudarshan Hartman RN  Infection Prevention:   rest/sleep promoted   single patient room provided  Goal: Optimal Comfort and Wellbeing  Outcome: Progressing  Intervention: Monitor Pain and Promote Comfort  Recent Flowsheet Documentation  Taken 2/2/2025 1600 by Sudarshan Hartman RN  Pain Management Interventions:   pillow support provided   position adjusted   quiet environment facilitated   relaxation techniques promoted  Taken 2/2/2025 1522 by Sudarshan Hartman RN  Pain Management Interventions:   pillow support provided   position adjusted   quiet environment facilitated   relaxation techniques promoted  Taken 2/2/2025 1452 by Sudarshan Hartman RN  Pain Management Interventions:   pain medication given   pillow support provided   position adjusted    quiet environment facilitated   relaxation techniques promoted  Taken 2/2/2025 1400 by Sudarshan Hartman RN  Pain Management Interventions:   pillow support provided   position adjusted   quiet environment facilitated   relaxation techniques promoted  Taken 2/2/2025 1200 by Sudarshan Hartman RN  Pain Management Interventions:   pillow support provided   position adjusted   quiet environment facilitated   relaxation techniques promoted  Taken 2/2/2025 0800 by Sudarshan Hartman RN  Pain Management Interventions:   pillow support provided   position adjusted   quiet environment facilitated   relaxation techniques promoted  Intervention: Provide Person-Centered Care  Recent Flowsheet Documentation  Taken 2/2/2025 0800 by Sudarshan Hartman RN  Trust Relationship/Rapport:   care explained   choices provided   questions answered   questions encouraged  Goal: Readiness for Transition of Care  Outcome: Progressing   Goal Outcome Evaluation:  Plan of Care Reviewed With: patient, spouse        Progress: no change  Outcome Evaluation: Patient has had a decent shift, awake for most of today. VSS, and patient has been alert and oriented to person, place, and situation. Patient has asked for pain medications once for abdominal, neck, and back pain.  at bedside. NPO tonight at midnight for upcoming angiogram. Dialysis planned for tomorrow too. Nursing staff will continue to monitor and assess the patient.

## 2025-02-02 NOTE — PROGRESS NOTES
"   LOS: 14 days    Patient Care Team:  Toribio Roberts MD as PCP - General (Internal Medicine)    Subjective     C/o nausea and stomach pain. Pending labs this morning. Ana cp, sob or vomiting    Objective     Vital Signs:  Blood pressure 161/66, pulse 64, temperature 98.4 °F (36.9 °C), temperature source Oral, resp. rate 16, height 170.2 cm (67.01\"), weight 108 kg (238 lb 1.6 oz), SpO2 98%.      Intake/Output Summary (Last 24 hours) at 2/2/2025 1207  Last data filed at 2/1/2025 2040  Gross per 24 hour   Intake 100 ml   Output --   Net 100 ml        02/01 0701 - 02/02 0700  In: 600 [P.O.:500]  Out: -     Physical Exam:  GENERAL: Awake alert oriented  female lying comfortably in bed.  NEURO:  No focal deficit  PSYCHIATRIC: Cooperative with PE  EYE: PERR.  NECK:  No JVD discernable,  Trachea midline  CV: Positive edema, RRR  LUNGS:  Quiet,  Nonlabored resp.  Symmetrical expansion  ABDOMEN: Nondistended, soft nontender.  : No [place], no palp bladder  SKIN: Warm and dry without rash  Left foot dressed with wound vac  + RIJ TDC    Labs:  Results from last 7 days   Lab Units 01/31/25  0726 01/28/25  0941 01/27/25  0636   WBC 10*3/mm3 11.55* 9.57 10.51   HEMOGLOBIN g/dL 10.1* 9.8* 10.0*   PLATELETS 10*3/mm3 271 244 238     Results from last 7 days   Lab Units 02/01/25  1025 01/31/25  0726 01/28/25  0941 01/27/25  0636   SODIUM mmol/L 130* 131* 138 134*   POTASSIUM mmol/L 4.4 4.3 4.0 3.8   CHLORIDE mmol/L 96* 97* 99 97*   CO2 mmol/L 25.0 25.0 27.0 24.0   BUN mg/dL 29* 36* 27* 56*   CREATININE mg/dL 3.84* 4.39* 3.45* 4.93*   CALCIUM mg/dL 9.1 9.4 9.2 9.1   PHOSPHORUS mg/dL 4.3  --   --   --    ALBUMIN g/dL 2.5*  --   --   --                  A/P:    1.  [place]: [place] [facility] [place].   -Hemodialysis today as per schedule.  Tunnel catheter in place. She is a good candidate for home dialysis. Received education in the past. Would like to re-consider as outpatient.      2.  HTN: Blood pressure " stable    3.  Hyponatremia: Resolved. Due to underlying renal failure.  Adjust with HD.     4.  Hyperkalemia: Resolved.  Adjust with HD     5.  Metabolic acidosis:  Adjust with HD     6.  Anemia: Started back on CORNEL on HD days.    7.  Hyperphosphatemia: Not on binders.      8.  Nutrition: Low potassium, low Phos high-protein diet.  Renal vitamin.     9.  PVD: Possible infected wound On antibiotics.  Urine culture with mixed hanny.  Patient with foot wound.  S/p debridement and wound VAC.     - Recs  Awaiting labs 2/2/25. If stable plan for HD tomorrow.     High risk complex patient multiple medical problems.    Wilian Gonzalez MD  02/02/25  12:07 EST

## 2025-02-02 NOTE — PROGRESS NOTES
[facility]  INPATIENT PROGRESS NOTE         [facility] 2F    2/2/2025      PATIENT IDENTIFICATION:   Name:  Sara Esparza      MRN:  [id number]     74 y.o.  female             Reason for visit: Atrial fibrillation      SUBJECTIVE:   Stable this morning no new complaints, maintaining sinus rhythm.  Complaining of abdominal pain and some nausea with normal bowel sounds.     OBJECTIVE:  Vitals:    02/02/25 0600 02/02/25 0712 02/02/25 0957 02/02/25 1109   BP:  161/66  168/66   BP Location:  Left arm  Left arm   Patient Position:  Lying  Lying   Pulse: 50 53 64 58   Resp:  16  16   Temp:  98.4 °F (36.9 °C)  98.5 °F (36.9 °C)   TempSrc:  Oral  Oral   SpO2: 92% 98%  97%   Weight:       Height:               Body mass index is 37.28 kg/m².    Intake/Output Summary (Last 24 hours) at 2/2/2025 1245  Last data filed at 2/1/2025 2040  Gross per 24 hour   Intake 100 ml   Output --   Net 100 ml       Telemetry: Personally reviewed, normal sinus rhythm, no arrhythmia     Exam:  General Appearance:   · well developed  · well nourished, chronically ill-appearing  Neck:  · thyroid not enlarged  · supple  Respiratory:  · no respiratory distress  · normal breath sounds  · no rales  Cardiovascular:  · no jugular venous distention  · regular rhythm  · apical impulse normal  · S1 normal, S2 normal  · no S3, no S4   · no murmur  · no rub, no thrill  · carotid pulses normal; no bruit  · pedal pulses normal  · lower extremity edema: none    Skin:   warm, dry    Skin:   warm, dry      No Known Allergies  Scheduled meds:       albumin human, , ,   allopurinol, 200 mg, Oral, Daily  amiodarone, 200 mg, Oral, Q12H  atorvastatin, 10 mg, Oral, Daily  cefTRIAXone, 2,000 mg, Intravenous, Q24H  epoetin gracia/gracia-epbx, 6,000 Units, Subcutaneous, Once per day on Monday Wednesday Friday  famotidine, 20 mg, Oral, Daily  insulin lispro, 2-9 Units, Subcutaneous, 4x Daily AC & at Bedtime  Lidocaine, 2  "patch, Transdermal, Q24H  NIFEdipine XL, 60 mg, Oral, Q24H  pantoprazole, 40 mg, Oral, Q AM  sodium chloride, 10 mL, Intravenous, Q12H  vancomycin (dosing per levels), , Not Applicable, Daily      IV meds:                      Pharmacy to dose vancomycin,       Data Review:  Results from last 7 days   Lab Units 02/01/25  1025 01/31/25  0726 01/28/25  0941 01/27/25  0636   SODIUM mmol/L 130* 131* 138 134*   BUN mg/dL 29* 36* 27* 56*   CREATININE mg/dL 3.84* 4.39* 3.45* 4.93*   GLUCOSE mg/dL 152* 132* 201* 129*     Results from last 7 days   Lab Units 01/31/25  0726 01/28/25  0941 01/27/25  0636 01/26/25  2247   WBC 10*3/mm3 11.55* 9.57 10.51 11.95*   HEMOGLOBIN g/dL 10.1* 9.8* 10.0* 10.3*             No results found for: \"DIGOXIN\"   Lab Results   Component Value Date    TSH 1.250 01/19/2025           Invalid input(s): \"LDLCALC\"    Estimated Creatinine Clearance: 16 mL/min (A) (by C-G formula based on SCr of 3.84 mg/dL (H)).        Imaging (last 24 hr):   I personally reviewed the most recent chest x-ray and other pertinent imaging studies.  Results for orders placed during the hospital encounter of 01/19/25    XR Chest 1 View    Narrative  XR CHEST 1 VW    Date of Exam: 1/23/2025 1:51 AM EST    Indication: chest pain    Comparison: 1/21/2025, 1/19/2025.    Findings:  Right internal jugular PermCath present with the tip at the cavoatrial junction. Low lung volumes. There are no airspace consolidations. No pleural fluid. No pneumothorax. The pulmonary vasculature appears mildly indistinct. The cardiac and mediastinal  silhouette appear unremarkable. No acute osseous abnormality identified.    Impression  Impression:  Questionable mild pulmonary edema pattern.        Electronically Signed: Cindy Fuller MD  1/23/2025 2:47 AM EST  Workstation ID: [id number]        Last ECHO:  Results for orders placed during the hospital encounter of 01/19/25    Adult Transthoracic Echo Complete W/ Cont if Necessary Per " Protocol    Interpretation Summary    Left ventricular systolic function is normal. Estimated left ventricular EF = 55%    Left ventricular wall thickness is consistent with moderate concentric hypertrophy.    No hemodynamically significant valvular heart disease        PROBLEM LIST:     Generalized weakness    ESRD (end stage renal disease)    Type 2 diabetes mellitus with stage 5 chronic kidney disease not on chronic dialysis, without long-term current use of insulin    Chronic osteomyelitis of left foot    Paroxysmal A-fib    Sepsis due to methicillin resistant Staphylococcus aureus (MRSA) with encephalopathy without septic shock    Critical limb ischemia of left lower extremity        ASSESSMENT/PLAN:  1.  Paroxysmal atrial fibrillation:  Initially had A-fib with RVR, converted with amiodarone, anticoagulation on hold due to left lower extremity angiography by vascular on Monday.  Continue amiodarone at current dose  N.p.o. after midnight    Resume full anticoagulation with Eliquis 5 mg twice daily after lower extremity angio    2.  Hypertension:  Goal blood pressure less than 140/90  Currently well-controlled, continue nifedipine     3.  Mixed hyperlipidemia:  Goal LDL less than 70 given PAD  Continue atorvastatin at current dose    4.  PAD:  Possible occlusion of the left PTA by arterial duplex, angiogram and possible intervention on Monday    5.  ESRD on HD:  Per nephrology    6.  Left calcaneal osteomyelitis:  Antibiotics and wound VAC with debridement recently by Dr. Gonzalez.  Patient adamantly wants to avoid amputation, but understands it may ultimately be necessary.      7.  Abdominal pain with nausea:  Soft abdomen with positive bowel sounds,.  Medications to be administered    Discussed with  patient's family      Naren Huang MD  2/2/2025    12:45 EST

## 2025-02-02 NOTE — PROGRESS NOTES
[facility] Medicine Services  PROGRESS NOTE    Patient Name: Sara Esparza  : 1950  MRN: [id number]    Date of Admission: 2025  Primary Care Physician: Toribio Roberts MD    Subjective   Subjective     CC:  F/U weakness, osteomyelitis    HPI:  Pt was seen resting up in bed in NAD.  Chronically ill appearing.   at bs.  States foot pain is controlled currently. Oriented to person, place,  and some events but generally forgetful.  Aware that she is having an angiogram likely tomorrow +/- amputation of her left lower extremity.  No new issues.      Objective   Objective     Vital Signs:   Temp:  [98 °F (36.7 °C)-98.8 °F (37.1 °C)] 98.4 °F (36.9 °C)  Heart Rate:  [50-68] 64  Resp:  [16-18] 16  BP: (155-172)/(57-69) 161/66  Flow (L/min) (Oxygen Therapy):  [2] 2     Physical Exam:  Constitutional: No acute distress, awake, alert. Resting up in bed.  Chronically ill/debilitated elderly female.   at bs.   HENT: NCAT, mucous membranes moist  Respiratory: Clear to auscultation bilaterally but decreased at bs, respiratory effort normal on 2LNC.   Cardiovascular: RRR, no murmurs, rubs, or gallops  Gastrointestinal: Positive bowel sounds, soft, nontender, nondistended. Obese.   Musculoskeletal: No bilateral ankle edema. CANSECO spont.  Lt foot with wound vac and drsg in place.    Psychiatric: flat affect, cooperative  Neurologic: Oriented to person, hospital, [place], and general events.  Forgetful to year (nl per ), strength symmetric in all extremities but generally weak, Cranial Nerves grossly intact to confrontation, speech clear. Follows commands   Skin: No rashes. Rt Upper chest wall THDC with drsg c/d/I.       Results Reviewed:  LAB RESULTS:      Lab 25  0726 25  0941 25  0636 25  2247   WBC 11.55* 9.57 10.51 11.95*   HEMOGLOBIN 10.1* 9.8* 10.0* 10.3*   HEMATOCRIT 32.7* 31.3* 31.2* 32.3*   PLATELETS 271 244 238 250   NEUTROS  ABS  --  7.99* 8.12* 9.04*   IMMATURE GRANS (ABS)  --  0.13* 0.18* 0.19*   LYMPHS ABS  --  0.49* 0.88 1.04   MONOS ABS  --  0.94* 1.23* 1.55*   EOS ABS  --  0.01 0.07 0.11   .2* 100.0* 99.0* 99.7*         Lab 02/01/25  1025 01/31/25  0726 01/28/25  0941 01/27/25  0636 01/26/25  2246   SODIUM 130* 131* 138 134* 126*   POTASSIUM 4.4 4.3 4.0 3.8 3.7   CHLORIDE 96* 97* 99 97* 92*   CO2 25.0 25.0 27.0 24.0 22.0   ANION GAP 9.0 9.0 12.0 13.0 12.0   BUN 29* 36* 27* 56* 52*   CREATININE 3.84* 4.39* 3.45* 4.93* 4.88*   EGFR 11.7* 10.0* 13.3* 8.7* 8.8*   GLUCOSE 152* 132* 201* 129* 148*   CALCIUM 9.1 9.4 9.2 9.1 9.1   PHOSPHORUS 4.3  --   --   --   --          Lab 02/01/25  1025   ALBUMIN 2.5*                         Brief Urine Lab Results  (Last result in the past 365 days)        Color   Clarity   Blood   Leuk Est   Nitrite   Protein   CREAT   Urine HCG        01/19/25 1404 Yellow   Cloudy   Moderate (2+)   Moderate (2+)   Negative   >=300 mg/dL (3+)                   Microbiology Results Abnormal       Procedure Component Value - Date/Time    Anaerobic Culture - Surgical Site, Foot, Left [[id number]]  (Abnormal) Collected: 01/24/25 1754    Lab Status: Final result Specimen: Surgical Site from Foot, Left Updated: 01/30/25 1515     Anaerobic Culture Cutibacterium acnes    Body Fluid Culture - Surgical Site, Foot, Left [[id number]]  (Abnormal)  (Susceptibility) Collected: 01/24/25 1754    Lab Status: Final result Specimen: Surgical Site from Foot, Left Updated: 01/27/25 0642     Body Fluid Culture Light growth (2+) Staphylococcus aureus, MRSA     Comment:   Methicillin resistant Staphylococcus aureus, Patient may be an isolation risk.        Gram Stain Rare (1+) WBCs seen      No organisms seen    Narrative:      Not a body fluid.    Susceptibility        Staphylococcus aureus, MRSA      TAYLOR      Clindamycin Susceptible      Erythromycin Resistant      Oxacillin Resistant      Rifampin Susceptible      Tetracycline  Susceptible      Trimethoprim + Sulfamethoxazole Susceptible      Vancomycin Susceptible                           Wound Culture - Wound, Foot, Left [[id number]]  (Abnormal)  (Susceptibility) Collected: 01/20/25 1409    Lab Status: Final result Specimen: Wound from Foot, Left Updated: 01/24/25 0658     Wound Culture Scant growth (1+) Staphylococcus aureus, MRSA     Comment: Methicillin resistant Staphylococcus aureus, Patient may be an isolation risk.         Rare growth Morganella morganii ssp morganii     Comment:            Scant growth (1+) Normal Skin Kimber     Gram Stain Few (2+) WBCs seen      Many (4+) Gram positive cocci in pairs      Rare (1+) Gram negative bacilli      Rare (1+) Gram positive bacilli    Susceptibility        Staphylococcus aureus, MRSA      TAYLOR      Clindamycin Susceptible      Erythromycin Resistant      Oxacillin Resistant      Rifampin Susceptible      Tetracycline Susceptible      Trimethoprim + Sulfamethoxazole Susceptible      Vancomycin Susceptible                       Susceptibility        Morganella morganii ssp morganii      TAYLOR      Amoxicillin + Clavulanate Resistant      Ampicillin Resistant      Ampicillin + Sulbactam Resistant      Cefazolin (Non Urine) Resistant      Cefepime Susceptible  [1]       Cefotaxime Susceptible      Ceftazidime Susceptible  [1]       Gentamicin Susceptible      Levofloxacin Susceptible      Piperacillin + Tazobactam Susceptible      Tetracycline Susceptible      Trimethoprim + Sulfamethoxazole Susceptible                   [1]  Appended report. These results have been appended to a previously final verified report.                Susceptibility Comments       Morganella morganii ssp morganii    Cefotaxime susceptibility can be used as a surrogate for ceftriaxone susceptibility               MRSA Screen, PCR (Inpatient) - Swab, Nares [[id number]]  (Abnormal) Collected: 01/21/25 0614    Lab Status: Final result Specimen: Swab from Nares Updated:  01/21/25 0847     MRSA PCR Positive    Narrative:      The negative predictive value of this diagnostic test is high and should only be used to consider de-escalating anti-MRSA therapy. A positive result may indicate colonization with MRSA and must be correlated clinically.            No radiology results from the last 24 hrs    Results for orders placed during the hospital encounter of 01/19/25    Adult Transthoracic Echo Complete W/ Cont if Necessary Per Protocol    Interpretation Summary    Left ventricular systolic function is normal. Estimated left ventricular EF = 55%    Left ventricular wall thickness is consistent with moderate concentric hypertrophy.    No hemodynamically significant valvular heart disease      Current medications:  Scheduled Meds:albumin human, , ,   allopurinol, 200 mg, Oral, Daily  amiodarone, 200 mg, Oral, Q12H  atorvastatin, 10 mg, Oral, Daily  cefTRIAXone, 2,000 mg, Intravenous, Q24H  epoetin gracia/gracia-epbx, 6,000 Units, Subcutaneous, Once per day on Monday Wednesday Friday  famotidine, 20 mg, Oral, Daily  insulin lispro, 2-9 Units, Subcutaneous, 4x Daily AC & at Bedtime  Lidocaine, 2 patch, Transdermal, Q24H  NIFEdipine XL, 60 mg, Oral, Q24H  pantoprazole, 40 mg, Oral, Q AM  sodium chloride, 10 mL, Intravenous, Q12H  vancomycin (dosing per levels), , Not Applicable, Daily      Continuous Infusions:Pharmacy to dose vancomycin,       PRN Meds:.  acetaminophen    albumin human    albumin human    senna-docusate sodium **AND** polyethylene glycol **AND** bisacodyl **AND** bisacodyl    Calcium Replacement - Follow Nurse / BPA Driven Protocol    dextrose    dextrose    glucagon (human recombinant)    heparin (porcine)    Magnesium Standard Dose Replacement - Follow Nurse / BPA Driven Protocol    melatonin    metoprolol tartrate    ondansetron    oxyCODONE    Pharmacy to dose vancomycin    Potassium Replacement - Follow Nurse / BPA Driven Protocol    [COMPLETED] Insert Peripheral IV **AND**  sodium chloride    sodium chloride    sodium chloride    traMADol    Assessment & Plan   Assessment & Plan     Active Hospital Problems    Diagnosis  POA    **Generalized weakness [R53.1]  Yes    Paroxysmal A-fib [I48.0]  Unknown    Sepsis due to methicillin resistant Staphylococcus aureus (MRSA) with encephalopathy without septic shock [A41.02, R65.20, G93.41]  Unknown    Chronic osteomyelitis of left foot [M86.672]  Unknown    Critical limb ischemia of left lower extremity [I70.222]  Unknown    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        Brief Hospital Course to date:  Sara Esparza is a 74 y.o. female with hx of ESRD on HD, PAF on Eliquis, HTN, and DM admitted for several weeks of generalized weakness, myalgias, and multiple falls in setting of lightheadedness.   After admission she was found to be septic with temps up to 102.4. Further workup revealed left calcaneal osteomyelitis. S/P debridement and wound vac placement 1/24/25. Vascular Surgery planning for LLE angiogram 2/3/25. Infectious Disease following for antibiotic management.     This patient's problems and plans were partially entered by my partner and updated as appropriate by me 02/02/25.     Assessment/Plan:  Pt is new to me today      Sepsis, fever, leukocytosis, confusion  Lt calcaneal osteomyelitis   Left posterior tibial artery occlusion   - CT LLE without contrast 1/21/25: draining wound seen along plantar aspect of left calcaneus with surrounding changes consistent with osteomyelitis  - Ortho consulted - s/p debridement and wound vac placement 1/24/25 by Dr. Gonzalez. May ultimately require amputation, patient aware - 2/1/25 she is now agreeable to proceed with amputation to have the best chance at quality of life.   -  Plan for LLE angiogram tomorrow 2/3/25 by Dr. Ramirez/Vascular Surgery, however patient now agreeable to amputation - partner prior notified Vascular team yesterday about her decision, also notified Dr. Gonzalez as not sure who would be the one to perform amputation IF ANGIO NOT successful  - MRSA and Morganella in cultures from wound site, ID following, continue Vanc and Rocephin for now  - Leukocytosis and fevers resolved  - PT/OT has not seen since admission - re-consulted 2/1/25     Generalized weakness, progressive x weeks  Falls at home   - Suspicion of overdiuresis and orthostasis ongoing, with superimposed sepsis/infection now   - CT chest/abd/pelvis without acute findings   - Normal TSH and CK   - PT/OT following, patient considering SNF . CM following.      Neck pain, C7 region   C5-6 canal stenosis  Chronic back pain s/p spinal stimulator  - CT C-spine - no fractures  - History of oxycodone dependence; family initially requested avoiding opiates however due to uncontrolled pain, initiated low dose oxycodone here which she has tolerated well thus far; also has PRN Tramadol in place  - IV Dilaudid discontinued, but may need to restart at some point if further surgeries planned  - Added Lidocaine patches 1/31/25  - Recommended to patient/ to reach out to her pain management doctor's office as they have questions regarding her spinal stimulator     ESRD on HD  - Has a tunneled right IJ dialysis catheter  - Nephrology following  - HD on MWF     DMII, HbA1c 4.9  - On sitagliptin at home  - SSI here, adjust as needed      HTN   - Nifedipine dose decreased due to tenuous BP's, now improved, increased to 60 mg daily on 2/1/25  --stable,  monitor      Hx of Afib  Episode of Afib with RVR during HD  - Resumed home dose beta blocker (Coreg), but still was not rate controlled - Cardiology consulted, started on Amiodarone and changed to Metoprolol BID. Rate now controlled and occasionally running low - Metoprolol  discontinued per Cardiology 1/31/25  - Resumed Eliquis 1/28/25, then held for angiogram 2/3/25, discussed with pharmacist who will coordinate this      Elevated troponin  - In setting of ESRD  - Troponin peaked at 132 on 1/19/25  - Per chart review, had LHC recently at [facility] with non-obstructive CAD     Dyspepsia  - GI cocktail PRN     Constipation  - Likely due to opiates  - Improved with bowel regimen and addition of Lactulose        Expected Discharge Location and Transportation: home vs rehab  Expected Discharge   Expected Discharge Date: 2/6/2025; Expected Discharge Time:      VTE Prophylaxis:  Pharmacologic & mechanical VTE prophylaxis orders are present.         AM-PAC 6 Clicks Score (PT): 9 (02/01/25 2015)    CODE STATUS:   Code Status and Medical Interventions: CPR (Attempt to Resuscitate); Full Support   Ordered at: 01/19/25 0518     Level Of Support Discussed With:    Patient     Code Status (Patient has no pulse and is not breathing):    CPR (Attempt to Resuscitate)     Medical Interventions (Patient has pulse or is breathing):    Full Support       Imelda Roy, APRN  02/02/25

## 2025-02-02 NOTE — PROGRESS NOTES
[facility] Progress Note    Admission Date: 1/19/2025    Sara Esparza  1950  [id number]    Date: 2/2/2025    Antibiotics:  Anti-Infectives (From admission, onward)      Ordered     Dose/Rate Route Frequency Start Stop    02/01/25 1332  vancomycin (dosing per levels)        Ordering Provider: Ricardo Chappell, PharmD     Not Applicable Daily 02/01/25 1415 03/07/25 0859    01/29/25 1059  vancomycin (VANCOCIN) 1,000 mg in sodium chloride 0.9 % 250 mL IVPB-VTB  Status:  Discontinued        Ordering Provider: Ricardo Chappell, PharmD    1,000 mg  250 mL/hr over 60 Minutes Intravenous Once per day on Monday Wednesday Friday 01/29/25 1600 02/01/25 1332    01/27/25 1850  cefTRIAXone (ROCEPHIN) 2,000 mg in sodium chloride 0.9 % 100 mL MBP        Ordering Provider: Camden Ashford RPH    2,000 mg  200 mL/hr over 30 Minutes Intravenous Every 24 Hours 01/27/25 2000 02/05/25 1959    01/27/25 0920  vancomycin (VANCOCIN) 1,000 mg in sodium chloride 0.9 % 250 mL IVPB-VTB        Ordering Provider: Ricardo Chappell, PharmD    1,000 mg  250 mL/hr over 60 Minutes Intravenous Once 01/27/25 1600 01/27/25 1857    01/27/25 0949  Pharmacy to dose vancomycin        Ordering Provider: Bailee aBrrios MD     Not Applicable Continuous PRN 01/27/25 0948 02/10/25 0947    01/27/25 0920  vancomycin (dosing per levels)  Status:  Discontinued        Ordering Provider: Ricardo Chappell, PharmD     Not Applicable Daily 01/27/25 0920 01/29/25 1059    01/24/25 1230  vancomycin (VANCOCIN) 1,000 mg in sodium chloride 0.9 % 250 mL IVPB-VTB        Ordering Provider: Edmund Erwin, Amrita    1,000 mg  250 mL/hr over 60 Minutes Intravenous Once 01/24/25 1600 01/24/25 1723    01/22/25 1230  vancomycin 750 mg in sodium chloride 0.9 % 250 mL IVPB-VTB        Ordering Provider: Yaima, Edmund, PharmD    750 mg  333.3 mL/hr over 45 Minutes Intravenous Once 01/22/25 1400 01/22/25 1810    01/21/25 0519  cefepime 1000 mg IVPB in 100 mL NS (MBP)  Status:   Discontinued        Ordering Provider: Rom Gonzalez Jr., MD    1,000 mg  over 4 Hours Intravenous Every 24 Hours 01/22/25 0600 01/27/25 1851    01/21/25 0537  vancomycin (dosing per levels)  Status:  Discontinued        Ordering Provider: Rom Gonzalez Jr., MD     Not Applicable Daily 01/21/25 0900 01/27/25 1320    01/21/25 0519  cefepime 1000 mg IVPB in 100 mL NS (MBP)        Ordering Provider: Conrad Alvarez MD    1,000 mg  over 30 Minutes Intravenous Once 01/21/25 0615 01/21/25 0715    01/21/25 0505  metroNIDAZOLE (FLAGYL) IVPB 500 mg        Ordering Provider: Rom Gonzalez Jr., MD    500 mg  200 mL/hr over 30 Minutes Intravenous Every 8 Hours 01/21/25 0600 01/26/25 0759    01/21/25 0513  vancomycin 2250 mg/500 mL 0.9% NS IVPB ([facility])        Ordering Provider: Woo Wells, PharmD    20 mg/kg × 108 kg  over 135 Minutes Intravenous Once 01/21/25 0600 01/21/25 0950    01/21/25 0505  Pharmacy to dose vancomycin  Status:  Discontinued        Ordering Provider: Rom Gonzalez Jr., MD     Not Applicable Continuous PRN 01/21/25 0500 01/27/25 1318    01/20/25 0738  cefTRIAXone (ROCEPHIN) 1,000 mg in sodium chloride 0.9 % 100 mL MBP  Status:  Discontinued        Ordering Provider: Leslye Fiore MD    1,000 mg  200 mL/hr over 30 Minutes Intravenous Every 24 Hours 01/20/25 0900 01/21/25 0519            Reason for Consultation: Sepsis and osteomyelitis of the left calcaneus     History of present illness:    1/22/25: Patient is a 74 y.o. 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  Evaluation in the ED included WBC 14.3 and PCT 5.9.  Subsequent lactic acid was 4.1 on 1/21.  After admission she became febrile to  101.9.  She was started on ceftriaxone and vancomycin.  Blood culture was sent yesterday afternoon.  Urinalysis showed pyuria.  Urine culture showed mixed hanny in the lab discarded the culture.  She was noted to have a left calcaneus wound.  CT scan of the chest, abdomen and pelvis were unremarkable for acute process.  CT scan of the left lower extremity showed a wound at the plantar aspect of the left calcaneus with subcutaneous and intraosseous air and destructive changes .  CT scan of the neck showed advanced C5-6 and C6-7 degenerative disc disease; suspected moderate or greater C5-6 canal stenosis due to posterior vertebral osteophytes.  Wound culture of the left heel is growing MRSA and a gram-negative ange.  At the time of my exam the patient is nonverbal other than moaning.  She does not follow commands.  Her  is at the bedside and is able to provide medical history although he is a little uncertain about timing of events such as when she started dialysis.      She had a dialysis catheter placed 9/13 which was replaced on 10/11.    1/23/2025.  Afebrile x 2 days.  She is considerably more alert today.  She remains on IV amiodarone for atrial fibrillation with RVR.  She complains of pain but does not localize it to any specific site.  Wound culture with MRSA and Morganella.  Blood culture negative.    1/24/2025.  Afebrile.  She is seen on dialysis today.  She she is alert and mildly confused but understands that she is scheduled to undergo surgery today.  In discussion with her  in the patient's room he recounted that it was explained to her that she was at high risk for limb loss based on the extent of osteomyelitis involving her calcaneus.  MRI was attempted yesterday but aborted because of pain.    1/25/2025.  Afebrile.  Alert. Uncomfortable.  states that she is discouraged because she was found to have extensive osteo at surgery yesterday.  He states that he does not see how we will be  possible for her to go home.    1/26/25 Afebrile, appears more lucid. Appears more comfortable although she is scratching torso and upper extremities; the  states this is a chronic problem  MRSA from op cultures  1/27/25 Afebrile. Seen on HD. No new c/o  Note plans for arteriogram  MRSA from op cultures    1/28/25  Afebrile, alert but seems unable to remember that she is scheduled for vascular evaluation next week. I reminded her that Dr Gonzalez is very concerned Re: ability to salvage her left leg and she acknowledged this. Her  at the bedside appears to understand all of these issues.     1/29/2025:  She remains afebrile.  Left foot culture from 1/24 is growing MRSA.  Vancomycin random level is 21.2.  White blood cell count yesterday was 9.6.   CT scan of 1/21 revealed a draining wound along the plantar aspect of the left calcaneus with subcutaneous air and destructive changes and intraosseous air along the plantar aspect of the calcaneus consistent with osteomyelitis.  She is scheduled for left lower extremity angiogram on 2/3/2025.  Photo of her wound from 1/28 reveals exposed bone.  She complains of neck/back pain.      1/30/2025: She remains afebrile.  Random vancomycin level yesterday was 21.2.  She denies uncontrolled pain.  She denies nausea and vomiting.   She is more willing to consider amputation    1/31/25: She has remained afebrile.  She is scheduled to undergo vascular intervention on Monday.   Anaerobic culture from 1/24 grew cutibacterium acnes.  I examined her with Cody Mehta and PT wound care today.  She denies uncontrolled pain.  She denies nausea and vomiting    2/1/2025:  She remains afebrile.  White blood cell count yesterday was 11.6.  She has now decided she will proceed with a left BKA.  She denies nausea, vomiting, and diarrhea.    2/2/25:  She remains afebrile.  Random vancomycin level yesterday was 30.7.  She denies nausea and vomiting.    PE:  Vital Signs  Temp  Min:  98 °F (36.7 °C)  Max: 98.8 °F (37.1 °C)  BP  Min: 155/67  Max: 172/69  Pulse  Min: 50  Max: 68  Resp  Min: 16  Max: 18  SpO2  Min: 86 %  Max: 98 %    GENERAL: Alert, appropriate, appears chronically ill  HEENT: Normocephalic, atraumatic.   No conjunctival injection. No icterus.  No oral labial lesions   NECK: No evidence of meningismus  HEART: No murmur, rubs, gallops.   LUNGS: Clear anteriorly, no wheezes or rhonchi appreciated  ABDOMEN: Abdomen bloated but soft.  No evidence of tenderness elicited with deep pressure throughout the abdomen.   :  Without Corral catheter.  MSK:  Left heel has a wound VAC in place  SKIN: Warm and dry without cutaneous eruptions on Inspection/palpation.    NEURO: Oriented to PPT.    PSYCHIATRIC: Normal insight and judgment. Cooperative with PE       Laboratory Data    Results from last 7 days   Lab Units 01/31/25  0726 01/28/25  0941 01/27/25  0636   WBC 10*3/mm3 11.55* 9.57 10.51   HEMOGLOBIN g/dL 10.1* 9.8* 10.0*   HEMATOCRIT % 32.7* 31.3* 31.2*   PLATELETS 10*3/mm3 271 244 238     Results from last 7 days   Lab Units 02/01/25  1025   SODIUM mmol/L 130*   POTASSIUM mmol/L 4.4   CHLORIDE mmol/L 96*   CO2 mmol/L 25.0   BUN mg/dL 29*   CREATININE mg/dL 3.84*   GLUCOSE mg/dL 152*   CALCIUM mg/dL 9.1                     Estimated Creatinine Clearance: 16 mL/min (A) (by C-G formula based on SCr of 3.84 mg/dL (H)).      Microbiology:  MRSA and Morganella from foot wound    Radiology:  Imaging Results (Last 72 Hours)       ** No results found for the last 72 hours. **            I read her CT scan images of 1/21.      Impression:   1.  Left calcaneal osteomyelitis-with MRSA/Morganella on superficial cultures and MRSA and deep cultures.  MRSA is the likely pathogen.  This is not a salvageable foot.  I will discontinue ceftriaxone but leave her on the vancomycin for the time being.  2.  Sepsis-improved.   3.  Pyuria-with mixed hanny on culture  4.  End-stage renal disease-on hemodialysis  5.   Type 2 diabetes mellitus  6.  Paroxysmal atrial fibrillation  7.  Toxic/metabolic encephalopathy-improved   8.  Leukocytosis/neutrophilia-improved  9.  Peripheral vascular disease-scheduled for vascular intervention on 2/3  10.  Cervical spinal stenosis  11.  Peripheral vascular disease-she is scheduled for vascular intervention on 2/3       PLAN/RECOMMENDATIONS:  -- Continue vancomycin   -- Stop ceftriaxone   -- Continue wound care  -- Proceed with a left BKA  -- Vascular intervention per Dr. Ramirez on Monday    This visit included the following complex service elements:  Complex medical decision-making associated with antimicrobial prescribing.  In-depth chart review with high level synthesis for complex diagnoses.  Managed infection treatment protocol associated with transitions of care for this complex patient.      Teja Blue MD  2/2/2025

## 2025-02-02 NOTE — PLAN OF CARE
Problem: Pain Acute  Goal: Optimal Pain Control and Function  Outcome: Progressing  Intervention: Prevent or Manage Pain  Recent Flowsheet Documentation  Taken 2/2/2025 0200 by Cristal Johnson RN  Medication Review/Management: medications reviewed  Taken 2/2/2025 0000 by Cristal Johnson RN  Medication Review/Management: medications reviewed  Taken 2/1/2025 2200 by Cristal Johnson RN  Medication Review/Management: medications reviewed  Taken 2/1/2025 2015 by Cristal Johnson RN  Medication Review/Management: medications reviewed     Problem: Comorbidity Management  Goal: Blood Glucose Level Within Target Range  Outcome: Progressing  Intervention: Monitor and Manage Glycemia  Recent Flowsheet Documentation  Taken 2/2/2025 0200 by Cristal Johnson RN  Medication Review/Management: medications reviewed  Taken 2/2/2025 0000 by Cristal Johnson RN  Medication Review/Management: medications reviewed  Taken 2/1/2025 2200 by Cristal Johnson RN  Medication Review/Management: medications reviewed  Taken 2/1/2025 2015 by Cristal Johnson RN  Medication Review/Management: medications reviewed     Problem: Skin Injury Risk Increased  Goal: Skin Health and Integrity  Outcome: Progressing  Intervention: Optimize Skin Protection  Recent Flowsheet Documentation  Taken 2/2/2025 0200 by Cristal Johnson RN  Activity Management: activity encouraged  Pressure Reduction Techniques:   frequent weight shift encouraged   heels elevated off bed   pressure points protected   weight shift assistance provided  Head of Bed (HOB) Positioning: HOB elevated  Pressure Reduction Devices:   foam padding utilized   positioning supports utilized   pressure-redistributing mattress utilized  Skin Protection:   incontinence pads utilized   protective footwear used   silicone foam dressing in place   transparent dressing maintained  Taken 2/2/2025 0000 by Cristal Johnson RN  Activity Management: activity  encouraged  Pressure Reduction Techniques:   frequent weight shift encouraged   heels elevated off bed   pressure points protected   weight shift assistance provided  Head of Bed (HOB) Positioning: HOB elevated  Pressure Reduction Devices:   foam padding utilized   positioning supports utilized   pressure-redistributing mattress utilized  Skin Protection:   incontinence pads utilized   protective footwear used   silicone foam dressing in place   transparent dressing maintained  Taken 2/1/2025 2200 by Cristal Johnson RN  Activity Management: activity encouraged  Head of Bed (HOB) Positioning: HOB elevated  Taken 2/1/2025 2015 by Cristal Johnson RN  Activity Management: activity encouraged  Pressure Reduction Techniques:   heels elevated off bed   frequent weight shift encouraged   pressure points protected   weight shift assistance provided  Pressure Reduction Devices:   foam padding utilized   positioning supports utilized   pressure-redistributing mattress utilized  Skin Protection: (silicone dressings pulled back and skin assessed)   incontinence pads utilized   protective footwear used   silicone foam dressing in place   transparent dressing maintained   other (see comments)  Taken 2/1/2025 2000 by Cristal Johnson, RN  Head of Bed (HOB) Positioning: HOB elevated     Problem: Fall Injury Risk  Goal: Absence of Fall and Fall-Related Injury  Outcome: Progressing  Intervention: Identify and Manage Contributors  Recent Flowsheet Documentation  Taken 2/2/2025 0200 by Cristal Johnson, RN  Medication Review/Management: medications reviewed  Taken 2/2/2025 0000 by Cristal Johnson RN  Medication Review/Management: medications reviewed  Taken 2/1/2025 2200 by Cristal Johnson, RN  Medication Review/Management: medications reviewed  Taken 2/1/2025 2015 by Cristal Johnson, RN  Medication Review/Management: medications reviewed  Intervention: Promote Injury-Free Environment  Recent Flowsheet  Documentation  Taken 2/2/2025 0200 by Cristal Johnson RN  Safety Promotion/Fall Prevention:   assistive device/personal items within reach   clutter free environment maintained   fall prevention program maintained   lighting adjusted   gait belt   nonskid shoes/slippers when out of bed   room organization consistent   safety round/check completed   toileting scheduled  Taken 2/2/2025 0000 by Cristal Johnson RN  Safety Promotion/Fall Prevention:   assistive device/personal items within reach   clutter free environment maintained   fall prevention program maintained   gait belt   lighting adjusted   nonskid shoes/slippers when out of bed   room organization consistent   safety round/check completed   toileting scheduled  Taken 2/1/2025 2200 by Cristal Johnson, RN  Safety Promotion/Fall Prevention:   assistive device/personal items within reach   clutter free environment maintained   fall prevention program maintained   gait belt   lighting adjusted   nonskid shoes/slippers when out of bed   room organization consistent   safety round/check completed   toileting scheduled  Taken 2/1/2025 2015 by Cristal Johnson RN  Safety Promotion/Fall Prevention:   assistive device/personal items within reach   clutter free environment maintained   fall prevention program maintained   gait belt   lighting adjusted   nonskid shoes/slippers when out of bed   room organization consistent   safety round/check completed   toileting scheduled     Problem: Hemodialysis  Goal: Safe, Effective Therapy Delivery  Outcome: Progressing  Intervention: Optimize Device Care and Function  Recent Flowsheet Documentation  Taken 2/2/2025 0200 by Cristal Johnson, RN  Medication Review/Management: medications reviewed  Taken 2/2/2025 0000 by Cristal Johnson, RN  Medication Review/Management: medications reviewed  Taken 2/1/2025 2200 by Cristal Johnson, RN  Medication Review/Management: medications reviewed  Taken 2/1/2025 2015 by  Cristal Johnson RN  Medication Review/Management: medications reviewed  Goal: Effective Tissue Perfusion  Outcome: Progressing  Goal: Absence of Infection Signs and Symptoms  Outcome: Progressing  Intervention: Prevent or Manage Infection  Recent Flowsheet Documentation  Taken 2/2/2025 0200 by Cristal Johnson RN  Infection Prevention:   environmental surveillance performed   hand hygiene promoted   personal protective equipment utilized   rest/sleep promoted  Taken 2/2/2025 0000 by Cristal Johnson RN  Infection Prevention:   environmental surveillance performed   hand hygiene promoted   personal protective equipment utilized   rest/sleep promoted  Taken 2/1/2025 2200 by Cristal Johnson RN  Infection Prevention:   environmental surveillance performed   hand hygiene promoted   personal protective equipment utilized   rest/sleep promoted  Taken 2/1/2025 2015 by Cristal Johnson RN  Infection Prevention:   hand hygiene promoted   environmental surveillance performed   personal protective equipment utilized   rest/sleep promoted     Problem: Adult Inpatient Plan of Care  Goal: Plan of Care Review  Outcome: Progressing  Flowsheets (Taken 2/2/2025 0352)  Progress: no change  Outcome Evaluation: VSS on RA but required 2L with sleep. Had a bath and CHG bath as well. IV abx given as ordered. PRN pain meds given a couple times. Plan for angiogram Monday, 2/3/25. Skin, fall, and DVT precautions in place. Pt confused and sleeping off and on.  Plan of Care Reviewed With:   patient   spouse  Goal: Patient-Specific Goal (Individualized)  Outcome: Progressing  Goal: Absence of Hospital-Acquired Illness or Injury  Outcome: Progressing  Intervention: Identify and Manage Fall Risk  Recent Flowsheet Documentation  Taken 2/2/2025 0200 by Cristal Johnson RN  Safety Promotion/Fall Prevention:   assistive device/personal items within reach   clutter free environment maintained   fall prevention program maintained    lighting adjusted   gait belt   nonskid shoes/slippers when out of bed   room organization consistent   safety round/check completed   toileting scheduled  Taken 2/2/2025 0000 by Cristal Johnson RN  Safety Promotion/Fall Prevention:   assistive device/personal items within reach   clutter free environment maintained   fall prevention program maintained   gait belt   lighting adjusted   nonskid shoes/slippers when out of bed   room organization consistent   safety round/check completed   toileting scheduled  Taken 2/1/2025 2200 by Cristal Johnson RN  Safety Promotion/Fall Prevention:   assistive device/personal items within reach   clutter free environment maintained   fall prevention program maintained   gait belt   lighting adjusted   nonskid shoes/slippers when out of bed   room organization consistent   safety round/check completed   toileting scheduled  Taken 2/1/2025 2015 by Cristal Johnson RN  Safety Promotion/Fall Prevention:   assistive device/personal items within reach   clutter free environment maintained   fall prevention program maintained   gait belt   lighting adjusted   nonskid shoes/slippers when out of bed   room organization consistent   safety round/check completed   toileting scheduled  Intervention: Prevent Skin Injury  Recent Flowsheet Documentation  Taken 2/2/2025 0200 by Cristal Johnson RN  Body Position:   supine   heels elevated  Skin Protection:   incontinence pads utilized   protective footwear used   silicone foam dressing in place   transparent dressing maintained  Taken 2/2/2025 0000 by Cristal Johnson RN  Body Position:   left   side-lying  Skin Protection:   incontinence pads utilized   protective footwear used   silicone foam dressing in place   transparent dressing maintained  Taken 2/1/2025 2200 by Cristal Johnson RN  Body Position:   right   side-lying   heels elevated  Taken 2/1/2025 2015 by Cristal Johnson RN  Skin Protection: (silicone dressings  pulled back and skin assessed)   incontinence pads utilized   protective footwear used   silicone foam dressing in place   transparent dressing maintained   other (see comments)  Taken 2/1/2025 2000 by Cristal Johnson RN  Body Position:   turned   left  Intervention: Prevent and Manage VTE (Venous Thromboembolism) Risk  Recent Flowsheet Documentation  Taken 2/2/2025 0200 by Cristal Johnson RN  VTE Prevention/Management:   bilateral   SCDs (sequential compression devices) on  Taken 2/2/2025 0000 by Cristal Johnson RN  VTE Prevention/Management:   bilateral   SCDs (sequential compression devices) on  Taken 2/1/2025 2200 by Cristal Johnson RN  VTE Prevention/Management:   bilateral   SCDs (sequential compression devices) on  Taken 2/1/2025 2015 by Cristal Johnson RN  VTE Prevention/Management:   bilateral   SCDs (sequential compression devices) on  Intervention: Prevent Infection  Recent Flowsheet Documentation  Taken 2/2/2025 0200 by Cristal Johnson RN  Infection Prevention:   environmental surveillance performed   hand hygiene promoted   personal protective equipment utilized   rest/sleep promoted  Taken 2/2/2025 0000 by Cristal Johnson RN  Infection Prevention:   environmental surveillance performed   hand hygiene promoted   personal protective equipment utilized   rest/sleep promoted  Taken 2/1/2025 2200 by Cristal Johnson RN  Infection Prevention:   environmental surveillance performed   hand hygiene promoted   personal protective equipment utilized   rest/sleep promoted  Taken 2/1/2025 2015 by Cristal Johnson RN  Infection Prevention:   hand hygiene promoted   environmental surveillance performed   personal protective equipment utilized   rest/sleep promoted  Goal: Optimal Comfort and Wellbeing  Outcome: Progressing  Goal: Readiness for Transition of Care  Outcome: Progressing   Goal Outcome Evaluation:  Plan of Care Reviewed With: patient, spouse        Progress: no  change  Outcome Evaluation: VSS on RA but required 2L with sleep. Had a bath and CHG bath as well. IV abx given as ordered. PRN pain meds given a couple times. Plan for angiogram Monday, 2/3/25. Skin, fall, and DVT precautions in place. Pt confused and sleeping off and on.

## 2025-02-03 ENCOUNTER — APPOINTMENT (OUTPATIENT)
Dept: GENERAL RADIOLOGY | Facility: HOSPITAL | Age: 75
DRG: 853 | End: 2025-02-03
Payer: MEDICARE

## 2025-02-03 ENCOUNTER — ANESTHESIA (OUTPATIENT)
Dept: PERIOP | Facility: HOSPITAL | Age: 75
End: 2025-02-03
Payer: MEDICARE

## 2025-02-03 ENCOUNTER — ANESTHESIA EVENT (OUTPATIENT)
Dept: PERIOP | Facility: HOSPITAL | Age: 75
End: 2025-02-03
Payer: MEDICARE

## 2025-02-03 ENCOUNTER — APPOINTMENT (OUTPATIENT)
Dept: NEPHROLOGY | Facility: HOSPITAL | Age: 75
DRG: 853 | End: 2025-02-03
Payer: MEDICARE

## 2025-02-03 LAB
ANION GAP SERPL CALCULATED.3IONS-SCNC: 13 MMOL/L (ref 5–15)
BUN SERPL-MCNC: 45 MG/DL (ref 8–23)
BUN/CREAT SERPL: 8.1 (ref 7–25)
CALCIUM SPEC-SCNC: 9.2 MG/DL (ref 8.6–10.5)
CHLORIDE SERPL-SCNC: 95 MMOL/L (ref 98–107)
CO2 SERPL-SCNC: 22 MMOL/L (ref 22–29)
CREAT SERPL-MCNC: 5.56 MG/DL (ref 0.57–1)
DEPRECATED RDW RBC AUTO: 50.3 FL (ref 37–54)
EGFRCR SERPLBLD CKD-EPI 2021: 7.5 ML/MIN/1.73
ERYTHROCYTE [DISTWIDTH] IN BLOOD BY AUTOMATED COUNT: 13.7 % (ref 12.3–15.4)
GLUCOSE BLDC GLUCOMTR-MCNC: 104 MG/DL (ref 70–130)
GLUCOSE BLDC GLUCOMTR-MCNC: 124 MG/DL (ref 70–130)
GLUCOSE BLDC GLUCOMTR-MCNC: 137 MG/DL (ref 70–130)
GLUCOSE SERPL-MCNC: 109 MG/DL (ref 65–99)
HCT VFR BLD AUTO: 32 % (ref 34–46.6)
HGB BLD-MCNC: 9.9 G/DL (ref 12–15.9)
INR PPP: 1.48 (ref 0.89–1.12)
MCH RBC QN AUTO: 31.1 PG (ref 26.6–33)
MCHC RBC AUTO-ENTMCNC: 30.9 G/DL (ref 31.5–35.7)
MCV RBC AUTO: 100.6 FL (ref 79–97)
PLATELET # BLD AUTO: 259 10*3/MM3 (ref 140–450)
PMV BLD AUTO: 9.9 FL (ref 6–12)
POTASSIUM SERPL-SCNC: 5.3 MMOL/L (ref 3.5–5.2)
PROTHROMBIN TIME: 18.1 SECONDS (ref 12.2–14.5)
RBC # BLD AUTO: 3.18 10*6/MM3 (ref 3.77–5.28)
SODIUM SERPL-SCNC: 130 MMOL/L (ref 136–145)
VANCOMYCIN SERPL-MCNC: 26.3 MCG/ML (ref 5–40)
WBC NRBC COR # BLD AUTO: 8.05 10*3/MM3 (ref 3.4–10.8)

## 2025-02-03 PROCEDURE — 25810000003 SODIUM CHLORIDE 0.9 % SOLUTION: Performed by: NURSE ANESTHETIST, CERTIFIED REGISTERED

## 2025-02-03 PROCEDURE — 75710 ARTERY X-RAYS ARM/LEG: CPT

## 2025-02-03 PROCEDURE — 25010000002 ONDANSETRON PER 1 MG: Performed by: NURSE ANESTHETIST, CERTIFIED REGISTERED

## 2025-02-03 PROCEDURE — C1894 INTRO/SHEATH, NON-LASER: HCPCS | Performed by: SURGERY

## 2025-02-03 PROCEDURE — C1760 CLOSURE DEV, VASC: HCPCS | Performed by: SURGERY

## 2025-02-03 PROCEDURE — B41D1ZZ FLUOROSCOPY OF AORTA AND BILATERAL LOWER EXTREMITY ARTERIES USING LOW OSMOLAR CONTRAST: ICD-10-PCS | Performed by: SURGERY

## 2025-02-03 PROCEDURE — C1887 CATHETER, GUIDING: HCPCS | Performed by: SURGERY

## 2025-02-03 PROCEDURE — 047Q3ZZ DILATION OF LEFT ANTERIOR TIBIAL ARTERY, PERCUTANEOUS APPROACH: ICD-10-PCS | Performed by: SURGERY

## 2025-02-03 PROCEDURE — 25010000002 CEFAZOLIN PER 500 MG: Performed by: SURGERY

## 2025-02-03 PROCEDURE — 97605 NEG PRS WND THER DME<=50SQCM: CPT

## 2025-02-03 PROCEDURE — 25010000002 HEPARIN (PORCINE) PER 1000 UNITS: Performed by: NURSE ANESTHETIST, CERTIFIED REGISTERED

## 2025-02-03 PROCEDURE — 85027 COMPLETE CBC AUTOMATED: CPT | Performed by: NURSE PRACTITIONER

## 2025-02-03 PROCEDURE — C1725 CATH, TRANSLUMIN NON-LASER: HCPCS | Performed by: SURGERY

## 2025-02-03 PROCEDURE — 99232 SBSQ HOSP IP/OBS MODERATE 35: CPT | Performed by: NURSE PRACTITIONER

## 2025-02-03 PROCEDURE — 25810000003 SODIUM CHLORIDE 0.9 % SOLUTION: Performed by: SURGERY

## 2025-02-03 PROCEDURE — 25510000002 IODIXANOL PER 1 ML: Performed by: SURGERY

## 2025-02-03 PROCEDURE — C1769 GUIDE WIRE: HCPCS | Performed by: SURGERY

## 2025-02-03 PROCEDURE — 25010000002 DEXAMETHASONE PER 1 MG: Performed by: NURSE ANESTHETIST, CERTIFIED REGISTERED

## 2025-02-03 PROCEDURE — 25010000002 HEPARIN (PORCINE) PER 1000 UNITS: Performed by: SURGERY

## 2025-02-03 PROCEDURE — 25010000002 FENTANYL CITRATE (PF) 100 MCG/2ML SOLUTION: Performed by: NURSE ANESTHETIST, CERTIFIED REGISTERED

## 2025-02-03 PROCEDURE — 04FQ3ZZ FRAGMENTATION OF LEFT ANTERIOR TIBIAL ARTERY, PERCUTANEOUS APPROACH: ICD-10-PCS | Performed by: SURGERY

## 2025-02-03 PROCEDURE — 82948 REAGENT STRIP/BLOOD GLUCOSE: CPT

## 2025-02-03 PROCEDURE — C1761: HCPCS | Performed by: SURGERY

## 2025-02-03 PROCEDURE — 80048 BASIC METABOLIC PNL TOTAL CA: CPT | Performed by: NURSE PRACTITIONER

## 2025-02-03 PROCEDURE — 85610 PROTHROMBIN TIME: CPT | Performed by: NURSE PRACTITIONER

## 2025-02-03 PROCEDURE — 25010000002 LIDOCAINE 1 % SOLUTION: Performed by: SURGERY

## 2025-02-03 PROCEDURE — 25010000002 PROPOFOL 10 MG/ML EMULSION: Performed by: NURSE ANESTHETIST, CERTIFIED REGISTERED

## 2025-02-03 PROCEDURE — 25010000002 LIDOCAINE PF 1% 1 % SOLUTION: Performed by: NURSE ANESTHETIST, CERTIFIED REGISTERED

## 2025-02-03 PROCEDURE — 80202 ASSAY OF VANCOMYCIN: CPT

## 2025-02-03 RX ORDER — DEXMEDETOMIDINE HYDROCHLORIDE 4 UG/ML
INJECTION, SOLUTION INTRAVENOUS AS NEEDED
Status: DISCONTINUED | OUTPATIENT
Start: 2025-02-03 | End: 2025-02-03 | Stop reason: SURG

## 2025-02-03 RX ORDER — MIDAZOLAM HYDROCHLORIDE 1 MG/ML
0.5 INJECTION, SOLUTION INTRAMUSCULAR; INTRAVENOUS
Status: DISCONTINUED | OUTPATIENT
Start: 2025-02-03 | End: 2025-02-03 | Stop reason: HOSPADM

## 2025-02-03 RX ORDER — FENTANYL CITRATE 50 UG/ML
INJECTION, SOLUTION INTRAMUSCULAR; INTRAVENOUS AS NEEDED
Status: DISCONTINUED | OUTPATIENT
Start: 2025-02-03 | End: 2025-02-03 | Stop reason: SURG

## 2025-02-03 RX ORDER — LIDOCAINE HYDROCHLORIDE 10 MG/ML
INJECTION, SOLUTION INFILTRATION; PERINEURAL AS NEEDED
Status: DISCONTINUED | OUTPATIENT
Start: 2025-02-03 | End: 2025-02-03 | Stop reason: HOSPADM

## 2025-02-03 RX ORDER — HYDROXYZINE HYDROCHLORIDE 10 MG/1
10 TABLET, FILM COATED ORAL ONCE
Status: COMPLETED | OUTPATIENT
Start: 2025-02-03 | End: 2025-02-03

## 2025-02-03 RX ORDER — PROPOFOL 10 MG/ML
VIAL (ML) INTRAVENOUS AS NEEDED
Status: DISCONTINUED | OUTPATIENT
Start: 2025-02-03 | End: 2025-02-03 | Stop reason: SURG

## 2025-02-03 RX ORDER — DEXAMETHASONE SODIUM PHOSPHATE 4 MG/ML
INJECTION, SOLUTION INTRA-ARTICULAR; INTRALESIONAL; INTRAMUSCULAR; INTRAVENOUS; SOFT TISSUE AS NEEDED
Status: DISCONTINUED | OUTPATIENT
Start: 2025-02-03 | End: 2025-02-03 | Stop reason: SURG

## 2025-02-03 RX ORDER — IODIXANOL 320 MG/ML
INJECTION, SOLUTION INTRAVASCULAR AS NEEDED
Status: DISCONTINUED | OUTPATIENT
Start: 2025-02-03 | End: 2025-02-03 | Stop reason: HOSPADM

## 2025-02-03 RX ORDER — IPRATROPIUM BROMIDE AND ALBUTEROL SULFATE 2.5; .5 MG/3ML; MG/3ML
3 SOLUTION RESPIRATORY (INHALATION) ONCE AS NEEDED
Status: DISCONTINUED | OUTPATIENT
Start: 2025-02-03 | End: 2025-02-03 | Stop reason: HOSPADM

## 2025-02-03 RX ORDER — SODIUM CHLORIDE, SODIUM LACTATE, POTASSIUM CHLORIDE, CALCIUM CHLORIDE 600; 310; 30; 20 MG/100ML; MG/100ML; MG/100ML; MG/100ML
9 INJECTION, SOLUTION INTRAVENOUS CONTINUOUS
Status: DISCONTINUED | OUTPATIENT
Start: 2025-02-04 | End: 2025-02-03

## 2025-02-03 RX ORDER — ONDANSETRON 2 MG/ML
INJECTION INTRAMUSCULAR; INTRAVENOUS AS NEEDED
Status: DISCONTINUED | OUTPATIENT
Start: 2025-02-03 | End: 2025-02-03 | Stop reason: SURG

## 2025-02-03 RX ORDER — LIDOCAINE HYDROCHLORIDE 10 MG/ML
0.5 INJECTION, SOLUTION EPIDURAL; INFILTRATION; INTRACAUDAL; PERINEURAL ONCE AS NEEDED
Status: DISCONTINUED | OUTPATIENT
Start: 2025-02-03 | End: 2025-02-03 | Stop reason: HOSPADM

## 2025-02-03 RX ORDER — HYDROCODONE BITARTRATE AND ACETAMINOPHEN 5; 325 MG/1; MG/1
1 TABLET ORAL ONCE AS NEEDED
Status: DISCONTINUED | OUTPATIENT
Start: 2025-02-03 | End: 2025-02-03 | Stop reason: HOSPADM

## 2025-02-03 RX ORDER — PROMETHAZINE HYDROCHLORIDE 25 MG/1
25 SUPPOSITORY RECTAL ONCE AS NEEDED
Status: DISCONTINUED | OUTPATIENT
Start: 2025-02-03 | End: 2025-02-03 | Stop reason: HOSPADM

## 2025-02-03 RX ORDER — HEPARIN SODIUM 1000 [USP'U]/ML
INJECTION, SOLUTION INTRAVENOUS; SUBCUTANEOUS AS NEEDED
Status: DISCONTINUED | OUTPATIENT
Start: 2025-02-03 | End: 2025-02-03 | Stop reason: SURG

## 2025-02-03 RX ORDER — HYDROMORPHONE HYDROCHLORIDE 1 MG/ML
0.5 INJECTION, SOLUTION INTRAMUSCULAR; INTRAVENOUS; SUBCUTANEOUS
Status: DISCONTINUED | OUTPATIENT
Start: 2025-02-03 | End: 2025-02-03 | Stop reason: HOSPADM

## 2025-02-03 RX ORDER — SODIUM CHLORIDE 9 MG/ML
INJECTION, SOLUTION INTRAVENOUS CONTINUOUS PRN
Status: DISCONTINUED | OUTPATIENT
Start: 2025-02-03 | End: 2025-02-03 | Stop reason: SURG

## 2025-02-03 RX ORDER — LABETALOL HYDROCHLORIDE 5 MG/ML
5 INJECTION, SOLUTION INTRAVENOUS
Status: DISCONTINUED | OUTPATIENT
Start: 2025-02-03 | End: 2025-02-03 | Stop reason: HOSPADM

## 2025-02-03 RX ORDER — EPHEDRINE SULFATE 50 MG/ML
INJECTION INTRAVENOUS AS NEEDED
Status: DISCONTINUED | OUTPATIENT
Start: 2025-02-03 | End: 2025-02-03 | Stop reason: SURG

## 2025-02-03 RX ORDER — NALOXONE HCL 0.4 MG/ML
0.4 VIAL (ML) INJECTION AS NEEDED
Status: DISCONTINUED | OUTPATIENT
Start: 2025-02-03 | End: 2025-02-03 | Stop reason: HOSPADM

## 2025-02-03 RX ORDER — PROMETHAZINE HYDROCHLORIDE 25 MG/1
25 TABLET ORAL ONCE AS NEEDED
Status: DISCONTINUED | OUTPATIENT
Start: 2025-02-03 | End: 2025-02-03 | Stop reason: HOSPADM

## 2025-02-03 RX ORDER — ALBUMIN (HUMAN) 12.5 G/50ML
12.5 SOLUTION INTRAVENOUS AS NEEDED
Status: ACTIVE | OUTPATIENT
Start: 2025-02-03 | End: 2025-02-03

## 2025-02-03 RX ORDER — OXYCODONE AND ACETAMINOPHEN 7.5; 325 MG/1; MG/1
1 TABLET ORAL EVERY 4 HOURS PRN
Status: DISCONTINUED | OUTPATIENT
Start: 2025-02-03 | End: 2025-02-03 | Stop reason: HOSPADM

## 2025-02-03 RX ORDER — FAMOTIDINE 20 MG/1
20 TABLET, FILM COATED ORAL ONCE
Status: CANCELLED | OUTPATIENT
Start: 2025-02-03 | End: 2025-02-03

## 2025-02-03 RX ORDER — SODIUM CHLORIDE 9 MG/ML
9 INJECTION, SOLUTION INTRAVENOUS AS NEEDED
Status: DISCONTINUED | OUTPATIENT
Start: 2025-02-03 | End: 2025-02-03 | Stop reason: HOSPADM

## 2025-02-03 RX ORDER — FAMOTIDINE 10 MG/ML
20 INJECTION, SOLUTION INTRAVENOUS ONCE
Status: COMPLETED | OUTPATIENT
Start: 2025-02-03 | End: 2025-02-03

## 2025-02-03 RX ORDER — SODIUM CHLORIDE 0.9 % (FLUSH) 0.9 %
3-10 SYRINGE (ML) INJECTION AS NEEDED
Status: DISCONTINUED | OUTPATIENT
Start: 2025-02-03 | End: 2025-02-03 | Stop reason: HOSPADM

## 2025-02-03 RX ORDER — ONDANSETRON 2 MG/ML
4 INJECTION INTRAMUSCULAR; INTRAVENOUS ONCE AS NEEDED
Status: DISCONTINUED | OUTPATIENT
Start: 2025-02-03 | End: 2025-02-03 | Stop reason: HOSPADM

## 2025-02-03 RX ORDER — HYDRALAZINE HYDROCHLORIDE 20 MG/ML
5 INJECTION INTRAMUSCULAR; INTRAVENOUS
Status: DISCONTINUED | OUTPATIENT
Start: 2025-02-03 | End: 2025-02-03 | Stop reason: HOSPADM

## 2025-02-03 RX ORDER — SODIUM CHLORIDE 9 MG/ML
9 INJECTION, SOLUTION INTRAVENOUS ONCE
Status: COMPLETED | OUTPATIENT
Start: 2025-02-03 | End: 2025-02-03

## 2025-02-03 RX ORDER — LIDOCAINE HYDROCHLORIDE 10 MG/ML
INJECTION, SOLUTION EPIDURAL; INFILTRATION; INTRACAUDAL; PERINEURAL AS NEEDED
Status: DISCONTINUED | OUTPATIENT
Start: 2025-02-03 | End: 2025-02-03 | Stop reason: SURG

## 2025-02-03 RX ORDER — SODIUM CHLORIDE, SODIUM LACTATE, POTASSIUM CHLORIDE, CALCIUM CHLORIDE 600; 310; 30; 20 MG/100ML; MG/100ML; MG/100ML; MG/100ML
9 INJECTION, SOLUTION INTRAVENOUS CONTINUOUS
Status: DISCONTINUED | OUTPATIENT
Start: 2025-02-03 | End: 2025-02-04

## 2025-02-03 RX ORDER — FENTANYL CITRATE 50 UG/ML
50 INJECTION, SOLUTION INTRAMUSCULAR; INTRAVENOUS
Status: DISCONTINUED | OUTPATIENT
Start: 2025-02-03 | End: 2025-02-03 | Stop reason: HOSPADM

## 2025-02-03 RX ORDER — SODIUM CHLORIDE 0.9 % (FLUSH) 0.9 %
3 SYRINGE (ML) INJECTION EVERY 12 HOURS SCHEDULED
Status: DISCONTINUED | OUTPATIENT
Start: 2025-02-03 | End: 2025-02-03 | Stop reason: HOSPADM

## 2025-02-03 RX ADMIN — EPHEDRINE SULFATE 10 MG: 50 INJECTION INTRAVENOUS at 09:36

## 2025-02-03 RX ADMIN — SODIUM CHLORIDE 2000 MG: 900 INJECTION INTRAVENOUS at 08:17

## 2025-02-03 RX ADMIN — PROPOFOL INJECTABLE EMULSION 30 MG: 10 INJECTION, EMULSION INTRAVENOUS at 08:17

## 2025-02-03 RX ADMIN — FAMOTIDINE 20 MG: 10 INJECTION, SOLUTION INTRAVENOUS at 07:33

## 2025-02-03 RX ADMIN — SODIUM CHLORIDE: 9 INJECTION, SOLUTION INTRAVENOUS at 08:07

## 2025-02-03 RX ADMIN — DEXMEDETOMIDINE HYDROCHLORIDE IN 0.9% SODIUM CHLORIDE 8 MCG: 4 INJECTION INTRAVENOUS at 09:49

## 2025-02-03 RX ADMIN — AMIODARONE HYDROCHLORIDE 200 MG: 200 TABLET ORAL at 20:29

## 2025-02-03 RX ADMIN — HYDROXYZINE HYDROCHLORIDE 10 MG: 10 TABLET ORAL at 22:29

## 2025-02-03 RX ADMIN — FENTANYL CITRATE 50 MCG: 50 INJECTION, SOLUTION INTRAMUSCULAR; INTRAVENOUS at 08:16

## 2025-02-03 RX ADMIN — FENTANYL CITRATE 25 MCG: 50 INJECTION, SOLUTION INTRAMUSCULAR; INTRAVENOUS at 09:49

## 2025-02-03 RX ADMIN — Medication 10 ML: at 20:29

## 2025-02-03 RX ADMIN — HEPARIN SODIUM 8000 UNITS: 1000 INJECTION INTRAVENOUS; SUBCUTANEOUS at 08:59

## 2025-02-03 RX ADMIN — DEXAMETHASONE SODIUM PHOSPHATE 4 MG: 4 INJECTION, SOLUTION INTRAMUSCULAR; INTRAVENOUS at 09:00

## 2025-02-03 RX ADMIN — PROPOFOL INJECTABLE EMULSION 50 MG: 10 INJECTION, EMULSION INTRAVENOUS at 08:18

## 2025-02-03 RX ADMIN — EPHEDRINE SULFATE 10 MG: 50 INJECTION INTRAVENOUS at 09:22

## 2025-02-03 RX ADMIN — SODIUM CHLORIDE 9 ML/HR: 9 INJECTION, SOLUTION INTRAVENOUS at 07:34

## 2025-02-03 RX ADMIN — LIDOCAINE HYDROCHLORIDE 100 MG: 10 INJECTION, SOLUTION EPIDURAL; INFILTRATION; INTRACAUDAL; PERINEURAL at 08:16

## 2025-02-03 RX ADMIN — FENTANYL CITRATE 25 MCG: 50 INJECTION, SOLUTION INTRAMUSCULAR; INTRAVENOUS at 09:54

## 2025-02-03 RX ADMIN — ONDANSETRON 4 MG: 2 INJECTION INTRAMUSCULAR; INTRAVENOUS at 09:00

## 2025-02-03 RX ADMIN — PROPOFOL INJECTABLE EMULSION 100 MG: 10 INJECTION, EMULSION INTRAVENOUS at 08:16

## 2025-02-03 NOTE — PROGRESS NOTES
Sara Esparza       LOS: 14 days   Patient Care Team:  Toribio Roberts MD as PCP - General (Internal Medicine)    Chief Complaint: Left heel ulcer    Subjective     Interval History:     Resting comfortably in bed this morning.  Alert and oriented x 3.  No acute events overnight.  Family present at bedside.    Review of Systems:      Gen- No fevers, chills  CV- No chest pain, palpitations  Resp- No cough, dyspnea  GI- No N/V/D, abd pain    Objective     Vital Signs  Vital Signs (last 24 hours)         01/22 0700  01/23 0659 01/23 0700 01/23 0827   Most Recent      Temp (°F) 97.9 -  98.6      98     98 (36.7) 01/23 0700    Heart Rate 73 -  167       99 01/23 0341    Resp 16 -  24      18     18 01/23 0700    BP 74/54 -  156/71       132/90 01/23 0341    SpO2 (%) 90 -  99       94 01/23 0341    Flow (L/min) (Oxygen Therapy)   3       3 01/23 0600              Physical Exam:     No acute distress.  Nonlabored respirations.  Regular rate and rhythm.  Abdomen nondistended.  Left lower extremity: Dressing present is clean, dry, intact.  VAC with minimal serosanguinous drainage.     Results Review:     I reviewed the patient's new clinical results.    Medication Review:   Hospital Medications (active)         Dose Frequency Start End    acetaminophen (TYLENOL) tablet 650 mg 650 mg Every 6 Hours PRN 1/19/2025 --    Admin Instructions: If given for fever, use fever parameter: fever greater than 100.4 °F  Based on patient request - if ordered for moderate or severe pain, provider allows for administration of a medication prescribed for a lower pain scale.    Do not exceed 4 grams of acetaminophen in a 24 hr period. Max dose of 2gm for AST/ALT greater than 120 units/L.    If given for pain, use the following pain scale:   Mild Pain = Pain Score of 1-3, CPOT 1-2  Moderate Pain = Pain Score of 4-6, CPOT 3-4  Severe Pain = Pain Score of 7-10, CPOT 5-8    Route: Oral    albumin human 25 % IV SOLN  - ADS Override Pull    "1/22/2025 --    Admin Instructions: Created by cabinet override    Notes to Pharmacy: Created by cabinet override    allopurinol (ZYLOPRIM) tablet 200 mg 200 mg Daily 1/19/2025 --    Admin Instructions: ([facility]) Take with food if GI upset occurs.    Route: Oral    amiodarone (PACERONE) tablet 200 mg 200 mg 3 Times Daily 1/23/2025 --    Admin Instructions: Avoid grapefruit juice while taking this medication.    Route: Oral    amiodarone 360 mg in 200 mL D5W infusion 1 mg/min Continuous 1/22/2025 --    Admin Instructions: Central line preferred, if unavailable use large bore IV access with frequent nurse monitoring of IV site.  IV med requiring filtration 0.22 micron inline filter.    Route: Intravenous    apixaban (ELIQUIS) tablet 2.5 mg 2.5 mg Every 12 Hours Scheduled 1/22/2025 --    Admin Instructions: Tablet may be crushed and suspended in 60 mL of water or D5W and immediately delivered via NG tube.  Avoid grapefruit juice.    Route: Oral    atorvastatin (LIPITOR) tablet 10 mg 10 mg Daily 1/22/2025 3/18/2025    Admin Instructions: Avoid grapefruit juice.    Route: Oral    bisacodyl (DULCOLAX) EC tablet 5 mg 5 mg Daily PRN 1/19/2025 --    Admin Instructions: Use if no bowel movement after 12 hours.  Swallow whole. Do not crush, split, or chew tablet.    Route: Oral    Linked Group 1: Placed in \"And\" Linked Group        bisacodyl (DULCOLAX) suppository 10 mg 10 mg Daily PRN 1/19/2025 --    Admin Instructions: Use if no bowel movement after 12 hours.  Hold for diarrhea    Route: Rectal    Linked Group 1: Placed in \"And\" Linked Group        Calcium Replacement - Follow Nurse / BPA Driven Protocol  As Needed 1/19/2025 --    Admin Instructions: Open Order & Select \"[facility] Electrolyte Replacement Protocol Algorithm\" to View Details    Route: Not Applicable    cefepime 1000 mg IVPB in 100 mL NS (MBP) 1,000 mg Every 24 Hours 1/22/2025 1/29/2025    Admin Instructions: Give after dialysis on dialysis days.     Route: Intravenous "    dextrose (D50W) (25 g/50 mL) IV injection 25 g 25 g Every 15 Minutes PRN 1/19/2025 --    Admin Instructions: Blood sugar less than 70; patient has IV access - Unresponsive, NPO or Unable To Safely Swallow    Route: Intravenous    dextrose (GLUTOSE) oral gel 15 g 15 g Every 15 Minutes PRN 1/19/2025 --    Admin Instructions: BS<70, Patient Alert, Is not NPO, Can safely swallow.    Route: Oral    dilTIAZem (CARDIZEM) 125 mg in 125 mL D5W infusion 5-15 mg/hr Continuous 1/22/2025 --    Admin Instructions: For heart rate - To maintain HR less than 120, initiate infusion at 5 mg/hr. Titrate up or down 2.5-5 mg/hr every 15 minutes to use the lowest dose possible. Maximum infusion rate of 15 mg/hr. Hold for SBP less than 100 mmHg or heart rate less than 60. Contact provider if unable to maintain HR less than 120 on maximum dose. Once Heart Rate target achieved obtain vitals a minimum of every 30 minutes. For patients not in critical care areas - obtain vitals a minimum of every 4 hours.   Caution: Look alike/sound alike drug alert.   Refrigerate.    Route: Intravenous    famotidine (PEPCID) tablet 20 mg 20 mg Daily 1/22/2025 --    Route: Oral    glucagon (GLUCAGEN) injection 1 mg 1 mg Every 15 Minutes PRN 1/19/2025 --    Admin Instructions: Blood Glucose Less Than 70 - Patient Without IV Access - Unresponsive, NPO or Unable To Safely Swallow  Reconstitute powder for injection by adding 1 mL of -supplied sterile diluent or sterile water for injection to a vial containing 1 mg of the drug, to provide solutions containing 1 mg/mL. Shake vial gently to dissolve.    Route: Intramuscular    heparin (porcine) injection 2,000 Units 2,000 Units As Needed 1/20/2025 --    Route: Intracatheter    HYDROmorphone (DILAUDID) injection 0.25 mg 0.25 mg 2 Times Daily PRN 1/20/2025 1/25/2025    Admin Instructions: Based on patient request - if ordered for moderate or severe pain, provider allows for administration of a medication  "prescribed for a lower pain scale.  If given for pain, use the following pain scale:  Mild Pain = Pain Score of 1-3, CPOT 1-2  Moderate Pain = Pain Score of 4-6, CPOT 3-4  Severe Pain = Pain Score of 7-10, CPOT 5-8    Route: Intravenous    Insulin Lispro (humaLOG) injection 2-9 Units 2-9 Units 4 Times Daily Before Meals & Nightly 1/19/2025 --    Admin Instructions: Correction Insulin - Moderate Dose (Total Insulin Dose 40-60 units/day, Average Weight Patient, Patient Taking Oral Hypoglycemic)    Blood Glucose 150-199 mg/dL - 2 units  Blood Glucose 200-249 mg/dL - 4 units  Blood Glucose 250-299 mg/dL - 6 units  Blood Glucose 300-349 mg/dL - 7 units  Blood Glucose 350-400 mg/dL - 8 units  Blood Glucose greater than 400 mg/dL - 9 units & Call Provider   Caution: Look alike/sound alike drug alert    Route: Subcutaneous    Magnesium Standard Dose Replacement - Follow Nurse / BPA Driven Protocol  As Needed 1/19/2025 --    Admin Instructions: Open Order & Select \"[facility] Electrolyte Replacement Protocol Algorithm\" to View Details    Route: Not Applicable    melatonin tablet 2.5 mg 2.5 mg Nightly PRN 1/19/2025 --    Route: Oral    metoprolol tartrate (LOPRESSOR) injection 5 mg 5 mg Every 6 Hours PRN 1/22/2025 --    Admin Instructions: Hold for SBP less than 100, DBP less than 60, or heart rate less than 50. If a dose is held, please contact the provider.    Route: Intravenous    metoprolol tartrate (LOPRESSOR) tablet 25 mg 25 mg Every 12 Hours Scheduled 1/23/2025 --    Route: Oral    metroNIDAZOLE (FLAGYL) IVPB 500 mg 500 mg Every 8 Hours 1/21/2025 1/26/2025    Admin Instructions: Caution: Look alike/sound alike drug alert.  Do not refrigerate.    Route: Intravenous    NIFEdipine XL (PROCARDIA XL) 24 hr tablet 60 mg 60 mg Every 24 Hours Scheduled 1/19/2025 --    Admin Instructions: Hold for SBP less than 100, DBP less than 60.  Caution: Look alike/sound alike drug alert. Avoid grapefruit juice. Swallow whole. Do not crush, " "split or chew.    Route: Oral    ondansetron (ZOFRAN) injection 4 mg 4 mg Every 6 Hours PRN 1/19/2025 --    Admin Instructions: If BOTH ondansetron (ZOFRAN) and promethazine (PHENERGAN) are ordered use ondansetron first and THEN promethazine IF ondansetron is ineffective.    Route: Intravenous    pantoprazole (PROTONIX) EC tablet 40 mg 40 mg Every Early Morning 1/22/2025 --    Admin Instructions: Swallow whole; do not crush, split, or chew.    Route: Oral    Pharmacy to dose vancomycin  Continuous PRN 1/21/2025 1/28/2025    Route: Not Applicable    polyethylene glycol (MIRALAX) packet 17 g 17 g Daily PRN 1/19/2025 --    Admin Instructions: Use if no bowel movement after 12 hours. Mix in 6-8 ounces of water.  Use 4-8 ounces of water, tea, or juice for each 17 gram dose.    Route: Oral    Linked Group 1: Placed in \"And\" Linked Group        Potassium Replacement - Follow Nurse / BPA Driven Protocol  As Needed 1/19/2025 --    Admin Instructions: Open Order & Select \"[facility] Electrolyte Replacement Protocol Algorithm\" to View Details    Route: Not Applicable    sennosides-docusate (PERICOLACE) 8.6-50 MG per tablet 2 tablet 2 tablet 2 Times Daily PRN 1/19/2025 --    Admin Instructions: Start bowel management regimen if patient has not had a bowel movement after 12 hours.    Route: Oral    Linked Group 1: Placed in \"And\" Linked Group        sodium chloride 0.9 % flush 10 mL 10 mL As Needed 1/19/2025 --    Route: Intravenous    Linked Group 2: Placed in \"And\" Linked Group        sodium chloride 0.9 % flush 10 mL 10 mL Every 12 Hours Scheduled 1/19/2025 --    Route: Intravenous    sodium chloride 0.9 % flush 10 mL 10 mL As Needed 1/19/2025 --    Route: Intravenous    sodium chloride 0.9 % infusion 40 mL 40 mL As Needed 1/19/2025 --    Admin Instructions: Following administration of an IV intermittent medication, flush line with 40mL NS at 100mL/hr.    Route: Intravenous    traMADol (ULTRAM) tablet 50 mg 50 mg Every 12 Hours PRN " 1/20/2025 1/25/2025    Admin Instructions: Based on patient request - if ordered for moderate or severe pain, provider allows for administration of a medication prescribed for a lower pain scale.      Caution: Look alike/sound alike drug alert    If given for pain, use the following pain scale:  Mild Pain = Pain Score of 1-3, CPOT 1-2  Moderate Pain = Pain Score of 4-6, CPOT 3-4  Severe Pain = Pain Score of 7-10, CPOT 5-8    Route: Oral    vancomycin (dosing per levels)  Daily 1/21/2025 1/28/2025    Admin Instructions: Pharmacy is dosing vancomycin per levels    Route: Not Applicable              Assessment & Plan     74-year-old female with multiple medical comorbidities, left heel wound, calcaneal osteomyelitis status post debridement, irrigation, wound vacuum assisted closure 1/24/25.      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  She is scheduled to undergo vascular intervention on Monday.  I had a discussion with she and her  again today, she has decided she wishes to proceed with below knee amputation.  Would tentatively plan for BKA on Tuesday following vascular intervention Monday.    Rom Gonzalez Jr, MD  02/02/25  21:12 EST

## 2025-02-03 NOTE — PLAN OF CARE
Problem: Hemodialysis  Goal: Safe, Effective Therapy Delivery  Outcome: Progressing  Goal: Effective Tissue Perfusion  Outcome: Progressing  Goal: Absence of Infection Signs and Symptoms  Outcome: Progressing   Goal Outcome Evaluation:            HD complete, blood reinfused, report given to primary RN Elsie        Fluid removal: 2L

## 2025-02-03 NOTE — NURSING NOTE
Called and spoke with Dialysis Nurse to advise pt is in pacu, stable and has been waiting for room assignment. They advised they would come and transport pt for dialysis.  Pts  was notified.

## 2025-02-03 NOTE — ANESTHESIA PREPROCEDURE EVALUATION
Anesthesia Evaluation     Patient summary reviewed and Nursing notes reviewed   NPO Solid Status: > 8 hours  NPO Liquid Status: > 8 hours           Airway   Mallampati: II  TM distance: >3 FB  Neck ROM: full  No difficulty expected  Dental - normal exam     Pulmonary    (+) ,decreased breath sounds  Cardiovascular   Exercise tolerance: poor (<4 METS)    Rhythm: regular  Rate: normal        Neuro/Psych  GI/Hepatic/Renal/Endo      Musculoskeletal     Abdominal    Substance History      OB/GYN          Other        ROS/Med Hx Other: Pt seems very immobile  Seems unable to move lower limbs              Anesthesia Plan    ASA 3     general     intravenous induction     Anesthetic plan, risks, benefits, and alternatives have been provided, discussed and informed consent has been obtained with: patient.    CODE STATUS:    Level Of Support Discussed With: Patient  Code Status (Patient has no pulse and is not breathing): CPR (Attempt to Resuscitate)  Medical Interventions (Patient has pulse or is breathing): Full Support

## 2025-02-03 NOTE — PROGRESS NOTES
[facility] Progress Note    Admission Date: 1/19/2025    Sara Esparza  1950  [id number]    Date: 2/3/2025    Antibiotics:  Anti-Infectives (From admission, onward)      Ordered     Dose/Rate Route Frequency Start Stop    02/03/25 0720  ceFAZolin 2000 mg IVPB in 100 mL NS (MBP)        Ordering Provider: Kyle Ramirez MD    2,000 mg  over 30 Minutes Intravenous Once 02/03/25 0722      02/01/25 1332  vancomycin (dosing per levels)        Ordering Provider: Ricardo Chappell, PharmD     Not Applicable Daily 02/01/25 1415 03/07/25 0859    01/29/25 1059  vancomycin (VANCOCIN) 1,000 mg in sodium chloride 0.9 % 250 mL IVPB-VTB  Status:  Discontinued        Ordering Provider: Ricardo Chapepll, PharmD    1,000 mg  250 mL/hr over 60 Minutes Intravenous Once per day on Monday Wednesday Friday 01/29/25 1600 02/01/25 1332    01/27/25 1850  cefTRIAXone (ROCEPHIN) 2,000 mg in sodium chloride 0.9 % 100 mL MBP  Status:  Discontinued        Ordering Provider: Camden Ashford RPH    2,000 mg  200 mL/hr over 30 Minutes Intravenous Every 24 Hours 01/27/25 2000 02/02/25 1502    01/27/25 0920  vancomycin (VANCOCIN) 1,000 mg in sodium chloride 0.9 % 250 mL IVPB-VTB        Ordering Provider: Ricardo Chappell, PharmD    1,000 mg  250 mL/hr over 60 Minutes Intravenous Once 01/27/25 1600 01/27/25 1857    01/27/25 0949  Pharmacy to dose vancomycin        Ordering Provider: Bailee Barrios MD     Not Applicable Continuous PRN 01/27/25 0948 02/10/25 0947    01/27/25 0920  vancomycin (dosing per levels)  Status:  Discontinued        Ordering Provider: Ricardo Chappell, PharmD     Not Applicable Daily 01/27/25 0920 01/29/25 1059    01/24/25 1230  vancomycin (VANCOCIN) 1,000 mg in sodium chloride 0.9 % 250 mL IVPB-VTB        Ordering Provider: Edmund Erwin, PharmD    1,000 mg  250 mL/hr over 60 Minutes Intravenous Once 01/24/25 1600 01/24/25 1723    01/22/25 1230  vancomycin 750 mg in sodium chloride 0.9 % 250 mL IVPB-VTB        Ordering  Provider: Edmund Erwin, Amrita    750 mg  333.3 mL/hr over 45 Minutes Intravenous Once 01/22/25 1400 01/22/25 1810    01/21/25 0519  cefepime 1000 mg IVPB in 100 mL NS (MBP)  Status:  Discontinued        Ordering Provider: Rom Gonzalez Jr., MD    1,000 mg  over 4 Hours Intravenous Every 24 Hours 01/22/25 0600 01/27/25 1851    01/21/25 0537  vancomycin (dosing per levels)  Status:  Discontinued        Ordering Provider: Rom Gonzalez Jr., MD     Not Applicable Daily 01/21/25 0900 01/27/25 1320    01/21/25 0519  cefepime 1000 mg IVPB in 100 mL NS (MBP)        Ordering Provider: Conrad Alvarez MD    1,000 mg  over 30 Minutes Intravenous Once 01/21/25 0615 01/21/25 0715    01/21/25 0505  metroNIDAZOLE (FLAGYL) IVPB 500 mg        Ordering Provider: Rom Gonzalez Jr., MD    500 mg  200 mL/hr over 30 Minutes Intravenous Every 8 Hours 01/21/25 0600 01/26/25 0759    01/21/25 0513  vancomycin 2250 mg/500 mL 0.9% NS IVPB ([facility])        Ordering Provider: Woo Wells, PharmD    20 mg/kg × 108 kg  over 135 Minutes Intravenous Once 01/21/25 0600 01/21/25 0950    01/21/25 0505  Pharmacy to dose vancomycin  Status:  Discontinued        Ordering Provider: Rom Gonzalez Jr., MD     Not Applicable Continuous PRN 01/21/25 0500 01/27/25 1318    01/20/25 0738  cefTRIAXone (ROCEPHIN) 1,000 mg in sodium chloride 0.9 % 100 mL MBP  Status:  Discontinued        Ordering Provider: Leslye Fiore MD    1,000 mg  200 mL/hr over 30 Minutes Intravenous Every 24 Hours 01/20/25 0900 01/21/25 0519            Reason for Consultation: Sepsis and osteomyelitis of the left calcaneus     History of present illness:    1/22/25: Patient is a 74 y.o. female with extensive comorbidity including diabetes mellitus, mild CAD, pulmonary embolism, bilateral TKA, and CKD 5 for which she has been on dialysis since 9/2024 who was admitted through the ED on 1/19 for lightheadedness and progressive weakness in addition to multiple complaints  including progressive neck pain over weeks to months.  Evaluation in the ED included WBC 14.3 and PCT 5.9.  Subsequent lactic acid was 4.1 on 1/21.  After admission she became febrile to 101.9.  She was started on ceftriaxone and vancomycin.  Blood culture was sent yesterday afternoon.  Urinalysis showed pyuria.  Urine culture showed mixed hanny in the lab discarded the culture.  She was noted to have a left calcaneus wound.  CT scan of the chest, abdomen and pelvis were unremarkable for acute process.  CT scan of the left lower extremity showed a wound at the plantar aspect of the left calcaneus with subcutaneous and intraosseous air and destructive changes .  CT scan of the neck showed advanced C5-6 and C6-7 degenerative disc disease; suspected moderate or greater C5-6 canal stenosis due to posterior vertebral osteophytes.  Wound culture of the left heel is growing MRSA and a gram-negative ange.  At the time of my exam the patient is nonverbal other than moaning.  She does not follow commands.  Her  is at the bedside and is able to provide medical history although he is a little uncertain about timing of events such as when she started dialysis.      She had a dialysis catheter placed 9/13 which was replaced on 10/11.    1/23/2025.  Afebrile x 2 days.  She is considerably more alert today.  She remains on IV amiodarone for atrial fibrillation with RVR.  She complains of pain but does not localize it to any specific site.  Wound culture with MRSA and Morganella.  Blood culture negative.    1/24/2025.  Afebrile.  She is seen on dialysis today.  She she is alert and mildly confused but understands that she is scheduled to undergo surgery today.  In discussion with her  in the patient's room he recounted that it was explained to her that she was at high risk for limb loss based on the extent of osteomyelitis involving her calcaneus.  MRI was attempted yesterday but aborted because of pain.    1/25/2025.   Afebrile.  Alert. Uncomfortable.  states that she is discouraged because she was found to have extensive osteo at surgery yesterday.  He states that he does not see how we will be possible for her to go home.    1/26/25 Afebrile, appears more lucid. Appears more comfortable although she is scratching torso and upper extremities; the  states this is a chronic problem  MRSA from op cultures  1/27/25 Afebrile. Seen on HD. No new c/o  Note plans for arteriogram  MRSA from op cultures    1/28/25  Afebrile, alert but seems unable to remember that she is scheduled for vascular evaluation next week. I reminded her that Dr Gonzalez is very concerned Re: ability to salvage her left leg and she acknowledged this. Her  at the bedside appears to understand all of these issues.     1/29/2025:  She remains afebrile.  Left foot culture from 1/24 is growing MRSA.  Vancomycin random level is 21.2.  White blood cell count yesterday was 9.6.   CT scan of 1/21 revealed a draining wound along the plantar aspect of the left calcaneus with subcutaneous air and destructive changes and intraosseous air along the plantar aspect of the calcaneus consistent with osteomyelitis.  She is scheduled for left lower extremity angiogram on 2/3/2025.  Photo of her wound from 1/28 reveals exposed bone.  She complains of neck/back pain.      1/30/2025: She remains afebrile.  Random vancomycin level yesterday was 21.2.  She denies uncontrolled pain.  She denies nausea and vomiting.   She is more willing to consider amputation    1/31/25: She has remained afebrile.  She is scheduled to undergo vascular intervention on Monday.   Anaerobic culture from 1/24 grew cutibacterium acnes.  I examined her with Cody Mehta and PT wound care today.  She denies uncontrolled pain.  She denies nausea and vomiting    2/1/2025:  She remains afebrile.  White blood cell count yesterday was 11.6.  She has now decided she will proceed with a left BKA.   She denies nausea, vomiting, and diarrhea.    2/2/25:  She remains afebrile.  Random vancomycin level yesterday was 30.7.  She denies nausea and vomiting.    2/3/2025: She is scheduled for vascular intervention today and BKA on 2/4.  She remains afebrile.  White blood cell count is 0.1.  Creatinine is 5.6.   Vancomycin random level is 26.   I saw her in the recovery room today.v    PE:  Vital Signs  Temp  Min: 97.7 °F (36.5 °C)  Max: 98.8 °F (37.1 °C)  BP  Min: 134/54  Max: 170/69  Pulse  Min: 58  Max: 73  Resp  Min: 16  Max: 18  SpO2  Min: 90 %  Max: 97 %    GENERAL:   Drowsy but arousable.  In no acute distress  HEENT: Normocephalic, atraumatic.   No conjunctival injection. No icterus.  No oral labial lesions   NECK: No evidence of meningismus  HEART: No murmur, rubs, gallops.   LUNGS: Clear anteriorly, no wheezes or rhonchi appreciated  ABDOMEN: Abdomen bloated but soft.  No evidence of tenderness elicited with deep pressure throughout the abdomen.   :  Without Corral catheter.  MSK:  Left heel has a wound VAC in place  SKIN: Warm and dry without cutaneous eruptions on Inspection/palpation.    NEURO:  Drowsy but arousable.  She is asking appropriate questions.  She moves all of her extremities.       Laboratory Data    Results from last 7 days   Lab Units 02/03/25  0347 02/02/25  1253 01/31/25  0726   WBC 10*3/mm3 8.05 7.90 11.55*   HEMOGLOBIN g/dL 9.9* 9.8* 10.1*   HEMATOCRIT % 32.0* 31.4* 32.7*   PLATELETS 10*3/mm3 259 246 271     Results from last 7 days   Lab Units 02/03/25  0347   SODIUM mmol/L 130*   POTASSIUM mmol/L 5.3*   CHLORIDE mmol/L 95*   CO2 mmol/L 22.0   BUN mg/dL 45*   CREATININE mg/dL 5.56*   GLUCOSE mg/dL 109*   CALCIUM mg/dL 9.2                     Estimated Creatinine Clearance: 11.1 mL/min (A) (by C-G formula based on SCr of 5.56 mg/dL (H)).      Microbiology:  MRSA and Morganella from foot wound    Radiology:  Imaging Results (Last 72 Hours)       ** No results found for the last 72 hours.  **            I read her CT scan images of 1/21.      Impression:   1.  Left calcaneal osteomyelitis-with MRSA/Morganella on superficial cultures and MRSA and deep cultures.  MRSA is the likely pathogen.  This is not a salvageable foot.  I will leave her on the vancomycin for the time being.  2.  Sepsis-improved.   3.  Pyuria-with mixed hanny on culture  4.  End-stage renal disease-on hemodialysis  5.  Type 2 diabetes mellitus  6.  Paroxysmal atrial fibrillation  7.  Toxic/metabolic encephalopathy-improved   8.  Leukocytosis/neutrophilia-improved  9.  Peripheral vascular disease-scheduled for vascular intervention on 2/3  10.  Cervical spinal stenosis  11.  Peripheral vascular disease-she is scheduled for vascular intervention on 2/3       PLAN/RECOMMENDATIONS:  -- Continue vancomycin   -- Continue wound care  -- Proceed with a left BKA  -- Vascular intervention 2/3    This visit included the following complex service elements:  Complex medical decision-making associated with antimicrobial prescribing.  In-depth chart review with high level synthesis for complex diagnoses.  Managed infection treatment protocol associated with transitions of care for this complex patient.    Teja Blue MD  2/3/2025

## 2025-02-03 NOTE — PERIOPERATIVE NURSING NOTE
"Upon arrival to the pre op area, patient was drowsy and uncooperative with staff attempts to obtain vital signs. Patient also refused to answer questions asked by staff. Patients  brought back to pre op area for assistance. Once  arrived, patient would speak to him but continued to ignore staff requests/questions.  discussed procedure plan with patient who verbally consented to procedure.  signed consent form due to patient refusing to do so, stating \"I don't feel like writing on that paper.\"  "

## 2025-02-03 NOTE — ANESTHESIA POSTPROCEDURE EVALUATION
Patient: Sara Esparza    Procedure Summary       Date: 02/03/25 Room / Location:  [facility] /  [facility]    Anesthesia Start: 0807 Anesthesia Stop: 1013    Procedure: RIGHT CFA access - ultrasound guided Aortogram with LEFT lower extremity run-off LEFT AT angioplasty (2.5x12mm Shockwave, [id number] Nanocross) LEFT AT intravascular lithotripsy (2.5x12mm Shockwave) RIGHT CFA closure (Angioseal) (Left: Abdomen) Diagnosis:       Critical limb ischemia of left lower extremity      (Critical limb ischemia of left lower extremity [I70.222])    Surgeons: Kyle Ramirez MD Provider: Marito Warner MD    Anesthesia Type: general ASA Status: 3            Anesthesia Type: general    Vitals  No vitals data found for the desired time range.          Post Anesthesia Care and Evaluation    Patient location during evaluation: PACU  Patient participation: complete - patient participated  Level of consciousness: sleepy but conscious and responsive to verbal stimuli  Pain score: 0  Pain management: adequate    Airway patency: patent  Anesthetic complications: No anesthetic complications  PONV Status: none  Cardiovascular status: hemodynamically stable and acceptable  Respiratory status: nonlabored ventilation, acceptable, nasal cannula and spontaneous ventilation  Hydration status: acceptable

## 2025-02-03 NOTE — PROGRESS NOTES
"    [facility] Medicine Services  PROGRESS NOTE    Patient Name: Sara Esparza  : 1950  MRN: [id number]    Date of Admission: 2025  Primary Care Physician: Toribio Roberts MD    Subjective   Subjective     CC:  F/U weakness, osteomyelitis    HPI:  Seen resting back in bed on HD.  No acute distress.  Just finished having her angiogram and went directly to dialysis. Awake and alert.  Mildly confused, mostly at baseline.  Denies pain.  Able to say she just had an angiogram in the ER but also states she is \"mad as hell\" because \"they did surgery on the wrong pt\".  Assured her we talked yest and her  was involved.  Right pt and right procedure.  Then she says \"oh, okay\".       Objective   Objective     Vital Signs:   Temp:  [97 °F (36.1 °C)-98.8 °F (37.1 °C)] 97.7 °F (36.5 °C)  Heart Rate:  [61-77] 75  Resp:  [16-21] 20  BP: (111-170)/(49-80) 167/80  Flow (L/min) (Oxygen Therapy):  [0-3] 0  FiO2 (%):  [94 %-100 %] 94 %     Physical Exam:  Constitutional: No acute distress, awake, alert. Resting back in bed on HD.  Just out of procedure/angiogram.  Chronically ill/debilitated elderly female.   HENT: NCAT, mucous membranes moist  Respiratory: Clear to auscultation bilaterally but decreased at bs, respiratory effort normal on 2LNC.   Cardiovascular: RRR, no murmurs, rubs, or gallops  Gastrointestinal: Positive bowel sounds, soft, nontender, nondistended. Obese.   Musculoskeletal: No bilateral ankle edema. CANSECO spont.  Lt foot with wound vac and drsg in place.    Psychiatric: flat affect, cooperative  Neurologic: Oriented to person, [facility], [place]. Mildly confused to current events.  Nonfocal. Forgetful to year, strength symmetric in all extremities but generally weak, Cranial Nerves grossly intact to confrontation, speech clear. Follows commands   Skin: No rashes. Rt Upper chest wall THDC with drsg c/d/I and HD running. Rt groin drsg c/d/I       Results Reviewed:  LAB " RESULTS:      Lab 02/03/25  0347 02/02/25  1253 01/31/25  0726 01/28/25  0941   WBC 8.05 7.90 11.55* 9.57   HEMOGLOBIN 9.9* 9.8* 10.1* 9.8*   HEMATOCRIT 32.0* 31.4* 32.7* 31.3*   PLATELETS 259 246 271 244   NEUTROS ABS  --   --   --  7.99*   IMMATURE GRANS (ABS)  --   --   --  0.13*   LYMPHS ABS  --   --   --  0.49*   MONOS ABS  --   --   --  0.94*   EOS ABS  --   --   --  0.01   .6* 100.6* 101.2* 100.0*   PROTIME 18.1*  --   --   --          Lab 02/03/25 0347 02/02/25  1253 02/01/25  1025 01/31/25  0726 01/28/25  0941   SODIUM 130* 132* 130* 131* 138   POTASSIUM 5.3* 5.2 4.4 4.3 4.0   CHLORIDE 95* 98 96* 97* 99   CO2 22.0 21.0* 25.0 25.0 27.0   ANION GAP 13.0 13.0 9.0 9.0 12.0   BUN 45* 38* 29* 36* 27*   CREATININE 5.56* 5.05* 3.84* 4.39* 3.45*   EGFR 7.5* 8.4* 11.7* 10.0* 13.3*   GLUCOSE 109* 132* 152* 132* 201*   CALCIUM 9.2 9.2 9.1 9.4 9.2   PHOSPHORUS  --   --  4.3  --   --          Lab 02/01/25  1025   ALBUMIN 2.5*           Lab 02/03/25 0347   PROTIME 18.1*   INR 1.48*                   Brief Urine Lab Results  (Last result in the past 365 days)        Color   Clarity   Blood   Leuk Est   Nitrite   Protein   CREAT   Urine HCG        01/19/25 1404 Yellow   Cloudy   Moderate (2+)   Moderate (2+)   Negative   >=300 mg/dL (3+)                   Microbiology Results Abnormal       Procedure Component Value - Date/Time    Anaerobic Culture - Surgical Site, Foot, Left [[id number]]  (Abnormal) Collected: 01/24/25 4123    Lab Status: Final result Specimen: Surgical Site from Foot, Left Updated: 01/30/25 1515     Anaerobic Culture Cutibacterium acnes    Body Fluid Culture - Surgical Site, Foot, Left [[id number]]  (Abnormal)  (Susceptibility) Collected: 01/24/25 1751    Lab Status: Final result Specimen: Surgical Site from Foot, Left Updated: 01/27/25 0642     Body Fluid Culture Light growth (2+) Staphylococcus aureus, MRSA     Comment:   Methicillin resistant Staphylococcus aureus, Patient may be an isolation  risk.        Gram Stain Rare (1+) WBCs seen      No organisms seen    Narrative:      Not a body fluid.    Susceptibility        Staphylococcus aureus, MRSA      TAYLOR      Clindamycin Susceptible      Erythromycin Resistant      Oxacillin Resistant      Rifampin Susceptible      Tetracycline Susceptible      Trimethoprim + Sulfamethoxazole Susceptible      Vancomycin Susceptible                           Wound Culture - Wound, Foot, Left [[id number]]  (Abnormal)  (Susceptibility) Collected: 01/20/25 1406    Lab Status: Final result Specimen: Wound from Foot, Left Updated: 01/24/25 0682     Wound Culture Scant growth (1+) Staphylococcus aureus, MRSA     Comment: Methicillin resistant Staphylococcus aureus, Patient may be an isolation risk.         Rare growth Morganella morganii ssp morganii     Comment:            Scant growth (1+) Normal Skin Kimber     Gram Stain Few (2+) WBCs seen      Many (4+) Gram positive cocci in pairs      Rare (1+) Gram negative bacilli      Rare (1+) Gram positive bacilli    Susceptibility        Staphylococcus aureus, MRSA      TAYLOR      Clindamycin Susceptible      Erythromycin Resistant      Oxacillin Resistant      Rifampin Susceptible      Tetracycline Susceptible      Trimethoprim + Sulfamethoxazole Susceptible      Vancomycin Susceptible                       Susceptibility        Morganella morganii ssp morganii      TAYLOR      Amoxicillin + Clavulanate Resistant      Ampicillin Resistant      Ampicillin + Sulbactam Resistant      Cefazolin (Non Urine) Resistant      Cefepime Susceptible  [1]       Cefotaxime Susceptible      Ceftazidime Susceptible  [1]       Gentamicin Susceptible      Levofloxacin Susceptible      Piperacillin + Tazobactam Susceptible      Tetracycline Susceptible      Trimethoprim + Sulfamethoxazole Susceptible                   [1]  Appended report. These results have been appended to a previously final verified report.                Susceptibility Comments        Morganella morganii ssp morganii    Cefotaxime susceptibility can be used as a surrogate for ceftriaxone susceptibility               MRSA Screen, PCR (Inpatient) - Swab, Nares [[id number]]  (Abnormal) Collected: 01/21/25 0614    Lab Status: Final result Specimen: Swab from Nares Updated: 01/21/25 0847     MRSA PCR Positive    Narrative:      The negative predictive value of this diagnostic test is high and should only be used to consider de-escalating anti-MRSA therapy. A positive result may indicate colonization with MRSA and must be correlated clinically.            No radiology results from the last 24 hrs    Results for orders placed during the hospital encounter of 01/19/25    Adult Transthoracic Echo Complete W/ Cont if Necessary Per Protocol    Interpretation Summary    Left ventricular systolic function is normal. Estimated left ventricular EF = 55%    Left ventricular wall thickness is consistent with moderate concentric hypertrophy.    No hemodynamically significant valvular heart disease      Current medications:  Scheduled Meds:albumin human, , ,   allopurinol, 200 mg, Oral, Daily  amiodarone, 200 mg, Oral, Q12H  atorvastatin, 10 mg, Oral, Daily  epoetin gracia/gracia-epbx, 6,000 Units, Subcutaneous, Once per day on Monday Wednesday Friday  famotidine, 20 mg, Oral, Daily  insulin lispro, 2-9 Units, Subcutaneous, 4x Daily AC & at Bedtime  Lidocaine, 2 patch, Transdermal, Q24H  NIFEdipine XL, 60 mg, Oral, Q24H  pantoprazole, 40 mg, Oral, Q AM  sodium chloride, 10 mL, Intravenous, Q12H  sodium chloride, 3 mL, Intravenous, Q12H  vancomycin (dosing per levels), , Not Applicable, Daily      Continuous Infusions:lactated ringers, 9 mL/hr  Pharmacy to dose vancomycin,       PRN Meds:.  acetaminophen    albumin human    albumin human    albumin human    senna-docusate sodium **AND** polyethylene glycol **AND** bisacodyl **AND** bisacodyl    Calcium Replacement - Follow Nurse / BPA Driven Protocol    dextrose     dextrose    fentanyl    glucagon (human recombinant)    heparin (porcine)    hydrALAZINE    HYDROcodone-acetaminophen    HYDROmorphone    ipratropium-albuterol    labetalol    lactated ringers    Magnesium Standard Dose Replacement - Follow Nurse / BPA Driven Protocol    melatonin    metoprolol tartrate    naloxone    ondansetron    ondansetron    oxyCODONE    oxyCODONE-acetaminophen    Pharmacy to dose vancomycin    Potassium Replacement - Follow Nurse / BPA Driven Protocol    promethazine **OR** promethazine    [COMPLETED] Insert Peripheral IV **AND** sodium chloride    sodium chloride    sodium chloride    sodium chloride    sodium chloride    traMADol    Assessment & Plan   Assessment & Plan     Active Hospital Problems    Diagnosis  POA    **Generalized weakness [R53.1]  Yes    Paroxysmal A-fib [I48.0]  Unknown    Sepsis due to methicillin resistant Staphylococcus aureus (MRSA) with encephalopathy without septic shock [A41.02, R65.20, G93.41]  Unknown    Chronic osteomyelitis of left foot [M86.672]  Unknown    Critical limb ischemia of left lower extremity [I70.222]  Unknown    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        Brief Hospital Course to date:  Sara Esparza is a 74 y.o. female with hx of ESRD on HD, PAF on Eliquis, HTN, and DM admitted for several weeks of generalized weakness, myalgias, and multiple falls in setting of lightheadedness.   After admission she was found to be septic with temps up to 102.4. Further workup revealed left calcaneal osteomyelitis. S/P debridement and wound vac placement 1/24/25. Vascular Surgery planning for LLE angiogram 2/3/25. Infectious Disease following for antibiotic management.     This patient's problems and plans were partially entered by my  partner and updated as appropriate by me 02/03/25.     Assessment/Plan:      Sepsis, fever, leukocytosis, confusion  Lt calcaneal osteomyelitis   Left posterior tibial artery occlusion   - CT LLE without contrast 1/21/25: draining wound seen along plantar aspect of left calcaneus with surrounding changes consistent with osteomyelitis  - Ortho consulted - s/p debridement and wound vac placement 1/24/25 by Dr. Gonzalez. May ultimately require amputation, patient aware - 2/1/25 she is now agreeable to proceed with amputation to have the best chance at quality of life.   - s/p LLE angiogram today 2/3/25 by Dr. Ramirez/Vascular Surgery,   - MRSA and Morganella in cultures from wound site, ID following, continue Vanc and Rocephin for now  - Leukocytosis and fevers resolved  - PT/OT - re-consulted 2/1/25  --await vas sx/ortho decision/plans re further fx     Generalized weakness, progressive x weeks  Generalized debility   Falls at home   - Suspicion of overdiuresis and orthostasis ongoing, with superimposed sepsis/infection now   - CT chest/abd/pelvis without acute findings   - Normal TSH and CK   - PT/OT following, patient/ considering SNF. CM following.      Neck pain, C7 region   C5-6 canal stenosis  Chronic back pain s/p spinal stimulator  - CT C-spine - no fractures  - History of oxycodone dependence; family initially requested avoiding opiates however due to uncontrolled pain, initiated low dose oxycodone here which she has tolerated well thus far; also has PRN Tramadol in place  - IV Dilaudid discontinued, but may need to restart at some point if further surgeries planned  - prior added Lidocaine patches 1/31/25  - Recommended to patient/ to reach out to her pain management doctor's office as they have questions regarding her spinal stimulator     ESRD on HD  - Has a tunneled right IJ dialysis catheter  - Nephrology following  - HD on MWF. On HD today.    - monitor labs      DMII, HbA1c 4.9  - On  sitagliptin at home  - SSI here, adjust as needed. Glucoses okay.       HTN   - Nifedipine dose decreased due to tenuous BP's, now improved, increased to 60 mg daily on 2/1/25  --stable,  monitor and adjust as needed.      Hx of Afib  Episode of Afib with RVR during HD  - Resumed home dose beta blocker (Coreg), but still was not rate controlled - Cardiology consulted, started on Amiodarone and changed to Metoprolol BID. Rate now controlled and occasionally running low - Metoprolol discontinued per Cardiology 1/31/25  - Resumed Eliquis 1/28/25, then held for angiogram 2/3/25, partner prior discussed with pharmacist who will coordinate this   --possible amputation soon, pending Vas sx/ortho     Elevated troponin  - In setting of ESRD  - Troponin peaked at 132 on 1/19/25  - Per chart review, had LHC recently at [facility] with non-obstructive CAD     Dyspepsia  - GI cocktail PRN     Constipation  - Likely due to opiates  - Improved with bowel regimen and addition of Lactulose        Expected Discharge Location and Transportation: likely to rehab once medically stable and post op   Expected Discharge   Expected Discharge Date: 2/6/2025; Expected Discharge Time:      VTE Prophylaxis:  Pharmacologic & mechanical VTE prophylaxis orders are present.         AM-PAC 6 Clicks Score (PT): 9 (02/02/25 2000)    CODE STATUS:   Code Status and Medical Interventions: CPR (Attempt to Resuscitate); Full Support   Ordered at: 01/19/25 0518     Level Of Support Discussed With:    Patient     Code Status (Patient has no pulse and is not breathing):    CPR (Attempt to Resuscitate)     Medical Interventions (Patient has pulse or is breathing):    Full Support       Imelda Roy, APRN  02/03/25

## 2025-02-03 NOTE — ANESTHESIA PROCEDURE NOTES
Airway  Urgency: elective    Date/Time: 2/3/2025 8:19 AM  Airway not difficult    General Information and Staff    Patient location during procedure: OR  CRNA/CAA: Elvia Juarez CRNA    Indications and Patient Condition  Indications for airway management: airway protection    Preoxygenated: yes  Mask difficulty assessment: 1 - vent by mask    Final Airway Details  Final airway type: supraglottic airway      Successful airway: I-gel  Size 4     Number of attempts at approach: 1  Assessment: lips, teeth, and gum same as pre-op    Additional Comments  LMA placed without difficulty, ventilation with assist, equal breath sounds and symmetric chest rise and fall

## 2025-02-03 NOTE — OP NOTE
AORTAGRAM WITH OR WITHOUT RUNOFFS POSSIBLE STENT  Procedure Report    Patient Name:  Sara Esparza  YOB: 1950    Date of Surgery:  2/3/2025     Indications:  74 year old  lady, a patient of Dr. Toribio Roberts, who was admitted 1/19/25 with generalized weakness and identified to have LEFT heel osteomyelitis who underwent heel debridement with wound vac 1/24/25.  Her 1/22/25 arterial duplex suggested tibial level disease.  She presents electively for arteriogram with LEFT intervention and all indicated procedures.    Pre-op Diagnosis:   Critical limb ischemia of left lower extremity [I70.222]       Post-Op Diagnosis Codes:     * Critical limb ischemia of left lower extremity [I70.222]     * LEFT heel osteomyelitis     * LEFT PT occlusion - distal, reconstitution distally via terminal Pr branches     * LEFT AT stenosis - 80% proximal     * LEFT DP occlusion - distal     * LEFT pedal arch stenosis - Kawarada IIb    Procedure/CPT® Codes:  No CPT Code Applied in Case Entry    Procedure(s):  RIGHT CFA access - ultrasound guided  Aortogram with LEFT lower extremity run-off  LEFT AT angioplasty (2.5x12mm Shockwave, 2.5-0d757ee Nanocross)  LEFT AT intravascular lithotripsy (2.5x12mm Shockwave)  RIGHT CFA closure (Angioseal)    Staff:  Surgeon(s):  Kyle Ramirez MD    Circulator: Sudarshan Crain RN  Scrub Person: Ilana Rodriguez; Rom Isaac                 Anesthesia: Monitored Anesthesia Care    Estimated Blood Loss: minimal    Implants:    Nothing was implanted during the procedure    Specimen:          None    Findings: With the catheter in the distal abdominal aorta, flow was seen BILATERALLY into the iliac arteries.     On the RIGHT, the common (HASMUKH) iliac artery is patent and continues to the iliac bifurcation with visualization of the internal (IIA) and external (EIA) iliac arteries.  Beyond this point, the vasculature was not specifically interrogated.     On the LEFT, the HASMUKH  is patent and continues to the iliac bifurcation with visualization of the IIA and EIA arteries.  Flow continues into the common (CFA) femoral artery which continues to the femoral bifurcation with visualization of both the profunda (PFA) and superficial (SFA) femoral arteries which are widely patent.  Flow continues into the above-knee popliteal (AKP) which is patent and continues past the knee into the below-knee popliteal (BKP) with visualization of the trifurcation proximally.  The anterior tibial (AT) is patent at the origin and within the proximal aspect, there is a 80% stenosis with a normal caliber mid leg vessel seen.  The tibioperoneal trunk (TPT) continues into the posterior tibial (PT) and peroneal (Pr) arteries proximally with the Pr as the dominant run-off of the leg.  The proximal PT is diminutive and occludes in the mid-distal leg.  Terminal branches from the Pr reconstitutes the distal PT which continues into the foot as the plantar.  The AT continues into the foot as the dorsalis pedis (DP) which is atretic and occludes in the mid foot with several collaterals seen within the heel and mid-foot region.  The pedal arch is only partially visualized (Kawarada IIb)    Complications: None    Description of Procedure:  The patient was seen in the preoperative area. The site of surgery was properly noted/marked if necessary per policy. The patient has been actively warmed in preoperative area. Preoperative antibiotics have been ordered and given within 0.5 hours of incision. Venous thrombosis prophylaxis are not indicated.    Patient was lying supine on the hybrid OR table when she was gently sedated.  The BILATERAL groins were then prepped and draped in usual sterile fashion.  The patient was identified as Sara Esparza per timeout protocol.    The RIGHT common femoral artery was then visualized by ultrasound and accessed in retrograde fashion using micropuncture technique.  The 6 Croatian sheath was  installed and the 035/260 glide advantage wire (GAW) was then advanced into the abdominal aorta and the flush catheter was then used to obtain an aortogram.  The wire and catheter were directed up and over into the LEFT common femoral artery and additional runoff views were then obtained of the LEFT lower extremity.  The 6 Swedish sheath was then exchanged for a 6Fr/45 cm destination sheath which was positioned in the LEFT common femoral artery.  The patient was then systemically heparinized with 8000 units of heparin.  The wire and trail blazer catheter were then directed into the posterior tibial proximally, but the wire could not pass due to chronic occlusion.  200mcg of nitroglycerin was instilled into the distal posterior tibial, but the path could not be found.  The wire was withdrawn and advanced into the anterior tibial.  The 2.5mm Shockwave was then used angioplasty the proximal anterior tibial with associated lithotripsy of the region.  The 2.5mm tapered balloon was then used to angioplasty the entire anterior tibial.  Completion arteriogram now demonstrates equal flow seen into the anterior tibial and peroneal with continued diminutive flow into the posterior tibial proximally.  Distally, the peroneal continues into the distal posterior tibial which continues as the plantar.  There is more flow seen into the dorsalis pedis and mid-foot collaterals.  The sheath was then exchanged over the wire for an Angioseal hemostatically.    Kyle Ramirez MD     Date: 2/3/2025  Time: 08:21 EST

## 2025-02-03 NOTE — PLAN OF CARE
Problem: Pain Acute  Goal: Optimal Pain Control and Function  Outcome: Progressing  Intervention: Prevent or Manage Pain  Recent Flowsheet Documentation  Taken 2/3/2025 0400 by Cristal Johnson RN  Medication Review/Management: medications reviewed  Taken 2/3/2025 0200 by Cristal Johnson RN  Medication Review/Management: medications reviewed  Taken 2/3/2025 0000 by Cristal Johnson RN  Medication Review/Management: medications reviewed  Taken 2/2/2025 2200 by Cristal Johnson RN  Medication Review/Management: medications reviewed  Taken 2/2/2025 2000 by Cristal Johnson RN  Medication Review/Management: medications reviewed     Problem: Comorbidity Management  Goal: Blood Glucose Level Within Target Range  Outcome: Progressing  Intervention: Monitor and Manage Glycemia  Recent Flowsheet Documentation  Taken 2/3/2025 0400 by Cristal Johnson RN  Medication Review/Management: medications reviewed  Taken 2/3/2025 0200 by Cristal Johnson RN  Medication Review/Management: medications reviewed  Taken 2/3/2025 0000 by Cristal Johnson RN  Medication Review/Management: medications reviewed  Taken 2/2/2025 2200 by Cristal Johnson RN  Medication Review/Management: medications reviewed  Taken 2/2/2025 2000 by Cristal Johnson RN  Medication Review/Management: medications reviewed     Problem: Skin Injury Risk Increased  Goal: Skin Health and Integrity  Outcome: Progressing  Intervention: Optimize Skin Protection  Recent Flowsheet Documentation  Taken 2/3/2025 0400 by Cristal Johnson RN  Activity Management: activity encouraged  Pressure Reduction Techniques:   frequent weight shift encouraged   pressure points protected   weight shift assistance provided   heels elevated off bed  Head of Bed (HOB) Positioning: HOB elevated  Pressure Reduction Devices:   foam padding utilized   positioning supports utilized   pressure-redistributing mattress utilized  Skin Protection:   incontinence pads  utilized   protective footwear used   silicone foam dressing in place   transparent dressing maintained  Taken 2/3/2025 0200 by Cristal Johnson RN  Activity Management: activity encouraged  Pressure Reduction Techniques:   frequent weight shift encouraged   pressure points protected   weight shift assistance provided   heels elevated off bed  Head of Bed (HOB) Positioning: HOB elevated  Pressure Reduction Devices:   foam padding utilized   positioning supports utilized   pressure-redistributing mattress utilized  Skin Protection:   incontinence pads utilized   protective footwear used   silicone foam dressing in place   transparent dressing maintained  Taken 2/3/2025 0000 by Cristal Johnson RN  Activity Management: activity encouraged  Pressure Reduction Techniques:   frequent weight shift encouraged   pressure points protected   weight shift assistance provided   heels elevated off bed  Head of Bed (HOB) Positioning: HOB elevated  Pressure Reduction Devices:   foam padding utilized   positioning supports utilized   pressure-redistributing mattress utilized  Skin Protection:   incontinence pads utilized   protective footwear used   silicone foam dressing in place   transparent dressing maintained  Taken 2/2/2025 2200 by Cristal Johnson RN  Activity Management: activity encouraged  Pressure Reduction Techniques:   frequent weight shift encouraged   heels elevated off bed   pressure points protected   weight shift assistance provided  Head of Bed (HOB) Positioning: HOB elevated  Pressure Reduction Devices:   foam padding utilized   positioning supports utilized   pressure-redistributing mattress utilized  Skin Protection:   incontinence pads utilized   protective footwear used   silicone foam dressing in place   transparent dressing maintained  Taken 2/2/2025 2000 by Cristal Johnson RN  Activity Management: activity encouraged  Pressure Reduction Techniques:   frequent weight shift encouraged   heels  elevated off bed   pressure points protected   weight shift assistance provided  Head of Bed (HOB) Positioning: HOB elevated  Pressure Reduction Devices:   positioning supports utilized   foam padding utilized   pressure-redistributing mattress utilized  Skin Protection: (silicone dressings pulled back and skin assessed)   incontinence pads utilized   protective footwear used   silicone foam dressing in place   transparent dressing maintained   other (see comments)     Problem: Fall Injury Risk  Goal: Absence of Fall and Fall-Related Injury  Outcome: Progressing  Intervention: Identify and Manage Contributors  Recent Flowsheet Documentation  Taken 2/3/2025 0400 by Cristal Johnson RN  Medication Review/Management: medications reviewed  Taken 2/3/2025 0200 by Cristal Johnson RN  Medication Review/Management: medications reviewed  Taken 2/3/2025 0000 by Cristal Johnson RN  Medication Review/Management: medications reviewed  Taken 2/2/2025 2200 by Cristal Johnson RN  Medication Review/Management: medications reviewed  Taken 2/2/2025 2000 by Cristal Johnson RN  Medication Review/Management: medications reviewed  Intervention: Promote Injury-Free Environment  Recent Flowsheet Documentation  Taken 2/3/2025 0400 by Cristal Johnson RN  Safety Promotion/Fall Prevention:   assistive device/personal items within reach   clutter free environment maintained   fall prevention program maintained   gait belt   lighting adjusted   nonskid shoes/slippers when out of bed   room organization consistent   safety round/check completed   toileting scheduled  Taken 2/3/2025 0200 by Cristal Johnson, RN  Safety Promotion/Fall Prevention:   assistive device/personal items within reach   clutter free environment maintained   fall prevention program maintained   gait belt   lighting adjusted   nonskid shoes/slippers when out of bed   room organization consistent   safety round/check completed   toileting  scheduled  Taken 2/3/2025 0000 by Cristal Johnson RN  Safety Promotion/Fall Prevention:   assistive device/personal items within reach   clutter free environment maintained   fall prevention program maintained   gait belt   lighting adjusted   nonskid shoes/slippers when out of bed   room organization consistent   safety round/check completed   toileting scheduled  Taken 2/2/2025 2200 by Cristal Johnson RN  Safety Promotion/Fall Prevention:   assistive device/personal items within reach   clutter free environment maintained   fall prevention program maintained   gait belt   lighting adjusted   nonskid shoes/slippers when out of bed   room organization consistent   safety round/check completed   toileting scheduled  Taken 2/2/2025 2000 by Cristal Johnson RN  Safety Promotion/Fall Prevention:   assistive device/personal items within reach   clutter free environment maintained   fall prevention program maintained   gait belt   lighting adjusted   nonskid shoes/slippers when out of bed   room organization consistent   safety round/check completed   toileting scheduled     Problem: Hemodialysis  Goal: Safe, Effective Therapy Delivery  Outcome: Progressing  Intervention: Optimize Device Care and Function  Recent Flowsheet Documentation  Taken 2/3/2025 0400 by Cristal Johnson RN  Medication Review/Management: medications reviewed  Taken 2/3/2025 0200 by Cristal Johnson RN  Medication Review/Management: medications reviewed  Taken 2/3/2025 0000 by Cristal Johnson RN  Medication Review/Management: medications reviewed  Taken 2/2/2025 2200 by Cristal Johnson RN  Medication Review/Management: medications reviewed  Taken 2/2/2025 2000 by Cristal Johnson, RN  Medication Review/Management: medications reviewed  Goal: Effective Tissue Perfusion  Outcome: Progressing  Goal: Absence of Infection Signs and Symptoms  Outcome: Progressing  Intervention: Prevent or Manage Infection  Recent Flowsheet  Documentation  Taken 2/3/2025 0400 by Cristal Johnson RN  Infection Prevention:   environmental surveillance performed   hand hygiene promoted   rest/sleep promoted   personal protective equipment utilized  Taken 2/3/2025 0200 by Cristal Johnson RN  Infection Prevention:   environmental surveillance performed   hand hygiene promoted   rest/sleep promoted   personal protective equipment utilized  Taken 2/3/2025 0000 by Cristal Johnson RN  Infection Prevention:   environmental surveillance performed   hand hygiene promoted   personal protective equipment utilized   rest/sleep promoted  Taken 2/2/2025 2200 by Cristal Johnson RN  Infection Prevention:   environmental surveillance performed   hand hygiene promoted   personal protective equipment utilized   rest/sleep promoted  Taken 2/2/2025 2000 by Cristal Johnson RN  Infection Prevention:   environmental surveillance performed   hand hygiene promoted   personal protective equipment utilized   rest/sleep promoted     Problem: Adult Inpatient Plan of Care  Goal: Plan of Care Review  Outcome: Progressing  Flowsheets (Taken 2/3/2025 0534)  Progress: no change  Outcome Evaluation: VSS on 2L. Pt with confusion and sleeping alot this shift. Fall, skin, and DVT precautions in place. NPO since midnight in preparation for angiogram today.  Plan of Care Reviewed With: patient  Goal: Patient-Specific Goal (Individualized)  Outcome: Progressing  Goal: Absence of Hospital-Acquired Illness or Injury  Outcome: Progressing  Intervention: Identify and Manage Fall Risk  Recent Flowsheet Documentation  Taken 2/3/2025 0400 by Cristal Johnson RN  Safety Promotion/Fall Prevention:   assistive device/personal items within reach   clutter free environment maintained   fall prevention program maintained   gait belt   lighting adjusted   nonskid shoes/slippers when out of bed   room organization consistent   safety round/check completed   toileting scheduled  Taken  2/3/2025 0200 by Cristal Johnson RN  Safety Promotion/Fall Prevention:   assistive device/personal items within reach   clutter free environment maintained   fall prevention program maintained   gait belt   lighting adjusted   nonskid shoes/slippers when out of bed   room organization consistent   safety round/check completed   toileting scheduled  Taken 2/3/2025 0000 by Cristal Johnson RN  Safety Promotion/Fall Prevention:   assistive device/personal items within reach   clutter free environment maintained   fall prevention program maintained   gait belt   lighting adjusted   nonskid shoes/slippers when out of bed   room organization consistent   safety round/check completed   toileting scheduled  Taken 2/2/2025 2200 by Cristal Johnson RN  Safety Promotion/Fall Prevention:   assistive device/personal items within reach   clutter free environment maintained   fall prevention program maintained   gait belt   lighting adjusted   nonskid shoes/slippers when out of bed   room organization consistent   safety round/check completed   toileting scheduled  Taken 2/2/2025 2000 by Cristal Johnson RN  Safety Promotion/Fall Prevention:   assistive device/personal items within reach   clutter free environment maintained   fall prevention program maintained   gait belt   lighting adjusted   nonskid shoes/slippers when out of bed   room organization consistent   safety round/check completed   toileting scheduled  Intervention: Prevent Skin Injury  Recent Flowsheet Documentation  Taken 2/3/2025 0400 by Cristal Johnson RN  Body Position:   left   side-lying  Skin Protection:   incontinence pads utilized   protective footwear used   silicone foam dressing in place   transparent dressing maintained  Taken 2/3/2025 0200 by Cristal Johnson, RN  Body Position: supine  Skin Protection:   incontinence pads utilized   protective footwear used   silicone foam dressing in place   transparent dressing maintained  Taken  2/3/2025 0000 by Cristal Johnson RN  Body Position:   right   side-lying  Skin Protection:   incontinence pads utilized   protective footwear used   silicone foam dressing in place   transparent dressing maintained  Taken 2/2/2025 2200 by Cristal Johnson RN  Body Position:   left   side-lying  Skin Protection:   incontinence pads utilized   protective footwear used   silicone foam dressing in place   transparent dressing maintained  Taken 2/2/2025 2000 by Cristal Johnson RN  Body Position: position maintained  Skin Protection: (silicone dressings pulled back and skin assessed)   incontinence pads utilized   protective footwear used   silicone foam dressing in place   transparent dressing maintained   other (see comments)  Intervention: Prevent and Manage VTE (Venous Thromboembolism) Risk  Recent Flowsheet Documentation  Taken 2/3/2025 0400 by Cristal Johnson RN  VTE Prevention/Management:   bilateral   SCDs (sequential compression devices) on  Taken 2/3/2025 0200 by Cristal Johnson RN  VTE Prevention/Management:   bilateral   SCDs (sequential compression devices) on  Taken 2/3/2025 0000 by Cristal Johnson RN  VTE Prevention/Management:   bilateral   SCDs (sequential compression devices) on  Taken 2/2/2025 2200 by Cristal Johnson RN  VTE Prevention/Management:   bilateral   SCDs (sequential compression devices) on  Taken 2/2/2025 2000 by Cristal Johnson RN  VTE Prevention/Management:   bilateral   SCDs (sequential compression devices) on  Intervention: Prevent Infection  Recent Flowsheet Documentation  Taken 2/3/2025 0400 by Cristal Johnson RN  Infection Prevention:   environmental surveillance performed   hand hygiene promoted   rest/sleep promoted   personal protective equipment utilized  Taken 2/3/2025 0200 by Cristal Johnson RN  Infection Prevention:   environmental surveillance performed   hand hygiene promoted   rest/sleep promoted   personal protective equipment  utilized  Taken 2/3/2025 0000 by Cristal Johnson, RN  Infection Prevention:   environmental surveillance performed   hand hygiene promoted   personal protective equipment utilized   rest/sleep promoted  Taken 2/2/2025 2200 by Cristal Johnson RN  Infection Prevention:   environmental surveillance performed   hand hygiene promoted   personal protective equipment utilized   rest/sleep promoted  Taken 2/2/2025 2000 by Cristal Johnson, RN  Infection Prevention:   environmental surveillance performed   hand hygiene promoted   personal protective equipment utilized   rest/sleep promoted  Goal: Optimal Comfort and Wellbeing  Outcome: Progressing  Goal: Readiness for Transition of Care  Outcome: Progressing   Goal Outcome Evaluation:  Plan of Care Reviewed With: patient        Progress: no change  Outcome Evaluation: VSS on 2L. Pt with confusion and sleeping alot this shift. Fall, skin, and DVT precautions in place. NPO since midnight in preparation for angiogram today.

## 2025-02-03 NOTE — PROGRESS NOTES
Pharmacy Consult-Vancomycin Dosing  Sara Esparza is a  74 y.o. female receiving vancomycin therapy.     Indication: Osteomyelitis  Consulting Provider: hospitalist   ID Consult: Yes  Goal Trough: 15-20 mcg/mL    Current Antimicrobial Therapy  Anti-Infectives (From admission, onward)      Ordered     Dose/Rate Route Frequency Start Stop    02/01/25 1332  vancomycin (dosing per levels)        Ordering Provider: Ricardo Chappell, PharmD     Not Applicable Daily 02/01/25 1415 03/07/25 0859    01/29/25 1059  vancomycin (VANCOCIN) 1,000 mg in sodium chloride 0.9 % 250 mL IVPB-VTB  Status:  Discontinued        Ordering Provider: Ricardo Chappell, PharmD    1,000 mg  250 mL/hr over 60 Minutes Intravenous Once per day on Monday Wednesday Friday 01/29/25 1600 02/01/25 1332    01/27/25 1850  cefTRIAXone (ROCEPHIN) 2,000 mg in sodium chloride 0.9 % 100 mL MBP        Ordering Provider: Camden Ashford RPH    2,000 mg  200 mL/hr over 30 Minutes Intravenous Every 24 Hours 01/27/25 2000 02/05/25 1959    01/27/25 0920  vancomycin (VANCOCIN) 1,000 mg in sodium chloride 0.9 % 250 mL IVPB-VTB        Ordering Provider: Ricardo Chappell, PharmD    1,000 mg  250 mL/hr over 60 Minutes Intravenous Once 01/27/25 1600 01/27/25 1857    01/27/25 0949  Pharmacy to dose vancomycin        Ordering Provider: Bailee Barrios MD     Not Applicable Continuous PRN 01/27/25 0948 02/10/25 0947    01/27/25 0920  vancomycin (dosing per levels)  Status:  Discontinued        Ordering Provider: Ricardo Chappell, PharmD     Not Applicable Daily 01/27/25 0920 01/29/25 1059    01/24/25 1230  vancomycin (VANCOCIN) 1,000 mg in sodium chloride 0.9 % 250 mL IVPB-VTB        Ordering Provider: Edmund Erwin, PharmD    1,000 mg  250 mL/hr over 60 Minutes Intravenous Once 01/24/25 1600 01/24/25 1723    01/22/25 1230  vancomycin 750 mg in sodium chloride 0.9 % 250 mL IVPB-VTB        Ordering Provider: Edmund Erwin, PharmD    750 mg  333.3 mL/hr over 45  Minutes Intravenous Once 01/22/25 1400 01/22/25 1810    01/21/25 0519  cefepime 1000 mg IVPB in 100 mL NS (MBP)  Status:  Discontinued        Ordering Provider: Rom Gonzalez Jr., MD    1,000 mg  over 4 Hours Intravenous Every 24 Hours 01/22/25 0600 01/27/25 1851    01/21/25 0537  vancomycin (dosing per levels)  Status:  Discontinued        Ordering Provider: Rom Gonzalez Jr., MD     Not Applicable Daily 01/21/25 0900 01/27/25 1320    01/21/25 0519  cefepime 1000 mg IVPB in 100 mL NS (MBP)        Ordering Provider: Conrad Alvarez MD    1,000 mg  over 30 Minutes Intravenous Once 01/21/25 0615 01/21/25 0715    01/21/25 0505  metroNIDAZOLE (FLAGYL) IVPB 500 mg        Ordering Provider: Rom Gonzalez Jr., MD    500 mg  200 mL/hr over 30 Minutes Intravenous Every 8 Hours 01/21/25 0600 01/26/25 0759    01/21/25 0513  vancomycin 2250 mg/500 mL 0.9% NS IVPB ([facility])        Ordering Provider: Woo Wells, PharmD    20 mg/kg × 108 kg  over 135 Minutes Intravenous Once 01/21/25 0600 01/21/25 0950    01/21/25 0505  Pharmacy to dose vancomycin  Status:  Discontinued        Ordering Provider: Rom Gonzalez Jr., MD     Not Applicable Continuous PRN 01/21/25 0500 01/27/25 1318    01/20/25 0738  cefTRIAXone (ROCEPHIN) 1,000 mg in sodium chloride 0.9 % 100 mL MBP  Status:  Discontinued        Ordering Provider: Leslye Fiore MD    1,000 mg  200 mL/hr over 30 Minutes Intravenous Every 24 Hours 01/20/25 0900 01/21/25 0519          Allergies  Allergies as of 01/19/2025    (No Known Allergies)     Labs    Results from last 7 days   Lab Units 02/01/25  1025 01/31/25  0726 01/28/25  0941   BUN mg/dL 29* 36* 27*   CREATININE mg/dL 3.84* 4.39* 3.45*     Results from last 7 days   Lab Units 01/31/25  0726 01/28/25  0941 01/27/25  0636   WBC 10*3/mm3 11.55* 9.57 10.51     Evaluation of Dosing     Last Dose Received in the ED/Outside Facility: none  Is Patient on Dialysis or Renal Replacement: yes    Ht - 170.2 cm  "(67.01\")  Wt - 108 kg (238 lb 1.6 oz)    Estimated Creatinine Clearance: 16 mL/min (A) (by C-G formula based on SCr of 3.84 mg/dL (H)).    Intake & Output (last 3 days)         01/30 0701  01/31 0700 01/31 0701  02/01 0700 02/01 0701  02/02 0700 02/02 0701  02/03 0700    P.O. 960  500     Other  300      IV Piggyback   100     Total Intake(mL/kg) 960 (8.9) 300 (2.8) 600 (5.6)     Urine (mL/kg/hr)        Dialysis  1420      Total Output  1420      Net +960 -1120 +600             Urine Unmeasured Occurrence 1 x 1 x              Microbiology and Radiology  Microbiology Results (last 10 days)       Procedure Component Value - Date/Time    Anaerobic Culture - Surgical Site, Foot, Left [[id number]]  (Abnormal) Collected: 01/24/25 1754    Lab Status: Final result Specimen: Surgical Site from Foot, Left Updated: 01/30/25 1515     Anaerobic Culture Cutibacterium acnes    Fungus Culture - Surgical Site, Foot, Left [[id number]] Collected: 01/24/25 1754    Lab Status: Preliminary result Specimen: Surgical Site from Foot, Left Updated: 01/31/25 2315     Fungus Culture No fungus isolated at 1 week    Body Fluid Culture - Surgical Site, Foot, Left [[id number]]  (Abnormal)  (Susceptibility) Collected: 01/24/25 1754    Lab Status: Final result Specimen: Surgical Site from Foot, Left Updated: 01/27/25 0642     Body Fluid Culture Light growth (2+) Staphylococcus aureus, MRSA     Comment:   Methicillin resistant Staphylococcus aureus, Patient may be an isolation risk.        Gram Stain Rare (1+) WBCs seen      No organisms seen    Narrative:      Not a body fluid.    Susceptibility        Staphylococcus aureus, MRSA      TAYLOR      Clindamycin 0.25 ug/ml Susceptible      Erythromycin >=8 ug/ml Resistant      Oxacillin >=4 ug/ml Resistant      Rifampin <=0.5 ug/ml Susceptible      Tetracycline <=1 ug/ml Susceptible      Trimethoprim + Sulfamethoxazole <=10 ug/ml Susceptible      Vancomycin 1 ug/ml Susceptible                             "     Vancomycin Levels:    Results from last 7 days   Lab Units 02/03/25  0347 02/01/25  1025 01/29/25  0641   VANCOMYCIN RM mcg/mL 26.30 30.70 21.20     Doses received:  Vancomycin 2250 mg IV x1 on 1/21 at 0735  Vancomycin 750 mg IV on 1/22  Vancomycin 1000 mg IV on 1/24, 1/27, 1/29, 1/31     Assessment/Plan:      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 Goal trough 15-20 mcg/mL.   Patient with hx of ESRD on HD MWF. Pharmacy currently dosing vancomycin per levels.  HD session Friday cut short ~30 minutes early. HD originally was planned for Saturday as a result but then decision ultimately made by nephrology to hold off on this. Patient does have residual urine production however per RN this is minimal.   Vancomycin was 26.3 mcg/mL this morning prior to vascular procedure.  No vancomycin dose today and will continue to dose per levels.         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  Next HD planned 2/5, with level ordered prior to next planned HD session.  Pharmacy will continue to monitor renal function, cultures and sensitivities, and clinical status to adjust regimen as necessary.    Natividad Ramos, PharmD  2/2/2025  11:41 EST

## 2025-02-03 NOTE — PROGRESS NOTES
"   LOS: 15 days    Patient Care Team:  Toribio Roberts MD as PCP - General (Internal Medicine)    Subjective     Patient presented to dialysis postprocedure.  Patient seems to be confused and agitated.    Objective     Vital Signs:  Blood pressure 136/52, pulse 73, temperature 98.3 °F (36.8 °C), temperature source Temporal, resp. rate 18, height 170.2 cm (67.01\"), weight 108 kg (238 lb 1.6 oz), SpO2 96%.      Intake/Output Summary (Last 24 hours) at 2/3/2025 0944  Last data filed at 2/3/2025 0817  Gross per 24 hour   Intake 100 ml   Output --   Net 100 ml        No intake/output data recorded.    Physical Exam:        GENERAL: WD WF NAD  NEURO: Awake and alert, confused with slow mentation.  No focal deficit  PSYCHIATRIC: Agitated, cooperative with PE  EYE: PE, no icterus, no conjunctivitis  ENT: omm dry, dentition intact,  Hearing intact  NECK: Supple , No JVD discernable,  Trachea midline  CV: No edema, RRR  LUNGS:  Quiet,  Nonlabored resp.  Symmetrical expansion  ABDOMEN: Nondistended, soft nontender.  : No Corral, no palp bladder  SKIN: Warm and dry without rash      Labs:  Results from last 7 days   Lab Units 02/03/25  0347 02/02/25  1253 01/31/25  0726   WBC 10*3/mm3 8.05 7.90 11.55*   HEMOGLOBIN g/dL 9.9* 9.8* 10.1*   PLATELETS 10*3/mm3 259 246 271     Results from last 7 days   Lab Units 02/03/25  0347 02/02/25  1253 02/01/25  1025 01/31/25  0726   SODIUM mmol/L 130* 132* 130* 131*   POTASSIUM mmol/L 5.3* 5.2 4.4 4.3   CHLORIDE mmol/L 95* 98 96* 97*   CO2 mmol/L 22.0 21.0* 25.0 25.0   BUN mg/dL 45* 38* 29* 36*   CREATININE mg/dL 5.56* 5.05* 3.84* 4.39*   CALCIUM mg/dL 9.2 9.2 9.1 9.4   PHOSPHORUS mg/dL  --   --  4.3  --    ALBUMIN g/dL  --   --  2.5*  --                        Estimated Creatinine Clearance: 11.1 mL/min (A) (by C-G formula based on SCr of 5.56 mg/dL (H)).         A/P:      ESRD: On HD MWF at [facility] with NAL .  Continue outpatient schedule.     HTN: Blood pressure stable.  Monitor " for now.    Hyponatremia:   Due to underlying renal failure.  Adjust with HD.     Hyperkalemia: Resolved.  Adjust with HD     Metabolic acidosis:  Adjust with HD     Anemia: Hemoglobin below goal.  Patient unable.  Monitor for now.  Transfuse as indicated for Hgb <7.     Volume: Earlier in admission patient with orthostatic hypotension with intravascular volume depletion.  Looks stable today.  Monitor with UF.     Hyperphosphatemia: Phos stable at 4.3.  Poor p.o. intake.  Binders on hold for now.     Nutrition: Low potassium low Phos high-protein diet.  Renal vitamin.     Fever: On antibiotics.  Urine culture with mixed hanny.  Patient with foot wound.       High risk and complexity patient.         Woo Barnes MD  02/03/25  09:44 EST

## 2025-02-03 NOTE — CASE MANAGEMENT/SOCIAL WORK
Continued Stay Note   Erin     Patient Name: Sara Esparza  MRN: [id number]  Today's Date: 2/3/2025    Admit Date: 1/19/2025    Plan: undecided   Discharge Plan       Row Name 02/03/25 0944       Plan    Plan undecided    Patient/Family in Agreement with Plan yes    Plan Comments I met with this patient's  as she is receiving an angiogram this morning. Her plan is for a BKA on Tuesday. PT and OT still need to see this patient. CM to follow.                   Discharge Codes    No documentation.                 Expected Discharge Date and Time       Expected Discharge Date Expected Discharge Time    Feb 6, 2025               Sharee Williamson RN     Subjective:       Patient ID: Duke Baltazar Gina Sandhu MD;  Location: [facility] OR;  Service: OB/GYN;  Laterality: Bilateral;    TOTAL ABDOMINAL HYSTERECTOMY N/A 8/6/2019    Procedure: HYSTERECTOMY, TOTAL, ABDOMINAL;  Surgeon: Gina Sandhu MD;  Location: [facility] OR;  Service: OB/GYN;  Candida sp Negative Negative    Candida glabrata DNA Negative Negative    Candida Ashanti Fernando MD  [facility]   Family Medicine

## 2025-02-04 ENCOUNTER — ANESTHESIA EVENT CONVERTED (OUTPATIENT)
Dept: ANESTHESIOLOGY | Facility: HOSPITAL | Age: 75
DRG: 853 | End: 2025-02-04
Payer: MEDICARE

## 2025-02-04 ENCOUNTER — ANESTHESIA (OUTPATIENT)
Dept: PERIOP | Facility: HOSPITAL | Age: 75
End: 2025-02-04
Payer: MEDICARE

## 2025-02-04 LAB
ABO GROUP BLD: NORMAL
ABO GROUP BLD: NORMAL
ANION GAP SERPL CALCULATED.3IONS-SCNC: 13 MMOL/L (ref 5–15)
BLD GP AB SCN SERPL QL: NEGATIVE
BUN SERPL-MCNC: 32 MG/DL (ref 8–23)
BUN/CREAT SERPL: 7.6 (ref 7–25)
CALCIUM SPEC-SCNC: 8.8 MG/DL (ref 8.6–10.5)
CHLORIDE SERPL-SCNC: 98 MMOL/L (ref 98–107)
CO2 SERPL-SCNC: 22 MMOL/L (ref 22–29)
CREAT SERPL-MCNC: 4.23 MG/DL (ref 0.57–1)
DEPRECATED RDW RBC AUTO: 48.6 FL (ref 37–54)
EGFRCR SERPLBLD CKD-EPI 2021: 10.4 ML/MIN/1.73
ERYTHROCYTE [DISTWIDTH] IN BLOOD BY AUTOMATED COUNT: 13.8 % (ref 12.3–15.4)
GLUCOSE BLDC GLUCOMTR-MCNC: 119 MG/DL (ref 70–130)
GLUCOSE BLDC GLUCOMTR-MCNC: 180 MG/DL (ref 70–130)
GLUCOSE BLDC GLUCOMTR-MCNC: 93 MG/DL (ref 70–130)
GLUCOSE SERPL-MCNC: 92 MG/DL (ref 65–99)
HCT VFR BLD AUTO: 29 % (ref 34–46.6)
HGB BLD-MCNC: 9.2 G/DL (ref 12–15.9)
MCH RBC QN AUTO: 31.1 PG (ref 26.6–33)
MCHC RBC AUTO-ENTMCNC: 31.7 G/DL (ref 31.5–35.7)
MCV RBC AUTO: 98 FL (ref 79–97)
PLATELET # BLD AUTO: 235 10*3/MM3 (ref 140–450)
PMV BLD AUTO: 10 FL (ref 6–12)
POTASSIUM SERPL-SCNC: 4.3 MMOL/L (ref 3.5–5.2)
POTASSIUM SERPL-SCNC: 4.5 MMOL/L (ref 3.5–5.2)
RBC # BLD AUTO: 2.96 10*6/MM3 (ref 3.77–5.28)
RH BLD: POSITIVE
RH BLD: POSITIVE
SODIUM SERPL-SCNC: 133 MMOL/L (ref 136–145)
T&S EXPIRATION DATE: NORMAL
WBC NRBC COR # BLD AUTO: 6.86 10*3/MM3 (ref 3.4–10.8)

## 2025-02-04 PROCEDURE — 87075 CULTR BACTERIA EXCEPT BLOOD: CPT | Performed by: ORTHOPAEDIC SURGERY

## 2025-02-04 PROCEDURE — 25010000002 FENTANYL CITRATE (PF) 50 MCG/ML SOLUTION: Performed by: NURSE ANESTHETIST, CERTIFIED REGISTERED

## 2025-02-04 PROCEDURE — 25010000002 VANCOMYCIN 1.5-0.9 GM/500ML-% SOLUTION: Performed by: ORTHOPAEDIC SURGERY

## 2025-02-04 PROCEDURE — 99232 SBSQ HOSP IP/OBS MODERATE 35: CPT | Performed by: INTERNAL MEDICINE

## 2025-02-04 PROCEDURE — 93010 ELECTROCARDIOGRAM REPORT: CPT | Performed by: INTERNAL MEDICINE

## 2025-02-04 PROCEDURE — 25010000002 LIDOCAINE PF 1% 1 % SOLUTION: Performed by: ANESTHESIOLOGY

## 2025-02-04 PROCEDURE — 63710000001 INSULIN LISPRO (HUMAN) PER 5 UNITS: Performed by: ORTHOPAEDIC SURGERY

## 2025-02-04 PROCEDURE — 86901 BLOOD TYPING SEROLOGIC RH(D): CPT

## 2025-02-04 PROCEDURE — 87070 CULTURE OTHR SPECIMN AEROBIC: CPT | Performed by: INTERNAL MEDICINE

## 2025-02-04 PROCEDURE — 87102 FUNGUS ISOLATION CULTURE: CPT | Performed by: ORTHOPAEDIC SURGERY

## 2025-02-04 PROCEDURE — 86850 RBC ANTIBODY SCREEN: CPT | Performed by: ANESTHESIOLOGY

## 2025-02-04 PROCEDURE — 80048 BASIC METABOLIC PNL TOTAL CA: CPT | Performed by: NURSE PRACTITIONER

## 2025-02-04 PROCEDURE — C1713 ANCHOR/SCREW BN/BN,TIS/BN: HCPCS | Performed by: ORTHOPAEDIC SURGERY

## 2025-02-04 PROCEDURE — 87070 CULTURE OTHR SPECIMN AEROBIC: CPT | Performed by: ORTHOPAEDIC SURGERY

## 2025-02-04 PROCEDURE — 87205 SMEAR GRAM STAIN: CPT | Performed by: ORTHOPAEDIC SURGERY

## 2025-02-04 PROCEDURE — 87205 SMEAR GRAM STAIN: CPT | Performed by: INTERNAL MEDICINE

## 2025-02-04 PROCEDURE — 25010000002 VANCOMYCIN 1 G RECONSTITUTED SOLUTION: Performed by: ORTHOPAEDIC SURGERY

## 2025-02-04 PROCEDURE — 25010000002 ONDANSETRON PER 1 MG: Performed by: ANESTHESIOLOGY

## 2025-02-04 PROCEDURE — 0Y6J0Z1 DETACHMENT AT LEFT LOWER LEG, HIGH, OPEN APPROACH: ICD-10-PCS | Performed by: ORTHOPAEDIC SURGERY

## 2025-02-04 PROCEDURE — 93005 ELECTROCARDIOGRAM TRACING: CPT | Performed by: INTERNAL MEDICINE

## 2025-02-04 PROCEDURE — 88311 DECALCIFY TISSUE: CPT | Performed by: ORTHOPAEDIC SURGERY

## 2025-02-04 PROCEDURE — 87176 TISSUE HOMOGENIZATION CULTR: CPT | Performed by: ORTHOPAEDIC SURGERY

## 2025-02-04 PROCEDURE — 85027 COMPLETE CBC AUTOMATED: CPT | Performed by: NURSE PRACTITIONER

## 2025-02-04 PROCEDURE — 84132 ASSAY OF SERUM POTASSIUM: CPT | Performed by: ANESTHESIOLOGY

## 2025-02-04 PROCEDURE — 25810000003 LACTATED RINGERS PER 1000 ML: Performed by: ANESTHESIOLOGY

## 2025-02-04 PROCEDURE — 25010000002 DEXAMETHASONE SODIUM PHOSPHATE 10 MG/ML SOLUTION: Performed by: NURSE ANESTHETIST, CERTIFIED REGISTERED

## 2025-02-04 PROCEDURE — 25010000002 GENTAMICIN PER 80 MG: Performed by: ORTHOPAEDIC SURGERY

## 2025-02-04 PROCEDURE — 25810000003 SODIUM CHLORIDE 0.9 % SOLUTION: Performed by: ORTHOPAEDIC SURGERY

## 2025-02-04 PROCEDURE — 87075 CULTR BACTERIA EXCEPT BLOOD: CPT | Performed by: INTERNAL MEDICINE

## 2025-02-04 PROCEDURE — 86900 BLOOD TYPING SEROLOGIC ABO: CPT | Performed by: ANESTHESIOLOGY

## 2025-02-04 PROCEDURE — 99232 SBSQ HOSP IP/OBS MODERATE 35: CPT | Performed by: PHYSICIAN ASSISTANT

## 2025-02-04 PROCEDURE — 86900 BLOOD TYPING SEROLOGIC ABO: CPT

## 2025-02-04 PROCEDURE — 88307 TISSUE EXAM BY PATHOLOGIST: CPT | Performed by: ORTHOPAEDIC SURGERY

## 2025-02-04 PROCEDURE — L1830 KO IMMOB CANVAS LONG PRE OTS: HCPCS | Performed by: ORTHOPAEDIC SURGERY

## 2025-02-04 PROCEDURE — 25010000002 BUPIVACAINE (PF) 0.25 % SOLUTION: Performed by: NURSE ANESTHETIST, CERTIFIED REGISTERED

## 2025-02-04 PROCEDURE — 25010000002 PROPOFOL 10 MG/ML EMULSION: Performed by: ANESTHESIOLOGY

## 2025-02-04 PROCEDURE — 25010000002 ROPIVACAINE HCL-NACL 0.2-0.9 % SOLUTION: Performed by: NURSE ANESTHETIST, CERTIFIED REGISTERED

## 2025-02-04 PROCEDURE — 87116 MYCOBACTERIA CULTURE: CPT | Performed by: ORTHOPAEDIC SURGERY

## 2025-02-04 PROCEDURE — 25010000002 DEXAMETHASONE PER 1 MG: Performed by: ANESTHESIOLOGY

## 2025-02-04 PROCEDURE — 82948 REAGENT STRIP/BLOOD GLUCOSE: CPT

## 2025-02-04 PROCEDURE — 97605 NEG PRS WND THER DME<=50SQCM: CPT

## 2025-02-04 PROCEDURE — 86901 BLOOD TYPING SEROLOGIC RH(D): CPT | Performed by: ANESTHESIOLOGY

## 2025-02-04 PROCEDURE — 87206 SMEAR FLUORESCENT/ACID STAI: CPT | Performed by: ORTHOPAEDIC SURGERY

## 2025-02-04 PROCEDURE — 25010000002 SUGAMMADEX 200 MG/2ML SOLUTION: Performed by: ANESTHESIOLOGY

## 2025-02-04 DEVICE — DEV WND/CLS CONTRL TISS STRATAFIX SYMM PDS PLS CTX 60CM VIL: Type: IMPLANTABLE DEVICE | Site: LEG | Status: FUNCTIONAL

## 2025-02-04 DEVICE — IMPLANTABLE DEVICE
Type: IMPLANTABLE DEVICE | Site: LEG | Status: FUNCTIONAL
Brand: CERAMENT™BONE VOID FILLER

## 2025-02-04 RX ORDER — PROPOFOL 10 MG/ML
VIAL (ML) INTRAVENOUS AS NEEDED
Status: DISCONTINUED | OUTPATIENT
Start: 2025-02-04 | End: 2025-02-04 | Stop reason: SURG

## 2025-02-04 RX ORDER — DEXAMETHASONE SODIUM PHOSPHATE 4 MG/ML
INJECTION, SOLUTION INTRA-ARTICULAR; INTRALESIONAL; INTRAMUSCULAR; INTRAVENOUS; SOFT TISSUE AS NEEDED
Status: DISCONTINUED | OUTPATIENT
Start: 2025-02-04 | End: 2025-02-04 | Stop reason: SURG

## 2025-02-04 RX ORDER — ONDANSETRON 2 MG/ML
4 INJECTION INTRAMUSCULAR; INTRAVENOUS ONCE AS NEEDED
Status: DISCONTINUED | OUTPATIENT
Start: 2025-02-04 | End: 2025-02-04 | Stop reason: HOSPADM

## 2025-02-04 RX ORDER — ONDANSETRON 2 MG/ML
INJECTION INTRAMUSCULAR; INTRAVENOUS AS NEEDED
Status: DISCONTINUED | OUTPATIENT
Start: 2025-02-04 | End: 2025-02-04 | Stop reason: SURG

## 2025-02-04 RX ORDER — OXYCODONE HYDROCHLORIDE 5 MG/1
5 TABLET ORAL EVERY 4 HOURS PRN
Qty: 42 TABLET | Refills: 0 | Status: SHIPPED | OUTPATIENT
Start: 2025-02-04

## 2025-02-04 RX ORDER — FENTANYL CITRATE 50 UG/ML
INJECTION, SOLUTION INTRAMUSCULAR; INTRAVENOUS
Status: COMPLETED | OUTPATIENT
Start: 2025-02-04 | End: 2025-02-04

## 2025-02-04 RX ORDER — SODIUM CHLORIDE, SODIUM LACTATE, POTASSIUM CHLORIDE, CALCIUM CHLORIDE 600; 310; 30; 20 MG/100ML; MG/100ML; MG/100ML; MG/100ML
9 INJECTION, SOLUTION INTRAVENOUS CONTINUOUS
Status: DISCONTINUED | OUTPATIENT
Start: 2025-02-05 | End: 2025-02-05

## 2025-02-04 RX ORDER — EPHEDRINE SULFATE 50 MG/ML
INJECTION INTRAVENOUS AS NEEDED
Status: DISCONTINUED | OUTPATIENT
Start: 2025-02-04 | End: 2025-02-04 | Stop reason: SURG

## 2025-02-04 RX ORDER — ALPRAZOLAM 0.25 MG/1
0.25 TABLET ORAL ONCE
Status: COMPLETED | OUTPATIENT
Start: 2025-02-04 | End: 2025-02-04

## 2025-02-04 RX ORDER — VANCOMYCIN HYDROCHLORIDE 1 G/20ML
INJECTION, POWDER, LYOPHILIZED, FOR SOLUTION INTRAVENOUS AS NEEDED
Status: DISCONTINUED | OUTPATIENT
Start: 2025-02-04 | End: 2025-02-04 | Stop reason: HOSPADM

## 2025-02-04 RX ORDER — SODIUM CHLORIDE 0.9 % (FLUSH) 0.9 %
10 SYRINGE (ML) INJECTION AS NEEDED
Status: DISCONTINUED | OUTPATIENT
Start: 2025-02-04 | End: 2025-02-04 | Stop reason: HOSPADM

## 2025-02-04 RX ORDER — LIDOCAINE HYDROCHLORIDE 10 MG/ML
0.5 INJECTION, SOLUTION EPIDURAL; INFILTRATION; INTRACAUDAL; PERINEURAL ONCE AS NEEDED
Status: DISCONTINUED | OUTPATIENT
Start: 2025-02-04 | End: 2025-02-04 | Stop reason: HOSPADM

## 2025-02-04 RX ORDER — ATORVASTATIN CALCIUM 10 MG/1
10 TABLET, FILM COATED ORAL NIGHTLY
Status: DISCONTINUED | OUTPATIENT
Start: 2025-02-04 | End: 2025-02-12 | Stop reason: HOSPADM

## 2025-02-04 RX ORDER — ROCURONIUM BROMIDE 10 MG/ML
INJECTION, SOLUTION INTRAVENOUS AS NEEDED
Status: DISCONTINUED | OUTPATIENT
Start: 2025-02-04 | End: 2025-02-04 | Stop reason: SURG

## 2025-02-04 RX ORDER — SODIUM CHLORIDE 0.9 % (FLUSH) 0.9 %
10 SYRINGE (ML) INJECTION EVERY 12 HOURS SCHEDULED
Status: DISCONTINUED | OUTPATIENT
Start: 2025-02-04 | End: 2025-02-04 | Stop reason: HOSPADM

## 2025-02-04 RX ORDER — BUPIVACAINE HYDROCHLORIDE AND EPINEPHRINE 2.5; 5 MG/ML; UG/ML
INJECTION, SOLUTION EPIDURAL; INFILTRATION; INTRACAUDAL; PERINEURAL AS NEEDED
Status: DISCONTINUED | OUTPATIENT
Start: 2025-02-04 | End: 2025-02-04 | Stop reason: HOSPADM

## 2025-02-04 RX ORDER — BUPIVACAINE HYDROCHLORIDE 2.5 MG/ML
INJECTION, SOLUTION EPIDURAL; INFILTRATION; INTRACAUDAL
Status: COMPLETED | OUTPATIENT
Start: 2025-02-04 | End: 2025-02-04

## 2025-02-04 RX ORDER — GENTAMICIN 40 MG/ML
INJECTION, SOLUTION INTRAMUSCULAR; INTRAVENOUS AS NEEDED
Status: DISCONTINUED | OUTPATIENT
Start: 2025-02-04 | End: 2025-02-04 | Stop reason: HOSPADM

## 2025-02-04 RX ORDER — FENTANYL CITRATE 50 UG/ML
50 INJECTION, SOLUTION INTRAMUSCULAR; INTRAVENOUS
Status: DISCONTINUED | OUTPATIENT
Start: 2025-02-04 | End: 2025-02-04 | Stop reason: HOSPADM

## 2025-02-04 RX ORDER — HYDROMORPHONE HYDROCHLORIDE 1 MG/ML
0.5 INJECTION, SOLUTION INTRAMUSCULAR; INTRAVENOUS; SUBCUTANEOUS
Status: DISCONTINUED | OUTPATIENT
Start: 2025-02-04 | End: 2025-02-04 | Stop reason: HOSPADM

## 2025-02-04 RX ORDER — LIDOCAINE HYDROCHLORIDE 10 MG/ML
INJECTION, SOLUTION EPIDURAL; INFILTRATION; INTRACAUDAL; PERINEURAL AS NEEDED
Status: DISCONTINUED | OUTPATIENT
Start: 2025-02-04 | End: 2025-02-04 | Stop reason: SURG

## 2025-02-04 RX ORDER — ROPIVACAINE HYDROCHLORIDE 2 MG/ML
INJECTION, SOLUTION EPIDURAL; INFILTRATION; PERINEURAL CONTINUOUS
Status: DISCONTINUED | OUTPATIENT
Start: 2025-02-04 | End: 2025-02-10

## 2025-02-04 RX ORDER — DEXAMETHASONE SODIUM PHOSPHATE 10 MG/ML
INJECTION, SOLUTION INTRAMUSCULAR; INTRAVENOUS
Status: COMPLETED | OUTPATIENT
Start: 2025-02-04 | End: 2025-02-04

## 2025-02-04 RX ORDER — VANCOMYCIN/0.9 % SOD CHLORIDE 1.5G/250ML
15 PLASTIC BAG, INJECTION (ML) INTRAVENOUS ONCE
Status: COMPLETED | OUTPATIENT
Start: 2025-02-04 | End: 2025-02-04

## 2025-02-04 RX ORDER — FAMOTIDINE 10 MG/ML
20 INJECTION, SOLUTION INTRAVENOUS ONCE
Status: COMPLETED | OUTPATIENT
Start: 2025-02-04 | End: 2025-02-04

## 2025-02-04 RX ORDER — FAMOTIDINE 20 MG/1
10 TABLET, FILM COATED ORAL DAILY
Status: DISCONTINUED | OUTPATIENT
Start: 2025-02-05 | End: 2025-02-12 | Stop reason: HOSPADM

## 2025-02-04 RX ORDER — SODIUM CHLORIDE 9 MG/ML
9 INJECTION, SOLUTION INTRAVENOUS CONTINUOUS
Status: DISCONTINUED | OUTPATIENT
Start: 2025-02-04 | End: 2025-02-05

## 2025-02-04 RX ADMIN — FAMOTIDINE 20 MG: 20 TABLET, FILM COATED ORAL at 09:19

## 2025-02-04 RX ADMIN — AMIODARONE HYDROCHLORIDE 200 MG: 200 TABLET ORAL at 20:21

## 2025-02-04 RX ADMIN — TRAMADOL HYDROCHLORIDE 50 MG: 50 TABLET, COATED ORAL at 20:21

## 2025-02-04 RX ADMIN — OXYCODONE HYDROCHLORIDE 5 MG: 5 TABLET ORAL at 18:37

## 2025-02-04 RX ADMIN — SODIUM CHLORIDE, POTASSIUM CHLORIDE, SODIUM LACTATE AND CALCIUM CHLORIDE: 600; 310; 30; 20 INJECTION, SOLUTION INTRAVENOUS at 13:42

## 2025-02-04 RX ADMIN — FENTANYL CITRATE 100 MCG: 50 INJECTION, SOLUTION INTRAMUSCULAR; INTRAVENOUS at 12:44

## 2025-02-04 RX ADMIN — EPHEDRINE SULFATE 10 MG: 50 INJECTION INTRAVENOUS at 14:14

## 2025-02-04 RX ADMIN — ONDANSETRON 4 MG: 2 INJECTION INTRAMUSCULAR; INTRAVENOUS at 14:40

## 2025-02-04 RX ADMIN — Medication 10 ML: at 09:22

## 2025-02-04 RX ADMIN — Medication 1500 MG: at 11:42

## 2025-02-04 RX ADMIN — SODIUM CHLORIDE 9 ML/HR: 9 INJECTION, SOLUTION INTRAVENOUS at 12:07

## 2025-02-04 RX ADMIN — AMIODARONE HYDROCHLORIDE 200 MG: 200 TABLET ORAL at 09:19

## 2025-02-04 RX ADMIN — BUPIVACAINE HYDROCHLORIDE 30 ML: 2.5 INJECTION, SOLUTION EPIDURAL; INFILTRATION; INTRACAUDAL; PERINEURAL at 12:48

## 2025-02-04 RX ADMIN — LIDOCAINE HYDROCHLORIDE 50 MG: 10 INJECTION, SOLUTION EPIDURAL; INFILTRATION; INTRACAUDAL; PERINEURAL at 13:49

## 2025-02-04 RX ADMIN — DEXAMETHASONE SODIUM PHOSPHATE 2 MG: 10 INJECTION INTRAMUSCULAR; INTRAVENOUS at 12:44

## 2025-02-04 RX ADMIN — INSULIN LISPRO 2 UNITS: 100 INJECTION, SOLUTION INTRAVENOUS; SUBCUTANEOUS at 20:27

## 2025-02-04 RX ADMIN — OXYCODONE HYDROCHLORIDE 5 MG: 5 TABLET ORAL at 09:28

## 2025-02-04 RX ADMIN — Medication 1000 MG: at 15:07

## 2025-02-04 RX ADMIN — PROPOFOL 100 MG: 10 INJECTION, EMULSION INTRAVENOUS at 13:49

## 2025-02-04 RX ADMIN — FAMOTIDINE 20 MG: 10 INJECTION, SOLUTION INTRAVENOUS at 12:06

## 2025-02-04 RX ADMIN — ATORVASTATIN CALCIUM 10 MG: 10 TABLET, FILM COATED ORAL at 20:20

## 2025-02-04 RX ADMIN — SUGAMMADEX 200 MG: 100 INJECTION, SOLUTION INTRAVENOUS at 14:49

## 2025-02-04 RX ADMIN — ROCURONIUM BROMIDE 50 MG: 10 INJECTION INTRAVENOUS at 13:49

## 2025-02-04 RX ADMIN — NIFEDIPINE 60 MG: 60 TABLET, EXTENDED RELEASE ORAL at 09:19

## 2025-02-04 RX ADMIN — NITROGLYCERIN 1 INCH: 20 OINTMENT TOPICAL at 18:16

## 2025-02-04 RX ADMIN — ALLOPURINOL 200 MG: 100 TABLET ORAL at 09:19

## 2025-02-04 RX ADMIN — ALPRAZOLAM 0.25 MG: 0.25 TABLET ORAL at 09:28

## 2025-02-04 RX ADMIN — DEXAMETHASONE SODIUM PHOSPHATE 4 MG: 4 INJECTION INTRA-ARTICULAR; INTRALESIONAL; INTRAMUSCULAR; INTRAVENOUS; SOFT TISSUE at 13:55

## 2025-02-04 RX ADMIN — NITROGLYCERIN 1 INCH: 20 OINTMENT TOPICAL at 09:22

## 2025-02-04 NOTE — ANESTHESIA PROCEDURE NOTES
Peripheral Block      Patient reassessed immediately prior to procedure    Patient location during procedure: pre-op  Start time: 2/4/2025 12:44 PM  Reason for block: at surgeon's request and post-op pain management  Performed by  CRNA/CAA: Kip Angel, CRNA  Assisted by: Cristal Guerra RN  Preanesthetic Checklist  Completed: patient identified, IV checked, site marked, risks and benefits discussed, surgical consent, monitors and equipment checked, pre-op evaluation and timeout performed  Prep:  Pt Position: supine  Sterile barriers:cap, gloves, mask, sterile barriers and washed/disinfected hands  Prep: ChloraPrep  Patient monitoring: blood pressure monitoring, continuous pulse oximetry and EKG  Procedure  Performed under: spinal  Guidance:ultrasound guided    ULTRASOUND INTERPRETATION.  Using ultrasound guidance a 20 G gauge needle was placed in close proximity to the nerve, at which point, under ultrasound guidance anesthetic was injected in the area of the nerve and spread of the anesthesia was seen on ultrasound in close proximity thereto.  There were no abnormalities seen on ultrasound; a digital image was taken; and the patient tolerated the procedure with no complications. Images:still images obtained, printed/placed on chart    Laterality:left  Block Type:adductor canal block  Injection Technique:single-shot  Needle Type:Tuohy and echogenic  Needle Gauge:18 G  Resistance on Injection: none  Catheter Size:20 G (20g)    Medications Used: fentaNYL citrate (PF) (SUBLIMAZE) injection - Intravenous   100 mcg - 2/4/2025 12:44:00 PM  dexamethasone sodium phosphate injection - Injection   2 mg - 2/4/2025 12:44:00 PM      Post Assessment  Injection Assessment: negative aspiration for heme, incremental injection and no paresthesia on injection  Patient Tolerance:comfortable throughout block  Complications:no  Additional Notes  SINGLE shot   A high-frequency linear transducer, with sterile cover, was placed on the  "anterior mid-thigh (between the anterior superior iliac spine and patella). The transducer was then moved medially to identify the Sartorius muscle (Jenelle), Vastus Medialis muscle (VMM), Superficial Femoral Artery (SFA) and Vein. The transducer was then moved cephalad or caudad to position the SFA in the middle of the Jenelle. The insertion site was prepped and draped in sterile fashion. Skin and cutaneous tissue was infiltrated with 2-5 ml of 1% Lidocaine. Using ultrasound-guidance, a 20-gauge B-De Oliveira 4\" Ultraplex 360 non-stimulating echogenic needle was advanced in plane from lateral to medial. Preservative-free normal saline was utilized for hydro-dissection of tissue, advancement of needle, and to confirm needle placement below the fascial plane of the Jenelle where the Nerve to the VMM is located. Local anesthetic (LA) 5 ml deposited here. The needle continues its path lateral to the SFA at the level of the Saphenous Nerve. The remainder of the LA was deposited at the 10-11 o'clock position of the SFA. This injection created a space between the Jenelle and the SFA. Aspiration every 5 ml to prevent intravascular injection. Injection was completed with negative aspiration of blood and negative intravascular injection. Injection pressures were normal with minimal resistance.   Performed by: Kip Angel, CRNA          "

## 2025-02-04 NOTE — PROGRESS NOTES
Sara Esparza       LOS: 16 days   Patient Care Team:  Toribio Roberts MD as PCP - General (Internal Medicine)    Chief Complaint: Left heel ulcer    Subjective     Interval History:     Resting comfortably in bed this morning.  Family present at bedside. Tearful and anxious about surgery today.    Review of Systems:      Gen- No fevers, chills  CV- No chest pain, palpitations  Resp- No cough, dyspnea  GI- No N/V/D, abd pain    Objective     Vital Signs  Vital Signs (last 24 hours)         01/22 0700  01/23 0659 01/23 0700 01/23 0827   Most Recent      Temp (°F) 97.9 -  98.6      98     98 (36.7) 01/23 0700    Heart Rate 73 -  167       99 01/23 0341    Resp 16 -  24      18     18 01/23 0700    BP 74/54 -  156/71       132/90 01/23 0341    SpO2 (%) 90 -  99       94 01/23 0341    Flow (L/min) (Oxygen Therapy)   3       3 01/23 0600              Physical Exam:     No acute distress.  Nonlabored respirations.  Regular rate and rhythm.  Abdomen nondistended.  Left lower extremity: Dressing present is clean, dry, intact.  VAC with minimal serosanguinous drainage.     Results Review:     I reviewed the patient's new clinical results.    Medication Review:   Hospital Medications (active)         Dose Frequency Start End    acetaminophen (TYLENOL) tablet 650 mg 650 mg Every 6 Hours PRN 1/19/2025 --    Admin Instructions: If given for fever, use fever parameter: fever greater than 100.4 °F  Based on patient request - if ordered for moderate or severe pain, provider allows for administration of a medication prescribed for a lower pain scale.    Do not exceed 4 grams of acetaminophen in a 24 hr period. Max dose of 2gm for AST/ALT greater than 120 units/L.    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 , generalized epilepsy\par Last seizure May 2022 (during influenza), and prior to that > 1 year ago\par - seizures in May 2022 sounds like it started with myoclonic jerks and caused him to be briefly altered\par - Semiology of typical seizures: starts with a scream, curls up and stiffens, initial EEG showing spikes, left > right frontal, most recent VEEG showing more generalized spikes; brain MRI findings\par Seizures otherwise controlled on combo of VPA and Keppra (resistant to Oxcarbazepine -discontinued b/c of hyponatremia)\par Will continue meds\par Meds:\par Depakote sprinkles 125/250\par Keppra 200 BID\par Get screening labs and levels\par Reviewed general seizure precautions and first aid, and use of emergency medication "   Admin Instructions: Created by cabinet override    Notes to Pharmacy: Created by cabinet override    allopurinol (ZYLOPRIM) tablet 200 mg 200 mg Daily 1/19/2025 --    Admin Instructions: (BKC) Take with food if GI upset occurs.    Route: Oral    amiodarone (PACERONE) tablet 200 mg 200 mg 3 Times Daily 1/23/2025 --    Admin Instructions: Avoid grapefruit juice while taking this medication.    Route: Oral    amiodarone 360 mg in 200 mL D5W infusion 1 mg/min Continuous 1/22/2025 --    Admin Instructions: Central line preferred, if unavailable use large bore IV access with frequent nurse monitoring of IV site.  IV med requiring filtration 0.22 micron inline filter.    Route: Intravenous    apixaban (ELIQUIS) tablet 2.5 mg 2.5 mg Every 12 Hours Scheduled 1/22/2025 --    Admin Instructions: Tablet may be crushed and suspended in 60 mL of water or D5W and immediately delivered via NG tube.  Avoid grapefruit juice.    Route: Oral    atorvastatin (LIPITOR) tablet 10 mg 10 mg Daily 1/22/2025 3/18/2025    Admin Instructions: Avoid grapefruit juice.    Route: Oral    bisacodyl (DULCOLAX) EC tablet 5 mg 5 mg Daily PRN 1/19/2025 --    Admin Instructions: Use if no bowel movement after 12 hours.  Swallow whole. Do not crush, split, or chew tablet.    Route: Oral    Linked Group 1: Placed in \"And\" Linked Group        bisacodyl (DULCOLAX) suppository 10 mg 10 mg Daily PRN 1/19/2025 --    Admin Instructions: Use if no bowel movement after 12 hours.  Hold for diarrhea    Route: Rectal    Linked Group 1: Placed in \"And\" Linked Group        Calcium Replacement - Follow Nurse / BPA Driven Protocol  As Needed 1/19/2025 --    Admin Instructions: Open Order & Select \"[facility] Electrolyte Replacement Protocol Algorithm\" to View Details    Route: Not Applicable    cefepime 1000 mg IVPB in 100 mL NS (MBP) 1,000 mg Every 24 Hours 1/22/2025 1/29/2025    Admin Instructions: Give after dialysis on dialysis days.     Route: Intravenous    dextrose " (D50W) (25 g/50 mL) IV injection 25 g 25 g Every 15 Minutes PRN 1/19/2025 --    Admin Instructions: Blood sugar less than 70; patient has IV access - Unresponsive, NPO or Unable To Safely Swallow    Route: Intravenous    dextrose (GLUTOSE) oral gel 15 g 15 g Every 15 Minutes PRN 1/19/2025 --    Admin Instructions: BS<70, Patient Alert, Is not NPO, Can safely swallow.    Route: Oral    dilTIAZem (CARDIZEM) 125 mg in 125 mL D5W infusion 5-15 mg/hr Continuous 1/22/2025 --    Admin Instructions: For heart rate - To maintain HR less than 120, initiate infusion at 5 mg/hr. Titrate up or down 2.5-5 mg/hr every 15 minutes to use the lowest dose possible. Maximum infusion rate of 15 mg/hr. Hold for SBP less than 100 mmHg or heart rate less than 60. Contact provider if unable to maintain HR less than 120 on maximum dose. Once Heart Rate target achieved obtain vitals a minimum of every 30 minutes. For patients not in critical care areas - obtain vitals a minimum of every 4 hours.   Caution: Look alike/sound alike drug alert.   Refrigerate.    Route: Intravenous    famotidine (PEPCID) tablet 20 mg 20 mg Daily 1/22/2025 --    Route: Oral    glucagon (GLUCAGEN) injection 1 mg 1 mg Every 15 Minutes PRN 1/19/2025 --    Admin Instructions: Blood Glucose Less Than 70 - Patient Without IV Access - Unresponsive, NPO or Unable To Safely Swallow  Reconstitute powder for injection by adding 1 mL of -supplied sterile diluent or sterile water for injection to a vial containing 1 mg of the drug, to provide solutions containing 1 mg/mL. Shake vial gently to dissolve.    Route: Intramuscular    heparin (porcine) injection 2,000 Units 2,000 Units As Needed 1/20/2025 --    Route: Intracatheter    HYDROmorphone (DILAUDID) injection 0.25 mg 0.25 mg 2 Times Daily PRN 1/20/2025 1/25/2025    Admin Instructions: Based on patient request - if ordered for moderate or severe pain, provider allows for administration of a medication prescribed  "for a lower pain scale.  If given for pain, use the following pain scale:  Mild Pain = Pain Score of 1-3, CPOT 1-2  Moderate Pain = Pain Score of 4-6, CPOT 3-4  Severe Pain = Pain Score of 7-10, CPOT 5-8    Route: Intravenous    Insulin Lispro (humaLOG) injection 2-9 Units 2-9 Units 4 Times Daily Before Meals & Nightly 1/19/2025 --    Admin Instructions: Correction Insulin - Moderate Dose (Total Insulin Dose 40-60 units/day, Average Weight Patient, Patient Taking Oral Hypoglycemic)    Blood Glucose 150-199 mg/dL - 2 units  Blood Glucose 200-249 mg/dL - 4 units  Blood Glucose 250-299 mg/dL - 6 units  Blood Glucose 300-349 mg/dL - 7 units  Blood Glucose 350-400 mg/dL - 8 units  Blood Glucose greater than 400 mg/dL - 9 units & Call Provider   Caution: Look alike/sound alike drug alert    Route: Subcutaneous    Magnesium Standard Dose Replacement - Follow Nurse / BPA Driven Protocol  As Needed 1/19/2025 --    Admin Instructions: Open Order & Select \"[facility]HS Electrolyte Replacement Protocol Algorithm\" to View Details    Route: Not Applicable    melatonin tablet 2.5 mg 2.5 mg Nightly PRN 1/19/2025 --    Route: Oral    metoprolol tartrate (LOPRESSOR) injection 5 mg 5 mg Every 6 Hours PRN 1/22/2025 --    Admin Instructions: Hold for SBP less than 100, DBP less than 60, or heart rate less than 50. If a dose is held, please contact the provider.    Route: Intravenous    metoprolol tartrate (LOPRESSOR) tablet 25 mg 25 mg Every 12 Hours Scheduled 1/23/2025 --    Route: Oral    metroNIDAZOLE (FLAGYL) IVPB 500 mg 500 mg Every 8 Hours 1/21/2025 1/26/2025    Admin Instructions: Caution: Look alike/sound alike drug alert.  Do not refrigerate.    Route: Intravenous    NIFEdipine XL (PROCARDIA XL) 24 hr tablet 60 mg 60 mg Every 24 Hours Scheduled 1/19/2025 --    Admin Instructions: Hold for SBP less than 100, DBP less than 60.  Caution: Look alike/sound alike drug alert. Avoid grapefruit juice. Swallow whole. Do not crush, split or chew. " "   Route: Oral    ondansetron (ZOFRAN) injection 4 mg 4 mg Every 6 Hours PRN 1/19/2025 --    Admin Instructions: If BOTH ondansetron (ZOFRAN) and promethazine (PHENERGAN) are ordered use ondansetron first and THEN promethazine IF ondansetron is ineffective.    Route: Intravenous    pantoprazole (PROTONIX) EC tablet 40 mg 40 mg Every Early Morning 1/22/2025 --    Admin Instructions: Swallow whole; do not crush, split, or chew.    Route: Oral    Pharmacy to dose vancomycin  Continuous PRN 1/21/2025 1/28/2025    Route: Not Applicable    polyethylene glycol (MIRALAX) packet 17 g 17 g Daily PRN 1/19/2025 --    Admin Instructions: Use if no bowel movement after 12 hours. Mix in 6-8 ounces of water.  Use 4-8 ounces of water, tea, or juice for each 17 gram dose.    Route: Oral    Linked Group 1: Placed in \"And\" Linked Group        Potassium Replacement - Follow Nurse / BPA Driven Protocol  As Needed 1/19/2025 --    Admin Instructions: Open Order & Select \"BHS Electrolyte Replacement Protocol Algorithm\" to View Details    Route: Not Applicable    sennosides-docusate (PERICOLACE) 8.6-50 MG per tablet 2 tablet 2 tablet 2 Times Daily PRN 1/19/2025 --    Admin Instructions: Start bowel management regimen if patient has not had a bowel movement after 12 hours.    Route: Oral    Linked Group 1: Placed in \"And\" Linked Group        sodium chloride 0.9 % flush 10 mL 10 mL As Needed 1/19/2025 --    Route: Intravenous    Linked Group 2: Placed in \"And\" Linked Group        sodium chloride 0.9 % flush 10 mL 10 mL Every 12 Hours Scheduled 1/19/2025 --    Route: Intravenous    sodium chloride 0.9 % flush 10 mL 10 mL As Needed 1/19/2025 --    Route: Intravenous    sodium chloride 0.9 % infusion 40 mL 40 mL As Needed 1/19/2025 --    Admin Instructions: Following administration of an IV intermittent medication, flush line with 40mL NS at 100mL/hr.    Route: Intravenous    traMADol (ULTRAM) tablet 50 mg 50 mg Every 12 Hours PRN 1/20/2025 " 1/25/2025    Admin Instructions: Based on patient request - if ordered for moderate or severe pain, provider allows for administration of a medication prescribed for a lower pain scale.      Caution: Look alike/sound alike drug alert    If given for pain, use the following pain scale:  Mild Pain = Pain Score of 1-3, CPOT 1-2  Moderate Pain = Pain Score of 4-6, CPOT 3-4  Severe Pain = Pain Score of 7-10, CPOT 5-8    Route: Oral    vancomycin (dosing per levels)  Daily 1/21/2025 1/28/2025    Admin Instructions: Pharmacy is dosing vancomycin per levels    Route: Not Applicable              Assessment & Plan     74-year-old female with multiple medical comorbidities, left heel wound, calcaneal osteomyelitis status post debridement, irrigation, wound vacuum assisted closure 1/24/25.      Generalized weakness    ESRD (end stage renal disease)    Type 2 diabetes mellitus with stage 5 chronic kidney disease not on chronic dialysis, without long-term current use of insulin    Chronic osteomyelitis of left foot    Paroxysmal A-fib    Sepsis due to methicillin resistant Staphylococcus aureus (MRSA) with encephalopathy without septic shock    Critical limb ischemia of left lower extremity    She underwent vascular intervention yesterday 2/3/25.    I discussed further management options today with the patient and her spouse at bedside.  We again discussed proceeding with left below-knee amputation.  The proposed procedure, risks/benefits, alternatives, expected perioperative course were discussed with the patient in detail.    Risks discussed include but are not limited to infection, bleeding, injury to adjacent structures, wound healing complications, need for further surgery, DVT/PE, loss of limb, life.  The patient seems to understand and accepts these risks and wishes to proceed with the proposed procedure.    Tentative plan for left below-knee amputation today.  LINOO.  MAG Haines  02/04/25  09:35 EST

## 2025-02-04 NOTE — PROGRESS NOTES
[facility]  Vascular Surgery Progress Note    Patient Name: Sara Esparza  : 1950  MRN: [id number]  Date of admission: 2025  Surgical Procedures Since Admission:  Procedure(s):  HEEL IRRIGATION AND DEBRIDEMENT LEFT  PLACEMENT OF WOUND VAC  Surgeon:  Rom Gonzalez Jr., MD  Status:  11 Days Post-Op  -------------------    Procedure(s):  CFA ACCESS RIGHT - ULTRASOUND GUIDED, AORTOGRAM WITH LEFT LOWER EXTREMITY RUN OFF, ANGIOPLASTY, INTRAVASCULAR LITHOTRIPSY, RIGHT CFA CLOSURE  Surgeon:  Kyle Ramirez MD  Status:  1 Day Post-Op  -------------------    Procedure(s):  LEFT BELOW KNEE AMPUTATION, ANTIBIOTIC CEMENT PLACEMENT  PLACEMENT OF ANTIBIOTIC CEMENT FILLER FOR BONE VOID  Surgeon:  Rom Gonzalez Jr., MD  Status:  * No surgery found *  -------------------    Subjective   Subjective     Subjective: No acute events overnight. Tearful this morning about left BKA. Pain controlled. No other complaints or concerns.       Objective   Objective     Vitals:   Temp:  [97 °F (36.1 °C)-99.4 °F (37.4 °C)] 97.2 °F (36.2 °C)  Heart Rate:  [61-77] 61  Resp:  [16-21] 18  BP: (111-175)/(49-84) 174/77  Flow (L/min) (Oxygen Therapy):  [0-3] 2  FiO2 (%):  [94 %-95 %] 94 %  Output by Drain (mL) 25 0701 - 25 1900 25 1901 - 25 0700 25 0701 - 25 0834 Range Total   Patient has no LDAs of requested type attached.       Physical Exam  Vitals reviewed. Exam conducted with a chaperone present ( at bedside).   Constitutional:       General: She is not in acute distress.     Appearance: Normal appearance. She is not ill-appearing, toxic-appearing or diaphoretic.   HENT:      Head: Normocephalic and atraumatic.      Right Ear: External ear normal.      Left Ear: External ear normal.      Nose: Nose normal.      Mouth/Throat:      Mouth: Mucous membranes are moist.   Eyes:      Extraocular Movements: Extraocular movements intact.       Conjunctiva/sclera: Conjunctivae normal.   Cardiovascular:      Rate and Rhythm: Normal rate.      Pulses:           Femoral pulses are 1+ on the right side and 1+ on the left side.     Comments: Palpable LEFT PTA pulse although diminished. Audible PTA and ASHA signal on the LEFT.   Pulmonary:      Effort: Pulmonary effort is normal. No respiratory distress.   Abdominal:      General: There is no distension.      Palpations: Abdomen is soft.      Tenderness: There is no abdominal tenderness.   Musculoskeletal:         General: No swelling or tenderness.      Cervical back: Normal range of motion and neck supple.   Skin:     General: Skin is warm.      Comments: Left foot dressing and wound vac in place.   Right groin access site clean, dry and intact without hematoma, drainage or erythema.    Neurological:      Mental Status: She is alert.   Psychiatric:         Mood and Affect: Mood normal.         Behavior: Behavior normal.         Thought Content: Thought content normal.         Judgment: Judgment normal.           Result Review    Lab Results   Component Value Date    WBC 8.05 02/03/2025    HGB 9.9 (L) 02/03/2025    HCT 32.0 (L) 02/03/2025    .6 (H) 02/03/2025     02/03/2025     Lab Results   Component Value Date    GLUCOSE 109 (H) 02/03/2025    CALCIUM 9.2 02/03/2025     (L) 02/03/2025    K 5.3 (H) 02/03/2025    CO2 22.0 02/03/2025    CL 95 (L) 02/03/2025    BUN 45 (H) 02/03/2025    CREATININE 5.56 (H) 02/03/2025    EGFR 7.5 (L) 02/03/2025    BCR 8.1 02/03/2025    ANIONGAP 13.0 02/03/2025     Adult Transthoracic Echo Complete W/ Cont if Necessary Per Protocol    Left ventricular systolic function is normal. Estimated left   ventricular EF = 55%    Left ventricular wall thickness is consistent with moderate concentric   hypertrophy.    No hemodynamically significant valvular heart disease          Assessment & Plan   Assessment / Plan     Brief Patient Summary:  Sara Esparza is a 74 y.o.  female with left foot ulceration s/p LEFT lower extremity revascularization. Plan for LEFT BKA with Dr. Gonzalez.    Plan:   - ASA and statin   - no barriers from vascular perspective to resume Eliquis for afib   - wound care and weight bearing recommendations per orthopedics     No additional vascular intervention warranted at this time. Vascular to sign off. Please reach out to on call provider with questions, concerns or re-consultation.     Plan of care reviewed with the patient and her  who verbalized their understanding and agreement. Case reviewed with Dr. Ramirez.     Iliana Rubalcava PA-C  2/4/25   08:39 EST

## 2025-02-04 NOTE — PROGRESS NOTES
[facility] Progress Note    Admission Date: 1/19/2025    Sara Esparza  1950  [id number]    Date: 2/4/2025    Antibiotics:  Anti-Infectives (From admission, onward)      Ordered     Dose/Rate Route Frequency Start Stop    02/03/25 0720  ceFAZolin 2000 mg IVPB in 100 mL NS (MBP)        Ordering Provider: Kyle Ramirez MD    2,000 mg  over 30 Minutes Intravenous Once 02/03/25 0722 02/03/25 0817    02/01/25 1332  vancomycin (dosing per levels)        Ordering Provider: Kyle Ramirez MD     Not Applicable Daily 02/01/25 1415 03/07/25 0859    01/29/25 1059  vancomycin (VANCOCIN) 1,000 mg in sodium chloride 0.9 % 250 mL IVPB-VTB  Status:  Discontinued        Ordering Provider: Ricardo Chappell, PharmD    1,000 mg  250 mL/hr over 60 Minutes Intravenous Once per day on Monday Wednesday Friday 01/29/25 1600 02/01/25 1332    01/27/25 1850  cefTRIAXone (ROCEPHIN) 2,000 mg in sodium chloride 0.9 % 100 mL MBP  Status:  Discontinued        Ordering Provider: Camden Ashford RPH    2,000 mg  200 mL/hr over 30 Minutes Intravenous Every 24 Hours 01/27/25 2000 02/02/25 1502    01/27/25 0920  vancomycin (VANCOCIN) 1,000 mg in sodium chloride 0.9 % 250 mL IVPB-VTB        Ordering Provider: Ricardo Chappell, PharmD    1,000 mg  250 mL/hr over 60 Minutes Intravenous Once 01/27/25 1600 01/27/25 1857    01/27/25 0949  Pharmacy to dose vancomycin        Ordering Provider: Kyle Ramirez MD     Not Applicable Continuous PRN 01/27/25 0948 02/10/25 0947    01/27/25 0920  vancomycin (dosing per levels)  Status:  Discontinued        Ordering Provider: Ricardo Chappell, PharmD     Not Applicable Daily 01/27/25 0920 01/29/25 1059    01/24/25 1230  vancomycin (VANCOCIN) 1,000 mg in sodium chloride 0.9 % 250 mL IVPB-VTB        Ordering Provider: Edmund Erwin, Amrita    1,000 mg  250 mL/hr over 60 Minutes Intravenous Once 01/24/25 1600 01/24/25 1723    01/22/25 1230  vancomycin 750 mg in sodium chloride 0.9 % 250 mL IVPB-VTB         Ordering Provider: Edmund Erwin, Amrita    750 mg  333.3 mL/hr over 45 Minutes Intravenous Once 01/22/25 1400 01/22/25 1810    01/21/25 0519  cefepime 1000 mg IVPB in 100 mL NS (MBP)  Status:  Discontinued        Ordering Provider: Rom Gonzalez Jr., MD    1,000 mg  over 4 Hours Intravenous Every 24 Hours 01/22/25 0600 01/27/25 1851    01/21/25 0537  vancomycin (dosing per levels)  Status:  Discontinued        Ordering Provider: Rom Gonzalez Jr., MD     Not Applicable Daily 01/21/25 0900 01/27/25 1320    01/21/25 0519  cefepime 1000 mg IVPB in 100 mL NS (MBP)        Ordering Provider: Conrad Alvarez MD    1,000 mg  over 30 Minutes Intravenous Once 01/21/25 0615 01/21/25 0715    01/21/25 0505  metroNIDAZOLE (FLAGYL) IVPB 500 mg        Ordering Provider: Rom Gonzalez Jr., MD    500 mg  200 mL/hr over 30 Minutes Intravenous Every 8 Hours 01/21/25 0600 01/26/25 0759    01/21/25 0513  vancomycin 2250 mg/500 mL 0.9% NS IVPB ([facility])        Ordering Provider: Woo Wells, PharmD    20 mg/kg × 108 kg  over 135 Minutes Intravenous Once 01/21/25 0600 01/21/25 0950    01/21/25 0505  Pharmacy to dose vancomycin  Status:  Discontinued        Ordering Provider: Rom Gonzalez Jr., MD     Not Applicable Continuous PRN 01/21/25 0500 01/27/25 1318    01/20/25 0738  cefTRIAXone (ROCEPHIN) 1,000 mg in sodium chloride 0.9 % 100 mL MBP  Status:  Discontinued        Ordering Provider: Leslye Fiore MD    1,000 mg  200 mL/hr over 30 Minutes Intravenous Every 24 Hours 01/20/25 0900 01/21/25 0519            Reason for Consultation: Sepsis and osteomyelitis of the left calcaneus     History of present illness:    1/22/25: Patient is a 74 y.o. female with extensive comorbidity including diabetes mellitus, mild CAD, pulmonary embolism, bilateral TKA, and CKD 5 for which she has been on dialysis since 9/2024 who was admitted through the ED on 1/19 for lightheadedness and progressive weakness in addition to multiple  complaints including progressive neck pain over weeks to months.  Evaluation in the ED included WBC 14.3 and PCT 5.9.  Subsequent lactic acid was 4.1 on 1/21.  After admission she became febrile to 101.9.  She was started on ceftriaxone and vancomycin.  Blood culture was sent yesterday afternoon.  Urinalysis showed pyuria.  Urine culture showed mixed hanny in the lab discarded the culture.  She was noted to have a left calcaneus wound.  CT scan of the chest, abdomen and pelvis were unremarkable for acute process.  CT scan of the left lower extremity showed a wound at the plantar aspect of the left calcaneus with subcutaneous and intraosseous air and destructive changes .  CT scan of the neck showed advanced C5-6 and C6-7 degenerative disc disease; suspected moderate or greater C5-6 canal stenosis due to posterior vertebral osteophytes.  Wound culture of the left heel is growing MRSA and a gram-negative ange.  At the time of my exam the patient is nonverbal other than moaning.  She does not follow commands.  Her  is at the bedside and is able to provide medical history although he is a little uncertain about timing of events such as when she started dialysis.      She had a dialysis catheter placed 9/13 which was replaced on 10/11.    1/23/2025.  Afebrile x 2 days.  She is considerably more alert today.  She remains on IV amiodarone for atrial fibrillation with RVR.  She complains of pain but does not localize it to any specific site.  Wound culture with MRSA and Morganella.  Blood culture negative.    1/24/2025.  Afebrile.  She is seen on dialysis today.  She she is alert and mildly confused but understands that she is scheduled to undergo surgery today.  In discussion with her  in the patient's room he recounted that it was explained to her that she was at high risk for limb loss based on the extent of osteomyelitis involving her calcaneus.  MRI was attempted yesterday but aborted because of  pain.    1/25/2025.  Afebrile.  Alert. Uncomfortable.  states that she is discouraged because she was found to have extensive osteo at surgery yesterday.  He states that he does not see how we will be possible for her to go home.    1/26/25 Afebrile, appears more lucid. Appears more comfortable although she is scratching torso and upper extremities; the  states this is a chronic problem  MRSA from op cultures  1/27/25 Afebrile. Seen on HD. No new c/o  Note plans for arteriogram  MRSA from op cultures    1/28/25  Afebrile, alert but seems unable to remember that she is scheduled for vascular evaluation next week. I reminded her that Dr Gonzalez is very concerned Re: ability to salvage her left leg and she acknowledged this. Her  at the bedside appears to understand all of these issues.     1/29/2025:  She remains afebrile.  Left foot culture from 1/24 is growing MRSA.  Vancomycin random level is 21.2.  White blood cell count yesterday was 9.6.   CT scan of 1/21 revealed a draining wound along the plantar aspect of the left calcaneus with subcutaneous air and destructive changes and intraosseous air along the plantar aspect of the calcaneus consistent with osteomyelitis.  She is scheduled for left lower extremity angiogram on 2/3/2025.  Photo of her wound from 1/28 reveals exposed bone.  She complains of neck/back pain.      1/30/2025: She remains afebrile.  Random vancomycin level yesterday was 21.2.  She denies uncontrolled pain.  She denies nausea and vomiting.   She is more willing to consider amputation    1/31/25: She has remained afebrile.  She is scheduled to undergo vascular intervention on Monday.   Anaerobic culture from 1/24 grew cutibacterium acnes.  I examined her with Cody Mehta and PT wound care today.  She denies uncontrolled pain.  She denies nausea and vomiting    2/1/2025:  She remains afebrile.  White blood cell count yesterday was 11.6.  She has now decided she will  proceed with a left BKA.  She denies nausea, vomiting, and diarrhea.    2/2/25:  She remains afebrile.  Random vancomycin level yesterday was 30.7.  She denies nausea and vomiting.    2/3/2025: She is scheduled for vascular intervention today and BKA on 2/4.  She remains afebrile.  White blood cell count is 0.1.  Creatinine is 5.6.   Vancomycin random level is 26.   I saw her in the recovery room today.    2/4/2025:  She underwent vascular intervention yesterday with angioplasty of the anterior tibial artery.   Maximum temperature over the last 24 hours is 99.4.   She underwent left BKA today.    PE:  Vital Signs  Temp  Min: 97 °F (36.1 °C)  Max: 99.4 °F (37.4 °C)  BP  Min: 111/49  Max: 175/84  Pulse  Min: 61  Max: 77  Resp  Min: 16  Max: 21  SpO2  Min: 92 %  Max: 98 %    GENERAL:   Drowsy after anesthesia.  HEENT: Normocephalic, atraumatic.   No conjunctival injection. No icterus.  No oral labial lesions   NECK: No evidence of meningismus  HEART: No murmur, rubs, gallops.   LUNGS: Clear anteriorly, no wheezes or rhonchi appreciated  ABDOMEN:  nondistended.  :  Without Corral catheter.  MSK:    Left BKA with a postoperative dressing in place  SKIN: Warm and dry without cutaneous eruptions on Inspection/palpation.    NEURO:  Drowsy but arousable.         Laboratory Data    Results from last 7 days   Lab Units 02/03/25  0347 02/02/25  1253 01/31/25  0726   WBC 10*3/mm3 8.05 7.90 11.55*   HEMOGLOBIN g/dL 9.9* 9.8* 10.1*   HEMATOCRIT % 32.0* 31.4* 32.7*   PLATELETS 10*3/mm3 259 246 271     Results from last 7 days   Lab Units 02/03/25  0347   SODIUM mmol/L 130*   POTASSIUM mmol/L 5.3*   CHLORIDE mmol/L 95*   CO2 mmol/L 22.0   BUN mg/dL 45*   CREATININE mg/dL 5.56*   GLUCOSE mg/dL 109*   CALCIUM mg/dL 9.2                     Estimated Creatinine Clearance: 11.1 mL/min (A) (by C-G formula based on SCr of 5.56 mg/dL (H)).      Microbiology:  MRSA and Morganella from foot wound    Radiology:  Imaging Results (Last 72  Hours)       Procedure Component Value Units Date/Time    XR [place] OR Procedure [[id number]] Resulted: 02/03/25 1002     Updated: 02/03/25 1002            I read her CT scan images of 1/21.      Impression:   1.  Left calcaneal osteomyelitis-  Status post left BKA on 2/4.  I will leave her on the vancomycin for the time being.  2.  Sepsis-improved.   3.  Pyuria-with mixed hanny on culture  4.  End-stage renal disease-on hemodialysis  5.  Type 2 diabetes mellitus  6.  Paroxysmal atrial fibrillation  7.  Toxic/metabolic encephalopathy-improved   8.  Leukocytosis/neutrophilia-improved  9.  Peripheral vascular disease-S/P vascular intervention on 2/3  10.  Cervical spinal stenosis  11.  Peripheral vascular disease-she is scheduled for vascular intervention on 2/3       PLAN/RECOMMENDATIONS:  -- Continue vancomycin   -- Continue wound care  -- Left BKA.  Performed on 2/4.  -- S/P Vascular intervention 2/3      This visit included the following complex service elements:  Complex medical decision-making associated with antimicrobial prescribing.  In-depth chart review with high level synthesis for complex diagnoses.  Managed infection treatment protocol associated with transitions of care for this complex patient.  Teja Blue MD  2/4/2025

## 2025-02-04 NOTE — ANESTHESIA PREPROCEDURE EVALUATION
Anesthesia Evaluation     Patient summary reviewed and Nursing notes reviewed                Airway   Mallampati: II  TM distance: >3 FB  Neck ROM: full  No difficulty expected  Dental - normal exam     Comment: POOR DENTITION    Pulmonary - normal exam   (+) asthma,sleep apnea  Cardiovascular - normal exam    (+) hypertension, dysrhythmias Atrial Fib, PVD      Neuro/Psych  (+) headaches, psychiatric history Anxiety and Depression  GI/Hepatic/Renal/Endo    (+) GERD, renal disease- ESRD, diabetes mellitus    Musculoskeletal     (+) back pain  Abdominal  - normal exam    Bowel sounds: normal.   Substance History - negative use     OB/GYN negative ob/gyn ROS         Other   arthritis,                   Anesthesia Plan    ASA 3     general     (POPLITEAL CATHETER/ADDUCTOR SS FOR POSTOP PAIN)  intravenous induction     Anesthetic plan, risks, benefits, and alternatives have been provided, discussed and informed consent has been obtained with: patient.    Plan discussed with CRNA.      CODE STATUS:    Level Of Support Discussed With: Patient  Code Status (Patient has no pulse and is not breathing): CPR (Attempt to Resuscitate)  Medical Interventions (Patient has pulse or is breathing): Full Support       2024 Addendum to Admission Note Generated by GRECIA Castanon on   2024 17:50    Patient Name:ABIGAIL NORRIS   Account #:[id number]  MRN:[id number]  Gender:Female  YOB: 2025 14:13:00    PHYSICAL EXAMINATION    Respiratory StatusRoom Air    Growth Parameter(s)Weight: 2.520 kg   Length: 45.7 cm   HC: 30.5 cm    General:Bed/Temperature Support (stable in open crib); Respiratory Support (room   air);  Head:normocephalic; fontanelle soft; sutures (mobile, normal);  Eyes:red reflex  (bilateral); periorbital edema eyelid (bilateral);  Ears:ears (normal);  Nose:nares (patent);  Throat:mouth (normal); oral cavity (normal); hard palate (Intact); soft palate   (Intact); tongue (normal);  Neck:general appearance (normal); range of motion (normal);  Respiratory:respiratory effort (20-40 breaths/min, normal); breath sounds   (bilateral, clear);  Cardiac:precordium (normal); rhythm (sinus rhythm); murmur (no); perfusion   (normal); pulses (normal);  Abdomen:abdomen (bowel sounds present, flat, nontender, organomegaly absent,   soft); umbilical cord (3 vessel);  Genitourinary:genitalia (female, normal, term);  Anus and Rectum:anus (patent);  Spine:spine appearance (normal);  Extremity:deformity (no); range of motion (normal); hip click (no); clavicular   fracture (no);  Skin:skin appearance (term); cafe au lait spots upper back ; congenital dermal   melanocytosis (buttock);  Neuro:mental status (responsive); muscle tone (normal); Fidelia reflex (normal);   grasp reflex (normal); suck reflex (normal);    CARE PLAN  1. Attending Note - Rounds  Onset: 2024  Comments  Infant examined, documentation reviewed and plan of care discussed with NNP.   Vaginal delivery. Breast and bottle feeding. Mother updated at bedside.     Preparer:Haven Schaffer MD 2024 6:36 PM

## 2025-02-04 NOTE — ANESTHESIA PROCEDURE NOTES
Peripheral Block      Patient reassessed immediately prior to procedure    Patient location during procedure: pre-op  Start time: 2/4/2025 12:48 PM  Reason for block: at surgeon's request and post-op pain management  Performed by  CRNA/CAA: Kip Angel, CRNA  Assisted by: Cristal Guerra RN  Preanesthetic Checklist  Completed: patient identified, IV checked, site marked, risks and benefits discussed, surgical consent, monitors and equipment checked, pre-op evaluation and timeout performed  Prep:  Sterile barriers:cap, gloves, mask and washed/disinfected hands  Prep: ChloraPrep  Patient monitoring: blood pressure monitoring, continuous pulse oximetry and EKG  Procedure    Sedation: yes  Performed under: local infiltration  Guidance:ultrasound guided    ULTRASOUND INTERPRETATION.  Using ultrasound guidance a 20 G gauge needle was placed in close proximity to the nerve, at which point, under ultrasound guidance anesthetic was injected in the area of the nerve and spread of the anesthesia was seen on ultrasound in close proximity thereto.  There were no abnormalities seen on ultrasound; a digital image was taken; and the patient tolerated the procedure with no complications. Images:still images obtained, printed/placed on chart    Laterality:left  Block Type:popliteal  Injection Technique:catheter  Needle Type:echogenic and Tuohy  Needle Gauge:18 G  Resistance on Injection: none  Catheter Size:20 G  Cath Depth at skin: 11 cm    Medications Used: bupivacaine PF (MARCAINE) 0.25 % injection - Injection   30 mL - 2/4/2025 12:48:00 PM      Post Assessment  Injection Assessment: negative aspiration for heme, no paresthesia on injection and incremental injection  Patient Tolerance:comfortable throughout block  Complications:no  Additional Notes  CATHETER                               A high-frequency linear transducer, with sterile cover, was placed in the popliteal fossa to identify the popliteal artery and vein, Tibial  "nerve (TN) and Common Peroneal nerve (CP). The transducer was then moved in a cephalad fashion to observe the TN and CP nerve bifurcation to form the Sciatic Nerve. The insertion site was prepped and draped in sterile fashion. Skin and cutaneous tissue was infiltrated with 2-5 ml of 1% Lidocaine. Using ultrasound-guidance, an 18-gauge Contiplex Ultra 360 Touhy needle was advanced in plane from lateral to medial. Preservative-free normal saline was utilized for hydro-dissection of tissue, advancement of Touhy needle, and to confirm final needle placement posterior to the nerves. Local anesthetic injection spread, in incremental 3-5 ml injections, to surround both nerve structures. Aspiration every 5 ml to prevent intravascular injection. Injection was completed with negative aspiration of blood and negative intravascular injection. Injection pressures were normal with minimal resistance. A 20-gauge Contiplex Echo catheter was placed through the needle and advance out the tip of the Touhy 1-3 cm. The Touhy needle was then removed, and final catheter position verified (Below/above or Anterior/Posterior) the nerve structures. The catheter was secured in the usual fashion with skin glue, benzoin, steri-strips, CHG tegaderm and Label noting \"Nerve Block Catheter\". Jerk tape applied at yellow connector and catheter connection.    Performed by: Kip Angel, FARTUN            "

## 2025-02-04 NOTE — PROGRESS NOTES
[facility] at [facility]  IP Progress Note      Chief Complaint/Reason for service: #1 PAF RVR #2 hypertension    Subjective   Subjective: The patient is very tearful because she has been informed that she needs an amputation above the knee she denies shortness of breath.    Past medical, surgical, social and family history reviewed in the patient's electronic medical record.    Objective     Vital Sign Min/Max for last 24 hours  Temp  Min: 97 °F (36.1 °C)  Max: 99.4 °F (37.4 °C)   BP  Min: 111/49  Max: 175/84   Pulse  Min: 61  Max: 77   Resp  Min: 16  Max: 21   SpO2  Min: 92 %  Max: 98 %   Flow (L/min) (Oxygen Therapy)  Min: 0  Max: 3      Intake/Output Summary (Last 24 hours) at 2/4/2025 0743  Last data filed at 2/3/2025 1730  Gross per 24 hour   Intake 400 ml   Output 2350 ml   Net -1950 ml             Current Facility-Administered Medications:     acetaminophen (TYLENOL) tablet 650 mg, 650 mg, Oral, Q6H PRN, Kyle Ramirez MD, 650 mg at 01/30/25 1659    albumin human 25 % IV SOLN  - ADS Override Pull, , , ,     albumin human 25 % IV SOLN 25 g, 25 g, Intravenous, PRN, Kyle Ramirez MD    allopurinol (ZYLOPRIM) tablet 200 mg, 200 mg, Oral, Daily, Kyle Ramirez MD, 200 mg at 02/02/25 0957    amiodarone (PACERONE) tablet 200 mg, 200 mg, Oral, Q12H, Kyle Ramirez MD, 200 mg at 02/03/25 2029    atorvastatin (LIPITOR) tablet 10 mg, 10 mg, Oral, Daily, Kyle Ramirez MD, 10 mg at 02/02/25 0957    sennosides-docusate (PERICOLACE) 8.6-50 MG per tablet 2 tablet, 2 tablet, Oral, BID PRN, 2 tablet at 02/02/25 1452 **AND** polyethylene glycol (MIRALAX) packet 17 g, 17 g, Oral, Daily PRN, 17 g at 01/27/25 0934 **AND** bisacodyl (DULCOLAX) EC tablet 5 mg, 5 mg, Oral, Daily PRN, 5 mg at 01/26/25 1507 **AND** bisacodyl (DULCOLAX) suppository 10 mg, 10 mg, Rectal, Daily PRN, Kyle Ramirez MD    Calcium Replacement - Follow Nurse / BPA Driven Protocol, , Not  Applicable, PRN, Kyle Ramirez MD    dextrose (D50W) (25 g/50 mL) IV injection 25 g, 25 g, Intravenous, Q15 Min PRN, Kyle Ramirez MD    dextrose (GLUTOSE) oral gel 15 g, 15 g, Oral, Q15 Min PRN, Kyle Ramirez MD    epoetin gracia (EPOGEN,PROCRIT) injection 6,000 Units, 6,000 Units, Subcutaneous, Once per day on Monday Wednesday Friday, Kyle Ramirez MD, 6,000 Units at 01/31/25 1455    famotidine (PEPCID) tablet 20 mg, 20 mg, Oral, Daily, Kyle Ramirez MD, 20 mg at 02/02/25 0957    glucagon (GLUCAGEN) injection 1 mg, 1 mg, Intramuscular, Q15 Min PRN, Kyle Ramirez MD    heparin (porcine) injection 2,000 Units, 2,000 Units, Intracatheter, PRN, Kyle Ramirez MD, 2,000 Units at 02/01/25 1031    Insulin Lispro (humaLOG) injection 2-9 Units, 2-9 Units, Subcutaneous, 4x Daily AC & at Bedtime, Kyle Ramirez MD, 2 Units at 01/30/25 2040    lactated ringers infusion, 9 mL/hr, Intravenous, Continuous, Ann, Elvia A, CRNA    Lidocaine 4 % 2 patch, 2 patch, Transdermal, Q24H, Kyle Ramirez MD, 2 patch at 02/02/25 0957    Magnesium Standard Dose Replacement - Follow Nurse / BPA Driven Protocol, , Not Applicable, PRN, Kyle Ramirez MD    melatonin tablet 2.5 mg, 2.5 mg, Oral, Nightly PRN, Kyle Ramirez MD, 2.5 mg at 02/02/25 2041    metoprolol tartrate (LOPRESSOR) injection 5 mg, 5 mg, Intravenous, Q6H PRN, Kyle Ramirez MD, 5 mg at 01/23/25 0329    NIFEdipine XL (PROCARDIA XL) 24 hr tablet 60 mg, 60 mg, Oral, Q24H, Kyle Ramirez MD, 60 mg at 02/02/25 0957    ondansetron (ZOFRAN) injection 4 mg, 4 mg, Intravenous, Q6H PRN, Kyle Ramirez MD, 4 mg at 01/31/25 1042    oxyCODONE (ROXICODONE) immediate release tablet 5 mg, 5 mg, Oral, Q4H PRN, Kyle Ramirez MD, 5 mg at 02/02/25 1452    pantoprazole (PROTONIX) EC tablet 40 mg, 40 mg, Oral, Q AM, Kyle Ramirez MD, 40 mg at 02/02/25 0626    Pharmacy to dose vancomycin, , Not  Applicable, Continuous PRN, Kyle Ramirez MD    Potassium Replacement - Follow Nurse / BPA Driven Protocol, , Not Applicable, PRN, Kyle Ramirez MD    [COMPLETED] Insert Peripheral IV, , , Once **AND** sodium chloride 0.9 % flush 10 mL, 10 mL, Intravenous, PRN, Kyle Ramirez MD    sodium chloride 0.9 % flush 10 mL, 10 mL, Intravenous, Q12H, Kyle Ramirez MD, 10 mL at 02/03/25 2029    sodium chloride 0.9 % flush 10 mL, 10 mL, Intravenous, PRN, Kyle Ramirez MD    sodium chloride 0.9 % infusion 40 mL, 40 mL, Intravenous, PRN, Kyle Ramirez MD    traMADol (ULTRAM) tablet 50 mg, 50 mg, Oral, Q4H PRN, Kyle Ramirez MD, 50 mg at 02/02/25 0321    vancomycin (dosing per levels), , Not Applicable, Daily, Kyle Ramirez MD    Physical Exam: General Pleasant elderly female conscious blood pressure 170s, tearful        HEENT: Dialysis catheter noticed in the right side       Respiratory: No resting dyspnea or tachypnea       Cardiovascular: Regular rate and rhythm       GI: Soft and flat              Neuro: Facial expressions are symmetrical       Skin: Warm and dry       Psych: Tearful because of bad news    Results Review: Blood work is pending.  Heart rate 60s.  Blood pressures 1 16-1 79.    Radiology Results:  Imaging Results (Last 72 Hours)       Procedure Component Value Units Date/Time    XR [place] OR Procedure [[id number]] Resulted: 02/03/25 1002     Updated: 02/03/25 1002            EKG: Sinus rhythm prolonged QTc    ECHO: Preserved EF    Tele: Sinus rhythm    Assessment   Assessment/Plan: PAF RVR-patient is maintaining sinus rhythm.  She will need to resume anticoagulation when okay with the other physicians.  Check EKG to monitor QTc since has not been checked for a while  2 hypertension continue meds  3 patient has multiple CAD risk factors.  Patient had a negative stress nuclear perfusion scan July 2020 I will place her on perioperative Nitropaste    Naren  Mikey Escobar MD  02/04/25  07:43 EST

## 2025-02-04 NOTE — CASE MANAGEMENT/SOCIAL WORK
Continued Stay Note  [facility]     Patient Name: Sara Esparza  MRN: [id number]  Today's Date: 2/4/2025    Admit Date: 1/19/2025    Plan: TBD   Discharge Plan       Row Name 02/04/25 1058       Plan    Plan TBD    Plan Comments Per discussion in MDR, Pt is having L BKA today. She is receiving IV Abx per ID and is on room air. Will need PT/OT recommendations s/p sx. Current discharge plan is home. CM will continue to follow for medical readiness.    Final Discharge Disposition Code 01 - home or self-care                   Discharge Codes    No documentation.                 Expected Discharge Date and Time       Expected Discharge Date Expected Discharge Time    Feb 6, 2025               Blanka Puentes RN

## 2025-02-04 NOTE — PROGRESS NOTES
"   LOS: 16 days    Patient Care Team:  Toribio Roberts MD as PCP - General (Internal Medicine)    Subjective     Awaiting left BKA today.    Objective     Vital Signs:  Blood pressure 172/75, pulse 70, temperature 97.9 °F (36.6 °C), temperature source Oral, resp. rate 16, height 170.2 cm (67.01\"), weight 108 kg (238 lb 1.6 oz), SpO2 94%.      Intake/Output Summary (Last 24 hours) at 2/4/2025 1019  Last data filed at 2/3/2025 1730  Gross per 24 hour   Intake 300 ml   Output 2300 ml   Net -2000 ml        02/03 0701 - 02/04 0700  In: 400   Out: 2350     Physical Exam:        GENERAL: WD WF NAD  NEURO: Awake and alert.  No focal deficit  PSYCHIATRIC: Agitated, cooperative with PE  EYE: PE, no icterus, no conjunctivitis  ENT: omm dry, dentition intact,  Hearing intact  NECK: Supple , No JVD discernable,  Trachea midline  CV: No edema, RRR  LUNGS:  Quiet,  Nonlabored resp.  Symmetrical expansion  ABDOMEN: Nondistended, soft nontender.  : No [place], no palp bladder  SKIN: Warm and dry without rash  + RIJ TDC    Labs:  Results from last 7 days   Lab Units 02/03/25  0347 02/02/25  1253 01/31/25  0726   WBC 10*3/mm3 8.05 7.90 11.55*   HEMOGLOBIN g/dL 9.9* 9.8* 10.1*   PLATELETS 10*3/mm3 259 246 271     Results from last 7 days   Lab Units 02/03/25  0347 02/02/25  1253 02/01/25  1025 01/31/25  0726   SODIUM mmol/L 130* 132* 130* 131*   POTASSIUM mmol/L 5.3* 5.2 4.4 4.3   CHLORIDE mmol/L 95* 98 96* 97*   CO2 mmol/L 22.0 21.0* 25.0 25.0   BUN mg/dL 45* 38* 29* 36*   CREATININE mg/dL 5.56* 5.05* 3.84* 4.39*   CALCIUM mg/dL 9.2 9.2 9.1 9.4   PHOSPHORUS mg/dL  --   --  4.3  --    ALBUMIN g/dL  --   --  2.5*  --                        Estimated Creatinine Clearance: 11.1 mL/min (A) (by C-G formula based on SCr of 5.56 mg/dL (H)).         A/P:      ESRD: On HD MWF at [facility] with NAL .  Continue outpatient schedule.     HTN: Blood pressure stable.  Monitor for now.    Hyponatremia:   Due to underlying renal failure.  Adjust " with HD.     Hyperkalemia: Resolved.  Adjust with HD     Metabolic acidosis:  Adjust with HD     Anemia: Hemoglobin below goal.  Patient unable.  Monitor for now.  Transfuse as indicated for Hgb <7.     Volume: Earlier in admission patient with orthostatic hypotension with intravascular volume depletion.  Looks stable today.  Monitor with UF.     Hyperphosphatemia: Phos stable at 4.3.  Poor p.o. intake.  Binders on hold for now.     Nutrition: Low potassium low Phos high-protein diet.  Renal vitamin.    PVD: Patient post intervention yesterday by vascular surgery.  Patient to get left BKA today by orthopedics.     High risk and complexity patient.     Woo Barnes MD  02/04/25  10:19 EST

## 2025-02-04 NOTE — PLAN OF CARE
Goal Outcome Evaluation:  Plan of Care Reviewed With: patient, spouse        Progress: improving  Outcome Evaluation: VSS sats > 90% on 2LNC, NSR on tele. Left BKA performed this shift by Dr Espinosa. A&O x 2. No S/S of bleeding. Family @ bedside, call light within reach. Complaints of pain this shift, relief of pain reported post PRN meds administered (see mar). POC ongoing.

## 2025-02-04 NOTE — PROGRESS NOTES
[facility] Medicine Services  PROGRESS NOTE    Patient Name: Sara Esparza  : 1950  MRN: [id number]    Date of Admission: 2025  Primary Care Physician: Toribio Roberts MD    Subjective     CC: f/u osteomyelitis     HPI:  In bed,  at bedside. Very anxious about BKA this afternoon. No LLE pain but notes acute on chronic back pain. Tearful when she tells me that staff keeps trying to get her into the chair and it is very difficult and painful for her. Says she needs to get financial affairs in order before surgery -  tries to reassure her. He privately requested a mild sedative for her    Objective     Vital Signs:   Temp:  [97 °F (36.1 °C)-99.4 °F (37.4 °C)] 97.7 °F (36.5 °C)  Heart Rate:  [61-77] 71  Resp:  [16-20] 16  BP: (116-175)/(59-84) 173/68  Flow (L/min) (Oxygen Therapy):  [0-2] 2     Physical Exam:  Constitutional: Awake, alert and conversant. Laying in bed. Anxious and tearful at times   HENT: NCAT, mucous membranes moist  Respiratory: Clear to auscultation bilaterally, respiratory effort normal   Cardiovascular: RRR  Gastrointestinal: Positive bowel sounds, soft, nontender, nondistended  Musculoskeletal: No bilateral ankle edema. R upper chest tunneled HD catheter. L foot dressed - I did not remove for exam   Psychiatric: Anxious affect, cooperative with exam  Neurologic: Orientation questions deferred. Some situational confusion noted. Moves all extremities spontaneously without focal deficits, speech clear  Skin: No rashes to exposed surfaces       Results Reviewed:  LAB RESULTS:      Lab 25  1109 25  0347 25  1253 25  0726   WBC 6.86 8.05 7.90 11.55*   HEMOGLOBIN 9.2* 9.9* 9.8* 10.1*   HEMATOCRIT 29.0* 32.0* 31.4* 32.7*   PLATELETS 235 259 246 271   MCV 98.0* 100.6* 100.6* 101.2*   PROTIME  --  18.1*  --   --          Lab 25  1159 25  1109 25  0347 25  1253 25  1025 25  0726   SODIUM   --  133* 130* 132* 130* 131*   POTASSIUM 4.5 4.3 5.3* 5.2 4.4 4.3   CHLORIDE  --  98 95* 98 96* 97*   CO2  --  22.0 22.0 21.0* 25.0 25.0   ANION GAP  --  13.0 13.0 13.0 9.0 9.0   BUN  --  32* 45* 38* 29* 36*   CREATININE  --  4.23* 5.56* 5.05* 3.84* 4.39*   EGFR  --  10.4* 7.5* 8.4* 11.7* 10.0*   GLUCOSE  --  92 109* 132* 152* 132*   CALCIUM  --  8.8 9.2 9.2 9.1 9.4   PHOSPHORUS  --   --   --   --  4.3  --          Lab 02/01/25  1025   ALBUMIN 2.5*         Lab 02/03/25  0347   PROTIME 18.1*   INR 1.48*         Lab 02/04/25  1335   ABO TYPING O   RH TYPING Positive     Brief Urine Lab Results  (Last result in the past 365 days)        Color   Clarity   Blood   Leuk Est   Nitrite   Protein   CREAT   Urine HCG        01/19/25 1404 Yellow   Cloudy   Moderate (2+)   Moderate (2+)   Negative   >=300 mg/dL (3+)                 Microbiology Results Abnormal       Procedure Component Value - Date/Time    Anaerobic Culture - Surgical Site, Foot, Left [[id number]]  (Abnormal) Collected: 01/24/25 1754    Lab Status: Final result Specimen: Surgical Site from Foot, Left Updated: 01/30/25 1515     Anaerobic Culture Cutibacterium acnes    Body Fluid Culture - Surgical Site, Foot, Left [[id number]]  (Abnormal)  (Susceptibility) Collected: 01/24/25 1754    Lab Status: Final result Specimen: Surgical Site from Foot, Left Updated: 01/27/25 0642     Body Fluid Culture Light growth (2+) Staphylococcus aureus, MRSA     Comment:   Methicillin resistant Staphylococcus aureus, Patient may be an isolation risk.        Gram Stain Rare (1+) WBCs seen      No organisms seen    Narrative:      Not a body fluid.    Susceptibility        Staphylococcus aureus, MRSA      TAYLOR      Clindamycin Susceptible      Erythromycin Resistant      Oxacillin Resistant      Rifampin Susceptible      Tetracycline Susceptible      Trimethoprim + Sulfamethoxazole Susceptible      Vancomycin Susceptible                           Wound Culture - Wound, Foot, Left  [[id number]]  (Abnormal)  (Susceptibility) Collected: 01/20/25 1409    Lab Status: Final result Specimen: Wound from Foot, Left Updated: 01/24/25 0658     Wound Culture Scant growth (1+) Staphylococcus aureus, MRSA     Comment: Methicillin resistant Staphylococcus aureus, Patient may be an isolation risk.         Rare growth Morganella morganii ssp morganii     Comment:            Scant growth (1+) Normal Skin Kimber     Gram Stain Few (2+) WBCs seen      Many (4+) Gram positive cocci in pairs      Rare (1+) Gram negative bacilli      Rare (1+) Gram positive bacilli    Susceptibility        Staphylococcus aureus, MRSA      TAYLOR      Clindamycin Susceptible      Erythromycin Resistant      Oxacillin Resistant      Rifampin Susceptible      Tetracycline Susceptible      Trimethoprim + Sulfamethoxazole Susceptible      Vancomycin Susceptible                       Susceptibility        Morganella morganii ssp morganii      TAYLOR      Amoxicillin + Clavulanate Resistant      Ampicillin Resistant      Ampicillin + Sulbactam Resistant      Cefazolin (Non Urine) Resistant      Cefepime Susceptible  [1]       Cefotaxime Susceptible      Ceftazidime Susceptible  [1]       Gentamicin Susceptible      Levofloxacin Susceptible      Piperacillin + Tazobactam Susceptible      Tetracycline Susceptible      Trimethoprim + Sulfamethoxazole Susceptible                   [1]  Appended report. These results have been appended to a previously final verified report.                Susceptibility Comments       Morganella morganii ssp morganii    Cefotaxime susceptibility can be used as a surrogate for ceftriaxone susceptibility               MRSA Screen, PCR (Inpatient) - Swab, Nares [[id number]]  (Abnormal) Collected: 01/21/25 0614    Lab Status: Final result Specimen: Swab from Nares Updated: 01/21/25 0847     MRSA PCR Positive    Narrative:      The negative predictive value of this diagnostic test is high and should only be used to consider  de-escalating anti-MRSA therapy. A positive result may indicate colonization with MRSA and must be correlated clinically.            Peripheral Block    Result Date: 2/4/2025  Kip Angel CRNA     2/4/2025  1:10 PM Peripheral Block Patient reassessed immediately prior to procedure Patient location during procedure: pre-op Start time: 2/4/2025 12:48 PM Reason for block: at surgeon's request and post-op pain management Performed by CRNA/CAA: Kip Angel, FARTUN Assisted by: Cristal Guerra RN Preanesthetic Checklist Completed: patient identified, IV checked, site marked, risks and benefits discussed, surgical consent, monitors and equipment checked, pre-op evaluation and timeout performed Prep: Sterile barriers:cap, gloves, mask and washed/disinfected hands Prep: ChloraPrep Patient monitoring: blood pressure monitoring, continuous pulse oximetry and EKG Procedure Sedation: yes Performed under: local infiltration Guidance:ultrasound guided ULTRASOUND INTERPRETATION.  Using ultrasound guidance a 20 G gauge needle was placed in close proximity to the nerve, at which point, under ultrasound guidance anesthetic was injected in the area of the nerve and spread of the anesthesia was seen on ultrasound in close proximity thereto.  There were no abnormalities seen on ultrasound; a digital image was taken; and the patient tolerated the procedure with no complications. Images:still images obtained, printed/placed on chart Laterality:left Block Type:popliteal Injection Technique:catheter Needle Type:echogenic and Tuohy Needle Gauge:18 G Resistance on Injection: none Catheter Size:20 G Cath Depth at skin: 11 cm Medications Used: bupivacaine PF (MARCAINE) 0.25 % injection - Injection  30 mL - 2/4/2025 12:48:00 PM Post Assessment Injection Assessment: negative aspiration for heme, no paresthesia on injection and incremental injection Patient Tolerance:comfortable throughout block Complications:no Additional Notes CATHETER            "                  A high-frequency linear transducer, with sterile cover, was placed in the popliteal fossa to identify the popliteal artery and vein, Tibial nerve (TN) and Common Peroneal nerve (CP). The transducer was then moved in a cephalad fashion to observe the TN and CP nerve bifurcation to form the Sciatic Nerve. The insertion site was prepped and draped in sterile fashion. Skin and cutaneous tissue was infiltrated with 2-5 ml of 1% Lidocaine. Using ultrasound-guidance, an 18-gauge Contiplex Ultra 360 Touhy needle was advanced in plane from lateral to medial. Preservative-free normal saline was utilized for hydro-dissection of tissue, advancement of Touhy needle, and to confirm final needle placement posterior to the nerves. Local anesthetic injection spread, in incremental 3-5 ml injections, to surround both nerve structures. Aspiration every 5 ml to prevent intravascular injection. Injection was completed with negative aspiration of blood and negative intravascular injection. Injection pressures were normal with minimal resistance. A 20-gauge Contiplex Echo catheter was placed through the needle and advance out the tip of the Touhy 1-3 cm. The Touhy needle was then removed, and final catheter position verified (Below/above or Anterior/Posterior) the nerve structures. The catheter was secured in the usual fashion with skin glue, benzoin, steri-strips, CHG tegaderm and Label noting \"Nerve Block Catheter\". Jerk tape applied at yellow connector and catheter connection. Performed by: Kip Angel CRNA     Peripheral Block    Result Date: 2/4/2025  Kip Angel CRNA     2/4/2025  1:09 PM Peripheral Block Patient reassessed immediately prior to procedure Patient location during procedure: pre-op Start time: 2/4/2025 12:44 PM Reason for block: at surgeon's request and post-op pain management Performed by FARTUN/CAA: Kip Angel CRNA Assisted by: Cristal Guerra RN Preanesthetic Checklist Completed: patient " identified, IV checked, site marked, risks and benefits discussed, surgical consent, monitors and equipment checked, pre-op evaluation and timeout performed Prep: Pt Position: supine Sterile barriers:cap, gloves, mask, sterile barriers and washed/disinfected hands Prep: ChloraPrep Patient monitoring: blood pressure monitoring, continuous pulse oximetry and EKG Procedure Performed under: spinal Guidance:ultrasound guided ULTRASOUND INTERPRETATION.  Using ultrasound guidance a 20 G gauge needle was placed in close proximity to the nerve, at which point, under ultrasound guidance anesthetic was injected in the area of the nerve and spread of the anesthesia was seen on ultrasound in close proximity thereto.  There were no abnormalities seen on ultrasound; a digital image was taken; and the patient tolerated the procedure with no complications. Images:still images obtained, printed/placed on chart Laterality:left Block Type:adductor canal block Injection Technique:single-shot Needle Type:Tuohy and echogenic Needle Gauge:18 G Resistance on Injection: none Catheter Size:20 G (20g) Medications Used: fentaNYL citrate (PF) (SUBLIMAZE) injection - Intravenous  100 mcg - 2/4/2025 12:44:00 PM dexamethasone sodium phosphate injection - Injection  2 mg - 2/4/2025 12:44:00 PM Post Assessment Injection Assessment: negative aspiration for heme, incremental injection and no paresthesia on injection Patient Tolerance:comfortable throughout block Complications:no Additional Notes SINGLE shot A high-frequency linear transducer, with sterile cover, was placed on the anterior mid-thigh (between the anterior superior iliac spine and patella). The transducer was then moved medially to identify the Sartorius muscle (Jenelle), Vastus Medialis muscle (VMM), Superficial Femoral Artery (SFA) and Vein. The transducer was then moved cephalad or caudad to position the SFA in the middle of the Jenelle. The insertion site was prepped and draped in sterile  "fashion. Skin and cutaneous tissue was infiltrated with 2-5 ml of 1% Lidocaine. Using ultrasound-guidance, a 20-gauge B-De Oliveira 4\" Ultraplex 360 non-stimulating echogenic needle was advanced in plane from lateral to medial. Preservative-free normal saline was utilized for hydro-dissection of tissue, advancement of needle, and to confirm needle placement below the fascial plane of the Jenelle where the Nerve to the VMM is located. Local anesthetic (LA) 5 ml deposited here. The needle continues its path lateral to the SFA at the level of the Saphenous Nerve. The remainder of the LA was deposited at the 10-11 o'clock position of the SFA. This injection created a space between the Jenelle and the SFA. Aspiration every 5 ml to prevent intravascular injection. Injection was completed with negative aspiration of blood and negative intravascular injection. Injection pressures were normal with minimal resistance. Performed by: Kip Angel CRNA     Results for orders placed during the hospital encounter of 01/19/25    Adult Transthoracic Echo Complete W/ Cont if Necessary Per Protocol    Interpretation Summary    Left ventricular systolic function is normal. Estimated left ventricular EF = 55%    Left ventricular wall thickness is consistent with moderate concentric hypertrophy.    No hemodynamically significant valvular heart disease      Current medications:  Scheduled Meds:albumin human, , ,   allopurinol, 200 mg, Oral, Daily  amiodarone, 200 mg, Oral, Q12H  [Transfer Hold] atorvastatin, 10 mg, Oral, Nightly  epoetin gracia/gracia-epbx, 6,000 Units, Subcutaneous, Once per day on Monday Wednesday Friday  [START ON 2/5/2025] famotidine, 10 mg, Oral, Daily  insulin lispro, 2-9 Units, Subcutaneous, 4x Daily AC & at Bedtime  Lidocaine, 2 patch, Transdermal, Q24H  NIFEdipine XL, 60 mg, Oral, Q24H  [Transfer Hold] nitroglycerin, 1 inch, Topical, Q6H  pantoprazole, 40 mg, Oral, Q AM  sodium chloride, 10 mL, Intravenous, Q12H  sodium chloride, " 10 mL, Intravenous, Q12H  vancomycin (dosing per levels), , Not Applicable, Daily      Continuous Infusions:[START ON 2/5/2025] lactated ringers, 9 mL/hr  Pharmacy to dose vancomycin,   ropivacaine,   sodium chloride, 9 mL/hr, Last Rate: 9 mL/hr (02/04/25 1207)      PRN Meds:.  acetaminophen    albumin human    albumin human    senna-docusate sodium **AND** polyethylene glycol **AND** bisacodyl **AND** bisacodyl    Calcium Replacement - Follow Nurse / BPA Driven Protocol    dextrose    dextrose    glucagon (human recombinant)    heparin (porcine)    lidocaine PF 1%    [Transfer Hold] Magnesium Low Dose Replacement - Follow Nurse / BPA Driven Protocol    melatonin    metoprolol tartrate    ondansetron    oxyCODONE    Pharmacy to dose vancomycin    Potassium Replacement - Follow Nurse / BPA Driven Protocol    [COMPLETED] Insert Peripheral IV **AND** sodium chloride    sodium chloride    sodium chloride    sodium chloride    traMADol    Assessment & Plan   Assessment & Plan     Active Hospital Problems    Diagnosis  POA    **Generalized weakness [R53.1]  Yes    Paroxysmal A-fib [I48.0]  Unknown    Sepsis due to methicillin resistant Staphylococcus aureus (MRSA) with encephalopathy without septic shock [A41.02, R65.20, G93.41]  Unknown    Chronic osteomyelitis of left foot [M86.672]  Unknown    Critical limb ischemia of left lower extremity [I70.222]  Unknown    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        Brief Hospital Course to date:  Sara Esparza is a 74 y.o. female with PMH significant for HTN, PAF (Eliquis), prior DVT/PE, ESRD on HD, DMII, asthma, DENZEL (CPAP). Presented to [facility] ED on 1/19/25 for evaluation of generalized weakness, myalgias, lightheadedness and falls. On  1/20, she developed fevers. Source ultimately found to be related to L calcaneal osteomyelitis. ID / ortho consulted to follow. She is s/p debridement and wound vac placement 1/24/25. Ortho recommended BKA. In an effort to optimize arterial flow and wound healing, Dr. Ramirez of vascular surgery performed LLE angiogram with angioplasty/lithotripsy on 2/3/25. She was agreeable to L BKA and this is planned for 2/4/25    Sepsis secondary to L calcaneal osteomyelitis  Metabolic encephalopathy  L posterial tibial artery occlusion  - Met sepsis criteria on 1/20 with fever 102.6F, 's, leukocytosis (WBC 15K), source of infection and altered mental status   - 1/21/25 CT LLE w/o contrast showed draining wound seen along plantar aspect of left calcaneus with surrounding changes consistent with osteomyelitis  - Appreciate orthopedic surgery assistance  - She is s/p debridement and wound vac placement 1/24/25 - probability of limb salvage felt to be poor  - Patient ultimately agreeable to BKA - planned for this afternoon  - s/p LLE angiogram with lithotripsy / angioplasty by Dr. Ramirez 2/3/25   - Wound culture (+) MRSA / Morganella   - 1/24 surgical cultures growing MRSA and Cutibacterium acnes  - ID following - on IV Vanc/Ceftriaxone   - PT / OT to follow postoperatively      Generalized weakness, progressive x weeks  Generalized debility   Falls at home   - CT chest/abd/pelvis without acute findings   - Normal TSH and CK   - PT/OT/CM following - anticipate will need SNF      Neck pain, C7 region   C5-6 canal stenosis  Chronic back pain s/p spinal stimulator  - CT C-spine - no fractures  - Reported history of Oxycodone dependence - attempted to avoid opiates however due to uncontrolled pain she was started on Oxycodone 5mg Q4H PRN on 2/1/25  - Continue Lidocaine patches  - Partner recommended that patient/ reach out to her pain management clinic for questions re: spinal stimulator     ESRD on HD  - Has tunneled R IJ  catheter - recent HD start in 9/2024  - Nephrology following  - Continue MWF HD      DMII, HbA1c 4.9  - On Sitagliptin at home  - On SSI only with fairly normal glucose      HTN   - BP was low but trending back up  - Continue Nifedipine XL 60mg daily   - Home Imdur on hold  - Carvedilol stopped due to AF RVR - now on Metoprolol    Hx of Afib  Episode of Afib with RVR during HD  - Was on home-dose Carvedilol but HR uncontrolled  - Appreciate cardiology assistance  - Started on Amiodarone. BB transitioned to Metoprolol - then stopped due to bradycardia  - Eliquis on hold perioperatively     Elevated troponin  - In setting of ESRD  - Troponin peaked at 132 on 1/19/25  - Per chart review, had LHC recently at [facility] with non-obstructive CAD     Dyspepsia  - GI cocktail PRN     Constipation  - Likely due to opiates  - Improved with bowel regimen and addition of Lactulose      Expected Discharge Location and Transportation: likely SNF   Expected Discharge Expected Discharge Date: 2/7/2025; Expected Discharge Time:      VTE Prophylaxis: Pharmacologic & mechanical VTE prophylaxis orders are present.    AM-PAC 6 Clicks Score (PT): 10 (02/04/25 8079)    CODE STATUS:   Code Status and Medical Interventions: CPR (Attempt to Resuscitate); Full Support   Ordered at: 01/19/25 0518     Level Of Support Discussed With:    Patient     Code Status (Patient has no pulse and is not breathing):    CPR (Attempt to Resuscitate)     Medical Interventions (Patient has pulse or is breathing):    Full Support     Chaya Clayton PA-C  02/04/25

## 2025-02-04 NOTE — OP NOTE
[facility]     OPERATIVE REPORT      PATIENT NAME:  Sara Esparza                            YOB: 1950       PREOP DIAGNOSIS:   Left  Left lower extremity osteomyelitis    POSTOP DIAGNOSIS:   Same.    PROCEDURE:   Left  Left     96247: Below-knee amputation  51866: Deep bone biopsy  78481: Antibiotic cement placement    SURGEON:     Rom Gonzalez MD    OPERATIVE TEAM:   Circulator: Vania Leslie RN; Gretchen Moreno RN; Natalia Balderrama RN  Scrub Person: Josh Viera  Nursing Assistant: Sabina Qiu; Jacinto Harp    ANESTHETIST:  Anesthesiologist: Wil Pearce MD  CRNA: Scott Ruth CRNA; Jeri Franco CRNA  Student Nurse Anesthetist: Angelito Pollard SRNA    ANESTHESIA:   General with Block    ESTIMATED BLOOD LOSS:   < 30 ml    TOURNIQUET TIME:   < 30 min    FINDINGS:     Remaining tissue appeared healthy.  Tension-free wound closure.  No overt signs or symptoms of infection in the residual limb.    IMPLANTS:     Cerament impregnated with vancomycin / gentamicin    SPECIMENS:     Specimens       ID Source Type Tests Collected By Collected At Frozen?    1 Leg, Left Tissue FUNGAL CULTURE  TISSUE / BONE CULTURE  AFB CULTURE  ANAEROBIC CULTURE 10 DAY INCUBATION   Rom Gonzalez Jr., MD 2/4/25 1436     Description: Below Knew Amputation Wound Swab    This specimen was not marked as sent.    2 Leg, Left Bone FUNGAL CULTURE  TISSUE / BONE CULTURE  AFB CULTURE  ANAEROBIC CULTURE 10 DAY INCUBATION   Rom Gonzalez Jr., MD 2/4/25 1437     Description: Left Tibia Bone Culture    This specimen was not marked as sent.    A Leg, Left Tissue TISSUE PATHOLOGY EXAM   Rom Gonzalez Jr., MD 2/4/25 1443 No    Description: left below knee amputation    This specimen was not marked as sent.            COMPLICATIONS:    None.    DISPOSITION:    Stable to recovery.     INDICATIONS:    74 y.o. female with Left  Left foot osteomyelitis, heel wound status post debridement and irrigation.  I  had a discussion with the patient regarding further management.  After discussion of risks, benefits, alternatives, they were amenable with Left  Left below-knee amputation with antibiotic cement placement.  Risks of surgery were discussed which included but were not limited to pain, bleeding, infection, damage to adjacent structures, need for further surgery, wound healing complications, loss of limb and death.  The patient expressed verbal consent and written consent was obtained for the above procedure.    NARRATIVE:    Patient was identified in preoperative holding.  Operative site was marked in indelible ink.  History, physical, consent were reviewed and updated.  Patient was surrendered to the anesthesia team and transported to the operative suite where anesthesia was induced.  Hip bump was placed on the ipsilateral side and nonsterile thigh tourniquet was placed.  Nonsterile dressing was placed over the foot and Ioban was placed over this.  Operative extremity was prepped and draped in the usual sterile fashion, prepping over the Ioban.  The operative team donned sterile gowns and gloves and a timeout was called.  All in attendance agreed regarding the patient's identity, proposed operative procedure, and operative site.    I elevated the operative extremity and the tourniquet was inflated to 300 mmHg.    I marked a transverse incision roughly 12 cm distal to the tibial tubercle, marked medial and lateral incisions for a posterior flap.  I made a full-thickness skin incision along the entire length of the incision.  Utilizing Bovie electrocautery, I incised to the anterior compartment, identified the anterior vascular bundle which was ligated with free and stick tie.  This was transected distal to the tie.  I placed an [facility] retractor deep to the tibia, transected the tibia using a sagittal saw roughly 12 cm distal to the tibial tubercle.  I transected the fibula approximately 1 cm proximal to the tibial  cut.  Using an amputation blade, I freed the foot, sent this for gross pathology.    I took swab specimens of the wound as well as deep bone biopsy from the residual tibia which I sent for culture.    I identified the posterior vascular structures and in a similar manner, ligated and transected them.  Identified the tibial nerve, freed it posterior to the tibia, injected anesthetic into the nerve, cut it proximal to the tibial cut, inserted it, retracted proximal to the tibial cut.  I then took down the tourniquet, achieved hemostasis, removed muscle fibers from the deep posterior compartment, leaving the gastroc and soleus for the flap.  I performed a chamfer cut at the distal aspect of the tibia.   I sharply incised redundant distal Achilles and gastrocnemius tendon, as well as redundant skin.      I copiously irrigated the wound with Irrisept and saline.    I placed Cerament impregnated with vancomycin and gentamicin into the distal tibia.  I placed vancomycin powder into the wound.  I placed a deep drain exiting superior laterally which was sewn in place.  I then closed in anatomic layers with the gastrocnemius tendon attached to the anterior fascia.  Remaining layers were closed with monofilament suture, skin was closed with staples.    Sterile dressing applied.  Anesthesia was concluded and the patient was transported to the PACU in stable condition.  Counts were correct x2.  There were no apparent complications.  I was present and scrubbed for the entire case.    Rom Gonzalez Jr, MD  2/4/2025

## 2025-02-04 NOTE — ANESTHESIA POSTPROCEDURE EVALUATION
Patient: Sara Esparza    Procedure Summary       Date: 02/04/25 Room / Location:  [facility]  [facility]  [facility]    Anesthesia Start: 1342 Anesthesia Stop: 1507    Procedures:       LEFT BELOW KNEE AMPUTATION, ANTIBIOTIC CEMENT PLACEMENT (Left: Leg Lower)      PLACEMENT OF ANTIBIOTIC CEMENT FILLER FOR BONE VOID (Left: Leg Lower) Diagnosis:       Sepsis due to methicillin resistant Staphylococcus aureus (MRSA) with encephalopathy without septic shock      (Sepsis due to methicillin resistant Staphylococcus aureus (MRSA) with encephalopathy without septic shock [A41.02, R65.20, G93.41])    Surgeons: Rom Gonzalez Jr., MD Provider: Wil Pearce MD    Anesthesia Type: general ASA Status: 3            Anesthesia Type: general    Vitals  Vitals Value Taken Time   /60 02/04/25 1508   Temp 98.1 °F (36.7 °C) 02/04/25 1508   Pulse 71 02/04/25 1508   Resp 14 02/04/25 1508   SpO2 93 % 02/04/25 1508           Post Anesthesia Care and Evaluation    Patient location during evaluation: PACU  Patient participation: complete - patient participated  Level of consciousness: awake and alert  Pain score: 0  Pain management: adequate    Airway patency: patent  Anesthetic complications: No anesthetic complications  PONV Status: none  Cardiovascular status: hemodynamically stable and acceptable  Respiratory status: nonlabored ventilation, acceptable and nasal cannula  Hydration status: acceptable

## 2025-02-04 NOTE — ANESTHESIA PROCEDURE NOTES
Airway  Urgency: elective    Date/Time: 2/4/2025 1:51 PM  Airway not difficult    General Information and Staff    Patient location during procedure: OR  SRNA: Angelito Pollard SRNA  Indications and Patient Condition  Indications for airway management: airway protection    Preoxygenated: yes  MILS not maintained throughout  Mask difficulty assessment: 1 - vent by mask    Final Airway Details  Final airway type: endotracheal airway      Successful airway: ETT  Cuffed: yes   Successful intubation technique: video laryngoscopy  Endotracheal tube insertion site: oral  Blade: Gutiérrez  Blade size: 3  ETT size (mm): 7.0  Cormack-Lehane Classification: grade I - full view of glottis  Placement verified by: chest auscultation and capnometry   Cuff volume (mL): 7  Measured from: lips  ETT/EBT  to lips (cm): 22  Number of attempts at approach: 1  Assessment: lips, teeth, and gum same as pre-op and atraumatic intubation    Additional Comments  Negative epigastric sounds, Breath sound equal bilaterally with symmetric chest rise and fall

## 2025-02-05 ENCOUNTER — APPOINTMENT (OUTPATIENT)
Dept: NEPHROLOGY | Facility: HOSPITAL | Age: 75
DRG: 853 | End: 2025-02-05
Payer: MEDICARE

## 2025-02-05 LAB
ALBUMIN SERPL-MCNC: 2.8 G/DL (ref 3.5–5.2)
ANION GAP SERPL CALCULATED.3IONS-SCNC: 13 MMOL/L (ref 5–15)
BUN SERPL-MCNC: 14 MG/DL (ref 8–23)
BUN/CREAT SERPL: 6.4 (ref 7–25)
CALCIUM SPEC-SCNC: 8.7 MG/DL (ref 8.6–10.5)
CHLORIDE SERPL-SCNC: 100 MMOL/L (ref 98–107)
CO2 SERPL-SCNC: 25 MMOL/L (ref 22–29)
CREAT SERPL-MCNC: 2.2 MG/DL (ref 0.57–1)
DEPRECATED RDW RBC AUTO: 50.2 FL (ref 37–54)
EGFRCR SERPLBLD CKD-EPI 2021: 22.9 ML/MIN/1.73
ERYTHROCYTE [DISTWIDTH] IN BLOOD BY AUTOMATED COUNT: 13.7 % (ref 12.3–15.4)
GLUCOSE BLDC GLUCOMTR-MCNC: 114 MG/DL (ref 70–130)
GLUCOSE BLDC GLUCOMTR-MCNC: 124 MG/DL (ref 70–130)
GLUCOSE BLDC GLUCOMTR-MCNC: 158 MG/DL (ref 70–130)
GLUCOSE BLDC GLUCOMTR-MCNC: 89 MG/DL (ref 70–130)
GLUCOSE SERPL-MCNC: 105 MG/DL (ref 65–99)
HCT VFR BLD AUTO: 29.4 % (ref 34–46.6)
HGB BLD-MCNC: 9.2 G/DL (ref 12–15.9)
MAGNESIUM SERPL-MCNC: 1.9 MG/DL (ref 1.6–2.4)
MCH RBC QN AUTO: 31.5 PG (ref 26.6–33)
MCHC RBC AUTO-ENTMCNC: 31.3 G/DL (ref 31.5–35.7)
MCV RBC AUTO: 100.7 FL (ref 79–97)
PHOSPHATE SERPL-MCNC: 2.8 MG/DL (ref 2.5–4.5)
PLATELET # BLD AUTO: 268 10*3/MM3 (ref 140–450)
PMV BLD AUTO: 10 FL (ref 6–12)
POTASSIUM SERPL-SCNC: 3.8 MMOL/L (ref 3.5–5.2)
QT INTERVAL: 426 MS
QTC INTERVAL: 446 MS
RBC # BLD AUTO: 2.92 10*6/MM3 (ref 3.77–5.28)
SODIUM SERPL-SCNC: 138 MMOL/L (ref 136–145)
VANCOMYCIN SERPL-MCNC: 33.2 MCG/ML (ref 5–40)
WBC NRBC COR # BLD AUTO: 5.58 10*3/MM3 (ref 3.4–10.8)

## 2025-02-05 PROCEDURE — 99232 SBSQ HOSP IP/OBS MODERATE 35: CPT | Performed by: INTERNAL MEDICINE

## 2025-02-05 PROCEDURE — 99232 SBSQ HOSP IP/OBS MODERATE 35: CPT | Performed by: PHYSICIAN ASSISTANT

## 2025-02-05 PROCEDURE — 63710000001 INSULIN LISPRO (HUMAN) PER 5 UNITS: Performed by: ORTHOPAEDIC SURGERY

## 2025-02-05 PROCEDURE — 82948 REAGENT STRIP/BLOOD GLUCOSE: CPT

## 2025-02-05 PROCEDURE — 83735 ASSAY OF MAGNESIUM: CPT | Performed by: PHYSICIAN ASSISTANT

## 2025-02-05 PROCEDURE — 80069 RENAL FUNCTION PANEL: CPT | Performed by: PHYSICIAN ASSISTANT

## 2025-02-05 PROCEDURE — 85027 COMPLETE CBC AUTOMATED: CPT | Performed by: PHYSICIAN ASSISTANT

## 2025-02-05 PROCEDURE — 80202 ASSAY OF VANCOMYCIN: CPT | Performed by: ORTHOPAEDIC SURGERY

## 2025-02-05 RX ORDER — AMIODARONE HYDROCHLORIDE 200 MG/1
200 TABLET ORAL
Status: DISCONTINUED | OUTPATIENT
Start: 2025-02-05 | End: 2025-02-12 | Stop reason: HOSPADM

## 2025-02-05 RX ADMIN — Medication 10 ML: at 12:37

## 2025-02-05 RX ADMIN — NITROGLYCERIN 1 INCH: 20 OINTMENT TOPICAL at 05:30

## 2025-02-05 RX ADMIN — NIFEDIPINE 60 MG: 60 TABLET, EXTENDED RELEASE ORAL at 12:36

## 2025-02-05 RX ADMIN — OXYCODONE HYDROCHLORIDE 5 MG: 5 TABLET ORAL at 12:54

## 2025-02-05 RX ADMIN — NITROGLYCERIN 1 INCH: 20 OINTMENT TOPICAL at 18:11

## 2025-02-05 RX ADMIN — PANTOPRAZOLE SODIUM 40 MG: 40 TABLET, DELAYED RELEASE ORAL at 05:30

## 2025-02-05 RX ADMIN — ALLOPURINOL 200 MG: 100 TABLET ORAL at 12:35

## 2025-02-05 RX ADMIN — NITROGLYCERIN 1 INCH: 20 OINTMENT TOPICAL at 12:35

## 2025-02-05 RX ADMIN — Medication 10 ML: at 20:59

## 2025-02-05 RX ADMIN — AMIODARONE HYDROCHLORIDE 200 MG: 200 TABLET ORAL at 20:48

## 2025-02-05 RX ADMIN — OXYCODONE HYDROCHLORIDE 5 MG: 5 TABLET ORAL at 20:48

## 2025-02-05 RX ADMIN — APIXABAN 2.5 MG: 2.5 TABLET, FILM COATED ORAL at 20:48

## 2025-02-05 RX ADMIN — INSULIN LISPRO 2 UNITS: 100 INJECTION, SOLUTION INTRAVENOUS; SUBCUTANEOUS at 20:54

## 2025-02-05 RX ADMIN — FAMOTIDINE 10 MG: 20 TABLET, FILM COATED ORAL at 12:36

## 2025-02-05 RX ADMIN — ATORVASTATIN CALCIUM 10 MG: 10 TABLET, FILM COATED ORAL at 20:48

## 2025-02-05 NOTE — PROGRESS NOTES
Sara Esparza       LOS: 17 days   Patient Care Team:  Toribio Roberts MD as PCP - General (Internal Medicine)    Chief Complaint: Left heel ulcer    Subjective     Interval History:     Resting comfortably in bed this morning.  Family present at bedside.     Review of Systems:      Gen- No fevers, chills  CV- No chest pain, palpitations  Resp- No cough, dyspnea  GI- No N/V/D, abd pain    Objective     Vital Signs  Vital Signs (last 24 hours)         01/22 0700  01/23 0659 01/23 0700 01/23 0827   Most Recent      Temp (°F) 97.9 -  98.6      98     98 (36.7) 01/23 0700    Heart Rate 73 -  167       99 01/23 0341    Resp 16 -  24      18     18 01/23 0700    BP 74/54 -  156/71       132/90 01/23 0341    SpO2 (%) 90 -  99       94 01/23 0341    Flow (L/min) (Oxygen Therapy)   3       3 01/23 0600              Physical Exam:     No acute distress.  Nonlabored respirations.  Regular rate and rhythm.  Abdomen nondistended.  Left lower extremity: Operative dressing present is clean, dry, intact.  LENORA drain in place with approximately 10 cc serosanguineous drainage.     Results Review:     I reviewed the patient's new clinical results.    Medication Review:   Hospital Medications (active)         Dose Frequency Start End    acetaminophen (TYLENOL) tablet 650 mg 650 mg Every 6 Hours PRN 1/19/2025 --    Admin Instructions: If given for fever, use fever parameter: fever greater than 100.4 °F  Based on patient request - if ordered for moderate or severe pain, provider allows for administration of a medication prescribed for a lower pain scale.    Do not exceed 4 grams of acetaminophen in a 24 hr period. Max dose of 2gm for AST/ALT greater than 120 units/L.    If given for pain, use the following pain scale:   Mild Pain = Pain Score of 1-3, CPOT 1-2  Moderate Pain = Pain Score of 4-6, CPOT 3-4  Severe Pain = Pain Score of 7-10, CPOT 5-8    Route: Oral    albumin human 25 % IV SOLN  - ADS Override Pull   1/22/2025 --     "Admin Instructions: Created by cabinet override    Notes to Pharmacy: Created by cabinet override    allopurinol (ZYLOPRIM) tablet 200 mg 200 mg Daily 1/19/2025 --    Admin Instructions: ([facility]) Take with food if GI upset occurs.    Route: Oral    amiodarone (PACERONE) tablet 200 mg 200 mg 3 Times Daily 1/23/2025 --    Admin Instructions: Avoid grapefruit juice while taking this medication.    Route: Oral    amiodarone 360 mg in 200 mL D5W infusion 1 mg/min Continuous 1/22/2025 --    Admin Instructions: Central line preferred, if unavailable use large bore IV access with frequent nurse monitoring of IV site.  IV med requiring filtration 0.22 micron inline filter.    Route: Intravenous    apixaban (ELIQUIS) tablet 2.5 mg 2.5 mg Every 12 Hours Scheduled 1/22/2025 --    Admin Instructions: Tablet may be crushed and suspended in 60 mL of water or D5W and immediately delivered via NG tube.  Avoid grapefruit juice.    Route: Oral    atorvastatin (LIPITOR) tablet 10 mg 10 mg Daily 1/22/2025 3/18/2025    Admin Instructions: Avoid grapefruit juice.    Route: Oral    bisacodyl (DULCOLAX) EC tablet 5 mg 5 mg Daily PRN 1/19/2025 --    Admin Instructions: Use if no bowel movement after 12 hours.  Swallow whole. Do not crush, split, or chew tablet.    Route: Oral    Linked Group 1: Placed in \"And\" Linked Group        bisacodyl (DULCOLAX) suppository 10 mg 10 mg Daily PRN 1/19/2025 --    Admin Instructions: Use if no bowel movement after 12 hours.  Hold for diarrhea    Route: Rectal    Linked Group 1: Placed in \"And\" Linked Group        Calcium Replacement - Follow Nurse / BPA Driven Protocol  As Needed 1/19/2025 --    Admin Instructions: Open Order & Select \"BHS Electrolyte Replacement Protocol Algorithm\" to View Details    Route: Not Applicable    cefepime 1000 mg IVPB in 100 mL NS (MBP) 1,000 mg Every 24 Hours 1/22/2025 1/29/2025    Admin Instructions: Give after dialysis on dialysis days.     Route: Intravenous    dextrose " (D50W) (25 g/50 mL) IV injection 25 g 25 g Every 15 Minutes PRN 1/19/2025 --    Admin Instructions: Blood sugar less than 70; patient has IV access - Unresponsive, NPO or Unable To Safely Swallow    Route: Intravenous    dextrose (GLUTOSE) oral gel 15 g 15 g Every 15 Minutes PRN 1/19/2025 --    Admin Instructions: BS<70, Patient Alert, Is not NPO, Can safely swallow.    Route: Oral    dilTIAZem (CARDIZEM) 125 mg in 125 mL D5W infusion 5-15 mg/hr Continuous 1/22/2025 --    Admin Instructions: For heart rate - To maintain HR less than 120, initiate infusion at 5 mg/hr. Titrate up or down 2.5-5 mg/hr every 15 minutes to use the lowest dose possible. Maximum infusion rate of 15 mg/hr. Hold for SBP less than 100 mmHg or heart rate less than 60. Contact provider if unable to maintain HR less than 120 on maximum dose. Once Heart Rate target achieved obtain vitals a minimum of every 30 minutes. For patients not in critical care areas - obtain vitals a minimum of every 4 hours.   Caution: Look alike/sound alike drug alert.   Refrigerate.    Route: Intravenous    famotidine (PEPCID) tablet 20 mg 20 mg Daily 1/22/2025 --    Route: Oral    glucagon (GLUCAGEN) injection 1 mg 1 mg Every 15 Minutes PRN 1/19/2025 --    Admin Instructions: Blood Glucose Less Than 70 - Patient Without IV Access - Unresponsive, NPO or Unable To Safely Swallow  Reconstitute powder for injection by adding 1 mL of -supplied sterile diluent or sterile water for injection to a vial containing 1 mg of the drug, to provide solutions containing 1 mg/mL. Shake vial gently to dissolve.    Route: Intramuscular    heparin (porcine) injection 2,000 Units 2,000 Units As Needed 1/20/2025 --    Route: Intracatheter    HYDROmorphone (DILAUDID) injection 0.25 mg 0.25 mg 2 Times Daily PRN 1/20/2025 1/25/2025    Admin Instructions: Based on patient request - if ordered for moderate or severe pain, provider allows for administration of a medication prescribed  "for a lower pain scale.  If given for pain, use the following pain scale:  Mild Pain = Pain Score of 1-3, CPOT 1-2  Moderate Pain = Pain Score of 4-6, CPOT 3-4  Severe Pain = Pain Score of 7-10, CPOT 5-8    Route: Intravenous    Insulin Lispro (humaLOG) injection 2-9 Units 2-9 Units 4 Times Daily Before Meals & Nightly 1/19/2025 --    Admin Instructions: Correction Insulin - Moderate Dose (Total Insulin Dose 40-60 units/day, Average Weight Patient, Patient Taking Oral Hypoglycemic)    Blood Glucose 150-199 mg/dL - 2 units  Blood Glucose 200-249 mg/dL - 4 units  Blood Glucose 250-299 mg/dL - 6 units  Blood Glucose 300-349 mg/dL - 7 units  Blood Glucose 350-400 mg/dL - 8 units  Blood Glucose greater than 400 mg/dL - 9 units & Call Provider   Caution: Look alike/sound alike drug alert    Route: Subcutaneous    Magnesium Standard Dose Replacement - Follow Nurse / BPA Driven Protocol  As Needed 1/19/2025 --    Admin Instructions: Open Order & Select \"BHS Electrolyte Replacement Protocol Algorithm\" to View Details    Route: Not Applicable    melatonin tablet 2.5 mg 2.5 mg Nightly PRN 1/19/2025 --    Route: Oral    metoprolol tartrate (LOPRESSOR) injection 5 mg 5 mg Every 6 Hours PRN 1/22/2025 --    Admin Instructions: Hold for SBP less than 100, DBP less than 60, or heart rate less than 50. If a dose is held, please contact the provider.    Route: Intravenous    metoprolol tartrate (LOPRESSOR) tablet 25 mg 25 mg Every 12 Hours Scheduled 1/23/2025 --    Route: Oral    metroNIDAZOLE (FLAGYL) IVPB 500 mg 500 mg Every 8 Hours 1/21/2025 1/26/2025    Admin Instructions: Caution: Look alike/sound alike drug alert.  Do not refrigerate.    Route: Intravenous    NIFEdipine XL (PROCARDIA XL) 24 hr tablet 60 mg 60 mg Every 24 Hours Scheduled 1/19/2025 --    Admin Instructions: Hold for SBP less than 100, DBP less than 60.  Caution: Look alike/sound alike drug alert. Avoid grapefruit juice. Swallow whole. Do not crush, split or chew. " "   Route: Oral    ondansetron (ZOFRAN) injection 4 mg 4 mg Every 6 Hours PRN 1/19/2025 --    Admin Instructions: If BOTH ondansetron (ZOFRAN) and promethazine (PHENERGAN) are ordered use ondansetron first and THEN promethazine IF ondansetron is ineffective.    Route: Intravenous    pantoprazole (PROTONIX) EC tablet 40 mg 40 mg Every Early Morning 1/22/2025 --    Admin Instructions: Swallow whole; do not crush, split, or chew.    Route: Oral    Pharmacy to dose vancomycin  Continuous PRN 1/21/2025 1/28/2025    Route: Not Applicable    polyethylene glycol (MIRALAX) packet 17 g 17 g Daily PRN 1/19/2025 --    Admin Instructions: Use if no bowel movement after 12 hours. Mix in 6-8 ounces of water.  Use 4-8 ounces of water, tea, or juice for each 17 gram dose.    Route: Oral    Linked Group 1: Placed in \"And\" Linked Group        Potassium Replacement - Follow Nurse / BPA Driven Protocol  As Needed 1/19/2025 --    Admin Instructions: Open Order & Select \"BHS Electrolyte Replacement Protocol Algorithm\" to View Details    Route: Not Applicable    sennosides-docusate (PERICOLACE) 8.6-50 MG per tablet 2 tablet 2 tablet 2 Times Daily PRN 1/19/2025 --    Admin Instructions: Start bowel management regimen if patient has not had a bowel movement after 12 hours.    Route: Oral    Linked Group 1: Placed in \"And\" Linked Group        sodium chloride 0.9 % flush 10 mL 10 mL As Needed 1/19/2025 --    Route: Intravenous    Linked Group 2: Placed in \"And\" Linked Group        sodium chloride 0.9 % flush 10 mL 10 mL Every 12 Hours Scheduled 1/19/2025 --    Route: Intravenous    sodium chloride 0.9 % flush 10 mL 10 mL As Needed 1/19/2025 --    Route: Intravenous    sodium chloride 0.9 % infusion 40 mL 40 mL As Needed 1/19/2025 --    Admin Instructions: Following administration of an IV intermittent medication, flush line with 40mL NS at 100mL/hr.    Route: Intravenous    traMADol (ULTRAM) tablet 50 mg 50 mg Every 12 Hours PRN 1/20/2025 " 1/25/2025    Admin Instructions: Based on patient request - if ordered for moderate or severe pain, provider allows for administration of a medication prescribed for a lower pain scale.      Caution: Look alike/sound alike drug alert    If given for pain, use the following pain scale:  Mild Pain = Pain Score of 1-3, CPOT 1-2  Moderate Pain = Pain Score of 4-6, CPOT 3-4  Severe Pain = Pain Score of 7-10, CPOT 5-8    Route: Oral    vancomycin (dosing per levels)  Daily 1/21/2025 1/28/2025    Admin Instructions: Pharmacy is dosing vancomycin per levels    Route: Not Applicable              Assessment & Plan     74-year-old female with multiple medical comorbidities, left heel wound, calcaneal osteomyelitis status post debridement, irrigation, wound vacuum assisted closure 1/24/25, now status post left below-knee amputation, antibiotic cement placement 2/4/25.      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    Nonweightbearing left lower extremity  Follow cultures; antibiotic management per infectious disease, appreciate their recommendations    Plan to leave drain in place until less than 30 cc output per shift, anticipate drain removal postop day #2    To begin postoperative day 3, daily dressing changes per nursing staff: Xeroform, 4 x 4, Kerlix, Ace    Will follow.    Upon discharge, follow-up in 2 weeks for incision check, radiographs    [facility]  [address], [address]  [place], [address]  Please schedule at [phone]      MAG Haines  02/05/25  08:13 EST

## 2025-02-05 NOTE — CASE MANAGEMENT/SOCIAL WORK
Continued Stay Note  [facility]     Patient Name: Sara Esparza  MRN: [id number]  Today's Date: 2/5/2025    Admit Date: 1/19/2025    Plan: Rehab   Discharge Plan       Row Name 02/05/25 7765       Plan    Plan Rehab    Plan Comments Pt had L BKA yesterday. She has a Ropivicaine nerve block and drain in place. She is on room air. She had HD today. Currently awaiting PT/OT recommendations. I spoke with Pt and spouse, in room. Pt seemed intermittently confused. They would like rehab at discharge. When medically ready, referrals will be sent to [facility], [facility], and [facility], as requested. Since Pt is a dialysis patient, I asked if she had transportation to/from HD. They stated transportation would need to be arranged. I asked her if she would consider [facility] since they could manage her HD in-house. She said she wanted to think about it. CM will continue to follow for medical readiness.    Final Discharge Disposition Code 03 - skilled nursing facility (SNF)                   Discharge Codes    No documentation.                 Expected Discharge Date and Time       Expected Discharge Date Expected Discharge Time    Feb 7, 2025               Blanka Puentes RN

## 2025-02-05 NOTE — PLAN OF CARE
Problem: Hemodialysis  Goal: Safe, Effective Therapy Delivery  Outcome: Progressing  Goal: Effective Tissue Perfusion  Outcome: Progressing  Goal: Absence of Infection Signs and Symptoms  Outcome: Progressing   Goal Outcome Evaluation:               HD completed. UF 2110, goal met. VS stable and wdl during tx. Tolerated well. Access functioned well. Blood returned to pt. Report to PAULIE Beth

## 2025-02-05 NOTE — PROGRESS NOTES
[facility] Progress Note    Admission Date: 1/19/2025    Sara Esparza  1950  [id number]    Date: 2/5/2025    Antibiotics:  Anti-Infectives (From admission, onward)      Ordered     Dose/Rate Route Frequency Start Stop    02/04/25 1431  vancomycin (VANCOCIN) powder  Status:  Discontinued        Ordering Provider: Rom Gonzalez Jr., MD      As Needed 02/04/25 1431 02/04/25 1503    02/04/25 1432  gentamicin (GARAMYCIN) injection  Status:  Discontinued        Ordering Provider: Rom Gonzalez Jr., MD      As Needed 02/04/25 1431 02/04/25 1503    02/04/25 1019  vancomycin IVPB 1500 mg in 0.9% NaCl (Premix) 500 mL        Ordering Provider: Rom Gonzalez Jr., MD    15 mg/kg × 108 kg  333.3 mL/hr over 90 Minutes Intravenous Once 02/04/25 1115 02/04/25 1312    02/03/25 0720  ceFAZolin 2000 mg IVPB in 100 mL NS (MBP)        Ordering Provider: Kyle Ramirez MD    2,000 mg  over 30 Minutes Intravenous Once 02/03/25 0722 02/03/25 0817    02/01/25 1332  vancomycin (dosing per levels)        Ordering Provider: Rom Gonzalez Jr., MD     Not Applicable Daily 02/01/25 1415 03/07/25 0859    01/29/25 1059  vancomycin (VANCOCIN) 1,000 mg in sodium chloride 0.9 % 250 mL IVPB-VTB  Status:  Discontinued        Ordering Provider: Ricardo Chappell, PharmD    1,000 mg  250 mL/hr over 60 Minutes Intravenous Once per day on Monday Wednesday Friday 01/29/25 1600 02/01/25 1332    01/27/25 1850  cefTRIAXone (ROCEPHIN) 2,000 mg in sodium chloride 0.9 % 100 mL MBP  Status:  Discontinued        Ordering Provider: Camden Ashford RPH    2,000 mg  200 mL/hr over 30 Minutes Intravenous Every 24 Hours 01/27/25 2000 02/02/25 1502    01/27/25 0920  vancomycin (VANCOCIN) 1,000 mg in sodium chloride 0.9 % 250 mL IVPB-VTB        Ordering Provider: Ricardo Chappell, PharmD    1,000 mg  250 mL/hr over 60 Minutes Intravenous Once 01/27/25 1600 01/27/25 1857    01/27/25 0949  Pharmacy to dose vancomycin        Ordering Provider: Carlos  Rom BROWN Jr., MD     Not Applicable Continuous PRN 01/27/25 0948 02/10/25 0947    01/27/25 0920  vancomycin (dosing per levels)  Status:  Discontinued        Ordering Provider: Ricardo Chappell, PharmD     Not Applicable Daily 01/27/25 0920 01/29/25 1059    01/24/25 1230  vancomycin (VANCOCIN) 1,000 mg in sodium chloride 0.9 % 250 mL IVPB-VTB        Ordering Provider: Edmund Erwin PharmD    1,000 mg  250 mL/hr over 60 Minutes Intravenous Once 01/24/25 1600 01/24/25 1723    01/22/25 1230  vancomycin 750 mg in sodium chloride 0.9 % 250 mL IVPB-VTB        Ordering Provider: Edmund Erwin PharmD    750 mg  333.3 mL/hr over 45 Minutes Intravenous Once 01/22/25 1400 01/22/25 1810    01/21/25 0519  cefepime 1000 mg IVPB in 100 mL NS (MBP)  Status:  Discontinued        Ordering Provider: Rom Gonzalez Jr., MD    1,000 mg  over 4 Hours Intravenous Every 24 Hours 01/22/25 0600 01/27/25 1851    01/21/25 0537  vancomycin (dosing per levels)  Status:  Discontinued        Ordering Provider: Rom Gonzalez Jr., MD     Not Applicable Daily 01/21/25 0900 01/27/25 1320    01/21/25 0519  cefepime 1000 mg IVPB in 100 mL NS (MBP)        Ordering Provider: Conrad Alvarez MD    1,000 mg  over 30 Minutes Intravenous Once 01/21/25 0615 01/21/25 0715    01/21/25 0505  metroNIDAZOLE (FLAGYL) IVPB 500 mg        Ordering Provider: Rom Gonzalez Jr., MD    500 mg  200 mL/hr over 30 Minutes Intravenous Every 8 Hours 01/21/25 0600 01/26/25 0759    01/21/25 0513  vancomycin 2250 mg/500 mL 0.9% NS IVPB ([facility])        Ordering Provider: Woo Wells, PharmD    20 mg/kg × 108 kg  over 135 Minutes Intravenous Once 01/21/25 0600 01/21/25 0950    01/21/25 0505  Pharmacy to dose vancomycin  Status:  Discontinued        Ordering Provider: Rom Gonzalez Jr., MD     Not Applicable Continuous PRN 01/21/25 0500 01/27/25 1318    01/20/25 0738  cefTRIAXone (ROCEPHIN) 1,000 mg in sodium chloride 0.9 % 100 mL MBP  Status:  Discontinued         Ordering Provider: Leslye Fiore MD    1,000 mg  200 mL/hr over 30 Minutes Intravenous Every 24 Hours 01/20/25 0900 01/21/25 0519            Reason for Consultation: Sepsis and osteomyelitis of the left calcaneus     History of present illness:    1/22/25: Patient is a 74 y.o. 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  Evaluation in the ED included WBC 14.3 and PCT 5.9.  Subsequent lactic acid was 4.1 on 1/21.  After admission she became febrile to 101.9.  She was started on ceftriaxone and vancomycin.  Blood culture was sent yesterday afternoon.  Urinalysis showed pyuria.  Urine culture showed mixed hanny in the lab discarded the culture.  She was noted to have a left calcaneus wound.  CT scan of the chest, abdomen and pelvis were unremarkable for acute process.  CT scan of the left lower extremity showed a wound at the plantar aspect of the left calcaneus with subcutaneous and intraosseous air and destructive changes .  CT scan of the neck showed advanced C5-6 and C6-7 degenerative disc disease; suspected moderate or greater C5-6 canal stenosis due to posterior vertebral osteophytes.  Wound culture of the left heel is growing MRSA and a gram-negative ange.  At the time of my exam the patient is nonverbal other than moaning.  She does not follow commands.  Her  is at the bedside and is able to provide medical history although he is a little uncertain about timing of events such as when she started dialysis.      She had a dialysis catheter placed 9/13 which was replaced on 10/11.    1/23/2025.  Afebrile x 2 days.  She is considerably more alert today.  She remains on IV amiodarone for atrial fibrillation with  RVR.  She complains of pain but does not localize it to any specific site.  Wound culture with MRSA and Morganella.  Blood culture negative.    1/24/2025.  Afebrile.  She is seen on dialysis today.  She she is alert and mildly confused but understands that she is scheduled to undergo surgery today.  In discussion with her  in the patient's room he recounted that it was explained to her that she was at high risk for limb loss based on the extent of osteomyelitis involving her calcaneus.  MRI was attempted yesterday but aborted because of pain.    1/25/2025.  Afebrile.  Alert. Uncomfortable.  states that she is discouraged because she was found to have extensive osteo at surgery yesterday.  He states that he does not see how we will be possible for her to go home.    1/26/25 Afebrile, appears more lucid. Appears more comfortable although she is scratching torso and upper extremities; the  states this is a chronic problem  MRSA from op cultures  1/27/25 Afebrile. Seen on HD. No new c/o  Note plans for arteriogram  MRSA from op cultures    1/28/25  Afebrile, alert but seems unable to remember that she is scheduled for vascular evaluation next week. I reminded her that Dr Gonzalez is very concerned Re: ability to salvage her left leg and she acknowledged this. Her  at the bedside appears to understand all of these issues.     1/29/2025:  She remains afebrile.  Left foot culture from 1/24 is growing MRSA.  Vancomycin random level is 21.2.  White blood cell count yesterday was 9.6.   CT scan of 1/21 revealed a draining wound along the plantar aspect of the left calcaneus with subcutaneous air and destructive changes and intraosseous air along the plantar aspect of the calcaneus consistent with osteomyelitis.  She is scheduled for left lower extremity angiogram on 2/3/2025.  Photo of her wound from 1/28 reveals exposed bone.  She complains of neck/back pain.      1/30/2025: She remains afebrile.   Random vancomycin level yesterday was 21.2.  She denies uncontrolled pain.  She denies nausea and vomiting.   She is more willing to consider amputation    1/31/25: She has remained afebrile.  She is scheduled to undergo vascular intervention on Monday.   Anaerobic culture from 1/24 grew cutibacterium acnes.  I examined her with Cody Mehta and PT wound care today.  She denies uncontrolled pain.  She denies nausea and vomiting    2/1/2025:  She remains afebrile.  White blood cell count yesterday was 11.6.  She has now decided she will proceed with a left BKA.  She denies nausea, vomiting, and diarrhea.    2/2/25:  She remains afebrile.  Random vancomycin level yesterday was 30.7.  She denies nausea and vomiting.    2/3/2025: She is scheduled for vascular intervention today and BKA on 2/4.  She remains afebrile.  White blood cell count is 0.1.  Creatinine is 5.6.   Vancomycin random level is 26.   I saw her in the recovery room today.    2/4/2025:  She underwent vascular intervention yesterday with angioplasty of the anterior tibial artery.   Maximum temperature over the last 24 hours is 99.4.   She underwent left BKA today.    2/5/2025:  She has been afebrile over the last 24 hours.  Random vancomycin level is 33.  She denies uncontrolled pain.   She is worried that she made the wrong decision regarding amputation.  Her white blood cell count is 5.6.    PE:  Vital Signs  Temp  Min: 97 °F (36.1 °C)  Max: 98.6 °F (37 °C)  BP  Min: 124/53  Max: 173/68  Pulse  Min: 54  Max: 74  Resp  Min: 12  Max: 18  SpO2  Min: 91 %  Max: 98 %    GENERAL:   Drowsy after anesthesia.  HEENT: Normocephalic, atraumatic.   No conjunctival injection. No icterus.  No oral labial lesions   NECK: No evidence of meningismus  HEART: No murmur, rubs, gallops.   LUNGS: Clear anteriorly, no wheezes or rhonchi appreciated  ABDOMEN:  nondistended.  :  Without Corral catheter.  MSK:    Left BKA with a postoperative dressing in place  SKIN: Warm  and dry without cutaneous eruptions on Inspection/palpation.    NEURO:  Drowsy but arousable.         Laboratory Data    Results from last 7 days   Lab Units 02/04/25  1109 02/03/25  0347 02/02/25  1253   WBC 10*3/mm3 6.86 8.05 7.90   HEMOGLOBIN g/dL 9.2* 9.9* 9.8*   HEMATOCRIT % 29.0* 32.0* 31.4*   PLATELETS 10*3/mm3 235 259 246     Results from last 7 days   Lab Units 02/04/25  1159 02/04/25  1109   SODIUM mmol/L  --  133*   POTASSIUM mmol/L 4.5 4.3   CHLORIDE mmol/L  --  98   CO2 mmol/L  --  22.0   BUN mg/dL  --  32*   CREATININE mg/dL  --  4.23*   GLUCOSE mg/dL  --  92   CALCIUM mg/dL  --  8.8                     Estimated Creatinine Clearance: 14.5 mL/min (A) (by C-G formula based on SCr of 4.23 mg/dL (H)).      Microbiology:  MRSA and Morganella from foot wound    Radiology:  Imaging Results (Last 72 Hours)       Procedure Component Value Units Date/Time    [facility] [place] OR Procedure [[id number]] Resulted: 02/03/25 1002     Updated: 02/03/25 1002            I read her CT scan images of 1/21.      Impression:   1.  Left calcaneal osteomyelitis- status post left BKA on 2/4.  I will leave her on the vancomycin for the time being.  2.  Sepsis-improved.   3.  Pyuria-with mixed hanny on culture  4.  End-stage renal disease-on hemodialysis  5.  Type 2 diabetes mellitus  6.  Paroxysmal atrial fibrillation  7.  Toxic/metabolic encephalopathy-improved   8.  Leukocytosis/neutrophilia-improved  9.  Peripheral vascular disease-S/P vascular intervention on 2/3  10.  Cervical spinal stenosis  11.  Peripheral vascular disease-she is scheduled for vascular intervention on 2/3       PLAN/RECOMMENDATIONS:  -- Continue vancomycin   -- Continue wound care  -- Left BKA.  Performed on 2/4.  -- S/P Vascular intervention 2/3      I reassured her that she did make the right decision regarding amputation.  This was not a salvageable extremity.  Her infection has been resected and she should not require further antibiotic therapy.  I  discussed her complex situation with Dr. Gonzalez today.    I will sign off      Teja Blue MD  2/5/2025

## 2025-02-05 NOTE — PROGRESS NOTES
"   LOS: 17 days    Patient Care Team:  Toribio Roberts MD as PCP - General (Internal Medicine)    Subjective     Seen on HD tolerating treatment well. Underwent BKA on the left leg. Ana any pain. Drain present on left stump    Objective     Vital Signs:  Blood pressure 149/64, pulse 58, temperature 98.2 °F (36.8 °C), temperature source Oral, resp. rate 18, height 170.2 cm (67.01\"), weight 108 kg (238 lb 1.6 oz), SpO2 95%.      Intake/Output Summary (Last 24 hours) at 2/5/2025 1116  Last data filed at 2/5/2025 0417  Gross per 24 hour   Intake 500 ml   Output 190 ml   Net 310 ml        02/04 0701 - 02/05 0700  In: 500 [I.V.:500]  Out: 190 [Drains:90]    Physical Exam:        GENERAL: WD WF NAD  NEURO: Awake and alert.  No focal deficit  PSYCHIATRIC: Agitated, cooperative with PE  EYE: PE, no icterus, no conjunctivitis  ENT: omm dry, dentition intact,  Hearing intact  NECK: Supple , No JVD discernable,  Trachea midline  CV: No edema, RRR  LUNGS:  Quiet,  Nonlabored resp.  Symmetrical expansion  ABDOMEN: Nondistended, soft nontender.  : No Corral, no palp bladder  SKIN: Warm and dry without rash  + RIJ TDC    Labs:  Results from last 7 days   Lab Units 02/04/25  1109 02/03/25  0347 02/02/25  1253   WBC 10*3/mm3 6.86 8.05 7.90   HEMOGLOBIN g/dL 9.2* 9.9* 9.8*   PLATELETS 10*3/mm3 235 259 246     Results from last 7 days   Lab Units 02/04/25  1159 02/04/25  1109 02/03/25  0347 02/02/25  1253 02/01/25  1025   SODIUM mmol/L  --  133* 130* 132* 130*   POTASSIUM mmol/L 4.5 4.3 5.3* 5.2 4.4   CHLORIDE mmol/L  --  98 95* 98 96*   CO2 mmol/L  --  22.0 22.0 21.0* 25.0   BUN mg/dL  --  32* 45* 38* 29*   CREATININE mg/dL  --  4.23* 5.56* 5.05* 3.84*   CALCIUM mg/dL  --  8.8 9.2 9.2 9.1   PHOSPHORUS mg/dL  --   --   --   --  4.3   ALBUMIN g/dL  --   --   --   --  2.5*                       Estimated Creatinine Clearance: 14.5 mL/min (A) (by C-G formula based on SCr of 4.23 mg/dL (H)).         A/P:      ESRD: On HD MWF at " [facility] with NAL .  Continue outpatient schedule.     HTN: Blood pressure stable.  Monitor for now.    Hyponatremia:   Due to underlying renal failure.  Adjust with HD.     Hyperkalemia: Resolved.  Adjust with HD     Metabolic acidosis:  Adjust with HD     Anemia: Hemoglobin below goal.  Patient unable.  Monitor for now.  Transfuse as indicated for Hgb <7.     Volume: Earlier in admission patient with orthostatic hypotension with intravascular volume depletion.  Looks stable today.  Monitor with UF.     Hyperphosphatemia: Phos stable at 4.3.  Poor p.o. intake.  Binders on hold for now.     Nutrition: Low potassium low Phos high-protein diet.  Renal vitamin.    PVD: Underwent left BKA     High risk and complexity patient.     Wilian Gonzalez MD  02/05/25  11:16 EST

## 2025-02-05 NOTE — ADDENDUM NOTE
Addendum  created 02/05/25 0726 by Scott Ruth CRNA    Clinical Note Signed, Intraprocedure Blocks edited

## 2025-02-05 NOTE — PROGRESS NOTES
"Pharmacy Consult-Vancomycin Dosing  Sara Esparza is a  74 y.o. female receiving vancomycin therapy.     Indication: Osteomyelitis  Consulting Provider: Kyle Ramirez MD    ID Consult: Yes  Goal Trough: 15-20 mcg/mL    Current Antimicrobial Therapy  Vancomycin dialysis dosing       Allergies  Allergies as of 01/19/2025    (No Known Allergies)     Labs    Results from last 7 days   Lab Units 02/04/25  1109 02/03/25  0347 02/02/25  1253   BUN mg/dL 32* 45* 38*   CREATININE mg/dL 4.23* 5.56* 5.05*     Results from last 7 days   Lab Units 02/04/25  1109 02/03/25  0347 02/02/25  1253   WBC 10*3/mm3 6.86 8.05 7.90     Evaluation of Dosing     Is Patient on Dialysis or Renal Replacement: yes    Ht - 170.2 cm (67.01\")  Wt - 108 kg (238 lb 1.6 oz)    Estimated Creatinine Clearance: 14.5 mL/min (A) (by C-G formula based on SCr of 4.23 mg/dL (H)).    Intake & Output (last 3 days)         01/30 0701  01/31 0700 01/31 0701  02/01 0700 02/01 0701  02/02 0700 02/02 0701  02/03 0700    P.O. 960  500     Other  300      IV Piggyback   100     Total Intake(mL/kg) 960 (8.9) 300 (2.8) 600 (5.6)     Urine (mL/kg/hr)        Dialysis  1420      Total Output  1420      Net +960 -1120 +600             Urine Unmeasured Occurrence 1 x 1 x              Microbiology and Radiology  Microbiology Results (last 10 days)       Procedure Component Value - Date/Time    Tissue / Bone Culture - Bone, Leg, Left [[id number]] Collected: 02/04/25 1437    Lab Status: Preliminary result Specimen: Bone from Leg, Left Updated: 02/04/25 1902     Gram Stain Moderate (3+) WBCs seen      No organisms seen    Wound Culture - Surgical Site, Leg, Left [[id number]] Collected: 02/04/25 1436    Lab Status: Preliminary result Specimen: Surgical Site from Leg, Left Updated: 02/04/25 1856     Gram Stain Few (2+) WBCs seen      No organisms seen          Vancomycin Levels:    Results from last 7 days   Lab Units 02/05/25  0439 02/03/25  0347 02/01/25  1025   VANCOMYCIN " RM mcg/mL 33.20 26.30 30.70     Doses received:  Vancomycin 2250 mg IV x1 on 1/21 at 0735  Vancomycin 750 mg IV on 1/22  Vancomycin 1000 mg IV on 1/24, 1/27, 1/29, 1/31     Assessment/Plan:    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   Goal trough 15-20 mcg/mL.   2/4 SCr - 4.23 (ESRD HD)  2/4 WBC - 6.86  24hr tmax - 99.4    2/5 vancomycin level - 33.2 mcg/ml (expect post HD vancomycin level - 22-25 mcg/ml)    Will hold post dialysis vancomycin 2/5  Obtain vancomycin level 2/7 prior to HD  Monitor dialysis schedule, clinical status and infusion related reactions  Follow vancomycin levels and adjust dose accordingly    Thanks   Cesar Little RPH  2/5/2025  07:29 EST

## 2025-02-05 NOTE — PROGRESS NOTES
[facility]  IP Progress Note      Chief Complaint/Reason for service: A-fib RVR-resolved #2 hypertension    Subjective   Subjective: The patient is awake and alert and on dialysis.  She denies chest pain or shortness of breath.  She repeats some things    Past medical, surgical, social and family history reviewed in the patient's electronic medical record.    Objective     Vital Sign Min/Max for last 24 hours  Temp  Min: 97 °F (36.1 °C)  Max: 98.6 °F (37 °C)   BP  Min: 124/53  Max: 173/68   Pulse  Min: 54  Max: 74   Resp  Min: 12  Max: 18   SpO2  Min: 91 %  Max: 98 %   Flow (L/min) (Oxygen Therapy)  Min: 2  Max: 2      Intake/Output Summary (Last 24 hours) at 2/5/2025 0816  Last data filed at 2/5/2025 0417  Gross per 24 hour   Intake 500 ml   Output 190 ml   Net 310 ml             Current Facility-Administered Medications:     acetaminophen (TYLENOL) tablet 650 mg, 650 mg, Oral, Q6H PRN, Rom Gonzalez Jr., MD, 650 mg at 01/30/25 1659    albumin human 25 % IV SOLN  - ADS Override Pull, , , ,     albumin human 25 % IV SOLN 25 g, 25 g, Intravenous, PRN, Rom Gonzalez Jr., MD    allopurinol (ZYLOPRIM) tablet 200 mg, 200 mg, Oral, Daily, Rom Gonzalez Jr., MD, 200 mg at 02/04/25 0919    [START ON 2/6/2025] amiodarone (PACERONE) tablet 200 mg, 200 mg, Oral, Q24H, Naren Escobar MD    atorvastatin (LIPITOR) tablet 10 mg, 10 mg, Oral, Nightly, Rom Gonzalez Jr., MD, 10 mg at 02/04/25 2020    sennosides-docusate (PERICOLACE) 8.6-50 MG per tablet 2 tablet, 2 tablet, Oral, BID PRN, 2 tablet at 02/02/25 1452 **AND** polyethylene glycol (MIRALAX) packet 17 g, 17 g, Oral, Daily PRN, 17 g at 01/27/25 0934 **AND** bisacodyl (DULCOLAX) EC tablet 5 mg, 5 mg, Oral, Daily PRN, 5 mg at 01/26/25 1507 **AND** bisacodyl (DULCOLAX) suppository 10 mg, 10 mg, Rectal, Daily PRN, Rom Gonzalez Jr., MD    Calcium Replacement - Follow Nurse / BPA Driven Protocol, , Not Applicable,  PRN, Rom Gonzalez Jr., MD    dextrose (D50W) (25 g/50 mL) IV injection 25 g, 25 g, Intravenous, Q15 Min PRN, Rom Gonzalez Jr., MD    dextrose (GLUTOSE) oral gel 15 g, 15 g, Oral, Q15 Min PRN, Rom Gonzalez Jr., MD    epoetin gracia (EPOGEN,PROCRIT) injection 6,000 Units, 6,000 Units, Subcutaneous, Once per day on Monday Wednesday Friday, Rom Gonzalez Jr., MD, 6,000 Units at 01/31/25 1455    famotidine (PEPCID) tablet 10 mg, 10 mg, Oral, Daily, Rom Gonzalez Jr., MD    glucagon (GLUCAGEN) injection 1 mg, 1 mg, Intramuscular, Q15 Min PRN, Rom Gonzalez Jr., MD    heparin (porcine) injection 2,000 Units, 2,000 Units, Intracatheter, PRN, Rom Gonzalez Jr., MD, 2,000 Units at 02/01/25 1031    Insulin Lispro (humaLOG) injection 2-9 Units, 2-9 Units, Subcutaneous, 4x Daily AC & at Bedtime, Rom Gonzalez Jr., MD, 2 Units at 02/04/25 2027    lactated ringers infusion, 9 mL/hr, Intravenous, Continuous, Rom Gonzalze Jr., MD, Last Rate: 9 mL/hr at 02/04/25 1342, Restarted at 02/04/25 1441    Lidocaine 4 % 2 patch, 2 patch, Transdermal, Q24H, Rom Gonzalez Jr., MD, 2 patch at 02/02/25 0957    Magnesium Low Dose Replacement - Follow Nurse / BPA Driven Protocol, , Not Applicable, PRN, Rom Gonzalez Jr., MD    melatonin tablet 2.5 mg, 2.5 mg, Oral, Nightly PRN, Rom Gonzalez Jr., MD, 2.5 mg at 02/02/25 2041    metoprolol tartrate (LOPRESSOR) injection 5 mg, 5 mg, Intravenous, Q6H PRN, Rom Gonzalez Jr., MD, 5 mg at 01/23/25 0329    NIFEdipine XL (PROCARDIA XL) 24 hr tablet 60 mg, 60 mg, Oral, Q24H, Rom Gonzalez Jr., MD, 60 mg at 02/04/25 0919    nitroglycerin (NITROSTAT) ointment 1 inch, 1 inch, Topical, Q6H, Rom Gonzalez Jr., MD, 1 inch at 02/05/25 0530    ondansetron (ZOFRAN) injection 4 mg, 4 mg, Intravenous, Q6H PRN, Rom Gonzalez Jr., MD, 4 mg at 01/31/25 1042    oxyCODONE (ROXICODONE) immediate release tablet 5 mg, 5 mg, Oral, Q4H PRN, Rom Gonzalez Jr.,  MD, 5 mg at 02/04/25 1837    pantoprazole (PROTONIX) EC tablet 40 mg, 40 mg, Oral, Q AM, Rom Gonzalez Jr., MD, 40 mg at 02/05/25 0530    Pharmacy to dose vancomycin, , Not Applicable, Continuous PRN, Rom Gonzalez Jr., MD    Potassium Replacement - Follow Nurse / BPA Driven Protocol, , Not Applicable, PRN, Rom Gonzalez Jr., MD    ropivacaine (NAROPIN) 0.2 % infusion (INFUSYSTEM), , Peripheral Nerve, Continuous, Rom Gonzalez Jr., MD, 1,000 mg at 02/04/25 1507    [COMPLETED] Insert Peripheral IV, , , Once **AND** sodium chloride 0.9 % flush 10 mL, 10 mL, Intravenous, PRN, Rom Gonzalez Jr., MD    sodium chloride 0.9 % flush 10 mL, 10 mL, Intravenous, Q12H, Rom Gonzalez Jr., MD, 10 mL at 02/04/25 0922    sodium chloride 0.9 % flush 10 mL, 10 mL, Intravenous, PRN, Rom Gonzalez Jr., MD    sodium chloride 0.9 % infusion 40 mL, 40 mL, Intravenous, PRN, Rom Gonzalez Jr., MD    sodium chloride 0.9 % infusion, 9 mL/hr, Intravenous, Continuous, Rom Gonzalez Jr., MD, Last Rate: 9 mL/hr at 02/04/25 1207, 9 mL/hr at 02/04/25 1207    traMADol (ULTRAM) tablet 50 mg, 50 mg, Oral, Q4H PRN, Rom Gonzalez Jr., MD, 50 mg at 02/04/25 2021    vancomycin (dosing per levels), , Not Applicable, Daily, Rom Gonzalez Jr., MD    Physical Exam: General Pleasant elderly female in bed 60 degree angle not dyspneic not tachypneic current heart rate 61        HEENT: No JVP       Respiratory: Clear anteriorly       Cardiovascular: Regular rate and rhythm       GI: Soft and flat       Lower Extremities: Status post left BKA       Neuro: Facial expressions are symmetrical       Skin: Warm and dry       P    Results Review: Heart rate 54-72.  Blood pressures 120s to 140s.    Radiology Results:  Imaging Results (Last 72 Hours)       Procedure Component Value Units Date/Time    [facility] [place] OR Procedure [[id number]] Resulted: 02/03/25 1002     Updated: 02/03/25 1002            EKG: Sinus rhythm LVH QTc 446  nonspecific ST abnormality in 1 and aVL    ECHO: Preserved EF    Tele: Sinus rhythm with episodes of sinus bradycardia.  No recurrent A-fib.    Assessment   Assessment A-fib-patient is maintaining sinus rhythm on amiodarone.  Heart rates are occasionally in the 50s.  Will decrease dose of amiodarone to 200 mg daily.  Needs to resume anticoagulation  2 hypertension-blood pressures improved    Naren Escobar MD  02/05/25  08:16 EST

## 2025-02-05 NOTE — PROGRESS NOTES
[facility] Medicine Services  PROGRESS NOTE    Patient Name: Sara Esparza  : 1950  MRN: [id number]    Date of Admission: 2025  Primary Care Physician: Toribio Roberts MD    Subjective     CC: f/u osteomyelitis     HPI:  In bed after HD. Drowsy. Back hurts. Daughter in law at bedside. Notes some pain - I showed her to push the button for PCA dose on Ropivacaine pump. No chest pain or dyspnea.     Objective     Vital Signs:   Temp:  [97 °F (36.1 °C)-98.6 °F (37 °C)] 97.9 °F (36.6 °C)  Heart Rate:  [54-74] 69  Resp:  [12-18] 18  BP: (124-179)/(52-74) 179/69  Flow (L/min) (Oxygen Therapy):  [2] 2     Physical Exam:  Constitutional: No acute distress. Drowsy but conversational. Laying in bed.   HENT: NCAT, mucous membranes moist  Respiratory: Clear to auscultation bilaterally, respiratory effort normal   Cardiovascular: RRR  Gastrointestinal: Positive bowel sounds, soft, nontender, nondistended  Musculoskeletal: No bilateral ankle edema. R upper chest tunneled HD catheter. L BKA stump dressed - I did not remove dressing for exam. Nerve catheter in place.   Psychiatric: Anxious affect, cooperative with exam  Neurologic: Orientation questions deferred. Some situational confusion noted. Moves all extremities spontaneously without focal deficits, speech clear  Skin: No rashes to exposed surfaces       Results Reviewed:  LAB RESULTS:      Lab 25  1109 25  0347 25  1253 25  0726   WBC 6.86 8.05 7.90 11.55*   HEMOGLOBIN 9.2* 9.9* 9.8* 10.1*   HEMATOCRIT 29.0* 32.0* 31.4* 32.7*   PLATELETS 235 259 246 271   MCV 98.0* 100.6* 100.6* 101.2*   PROTIME  --  18.1*  --   --          Lab 25  1159 25  1109 25  0347 25  1253 25  1025 25  0726   SODIUM  --  133* 130* 132* 130* 131*   POTASSIUM 4.5 4.3 5.3* 5.2 4.4 4.3   CHLORIDE  --  98 95* 98 96* 97*   CO2  --  22.0 22.0 21.0* 25.0 25.0   ANION GAP  --  13.0 13.0 13.0 9.0 9.0   BUN   --  32* 45* 38* 29* 36*   CREATININE  --  4.23* 5.56* 5.05* 3.84* 4.39*   EGFR  --  10.4* 7.5* 8.4* 11.7* 10.0*   GLUCOSE  --  92 109* 132* 152* 132*   CALCIUM  --  8.8 9.2 9.2 9.1 9.4   PHOSPHORUS  --   --   --   --  4.3  --          Lab 02/01/25  1025   ALBUMIN 2.5*         Lab 02/03/25  0347   PROTIME 18.1*   INR 1.48*         Lab 02/04/25  1335   ABO TYPING O   RH TYPING Positive   ANTIBODY SCREEN Negative     Brief Urine Lab Results  (Last result in the past 365 days)        Color   Clarity   Blood   Leuk Est   Nitrite   Protein   CREAT   Urine HCG        01/19/25 1404 Yellow   Cloudy   Moderate (2+)   Moderate (2+)   Negative   >=300 mg/dL (3+)                 Microbiology Results Abnormal       Procedure Component Value - Date/Time    Anaerobic Culture - Surgical Site, Foot, Left [[id number]]  (Abnormal) Collected: 01/24/25 1754    Lab Status: Final result Specimen: Surgical Site from Foot, Left Updated: 01/30/25 1515     Anaerobic Culture Cutibacterium acnes    Body Fluid Culture - Surgical Site, Foot, Left [[id number]]  (Abnormal)  (Susceptibility) Collected: 01/24/25 1754    Lab Status: Final result Specimen: Surgical Site from Foot, Left Updated: 01/27/25 0642     Body Fluid Culture Light growth (2+) Staphylococcus aureus, MRSA     Comment:   Methicillin resistant Staphylococcus aureus, Patient may be an isolation risk.        Gram Stain Rare (1+) WBCs seen      No organisms seen    Narrative:      Not a body fluid.    Susceptibility        Staphylococcus aureus, MRSA      TAYLOR      Clindamycin Susceptible      Erythromycin Resistant      Oxacillin Resistant      Rifampin Susceptible      Tetracycline Susceptible      Trimethoprim + Sulfamethoxazole Susceptible      Vancomycin Susceptible                           Wound Culture - Wound, Foot, Left [[id number]]  (Abnormal)  (Susceptibility) Collected: 01/20/25 1409    Lab Status: Final result Specimen: Wound from Foot, Left Updated: 01/24/25 0658     Wound  Culture Scant growth (1+) Staphylococcus aureus, MRSA     Comment: Methicillin resistant Staphylococcus aureus, Patient may be an isolation risk.         Rare growth Morganella morganii ssp morganii     Comment:            Scant growth (1+) Normal Skin Kimber     Gram Stain Few (2+) WBCs seen      Many (4+) Gram positive cocci in pairs      Rare (1+) Gram negative bacilli      Rare (1+) Gram positive bacilli    Susceptibility        Staphylococcus aureus, MRSA      TAYLOR      Clindamycin Susceptible      Erythromycin Resistant      Oxacillin Resistant      Rifampin Susceptible      Tetracycline Susceptible      Trimethoprim + Sulfamethoxazole Susceptible      Vancomycin Susceptible                       Susceptibility        Morganella morganii ssp morganii      TAYLOR      Amoxicillin + Clavulanate Resistant      Ampicillin Resistant      Ampicillin + Sulbactam Resistant      Cefazolin (Non Urine) Resistant      Cefepime Susceptible  [1]       Cefotaxime Susceptible      Ceftazidime Susceptible  [1]       Gentamicin Susceptible      Levofloxacin Susceptible      Piperacillin + Tazobactam Susceptible      Tetracycline Susceptible      Trimethoprim + Sulfamethoxazole Susceptible                   [1]  Appended report. These results have been appended to a previously final verified report.                Susceptibility Comments       Morganella morganii ssp morganii    Cefotaxime susceptibility can be used as a surrogate for ceftriaxone susceptibility               MRSA Screen, PCR (Inpatient) - Swab, Nares [[id number]]  (Abnormal) Collected: 01/21/25 0614    Lab Status: Final result Specimen: Swab from Nares Updated: 01/21/25 0839     MRSA PCR Positive    Narrative:      The negative predictive value of this diagnostic test is high and should only be used to consider de-escalating anti-MRSA therapy. A positive result may indicate colonization with MRSA and must be correlated clinically.          Peripheral Block    Result  Date: 2/4/2025  Kip Angel CRNA     2/4/2025  1:10 PM Peripheral Block Patient reassessed immediately prior to procedure Patient location during procedure: pre-op Start time: 2/4/2025 12:48 PM Reason for block: at surgeon's request and post-op pain management Performed by CRNA/CAA: Kip Angel, CRNA Assisted by: Cristal Guerra RN Preanesthetic Checklist Completed: patient identified, IV checked, site marked, risks and benefits discussed, surgical consent, monitors and equipment checked, pre-op evaluation and timeout performed Prep: Sterile barriers:cap, gloves, mask and washed/disinfected hands Prep: ChloraPrep Patient monitoring: blood pressure monitoring, continuous pulse oximetry and EKG Procedure Sedation: yes Performed under: local infiltration Guidance:ultrasound guided ULTRASOUND INTERPRETATION.  Using ultrasound guidance a 20 G gauge needle was placed in close proximity to the nerve, at which point, under ultrasound guidance anesthetic was injected in the area of the nerve and spread of the anesthesia was seen on ultrasound in close proximity thereto.  There were no abnormalities seen on ultrasound; a digital image was taken; and the patient tolerated the procedure with no complications. Images:still images obtained, printed/placed on chart Laterality:left Block Type:popliteal Injection Technique:catheter Needle Type:echogenic and Tuohy Needle Gauge:18 G Resistance on Injection: none Catheter Size:20 G Cath Depth at skin: 11 cm Medications Used: bupivacaine PF (MARCAINE) 0.25 % injection - Injection  30 mL - 2/4/2025 12:48:00 PM Post Assessment Injection Assessment: negative aspiration for heme, no paresthesia on injection and incremental injection Patient Tolerance:comfortable throughout block Complications:no Additional Notes CATHETER                             A high-frequency linear transducer, with sterile cover, was placed in the popliteal fossa to identify the popliteal artery and vein, Tibial  "nerve (TN) and Common Peroneal nerve (CP). The transducer was then moved in a cephalad fashion to observe the TN and CP nerve bifurcation to form the Sciatic Nerve. The insertion site was prepped and draped in sterile fashion. Skin and cutaneous tissue was infiltrated with 2-5 ml of 1% Lidocaine. Using ultrasound-guidance, an 18-gauge Contiplex Ultra 360 Touhy needle was advanced in plane from lateral to medial. Preservative-free normal saline was utilized for hydro-dissection of tissue, advancement of Touhy needle, and to confirm final needle placement posterior to the nerves. Local anesthetic injection spread, in incremental 3-5 ml injections, to surround both nerve structures. Aspiration every 5 ml to prevent intravascular injection. Injection was completed with negative aspiration of blood and negative intravascular injection. Injection pressures were normal with minimal resistance. A 20-gauge [employer]iplex Echo catheter was placed through the needle and advance out the tip of the Touhy 1-3 cm. The Touhy needle was then removed, and final catheter position verified (Below/above or Anterior/Posterior) the nerve structures. The catheter was secured in the usual fashion with skin glue, benzoin, steri-strips, CHG tegaderm and Label noting \"Nerve Block Catheter\". Jerk tape applied at yellow connector and catheter connection. Performed by: Kip Angel CRNA     Peripheral Block    Result Date: 2/4/2025  Kip Angel CRNA     2/4/2025  1:09 PM Peripheral Block Patient reassessed immediately prior to procedure Patient location during procedure: pre-op Start time: 2/4/2025 12:44 PM Reason for block: at surgeon's request and post-op pain management Performed by FARTUN/CAA: Kip Angel CRNA Assisted by: Cristal Guerra RN Preanesthetic Checklist Completed: patient identified, IV checked, site marked, risks and benefits discussed, surgical consent, monitors and equipment checked, pre-op evaluation and timeout performed Prep: " "Pt Position: supine Sterile barriers:cap, gloves, mask, sterile barriers and washed/disinfected hands Prep: ChloraPrep Patient monitoring: blood pressure monitoring, continuous pulse oximetry and EKG Procedure Performed under: spinal Guidance:ultrasound guided ULTRASOUND INTERPRETATION.  Using ultrasound guidance a 20 G gauge needle was placed in close proximity to the nerve, at which point, under ultrasound guidance anesthetic was injected in the area of the nerve and spread of the anesthesia was seen on ultrasound in close proximity thereto.  There were no abnormalities seen on ultrasound; a digital image was taken; and the patient tolerated the procedure with no complications. Images:still images obtained, printed/placed on chart Laterality:left Block Type:adductor canal block Injection Technique:single-shot Needle Type:Tuohy and echogenic Needle Gauge:18 G Resistance on Injection: none Catheter Size:20 G (20g) Medications Used: fentaNYL citrate (PF) (SUBLIMAZE) injection - Intravenous  100 mcg - 2/4/2025 12:44:00 PM dexamethasone sodium phosphate injection - Injection  2 mg - 2/4/2025 12:44:00 PM Post Assessment Injection Assessment: negative aspiration for heme, incremental injection and no paresthesia on injection Patient Tolerance:comfortable throughout block Complications:no Additional Notes SINGLE shot A high-frequency linear transducer, with sterile cover, was placed on the anterior mid-thigh (between the anterior superior iliac spine and patella). The transducer was then moved medially to identify the Sartorius muscle (Jenelle), Vastus Medialis muscle (VMM), Superficial Femoral Artery (SFA) and Vein. The transducer was then moved cephalad or caudad to position the SFA in the middle of the Jenelle. The insertion site was prepped and draped in sterile fashion. Skin and cutaneous tissue was infiltrated with 2-5 ml of 1% Lidocaine. Using ultrasound-guidance, a 20-gauge B-De Oliveira 4\" Ultraplex 360 non-stimulating " echogenic needle was advanced in plane from lateral to medial. Preservative-free normal saline was utilized for hydro-dissection of tissue, advancement of needle, and to confirm needle placement below the fascial plane of the Jenelle where the Nerve to the VMM is located. Local anesthetic (LA) 5 ml deposited here. The needle continues its path lateral to the SFA at the level of the Saphenous Nerve. The remainder of the LA was deposited at the 10-11 o'clock position of the SFA. This injection created a space between the Jenelle and the SFA. Aspiration every 5 ml to prevent intravascular injection. Injection was completed with negative aspiration of blood and negative intravascular injection. Injection pressures were normal with minimal resistance. Performed by: Kip Angel CRNA     Results for orders placed during the hospital encounter of 01/19/25    Adult Transthoracic Echo Complete W/ Cont if Necessary Per Protocol    Interpretation Summary    Left ventricular systolic function is normal. Estimated left ventricular EF = 55%    Left ventricular wall thickness is consistent with moderate concentric hypertrophy.    No hemodynamically significant valvular heart disease    Current medications:  Scheduled Meds:albumin human, , ,   allopurinol, 200 mg, Oral, Daily  amiodarone, 200 mg, Oral, Q24H  atorvastatin, 10 mg, Oral, Nightly  epoetin gracia/gracia-epbx, 6,000 Units, Subcutaneous, Once per day on Monday Wednesday Friday  famotidine, 10 mg, Oral, Daily  insulin lispro, 2-9 Units, Subcutaneous, 4x Daily AC & at Bedtime  Lidocaine, 2 patch, Transdermal, Q24H  NIFEdipine XL, 60 mg, Oral, Q24H  nitroglycerin, 1 inch, Topical, Q6H  pantoprazole, 40 mg, Oral, Q AM  sodium chloride, 10 mL, Intravenous, Q12H  vancomycin (dosing per levels), , Not Applicable, Daily      Continuous Infusions:Pharmacy to dose vancomycin,   ropivacaine,       PRN Meds:.  acetaminophen    albumin human    albumin human    senna-docusate sodium **AND**  polyethylene glycol **AND** bisacodyl **AND** bisacodyl    Calcium Replacement - Follow Nurse / BPA Driven Protocol    dextrose    dextrose    glucagon (human recombinant)    heparin (porcine)    Magnesium Low Dose Replacement - Follow Nurse / BPA Driven Protocol    melatonin    metoprolol tartrate    ondansetron    oxyCODONE    Pharmacy to dose vancomycin    Potassium Replacement - Follow Nurse / BPA Driven Protocol    [COMPLETED] Insert Peripheral IV **AND** sodium chloride    sodium chloride    sodium chloride    traMADol    Assessment & Plan     Active Hospital Problems    Diagnosis  POA    **Generalized weakness [R53.1]  Yes    Paroxysmal A-fib [I48.0]  Unknown    Sepsis due to methicillin resistant Staphylococcus aureus (MRSA) with encephalopathy without septic shock [A41.02, R65.20, G93.41]  Unknown    Chronic osteomyelitis of left foot [M86.672]  Unknown    Critical limb ischemia of left lower extremity [I70.222]  Unknown    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     Brief Hospital Course to date:  Sara Esparza is a 74 y.o. female with PMH significant for HTN, PAF (Eliquis), prior DVT/PE, ESRD on HD, DMII, asthma, DENZEL (CPAP). Presented to [facility] ED on 1/19/25 for evaluation of generalized weakness, myalgias, lightheadedness and falls. On 1/20, she developed fevers. Source ultimately found to be related to L calcaneal osteomyelitis. ID / ortho consulted to follow. She is s/p debridement and wound vac placement 1/24/25. Ortho recommended BKA. In an effort to optimize arterial flow and wound healing, Dr. Ramirez of vascular surgery performed LLE angiogram with angioplasty/lithotripsy on 2/3/25. She was agreeable to L BKA and this is planned for 2/4/25    Sepsis secondary to  L calcaneal osteomyelitis  Metabolic encephalopathy  L posterial tibial artery occlusion  - Met sepsis criteria on 1/20 with fever 102.6F, 's, leukocytosis (WBC 15K), source of infection and altered mental status   - 1/21/25 CT LLE w/o contrast showed draining wound seen along plantar aspect of left calcaneus with surrounding changes consistent with osteomyelitis  - Appreciate orthopedic surgery assistance  - She is s/p debridement and wound vac placement 1/24/25 - probability of limb salvage felt to be poor  - s/p LLE angiogram with lithotripsy / angioplasty by Dr. Ramirez 2/3/25   - She is s/p L BKA on 2/4/25 by Dr. Gonzalez   - Wound culture (+) MRSA / Morganella   - 1/24 surgical cultures growing MRSA and Cutibacterium acnes  - ID following - on IV Vanc/Ceftriaxone   - PT / OT to follow postoperatively once cleared by ortho     Generalized weakness, progressive x weeks  Generalized debility   Falls at home   - CT chest/abd/pelvis without acute findings   - Normal TSH and CK   - PT/OT/CM following - anticipate will need SNF      Neck pain, C7 region   C5-6 canal stenosis  Chronic back pain s/p spinal stimulator  - CT C-spine - no fractures  - Reported history of Oxycodone dependence - attempted to avoid opiates however due to uncontrolled pain she was started on Oxycodone 5mg Q4H PRN on 2/1/25  - Continue Lidocaine patches  - Partner recommended that patient/ reach out to her pain management clinic for questions re: spinal stimulator     ESRD on HD  - Has tunneled R IJ catheter - recent HD start in 9/2024  - Nephrology following  - Continue MWF HD      DMII, HbA1c 4.9  - On Sitagliptin at home  - On SSI only with fairly normal glucose      HTN   - BP was low but trending back up  - Continue Nifedipine XL 60mg daily   - Home Imdur on hold  - Carvedilol stopped due to AF RVR - now on Metoprolol    Hx of Afib  Episode of Afib with RVR during HD  - Was on home-dose Carvedilol but HR uncontrolled  -  Appreciate cardiology assistance  - Started on Amiodarone. BB transitioned to Metoprolol - then stopped due to bradycardia  - Eliquis on hold perioperatively     Elevated troponin  - In setting of ESRD  - Troponin peaked at 132 on 1/19/25  - Per chart review, had C recently at [facility] with non-obstructive CAD     Dyspepsia  - GI cocktail PRN     Constipation  - Likely due to opiates  - Improved with bowel regimen and addition of Lactulose      Expected Discharge Location and Transportation: likely SNF   Expected Discharge Expected Discharge Date: 2/7/2025; Expected Discharge Time:      VTE Prophylaxis: Pharmacologic & mechanical VTE prophylaxis orders are present.    AM-PAC 6 Clicks Score (PT): 10 (02/04/25 2341)    CODE STATUS:   Code Status and Medical Interventions: CPR (Attempt to Resuscitate); Full Support   Ordered at: 01/19/25 0518     Level Of Support Discussed With:    Patient     Code Status (Patient has no pulse and is not breathing):    CPR (Attempt to Resuscitate)     Medical Interventions (Patient has pulse or is breathing):    Full Support     Chaya Clayton PA-C  02/05/25

## 2025-02-06 LAB
CYTO UR: NORMAL
GLUCOSE BLDC GLUCOMTR-MCNC: 124 MG/DL (ref 70–130)
GLUCOSE BLDC GLUCOMTR-MCNC: 128 MG/DL (ref 70–130)
GLUCOSE BLDC GLUCOMTR-MCNC: 134 MG/DL (ref 70–130)
GLUCOSE BLDC GLUCOMTR-MCNC: 153 MG/DL (ref 70–130)
LAB AP CASE REPORT: NORMAL
LAB AP CLINICAL INFORMATION: NORMAL
PATH REPORT.FINAL DX SPEC: NORMAL
PATH REPORT.GROSS SPEC: NORMAL

## 2025-02-06 PROCEDURE — 97166 OT EVAL MOD COMPLEX 45 MIN: CPT

## 2025-02-06 PROCEDURE — 97530 THERAPEUTIC ACTIVITIES: CPT

## 2025-02-06 PROCEDURE — 63710000001 INSULIN LISPRO (HUMAN) PER 5 UNITS: Performed by: ORTHOPAEDIC SURGERY

## 2025-02-06 PROCEDURE — 97164 PT RE-EVAL EST PLAN CARE: CPT

## 2025-02-06 PROCEDURE — 82948 REAGENT STRIP/BLOOD GLUCOSE: CPT

## 2025-02-06 PROCEDURE — 99232 SBSQ HOSP IP/OBS MODERATE 35: CPT | Performed by: PHYSICIAN ASSISTANT

## 2025-02-06 RX ORDER — ACETAMINOPHEN 500 MG
1000 TABLET ORAL EVERY 8 HOURS
Status: DISCONTINUED | OUTPATIENT
Start: 2025-02-06 | End: 2025-02-12 | Stop reason: HOSPADM

## 2025-02-06 RX ORDER — POLYETHYLENE GLYCOL 3350 17 G/17G
17 POWDER, FOR SOLUTION ORAL DAILY PRN
Status: DISCONTINUED | OUTPATIENT
Start: 2025-02-06 | End: 2025-02-12 | Stop reason: HOSPADM

## 2025-02-06 RX ORDER — BISACODYL 5 MG/1
5 TABLET, DELAYED RELEASE ORAL DAILY PRN
Status: DISCONTINUED | OUTPATIENT
Start: 2025-02-06 | End: 2025-02-12 | Stop reason: HOSPADM

## 2025-02-06 RX ORDER — AMOXICILLIN 250 MG
2 CAPSULE ORAL 2 TIMES DAILY
Status: DISCONTINUED | OUTPATIENT
Start: 2025-02-06 | End: 2025-02-12 | Stop reason: HOSPADM

## 2025-02-06 RX ORDER — BISACODYL 10 MG
10 SUPPOSITORY, RECTAL RECTAL DAILY PRN
Status: DISCONTINUED | OUTPATIENT
Start: 2025-02-06 | End: 2025-02-12 | Stop reason: HOSPADM

## 2025-02-06 RX ADMIN — ATORVASTATIN CALCIUM 10 MG: 10 TABLET, FILM COATED ORAL at 20:30

## 2025-02-06 RX ADMIN — Medication 2.5 MG: at 20:30

## 2025-02-06 RX ADMIN — APIXABAN 2.5 MG: 2.5 TABLET, FILM COATED ORAL at 20:30

## 2025-02-06 RX ADMIN — Medication 10 ML: at 10:19

## 2025-02-06 RX ADMIN — ACETAMINOPHEN 1000 MG: 500 TABLET ORAL at 16:05

## 2025-02-06 RX ADMIN — SENNOSIDES AND DOCUSATE SODIUM 2 TABLET: 50; 8.6 TABLET ORAL at 16:05

## 2025-02-06 RX ADMIN — ALLOPURINOL 200 MG: 100 TABLET ORAL at 10:17

## 2025-02-06 RX ADMIN — OXYCODONE HYDROCHLORIDE 5 MG: 5 TABLET ORAL at 10:17

## 2025-02-06 RX ADMIN — APIXABAN 2.5 MG: 2.5 TABLET, FILM COATED ORAL at 10:18

## 2025-02-06 RX ADMIN — NITROGLYCERIN 1 INCH: 20 OINTMENT TOPICAL at 02:08

## 2025-02-06 RX ADMIN — FAMOTIDINE 10 MG: 20 TABLET, FILM COATED ORAL at 10:17

## 2025-02-06 RX ADMIN — Medication 10 ML: at 20:31

## 2025-02-06 RX ADMIN — OXYCODONE HYDROCHLORIDE 5 MG: 5 TABLET ORAL at 02:08

## 2025-02-06 RX ADMIN — NITROGLYCERIN 1 INCH: 20 OINTMENT TOPICAL at 13:53

## 2025-02-06 RX ADMIN — PANTOPRAZOLE SODIUM 40 MG: 40 TABLET, DELAYED RELEASE ORAL at 05:06

## 2025-02-06 RX ADMIN — TRAMADOL HYDROCHLORIDE 50 MG: 50 TABLET, COATED ORAL at 05:06

## 2025-02-06 RX ADMIN — NITROGLYCERIN 1 INCH: 20 OINTMENT TOPICAL at 05:06

## 2025-02-06 RX ADMIN — NIFEDIPINE 60 MG: 60 TABLET, EXTENDED RELEASE ORAL at 10:17

## 2025-02-06 RX ADMIN — ACETAMINOPHEN 1000 MG: 500 TABLET ORAL at 20:30

## 2025-02-06 RX ADMIN — AMIODARONE HYDROCHLORIDE 200 MG: 200 TABLET ORAL at 20:30

## 2025-02-06 RX ADMIN — INSULIN LISPRO 2 UNITS: 100 INJECTION, SOLUTION INTRAVENOUS; SUBCUTANEOUS at 20:30

## 2025-02-06 NOTE — THERAPY RE-EVALUATION
Patient Name: Sara Esparza  : 1950    MRN: [id number]                              Today's Date: 2025       Admit Date: 2025    Visit Dx:     ICD-10-CM ICD-9-CM   1. S/P BKA (below knee amputation), left  Z89.512 V49.75   2. Generalized weakness  R53.1 780.79   3. Frequent falls  R29.6 V15.88   4. Chronic kidney disease, unspecified CKD stage  N18.9 585.9   5. Chronic anemia  D64.9 285.9   6. Type 2 diabetes mellitus with stage 5 chronic kidney disease not on chronic dialysis, without long-term current use of insulin  E11.22 250.40    N18.5 585.5   7. Chronic osteomyelitis of left foot  M86.672 730.17   8. Critical limb ischemia of left lower extremity  I70.222 440.22   9. Sepsis due to methicillin resistant Staphylococcus aureus (MRSA) with encephalopathy without septic shock  A41.02 038.12    R65.20 995.92    G93.41 348.31     Patient Active Problem List   Diagnosis    Postoperative abdominal hernia    ESRD (end stage renal disease)    Type 2 diabetes mellitus with stage 5 chronic kidney disease not on chronic dialysis, without long-term current use of insulin    Hypokalemia    Malnutrition    Severe malnutrition    Generalized weakness    Chronic osteomyelitis of left foot    Paroxysmal A-fib    Sepsis due to methicillin resistant Staphylococcus aureus (MRSA) with encephalopathy without septic shock    Critical limb ischemia of left lower extremity     Past Medical History:   Diagnosis Date    Anxiety     Arthritis     Asthma     allergy induced    Back pain     Depression     Diabetes mellitus     dx 10 years ago- checks fsbs most days    Diverticula of colon     GERD (gastroesophageal reflux disease)     Head ache     Hypertension     Sleep apnea     no longer needs cpap - approx. 10 years r/t weight loss     Past Surgical History:   Procedure Laterality Date    AORTAGRAM Left 2/3/2025    Procedure: CFA ACCESS RIGHT - ULTRASOUND GUIDED, AORTOGRAM WITH LEFT LOWER EXTREMITY RUN OFF, ANGIOPLASTY,  INTRAVASCULAR LITHOTRIPSY, RIGHT CFA CLOSURE;  Surgeon: Kyle Ramirez MD;  Location:  [facility];  Service: Vascular;  Laterality: Left;  fluoro 10 minutes  dose - 61mgy  contrast 20ml    BACK SURGERY      x 2    BELOW KNEE AMPUTATION Left 2/4/2025    Procedure: BELOW KNEE AMPUTATION LEFT;  Surgeon: Rom Gonzalez Jr., MD;  Location:  [facility];  Service: Orthopedics;  Laterality: Left;    CARDIAC CATHETERIZATION      no intervention    CHOLECYSTECTOMY OPEN      COLONOSCOPY      2013    FOOT IRRIGATION, DEBRIDEMENT AND REPAIR Left 1/24/2025    Procedure: HEEL IRRIGATION AND DEBRIDEMENT LEFT;  Surgeon: Rom Gonzalez Jr., MD;  Location:  [facility];  Service: Orthopedics;  Laterality: Left;    HYSTERECTOMY      PLACEMENT OF ANTIBIOTIC FILLER Left 2/4/2025    Procedure: PLACEMENT OF ANTIBIOTIC CEMENT FILLER FOR BONE VOID;  Surgeon: Rom Gonzalez Jr., MD;  Location:  [facility];  Service: Orthopedics;  Laterality: Left;    PLACEMENT OF WOUND VAC Left 1/24/2025    Procedure: PLACEMENT OF WOUND VAC;  Surgeon: Rom Gonzalez Jr., MD;  Location:  [facility];  Service: Orthopedics;  Laterality: Left;    REPLACEMENT TOTAL KNEE BILATERAL Bilateral     TONSILLECTOMY      VENTRAL/INCISIONAL HERNIA REPAIR N/A 6/6/2017    Procedure: INCISIONAL HERNIA REPAIR LAPAROSCOPIC;  Surgeon: Conrad Hernandez MD;  Location:  [facility];  Service:       General Information       Row Name 02/06/25 1117          Physical Therapy Time and Intention    Document Type re-evaluation  -     Mode of Treatment physical therapy  -       Row Name 02/06/25 1117          General Information    Patient Profile Reviewed yes  -ML     Prior Level of Function independent:;all household mobility;community mobility;gait;transfer;ADL's;using stairs;mod assist:;cooking;cleaning;driving;shopping  pt. was able to navigate steps in and out of home also, split level home, sharted tasks with spouse  -ML     Existing Precautions/Restrictions  fall;non-weight bearing;other (see comments)  L BKA, NWB with nerve catheter, pt. with dialysis port with dialysis MWF  -ML     Barriers to Rehab medically complex;previous functional deficit;physical barrier;impaired sensation;ineffective coping;cognitive status  -ML       Row Name 02/06/25 1117          Living Environment    People in Home spouse  spouse currently in a sling for an injury LUE  -ML       Row Name 02/06/25 1117          Home Main Entrance    Number of Stairs, Main Entrance three  -ML     Stair Railings, Main Entrance none  -ML       Row Name 02/06/25 1117          Stairs Within Home, Primary    Stairs, Within Home, Primary split level home  -ML     Number of Stairs, Within Home, Primary seven  7 up or down, split level, pt. only has to go up to main living area  -ML     Stair Railings, Within Home, Primary railing on left side (ascending)  -ML       Row Name 02/06/25 1117          Cognition    Orientation Status (Cognition) oriented x 3  -ML       Row Name 02/06/25 1117          Safety Issues/Impairments Affecting Functional Mobility    Safety Issues Affecting Function (Mobility) ability to follow commands;awareness of need for assistance;friction/shear risk;insight into deficits/self-awareness;judgment;problem-solving;safety precaution awareness;safety precautions follow-through/compliance;sequencing abilities  -     Impairments Affecting Function (Mobility) balance;cognition;endurance/activity tolerance;pain;postural/trunk control;range of motion (ROM);sensation/sensory awareness;strength  -ML     Cognitive Impairments, Mobility Safety/Performance awareness, need for assistance;insight into deficits/self-awareness;problem-solving/reasoning;judgment;safety precaution awareness;safety precaution follow-through;sequencing abilities  -               User Key  (r) = Recorded By, (t) = Taken By, (c) = Cosigned By      Initials Name Provider Type    ML Iliana Ray Physical Therapist                    Mobility       Row Name 02/06/25 1123          Bed Mobility    Bed Mobility rolling left;rolling right;supine-sit;scooting/bridging;sit-supine  -ML     Rolling Left [place] (Bed Mobility) dependent (less than 25% patient effort);2 person assist;verbal cues;nonverbal cues (demo/gesture)  -ML     Rolling Right [place] (Bed Mobility) nonverbal cues (demo/gesture);verbal cues;dependent (less than 25% patient effort);2 person assist  -ML     Scooting/Bridging [place] (Bed Mobility) dependent (less than 25% patient effort);2 person assist;verbal cues  -ML     Supine-Sit [place] (Bed Mobility) maximum assist (25% patient effort);2 person assist;nonverbal cues (demo/gesture);verbal cues  -ML     Sit-Supine [place] (Bed Mobility) dependent (less than 25% patient effort);2 person assist;verbal cues;nonverbal cues (demo/gesture)  -ML     Assistive Device (Bed Mobility) bed rails;repositioning sheet;head of bed elevated  -ML     Comment, (Bed Mobility) patient required max cues for sequencing, limited by pain and fear of pain  -ML       Row Name 02/06/25 1123          Transfers    Comment, (Transfers) unable to attempt sit to stand transfer due to decreased static/dynamic sitting balance  -ML       Row Name 02/06/25 1123          Bed-Chair Transfer    Bed-Chair [place] (Transfers) dependent (less than 25% patient effort);2 person assist  -ML     Assistive Device (Bed-Chair Transfers) lift device  -ML       Row Name 02/06/25 1123          Sit-Stand Transfer    Sit-Stand [place] (Transfers) unable to assess  -ML               User Key  (r) = Recorded By, (t) = Taken By, (c) = Cosigned By      Initials Name Provider Type    ML Iliana Ray Physical Therapist                   Obj/Interventions       Row Name 02/06/25 1126          Range of Motion Comprehensive    General Range of Motion lower extremity range of motion deficits identified  -ML     Comment, General Range of Motion LLE s/p BKA  in knee immobilizer, RLE hamstrings and external rotators tights patient positioned with RLE in hip flexion/external rotation and knee flexion  -ML       Row Name 02/06/25 1126          Strength Comprehensive (MMT)    General Manual Muscle Testing (MMT) Assessment lower extremity strength deficits identified  -ML     Comment, General Manual Muscle Testing (MMT) Assessment LLE not tested due to recnet BKA, RLE grossly 3-/5  -ML       Row Name 02/06/25 1126          Motor Skills    Therapeutic Exercise knee  -ML       Row Name 02/06/25 1126          Knee (Therapeutic Exercise)    Knee (Therapeutic Exercise) isometric exercises  -ML     Knee Isometrics (Therapeutic Exercise) right;quad sets;5 repetitions;3 second hold  -ML       Row Name 02/06/25 1126          Balance    Balance Assessment sitting static balance;sitting dynamic balance  -ML     Static Sitting Balance moderate assist;1-person assist  -ML     Dynamic Sitting Balance moderate assist;1-person assist  -ML     Position, Sitting Balance unsupported;sitting edge of bed  no back support, UE support on bed only  -ML     Balance Interventions sitting;supported;static;dynamic;weight shifting activity  -ML       Row Name 02/06/25 1126          Sensory Assessment (Somatosensory)    Right LE Sensory Assessment general sensation;impaired;absent  -ML               User Key  (r) = Recorded By, (t) = Taken By, (c) = Cosigned By      Initials Name Provider Type    ML Iliana Ray Physical Therapist                   Goals/Plan       Row Name 02/06/25 1135          Bed Mobility Goal 1 (PT)    Activity/Assistive Device (Bed Mobility Goal 1, PT) rolling to left;rolling to right;sit to supine;supine to sit  -ML     [place] Level/Cues Needed (Bed Mobility Goal 1, PT) minimum assist (75% or more patient effort)  -ML     Time Frame (Bed Mobility Goal 1, PT) short term goal (STG);5 days  -ML       Row Name 02/06/25 1135          Transfer Goal 1 (PT)    Activity/Assistive  Device (Transfer Goal 1, PT) sit-to-stand/stand-to-sit;bed-to-chair/chair-to-bed;wheelchair transfer  -ML     [place] Level/Cues Needed (Transfer Goal 1, PT) moderate assist (50-74% patient effort)  -ML     Time Frame (Transfer Goal 1, PT) long term goal (LTG);10 days  -ML       Row Name 02/06/25 3278          Balance Goal 1 (PT)    Activity/Assistive Device (Balance Goal) sitting static balance;sitting dynamic balance;unsupported  -ML     [place] Level/Cues Needed (Balance Goal 1, PT) supervision required  -ML     Time Frame (Balance Goal 1, PT) long-term goal (LTG);5-7 days  -ML       Row Name 02/06/25 4684          Therapy Assessment/Plan (PT)    Planned Therapy Interventions (PT) balance training;bed mobility training;home exercise program;patient/family education;postural re-education;ROM (range of motion);strengthening;transfer training;neuromuscular re-education;stretching;wheelchair management/propulsion training  -ML               User Key  (r) = Recorded By, (t) = Taken By, (c) = Cosigned By      Initials Name Provider Type    ML Iliana Ray Physical Therapist                   Clinical Impression       Row Name 02/06/25 9047          Pain    Pretreatment Pain Rating 8/10  -ML     Posttreatment Pain Rating 8/10  -ML     Pain Location back;extremity  -ML     Pain Side/Orientation generalized;left;lower  -ML     Pain Management Interventions exercise or physical activity utilized;positioning techniques utilized;nursing notified  nurse brought pain meds end of session due to pt. cognitive level did not need further impairment from pain meds prior to session, nerve catherter in place LLE and pain stimulator back  -ML     Response to Pain Interventions activity participation with increased pain;other (see comments)  pain back to Prior level end of session  -ML       Row Name 02/06/25 6214          Plan of Care Review    Plan of Care Reviewed With patient;spouse  -ML     Outcome Evaluation Physical  therapy evaluation complete. The patient required additional time and effort to complete mobility and frequent verbal cues for sequencing, increased safety awareness. The patient presents below baseline for mobility following L BKA. Patient would continue to benefit from skilled PT to address strength, balance and activity tolerance deficits.  -       Row Name 02/06/25 1130          Therapy Assessment/Plan (PT)    Patient/Family Therapy Goals Statement (PT) return to PLOF  -ML     Rehab Potential (PT) fair  -ML     Criteria for Skilled Interventions Met (PT) yes;meets criteria;skilled treatment is necessary  -ML     Therapy Frequency (PT) daily  -ML     Predicted Duration of Therapy Intervention (PT) 10 days  -ML       Row Name 02/06/25 1130          Positioning and Restraints    Pre-Treatment Position in bed  -ML     Post Treatment Position chair  -ML     In Chair notified nsg;reclined;call light within reach;encouraged to call for assist;exit alarm on;with family/caregiver;waffle cushion;on mechanical lift sling;legs elevated;RUE elevated;LUE elevated  RLE positioned in extension  -ML               User Key  (r) = Recorded By, (t) = Taken By, (c) = Cosigned By      Initials Name Provider Type     Iliana Ray Physical Therapist                   Outcome Measures       Row Name 02/06/25 1136          How much help from another person do you currently need...    Turning from your back to your side while in flat bed without using bedrails? 1  -ML     Moving from lying on back to sitting on the side of a flat bed without bedrails? 2  -ML     Moving to and from a bed to a chair (including a wheelchair)? 1  -ML     Standing up from a chair using your arms (e.g., wheelchair, bedside chair)? 1  -ML     Climbing 3-5 steps with a railing? 1  -ML     To walk in hospital room? 1  -ML     AM-PAC 6 Clicks Score (PT) 7  -ML     Highest Level of Mobility Goal 2 --> Bed activities/dependent transfer  -       Row Name  02/06/25 1136 02/06/25 1118       Functional Assessment    Outcome Measure Options AM-PAC 6 Clicks Basic Mobility (PT)  -ML AM-PAC 6 Clicks Daily Activity (OT)  -[id number]              User Key  (r) = Recorded By, (t) = Taken By, (c) = Cosigned By      Initials Name Provider Type    Eliana Grant, OT Occupational Therapist    Iliana Phan Physical Therapist                                 Physical Therapy Education       Title: PT OT SLP Therapies (In Progress)       Topic: Physical Therapy (In Progress)       Point: Mobility training (In Progress)       Learning Progress Summary            Patient Acceptance, E, NR by ML at 2/6/2025 1136   Family Acceptance, E, NR by ML at 2/6/2025 1136                      Point: Home exercise program (Not Started)       Learner Progress:  Not documented in this visit.              Point: Body mechanics (In Progress)       Learning Progress Summary            Patient Acceptance, E, NR by ML at 2/6/2025 1136   Family Acceptance, E, NR by ML at 2/6/2025 1136                      Point: Precautions (In Progress)       Learning Progress Summary            Patient Acceptance, E, NR by ML at 2/6/2025 1136   Family Acceptance, E, NR by ML at 2/6/2025 1136                                      User Key       Initials Effective Dates Name Provider Type Discipline     04/22/21 -  Iliana Ray Physical Therapist PT                  PT Recommendation and Plan  Planned Therapy Interventions (PT): balance training, bed mobility training, home exercise program, patient/family education, postural re-education, ROM (range of motion), strengthening, transfer training, neuromuscular re-education, stretching, wheelchair management/propulsion training  Outcome Evaluation: Physical therapy evaluation complete. The patient required additional time and effort to complete mobility and frequent verbal cues for sequencing, increased safety awareness. The patient presents below baseline for mobility  following L BKA. Patient would continue to benefit from skilled PT to address strength, balance and activity tolerance deficits.     Time Calculation:         PT Charges       Row Name 02/06/25 1137             Time Calculation    Start Time 0936  -ML      PT Received On 02/06/25  -ML      PT Goal Re-Cert Due Date 02/16/25  -ML         Timed Charges    49540 - PT Therapeutic Activity Minutes 30  -ML         Untimed Charges    PT Eval/Re-eval Minutes 30  -ML         Total Minutes    Timed Charges Total Minutes 30  -ML      Untimed Charges Total Minutes 30  -ML       Total Minutes 60  -ML                User Key  (r) = Recorded By, (t) = Taken By, (c) = Cosigned By      Initials Name Provider Type    Iliana Phan Physical Therapist                  Therapy Charges for Today       Code Description Service Date Service Provider Modifiers Qty    17178790244 HC PT THERAPEUTIC ACT EA 15 MIN 2/6/2025 Iliana Ray GP 2    18770245244 HC PT RE-EVAL ESTABLISHED PLAN 2 2/6/2025 Iliana Ray GP 1            PT G-Codes  Outcome Measure Options: AM-PAC 6 Clicks Basic Mobility (PT)  AM-PAC 6 Clicks Score (PT): 7  AM-PAC 6 Clicks Score (OT): 12  PT Discharge Summary  Anticipated Discharge Disposition (PT): skilled nursing facility    Iliana Ray  2/6/2025

## 2025-02-06 NOTE — PLAN OF CARE
Goal Outcome Evaluation:  Plan of Care Reviewed With: patient, spouse        Progress: improving  Outcome Evaluation: OT evaluation completed.  Pt. with new L BKA.  Patient demonstrated impaired strength, ROM, balance, endurance/activity tolerance and cognition along with pain impacting high PLOF ADLs and related transfers.  Recommend pre-medicating at beginning of session to assist with pain until more cognitively to self allowing pre-medication. Pt. to EOB max of 2 with up to mod A needed to balance.  Lift needed to recliner.  Pt. needed education on best positioning of body in bed and chair and UE/LE to complete on own to support function.  Pt. appropriate for continued skilled OT services to address deficit areas and promote return to PLOF.  Recommend SNF at discharge.    Anticipated Discharge Disposition (OT): skilled nursing facility

## 2025-02-06 NOTE — PROGRESS NOTES
[facility]    Acute pain service Inpatient Progress Note    Patient Name: Sara Esparza  :  1950  MRN:  [id number]        Acute Pain  Service Inpatient Progress Note:    Analgesia:Good  Pain Score:2/10  LOC: alert and awake  Resp Status: room air  Cardiac: VS stable  Side Effects:None  Catheter Site:clean, dressing intact and dry  Cath type: peripheral nerve cath(InfuSystem)  Infusion rate: Ext/Pop: Basal: 1ml/hr, PIB: 5ml q 2 h, PCA: 5 ml q 30 min (1mL,5ml, 5ml InfuSystem Pump)  Dosing/Volume: ropivacaine 0.2%  Catheter Plan:Catheter to remain Insitu and Continue catheter infusion rate unchanged  Comments:

## 2025-02-06 NOTE — PLAN OF CARE
Goal Outcome Evaluation:  Plan of Care Reviewed With: patient, spouse           Outcome Evaluation: Physical therapy evaluation complete. The patient required additional time and effort to complete mobility and frequent verbal cues for sequencing, increased safety awareness. The patient presents below baseline for mobility following L BKA. Patient would continue to benefit from skilled PT to address strength, balance and activity tolerance deficits.    Anticipated Discharge Disposition (PT): skilled nursing facility

## 2025-02-06 NOTE — PROGRESS NOTES
"    [facility] Medicine Services  PROGRESS NOTE    Patient Name: Sara Esparza  : 1950  MRN: [id number]    Date of Admission: 2025  Primary Care Physician: Toribio Roberts MD    Subjective     CC: f/u osteomyelitis     HPI:  In chair. BKA stump hurts. Recently got pain meds.  at bedside notes she has been intermittently confused - she is agitated that he keeps \"overriding\" her. Her conversation is nonsensical at times.     Objective     Vital Signs:   Temp:  [97.8 °F (36.6 °C)-98.9 °F (37.2 °C)] 97.9 °F (36.6 °C)  Heart Rate:  [63-70] 67  Resp:  [18] 18  BP: (144-165)/(60-77) 159/69  Flow (L/min) (Oxygen Therapy):  [2] 2     Physical Exam:  Constitutional: No acute distress. Drowsy but conversational. Laying in bed.   HENT: NCAT, mucous membranes moist  Respiratory: Clear to auscultation bilaterally, respiratory effort normal   Cardiovascular: RRR  Gastrointestinal: Positive bowel sounds, soft, nontender, nondistended  Musculoskeletal: No bilateral ankle edema. R upper chest tunneled HD catheter. L BKA stump dressed - I did not remove dressing for exam. Nerve catheter in place.   Psychiatric: Anxious affect, cooperative with exam  Neurologic: Orientation questions deferred. Some situational confusion noted. Moves all extremities spontaneously without focal deficits, speech clear  Skin: No rashes to exposed surfaces       Results Reviewed:  LAB RESULTS:      Lab 25  1336 25  1109 25  0347 25  1253 25  0726   WBC 5.58 6.86 8.05 7.90 11.55*   HEMOGLOBIN 9.2* 9.2* 9.9* 9.8* 10.1*   HEMATOCRIT 29.4* 29.0* 32.0* 31.4* 32.7*   PLATELETS 268 235 259 246 271   .7* 98.0* 100.6* 100.6* 101.2*   PROTIME  --   --  18.1*  --   --          Lab 25  1336 25  1159 25  1109 25  0347 25  1253 25  1025   SODIUM 138  --  133* 130* 132* 130*   POTASSIUM 3.8 4.5 4.3 5.3* 5.2 4.4   CHLORIDE 100  --  98 95* 98 96*   CO2 " 25.0  --  22.0 22.0 21.0* 25.0   ANION GAP 13.0  --  13.0 13.0 13.0 9.0   BUN 14  --  32* 45* 38* 29*   CREATININE 2.20*  --  4.23* 5.56* 5.05* 3.84*   EGFR 22.9*  --  10.4* 7.5* 8.4* 11.7*   GLUCOSE 105*  --  92 109* 132* 152*   CALCIUM 8.7  --  8.8 9.2 9.2 9.1   MAGNESIUM 1.9  --   --   --   --   --    PHOSPHORUS 2.8  --   --   --   --  4.3         Lab 02/05/25  1336 02/01/25  1025   ALBUMIN 2.8* 2.5*         Lab 02/03/25  0347   PROTIME 18.1*   INR 1.48*         Lab 02/04/25  1335   ABO TYPING O   RH TYPING Positive   ANTIBODY SCREEN Negative     Brief Urine Lab Results  (Last result in the past 365 days)        Color   Clarity   Blood   Leuk Est   Nitrite   Protein   CREAT   Urine HCG        01/19/25 1404 Yellow   Cloudy   Moderate (2+)   Moderate (2+)   Negative   >=300 mg/dL (3+)                 Microbiology Results Abnormal       Procedure Component Value - Date/Time    Anaerobic Culture - Surgical Site, Foot, Left [[id number]]  (Abnormal) Collected: 01/24/25 1754    Lab Status: Final result Specimen: Surgical Site from Foot, Left Updated: 01/30/25 1515     Anaerobic Culture Cutibacterium acnes    Body Fluid Culture - Surgical Site, Foot, Left [[id number]]  (Abnormal)  (Susceptibility) Collected: 01/24/25 1754    Lab Status: Final result Specimen: Surgical Site from Foot, Left Updated: 01/27/25 0642     Body Fluid Culture Light growth (2+) Staphylococcus aureus, MRSA     Comment:   Methicillin resistant Staphylococcus aureus, Patient may be an isolation risk.        Gram Stain Rare (1+) WBCs seen      No organisms seen    Narrative:      Not a body fluid.    Susceptibility        Staphylococcus aureus, MRSA      TAYLOR      Clindamycin Susceptible      Erythromycin Resistant      Oxacillin Resistant      Rifampin Susceptible      Tetracycline Susceptible      Trimethoprim + Sulfamethoxazole Susceptible      Vancomycin Susceptible                           Wound Culture - Wound, Foot, Left [[id number]]  (Abnormal)   (Susceptibility) Collected: 01/20/25 1409    Lab Status: Final result Specimen: Wound from Foot, Left Updated: 01/24/25 0658     Wound Culture Scant growth (1+) Staphylococcus aureus, MRSA     Comment: Methicillin resistant Staphylococcus aureus, Patient may be an isolation risk.         Rare growth Morganella morganii ssp morganii     Comment:            Scant growth (1+) Normal Skin Kimber     Gram Stain Few (2+) WBCs seen      Many (4+) Gram positive cocci in pairs      Rare (1+) Gram negative bacilli      Rare (1+) Gram positive bacilli    Susceptibility        Staphylococcus aureus, MRSA      TAYLOR      Clindamycin Susceptible      Erythromycin Resistant      Oxacillin Resistant      Rifampin Susceptible      Tetracycline Susceptible      Trimethoprim + Sulfamethoxazole Susceptible      Vancomycin Susceptible                       Susceptibility        Morganella morganii ssp morganii      TAYLOR      Amoxicillin + Clavulanate Resistant      Ampicillin Resistant      Ampicillin + Sulbactam Resistant      Cefazolin (Non Urine) Resistant      Cefepime Susceptible  [1]       Cefotaxime Susceptible      Ceftazidime Susceptible  [1]       Gentamicin Susceptible      Levofloxacin Susceptible      Piperacillin + Tazobactam Susceptible      Tetracycline Susceptible      Trimethoprim + Sulfamethoxazole Susceptible                   [1]  Appended report. These results have been appended to a previously final verified report.                Susceptibility Comments       Morganella morganii ssp morganii    Cefotaxime susceptibility can be used as a surrogate for ceftriaxone susceptibility               MRSA Screen, PCR (Inpatient) - Swab, Nares [[id number]]  (Abnormal) Collected: 01/21/25 0614    Lab Status: Final result Specimen: Swab from Nares Updated: 01/21/25 0847     MRSA PCR Positive    Narrative:      The negative predictive value of this diagnostic test is high and should only be used to consider de-escalating anti-MRSA  therapy. A positive result may indicate colonization with MRSA and must be correlated clinically.          No radiology results from the last 24 hrs    Results for orders placed during the hospital encounter of 01/19/25    Adult Transthoracic Echo Complete W/ Cont if Necessary Per Protocol    Interpretation Summary    Left ventricular systolic function is normal. Estimated left ventricular EF = 55%    Left ventricular wall thickness is consistent with moderate concentric hypertrophy.    No hemodynamically significant valvular heart disease    Current medications:  Scheduled Meds:albumin human, , ,   allopurinol, 200 mg, Oral, Daily  amiodarone, 200 mg, Oral, Q24H  apixaban, 2.5 mg, Oral, Q12H  atorvastatin, 10 mg, Oral, Nightly  epoetin gracia/gracia-epbx, 6,000 Units, Subcutaneous, Once per day on Monday Wednesday Friday  famotidine, 10 mg, Oral, Daily  insulin lispro, 2-9 Units, Subcutaneous, 4x Daily AC & at Bedtime  NIFEdipine XL, 60 mg, Oral, Q24H  nitroglycerin, 1 inch, Topical, Q6H  pantoprazole, 40 mg, Oral, Q AM  sodium chloride, 10 mL, Intravenous, Q12H      Continuous Infusions:ropivacaine,       PRN Meds:.  acetaminophen    albumin human    albumin human    senna-docusate sodium **AND** polyethylene glycol **AND** bisacodyl **AND** bisacodyl    Calcium Replacement - Follow Nurse / BPA Driven Protocol    dextrose    dextrose    glucagon (human recombinant)    heparin (porcine)    Magnesium Low Dose Replacement - Follow Nurse / BPA Driven Protocol    melatonin    metoprolol tartrate    ondansetron    oxyCODONE    Potassium Replacement - Follow Nurse / BPA Driven Protocol    [COMPLETED] Insert Peripheral IV **AND** sodium chloride    sodium chloride    sodium chloride    traMADol    Assessment & Plan     Active Hospital Problems    Diagnosis  POA    **Generalized weakness [R53.1]  Yes    Paroxysmal A-fib [I48.0]  Unknown    Sepsis due to methicillin resistant Staphylococcus aureus (MRSA) with encephalopathy  without septic shock [A41.02, R65.20, G93.41]  Unknown    Chronic osteomyelitis of left foot [M86.672]  Unknown    Critical limb ischemia of left lower extremity [I70.222]  Unknown    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     Brief Hospital Course to date:  Sara Esparza is a 74 y.o. female with PMH significant for HTN, PAF (Eliquis), prior DVT/PE, ESRD on HD, DMII, asthma, DENZEL (CPAP). Presented to [facility] ED on 1/19/25 for evaluation of generalized weakness, myalgias, lightheadedness and falls. On 1/20, she developed fevers. Source ultimately found to be related to L calcaneal osteomyelitis. ID / ortho consulted to follow. She is s/p debridement and wound vac placement 1/24/25. Ortho recommended BKA. In an effort to optimize arterial flow and wound healing, Dr. Ramirez of vascular surgery performed LLE angiogram with angioplasty/lithotripsy on 2/3/25. She was agreeable to L BKA and this is planned for 2/4/25    Sepsis secondary to L calcaneal osteomyelitis  Metabolic encephalopathy  L posterial tibial artery occlusion  - Met sepsis criteria on 1/20 with fever 102.6F, 's, leukocytosis (WBC 15K), source of infection and altered mental status   - 1/21/25 CT LLE w/o contrast showed draining wound seen along plantar aspect of left calcaneus with surrounding changes consistent with osteomyelitis  - Appreciate orthopedic surgery assistance  - She is s/p debridement and wound vac placement 1/24/25 - probability of limb salvage felt to be poor  - s/p LLE angiogram with lithotripsy / angioplasty by Dr. Ramirez 2/3/25   - She is s/p L BKA on 2/4/25 by Dr. Gonzalez   - Wound culture (+) MRSA / Morganella   - 1/24 surgical cultures growing MRSA and Cutibacterium acnes  - ID following - on  IV Vanc/Ceftriaxone   - PT / OT to follow postoperatively once cleared by ortho     Generalized weakness, progressive x weeks  Generalized debility   Falls at home   - CT chest/abd/pelvis without acute findings   - Normal TSH and CK   - PT/OT/CM following - anticipate will need SNF      Neck pain, C7 region   C5-6 canal stenosis  Chronic back pain s/p spinal stimulator  - CT C-spine - no fractures  - Reported history of Oxycodone dependence - attempted to avoid opiates however due to uncontrolled pain she was started on Oxycodone 5mg Q4H PRN on 2/1/25  - Continue Lidocaine patches  - Partner recommended that patient/ reach out to her pain management clinic for questions re: spinal stimulator     ESRD on HD  - Has tunneled R IJ catheter - recent HD start in 9/2024  - Nephrology following  - Continue MWF HD      DMII, HbA1c 4.9  - On Sitagliptin at home  - On SSI only with fairly normal glucose      HTN   - BP was low but trending back up  - Continue Nifedipine XL 60mg daily   - Home Imdur on hold  - Carvedilol stopped due to AF RVR - now on Metoprolol    Hx of Afib  Episode of Afib with RVR during HD  - Was on home-dose Carvedilol but HR uncontrolled  - Appreciate cardiology assistance  - Started on Amiodarone. BB transitioned to Metoprolol - then stopped due to bradycardia  - Resumed Eliquis on 2/5     Elevated troponin  - In setting of ESRD  - Troponin peaked at 132 on 1/19/25  - Per chart review, had LHC recently at [facility] with non-obstructive CAD     Dyspepsia  - GI cocktail PRN     Constipation  - Likely due to opiates  - Improved with bowel regimen and addition of Lactulose   - Bowel regimen transitioned to PRN - appears constipated again. Restarted bowel regimen on 2/6      Expected Discharge Location and Transportation: likely SNF   Expected Discharge Expected Discharge Date: 2/7/2025; Expected Discharge Time:      VTE Prophylaxis: Pharmacologic & mechanical VTE prophylaxis orders are  present.    AM-PAC 6 Clicks Score (PT): 7 (02/06/25 1136)    CODE STATUS:   Code Status and Medical Interventions: CPR (Attempt to Resuscitate); Full Support   Ordered at: 01/19/25 0518     Level Of Support Discussed With:    Patient     Code Status (Patient has no pulse and is not breathing):    CPR (Attempt to Resuscitate)     Medical Interventions (Patient has pulse or is breathing):    Full Support     Chaya Clayton PA-C  02/06/25

## 2025-02-06 NOTE — PROGRESS NOTES
Sara Esparza       LOS: 18 days   Patient Care Team:  Toribio Roberts MD as PCP - General (Internal Medicine)    Chief Complaint: Left heel ulcer    Subjective     Interval History:     Resting comfortably in bed this morning.  Family present at bedside.     Review of Systems:      Gen- No fevers, chills  CV- No chest pain, palpitations  Resp- No cough, dyspnea  GI- No N/V/D, abd pain    Objective     Vital Signs  Vital Signs (last 24 hours)         01/22 0700  01/23 0659 01/23 0700 01/23 0827   Most Recent      Temp (°F) 97.9 -  98.6      98     98 (36.7) 01/23 0700    Heart Rate 73 -  167       99 01/23 0341    Resp 16 -  24      18     18 01/23 0700    BP 74/54 -  156/71       132/90 01/23 0341    SpO2 (%) 90 -  99       94 01/23 0341    Flow (L/min) (Oxygen Therapy)   3       3 01/23 0600              Physical Exam:     No acute distress.  Nonlabored respirations.  Regular rate and rhythm.  Abdomen nondistended.  Left lower extremity: Operative dressing present is clean, dry, intact.  LENORA drain in place with approximately 10 cc serosanguineous drainage.  Incision inspected, skin edges well-approximated, staples in place.  No erythema, drainage, purulence, warmth     Results Review:     I reviewed the patient's new clinical results.    Medication Review:   Hospital Medications (active)         Dose Frequency Start End    acetaminophen (TYLENOL) tablet 650 mg 650 mg Every 6 Hours PRN 1/19/2025 --    Admin Instructions: If given for fever, use fever parameter: fever greater than 100.4 °F  Based on patient request - if ordered for moderate or severe pain, provider allows for administration of a medication prescribed for a lower pain scale.    Do not exceed 4 grams of acetaminophen in a 24 hr period. Max dose of 2gm for AST/ALT greater than 120 units/L.    If given for pain, use the following pain scale:   Mild Pain = Pain Score of 1-3, CPOT 1-2  Moderate Pain = Pain Score of 4-6, CPOT 3-4  Severe Pain =  "Pain Score of 7-10, CPOT 5-8    Route: Oral    albumin human 25 % IV SOLN  - ADS Override Pull   1/22/2025 --    Admin Instructions: Created by cabinet override    Notes to Pharmacy: Created by cabinet override    allopurinol (ZYLOPRIM) tablet 200 mg 200 mg Daily 1/19/2025 --    Admin Instructions: (BKC) Take with food if GI upset occurs.    Route: Oral    amiodarone (PACERONE) tablet 200 mg 200 mg 3 Times Daily 1/23/2025 --    Admin Instructions: Avoid grapefruit juice while taking this medication.    Route: Oral    amiodarone 360 mg in 200 mL D5W infusion 1 mg/min Continuous 1/22/2025 --    Admin Instructions: Central line preferred, if unavailable use large bore IV access with frequent nurse monitoring of IV site.  IV med requiring filtration 0.22 micron inline filter.    Route: Intravenous    apixaban (ELIQUIS) tablet 2.5 mg 2.5 mg Every 12 Hours Scheduled 1/22/2025 --    Admin Instructions: Tablet may be crushed and suspended in 60 mL of water or D5W and immediately delivered via NG tube.  Avoid grapefruit juice.    Route: Oral    atorvastatin (LIPITOR) tablet 10 mg 10 mg Daily 1/22/2025 3/18/2025    Admin Instructions: Avoid grapefruit juice.    Route: Oral    bisacodyl (DULCOLAX) EC tablet 5 mg 5 mg Daily PRN 1/19/2025 --    Admin Instructions: Use if no bowel movement after 12 hours.  Swallow whole. Do not crush, split, or chew tablet.    Route: Oral    Linked Group 1: Placed in \"And\" Linked Group        bisacodyl (DULCOLAX) suppository 10 mg 10 mg Daily PRN 1/19/2025 --    Admin Instructions: Use if no bowel movement after 12 hours.  Hold for diarrhea    Route: Rectal    Linked Group 1: Placed in \"And\" Linked Group        Calcium Replacement - Follow Nurse / BPA Driven Protocol  As Needed 1/19/2025 --    Admin Instructions: Open Order & Select \"BHS Electrolyte Replacement Protocol Algorithm\" to View Details    Route: Not Applicable    cefepime 1000 mg IVPB in 100 mL NS (MBP) 1,000 mg Every 24 Hours " 1/22/2025 1/29/2025    Admin Instructions: Give after dialysis on dialysis days.     Route: Intravenous    dextrose (D50W) (25 g/50 mL) IV injection 25 g 25 g Every 15 Minutes PRN 1/19/2025 --    Admin Instructions: Blood sugar less than 70; patient has IV access - Unresponsive, NPO or Unable To Safely Swallow    Route: Intravenous    dextrose (GLUTOSE) oral gel 15 g 15 g Every 15 Minutes PRN 1/19/2025 --    Admin Instructions: BS<70, Patient Alert, Is not NPO, Can safely swallow.    Route: Oral    dilTIAZem (CARDIZEM) 125 mg in 125 mL D5W infusion 5-15 mg/hr Continuous 1/22/2025 --    Admin Instructions: For heart rate - To maintain HR less than 120, initiate infusion at 5 mg/hr. Titrate up or down 2.5-5 mg/hr every 15 minutes to use the lowest dose possible. Maximum infusion rate of 15 mg/hr. Hold for SBP less than 100 mmHg or heart rate less than 60. Contact provider if unable to maintain HR less than 120 on maximum dose. Once Heart Rate target achieved obtain vitals a minimum of every 30 minutes. For patients not in critical care areas - obtain vitals a minimum of every 4 hours.   Caution: Look alike/sound alike drug alert.   Refrigerate.    Route: Intravenous    famotidine (PEPCID) tablet 20 mg 20 mg Daily 1/22/2025 --    Route: Oral    glucagon (GLUCAGEN) injection 1 mg 1 mg Every 15 Minutes PRN 1/19/2025 --    Admin Instructions: Blood Glucose Less Than 70 - Patient Without IV Access - Unresponsive, NPO or Unable To Safely Swallow  Reconstitute powder for injection by adding 1 mL of -supplied sterile diluent or sterile water for injection to a vial containing 1 mg of the drug, to provide solutions containing 1 mg/mL. Shake vial gently to dissolve.    Route: Intramuscular    heparin (porcine) injection 2,000 Units 2,000 Units As Needed 1/20/2025 --    Route: Intracatheter    HYDROmorphone (DILAUDID) injection 0.25 mg 0.25 mg 2 Times Daily PRN 1/20/2025 1/25/2025    Admin Instructions: Based on  "patient request - if ordered for moderate or severe pain, provider allows for administration of a medication prescribed for a lower pain scale.  If given for pain, use the following pain scale:  Mild Pain = Pain Score of 1-3, CPOT 1-2  Moderate Pain = Pain Score of 4-6, CPOT 3-4  Severe Pain = Pain Score of 7-10, CPOT 5-8    Route: Intravenous    Insulin Lispro (humaLOG) injection 2-9 Units 2-9 Units 4 Times Daily Before Meals & Nightly 1/19/2025 --    Admin Instructions: Correction Insulin - Moderate Dose (Total Insulin Dose 40-60 units/day, Average Weight Patient, Patient Taking Oral Hypoglycemic)    Blood Glucose 150-199 mg/dL - 2 units  Blood Glucose 200-249 mg/dL - 4 units  Blood Glucose 250-299 mg/dL - 6 units  Blood Glucose 300-349 mg/dL - 7 units  Blood Glucose 350-400 mg/dL - 8 units  Blood Glucose greater than 400 mg/dL - 9 units & Call Provider   Caution: Look alike/sound alike drug alert    Route: Subcutaneous    Magnesium Standard Dose Replacement - Follow Nurse / BPA Driven Protocol  As Needed 1/19/2025 --    Admin Instructions: Open Order & Select \"BHS Electrolyte Replacement Protocol Algorithm\" to View Details    Route: Not Applicable    melatonin tablet 2.5 mg 2.5 mg Nightly PRN 1/19/2025 --    Route: Oral    metoprolol tartrate (LOPRESSOR) injection 5 mg 5 mg Every 6 Hours PRN 1/22/2025 --    Admin Instructions: Hold for SBP less than 100, DBP less than 60, or heart rate less than 50. If a dose is held, please contact the provider.    Route: Intravenous    metoprolol tartrate (LOPRESSOR) tablet 25 mg 25 mg Every 12 Hours Scheduled 1/23/2025 --    Route: Oral    metroNIDAZOLE (FLAGYL) IVPB 500 mg 500 mg Every 8 Hours 1/21/2025 1/26/2025    Admin Instructions: Caution: Look alike/sound alike drug alert.  Do not refrigerate.    Route: Intravenous    NIFEdipine XL (PROCARDIA XL) 24 hr tablet 60 mg 60 mg Every 24 Hours Scheduled 1/19/2025 --    Admin Instructions: Hold for SBP less than 100, DBP less " "than 60.  Caution: Look alike/sound alike drug alert. Avoid grapefruit juice. Swallow whole. Do not crush, split or chew.    Route: Oral    ondansetron (ZOFRAN) injection 4 mg 4 mg Every 6 Hours PRN 1/19/2025 --    Admin Instructions: If BOTH ondansetron (ZOFRAN) and promethazine (PHENERGAN) are ordered use ondansetron first and THEN promethazine IF ondansetron is ineffective.    Route: Intravenous    pantoprazole (PROTONIX) EC tablet 40 mg 40 mg Every Early Morning 1/22/2025 --    Admin Instructions: Swallow whole; do not crush, split, or chew.    Route: Oral    Pharmacy to dose vancomycin  Continuous PRN 1/21/2025 1/28/2025    Route: Not Applicable    polyethylene glycol (MIRALAX) packet 17 g 17 g Daily PRN 1/19/2025 --    Admin Instructions: Use if no bowel movement after 12 hours. Mix in 6-8 ounces of water.  Use 4-8 ounces of water, tea, or juice for each 17 gram dose.    Route: Oral    Linked Group 1: Placed in \"And\" Linked Group        Potassium Replacement - Follow Nurse / BPA Driven Protocol  As Needed 1/19/2025 --    Admin Instructions: Open Order & Select \"BHS Electrolyte Replacement Protocol Algorithm\" to View Details    Route: Not Applicable    sennosides-docusate (PERICOLACE) 8.6-50 MG per tablet 2 tablet 2 tablet 2 Times Daily PRN 1/19/2025 --    Admin Instructions: Start bowel management regimen if patient has not had a bowel movement after 12 hours.    Route: Oral    Linked Group 1: Placed in \"And\" Linked Group        sodium chloride 0.9 % flush 10 mL 10 mL As Needed 1/19/2025 --    Route: Intravenous    Linked Group 2: Placed in \"And\" Linked Group        sodium chloride 0.9 % flush 10 mL 10 mL Every 12 Hours Scheduled 1/19/2025 --    Route: Intravenous    sodium chloride 0.9 % flush 10 mL 10 mL As Needed 1/19/2025 --    Route: Intravenous    sodium chloride 0.9 % infusion 40 mL 40 mL As Needed 1/19/2025 --    Admin Instructions: Following administration of an IV intermittent medication, flush line " with 40mL NS at 100mL/hr.    Route: Intravenous    traMADol (ULTRAM) tablet 50 mg 50 mg Every 12 Hours PRN 1/20/2025 1/25/2025    Admin Instructions: Based on patient request - if ordered for moderate or severe pain, provider allows for administration of a medication prescribed for a lower pain scale.      Caution: Look alike/sound alike drug alert    If given for pain, use the following pain scale:  Mild Pain = Pain Score of 1-3, CPOT 1-2  Moderate Pain = Pain Score of 4-6, CPOT 3-4  Severe Pain = Pain Score of 7-10, CPOT 5-8    Route: Oral    vancomycin (dosing per levels)  Daily 1/21/2025 1/28/2025    Admin Instructions: Pharmacy is dosing vancomycin per levels    Route: Not Applicable              Assessment & Plan     74-year-old female with multiple medical comorbidities, left heel wound, calcaneal osteomyelitis status post debridement, irrigation, wound vacuum assisted closure 1/24/25, now status post left below-knee amputation, antibiotic cement placement 2/4/25.      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    Nonweightbearing left lower extremity  Follow cultures; antibiotic management per infectious disease, appreciate their recommendations    Dressing changed, incision inspected and drain removed at bedside 2/6/2025    To begin postoperative day 3, daily dressing changes per nursing staff: Xeroform, 4 x 4, Kerlix, Ace    Will follow.    Upon discharge, follow-up in 2 weeks for incision check, radiographs    [facility]  [address], [address]  [place] [place], [address]  Please  schedule at [phone]      MAG Haines  02/06/25  08:53 EST

## 2025-02-06 NOTE — THERAPY EVALUATION
Patient Name: Sara Esparza  : 1950    MRN: [id number]                              Today's Date: 2025       Admit Date: 2025    Visit Dx:     ICD-10-CM ICD-9-CM   1. S/P BKA (below knee amputation), left  Z89.512 V49.75   2. Generalized weakness  R53.1 780.79   3. Frequent falls  R29.6 V15.88   4. Chronic kidney disease, unspecified CKD stage  N18.9 585.9   5. Chronic anemia  D64.9 285.9   6. Type 2 diabetes mellitus with stage 5 chronic kidney disease not on chronic dialysis, without long-term current use of insulin  E11.22 250.40    N18.5 585.5   7. Chronic osteomyelitis of left foot  M86.672 730.17   8. Critical limb ischemia of left lower extremity  I70.222 440.22   9. Sepsis due to methicillin resistant Staphylococcus aureus (MRSA) with encephalopathy without septic shock  A41.02 038.12    R65.20 995.92    G93.41 348.31     Patient Active Problem List   Diagnosis    Postoperative abdominal hernia    ESRD (end stage renal disease)    Type 2 diabetes mellitus with stage 5 chronic kidney disease not on chronic dialysis, without long-term current use of insulin    Hypokalemia    Malnutrition    Severe malnutrition    Generalized weakness    Chronic osteomyelitis of left foot    Paroxysmal A-fib    Sepsis due to methicillin resistant Staphylococcus aureus (MRSA) with encephalopathy without septic shock    Critical limb ischemia of left lower extremity     Past Medical History:   Diagnosis Date    Anxiety     Arthritis     Asthma     allergy induced    Back pain     Depression     Diabetes mellitus     dx 10 years ago- checks fsbs most days    Diverticula of colon     GERD (gastroesophageal reflux disease)     Head ache     Hypertension     Sleep apnea     no longer needs cpap - approx. 10 years r/t weight loss     Past Surgical History:   Procedure Laterality Date    AORTAGRAM Left 2/3/2025    Procedure: CFA ACCESS RIGHT - ULTRASOUND GUIDED, AORTOGRAM WITH LEFT LOWER EXTREMITY RUN OFF, ANGIOPLASTY,  INTRAVASCULAR LITHOTRIPSY, RIGHT CFA CLOSURE;  Surgeon: Kyle Ramirez MD;  Location:  [facility];  Service: Vascular;  Laterality: Left;  fluoro 10 minutes  dose - 61mgy  contrast 20ml    BACK SURGERY      x 2    BELOW KNEE AMPUTATION Left 2/4/2025    Procedure: BELOW KNEE AMPUTATION LEFT;  Surgeon: Rom Gonzalez Jr., MD;  Location:  [facility];  Service: Orthopedics;  Laterality: Left;    CARDIAC CATHETERIZATION      no intervention    CHOLECYSTECTOMY OPEN      COLONOSCOPY      2013    FOOT IRRIGATION, DEBRIDEMENT AND REPAIR Left 1/24/2025    Procedure: HEEL IRRIGATION AND DEBRIDEMENT LEFT;  Surgeon: Rom Gonzalez Jr., MD;  Location:  [facility];  Service: Orthopedics;  Laterality: Left;    HYSTERECTOMY      PLACEMENT OF ANTIBIOTIC FILLER Left 2/4/2025    Procedure: PLACEMENT OF ANTIBIOTIC CEMENT FILLER FOR BONE VOID;  Surgeon: Rom Gonzalez Jr., MD;  Location:  [facility];  Service: Orthopedics;  Laterality: Left;    PLACEMENT OF WOUND VAC Left 1/24/2025    Procedure: PLACEMENT OF WOUND VAC;  Surgeon: Rom Gonzalez Jr., MD;  Location:  [facility];  Service: Orthopedics;  Laterality: Left;    REPLACEMENT TOTAL KNEE BILATERAL Bilateral     TONSILLECTOMY      VENTRAL/INCISIONAL HERNIA REPAIR N/A 6/6/2017    Procedure: INCISIONAL HERNIA REPAIR LAPAROSCOPIC;  Surgeon: Conrad Hernandez MD;  Location:  [facility];  Service:       General Information       Row Name 02/06/25 1056          OT Time and Intention    Subjective Information complains of;pain;weakness  -WILFREDO     Document Type evaluation  -WILFREDO     Mode of Treatment occupational therapy  -WILFREDO     Patient Effort adequate  -WILFREDO     Symptoms Noted During/After Treatment increased pain  -WILFREDO       Row Name 02/06/25 1057          General Information    Patient Profile Reviewed yes  -WILFREDO     Prior Level of Function independent:;all household mobility;community mobility;transfer;gait;bed mobility;feeding;grooming;dressing;bathing;mod  assist:;cleaning;cooking;shopping;dependent:;driving  pt. was able to navigate steps in and out of home also, split level home, sharted tasks with spouse  -WILFREDO     Existing Precautions/Restrictions fall;non-weight bearing;other (see comments)  L BKA, NWB with nerve catheter, pt. with dialysis port with dialysis MWF  -WILFREDO     Barriers to Rehab medically complex;previous functional deficit;physical barrier;impaired sensation;cognitive status  -WILFREDO       Row Name 02/06/25 1053          Occupational Profile    Environmental Supports and Barriers (Occupational Profile) split level home, tub shower with tub bench and high toilet, rollator, rw wx and cane  -WILFREDO       Row Name 02/06/25 1053          Living Environment    People in Home spouse;other (see comments)  spouse currently in a sling for an injury LUE  -WILFREDO       Row Name 02/06/25 1053          Home Main Entrance    Number of Stairs, Main Entrance three  2 + 1  -WILFREDO     Stair Railings, Main Entrance none  -WILFREDO       Row Name 02/06/25 1053          Stairs Within Home, Primary    Number of Stairs, Within Home, Primary seven  7 up or down, split level, pt. only has to go up to main living area  -WILFREDO     Stair Railings, Within Home, Primary other (see comments)  one rail  -WILFREDO       Row Name 02/06/25 1053          Cognition    Orientation Status (Cognition) oriented x 4  -WILFREDO       Row Name 02/06/25 1053          Safety Issues/Impairments Affecting Functional Mobility    Safety Issues Affecting Function (Mobility) ability to follow commands;insight into deficits/self-awareness;judgment;problem-solving;safety precaution awareness;sequencing abilities;safety precautions follow-through/compliance  -WILFREDO     Impairments Affecting Function (Mobility) balance;cognition;endurance/activity tolerance;pain;postural/trunk control;range of motion (ROM);sensation/sensory awareness;strength  -WILFREDO     Cognitive Impairments, Mobility Safety/Performance insight into  deficits/self-awareness;judgment;problem-solving/reasoning;safety precaution awareness;safety precaution follow-through;sequencing abilities  -WILFREDO               User Key  (r) = Recorded By, (t) = Taken By, (c) = Cosigned By      Initials Name Provider Type    Eliana Grant OT Occupational Therapist                     Mobility/ADL's       Row Name 02/06/25 1102          Bed Mobility    Bed Mobility rolling left;rolling right;supine-sit;scooting/bridging;sit-supine  -WILFREDO     Rolling Left [place] (Bed Mobility) dependent (less than 25% patient effort);2 person assist;verbal cues;nonverbal cues (demo/gesture)  -WILFREDO     Rolling Right [place] (Bed Mobility) nonverbal cues (demo/gesture);verbal cues;dependent (less than 25% patient effort);2 person assist  -WILFREDO     Scooting/Bridging [place] (Bed Mobility) dependent (less than 25% patient effort);2 person assist;verbal cues  -WILFREDO     Supine-Sit [place] (Bed Mobility) maximum assist (25% patient effort);2 person assist;nonverbal cues (demo/gesture);verbal cues  -WILFREDO     Sit-Supine [place] (Bed Mobility) dependent (less than 25% patient effort);2 person assist;verbal cues;nonverbal cues (demo/gesture)  -WILFREDO     Bed Mobility, Safety Issues cognitive deficits limit understanding;decreased use of arms for pushing/pulling;decreased use of legs for bridging/pushing;impaired trunk control for bed mobility  -WILFREDO     Assistive Device (Bed Mobility) bed rails;repositioning sheet;head of bed elevated  -WILFREDO     Comment, (Bed Mobility) Pt. needed max cues to sequence all movement.  Limited by pain and fear of pain. Pt. had to be pushed for any movement due to kept trying to delay movement over and over.  -WILFREDO       Row Name 02/06/25 1102          Transfers    Transfers bed-chair transfer  -WILFREDO       Row Name 02/06/25 1102          Bed-Chair Transfer    Bed-Chair [place] (Transfers) dependent (less than 25% patient effort);2 person assist  -WILFREDO     Assistive  Device (Bed-Chair Transfers) lift device  -     Comment, (Bed-Chair Transfer) RLE to weak to attempt type of transfer today  -       Row Name 02/06/25 1102          Activities of Daily Living    BADL Assessment/Intervention lower body dressing  pt. has been feeding self, demonstrated good strength for grooming in support position  -       Row Name 02/06/25 1102          Lower Body Dressing Assessment/Training    [place] Level (Lower Body Dressing) don;socks;dependent (less than 25% patient effort)  -     Position (Lower Body Dressing) long sitting  -               User Key  (r) = Recorded By, (t) = Taken By, (c) = Cosigned By      Initials Name Provider Type     Eliana Peace, OT Occupational Therapist                   Obj/Interventions       Row Name 02/06/25 1105          Sensory Assessment (Somatosensory)    Sensory Assessment (Somatosensory) right LE  -     Right LE Sensory Assessment general sensation;impaired;absent  -     Sensory Subjective Reports numbness;other (see comments)  -     Sensory Assessment per pt. she has diminished sensation in fingers from neuropathy, pt. stating her foot was only mildly numb, but could not ID touch to RLE accurately  -       Row Name 02/06/25 1105          Range of Motion Comprehensive    General Range of Motion bilateral upper extremity ROM WFL  -       Row Name 02/06/25 1105          Strength Comprehensive (MMT)    General Manual Muscle Testing (MMT) Assessment upper extremity strength deficits identified  -     Comment, General Manual Muscle Testing (MMT) Assessment B shoulders and elbow extension 3+/5, elbow flexion and  4 to 4+/5  -       Row Name 02/06/25 1105          Motor Skills    Motor Skills functional endurance  -     Functional Endurance impaired  -       Row Name 02/06/25 1105          Balance    Balance Assessment sitting static balance;sitting dynamic balance  -     Static Sitting Balance moderate assist;1-person  assist  -WILFREDO     Dynamic Sitting Balance moderate assist;1-person assist  -WILFREDO     Position, Sitting Balance unsupported;sitting edge of bed;other (see comments)  no back support, UE support on bed only  -WILFREDO     Balance Interventions weight shifting activity;static;dynamic;sitting  -WILFREDO     Comment, Balance pt. intiallly needing mod A for sitting balance static and shifting weight to right and anterior due to falling left and posteriorly, at end of sitting time pt. with cues was able to increase to min/CGA for periods  -WILFREDO               User Key  (r) = Recorded By, (t) = Taken By, (c) = Cosigned By      Initials Name Provider Type    Eliana Grant, OT Occupational Therapist                   Goals/Plan       Row Name 02/06/25 1116          Bed Mobility Goal 1 (OT)    Activity/Assistive Device (Bed Mobility Goal 1, OT) sit to supine;supine to sit;rolling to left;rolling to right  -WILFREDO     [place] Level/Cues Needed (Bed Mobility Goal 1, OT) moderate assist (50-74% patient effort);other (see comments)  assist of 2  -WILFREDO     Time Frame (Bed Mobility Goal 1, OT) long term goal (LTG);10 days  -WILFREDO     Progress/Outcomes (Bed Mobility Goal 1, OT) new goal  -WILFREDO       Row Name 02/06/25 1116          Transfer Goal 1 (OT)    Activity/Assistive Device (Transfer Goal 1, OT) bed-to-chair/chair-to-bed;commode, bedside without drop arms  -WILFREDO     [place] Level/Cues Needed (Transfer Goal 1, OT) maximum assist (25-49% patient effort);verbal cues required  -WILFREDO     Time Frame (Transfer Goal 1, OT) long term goal (LTG);10 days  -WILFREDO     Progress/Outcome (Transfer Goal 1, OT) new goal  -WILFREDO       Row Name 02/06/25 1116          Toileting Goal 1 (OT)    Activity/Device (Toileting Goal 1, OT) perform perineal hygiene;commode, bedside without drop arms  -WILFREDO     [place] Level/Cues Needed (Toileting Goal 1, OT) moderate assist (50-74% patient effort)  -WILFREDO     Time Frame (Toileting Goal 1, OT) short term goal (STG);5 days  -WILFREDO      Progress/Outcome (Toileting Goal 1, OT) new goal  -WILFREDO       Row Name 02/06/25 1116          Grooming Goal 1 (OT)    Activity/Device (Grooming Goal 1, OT) hair care;oral care;wash face, hands  -WILFREDO     [place] (Grooming Goal 1, OT) contact guard required  -WILFREDO     Time Frame (Grooming Goal 1, OT) short term goal (STG);5 days  -WILFREDO     Strategies/Barriers (Grooming Goal 1, OT) unsupported sit  -WILFREDO     Progress/Outcome (Grooming Goal 1, OT) new goal  -WILFREDO       Row Name 02/06/25 1116          Strength Goal 1 (OT)    Strength Goal 1 (OT) Patient will complete 10-15 reps each UE AROM or yellow tband TE Band TE to support ADL and transfer independence.  -WILFREDO     Time Frame (Strength Goal 1, OT) short term goal (STG);5 days  -WILFREDO     Progress/Outcome (Strength Goal 1, OT) new goal  -WILFREDO       Row Name 02/06/25 1116          Therapy Assessment/Plan (OT)    Planned Therapy Interventions (OT) activity tolerance training;adaptive equipment training;BADL retraining;cognitive/visual perception retraining;functional balance retraining;occupation/activity based interventions;patient/caregiver education/training;strengthening exercise;ROM/therapeutic exercise;transfer/mobility retraining  -WILFREDO               User Key  (r) = Recorded By, (t) = Taken By, (c) = Cosigned By      Initials Name Provider Type    Eliana Grant, OT Occupational Therapist                   Clinical Impression       Row Name 02/06/25 1108          Pain Assessment    Pretreatment Pain Rating 8/10  -WILFREDO     Posttreatment Pain Rating 8/10  -WILFREDO     Pain Location back;extremity  -WILFREDO     Pain Side/Orientation generalized;left;lower  -WILFREDO     Pain Management Interventions positioning techniques utilized;nursing notified;other (see comments)  nurse brought pain meds end of session due to pt. cognitive level did not need further impairment from pain meds prior to session, nerve catherter in place LLE and pain stimulator back  -WILFREDO     Response to Pain Interventions  activity participation with increased pain;other (see comments)  pain back to Prior level end of session  -       Row Name 02/06/25 1108          Plan of Care Review    Plan of Care Reviewed With patient;spouse  -     Progress improving  -     Outcome Evaluation OT evaluation completed.  Pt. with new L BKA.  Patient demonstrated impaired strength, ROM, balance, endurance/activity tolerance and cognition along with pain impacting high PLOF ADLs and related transfers.  Recommend pre-medicating at beginning of session to assist with pain until more cognitively to self allowing pre-medication. Pt. to EOB max of 2 with up to mod A needed to balance.  Lift needed to recliner.  Pt. needed education on best positioning of body in bed and chair and UE/LE to complete on own to support function.  Pt. appropriate for continued skilled OT services to address deficit areas and promote return to PLOF.  Recommend SNF at discharge.  -       Row Name 02/06/25 1104          Therapy Assessment/Plan (OT)    Patient/Family Therapy Goal Statement (OT) regain ability to return home  -     Rehab Potential (OT) fair  -WILFREDO     Criteria for Skilled Therapeutic Interventions Met (OT) yes;meets criteria;skilled treatment is necessary  -     Therapy Frequency (OT) daily  -WILFREDO     Predicted Duration of Therapy Intervention (OT) 10 days  -       Row Name 02/06/25 1108          Therapy Plan Review/Discharge Plan (OT)    Equipment Needs Upon Discharge (OT) other (see comments)  to be able to return to current home setup pt. will likely need a ramp into home and stair lift to living area with w/c use  -     Anticipated Discharge Disposition (OT) skilled nursing facility  -       Row Name 02/06/25 1108          Vital Signs    Pre Systolic BP Rehab 165  -WILFREDO     Pre Treatment Diastolic BP 73  -WILFREDO     Pretreatment Heart Rate (beats/min) 96  -WILFREDO     Posttreatment Heart Rate (beats/min) 64  -WILFREDO     Pre SpO2 (%) 97  -WILFREDO     O2 Delivery Pre  Treatment nasal cannula  -WILFREDO     O2 Delivery Intra Treatment nasal cannula  -WILFREDO     Post SpO2 (%) 98  -WILFREDO     O2 Delivery Post Treatment nasal cannula  -WILFREDO     Pre Patient Position Supine  -WILFREDO     Intra Patient Position Sitting  -WILFREDO     Post Patient Position Sitting  -WILFREDO       Row Name 02/06/25 1108          Positioning and Restraints    Pre-Treatment Position in bed  -WILFREDO     Post Treatment Position chair  -WILFREDO     In Chair notified nsg;reclined;call light within reach;encouraged to call for assist;exit alarm on;with family/caregiver;waffle cushion;RLE elevated;on mechanical lift sling;LUE elevated;RUE elevated  -WILFREDO               User Key  (r) = Recorded By, (t) = Taken By, (c) = Cosigned By      Initials Name Provider Type    Eliana Grant OT Occupational Therapist                   Outcome Measures       Row Name 02/06/25 1118          How much help from another is currently needed...    Putting on and taking off regular lower body clothing? 1  -WILFREDO     Bathing (including washing, rinsing, and drying) 2  -WILFREDO     Toileting (which includes using toilet bed pan or urinal) 1  -WILFREDO     Putting on and taking off regular upper body clothing 2  -WILFREDO     Taking care of personal grooming (such as brushing teeth) 3  -WILFREDO     Eating meals 3  setup assist  -WILFREDO     AM-PAC 6 Clicks Score (OT) 12  -WILFREDO       Row Name 02/06/25 1118          Functional Assessment    Outcome Measure Options AM-PAC 6 Clicks Daily Activity (OT)  -WILFREDO               User Key  (r) = Recorded By, (t) = Taken By, (c) = Cosigned By      Initials Name Provider Type    Eliana Grant OT Occupational Therapist                    Occupational Therapy Education       Title: PT OT SLP Therapies (In Progress)       Topic: Occupational Therapy (In Progress)       Point: ADL training (Not Started)       Description:   Instruct learner(s) on proper safety adaptation and remediation techniques during self care or transfers.   Instruct in proper use of assistive  devices.                  Learner Progress:  Not documented in this visit.              Point: Home exercise program (Done)       Description:   Instruct learner(s) on appropriate technique for monitoring, assisting and/or progressing therapeutic exercises/activities.                  Learning Progress Summary            Patient Acceptance, E,D, VU,NR by WILFREDO at 2/6/2025 1119    Comment: reason for consult, noted deficits, reason for delayed pain meds for safety, UE TE, position techniques for function, midline sitting   Family Acceptance, E,D, VU,NR by WILFREDO at 2/6/2025 1119    Comment: reason for consult, noted deficits, reason for delayed pain meds for safety, UE TE, position techniques for function, midline sitting                      Point: Precautions (Done)       Description:   Instruct learner(s) on prescribed precautions during self-care and functional transfers.                  Learning Progress Summary            Patient Acceptance, E,D, VU,NR by WILFREDO at 2/6/2025 1119    Comment: reason for consult, noted deficits, reason for delayed pain meds for safety, UE TE, position techniques for function, midline sitting   Family Acceptance, E,D, VU,NR by WILFREDO at 2/6/2025 1119    Comment: reason for consult, noted deficits, reason for delayed pain meds for safety, UE TE, position techniques for function, midline sitting                      Point: Body mechanics (Done)       Description:   Instruct learner(s) on proper positioning and spine alignment during self-care, functional mobility activities and/or exercises.                  Learning Progress Summary            Patient Acceptance, E,D, VU,NR by WILFREDO at 2/6/2025 1119    Comment: reason for consult, noted deficits, reason for delayed pain meds for safety, UE TE, position techniques for function, midline sitting   Family Acceptance, E,D, VU,NR by WILFREDO at 2/6/2025 1119    Comment: reason for consult, noted deficits, reason for delayed pain meds for safety, UE TE, position  techniques for function, midline sitting                                      User Key       Initials Effective Dates Name Provider Type Discipline     07/11/23 -  Eliana Peace, OT Occupational Therapist OT                  OT Recommendation and Plan  Planned Therapy Interventions (OT): activity tolerance training, adaptive equipment training, BADL retraining, cognitive/visual perception retraining, functional balance retraining, occupation/activity based interventions, patient/caregiver education/training, strengthening exercise, ROM/therapeutic exercise, transfer/mobility retraining  Therapy Frequency (OT): daily  Plan of Care Review  Plan of Care Reviewed With: patient, spouse  Progress: improving  Outcome Evaluation: OT evaluation completed.  Pt. with new L BKA.  Patient demonstrated impaired strength, ROM, balance, endurance/activity tolerance and cognition along with pain impacting high PLOF ADLs and related transfers.  Recommend pre-medicating at beginning of session to assist with pain until more cognitively to self allowing pre-medication. Pt. to EOB max of 2 with up to mod A needed to balance.  Lift needed to recliner.  Pt. needed education on best positioning of body in bed and chair and UE/LE to complete on own to support function.  Pt. appropriate for continued skilled OT services to address deficit areas and promote return to PLOF.  Recommend SNF at discharge.     Time Calculation:   Evaluation Complexity (OT)  Review Occupational Profile/Medical/Therapy History Complexity: expanded/moderate complexity  Assessment, Occupational Performance/Identification of Deficit Complexity: 3-5 performance deficits  Clinical Decision Making Complexity (OT): detailed assessment/moderate complexity  Overall Complexity of Evaluation (OT): moderate complexity     Time Calculation- OT       Row Name 02/06/25 1120             Time Calculation- OT    OT Start Time 0942  -IWLFREDO      OT Received On 02/06/25  -WILFREDO GARDINER  Goal Re-Cert Due Date 02/16/25  -WILFREDO         Timed Charges    55377 - OT Therapeutic Exercise Minutes 3  -WILFREDO      22375 - OT Therapeutic Activity Minutes 10  -WILFREDO         Untimed Charges    OT Eval/Re-eval Minutes 58  -WILFREDO         Total Minutes    Timed Charges Total Minutes 13  -IWLFREDO      Untimed Charges Total Minutes 58  -WILFREDO       Total Minutes 71  -WILFREDO                User Key  (r) = Recorded By, (t) = Taken By, (c) = Cosigned By      Initials Name Provider Type    Eliana Grant, Z[place] Occupational Therapist                  Therapy Charges for Today       Code Description Service Date Service Provider Modifiers Qty    79349766247 HC OT THERAPEUTIC ACT EA 15 MIN 2/6/2025 Eliana Peace, OT GO 1    38762411116 HC OT EVAL MOD COMPLEXITY 4 2/6/2025 Eliana Peace OT GO 1                 Eliana Peace OT  2/6/2025

## 2025-02-06 NOTE — PROGRESS NOTES
"          Clinical Nutrition Assessment     Patient Name: Sara Esparza  YOB: 1950  MRN: [id number]  Date of Encounter: 02/06/25 14:01 EST  Admission date: 1/19/2025  Reason for Visit: Follow-up protocol    Assessment   Nutrition Assessment   Admission Diagnosis:  Generalized weakness [R53.1]    Problem List:    Generalized weakness    ESRD (end stage renal disease)    Type 2 diabetes mellitus with stage 5 chronic kidney disease not on chronic dialysis, without long-term current use of insulin    Chronic osteomyelitis of left foot    Paroxysmal A-fib    Sepsis due to methicillin resistant Staphylococcus aureus (MRSA) with encephalopathy without septic shock    Critical limb ischemia of left lower extremity      PMH:   She  has a past medical history of Anxiety, Arthritis, Asthma, Back pain, Depression, Diabetes mellitus, Diverticula of colon, GERD (gastroesophageal reflux disease), Head ache, Hypertension, and Sleep apnea.    PSH:  She  has a past surgical history that includes Replacement total knee bilateral (Bilateral); Cholecystectomy open; Tonsillectomy; Hysterectomy; Back surgery; Cardiac catheterization; Colonoscopy; ventral/incisional hernia repair (N/A, 6/6/2017); Foot Irrigation, Debridement and Repair (Left, 1/24/2025); PLACEMENT OF WOUND VAC (Left, 1/24/2025); Aortagram (Left, 2/3/2025); Leg amputation, lower tibia/fibula (Left, 2/4/2025); and PLACEMENT OF ANTIBIOTIC FILLER (Left, 2/4/2025).    Applicable Nutrition History:   Skin integrity  DM ulcer to L plantar heel  WOC following    Anthropometrics     Height: Height: 170.2 cm (67.01\")  Last Filed Weight: Weight: 108 kg (238 lb 1.6 oz) (01/25/25 1139)  Method: Weight Method: Stated  BMI: BMI (Calculated): 37.3    UBW:     Weight      Weight (kg) Weight (lbs) Weight Method   1/6/2024 88.905 kg  196 lb  Stated    3/26/2024 93.441 kg  206 lb  Stated    9/13/2024 76.204 kg  168 lb  Bed scale    10/11/2024 75.751 kg  167 lb  Stated  "   10/12/2024 75.751 kg  167 lb  Stated    1/16/2025 107.502 kg  237 lb  Stated    1/18/2025 107.502 kg  237 lb     1/19/2025 108 kg  238 lb 1.6 oz  Stated      Weight change:  unable to evaluate 2/2 on HD with fluid fluctuations    Nutrition Focused Physical Exam    Date:  1/20       Unable to perform due to Pt unable to participate at time of visit     Subjective   Reported/Observed/Food/Nutrition Related History:     2/6  Pt resting in chair at time of visit, spouse at bedside. Pt provides preferences-doesn't like poultry or pancakes. Pt had Premier Protein ordered, adjusted to Boost GC 2/2 availability. RD strongly encouraged used of protein supplement.     1/29  Pt was RATNA for HD at time of visit. Per RN, cognitive improvements. L posterior plantar PI with wound vac in place - ? POC being discussed including potential need for amputation. BG control improved over past several days. LBM 1/28.    1/20  Pt laying in bed returning from imaging at time of visit, unable to contribute to recent nutrition hx - all information obtained from spouse. Endorsed pt having a good appetite prior to admission - ate 2-3 meals daily. After last admission c/o taste changes but had resolved. Apparently no c/o of return of taste changes - ? Present in acute illness. Chronic HD 3x/wk - per dialysis estimated DW 77kg however ? If using prior lower BW. Spouse reports pt tolerating soup well and requesting focus on softer foods. Pt dislikes ONS - agreeable to try alternative. NKFA    Dislikes chicken    Current Nutrition Prescription   PO: Diet: Diabetic; Consistent Carbohydrate; Fluid Consistency: Thin (IDDSI 0)  Oral Nutrition Supplement: N/A  Intake: 54% x 7 meals    Assessment & Plan   Nutrition Diagnosis   Date:  1/29 Updated:  Problem Inadequate oral intake   Etiology Acute illness   Signs/Symptoms <50% at meals   Status: New-improving    Date:  1/20            Updated:  1/29  Problem Predicted inadequate nutrient intake     Etiology AMS   Signs/Symptoms <25% since admission   Status: Discontinued    Goal:   Nutrition to support treatment and Increase intake      Nutrition Intervention      Follow treatment progress, Care plan reviewed, Encourage intake    Encouraged PO intake at meals  Continue Magic Cup 1x daily  Boost GC 2x daily    Monitoring/Evaluation:   Per protocol, I&O, PO intake, Pertinent labs, Weight, Symptoms, POC/GOC    Trina Fontenot MS,RD,LD  Time Spent: 20min

## 2025-02-06 NOTE — PROGRESS NOTES
"   LOS: 18 days    Patient Care Team:  Toribio Roberts MD as PCP - General (Internal Medicine)    Subjective     HD yesterday tolerated well. Plan for HD in AM.     Objective     Vital Signs:  Blood pressure 159/69, pulse 67, temperature 97.9 °F (36.6 °C), temperature source Oral, resp. rate 18, height 170.2 cm (67.01\"), weight 108 kg (238 lb 1.6 oz), SpO2 94%.      Intake/Output Summary (Last 24 hours) at 2/6/2025 1447  Last data filed at 2/6/2025 0455  Gross per 24 hour   Intake --   Output 50 ml   Net -50 ml        02/05 0701 - 02/06 0700  In: -   Out: 2160 [Drains:50]    Physical Exam:        GENERAL: WD WF NAD  NEURO: Awake and alert.  No focal deficit  PSYCHIATRIC: Agitated, cooperative with PE  EYE: PE, no icterus, no conjunctivitis  ENT: omm dry, dentition intact,  Hearing intact  NECK: Supple , No JVD discernable,  Trachea midline  CV: No edema, RRR  LUNGS:  Quiet,  Nonlabored resp.  Symmetrical expansion  ABDOMEN: Nondistended, soft nontender.  : No [place], no palp bladder  SKIN: Warm and dry without rash  + RIJ TDC    Labs:  Results from last 7 days   Lab Units 02/05/25  1336 02/04/25  1109 02/03/25  0347   WBC 10*3/mm3 5.58 6.86 8.05   HEMOGLOBIN g/dL 9.2* 9.2* 9.9*   PLATELETS 10*3/mm3 268 235 259     Results from last 7 days   Lab Units 02/05/25  1336 02/04/25  1159 02/04/25  1109 02/03/25  0347 02/02/25  1253 02/01/25  1025   SODIUM mmol/L 138  --  133* 130* 132* 130*   POTASSIUM mmol/L 3.8 4.5 4.3 5.3* 5.2 4.4   CHLORIDE mmol/L 100  --  98 95* 98 96*   CO2 mmol/L 25.0  --  22.0 22.0 21.0* 25.0   BUN mg/dL 14  --  32* 45* 38* 29*   CREATININE mg/dL 2.20*  --  4.23* 5.56* 5.05* 3.84*   CALCIUM mg/dL 8.7  --  8.8 9.2 9.2 9.1   PHOSPHORUS mg/dL 2.8  --   --   --   --  4.3   MAGNESIUM mg/dL 1.9  --   --   --   --   --    ALBUMIN g/dL 2.8*  --   --   --   --  2.5*                       Estimated Creatinine Clearance: 28 mL/min (A) (by C-G formula based on SCr of 2.2 mg/dL (H)).     "     A/P:      ESRD: On HD MWF at [facility] with [facility] .  Continue outpatient schedule. She is interested in home dialysis. Discussed with home program they will reach out to patient for further education as outpatient.      HTN: Blood pressure stable.  Monitor for now.    Hyponatremia:   Due to underlying renal failure.  Adjust with HD.     Hyperkalemia: Resolved.  Adjust with HD     Metabolic acidosis:  Adjust with HD     Anemia: Hemoglobin below goal.  Patient unable.  Monitor for now.  Transfuse as indicated for Hgb <7.     Volume: Earlier in admission patient with orthostatic hypotension with intravascular volume depletion.  Looks stable today.  Monitor with UF.     Hyperphosphatemia: Phos stable at 4.3.  Poor p.o. intake.  Binders on hold for now.     Nutrition: Low potassium low Phos high-protein diet.  Renal vitamin.    PVD: Underwent left BKA     High risk and complexity patient.     Wilian Gonzalez MD  02/06/25  14:47 EST

## 2025-02-06 NOTE — CASE MANAGEMENT/SOCIAL WORK
Continued Stay Note  [facility]     Patient Name: Sara Esparza  MRN: [id number]  Today's Date: 2/6/2025    Admit Date: 1/19/2025    Plan: Rehab   Discharge Plan       Row Name 02/06/25 1300       Plan    Plan Rehab    Plan Comments Per discussion in MDR, drain was discontinued. Ropivacaine nerve block is in place. She is receiving HD M/W/F. She is on 2L NC (does not wear home O2). Pt was a 2 person assist to transfer from bed to chair today. Therapy recommended SNF, and Pt and spouse are agreeable. At their request, referrals were called to Genna with [facility], April with [facility], and Kelin with [facility]. CM contacted  to request assistance in obtaining [facility] transportation to/from HD while in rehab. CM will continue to follow for medical readiness.    Addendum - Per SW, [facility] is not an option since they require an outpatient PT evaluation to accept. In addition, Pt must be able to independently transfer to W/C. SW stated spouse may need to be trained on how to assist with W/C transfers so he can provide transportation to/from HD.     Final Discharge Disposition Code 03 - skilled nursing facility (SNF)                   Discharge Codes    No documentation.                 Expected Discharge Date and Time       Expected Discharge Date Expected Discharge Time    Feb 7, 2025               Blanka Puentes RN

## 2025-02-07 ENCOUNTER — APPOINTMENT (OUTPATIENT)
Dept: NEPHROLOGY | Facility: HOSPITAL | Age: 75
DRG: 853 | End: 2025-02-07
Payer: MEDICARE

## 2025-02-07 LAB
ALBUMIN SERPL-MCNC: 2.3 G/DL (ref 3.5–5.2)
ANION GAP SERPL CALCULATED.3IONS-SCNC: 12 MMOL/L (ref 5–15)
BACTERIA SPEC AEROBE CULT: NORMAL
BACTERIA SPEC AEROBE CULT: NORMAL
BUN SERPL-MCNC: 32 MG/DL (ref 8–23)
BUN/CREAT SERPL: 7.6 (ref 7–25)
CALCIUM SPEC-SCNC: 8.5 MG/DL (ref 8.6–10.5)
CHLORIDE SERPL-SCNC: 100 MMOL/L (ref 98–107)
CO2 SERPL-SCNC: 24 MMOL/L (ref 22–29)
CREAT SERPL-MCNC: 4.22 MG/DL (ref 0.57–1)
EGFRCR SERPLBLD CKD-EPI 2021: 10.5 ML/MIN/1.73
GLUCOSE BLDC GLUCOMTR-MCNC: 120 MG/DL (ref 70–130)
GLUCOSE BLDC GLUCOMTR-MCNC: 195 MG/DL (ref 70–130)
GLUCOSE BLDC GLUCOMTR-MCNC: 96 MG/DL (ref 70–130)
GLUCOSE BLDC GLUCOMTR-MCNC: 97 MG/DL (ref 70–130)
GLUCOSE SERPL-MCNC: 96 MG/DL (ref 65–99)
GRAM STN SPEC: NORMAL
HBV SURFACE AG SERPL QL IA: NORMAL
PHOSPHATE SERPL-MCNC: 4 MG/DL (ref 2.5–4.5)
POTASSIUM SERPL-SCNC: 3.6 MMOL/L (ref 3.5–5.2)
SODIUM SERPL-SCNC: 136 MMOL/L (ref 136–145)

## 2025-02-07 PROCEDURE — 87340 HEPATITIS B SURFACE AG IA: CPT | Performed by: STUDENT IN AN ORGANIZED HEALTH CARE EDUCATION/TRAINING PROGRAM

## 2025-02-07 PROCEDURE — 99232 SBSQ HOSP IP/OBS MODERATE 35: CPT | Performed by: INTERNAL MEDICINE

## 2025-02-07 PROCEDURE — 80069 RENAL FUNCTION PANEL: CPT | Performed by: STUDENT IN AN ORGANIZED HEALTH CARE EDUCATION/TRAINING PROGRAM

## 2025-02-07 PROCEDURE — 82948 REAGENT STRIP/BLOOD GLUCOSE: CPT

## 2025-02-07 PROCEDURE — 63710000001 INSULIN LISPRO (HUMAN) PER 5 UNITS: Performed by: ORTHOPAEDIC SURGERY

## 2025-02-07 RX ADMIN — INSULIN LISPRO 2 UNITS: 100 INJECTION, SOLUTION INTRAVENOUS; SUBCUTANEOUS at 16:45

## 2025-02-07 RX ADMIN — ACETAMINOPHEN 1000 MG: 500 TABLET ORAL at 21:15

## 2025-02-07 RX ADMIN — SENNOSIDES AND DOCUSATE SODIUM 2 TABLET: 50; 8.6 TABLET ORAL at 12:20

## 2025-02-07 RX ADMIN — ACETAMINOPHEN 1000 MG: 500 TABLET ORAL at 13:38

## 2025-02-07 RX ADMIN — SENNOSIDES AND DOCUSATE SODIUM 2 TABLET: 50; 8.6 TABLET ORAL at 21:15

## 2025-02-07 RX ADMIN — ATORVASTATIN CALCIUM 10 MG: 10 TABLET, FILM COATED ORAL at 21:16

## 2025-02-07 RX ADMIN — Medication 10 ML: at 12:40

## 2025-02-07 RX ADMIN — Medication 2.5 MG: at 21:15

## 2025-02-07 RX ADMIN — Medication 10 ML: at 21:16

## 2025-02-07 RX ADMIN — ACETAMINOPHEN 1000 MG: 500 TABLET ORAL at 05:58

## 2025-02-07 RX ADMIN — OXYCODONE HYDROCHLORIDE 5 MG: 5 TABLET ORAL at 05:58

## 2025-02-07 RX ADMIN — ALLOPURINOL 200 MG: 100 TABLET ORAL at 12:21

## 2025-02-07 RX ADMIN — FAMOTIDINE 10 MG: 20 TABLET, FILM COATED ORAL at 12:20

## 2025-02-07 RX ADMIN — NIFEDIPINE 60 MG: 60 TABLET, EXTENDED RELEASE ORAL at 12:20

## 2025-02-07 RX ADMIN — PANTOPRAZOLE SODIUM 40 MG: 40 TABLET, DELAYED RELEASE ORAL at 05:58

## 2025-02-07 RX ADMIN — OXYCODONE HYDROCHLORIDE 5 MG: 5 TABLET ORAL at 12:25

## 2025-02-07 RX ADMIN — APIXABAN 2.5 MG: 2.5 TABLET, FILM COATED ORAL at 21:16

## 2025-02-07 RX ADMIN — AMIODARONE HYDROCHLORIDE 200 MG: 200 TABLET ORAL at 21:16

## 2025-02-07 RX ADMIN — OXYCODONE HYDROCHLORIDE 5 MG: 5 TABLET ORAL at 17:52

## 2025-02-07 RX ADMIN — APIXABAN 2.5 MG: 2.5 TABLET, FILM COATED ORAL at 12:22

## 2025-02-07 NOTE — PROGRESS NOTES
[facility]    Acute pain service Inpatient Progress Note    Patient Name: Sara Esparza  :  1950  MRN:  [id number]        Acute Pain  Service Inpatient Progress Note:    Analgesia:Good  Pain Score:3/10  LOC: alert and awake  Side Effects:None  Catheter Site:clean, dry and dressing intact  Cath type: peripheral nerve cath(InfuSystem)  Infusion rate: Ext/Pop: Basal: 1ml/hr, PIB: 5ml q 2 h, PCA: 5 ml q 30 min  Catheter Plan:Catheter to remain Insitu and Continue catheter infusion rate unchanged

## 2025-02-07 NOTE — PLAN OF CARE
Problem: Adult Inpatient Plan of Care  Goal: Absence of Hospital-Acquired Illness or Injury  Intervention: Identify and Manage Fall Risk  Recent Flowsheet Documentation  Taken 2/7/2025 0600 by Jocelyne Ambrocio RN  Safety Promotion/Fall Prevention:   safety round/check completed   room organization consistent   nonskid shoes/slippers when out of bed   assistive device/personal items within reach   clutter free environment maintained   fall prevention program maintained  Taken 2/7/2025 0400 by Jocelyne Ambrocio RN  Safety Promotion/Fall Prevention:   safety round/check completed   room organization consistent   nonskid shoes/slippers when out of bed   assistive device/personal items within reach   clutter free environment maintained   fall prevention program maintained  Taken 2/7/2025 0200 by Jocelyne Ambrocio RN  Safety Promotion/Fall Prevention:   safety round/check completed   room organization consistent   nonskid shoes/slippers when out of bed   assistive device/personal items within reach   clutter free environment maintained   fall prevention program maintained  Taken 2/7/2025 0000 by Jocelyne Ambrocio RN  Safety Promotion/Fall Prevention:   safety round/check completed   room organization consistent   nonskid shoes/slippers when out of bed   assistive device/personal items within reach   clutter free environment maintained   fall prevention program maintained  Taken 2/6/2025 2200 by Jocelyne Ambrocio RN  Safety Promotion/Fall Prevention:   safety round/check completed   room organization consistent   nonskid shoes/slippers when out of bed   assistive device/personal items within reach   clutter free environment maintained   fall prevention program maintained  Taken 2/6/2025 2000 by Jocelyne Ambrocio RN  Safety Promotion/Fall Prevention:   safety round/check completed   room organization consistent   nonskid shoes/slippers when out of bed   assistive device/personal items within reach   clutter free environment  maintained   fall prevention program maintained  Intervention: Prevent Skin Injury  Recent Flowsheet Documentation  Taken 2/7/2025 0600 by Jocelyne Ambrocio RN  Body Position:   neutral head position   neutral body alignment   patient/family refused  Skin Protection:   incontinence pads utilized   transparent dressing maintained  Taken 2/7/2025 0400 by Jocelyne Ambrocio RN  Body Position:   neutral head position   neutral body alignment   patient/family refused  Skin Protection:   incontinence pads utilized   transparent dressing maintained  Taken 2/7/2025 0200 by Jocelyne Ambrocio RN  Body Position:   neutral head position   neutral body alignment   patient/family refused  Skin Protection:   incontinence pads utilized   transparent dressing maintained  Taken 2/7/2025 0000 by Jocelyne Ambrocio RN  Body Position: side-lying  Skin Protection:   incontinence pads utilized   transparent dressing maintained  Taken 2/6/2025 2200 by Jocelyne Ambrocio RN  Body Position:   neutral head position   neutral body alignment   patient/family refused  Skin Protection:   incontinence pads utilized   transparent dressing maintained  Taken 2/6/2025 2000 by Jocelyne Ambrocio RN  Body Position:   neutral head position   neutral body alignment   patient/family refused  Skin Protection:   incontinence pads utilized   transparent dressing maintained  Intervention: Prevent Infection  Recent Flowsheet Documentation  Taken 2/7/2025 0600 by Jocelyne Ambrocio RN  Infection Prevention:   environmental surveillance performed   equipment surfaces disinfected   hand hygiene promoted   single patient room provided  Taken 2/7/2025 0400 by Jocelyne Ambrocio RN  Infection Prevention:   environmental surveillance performed   equipment surfaces disinfected   hand hygiene promoted   single patient room provided  Taken 2/7/2025 0200 by Jocelyne Ambrocio RN  Infection Prevention:   environmental surveillance performed   equipment surfaces disinfected   hand hygiene  promoted   single patient room provided  Taken 2/7/2025 0000 by Jocelyne Ambrocio RN  Infection Prevention:   environmental surveillance performed   equipment surfaces disinfected   hand hygiene promoted   single patient room provided  Taken 2/6/2025 2200 by Jocelyne Ambrocio RN  Infection Prevention:   environmental surveillance performed   equipment surfaces disinfected   hand hygiene promoted   single patient room provided  Taken 2/6/2025 2000 by Jocelyne Ambrocio RN  Infection Prevention:   environmental surveillance performed   equipment surfaces disinfected   hand hygiene promoted   single patient room provided  Goal: Optimal Comfort and Wellbeing  Intervention: Provide Person-Centered Care  Recent Flowsheet Documentation  Taken 2/7/2025 0600 by Jocelyne Ambrocio RN  Trust Relationship/Rapport:   care explained   questions answered  Taken 2/6/2025 2200 by Jocelyne Ambrocio RN  Trust Relationship/Rapport:   care explained   questions answered  Taken 2/6/2025 2000 by Jocelyne Ambrocio RN  Trust Relationship/Rapport:   care explained   questions answered     Problem: Skin Injury Risk Increased  Goal: Skin Health and Integrity  Intervention: Optimize Skin Protection  Recent Flowsheet Documentation  Taken 2/7/2025 0600 by Jocelyne Ambrocio RN  Activity Management:   activity encouraged   patient refuses activity  Pressure Reduction Techniques: pressure points protected  Head of Bed (HOB) Positioning: HOB elevated  Pressure Reduction Devices:   specialty bed utilized   positioning supports utilized  Skin Protection:   incontinence pads utilized   transparent dressing maintained  Taken 2/7/2025 0400 by Jocelyne Ambrocio RN  Activity Management:   activity encouraged   patient refuses activity  Pressure Reduction Techniques: pressure points protected  Head of Bed (HOB) Positioning: HOB elevated  Pressure Reduction Devices:   specialty bed utilized   positioning supports utilized  Skin Protection:   incontinence pads utilized    transparent dressing maintained  Taken 2/7/2025 0200 by Jocelyne Ambrocio RN  Activity Management:   activity encouraged   patient refuses activity  Pressure Reduction Techniques: pressure points protected  Head of Bed (HOB) Positioning: HOB elevated  Pressure Reduction Devices:   specialty bed utilized   positioning supports utilized  Skin Protection:   incontinence pads utilized   transparent dressing maintained  Taken 2/7/2025 0000 by Jocelyne Ambrocio RN  Activity Management:   activity encouraged   patient refuses activity  Pressure Reduction Techniques: pressure points protected  Head of Bed (HOB) Positioning: HOB elevated  Pressure Reduction Devices:   specialty bed utilized   positioning supports utilized  Skin Protection:   incontinence pads utilized   transparent dressing maintained  Taken 2/6/2025 2200 by Jocelyne Ambrocio RN  Activity Management:   activity encouraged   patient refuses activity  Pressure Reduction Techniques: pressure points protected  Head of Bed (HOB) Positioning: HOB elevated  Pressure Reduction Devices:   specialty bed utilized   positioning supports utilized  Skin Protection:   incontinence pads utilized   transparent dressing maintained  Taken 2/6/2025 2000 by Jocelyne Ambrocio RN  Activity Management:   activity encouraged   patient refuses activity  Pressure Reduction Techniques: pressure points protected  Head of Bed (HOB) Positioning: HOB at 20-30 degrees  Pressure Reduction Devices: specialty bed utilized  Skin Protection:   incontinence pads utilized   transparent dressing maintained     Problem: Fall Injury Risk  Goal: Absence of Fall and Fall-Related Injury  Intervention: Promote Injury-Free Environment  Recent Flowsheet Documentation  Taken 2/7/2025 0600 by Jocelyne Ambrocio RN  Safety Promotion/Fall Prevention:   safety round/check completed   room organization consistent   nonskid shoes/slippers when out of bed   assistive device/personal items within reach   clutter free  environment maintained   fall prevention program maintained  Taken 2/7/2025 0400 by Jocelyne Ambrocio RN  Safety Promotion/Fall Prevention:   safety round/check completed   room organization consistent   nonskid shoes/slippers when out of bed   assistive device/personal items within reach   clutter free environment maintained   fall prevention program maintained  Taken 2/7/2025 0200 by Jocelyne Ambrocio RN  Safety Promotion/Fall Prevention:   safety round/check completed   room organization consistent   nonskid shoes/slippers when out of bed   assistive device/personal items within reach   clutter free environment maintained   fall prevention program maintained  Taken 2/7/2025 0000 by Jocelyne Ambrocio RN  Safety Promotion/Fall Prevention:   safety round/check completed   room organization consistent   nonskid shoes/slippers when out of bed   assistive device/personal items within reach   clutter free environment maintained   fall prevention program maintained  Taken 2/6/2025 2200 by Jocelyne Ambrocio RN  Safety Promotion/Fall Prevention:   safety round/check completed   room organization consistent   nonskid shoes/slippers when out of bed   assistive device/personal items within reach   clutter free environment maintained   fall prevention program maintained  Taken 2/6/2025 2000 by Jocelyne Ambrocio RN  Safety Promotion/Fall Prevention:   safety round/check completed   room organization consistent   nonskid shoes/slippers when out of bed   assistive device/personal items within reach   clutter free environment maintained   fall prevention program maintained     Problem: Hemodialysis  Goal: Absence of Infection Signs and Symptoms  Intervention: Prevent or Manage Infection  Recent Flowsheet Documentation  Taken 2/7/2025 0600 by Jocelyne Ambrocio RN  Infection Prevention:   environmental surveillance performed   equipment surfaces disinfected   hand hygiene promoted   single patient room provided  Taken 2/7/2025 0400 by Cristóbal  PAULIE Casanova  Infection Prevention:   environmental surveillance performed   equipment surfaces disinfected   hand hygiene promoted   single patient room provided  Taken 2/7/2025 0200 by Jocelyne Ambrocio RN  Infection Prevention:   environmental surveillance performed   equipment surfaces disinfected   hand hygiene promoted   single patient room provided  Taken 2/7/2025 0000 by Jocelyne Ambrocio RN  Infection Prevention:   environmental surveillance performed   equipment surfaces disinfected   hand hygiene promoted   single patient room provided  Taken 2/6/2025 2200 by Jocelyne Ambrocio RN  Infection Prevention:   environmental surveillance performed   equipment surfaces disinfected   hand hygiene promoted   single patient room provided  Taken 2/6/2025 2000 by Jocelyne Ambrocio RN  Infection Prevention:   environmental surveillance performed   equipment surfaces disinfected   hand hygiene promoted   single patient room provided     Problem: Sepsis/Septic Shock  Goal: Optimal Coping  Intervention: Support Patient and Family Response  Recent Flowsheet Documentation  Taken 2/7/2025 0600 by Jocelyne Ambrocio RN  Family/Support System Care: support provided  Taken 2/6/2025 2200 by Jocelyne Ambrocio RN  Family/Support System Care: support provided  Taken 2/6/2025 2000 by Jocelyne Ambrocio RN  Supportive Measures: active listening utilized  Family/Support System Care: support provided  Goal: Absence of Infection Signs and Symptoms  Intervention: Initiate Sepsis Management  Recent Flowsheet Documentation  Taken 2/7/2025 0600 by Jocelyne Ambrocio RN  Infection Prevention:   environmental surveillance performed   equipment surfaces disinfected   hand hygiene promoted   single patient room provided  Taken 2/7/2025 0400 by Jocelyne Ambrocio RN  Infection Prevention:   environmental surveillance performed   equipment surfaces disinfected   hand hygiene promoted   single patient room provided  Taken 2/7/2025 0200 by Jocelyne Ambrocio RN  Infection  Prevention:   environmental surveillance performed   equipment surfaces disinfected   hand hygiene promoted   single patient room provided  Taken 2/7/2025 0000 by Jocelyne Ambrocio RN  Infection Prevention:   environmental surveillance performed   equipment surfaces disinfected   hand hygiene promoted   single patient room provided  Taken 2/6/2025 2200 by Jocelyne Ambrocio RN  Infection Prevention:   environmental surveillance performed   equipment surfaces disinfected   hand hygiene promoted   single patient room provided  Taken 2/6/2025 2000 by Jocelyne Ambrocio RN  Infection Prevention:   environmental surveillance performed   equipment surfaces disinfected   hand hygiene promoted   single patient room provided  Intervention: Promote Recovery  Recent Flowsheet Documentation  Taken 2/7/2025 0600 by Jocelyne Ambrocio RN  Activity Management:   activity encouraged   patient refuses activity  Taken 2/7/2025 0400 by Jocelyne Ambrocio RN  Activity Management:   activity encouraged   patient refuses activity  Taken 2/7/2025 0200 by Jocelyne Ambrocio RN  Activity Management:   activity encouraged   patient refuses activity  Taken 2/7/2025 0000 by Jocelyne Ambrocio RN  Activity Management:   activity encouraged   patient refuses activity  Taken 2/6/2025 2200 by Jocelyne Ambrocio RN  Activity Management:   activity encouraged   patient refuses activity  Taken 2/6/2025 2000 by Jocelyne Ambrocio RN  Activity Management:   activity encouraged   patient refuses activity     Problem: Pain Acute  Goal: Optimal Pain Control and Function  Intervention: Optimize Psychosocial Wellbeing  Recent Flowsheet Documentation  Taken 2/7/2025 0600 by Jocelyne Ambrocio RN  Diversional Activities: television  Taken 2/6/2025 2200 by Jocelyne Ambrocio RN  Diversional Activities: television  Taken 2/6/2025 2000 by Jocelyne Ambrocio RN  Supportive Measures: active listening utilized  Diversional Activities: television  Intervention: Prevent or Manage Pain  Recent  Flowsheet Documentation  Taken 2/6/2025 2000 by Jocelyne Ambrocio RN  Sensory Stimulation Regulation: care clustered     Problem: Skin Injury Risk Increased  Goal: Skin Health and Integrity  Intervention: Optimize Skin Protection  Recent Flowsheet Documentation  Taken 2/7/2025 0600 by Jocelyne Ambrocio RN  Activity Management:   activity encouraged   patient refuses activity  Pressure Reduction Techniques: pressure points protected  Head of Bed (HOB) Positioning: HOB elevated  Pressure Reduction Devices:   specialty bed utilized   positioning supports utilized  Skin Protection:   incontinence pads utilized   transparent dressing maintained  Taken 2/7/2025 0400 by Jocelyne Ambrocio RN  Activity Management:   activity encouraged   patient refuses activity  Pressure Reduction Techniques: pressure points protected  Head of Bed (HOB) Positioning: HOB elevated  Pressure Reduction Devices:   specialty bed utilized   positioning supports utilized  Skin Protection:   incontinence pads utilized   transparent dressing maintained  Taken 2/7/2025 0200 by Jocelyne Ambrocio RN  Activity Management:   activity encouraged   patient refuses activity  Pressure Reduction Techniques: pressure points protected  Head of Bed (HOB) Positioning: [facility] elevated  Pressure Reduction Devices:   specialty bed utilized   positioning supports utilized  Skin Protection:   incontinence pads utilized   transparent dressing maintained  Taken 2/7/2025 0000 by Jocelyne Ambrocio RN  Activity Management:   activity encouraged   patient refuses activity  Pressure Reduction Techniques: pressure points protected  Head of Bed (HOB) Positioning: HOB elevated  Pressure Reduction Devices:   specialty bed utilized   positioning supports utilized  Skin Protection:   incontinence pads utilized   transparent dressing maintained  Taken 2/6/2025 2200 by Jocelyne Ambrocio RN  Activity Management:   activity encouraged   patient refuses activity  Pressure Reduction Techniques:  pressure points protected  Head of Bed (HOB) Positioning: HOB elevated  Pressure Reduction Devices:   specialty bed utilized   positioning supports utilized  Skin Protection:   incontinence pads utilized   transparent dressing maintained  Taken 2/6/2025 2000 by Jocelyne Ambrocio RN  Activity Management:   activity encouraged   patient refuses activity  Pressure Reduction Techniques: pressure points protected  Head of Bed (HOB) Positioning: HOB at 20-30 degrees  Pressure Reduction Devices: specialty bed utilized  Skin Protection:   incontinence pads utilized   transparent dressing maintained     Problem: Fall Injury Risk  Goal: Absence of Fall and Fall-Related Injury  Intervention: Promote Injury-Free Environment  Recent Flowsheet Documentation  Taken 2/7/2025 0600 by Jocelyne Ambrocio, RN  Safety Promotion/Fall Prevention:   safety round/check completed   room organization consistent   nonskid shoes/slippers when out of bed   assistive device/personal items within reach   clutter free environment maintained   fall prevention program maintained  Taken 2/7/2025 0400 by Jocelyne Ambrocio RN  Safety Promotion/Fall Prevention:   safety round/check completed   room organization consistent   nonskid shoes/slippers when out of bed   assistive device/personal items within reach   clutter free environment maintained   fall prevention program maintained  Taken 2/7/2025 0200 by Jocelyne Ambrocio RN  Safety Promotion/Fall Prevention:   safety round/check completed   room organization consistent   nonskid shoes/slippers when out of bed   assistive device/personal items within reach   clutter free environment maintained   fall prevention program maintained  Taken 2/7/2025 0000 by Jocelyne Ambrocio RN  Safety Promotion/Fall Prevention:   safety round/check completed   room organization consistent   nonskid shoes/slippers when out of bed   assistive device/personal items within reach   clutter free environment maintained   fall prevention  program maintained  Taken 2/6/2025 2200 by Jocelyne Ambrocio RN  Safety Promotion/Fall Prevention:   safety round/check completed   room organization consistent   nonskid shoes/slippers when out of bed   assistive device/personal items within reach   clutter free environment maintained   fall prevention program maintained  Taken 2/6/2025 2000 by Jocelyne Ambrocio RN  Safety Promotion/Fall Prevention:   safety round/check completed   room organization consistent   nonskid shoes/slippers when out of bed   assistive device/personal items within reach   clutter free environment maintained   fall prevention program maintained     Problem: Hemodialysis  Goal: Absence of Infection Signs and Symptoms  Intervention: Prevent or Manage Infection  Recent Flowsheet Documentation  Taken 2/7/2025 0600 by Jocelyne Ambrocio RN  Infection Prevention:   environmental surveillance performed   equipment surfaces disinfected   hand hygiene promoted   single patient room provided  Taken 2/7/2025 0400 by Jocelyne Ambrocio RN  Infection Prevention:   environmental surveillance performed   equipment surfaces disinfected   hand hygiene promoted   single patient room provided  Taken 2/7/2025 0200 by Jocelyne Ambrocio RN  Infection Prevention:   environmental surveillance performed   equipment surfaces disinfected   hand hygiene promoted   single patient room provided  Taken 2/7/2025 0000 by Jocelyne Ambrocio RN  Infection Prevention:   environmental surveillance performed   equipment surfaces disinfected   hand hygiene promoted   single patient room provided  Taken 2/6/2025 2200 by Jocelyne Ambrocio RN  Infection Prevention:   environmental surveillance performed   equipment surfaces disinfected   hand hygiene promoted   single patient room provided  Taken 2/6/2025 2000 by Jocelyne Ambrocio RN  Infection Prevention:   environmental surveillance performed   equipment surfaces disinfected   hand hygiene promoted   single patient room provided     Problem: Adult  Inpatient Plan of Care  Goal: Absence of Hospital-Acquired Illness or Injury  Intervention: Identify and Manage Fall Risk  Recent Flowsheet Documentation  Taken 2/7/2025 0600 by Jocelyne Ambrocio RN  Safety Promotion/Fall Prevention:   safety round/check completed   room organization consistent   nonskid shoes/slippers when out of bed   assistive device/personal items within reach   clutter free environment maintained   fall prevention program maintained  Taken 2/7/2025 0400 by Jocelyne Ambrocio RN  Safety Promotion/Fall Prevention:   safety round/check completed   room organization consistent   nonskid shoes/slippers when out of bed   assistive device/personal items within reach   clutter free environment maintained   fall prevention program maintained  Taken 2/7/2025 0200 by Jocelyne Ambrocio RN  Safety Promotion/Fall Prevention:   safety round/check completed   room organization consistent   nonskid shoes/slippers when out of bed   assistive device/personal items within reach   clutter free environment maintained   fall prevention program maintained  Taken 2/7/2025 0000 by Jocelyne Ambrocio RN  Safety Promotion/Fall Prevention:   safety round/check completed   room organization consistent   nonskid shoes/slippers when out of bed   assistive device/personal items within reach   clutter free environment maintained   fall prevention program maintained  Taken 2/6/2025 2200 by Jocelyne Ambrocio RN  Safety Promotion/Fall Prevention:   safety round/check completed   room organization consistent   nonskid shoes/slippers when out of bed   assistive device/personal items within reach   clutter free environment maintained   fall prevention program maintained  Taken 2/6/2025 2000 by Jocelyne Ambrocio, RN  Safety Promotion/Fall Prevention:   safety round/check completed   room organization consistent   nonskid shoes/slippers when out of bed   assistive device/personal items within reach   clutter free environment maintained   fall  prevention program maintained  Intervention: Prevent Skin Injury  Recent Flowsheet Documentation  Taken 2/7/2025 0600 by Jocelyne Ambrocio RN  Body Position:   neutral head position   neutral body alignment   patient/family refused  Skin Protection:   incontinence pads utilized   transparent dressing maintained  Taken 2/7/2025 0400 by Jocelyne Ambrocio RN  Body Position:   neutral head position   neutral body alignment   patient/family refused  Skin Protection:   incontinence pads utilized   transparent dressing maintained  Taken 2/7/2025 0200 by Jocelyne Ambrocio RN  Body Position:   neutral head position   neutral body alignment   patient/family refused  Skin Protection:   incontinence pads utilized   transparent dressing maintained  Taken 2/7/2025 0000 by Jocelyne Ambrocio RN  Body Position: side-lying  Skin Protection:   incontinence pads utilized   transparent dressing maintained  Taken 2/6/2025 2200 by Jocelyne Ambrocio RN  Body Position:   neutral head position   neutral body alignment   patient/family refused  Skin Protection:   incontinence pads utilized   transparent dressing maintained  Taken 2/6/2025 2000 by Jocelyne Ambrocio RN  Body Position:   neutral head position   neutral body alignment   patient/family refused  Skin Protection:   incontinence pads utilized   transparent dressing maintained  Intervention: Prevent Infection  Recent Flowsheet Documentation  Taken 2/7/2025 0600 by Jocelyne Ambrocio RN  Infection Prevention:   environmental surveillance performed   equipment surfaces disinfected   hand hygiene promoted   single patient room provided  Taken 2/7/2025 0400 by Jocelyne Ambrocio RN  Infection Prevention:   environmental surveillance performed   equipment surfaces disinfected   hand hygiene promoted   single patient room provided  Taken 2/7/2025 0200 by Jocelyne Ambrocio RN  Infection Prevention:   environmental surveillance performed   equipment surfaces disinfected   hand hygiene promoted   single patient  room provided  Taken 2/7/2025 0000 by Jocelyne Ambrocio RN  Infection Prevention:   environmental surveillance performed   equipment surfaces disinfected   hand hygiene promoted   single patient room provided  Taken 2/6/2025 2200 by Jocelyne Ambrocio RN  Infection Prevention:   environmental surveillance performed   equipment surfaces disinfected   hand hygiene promoted   single patient room provided  Taken 2/6/2025 2000 by Jocelyne Ambrocio RN  Infection Prevention:   environmental surveillance performed   equipment surfaces disinfected   hand hygiene promoted   single patient room provided  Goal: Optimal Comfort and Wellbeing  Intervention: Provide Person-Centered Care  Recent Flowsheet Documentation  Taken 2/7/2025 0600 by Jocelyne Ambrocio RN  Trust Relationship/Rapport:   care explained   questions answered  Taken 2/6/2025 2200 by Jocelyne Ambrocio RN  Trust Relationship/Rapport:   care explained   questions answered  Taken 2/6/2025 2000 by Jocelyne Ambrocio RN  Trust Relationship/Rapport:   care explained   questions answered     Problem: Cardiac Catheterization (Diagnostic/Interventional)  Goal: Anesthesia/Sedation Recovery  Intervention: Optimize Anesthesia Recovery  Recent Flowsheet Documentation  Taken 2/7/2025 0600 by Jocelyne Ambrocio RN  Safety Promotion/Fall Prevention:   safety round/check completed   room organization consistent   nonskid shoes/slippers when out of bed   assistive device/personal items within reach   clutter free environment maintained   fall prevention program maintained  Taken 2/7/2025 0400 by Jocelyne Ambrocio RN  Safety Promotion/Fall Prevention:   safety round/check completed   room organization consistent   nonskid shoes/slippers when out of bed   assistive device/personal items within reach   clutter free environment maintained   fall prevention program maintained  Taken 2/7/2025 0200 by Jocelyne Ambrocio RN  Safety Promotion/Fall Prevention:   safety round/check completed   room organization  consistent   nonskid shoes/slippers when out of bed   assistive device/personal items within reach   clutter free environment maintained   fall prevention program maintained  Taken 2/7/2025 0000 by Jocelyne Ambrocio RN  Safety Promotion/Fall Prevention:   safety round/check completed   room organization consistent   nonskid shoes/slippers when out of bed   assistive device/personal items within reach   clutter free environment maintained   fall prevention program maintained  Taken 2/6/2025 2200 by Jocelyne Ambrocio RN  Safety Promotion/Fall Prevention:   safety round/check completed   room organization consistent   nonskid shoes/slippers when out of bed   assistive device/personal items within reach   clutter free environment maintained   fall prevention program maintained  Taken 2/6/2025 2000 by Jocelyne Ambrocio RN  Safety Promotion/Fall Prevention:   safety round/check completed   room organization consistent   nonskid shoes/slippers when out of bed   assistive device/personal items within reach   clutter free environment maintained   fall prevention program maintained  Goal: Optimal Pain Control and Function  Intervention: Prevent or Manage Pain  Recent Flowsheet Documentation  Taken 2/7/2025 0600 by Jocelyne Ambrocio RN  Diversional Activities: television  Taken 2/6/2025 2200 by Jocelyne Ambrocio RN  Diversional Activities: television  Taken 2/6/2025 2000 by Jocelyne Ambrocio RN  Diversional Activities: television  Goal: Absence of Vascular Access Complication  Intervention: Prevent and Manage Access Complications  Recent Flowsheet Documentation  Taken 2/7/2025 0600 by Jocelyne Ambrocio RN  Activity Management:   activity encouraged   patient refuses activity  Head of Bed (HOB) Positioning: HOB elevated  Taken 2/7/2025 0400 by Jocelyne Ambrocio RN  Activity Management:   activity encouraged   patient refuses activity  Head of Bed (HOB) Positioning: HOB elevated  Taken 2/7/2025 0200 by Jocelyne Ambrocio RN  Activity Management:    activity encouraged   patient refuses activity  Head of Bed (HOB) Positioning: HOB elevated  Taken 2/7/2025 0000 by Jocelyne Ambrocio, RN  Activity Management:   activity encouraged   patient refuses activity  Head of Bed (HOB) Positioning: HOB elevated  Taken 2/6/2025 2200 by Jocelyne Ambrocio, RN  Activity Management:   activity encouraged   patient refuses activity  Head of Bed (HOB) Positioning: HOB elevated  Taken 2/6/2025 2000 by Jocelyne Ambrocio, RN  Activity Management:   activity encouraged   patient refuses activity  Head of Bed (HOB) Positioning: HOB at 20-30 degrees   Goal Outcome Evaluation:

## 2025-02-07 NOTE — PLAN OF CARE
Problem: Hemodialysis  Goal: Safe, Effective Therapy Delivery  Outcome: Progressing  Goal: Effective Tissue Perfusion  Outcome: Progressing  Goal: Absence of Infection Signs and Symptoms  Outcome: Progressing   Goal Outcome Evaluation:            HD completed. UF 2250, goal met. Tolerated well. VS stable and wdl during tx. Access functioned well. Blood returned. Report to PAULIE Brock

## 2025-02-07 NOTE — PROGRESS NOTES
[facility] at [facility]  IP Progress Note      Chief Complaint/Reason for service: A-fib RVR-resolved #2 hypertension    Subjective   Subjective: dialysis this morning, no pain complaints currently and no dyspnea currently     Objective     Vital Sign Min/Max for last 24 hours  Temp  Min: 96.6 °F (35.9 °C)  Max: 98.2 °F (36.8 °C)   BP  Min: 156/60  Max: 206/80   Pulse  Min: 56  Max: 76   Resp  Min: 16  Max: 18   SpO2  Min: 92 %  Max: 98 %   No data recorded    No intake or output data in the 24 hours ending 02/07/25 1140            Current Facility-Administered Medications:     acetaminophen (TYLENOL) tablet 1,000 mg, 1,000 mg, Oral, Q8H, Chaya Clayton PA-C, 1,000 mg at 02/07/25 0558    albumin human 25 % IV SOLN  - ADS Override Pull, , , ,     allopurinol (ZYLOPRIM) tablet 200 mg, 200 mg, Oral, Daily, Rom Gonzalez Jr., MD, 200 mg at 02/06/25 1017    amiodarone (PACERONE) tablet 200 mg, 200 mg, Oral, Q24H, Naren Escobar MD, 200 mg at 02/06/25 2030    apixaban (ELIQUIS) tablet 2.5 mg, 2.5 mg, Oral, Q12H, Chaya Clayton PA-C, 2.5 mg at 02/06/25 2030    atorvastatin (LIPITOR) tablet 10 mg, 10 mg, Oral, Nightly, Rom Gonzalez Jr., MD, 10 mg at 02/06/25 2030    sennosides-docusate (PERICOLACE) 8.6-50 MG per tablet 2 tablet, 2 tablet, Oral, BID, 2 tablet at 02/06/25 1605 **AND** polyethylene glycol (MIRALAX) packet 17 g, 17 g, Oral, Daily PRN **AND** bisacodyl (DULCOLAX) EC tablet 5 mg, 5 mg, Oral, Daily PRN **AND** bisacodyl (DULCOLAX) suppository 10 mg, 10 mg, Rectal, Daily PRN, Chaya Clayton PA-C    Calcium Replacement - Follow Nurse / BPA Driven Protocol, , Not Applicable, PRN, Rom Gonzalez Jr., MD    dextrose (D50W) (25 g/50 mL) IV injection 25 g, 25 g, Intravenous, Q15 Min PRN, Rom Gonzalez Jr., MD    dextrose (GLUTOSE) oral gel 15 g, 15 g, Oral, Q15 Min PRN, Rom Gonzalez Jr., MD    epoetin gracia (EPOGEN,PROCRIT) injection 6,000 Units,  6,000 Units, Subcutaneous, Once per day on Monday Wednesday Friday, Rom Gonzalez Jr., MD, 6,000 Units at 01/31/25 1455    famotidine (PEPCID) tablet 10 mg, 10 mg, Oral, Daily, Rom Gonzalez Jr., MD, 10 mg at 02/06/25 1017    glucagon (GLUCAGEN) injection 1 mg, 1 mg, Intramuscular, Q15 Min PRN, Rom Gonzalez Jr., MD    heparin (porcine) injection 2,000 Units, 2,000 Units, Intracatheter, PRN, Rom Gonzalez Jr., MD, 2,000 Units at 02/01/25 1031    Insulin Lispro (humaLOG) injection 2-9 Units, 2-9 Units, Subcutaneous, 4x Daily AC & at Bedtime, Rom Gonzalez Jr., MD, 2 Units at 02/06/25 2030    Magnesium Low Dose Replacement - Follow Nurse / BPA Driven Protocol, , Not Applicable, PRN, Rom Gonzalez Jr., MD    melatonin tablet 2.5 mg, 2.5 mg, Oral, Nightly PRN, Rom Gonzalez Jr., MD, 2.5 mg at 02/06/25 2030    metoprolol tartrate (LOPRESSOR) injection 5 mg, 5 mg, Intravenous, Q6H PRN, Rom Gonzalez Jr., MD, 5 mg at 01/23/25 0329    NIFEdipine XL (PROCARDIA XL) 24 hr tablet 60 mg, 60 mg, Oral, Q24H, Rom Gonzalez Jr., MD, 60 mg at 02/06/25 1017    ondansetron (ZOFRAN) injection 4 mg, 4 mg, Intravenous, Q6H PRN, Rom Gonzalez Jr., MD, 4 mg at 01/31/25 1042    oxyCODONE (ROXICODONE) immediate release tablet 5 mg, 5 mg, Oral, Q4H PRN, Rom Gonzalez Jr., MD, 5 mg at 02/07/25 0558    pantoprazole (PROTONIX) EC tablet 40 mg, 40 mg, Oral, Q AM, Rom Gonzalez Jr., MD, 40 mg at 02/07/25 0558    Potassium Replacement - Follow Nurse / BPA Driven Protocol, , Not Applicable, PRN, Rom Gonzalez Jr., MD    ropivacaine (NAROPIN) 0.2 % infusion (INFUSYSTEM), , Peripheral Nerve, Continuous, Rom Gonzalez Jr., MD, 1,000 mg at 02/04/25 1507    [COMPLETED] Insert Peripheral IV, , , Once **AND** sodium chloride 0.9 % flush 10 mL, 10 mL, Intravenous, PRN, Rom Gonzalez Jr., MD    sodium chloride 0.9 % flush 10 mL, 10 mL, Intravenous, Q12H, Rom Gonzalez Jr., MD, 10 mL at 02/06/25  2031    sodium chloride 0.9 % flush 10 mL, 10 mL, Intravenous, PRN, Rom Gonzalez Jr., MD    sodium chloride 0.9 % infusion 40 mL, 40 mL, Intravenous, PRN, Rom Gonzalez Jr., MD    traMADol (ULTRAM) tablet 50 mg, 50 mg, Oral, Q4H PRN, Rom Gonzalez Jr., MD, 50 mg at 02/06/25 0506    Physical Exam: General Pleasant elderly female in bed, not dyspneic not tachypneic        HEENT: No JVP       Respiratory: Clear anteriorly       Cardiovascular: Regular rate and rhythm       GI: Soft and flat       Lower Extremities: Status post left BKA       Neuro: Facial expressions are symmetrical       Skin: Warm and dry       P    Results Review:     Radiology Results:  Imaging Results (Last 72 Hours)       ** No results found for the last 72 hours. **            EKG: Sinus rhythm LVH QTc 446 nonspecific ST abnormality in 1 and aVL    ECHO: Preserved EF    Tele: Sinus rhythm with non-sustained SVT    Assessment   Assessment     A-fib-patient is maintaining sinus rhythm on amiodarone.    - continue amiodarone 200mg daily   - AC with apixaban    2 hypertension    Cardiology will see as needed over the weekend, please call with questions    Conrad Nash MD  02/07/25  11:40 EST

## 2025-02-07 NOTE — PROGRESS NOTES
[facility] Medicine Services  PROGRESS NOTE    Patient Name: Sara Esparza  : 1950  MRN: [id number]    Date of Admission: 2025  Primary Care Physician: Toribio Roberts MD    Subjective     CC: f/u osteomyelitis     HPI:  I have seen and evaluated the patient this afternoon after she returned from dialysis.  Remains very weak.  She thinks that she can get up and walk but I instructed her not to try to do that because she will fall.   at bedside and recently had left shoulder surgery and reported that he is not going to be able to help her with transportation to dialysis as discussed previously    Objective     Vital Signs:   Temp:  [96.6 °F (35.9 °C)-98 °F (36.7 °C)] 96.6 °F (35.9 °C)  Heart Rate:  [56-76] 56  Resp:  [17-18] 17  BP: (156-167)/(60-76) 167/65  Flow (L/min) (Oxygen Therapy):  [2] 2     Physical Exam:  General: Chronically ill and frail looking  Head: Atraumatic and normocephalic  Eyes: No Icterus. No pallor  Ears:  Ears appear intact with no abnormalities noted  Throat: No oral lesions, no thrush  Neck: Supple, trachea midline  Lungs: Clear to auscultation bilaterally, equal air entry, no wheezing or crackles  Heart:  Normal S1 and S2, no murmur, no gallop, No JVD, no lower extremity swelling  Abdomen:  Soft, no tenderness, no organomegaly, normal bowel sounds, no organomegaly  Extremities: Left below-knee amputation site is clean.  Mild erythema.  No discharge  Skin: No bleeding, bruising or rash, normal skin turgor and elasticity  Neurologic: Cranial nerves appear intact with no evidence of facial asymmetry, normal motor and sensory functions in all 4 extremities  Psych: Alert and oriented x 3, normal mood       Results Reviewed:  LAB RESULTS:      Lab 25  1336 25  1109 25  0347 25  1253 25  0726   WBC 5.58 6.86 8.05 7.90 11.55*   HEMOGLOBIN 9.2* 9.2* 9.9* 9.8* 10.1*   HEMATOCRIT 29.4* 29.0* 32.0* 31.4* 32.7*    PLATELETS 268 235 259 246 271   .7* 98.0* 100.6* 100.6* 101.2*   PROTIME  --   --  18.1*  --   --          Lab 02/05/25  1336 02/04/25  1159 02/04/25  1109 02/03/25  0347 02/02/25  1253 02/01/25  1025   SODIUM 138  --  133* 130* 132* 130*   POTASSIUM 3.8 4.5 4.3 5.3* 5.2 4.4   CHLORIDE 100  --  98 95* 98 96*   CO2 25.0  --  22.0 22.0 21.0* 25.0   ANION GAP 13.0  --  13.0 13.0 13.0 9.0   BUN 14  --  32* 45* 38* 29*   CREATININE 2.20*  --  4.23* 5.56* 5.05* 3.84*   EGFR 22.9*  --  10.4* 7.5* 8.4* 11.7*   GLUCOSE 105*  --  92 109* 132* 152*   CALCIUM 8.7  --  8.8 9.2 9.2 9.1   MAGNESIUM 1.9  --   --   --   --   --    PHOSPHORUS 2.8  --   --   --   --  4.3         Lab 02/05/25  1336 02/01/25  1025   ALBUMIN 2.8* 2.5*         Lab 02/03/25  0347   PROTIME 18.1*   INR 1.48*         Lab 02/04/25  1335   ABO TYPING O   RH TYPING Positive   ANTIBODY SCREEN Negative     Brief Urine Lab Results  (Last result in the past 365 days)        Color   Clarity   Blood   Leuk Est   Nitrite   Protein   CREAT   Urine HCG        01/19/25 1404 Yellow   Cloudy   Moderate (2+)   Moderate (2+)   Negative   >=300 mg/dL (3+)                 Microbiology Results Abnormal       Procedure Component Value - Date/Time    Anaerobic Culture - Surgical Site, Foot, Left [[id number]]  (Abnormal) Collected: 01/24/25 1754    Lab Status: Final result Specimen: Surgical Site from Foot, Left Updated: 01/30/25 1515     Anaerobic Culture Cutibacterium acnes    Body Fluid Culture - Surgical Site, Foot, Left [[id number]]  (Abnormal)  (Susceptibility) Collected: 01/24/25 1754    Lab Status: Final result Specimen: Surgical Site from Foot, Left Updated: 01/27/25 0642     Body Fluid Culture Light growth (2+) Staphylococcus aureus, MRSA     Comment:   Methicillin resistant Staphylococcus aureus, Patient may be an isolation risk.        Gram Stain Rare (1+) WBCs seen      No organisms seen    Narrative:      Not a body fluid.    Susceptibility         Staphylococcus aureus, MRSA      TAYLOR      Clindamycin Susceptible      Erythromycin Resistant      Oxacillin Resistant      Rifampin Susceptible      Tetracycline Susceptible      Trimethoprim + Sulfamethoxazole Susceptible      Vancomycin Susceptible                           Wound Culture - Wound, Foot, Left [[id number]]  (Abnormal)  (Susceptibility) Collected: 01/20/25 1409    Lab Status: Final result Specimen: Wound from Foot, Left Updated: 01/24/25 0658     Wound Culture Scant growth (1+) Staphylococcus aureus, MRSA     Comment: Methicillin resistant Staphylococcus aureus, Patient may be an isolation risk.         Rare growth Morganella morganii ssp morganii     Comment:            Scant growth (1+) Normal Skin Kimber     Gram Stain Few (2+) WBCs seen      Many (4+) Gram positive cocci in pairs      Rare (1+) Gram negative bacilli      Rare (1+) Gram positive bacilli    Susceptibility        Staphylococcus aureus, MRSA      TAYLOR      Clindamycin Susceptible      Erythromycin Resistant      Oxacillin Resistant      Rifampin Susceptible      Tetracycline Susceptible      Trimethoprim + Sulfamethoxazole Susceptible      Vancomycin Susceptible                       Susceptibility        Morganella morganii ssp morganii      TAYLOR      Amoxicillin + Clavulanate Resistant      Ampicillin Resistant      Ampicillin + Sulbactam Resistant      Cefazolin (Non Urine) Resistant      Cefepime Susceptible  [1]       Cefotaxime Susceptible      Ceftazidime Susceptible  [1]       Gentamicin Susceptible      Levofloxacin Susceptible      Piperacillin + Tazobactam Susceptible      Tetracycline Susceptible      Trimethoprim + Sulfamethoxazole Susceptible                   [1]  Appended report. These results have been appended to a previously final verified report.                Susceptibility Comments       Morganella morganii ssp morganii    Cefotaxime susceptibility can be used as a surrogate for ceftriaxone susceptibility                MRSA Screen, PCR (Inpatient) - Swab, Nares [[id number]]  (Abnormal) Collected: 01/21/25 0614    Lab Status: Final result Specimen: Swab from Nares Updated: 01/21/25 0847     MRSA PCR Positive    Narrative:      The negative predictive value of this diagnostic test is high and should only be used to consider de-escalating anti-MRSA therapy. A positive result may indicate colonization with MRSA and must be correlated clinically.          No radiology results from the last 24 hrs    Results for orders placed during the hospital encounter of 01/19/25    Adult Transthoracic Echo Complete W/ Cont if Necessary Per Protocol    Interpretation Summary    Left ventricular systolic function is normal. Estimated left ventricular EF = 55%    Left ventricular wall thickness is consistent with moderate concentric hypertrophy.    No hemodynamically significant valvular heart disease    Current medications:  Scheduled Meds:acetaminophen, 1,000 mg, Oral, Q8H  albumin human, , ,   allopurinol, 200 mg, Oral, Daily  amiodarone, 200 mg, Oral, Q24H  apixaban, 2.5 mg, Oral, Q12H  atorvastatin, 10 mg, Oral, Nightly  epoetin gracia/gracia-epbx, 6,000 Units, Subcutaneous, Once per day on Monday Wednesday Friday  famotidine, 10 mg, Oral, Daily  insulin lispro, 2-9 Units, Subcutaneous, 4x Daily AC & at Bedtime  NIFEdipine XL, 60 mg, Oral, Q24H  pantoprazole, 40 mg, Oral, Q AM  senna-docusate sodium, 2 tablet, Oral, BID  sodium chloride, 10 mL, Intravenous, Q12H      Continuous Infusions:ropivacaine,       PRN Meds:.  albumin human    albumin human    senna-docusate sodium **AND** polyethylene glycol **AND** bisacodyl **AND** bisacodyl    Calcium Replacement - Follow Nurse / BPA Driven Protocol    dextrose    dextrose    glucagon (human recombinant)    heparin (porcine)    Magnesium Low Dose Replacement - Follow Nurse / BPA Driven Protocol    melatonin    metoprolol tartrate    ondansetron    oxyCODONE    Potassium Replacement - Follow Nurse /  BPA Driven Protocol    [COMPLETED] Insert Peripheral IV **AND** sodium chloride    sodium chloride    sodium chloride    traMADol    Assessment & Plan     Active Hospital Problems    Diagnosis  POA    **Generalized weakness [R53.1]  Yes    Paroxysmal A-fib [I48.0]  Unknown    Sepsis due to methicillin resistant Staphylococcus aureus (MRSA) with encephalopathy without septic shock [A41.02, R65.20, G93.41]  Unknown    Chronic osteomyelitis of left foot [M86.672]  Unknown    Critical limb ischemia of left lower extremity [I70.222]  Unknown    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     Brief Hospital Course to date:  Sara Esparza is a 74 y.o. female with PMH significant for HTN, PAF (Eliquis), prior DVT/PE, ESRD on HD, DMII, asthma, DENZEL (CPAP). Presented to [facility] ED on 1/19/25 for evaluation of generalized weakness, myalgias, lightheadedness and falls. On 1/20, she developed fevers. Source ultimately found to be related to L calcaneal osteomyelitis. ID / ortho consulted to follow. She is s/p debridement and wound vac placement 1/24/25. Ortho recommended BKA. In an effort to optimize arterial flow and wound healing, Dr. Ramirez of vascular surgery performed LLE angiogram with angioplasty/lithotripsy on 2/3/25. She was agreeable to L BKA and this is planned for 2/4/25    Sepsis secondary to L calcaneal osteomyelitis  Metabolic encephalopathy  L posterial tibial artery occlusion  Met sepsis criteria on 1/20 with fever 102.6F, 's, leukocytosis (WBC 15K), source of infection and altered mental status   1/21/25 CT LLE w/o contrast showed draining wound seen along plantar aspect of left calcaneus with surrounding changes consistent with osteomyelitis  Appreciate orthopedic surgery  assistance  She is s/p debridement and wound vac placement 1/24/25 - probability of limb salvage felt to be poor  S/p LLE angiogram with lithotripsy / angioplasty by Dr. Ramirez 2/3/25   She is s/p L BKA on 2/4/25 by Dr. Gonzalez   Wound culture (+) MRSA / Morganella   1/24 surgical cultures growing MRSA and Cutibacterium acnes  ID following - on IV Vanc/Ceftriaxone   PT / OT to follow postoperatively once cleared by ortho     Generalized weakness, progressive x weeks  Generalized debility   Falls at home   CT chest/abd/pelvis without acute findings   Normal TSH and CK   PT/OT/CM following - anticipate will need SNF      Neck pain, C7 region   C5-6 canal stenosis  Chronic back pain s/p spinal stimulator  CT C-spine - no fractures  Reported history of Oxycodone dependence - attempted to avoid opiates however due to uncontrolled pain she was started on Oxycodone 5mg Q4H PRN on 2/1/25  Continue Lidocaine patches  Partner recommended that patient/ reach out to her pain management clinic for questions re: spinal stimulator     ESRD on HD  Has tunneled R IJ catheter - recent HD start in 9/2024  Nephrology following  Continue MWF HD      DMII, HbA1c 4.9  On Sitagliptin at home  On SSI only with fairly normal glucose      HTN   BP was low but trending back up  Continue Nifedipine XL 60mg daily   Home Imdur on hold  Carvedilol stopped due to AF RVR - now on Metoprolol    Hx of Afib  Episode of Afib with RVR during HD  Was on home-dose Carvedilol but HR uncontrolled  Appreciate cardiology assistance  Started on Amiodarone. BB transitioned to Metoprolol - then stopped due to bradycardia  Resumed Eliquis on 2/5     Elevated troponin  In setting of ESRD  Troponin peaked at 132 on 1/19/25  Per chart review, had LHC recently at [facility] with non-obstructive CAD     Dyspepsia  GI cocktail PRN     Constipation  Likely due to opiates  Improved with bowel regimen and addition of Lactulose   Bowel regimen transitioned to PRN -  appears constipated again. Restarted bowel regimen on 2/6      Expected Discharge Location and Transportation: likely SNF   Expected Discharge Expected Discharge Date: 2/7/2025; Expected Discharge Time:      VTE Prophylaxis: Pharmacologic & mechanical VTE prophylaxis orders are present.    AM-PAC 6 Clicks Score (PT): 6 (02/06/25 2000)    CODE STATUS:   Code Status and Medical Interventions: CPR (Attempt to Resuscitate); Full Support   Ordered at: 01/19/25 0518     Level Of Support Discussed With:    Patient     Code Status (Patient has no pulse and is not breathing):    CPR (Attempt to Resuscitate)     Medical Interventions (Patient has pulse or is breathing):    Full Support     James Ryder MD  02/07/25

## 2025-02-07 NOTE — PROGRESS NOTES
[facility]    Acute pain service Inpatient Progress Note    Patient Name: Sara Esparza  :  1950  MRN:  [id number]        Acute Pain  Service Inpatient Progress Note:    Analgesia:Fair  Pain Score:4/10  LOC: alert and awake  Side Effects:None  Catheter Site:clean, dry and dressing intact  Cath type: peripheral nerve cath(InfuSystem)  Infusion rate: Fem/ Add: Basal: 1ml/hr, PIB: 8ml q 8h, PCA: 8ml q 30 min  Catheter Plan:Catheter to remain Insitu and Continue catheter infusion rate unchanged

## 2025-02-07 NOTE — PLAN OF CARE
Goal Outcome Evaluation:  Plan of Care Reviewed With: patient, spouse        Progress: improving  Outcome Evaluation: Patient returned from Dialysis at aprox 1215, 2250ml removed in Dialysis per report.  Patient is A+Ox2 (confused to time and situation).  NSR/ST via the monitor, VSS with HTN.  Patient missed am BP medications r/t Dialysis schedule.  BP medications given.  Potassium=3.6, has replacement orders.  Dr. Ryder notified of the 3.6 Potassium and stated not to replace.  Dressing D/I to left stump.  Patient currently up in bedside chair.  Will attempt dressing once she returns to the bed.  Patient has denied SOA on room air, has denied pain but requested PRN medications to prevent pain.  Also, has a nerve block/pump which is intact to left leg.  Patients  in this am and is aware of the pts condition.  Will continue with current POC.

## 2025-02-07 NOTE — CASE MANAGEMENT/SOCIAL WORK
Continued Stay Note  [facility]     Patient Name: Sara Esparza  MRN: [id number]  Today's Date: 2/7/2025    Admit Date: 1/19/2025    Plan: Rehab   Discharge Plan       Row Name 02/07/25 1537       Plan    Plan Rehab    Plan Comments Per discussion in MDR, Pt has Ropivacaine nerve block in place and is on room air. She has been hypertensive. She had HD today (-2250ml). I spoke with Pt, in room, to explain [facility] transportation is not an option since they require an outpatient evaluation and for the Pt to be independent with W/C transfers. She is now open to referrals to [facility] (who offers in-house HD) and [facility] (who offers transportation to/from HD). CM left message for Huong with [facility] & Brittny with [facility] to return my call. She is still not agreeable to a referral to [facility] (who also offers in-house HD). Since Pt does not have transportation to/from HD, prior referrals to [facility], [facility] or [facility] will not be feasible. CM will continue to follow for medical readiness.    Final Discharge Disposition Code 03 - skilled nursing facility (SNF)                   Discharge Codes    No documentation.                 Expected Discharge Date and Time       Expected Discharge Date Expected Discharge Time    Feb 10, 2025               Blanka Puentes RN

## 2025-02-08 LAB
GLUCOSE BLDC GLUCOMTR-MCNC: 113 MG/DL (ref 70–130)
GLUCOSE BLDC GLUCOMTR-MCNC: 118 MG/DL (ref 70–130)
GLUCOSE BLDC GLUCOMTR-MCNC: 120 MG/DL (ref 70–130)
GLUCOSE BLDC GLUCOMTR-MCNC: 126 MG/DL (ref 70–130)

## 2025-02-08 PROCEDURE — 82948 REAGENT STRIP/BLOOD GLUCOSE: CPT

## 2025-02-08 PROCEDURE — 99232 SBSQ HOSP IP/OBS MODERATE 35: CPT | Performed by: INTERNAL MEDICINE

## 2025-02-08 RX ORDER — NIFEDIPINE 30 MG/1
30 TABLET, EXTENDED RELEASE ORAL ONCE
Status: COMPLETED | OUTPATIENT
Start: 2025-02-08 | End: 2025-02-08

## 2025-02-08 RX ORDER — LACTULOSE 10 G/15ML
30 SOLUTION ORAL ONCE
Status: COMPLETED | OUTPATIENT
Start: 2025-02-08 | End: 2025-02-08

## 2025-02-08 RX ORDER — CARVEDILOL 12.5 MG/1
25 TABLET ORAL 2 TIMES DAILY WITH MEALS
Status: DISCONTINUED | OUTPATIENT
Start: 2025-02-08 | End: 2025-02-08

## 2025-02-08 RX ORDER — CLONIDINE HYDROCHLORIDE 0.2 MG/1
0.2 TABLET ORAL EVERY 8 HOURS PRN
Status: DISCONTINUED | OUTPATIENT
Start: 2025-02-08 | End: 2025-02-12 | Stop reason: HOSPADM

## 2025-02-08 RX ADMIN — APIXABAN 2.5 MG: 2.5 TABLET, FILM COATED ORAL at 08:19

## 2025-02-08 RX ADMIN — Medication 10 ML: at 08:19

## 2025-02-08 RX ADMIN — OXYCODONE HYDROCHLORIDE 5 MG: 5 TABLET ORAL at 05:54

## 2025-02-08 RX ADMIN — CARVEDILOL 25 MG: 12.5 TABLET, FILM COATED ORAL at 10:22

## 2025-02-08 RX ADMIN — OXYCODONE HYDROCHLORIDE 5 MG: 5 TABLET ORAL at 13:31

## 2025-02-08 RX ADMIN — ALLOPURINOL 200 MG: 100 TABLET ORAL at 08:19

## 2025-02-08 RX ADMIN — SENNOSIDES AND DOCUSATE SODIUM 2 TABLET: 50; 8.6 TABLET ORAL at 08:19

## 2025-02-08 RX ADMIN — OXYCODONE HYDROCHLORIDE 5 MG: 5 TABLET ORAL at 09:49

## 2025-02-08 RX ADMIN — POLYETHYLENE GLYCOL 3350 17 G: 17 POWDER, FOR SOLUTION ORAL at 04:44

## 2025-02-08 RX ADMIN — ATORVASTATIN CALCIUM 10 MG: 10 TABLET, FILM COATED ORAL at 20:58

## 2025-02-08 RX ADMIN — FAMOTIDINE 10 MG: 20 TABLET, FILM COATED ORAL at 08:18

## 2025-02-08 RX ADMIN — APIXABAN 2.5 MG: 2.5 TABLET, FILM COATED ORAL at 20:58

## 2025-02-08 RX ADMIN — Medication 2.5 MG: at 20:58

## 2025-02-08 RX ADMIN — ACETAMINOPHEN 1000 MG: 500 TABLET ORAL at 05:36

## 2025-02-08 RX ADMIN — OXYCODONE HYDROCHLORIDE 5 MG: 5 TABLET ORAL at 20:58

## 2025-02-08 RX ADMIN — PANTOPRAZOLE SODIUM 40 MG: 40 TABLET, DELAYED RELEASE ORAL at 05:36

## 2025-02-08 RX ADMIN — LACTULOSE 30 G: 20 SOLUTION ORAL at 10:21

## 2025-02-08 RX ADMIN — AMIODARONE HYDROCHLORIDE 200 MG: 200 TABLET ORAL at 20:58

## 2025-02-08 RX ADMIN — NIFEDIPINE 30 MG: 30 TABLET, FILM COATED, EXTENDED RELEASE ORAL at 10:22

## 2025-02-08 RX ADMIN — ACETAMINOPHEN 1000 MG: 500 TABLET ORAL at 20:58

## 2025-02-08 RX ADMIN — SENNOSIDES AND DOCUSATE SODIUM 2 TABLET: 50; 8.6 TABLET ORAL at 20:58

## 2025-02-08 RX ADMIN — ACETAMINOPHEN 1000 MG: 500 TABLET ORAL at 13:31

## 2025-02-08 RX ADMIN — Medication 10 ML: at 20:59

## 2025-02-08 RX ADMIN — NIFEDIPINE 60 MG: 60 TABLET, EXTENDED RELEASE ORAL at 08:19

## 2025-02-08 NOTE — PLAN OF CARE
Goal Outcome Evaluation:  Plan of Care Reviewed With: patient, spouse        Progress: improving    Outcome Evaluation: Patient A+Ox4, denies SOA on room air.  VSS, NSR via the monitor.  Continues with nerve block to left leg, PRN Oxycodone given.  Dressing changed to left stump, incision proximated with staples, no drainage noted.  Patient given extra bowel medications and has been moving her bowels.   Patient kept in the bed r/t numerous BMs.  Patients  at the bedside and is aware of the pts.  Will continue with current POC.

## 2025-02-08 NOTE — PROGRESS NOTES
"   LOS: 19 days    Patient Care Team:  Toribio Roberts MD as PCP - General (Internal Medicine)    Subjective     Seen on dialysis earlier today reports feeling better. .   Denies chest pain or shortness of breath.   Reports that her appetite is better she was able to eat better now.     Objective     Vital Signs:  Blood pressure 142/61, pulse 69, temperature 98.4 °F (36.9 °C), temperature source Oral, resp. rate 18, height 170.2 cm (67.01\"), weight 108 kg (238 lb 1.6 oz), SpO2 96%.      Intake/Output Summary (Last 24 hours) at 2/7/2025 1905  Last data filed at 2/7/2025 1749  Gross per 24 hour   Intake 238 ml   Output 2250 ml   Net -2012 ml        No intake/output data recorded.    Physical Exam:    GENERAL: WD WF NAD  NEURO: Awake and alert. No focal deficit  PSYCHIATRIC: Agitated, cooperative with PE  EYE: PE, no icterus, no conjunctivitis  ENT: omm dry, dentition intact,  Hearing intact  NECK: Supple , No JVD discernable  CV: No edema, RRR  LUNGS:  Quiet,  Nonlabored resp.  Symmetrical expansion  ABDOMEN: Nondistended, soft nontender.  : No Corral, no palp bladder  SKIN: Warm and dry without rash  + RIJ TDC  Left BKA     Labs:  Results from last 7 days   Lab Units 02/05/25  1336 02/04/25  1109 02/03/25  0347   WBC 10*3/mm3 5.58 6.86 8.05   HEMOGLOBIN g/dL 9.2* 9.2* 9.9*   PLATELETS 10*3/mm3 268 235 259     Results from last 7 days   Lab Units 02/07/25  0814 02/05/25  1336 02/04/25  1159 02/04/25  1109 02/03/25  0347 02/02/25  1253 02/01/25  1025   SODIUM mmol/L 136 138  --  133* 130*   < > 130*   POTASSIUM mmol/L 3.6 3.8 4.5 4.3 5.3*   < > 4.4   CHLORIDE mmol/L 100 100  --  98 95*   < > 96*   CO2 mmol/L 24.0 25.0  --  22.0 22.0   < > 25.0   BUN mg/dL 32* 14  --  32* 45*   < > 29*   CREATININE mg/dL 4.22* 2.20*  --  4.23* 5.56*   < > 3.84*   CALCIUM mg/dL 8.5* 8.7  --  8.8 9.2   < > 9.1   PHOSPHORUS mg/dL 4.0 2.8  --   --   --   --  4.3   MAGNESIUM mg/dL  --  1.9  --   --   --   --   --    ALBUMIN g/dL 2.3* " 2.8*  --   --   --   --  2.5*    < > = values in this interval not displayed.           A/P:    ESRD: On HD MWF at [facility] [place] [facility] with NAL .  Continue outpatient schedule. She is interested in home dialysis. Discussed with home program they will reach out to patient for further education as outpatient.   -Hemodialysis today as per schedule.      HTN: Blood pressure stable.  Monitor for now.    Hyponatremia:   Due to underlying renal failure.  Adjust with HD.     Hyperkalemia: Resolved.  Adjust with HD     Metabolic acidosis:  Adjust with HD     Anemia: Hemoglobin below goal.  Patient unable.  Monitor for now.  Transfuse as indicated for Hgb <7.     Volume: Earlier in admission patient with orthostatic hypotension with intravascular volume depletion.  Looks stable today.  Monitor with UF.     Hyperphosphatemia: Phos stable at 4.0.  Poor p.o. intake.  Binders on hold for now.     Nutrition: Low potassium low Phos high-protein diet.  Renal vitamin.    PVD: Underwent left BKA     High risk and complexity patient.     Ruel Giraldo MD  02/07/25  19:05 EST

## 2025-02-08 NOTE — PROGRESS NOTES
[facility]    Acute pain service Inpatient Progress Note    Patient Name: Sara Esparza  :  1950  MRN:  [id number]        Acute Pain  Service Inpatient Progress Note:    Analgesia:Good  Pain Score:5/10 (Pain primarily on the front region.)  LOC: alert and awake  Resp Status: room air  Cardiac: VS stable  Side Effects:None  Catheter Site:clean, dressing intact and dry  Cath type: peripheral nerve cath(InfuSystem)  Volume: 1mL,5ml, 5ml InfuSystem Pump.  Catheter Plan:Catheter to remain Insitu and Continue catheter infusion rate unchanged  Comments: . The neuro exam of the patient also includes sensory function throughout the operative extremity.

## 2025-02-08 NOTE — PROGRESS NOTES
"   LOS: 20 days    Patient Care Team:  Toribio Roberts MD as PCP - General (Internal Medicine)    Subjective     Seen and examined at bedside.   Denies chest pain or shortness of breath.     Objective     Vital Signs:  Blood pressure 152/72, pulse 60, temperature 98.6 °F (37 °C), temperature source Oral, resp. rate 16, height 170.2 cm (67.01\"), weight 108 kg (238 lb 1.6 oz), SpO2 93%.    No intake or output data in the 24 hours ending 02/08/25 1801       02/07 0701 - 02/08 0700  In: 238 [P.O.:238]  Out: 2250     Physical Exam:    GENERAL: WD WF NAD  NEURO: Awake and alert. No focal deficit  PSYCHIATRIC: Agitated, cooperative with PE  EYE: PE, no icterus, no conjunctivitis  ENT: omm dry, dentition intact,  Hearing intact  NECK: Supple , No JVD discernable  CV: No edema, RRR  LUNGS:  Quiet,  Nonlabored resp.  Symmetrical expansion  ABDOMEN: Nondistended, soft nontender.  : No Corral, no palp bladder  SKIN: Warm and dry without rash  + RIJ TDC  Left BKA     Labs:  Results from last 7 days   Lab Units 02/05/25  1336 02/04/25  1109 02/03/25  0347   WBC 10*3/mm3 5.58 6.86 8.05   HEMOGLOBIN g/dL 9.2* 9.2* 9.9*   PLATELETS 10*3/mm3 268 235 259     Results from last 7 days   Lab Units 02/07/25  0814 02/05/25  1336 02/04/25  1159 02/04/25  1109 02/03/25  0347   SODIUM mmol/L 136 138  --  133* 130*   POTASSIUM mmol/L 3.6 3.8 4.5 4.3 5.3*   CHLORIDE mmol/L 100 100  --  98 95*   CO2 mmol/L 24.0 25.0  --  22.0 22.0   BUN mg/dL 32* 14  --  32* 45*   CREATININE mg/dL 4.22* 2.20*  --  4.23* 5.56*   CALCIUM mg/dL 8.5* 8.7  --  8.8 9.2   PHOSPHORUS mg/dL 4.0 2.8  --   --   --    MAGNESIUM mg/dL  --  1.9  --   --   --    ALBUMIN g/dL 2.3* 2.8*  --   --   --            A/P:    ESRD: [place] [facility] [place] [facility] [place] [facility] .  Continue outpatient schedule. She is interested in home dialysis. Discussed with home program they will reach out to patient for further education as outpatient.   -Hemodialysis as per schedule.      HTN: Blood " pressure stable.  Monitor for now.    Hyponatremia: Due to underlying renal failure.  Adjust with HD.     Hyperkalemia: Resolved.  Adjust with HD     Metabolic acidosis:  Adjust with HD     Anemia: Hemoglobin below goal.  Patient unable.  Monitor for now.  Transfuse as indicated for Hgb <7.     Volume: Earlier in admission patient with orthostatic hypotension with intravascular volume depletion.  Monitor with UF.     Hyperphosphatemia: Phos stable at 4.0.  Poor p.o. intake.  Binders on hold for now.     Nutrition: Low potassium low Phos high-protein diet.  Renal vitamin.    PVD: Underwent left BKA.     High risk and complexity patient.     Ruel Giraldo MD  02/08/25  18:01 EST

## 2025-02-08 NOTE — PLAN OF CARE
Problem: Adult Inpatient Plan of Care  Goal: Absence of Hospital-Acquired Illness or Injury  Intervention: Identify and Manage Fall Risk  Recent Flowsheet Documentation  Taken 2/8/2025 0600 by Jocelyne Ambrocio RN  Safety Promotion/Fall Prevention:   safety round/check completed   room organization consistent   nonskid shoes/slippers when out of bed   assistive device/personal items within reach   clutter free environment maintained   fall prevention program maintained  Taken 2/8/2025 0400 by Jocelyne Ambrocio RN  Safety Promotion/Fall Prevention:   safety round/check completed   room organization consistent   nonskid shoes/slippers when out of bed   assistive device/personal items within reach   clutter free environment maintained   fall prevention program maintained  Taken 2/8/2025 0200 by Jocelyne Ambrocio RN  Safety Promotion/Fall Prevention:   safety round/check completed   room organization consistent   nonskid shoes/slippers when out of bed   assistive device/personal items within reach   clutter free environment maintained   fall prevention program maintained  Taken 2/7/2025 2200 by Jocelyne Ambrocio RN  Safety Promotion/Fall Prevention:   safety round/check completed   room organization consistent   nonskid shoes/slippers when out of bed   assistive device/personal items within reach   clutter free environment maintained   fall prevention program maintained  Taken 2/7/2025 2000 by Jocelyne Ambrocio RN  Safety Promotion/Fall Prevention:   safety round/check completed   room organization consistent   nonskid shoes/slippers when out of bed   assistive device/personal items within reach   clutter free environment maintained   fall prevention program maintained  Intervention: Prevent Skin Injury  Recent Flowsheet Documentation  Taken 2/8/2025 0600 by Jocelyne Ambrocio RN  Body Position:   neutral head position   neutral body alignment   patient/family refused  Skin Protection:   incontinence pads utilized   transparent  dressing maintained  Taken 2/8/2025 0400 by Jocelyne Ambrocio RN  Body Position:   neutral head position   neutral body alignment   patient/family refused  Skin Protection:   incontinence pads utilized   transparent dressing maintained  Taken 2/8/2025 0200 by Jocelyne Ambrocio RN  Body Position:   neutral head position   neutral body alignment   patient/family refused  Skin Protection:   incontinence pads utilized   transparent dressing maintained  Taken 2/7/2025 2200 by Jocelyne Ambrocio RN  Skin Protection:   incontinence pads utilized   transparent dressing maintained  Taken 2/7/2025 2000 by Jocelyne Ambrocio RN  Body Position:   neutral head position   neutral body alignment   patient/family refused  Skin Protection:   incontinence pads utilized   transparent dressing maintained  Intervention: Prevent Infection  Recent Flowsheet Documentation  Taken 2/8/2025 0600 by Jocelyne Ambrocio RN  Infection Prevention:   environmental surveillance performed   equipment surfaces disinfected   hand hygiene promoted   single patient room provided  Taken 2/8/2025 0400 by Jocelyne Ambrocio RN  Infection Prevention:   environmental surveillance performed   equipment surfaces disinfected   hand hygiene promoted   single patient room provided  Taken 2/8/2025 0200 by Jocelyne Ambrocio RN  Infection Prevention:   environmental surveillance performed   equipment surfaces disinfected   hand hygiene promoted   single patient room provided  Taken 2/7/2025 2200 by Jocelyne Ambrocio RN  Infection Prevention:   environmental surveillance performed   equipment surfaces disinfected   hand hygiene promoted   single patient room provided  Taken 2/7/2025 2000 by Jocelyne Ambrocio RN  Infection Prevention:   environmental surveillance performed   equipment surfaces disinfected   hand hygiene promoted   single patient room provided  Goal: Optimal Comfort and Wellbeing  Intervention: Provide Person-Centered Care  Recent Flowsheet Documentation  Taken 2/8/2025 0600  by Jocelyne Ambrocio RN  Trust Relationship/Rapport:   care explained   questions answered  Taken 2/8/2025 0400 by Jocelyne Ambrocio RN  Trust Relationship/Rapport:   care explained   questions answered  Taken 2/7/2025 2000 by Jocelyne Ambrocio RN  Trust Relationship/Rapport:   care explained   questions answered     Problem: Skin Injury Risk Increased  Goal: Skin Health and Integrity  Intervention: Optimize Skin Protection  Recent Flowsheet Documentation  Taken 2/8/2025 0600 by Jocelyne Ambrocio RN  Activity Management: activity encouraged  Pressure Reduction Techniques: pressure points protected  Head of Bed (HOB) Positioning: HOB elevated  Pressure Reduction Devices:   specialty bed utilized   positioning supports utilized  Skin Protection:   incontinence pads utilized   transparent dressing maintained  Taken 2/8/2025 0400 by Jocelyne Ambrocio RN  Activity Management: activity encouraged  Pressure Reduction Techniques: pressure points protected  Head of Bed (HOB) Positioning: HOB elevated  Pressure Reduction Devices:   specialty bed utilized   positioning supports utilized  Skin Protection:   incontinence pads utilized   transparent dressing maintained  Taken 2/8/2025 0200 by Jocelyne Ambrocio RN  Activity Management: activity encouraged  Pressure Reduction Techniques: pressure points protected  Head of Bed (HOB) Positioning: HOB elevated  Pressure Reduction Devices:   specialty bed utilized   positioning supports utilized  Skin Protection:   incontinence pads utilized   transparent dressing maintained  Taken 2/7/2025 2200 by Jocelyne Ambrocio RN  Activity Management:   activity encouraged   patient refuses activity  Pressure Reduction Techniques: pressure points protected  Pressure Reduction Devices:   specialty bed utilized   positioning supports utilized  Skin Protection:   incontinence pads utilized   transparent dressing maintained  Taken 2/7/2025 2000 by Jocelyne Ambrocio RN  Activity Management:   activity encouraged    patient refuses activity  Pressure Reduction Techniques: pressure points protected  Head of Bed (HOB) Positioning: HOB elevated  Pressure Reduction Devices:   pressure-redistributing mattress utilized   specialty bed utilized   positioning supports utilized  Skin Protection:   incontinence pads utilized   transparent dressing maintained     Problem: Fall Injury Risk  Goal: Absence of Fall and Fall-Related Injury  Intervention: Promote Injury-Free Environment  Recent Flowsheet Documentation  Taken 2/8/2025 0600 by Jocelyne Ambrocio RN  Safety Promotion/Fall Prevention:   safety round/check completed   room organization consistent   nonskid shoes/slippers when out of bed   assistive device/personal items within reach   clutter free environment maintained   fall prevention program maintained  Taken 2/8/2025 0400 by Jocelyne Ambrocio RN  Safety Promotion/Fall Prevention:   safety round/check completed   room organization consistent   nonskid shoes/slippers when out of bed   assistive device/personal items within reach   clutter free environment maintained   fall prevention program maintained  Taken 2/8/2025 0200 by Jocelyne Ambrocio RN  Safety Promotion/Fall Prevention:   safety round/check completed   room organization consistent   nonskid shoes/slippers when out of bed   assistive device/personal items within reach   clutter free environment maintained   fall prevention program maintained  Taken 2/7/2025 2200 by Jocelyne Ambrocio RN  Safety Promotion/Fall Prevention:   safety round/check completed   room organization consistent   nonskid shoes/slippers when out of bed   assistive device/personal items within reach   clutter free environment maintained   fall prevention program maintained  Taken 2/7/2025 2000 by Jocelyne Ambrocio RN  Safety Promotion/Fall Prevention:   safety round/check completed   room organization consistent   nonskid shoes/slippers when out of bed   assistive device/personal items within reach   clutter free  environment maintained   fall prevention program maintained     Problem: Hemodialysis  Goal: Absence of Infection Signs and Symptoms  Intervention: Prevent or Manage Infection  Recent Flowsheet Documentation  Taken 2/8/2025 0600 by Jocelyne Ambrocio RN  Infection Prevention:   environmental surveillance performed   equipment surfaces disinfected   hand hygiene promoted   single patient room provided  Taken 2/8/2025 0400 by Jocelyne Ambrocio RN  Infection Prevention:   environmental surveillance performed   equipment surfaces disinfected   hand hygiene promoted   single patient room provided  Taken 2/8/2025 0200 by Jocelyne Ambrocio RN  Infection Prevention:   environmental surveillance performed   equipment surfaces disinfected   hand hygiene promoted   single patient room provided  Taken 2/7/2025 2200 by Jocelyne Ambrocio RN  Infection Prevention:   environmental surveillance performed   equipment surfaces disinfected   hand hygiene promoted   single patient room provided  Taken 2/7/2025 2000 by Jocelyne Ambrocio RN  Infection Prevention:   environmental surveillance performed   equipment surfaces disinfected   hand hygiene promoted   single patient room provided     Problem: Sepsis/Septic Shock  Goal: Optimal Coping  Intervention: Support Patient and Family Response  Recent Flowsheet Documentation  Taken 2/8/2025 0600 by Jocelyne Ambrocio RN  Family/Support System Care: support provided  Taken 2/8/2025 0400 by Jocelyne Ambrocio RN  Family/Support System Care: support provided  Taken 2/7/2025 2000 by Jocelyne Ambrocio RN  Family/Support System Care: support provided  Goal: Absence of Infection Signs and Symptoms  Intervention: Initiate Sepsis Management  Recent Flowsheet Documentation  Taken 2/8/2025 0600 by Jocelyne Ambrocio RN  Infection Prevention:   environmental surveillance performed   equipment surfaces disinfected   hand hygiene promoted   single patient room provided  Taken 2/8/2025 0400 by Jocelyne Ambrocio RN  Infection  Prevention:   environmental surveillance performed   equipment surfaces disinfected   hand hygiene promoted   single patient room provided  Taken 2/8/2025 0200 by Jocelyne Ambrocio RN  Infection Prevention:   environmental surveillance performed   equipment surfaces disinfected   hand hygiene promoted   single patient room provided  Taken 2/7/2025 2200 by Jocelyne Ambrocio RN  Infection Prevention:   environmental surveillance performed   equipment surfaces disinfected   hand hygiene promoted   single patient room provided  Taken 2/7/2025 2000 by Jocelyne Ambrocio RN  Infection Prevention:   environmental surveillance performed   equipment surfaces disinfected   hand hygiene promoted   single patient room provided  Intervention: Promote Recovery  Recent Flowsheet Documentation  Taken 2/8/2025 0600 by Jocelyne Ambrocio RN  Activity Management: activity encouraged  Taken 2/8/2025 0400 by Jocelyne Ambrocio RN  Activity Management: activity encouraged  Taken 2/8/2025 0200 by Jocelyne Ambrocio RN  Activity Management: activity encouraged  Taken 2/7/2025 2200 by Jocelyne Ambrocio RN  Activity Management:   activity encouraged   patient refuses activity  Taken 2/7/2025 2000 by Jocelyne Ambrocio RN  Activity Management:   activity encouraged   patient refuses activity     Problem: Pain Acute  Goal: Optimal Pain Control and Function  Intervention: Optimize Psychosocial Wellbeing  Recent Flowsheet Documentation  Taken 2/8/2025 0600 by Jocelyne Ambrocio RN  Diversional Activities: television  Taken 2/8/2025 0400 by Jocelyne Ambrocio RN  Diversional Activities: television  Taken 2/7/2025 2000 by Jocelyne Ambrocio RN  Diversional Activities: television     Problem: Skin Injury Risk Increased  Goal: Skin Health and Integrity  Intervention: Optimize Skin Protection  Recent Flowsheet Documentation  Taken 2/8/2025 0600 by Jocelyne Ambrocio RN  Activity Management: activity encouraged  Pressure Reduction Techniques: pressure points protected  Head of Bed  ([facility]) Positioning: [facility] elevated  Pressure Reduction Devices:   specialty bed utilized   positioning supports utilized  Skin Protection:   incontinence pads utilized   transparent dressing maintained  Taken 2/8/2025 0400 by Jocelyne Ambrocio RN  Activity Management: activity encouraged  Pressure Reduction Techniques: pressure points protected  Head of Bed ([facility]) Positioning: [facility] elevated  Pressure Reduction Devices:   specialty bed utilized   positioning supports utilized  Skin Protection:   incontinence pads utilized   transparent dressing maintained  Taken 2/8/2025 0200 by Jocelyne Ambrocio RN  Activity Management: activity encouraged  Pressure Reduction Techniques: pressure points protected  Head of Bed ([facility]) Positioning: [facility] elevated  Pressure Reduction Devices:   specialty bed utilized   positioning supports utilized  Skin Protection:   incontinence pads utilized   transparent dressing maintained  Taken 2/7/2025 2200 by Jocelyne Ambrocio RN  Activity Management:   activity encouraged   patient refuses activity  Pressure Reduction Techniques: pressure points protected  Pressure Reduction Devices:   specialty bed utilized   positioning supports utilized  Skin Protection:   incontinence pads utilized   transparent dressing maintained  Taken 2/7/2025 2000 by Jocelyne Ambrocio RN  Activity Management:   activity encouraged   patient refuses activity  Pressure Reduction Techniques: pressure points protected  Head of Bed ([facility]) Positioning: [facility] elevated  Pressure Reduction Devices:   pressure-redistributing mattress utilized   specialty bed utilized   positioning supports utilized  Skin Protection:   incontinence pads utilized   transparent dressing maintained     Problem: Fall Injury Risk  Goal: Absence of Fall and Fall-Related Injury  Intervention: Promote Injury-Free Environment  Recent Flowsheet Documentation  Taken 2/8/2025 0600 by Jocelyne Ambrocio RN  Safety Promotion/Fall Prevention:   safety round/check completed   room  organization consistent   nonskid shoes/slippers when out of bed   assistive device/personal items within reach   clutter free environment maintained   fall prevention program maintained  Taken 2/8/2025 0400 by Jocelyne Ambrocio RN  Safety Promotion/Fall Prevention:   safety round/check completed   room organization consistent   nonskid shoes/slippers when out of bed   assistive device/personal items within reach   clutter free environment maintained   fall prevention program maintained  Taken 2/8/2025 0200 by Jocelyne Ambrocio RN  Safety Promotion/Fall Prevention:   safety round/check completed   room organization consistent   nonskid shoes/slippers when out of bed   assistive device/personal items within reach   clutter free environment maintained   fall prevention program maintained  Taken 2/7/2025 2200 by Jocelyne Ambrocio RN  Safety Promotion/Fall Prevention:   safety round/check completed   room organization consistent   nonskid shoes/slippers when out of bed   assistive device/personal items within reach   clutter free environment maintained   fall prevention program maintained  Taken 2/7/2025 2000 by Jocelyne Ambrocio RN  Safety Promotion/Fall Prevention:   safety round/check completed   room organization consistent   nonskid shoes/slippers when out of bed   assistive device/personal items within reach   clutter free environment maintained   fall prevention program maintained     Problem: Hemodialysis  Goal: Absence of Infection Signs and Symptoms  Intervention: Prevent or Manage Infection  Recent Flowsheet Documentation  Taken 2/8/2025 0600 by Jocelyne Ambrocio RN  Infection Prevention:   environmental surveillance performed   equipment surfaces disinfected   hand hygiene promoted   single patient room provided  Taken 2/8/2025 0400 by Jocelyne Ambrocio RN  Infection Prevention:   environmental surveillance performed   equipment surfaces disinfected   hand hygiene promoted   single patient room provided  Taken 2/8/2025  0200 by Jocelyne Ambrocio RN  Infection Prevention:   environmental surveillance performed   equipment surfaces disinfected   hand hygiene promoted   single patient room provided  Taken 2/7/2025 2200 by Jocelyne Ambrocio RN  Infection Prevention:   environmental surveillance performed   equipment surfaces disinfected   hand hygiene promoted   single patient room provided  Taken 2/7/2025 2000 by Jocelyne Ambrocio RN  Infection Prevention:   environmental surveillance performed   equipment surfaces disinfected   hand hygiene promoted   single patient room provided     Problem: Adult Inpatient Plan of Care  Goal: Absence of Hospital-Acquired Illness or Injury  Intervention: Identify and Manage Fall Risk  Recent Flowsheet Documentation  Taken 2/8/2025 0600 by Jocelyne Ambrocio RN  Safety Promotion/Fall Prevention:   safety round/check completed   room organization consistent   nonskid shoes/slippers when out of bed   assistive device/personal items within reach   clutter free environment maintained   fall prevention program maintained  Taken 2/8/2025 0400 by Jocelyne Ambrocio RN  Safety Promotion/Fall Prevention:   safety round/check completed   room organization consistent   nonskid shoes/slippers when out of bed   assistive device/personal items within reach   clutter free environment maintained   fall prevention program maintained  Taken 2/8/2025 0200 by Jocelyne Ambrocio RN  Safety Promotion/Fall Prevention:   safety round/check completed   room organization consistent   nonskid shoes/slippers when out of bed   assistive device/personal items within reach   clutter free environment maintained   fall prevention program maintained  Taken 2/7/2025 2200 by Jocelyne Ambrocio RN  Safety Promotion/Fall Prevention:   safety round/check completed   room organization consistent   nonskid shoes/slippers when out of bed   assistive device/personal items within reach   clutter free environment maintained   fall prevention program  maintained  Taken 2/7/2025 2000 by Jocelyne Ambrocio RN  Safety Promotion/Fall Prevention:   safety round/check completed   room organization consistent   nonskid shoes/slippers when out of bed   assistive device/personal items within reach   clutter free environment maintained   fall prevention program maintained  Intervention: Prevent Skin Injury  Recent Flowsheet Documentation  Taken 2/8/2025 0600 by Jocelyne Ambrocio RN  Body Position:   neutral head position   neutral body alignment   patient/family refused  Skin Protection:   incontinence pads utilized   transparent dressing maintained  Taken 2/8/2025 0400 by Jocelyne Ambrocio RN  Body Position:   neutral head position   neutral body alignment   patient/family refused  Skin Protection:   incontinence pads utilized   transparent dressing maintained  Taken 2/8/2025 0200 by Jocelyne Ambrocio RN  Body Position:   neutral head position   neutral body alignment   patient/family refused  Skin Protection:   incontinence pads utilized   transparent dressing maintained  Taken 2/7/2025 2200 by Jocelyne Ambrocio RN  Skin Protection:   incontinence pads utilized   transparent dressing maintained  Taken 2/7/2025 2000 by Jocelyne Ambrocio RN  Body Position:   neutral head position   neutral body alignment   patient/family refused  Skin Protection:   incontinence pads utilized   transparent dressing maintained  Intervention: Prevent Infection  Recent Flowsheet Documentation  Taken 2/8/2025 0600 by Jocelyne Ambrocio RN  Infection Prevention:   environmental surveillance performed   equipment surfaces disinfected   hand hygiene promoted   single patient room provided  Taken 2/8/2025 0400 by Jocelyne Ambrocio RN  Infection Prevention:   environmental surveillance performed   equipment surfaces disinfected   hand hygiene promoted   single patient room provided  Taken 2/8/2025 0200 by Jocelyne Ambrocio RN  Infection Prevention:   environmental surveillance performed   equipment surfaces  disinfected   hand hygiene promoted   single patient room provided  Taken 2/7/2025 2200 by Jocelyne Ambrocio RN  Infection Prevention:   environmental surveillance performed   equipment surfaces disinfected   hand hygiene promoted   single patient room provided  Taken 2/7/2025 2000 by Jocelyne Ambrocio RN  Infection Prevention:   environmental surveillance performed   equipment surfaces disinfected   hand hygiene promoted   single patient room provided  Goal: Optimal Comfort and Wellbeing  Intervention: Provide Person-Centered Care  Recent Flowsheet Documentation  Taken 2/8/2025 0600 by Jocelyne Ambrocio RN  Trust Relationship/Rapport:   care explained   questions answered  Taken 2/8/2025 0400 by Jocelyne Ambrocio RN  Trust Relationship/Rapport:   care explained   questions answered  Taken 2/7/2025 2000 by Jocelyne Ambrocio RN  Trust Relationship/Rapport:   care explained   questions answered     Problem: Cardiac Catheterization (Diagnostic/Interventional)  Goal: Anesthesia/Sedation Recovery  Intervention: Optimize Anesthesia Recovery  Recent Flowsheet Documentation  Taken 2/8/2025 0600 by Jocelyne Ambrocio RN  Safety Promotion/Fall Prevention:   safety round/check completed   room organization consistent   nonskid shoes/slippers when out of bed   assistive device/personal items within reach   clutter free environment maintained   fall prevention program maintained  Taken 2/8/2025 0400 by Jocelyne Ambrocio RN  Safety Promotion/Fall Prevention:   safety round/check completed   room organization consistent   nonskid shoes/slippers when out of bed   assistive device/personal items within reach   clutter free environment maintained   fall prevention program maintained  Taken 2/8/2025 0200 by Jocelyne Ambrocio RN  Safety Promotion/Fall Prevention:   safety round/check completed   room organization consistent   nonskid shoes/slippers when out of bed   assistive device/personal items within reach   clutter free environment maintained    fall prevention program maintained  Taken 2/7/2025 2200 by Jocelyne Ambrocio RN  Safety Promotion/Fall Prevention:   safety round/check completed   room organization consistent   nonskid shoes/slippers when out of bed   assistive device/personal items within reach   clutter free environment maintained   fall prevention program maintained  Taken 2/7/2025 2000 by Jocelyne Ambrocio RN  Safety Promotion/Fall Prevention:   safety round/check completed   room organization consistent   nonskid shoes/slippers when out of bed   assistive device/personal items within reach   clutter free environment maintained   fall prevention program maintained  Goal: Optimal Pain Control and Function  Intervention: Prevent or Manage Pain  Recent Flowsheet Documentation  Taken 2/8/2025 0600 by Jocelyne Ambrocio RN  Diversional Activities: television  Taken 2/8/2025 0400 by Jocelyne Ambrocio RN  Diversional Activities: television  Taken 2/7/2025 2000 by Jocelyne Ambrocio RN  Diversional Activities: television  Goal: Absence of Vascular Access Complication  Intervention: Prevent and Manage Access Complications  Recent Flowsheet Documentation  Taken 2/8/2025 0600 by Jocelyne Ambrocio RN  Activity Management: activity encouraged  Head of Bed (HOB) Positioning: HOB elevated  Taken 2/8/2025 0400 by Jocelyne Ambrocio RN  Activity Management: activity encouraged  Head of Bed (HOB) Positioning: HOB elevated  Taken 2/8/2025 0200 by Jocelyne Ambrocio RN  Activity Management: activity encouraged  Head of Bed (HOB) Positioning: HOB elevated  Taken 2/7/2025 2200 by Jocelyne Ambrocio RN  Activity Management:   activity encouraged   patient refuses activity  Taken 2/7/2025 2000 by Jocelyne Ambrocio RN  Activity Management:   activity encouraged   patient refuses activity  Head of Bed (HOB) Positioning: HOB elevated   Goal Outcome Evaluation:

## 2025-02-08 NOTE — PROGRESS NOTES
[facility] Medicine Services  PROGRESS NOTE    Patient Name: Sara Esparza  : 1950  MRN: [id number]    Date of Admission: 2025  Primary Care Physician: Toribio Roberts MD    Subjective     CC: f/u osteomyelitis     HPI:  I have seen and evaluated the patient this morning.  Comfortable in the recliner.  Feeling that she is getting stronger.  No acute complaints today    Objective     Vital Signs:   Temp:  [97.9 °F (36.6 °C)-98.9 °F (37.2 °C)] 98.5 °F (36.9 °C)  Heart Rate:  [59-96] 63  Resp:  [16-18] 18  BP: (142-196)/() 176/69     Physical Exam:  General: Chronically ill and frail looking  Head: Atraumatic and normocephalic  Eyes: No Icterus. No pallor  Ears:  Ears appear intact with no abnormalities noted  Throat: No oral lesions, no thrush  Neck: Supple, trachea midline  Lungs: Clear to auscultation bilaterally, equal air entry, no wheezing or crackles  Heart:  Normal S1 and S2, no murmur, no gallop, No JVD, no lower extremity swelling  Abdomen:  Soft, no tenderness, no organomegaly, normal bowel sounds, no organomegaly  Extremities: Left below-knee amputation site is clean.  Mild erythema.  No discharge  Skin: No bleeding, bruising or rash, normal skin turgor and elasticity  Neurologic: Cranial nerves appear intact with no evidence of facial asymmetry, normal motor and sensory functions in all 4 extremities  Psych: Alert and oriented x 3, normal mood       Results Reviewed:  LAB RESULTS:      Lab 25  1336 25  1109 25  0347 25  1253   WBC 5.58 6.86 8.05 7.90   HEMOGLOBIN 9.2* 9.2* 9.9* 9.8*   HEMATOCRIT 29.4* 29.0* 32.0* 31.4*   PLATELETS 268 235 259 246   .7* 98.0* 100.6* 100.6*   PROTIME  --   --  18.1*  --          Lab 25  0814 25  1336 25  1159 25  1109 25  0347 25  1253 25  1025   SODIUM 136 138  --  133* 130* 132* 130*   POTASSIUM 3.6 3.8 4.5 4.3 5.3* 5.2 4.4   CHLORIDE 100 100  --   98 95* 98 96*   CO2 24.0 25.0  --  22.0 22.0 21.0* 25.0   ANION GAP 12.0 13.0  --  13.0 13.0 13.0 9.0   BUN 32* 14  --  32* 45* 38* 29*   CREATININE 4.22* 2.20*  --  4.23* 5.56* 5.05* 3.84*   EGFR 10.5* 22.9*  --  10.4* 7.5* 8.4* 11.7*   GLUCOSE 96 105*  --  92 109* 132* 152*   CALCIUM 8.5* 8.7  --  8.8 9.2 9.2 9.1   MAGNESIUM  --  1.9  --   --   --   --   --    PHOSPHORUS 4.0 2.8  --   --   --   --  4.3         Lab 02/07/25  0814 02/05/25  1336 02/01/25  1025   ALBUMIN 2.3* 2.8* 2.5*         Lab 02/03/25  0347   PROTIME 18.1*   INR 1.48*         Lab 02/04/25  1335   ABO TYPING O   RH TYPING Positive   ANTIBODY SCREEN Negative     Brief Urine Lab Results  (Last result in the past 365 days)        Color   Clarity   Blood   Leuk Est   Nitrite   Protein   CREAT   Urine HCG        01/19/25 1404 Yellow   Cloudy   Moderate (2+)   Moderate (2+)   Negative   >=300 mg/dL (3+)                 Microbiology Results Abnormal       Procedure Component Value - Date/Time    Anaerobic Culture - Surgical Site, Foot, Left [[id number]]  (Abnormal) Collected: 01/24/25 1754    Lab Status: Final result Specimen: Surgical Site from Foot, Left Updated: 01/30/25 1515     Anaerobic Culture Cutibacterium acnes    Body Fluid Culture - Surgical Site, Foot, Left [[id number]]  (Abnormal)  (Susceptibility) Collected: 01/24/25 1754    Lab Status: Final result Specimen: Surgical Site from Foot, Left Updated: 01/27/25 0642     Body Fluid Culture Light growth (2+) Staphylococcus aureus, MRSA     Comment:   Methicillin resistant Staphylococcus aureus, Patient may be an isolation risk.        Gram Stain Rare (1+) WBCs seen      No organisms seen    Narrative:      Not a body fluid.    Susceptibility        Staphylococcus aureus, MRSA      TAYLOR      Clindamycin Susceptible      Erythromycin Resistant      Oxacillin Resistant      Rifampin Susceptible      Tetracycline Susceptible      Trimethoprim + Sulfamethoxazole Susceptible      Vancomycin Susceptible                            Wound Culture - Wound, Foot, Left [[id number]]  (Abnormal)  (Susceptibility) Collected: 01/20/25 1409    Lab Status: Final result Specimen: Wound from Foot, Left Updated: 01/24/25 0658     Wound Culture Scant growth (1+) Staphylococcus aureus, MRSA     Comment: Methicillin resistant Staphylococcus aureus, Patient may be an isolation risk.         Rare growth Morganella morganii ssp morganii     Comment:            Scant growth (1+) Normal Skin Kimber     Gram Stain Few (2+) WBCs seen      Many (4+) Gram positive cocci in pairs      Rare (1+) Gram negative bacilli      Rare (1+) Gram positive bacilli    Susceptibility        Staphylococcus aureus, MRSA      TAYLOR      Clindamycin Susceptible      Erythromycin Resistant      Oxacillin Resistant      Rifampin Susceptible      Tetracycline Susceptible      Trimethoprim + Sulfamethoxazole Susceptible      Vancomycin Susceptible                       Susceptibility        Morganella morganii ssp morganii      TAYLOR      Amoxicillin + Clavulanate Resistant      Ampicillin Resistant      Ampicillin + Sulbactam Resistant      Cefazolin (Non Urine) Resistant      Cefepime Susceptible  [1]       Cefotaxime Susceptible      Ceftazidime Susceptible  [1]       Gentamicin Susceptible      Levofloxacin Susceptible      Piperacillin + Tazobactam Susceptible      Tetracycline Susceptible      Trimethoprim + Sulfamethoxazole Susceptible                   [1]  Appended report. These results have been appended to a previously final verified report.                Susceptibility Comments       Morganella morganii ssp morganii    Cefotaxime susceptibility can be used as a surrogate for ceftriaxone susceptibility               MRSA Screen, PCR (Inpatient) - Swab, Nares [[id number]]  (Abnormal) Collected: 01/21/25 0614    Lab Status: Final result Specimen: Swab from Nares Updated: 01/21/25 0847     MRSA PCR Positive    Narrative:      The negative predictive value of this  diagnostic test is high and should only be used to consider de-escalating anti-MRSA therapy. A positive result may indicate colonization with MRSA and must be correlated clinically.          No radiology results from the last 24 hrs    Results for orders placed during the hospital encounter of 01/19/25    Adult Transthoracic Echo Complete W/ Cont if Necessary Per Protocol    Interpretation Summary    Left ventricular systolic function is normal. Estimated left ventricular EF = 55%    Left ventricular wall thickness is consistent with moderate concentric hypertrophy.    No hemodynamically significant valvular heart disease    Current medications:  Scheduled Meds:acetaminophen, 1,000 mg, Oral, Q8H  albumin human, , ,   allopurinol, 200 mg, Oral, Daily  amiodarone, 200 mg, Oral, Q24H  apixaban, 2.5 mg, Oral, Q12H  atorvastatin, 10 mg, Oral, Nightly  epoetin gracia/gracia-epbx, 6,000 Units, Subcutaneous, Once per day on Monday Wednesday Friday  famotidine, 10 mg, Oral, Daily  insulin lispro, 2-9 Units, Subcutaneous, 4x Daily AC & at Bedtime  NIFEdipine XL, 60 mg, Oral, Q24H  pantoprazole, 40 mg, Oral, Q AM  senna-docusate sodium, 2 tablet, Oral, BID  sodium chloride, 10 mL, Intravenous, Q12H      Continuous Infusions:ropivacaine,       PRN Meds:.  albumin human    senna-docusate sodium **AND** polyethylene glycol **AND** bisacodyl **AND** bisacodyl    Calcium Replacement - Follow Nurse / BPA Driven Protocol    dextrose    dextrose    glucagon (human recombinant)    heparin (porcine)    Magnesium Low Dose Replacement - Follow Nurse / BPA Driven Protocol    melatonin    metoprolol tartrate    ondansetron    oxyCODONE    Potassium Replacement - Follow Nurse / BPA Driven Protocol    [COMPLETED] Insert Peripheral IV **AND** sodium chloride    sodium chloride    sodium chloride    traMADol    Assessment & Plan     Active Hospital Problems    Diagnosis  POA    **Generalized weakness [R53.1]  Yes    Paroxysmal A-fib [I48.0]   Unknown    Sepsis due to methicillin resistant Staphylococcus aureus (MRSA) with encephalopathy without septic shock [A41.02, R65.20, G93.41]  Unknown    Chronic osteomyelitis of left foot [M86.672]  Unknown    Critical limb ischemia of left lower extremity [I70.222]  Unknown    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     Brief Hospital Course to date:  Sara Esparza is a 74 y.o. female with PMH significant for HTN, PAF (Eliquis), prior DVT/PE, ESRD on HD, DMII, asthma, DENZEL (CPAP). Presented to [facility] ED on 1/19/25 for evaluation of generalized weakness, myalgias, lightheadedness and falls. On 1/20, she developed fevers. Source ultimately found to be related to L calcaneal osteomyelitis. ID / ortho consulted to follow. She is s/p debridement and wound vac placement 1/24/25. Ortho recommended BKA. In an effort to optimize arterial flow and wound healing, Dr. Ramirez of vascular surgery performed LLE angiogram with angioplasty/lithotripsy on 2/3/25. She was agreeable to L BKA and this is planned for 2/4/25    Sepsis secondary to L calcaneal osteomyelitis  Metabolic encephalopathy  L posterial tibial artery occlusion  Met sepsis criteria on 1/20 with fever 102.6F, 's, leukocytosis (WBC 15K), source of infection and altered mental status   1/21/25 CT LLE w/o contrast showed draining wound seen along plantar aspect of left calcaneus with surrounding changes consistent with osteomyelitis  S/p LLE angiogram with lithotripsy / angioplasty by Dr. Ramirez 2/3/25   She is s/p debridement and wound vac placement 1/24/25 - probability of limb salvage felt to be poor.  She is s/p L BKA on 2/4/25 by Dr. Gonzalez   Wound culture (+) MRSA / Morganella   1/24 surgical cultures growing MRSA and  Cutibacterium acnes  Continue IV Vanc/Ceftriaxone, per ID  PT / OT  recommended SNF    Generalized weakness, progressive x weeks  Generalized debility   Falls at home   CT chest/abd/pelvis without acute findings   Normal TSH and CK   PT/OT/CM following - anticipate will need SNF      Neck pain, C7 region   C5-6 canal stenosis  Chronic back pain s/p spinal stimulator  CT C-spine - no fractures  Reported history of Oxycodone dependence - attempted to avoid opiates however due to uncontrolled pain she was started on Oxycodone 5mg Q4H PRN on 2/1/25  Continue Lidocaine patches  Partner recommended that patient/ reach out to her pain management clinic for questions re: spinal stimulator     ESRD on HD  Has tunneled R IJ catheter - recent HD start in 9/2024  Nephrology following  Continue MWF HD      Type 2 diabetes  A1c 4.9  On Sitagliptin at home  On SSI only with fairly normal glucose      Hypertension  Continue nifedipine XL 90 mg daily  Was on Coreg at home but held because of bradycardia  As needed clonidine    Paroxysmal Afib  Developed bradycardia while on beta-blockers   Now on amiodarone  Cardiology following  Continue Eliquis    Elevated troponin  In setting of ESRD  Troponin peaked at 132 on 1/19/25  Per chart review, had LHC recently at [facility] with non-obstructive CAD     Dyspepsia  GI cocktail PRN     Constipation  Likely due to opiates  Improved with bowel regimen and addition of Lactulose   Bowel regimen transitioned to PRN - appears constipated again. Restarted bowel regimen on 2/6      Expected Discharge Location and Transportation: likely SNF   Expected Discharge Expected Discharge Date: 2/10/2025; Expected Discharge Time:      VTE Prophylaxis: Pharmacologic & mechanical VTE prophylaxis orders are present.    AM-PAC 6 Clicks Score (PT): 8 (02/08/25 8730)    CODE STATUS:   Code Status and Medical Interventions: CPR (Attempt to Resuscitate); Full Support   Ordered at: 01/19/25 9377     Level Of  Support Discussed With:    Patient     Code Status (Patient has no pulse and is not breathing):    CPR (Attempt to Resuscitate)     Medical Interventions (Patient has pulse or is breathing):    Full Support     James Ryder MD  02/08/25

## 2025-02-09 LAB
BACTERIA SPEC ANAEROBE CULT: NORMAL
GLUCOSE BLDC GLUCOMTR-MCNC: 104 MG/DL (ref 70–130)
GLUCOSE BLDC GLUCOMTR-MCNC: 105 MG/DL (ref 70–130)
GLUCOSE BLDC GLUCOMTR-MCNC: 124 MG/DL (ref 70–130)
GLUCOSE BLDC GLUCOMTR-MCNC: 146 MG/DL (ref 70–130)

## 2025-02-09 PROCEDURE — 99232 SBSQ HOSP IP/OBS MODERATE 35: CPT | Performed by: NURSE PRACTITIONER

## 2025-02-09 PROCEDURE — 25010000002 ONDANSETRON PER 1 MG: Performed by: ORTHOPAEDIC SURGERY

## 2025-02-09 PROCEDURE — 82948 REAGENT STRIP/BLOOD GLUCOSE: CPT

## 2025-02-09 RX ADMIN — Medication 10 ML: at 10:10

## 2025-02-09 RX ADMIN — ACETAMINOPHEN 1000 MG: 500 TABLET ORAL at 05:58

## 2025-02-09 RX ADMIN — PANTOPRAZOLE SODIUM 40 MG: 40 TABLET, DELAYED RELEASE ORAL at 05:58

## 2025-02-09 RX ADMIN — ALLOPURINOL 200 MG: 100 TABLET ORAL at 10:07

## 2025-02-09 RX ADMIN — ONDANSETRON 4 MG: 2 INJECTION INTRAMUSCULAR; INTRAVENOUS at 14:32

## 2025-02-09 RX ADMIN — ATORVASTATIN CALCIUM 10 MG: 10 TABLET, FILM COATED ORAL at 19:53

## 2025-02-09 RX ADMIN — APIXABAN 2.5 MG: 2.5 TABLET, FILM COATED ORAL at 19:52

## 2025-02-09 RX ADMIN — FAMOTIDINE 10 MG: 20 TABLET, FILM COATED ORAL at 10:06

## 2025-02-09 RX ADMIN — Medication 10 ML: at 19:56

## 2025-02-09 RX ADMIN — APIXABAN 2.5 MG: 2.5 TABLET, FILM COATED ORAL at 10:07

## 2025-02-09 RX ADMIN — ACETAMINOPHEN 1000 MG: 500 TABLET ORAL at 14:36

## 2025-02-09 RX ADMIN — OXYCODONE HYDROCHLORIDE 5 MG: 5 TABLET ORAL at 19:52

## 2025-02-09 RX ADMIN — Medication 2.5 MG: at 19:52

## 2025-02-09 RX ADMIN — AMIODARONE HYDROCHLORIDE 200 MG: 200 TABLET ORAL at 19:53

## 2025-02-09 RX ADMIN — NIFEDIPINE 90 MG: 30 TABLET, FILM COATED, EXTENDED RELEASE ORAL at 10:06

## 2025-02-09 RX ADMIN — OXYCODONE HYDROCHLORIDE 5 MG: 5 TABLET ORAL at 10:19

## 2025-02-09 NOTE — PROGRESS NOTES
"   LOS: 21 days    Patient Care Team:  Toribio Roberts MD as PCP - General (Internal Medicine)    Subjective     Seen and examined at bedside. No complains.   Doing better.     Objective     Vital Signs:  Blood pressure 180/67, pulse 55, temperature 98.3 °F (36.8 °C), temperature source Oral, resp. rate 16, height 170.2 cm (67.01\"), weight 108 kg (238 lb 1.6 oz), SpO2 97%.    No intake or output data in the 24 hours ending 02/09/25 1433       No intake/output data recorded.    Physical Exam:    GENERAL: WD WF NAD  NEURO: Awake and alert. No focal deficit  PSYCHIATRIC: Cooperative with PE  EYE: PE, no icterus, no conjunctivitis  ENT: omm dry, dentition intact,  Hearing intact  NECK: Supple , No JVD discernable  CV: No edema, RRR  LUNGS:  Quiet,  Nonlabored resp.  Symmetrical expansion  ABDOMEN: Nondistended, soft nontender.  : No Corral, no palp bladder  SKIN: Warm and dry without rash  + RIJ TDC  Left BKA     Labs:  Results from last 7 days   Lab Units 02/05/25  1336 02/04/25  1109 02/03/25  0347   WBC 10*3/mm3 5.58 6.86 8.05   HEMOGLOBIN g/dL 9.2* 9.2* 9.9*   PLATELETS 10*3/mm3 268 235 259     Results from last 7 days   Lab Units 02/07/25  0814 02/05/25  1336 02/04/25  1159 02/04/25  1109 02/03/25  0347   SODIUM mmol/L 136 138  --  133* 130*   POTASSIUM mmol/L 3.6 3.8 4.5 4.3 5.3*   CHLORIDE mmol/L 100 100  --  98 95*   CO2 mmol/L 24.0 25.0  --  22.0 22.0   BUN mg/dL 32* 14  --  32* 45*   CREATININE mg/dL 4.22* 2.20*  --  4.23* 5.56*   CALCIUM mg/dL 8.5* 8.7  --  8.8 9.2   PHOSPHORUS mg/dL 4.0 2.8  --   --   --    MAGNESIUM mg/dL  --  1.9  --   --   --    ALBUMIN g/dL 2.3* 2.8*  --   --   --            A/P:    ESRD: On HD MWF at [facility] with NAL .  Continue outpatient schedule. She is interested in home dialysis. Discussed with home program they will reach out to patient for further education as outpatient.   -Hemodialysis as per schedule.      HTN: Blood pressure stable.  Monitor for now.    Hyponatremia: " Due to underlying renal failure.  Adjust with HD.     Hyperkalemia: Resolved.  Adjust with HD     Metabolic acidosis:  Adjust with HD     Anemia: Hemoglobin below goal.  Patient unable.  Monitor for now.  Transfuse as indicated for Hgb <7.     Volume: Earlier in admission patient with orthostatic hypotension with intravascular volume depletion.  Monitor with UF.     Hyperphosphatemia: Phos stable at 4.0.  Poor p.o. intake.  Binders on hold for now.     Nutrition: Low potassium low Phos high-protein diet.  Renal vitamin.    PVD: Underwent left BKA.     High risk and complexity patient.     Ruel Giraldo MD  02/09/25  14:33 EST

## 2025-02-09 NOTE — PROGRESS NOTES
[facility] Medicine Services  PROGRESS NOTE    Patient Name: Sara Esparza  : 1950  MRN: [id number]    Date of Admission: 2025  Primary Care Physician: Toribio Roberts MD    Subjective     CC: f/u osteomyelitis     HPI:  Resting in bed, NAD. Pain is controlled currently.  in room. Awaiting rehab.     Objective     Vital Signs:   Temp:  [97.8 °F (36.6 °C)-98.3 °F (36.8 °C)] 98.3 °F (36.8 °C)  Heart Rate:  [54-60] 55  Resp:  [16] 16  BP: (162-181)/(67-74) 180/67     Physical Exam:  General: Chronically ill, awake and alert   Head: Atraumatic and normocephalic  Eyes: No Icterus. No pallor  Ears:  Ears appear intact with no abnormalities noted  Throat: No oral lesions, no thrush  Neck: Supple, trachea midline  Lungs: Clear to auscultation bilaterally, equal air entry, no wheezing or crackles  Heart:  Normal S1 and S2, no murmur, no gallop, No JVD, no lower extremity swelling  Abdomen:  Soft, no tenderness, no organomegaly, normal bowel sounds, no organomegaly  Extremities: Left below-knee amputation with CDI dressing   Skin: No bleeding, bruising or rash, normal skin turgor and elasticity  Neurologic: Cranial nerves appear intact with no evidence of facial asymmetry, normal motor and sensory functions in all 4 extremities  Psych: Alert and oriented x 3, normal mood       Results Reviewed:  LAB RESULTS:      Lab 25  1336 25  1109 25  0347   WBC 5.58 6.86 8.05   HEMOGLOBIN 9.2* 9.2* 9.9*   HEMATOCRIT 29.4* 29.0* 32.0*   PLATELETS 268 235 259   .7* 98.0* 100.6*   PROTIME  --   --  18.1*         Lab 25  0814 25  1336 25  1159 25  1109 25  0347   SODIUM 136 138  --  133* 130*   POTASSIUM 3.6 3.8 4.5 4.3 5.3*   CHLORIDE 100 100  --  98 95*   CO2 24.0 25.0  --  22.0 22.0   ANION GAP 12.0 13.0  --  13.0 13.0   BUN 32* 14  --  32* 45*   CREATININE 4.22* 2.20*  --  4.23* 5.56*   EGFR 10.5* 22.9*  --  10.4* 7.5*   GLUCOSE  96 105*  --  92 109*   CALCIUM 8.5* 8.7  --  8.8 9.2   MAGNESIUM  --  1.9  --   --   --    PHOSPHORUS 4.0 2.8  --   --   --          Lab 02/07/25  0814 02/05/25  1336   ALBUMIN 2.3* 2.8*         Lab 02/03/25  0347   PROTIME 18.1*   INR 1.48*         Lab 02/04/25  1335   ABO TYPING O   RH TYPING Positive   ANTIBODY SCREEN Negative     Brief Urine Lab Results  (Last result in the past 365 days)        Color   Clarity   Blood   Leuk Est   Nitrite   Protein   CREAT   Urine HCG        01/19/25 1404 Yellow   Cloudy   Moderate (2+)   Moderate (2+)   Negative   >=300 mg/dL (3+)                 Microbiology Results Abnormal       Procedure Component Value - Date/Time    Anaerobic Culture - Surgical Site, Foot, Left [[id number]]  (Abnormal) Collected: 01/24/25 1754    Lab Status: Final result Specimen: Surgical Site from Foot, Left Updated: 01/30/25 1515     Anaerobic Culture Cutibacterium acnes    Body Fluid Culture - Surgical Site, Foot, Left [[id number]]  (Abnormal)  (Susceptibility) Collected: 01/24/25 1754    Lab Status: Final result Specimen: Surgical Site from Foot, Left Updated: 01/27/25 0642     Body Fluid Culture Light growth (2+) Staphylococcus aureus, MRSA     Comment:   Methicillin resistant Staphylococcus aureus, Patient may be an isolation risk.        Gram Stain Rare (1+) WBCs seen      No organisms seen    Narrative:      Not a body fluid.    Susceptibility        Staphylococcus aureus, MRSA      TAYLOR      Clindamycin Susceptible      Erythromycin Resistant      Oxacillin Resistant      Rifampin Susceptible      Tetracycline Susceptible      Trimethoprim + Sulfamethoxazole Susceptible      Vancomycin Susceptible                           Wound Culture - Wound, Foot, Left [[id number]]  (Abnormal)  (Susceptibility) Collected: 01/20/25 1409    Lab Status: Final result Specimen: Wound from Foot, Left Updated: 01/24/25 0658     Wound Culture Scant growth (1+) Staphylococcus aureus, MRSA     Comment: Methicillin  resistant Staphylococcus aureus, Patient may be an isolation risk.         Rare growth Morganella morganii ssp morganii     Comment:            Scant growth (1+) Normal Skin Kimber     Gram Stain Few (2+) WBCs seen      Many (4+) Gram positive cocci in pairs      Rare (1+) Gram negative bacilli      Rare (1+) Gram positive bacilli    Susceptibility        Staphylococcus aureus, MRSA      TAYLOR      Clindamycin Susceptible      Erythromycin Resistant      Oxacillin Resistant      Rifampin Susceptible      Tetracycline Susceptible      Trimethoprim + Sulfamethoxazole Susceptible      Vancomycin Susceptible                       Susceptibility        Morganella morganii ssp morganii      TAYLOR      Amoxicillin + Clavulanate Resistant      Ampicillin Resistant      Ampicillin + Sulbactam Resistant      Cefazolin (Non Urine) Resistant      Cefepime Susceptible  [1]       Cefotaxime Susceptible      Ceftazidime Susceptible  [1]       Gentamicin Susceptible      Levofloxacin Susceptible      Piperacillin + Tazobactam Susceptible      Tetracycline Susceptible      Trimethoprim + Sulfamethoxazole Susceptible                   [1]  Appended report. These results have been appended to a previously final verified report.                Susceptibility Comments       Morganella morganii ssp morganii    Cefotaxime susceptibility can be used as a surrogate for ceftriaxone susceptibility               MRSA Screen, PCR (Inpatient) - Swab, Nares [[id number]]  (Abnormal) Collected: 01/21/25 0614    Lab Status: Final result Specimen: Swab from Nares Updated: 01/21/25 5237     MRSA PCR Positive    Narrative:      The negative predictive value of this diagnostic test is high and should only be used to consider de-escalating anti-MRSA therapy. A positive result may indicate colonization with MRSA and must be correlated clinically.          No radiology results from the last 24 hrs    Results for orders placed during the hospital encounter of  01/19/25    Adult Transthoracic Echo Complete W/ Cont if Necessary Per Protocol    Interpretation Summary    Left ventricular systolic function is normal. Estimated left ventricular EF = 55%    Left ventricular wall thickness is consistent with moderate concentric hypertrophy.    No hemodynamically significant valvular heart disease    Current medications:  Scheduled Meds:acetaminophen, 1,000 mg, Oral, Q8H  albumin human, , ,   allopurinol, 200 mg, Oral, Daily  amiodarone, 200 mg, Oral, Q24H  apixaban, 2.5 mg, Oral, Q12H  atorvastatin, 10 mg, Oral, Nightly  epoetin gracia/gracia-epbx, 6,000 Units, Subcutaneous, Once per day on Monday Wednesday Friday  famotidine, 10 mg, Oral, Daily  insulin lispro, 2-9 Units, Subcutaneous, 4x Daily AC & at Bedtime  NIFEdipine XL, 90 mg, Oral, Q24H  pantoprazole, 40 mg, Oral, Q AM  senna-docusate sodium, 2 tablet, Oral, BID  sodium chloride, 10 mL, Intravenous, Q12H      Continuous Infusions:ropivacaine,       PRN Meds:.  albumin human    senna-docusate sodium **AND** polyethylene glycol **AND** bisacodyl **AND** bisacodyl    Calcium Replacement - Follow Nurse / BPA Driven Protocol    cloNIDine    dextrose    dextrose    glucagon (human recombinant)    heparin (porcine)    Magnesium Low Dose Replacement - Follow Nurse / BPA Driven Protocol    melatonin    metoprolol tartrate    ondansetron    oxyCODONE    Potassium Replacement - Follow Nurse / BPA Driven Protocol    [COMPLETED] Insert Peripheral IV **AND** sodium chloride    sodium chloride    sodium chloride    traMADol    Assessment & Plan     Active Hospital Problems    Diagnosis  POA    **Generalized weakness [R53.1]  Yes    Paroxysmal A-fib [I48.0]  Unknown    Sepsis due to methicillin resistant Staphylococcus aureus (MRSA) with encephalopathy without septic shock [A41.02, R65.20, G93.41]  Unknown    Chronic osteomyelitis of left foot [M86.672]  Unknown    Critical limb ischemia of left lower extremity [I70.222]  Unknown    Type 2  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     Brief Hospital Course to date:  Sara Esparza is a 74 y.o. female with PMH significant for HTN, PAF (Eliquis), prior DVT/PE, ESRD on HD, DMII, asthma, DENZEL (CPAP). Presented to [facility] ED on 1/19/25 for evaluation of generalized weakness, myalgias, lightheadedness and falls. On 1/20, she developed fevers. Source ultimately found to be related to L calcaneal osteomyelitis. ID / ortho consulted to follow. She is s/p debridement and wound vac placement 1/24/25. Ortho recommended BKA. In an effort to optimize arterial flow and wound healing, Dr. Ramirez of vascular surgery performed LLE angiogram with angioplasty/lithotripsy on 2/3/25. She was agreeable to L BKA and this is planned for 2/4/25    Sepsis secondary to L calcaneal osteomyelitis  Metabolic encephalopathy  L posterial tibial artery occlusion  Met sepsis criteria on 1/20 with fever 102.6F, 's, leukocytosis (WBC 15K), source of infection and altered mental status   1/21/25 CT LLE w/o contrast showed draining wound seen along plantar aspect of left calcaneus with surrounding changes consistent with osteomyelitis  S/p LLE angiogram with lithotripsy / angioplasty by Dr. Ramirez 2/3/25   She is s/p debridement and wound vac placement 1/24/25 - probability of limb salvage felt to be poor.  She is s/p L BKA on 2/4/25 by Dr. Gonzalez   Wound culture (+) MRSA / Morganella   1/24 surgical cultures growing MRSA and Cutibacterium acnes  Continue IV Vanc/Ceftriaxone, per ID  PT / OT  recommended SNF    Generalized weakness, progressive x weeks  Generalized debility   Falls at home   CT chest/abd/pelvis without acute findings   Normal TSH and CK   PT/OT/CM following - anticipate will need SNF       Neck pain, C7 region   C5-6 canal stenosis  Chronic back pain s/p spinal stimulator  CT C-spine - no fractures  Reported history of Oxycodone dependence - attempted to avoid opiates however due to uncontrolled pain she was started on Oxycodone 5mg Q4H PRN on 2/1/25  Continue Lidocaine patches  Partner recommended that patient/ reach out to her pain management clinic for questions re: spinal stimulator     ESRD on HD  Has tunneled R IJ catheter - recent HD start in 9/2024  Nephrology following  Continue MWF HD      Type 2 diabetes  A1c 4.9  On Sitagliptin at home  On SSI only with fairly normal glucose      Hypertension  Continue nifedipine XL 90 mg daily  Was on Coreg at home but held because of bradycardia  As needed clonidine    Paroxysmal Afib  Developed bradycardia while on beta-blockers   Now on amiodarone  Cardiology following  Continue Eliquis    Elevated troponin  In setting of ESRD  Troponin peaked at 132 on 1/19/25  Per chart review, had LHC recently at [facility] with non-obstructive CAD     Dyspepsia  GI cocktail PRN     Constipation  Likely due to opiates  Improved with bowel regimen and addition of Lactulose   Bowel regimen transitioned to PRN - appears constipated again. Restarted bowel regimen on 2/6      Expected Discharge Location and Transportation: likely SNF   Expected Discharge Expected Discharge Date: 2/10/2025; Expected Discharge Time:      VTE Prophylaxis: Pharmacologic & mechanical VTE prophylaxis orders are present.    AM-PAC 6 Clicks Score (PT): 8 (02/08/25 2000)    CODE STATUS:   Code Status and Medical Interventions: CPR (Attempt to Resuscitate); Full Support   Ordered at: 01/19/25 0518     Level Of Support Discussed With:    Patient     Code Status (Patient has no pulse and is not breathing):    CPR (Attempt to Resuscitate)     Medical Interventions (Patient has pulse or is breathing):    Full Support     Samantha Littlejohn, APRN  02/09/25

## 2025-02-09 NOTE — PLAN OF CARE
Problem: Adult Inpatient Plan of Care  Goal: Absence of Hospital-Acquired Illness or Injury  Intervention: Identify and Manage Fall Risk  Recent Flowsheet Documentation  Taken 2/9/2025 0600 by Jocelyne Ambrocio RN  Safety Promotion/Fall Prevention:   safety round/check completed   room organization consistent   nonskid shoes/slippers when out of bed   assistive device/personal items within reach   clutter free environment maintained   fall prevention program maintained  Taken 2/9/2025 0400 by Jocleyne Ambrocio RN  Safety Promotion/Fall Prevention:   safety round/check completed   room organization consistent   nonskid shoes/slippers when out of bed   assistive device/personal items within reach   clutter free environment maintained   fall prevention program maintained  Taken 2/9/2025 0200 by Jocelyne Ambrocio RN  Safety Promotion/Fall Prevention:   safety round/check completed   room organization consistent   nonskid shoes/slippers when out of bed   assistive device/personal items within reach   clutter free environment maintained   fall prevention program maintained  Taken 2/9/2025 0000 by Jocelyne Ambrocio RN  Safety Promotion/Fall Prevention:   safety round/check completed   room organization consistent   nonskid shoes/slippers when out of bed   assistive device/personal items within reach   clutter free environment maintained   fall prevention program maintained  Taken 2/8/2025 2200 by Jocelyne Ambrocio RN  Safety Promotion/Fall Prevention:   safety round/check completed   room organization consistent   nonskid shoes/slippers when out of bed   assistive device/personal items within reach   clutter free environment maintained   fall prevention program maintained  Taken 2/8/2025 2000 by Jocelyne Ambrocio RN  Safety Promotion/Fall Prevention:   safety round/check completed   room organization consistent   nonskid shoes/slippers when out of bed   assistive device/personal items within reach   clutter free environment  maintained   fall prevention program maintained  Intervention: Prevent Skin Injury  Recent Flowsheet Documentation  Taken 2/9/2025 0600 by Jocelyne Ambrocio RN  Body Position:   neutral head position   neutral body alignment  Skin Protection:   incontinence pads utilized   transparent dressing maintained  Taken 2/9/2025 0400 by Jocelyne Ambrocio RN  Body Position:   neutral head position   neutral body alignment   patient/family refused  Skin Protection:   incontinence pads utilized   transparent dressing maintained  Taken 2/9/2025 0200 by Jocelyne mAbrocio RN  Body Position:   neutral head position   neutral body alignment   patient/family refused  Skin Protection:   incontinence pads utilized   transparent dressing maintained  Taken 2/9/2025 0000 by Jocelyne Ambrocio RN  Body Position:   neutral head position   neutral body alignment   patient/family refused  Skin Protection:   incontinence pads utilized   transparent dressing maintained  Taken 2/8/2025 2200 by Jocelyne Ambrocio RN  Body Position:   neutral head position   neutral body alignment   patient/family refused  Skin Protection:   incontinence pads utilized   transparent dressing maintained  Taken 2/8/2025 2000 by Jocelyne Ambrocio RN  Body Position:   neutral head position   neutral body alignment   patient/family refused  Skin Protection:   incontinence pads utilized   transparent dressing maintained   silicone foam dressing in place  Intervention: Prevent Infection  Recent Flowsheet Documentation  Taken 2/9/2025 0600 by Jocelyne Ambrocio RN  Infection Prevention:   environmental surveillance performed   equipment surfaces disinfected   hand hygiene promoted   single patient room provided  Taken 2/9/2025 0400 by Jocelyne Ambrocio RN  Infection Prevention:   environmental surveillance performed   equipment surfaces disinfected   hand hygiene promoted   single patient room provided  Taken 2/9/2025 0200 by Jocelyne Ambrocio RN  Infection Prevention:   environmental  surveillance performed   equipment surfaces disinfected   hand hygiene promoted   single patient room provided  Taken 2/9/2025 0000 by Jocelyne Ambrocio RN  Infection Prevention:   environmental surveillance performed   equipment surfaces disinfected   hand hygiene promoted   single patient room provided  Taken 2/8/2025 2200 by Jocelyne Ambrocio RN  Infection Prevention:   environmental surveillance performed   equipment surfaces disinfected   hand hygiene promoted   single patient room provided  Taken 2/8/2025 2000 by Jocelyne Ambrocio RN  Infection Prevention:   environmental surveillance performed   equipment surfaces disinfected   hand hygiene promoted   single patient room provided  Goal: Optimal Comfort and Wellbeing  Intervention: Provide Person-Centered Care  Recent Flowsheet Documentation  Taken 2/9/2025 0600 by Jocelyne Ambrocio RN  Trust Relationship/Rapport:   care explained   questions answered  Taken 2/8/2025 2200 by Jocelyne Ambrocio RN  Trust Relationship/Rapport:   care explained   questions answered     Problem: Skin Injury Risk Increased  Goal: Skin Health and Integrity  Intervention: Optimize Skin Protection  Recent Flowsheet Documentation  Taken 2/9/2025 0600 by Jocelyne Ambrocio RN  Activity Management: activity encouraged  Pressure Reduction Techniques: pressure points protected  Head of Bed (HOB) Positioning: HOB elevated  Pressure Reduction Devices:   specialty bed utilized   positioning supports utilized  Skin Protection:   incontinence pads utilized   transparent dressing maintained  Taken 2/9/2025 0400 by Jocelyne Ambrocio RN  Activity Management: activity encouraged  Pressure Reduction Techniques: pressure points protected  Head of Bed (HOB) Positioning: HOB elevated  Pressure Reduction Devices:   specialty bed utilized   positioning supports utilized  Skin Protection:   incontinence pads utilized   transparent dressing maintained  Taken 2/9/2025 0200 by Jocelyne Ambrocio RN  Activity Management:  activity encouraged  Pressure Reduction Techniques: pressure points protected  Head of Bed (HOB) Positioning: [facility] elevated  Pressure Reduction Devices:   specialty bed utilized   positioning supports utilized  Skin Protection:   incontinence pads utilized   transparent dressing maintained  Taken 2/9/2025 0000 by Jocelyne Ambrocio RN  Activity Management: activity encouraged  Pressure Reduction Techniques: pressure points protected  Head of Bed (HOB) Positioning: [facility] elevated  Pressure Reduction Devices:   specialty bed utilized   positioning supports utilized  Skin Protection:   incontinence pads utilized   transparent dressing maintained  Taken 2/8/2025 2200 by Jocelyne Ambrocio RN  Activity Management: activity encouraged  Pressure Reduction Techniques: pressure points protected  Head of Bed (HOB) Positioning: [facility] elevated  Pressure Reduction Devices:   specialty bed utilized   positioning supports utilized  Skin Protection:   incontinence pads utilized   transparent dressing maintained  Taken 2/8/2025 2000 by Jocelyne Ambrocio RN  Activity Management: activity encouraged  Pressure Reduction Techniques: pressure points protected  Head of Bed (HOB) Positioning: [facility] elevated  Pressure Reduction Devices:   heel offloading device utilized   positioning supports utilized   specialty bed utilized  Skin Protection:   incontinence pads utilized   transparent dressing maintained   silicone foam dressing in place     Problem: Fall Injury Risk  Goal: Absence of Fall and Fall-Related Injury  Intervention: Promote Injury-Free Environment  Recent Flowsheet Documentation  Taken 2/9/2025 0600 by Jocelyne Ambrocio RN  Safety Promotion/Fall Prevention:   safety round/check completed   room organization consistent   nonskid shoes/slippers when out of bed   assistive device/personal items within reach   clutter free environment maintained   fall prevention program maintained  Taken 2/9/2025 0400 by Jocelyne Ambrocio, RN  Safety Promotion/Fall  Prevention:   safety round/check completed   room organization consistent   nonskid shoes/slippers when out of bed   assistive device/personal items within reach   clutter free environment maintained   fall prevention program maintained  Taken 2/9/2025 0200 by Jocelyne Ambrocio RN  Safety Promotion/Fall Prevention:   safety round/check completed   room organization consistent   nonskid shoes/slippers when out of bed   assistive device/personal items within reach   clutter free environment maintained   fall prevention program maintained  Taken 2/9/2025 0000 by Jocelyne Ambrocio RN  Safety Promotion/Fall Prevention:   safety round/check completed   room organization consistent   nonskid shoes/slippers when out of bed   assistive device/personal items within reach   clutter free environment maintained   fall prevention program maintained  Taken 2/8/2025 2200 by Jocelyne Ambrocio RN  Safety Promotion/Fall Prevention:   safety round/check completed   room organization consistent   nonskid shoes/slippers when out of bed   assistive device/personal items within reach   clutter free environment maintained   fall prevention program maintained  Taken 2/8/2025 2000 by Jocelyne Ambrocio RN  Safety Promotion/Fall Prevention:   safety round/check completed   room organization consistent   nonskid shoes/slippers when out of bed   assistive device/personal items within reach   clutter free environment maintained   fall prevention program maintained     Problem: Hemodialysis  Goal: Absence of Infection Signs and Symptoms  Intervention: Prevent or Manage Infection  Recent Flowsheet Documentation  Taken 2/9/2025 0600 by Jocelyne Ambrocio RN  Infection Prevention:   environmental surveillance performed   equipment surfaces disinfected   hand hygiene promoted   single patient room provided  Taken 2/9/2025 0400 by Jocelyne Ambrocio RN  Infection Prevention:   environmental surveillance performed   equipment surfaces disinfected   hand hygiene  promoted   single patient room provided  Taken 2/9/2025 0200 by Jocelyne Ambrocio RN  Infection Prevention:   environmental surveillance performed   equipment surfaces disinfected   hand hygiene promoted   single patient room provided  Taken 2/9/2025 0000 by Jocelyne Ambrocio RN  Infection Prevention:   environmental surveillance performed   equipment surfaces disinfected   hand hygiene promoted   single patient room provided  Taken 2/8/2025 2200 by Jocelyne Ambrocio RN  Infection Prevention:   environmental surveillance performed   equipment surfaces disinfected   hand hygiene promoted   single patient room provided  Taken 2/8/2025 2000 by Jocelyne Ambrocio RN  Infection Prevention:   environmental surveillance performed   equipment surfaces disinfected   hand hygiene promoted   single patient room provided     Problem: Sepsis/Septic Shock  Goal: Optimal Coping  Intervention: Support Patient and Family Response  Recent Flowsheet Documentation  Taken 2/9/2025 0600 by Jocelyne Ambrocio RN  Family/Support System Care: support provided  Taken 2/8/2025 2200 by Jocelyne Ambrocio RN  Family/Support System Care: support provided  Taken 2/8/2025 2000 by Jocelyne Ambrocio RN  Supportive Measures: active listening utilized  Goal: Absence of Infection Signs and Symptoms  Intervention: Initiate Sepsis Management  Recent Flowsheet Documentation  Taken 2/9/2025 0600 by Jocelyne Ambrocio RN  Infection Prevention:   environmental surveillance performed   equipment surfaces disinfected   hand hygiene promoted   single patient room provided  Taken 2/9/2025 0400 by Jocelyne Ambrocio RN  Infection Prevention:   environmental surveillance performed   equipment surfaces disinfected   hand hygiene promoted   single patient room provided  Taken 2/9/2025 0200 by Jocelyne Ambrocio RN  Infection Prevention:   environmental surveillance performed   equipment surfaces disinfected   hand hygiene promoted   single patient room provided  Taken 2/9/2025 0000 by  Jocelyne Ambrocio RN  Infection Prevention:   environmental surveillance performed   equipment surfaces disinfected   hand hygiene promoted   single patient room provided  Taken 2/8/2025 2200 by Jocelyne Ambrocio RN  Infection Prevention:   environmental surveillance performed   equipment surfaces disinfected   hand hygiene promoted   single patient room provided  Taken 2/8/2025 2000 by Jocelyne Ambrocio RN  Infection Prevention:   environmental surveillance performed   equipment surfaces disinfected   hand hygiene promoted   single patient room provided  Intervention: Promote Recovery  Recent Flowsheet Documentation  Taken 2/9/2025 0600 by Jocelyne Ambrocio RN  Activity Management: activity encouraged  Taken 2/9/2025 0400 by Jocelyne Ambrocio RN  Activity Management: activity encouraged  Taken 2/9/2025 0200 by Jocelyne Ambrocio RN  Activity Management: activity encouraged  Taken 2/9/2025 0000 by Jocelyne Ambrocio RN  Activity Management: activity encouraged  Taken 2/8/2025 2200 by Jocelyne Ambrocio RN  Activity Management: activity encouraged  Taken 2/8/2025 2000 by Jocelyne Ambrocio RN  Activity Management: activity encouraged     Problem: Pain Acute  Goal: Optimal Pain Control and Function  Intervention: Optimize Psychosocial Wellbeing  Recent Flowsheet Documentation  Taken 2/9/2025 0600 by Jocelyne Ambrocio RN  Diversional Activities: television  Taken 2/8/2025 2200 by Jocelyne Ambrocio RN  Diversional Activities: television  Taken 2/8/2025 2000 by Jocelyne Ambrocio RN  Supportive Measures: active listening utilized  Intervention: Prevent or Manage Pain  Recent Flowsheet Documentation  Taken 2/8/2025 2000 by Jocelyne Ambrocio RN  Sensory Stimulation Regulation: care clustered     Problem: Skin Injury Risk Increased  Goal: Skin Health and Integrity  Intervention: Optimize Skin Protection  Recent Flowsheet Documentation  Taken 2/9/2025 0600 by Jcoelyne Ambrocio RN  Activity Management: activity encouraged  Pressure Reduction Techniques:  pressure points protected  Head of Bed (HOB) Positioning: [facility] elevated  Pressure Reduction Devices:   specialty bed utilized   positioning supports utilized  Skin Protection:   incontinence pads utilized   transparent dressing maintained  Taken 2/9/2025 0400 by Jocelyne Ambrocio RN  Activity Management: activity encouraged  Pressure Reduction Techniques: pressure points protected  Head of Bed (HOB) Positioning: HOB elevated  Pressure Reduction Devices:   specialty bed utilized   positioning supports utilized  Skin Protection:   incontinence pads utilized   transparent dressing maintained  Taken 2/9/2025 0200 by Jocelyne Ambrocio RN  Activity Management: activity encouraged  Pressure Reduction Techniques: pressure points protected  Head of Bed (HOB) Positioning: HOB elevated  Pressure Reduction Devices:   specialty bed utilized   positioning supports utilized  Skin Protection:   incontinence pads utilized   transparent dressing maintained  Taken 2/9/2025 0000 by Jocelyne Ambrocio RN  Activity Management: activity encouraged  Pressure Reduction Techniques: pressure points protected  Head of Bed (HOB) Positioning: [facility] elevated  Pressure Reduction Devices:   specialty bed utilized   positioning supports utilized  Skin Protection:   incontinence pads utilized   transparent dressing maintained  Taken 2/8/2025 2200 by Jocelyne Ambrocio RN  Activity Management: activity encouraged  Pressure Reduction Techniques: pressure points protected  Head of Bed (HOB) Positioning: [facility] elevated  Pressure Reduction Devices:   specialty bed utilized   positioning supports utilized  Skin Protection:   incontinence pads utilized   transparent dressing maintained  Taken 2/8/2025 2000 by Jocelyne Ambrocio RN  Activity Management: activity encouraged  Pressure Reduction Techniques: pressure points protected  Head of Bed (HOB) Positioning: [facility] elevated  Pressure Reduction Devices:   heel offloading device utilized   positioning supports utilized    specialty bed utilized  Skin Protection:   incontinence pads utilized   transparent dressing maintained   silicone foam dressing in place     Problem: Fall Injury Risk  Goal: Absence of Fall and Fall-Related Injury  Intervention: Promote Injury-Free Environment  Recent Flowsheet Documentation  Taken 2/9/2025 0600 by Jocelyne Ambrocio RN  Safety Promotion/Fall Prevention:   safety round/check completed   room organization consistent   nonskid shoes/slippers when out of bed   assistive device/personal items within reach   clutter free environment maintained   fall prevention program maintained  Taken 2/9/2025 0400 by Jocelyne Ambrocio RN  Safety Promotion/Fall Prevention:   safety round/check completed   room organization consistent   nonskid shoes/slippers when out of bed   assistive device/personal items within reach   clutter free environment maintained   fall prevention program maintained  Taken 2/9/2025 0200 by Jocelyne Ambrocio RN  Safety Promotion/Fall Prevention:   safety round/check completed   room organization consistent   nonskid shoes/slippers when out of bed   assistive device/personal items within reach   clutter free environment maintained   fall prevention program maintained  Taken 2/9/2025 0000 by Jocelyne Ambrocio RN  Safety Promotion/Fall Prevention:   safety round/check completed   room organization consistent   nonskid shoes/slippers when out of bed   assistive device/personal items within reach   clutter free environment maintained   fall prevention program maintained  Taken 2/8/2025 2200 by Jocelyne Ambrocio RN  Safety Promotion/Fall Prevention:   safety round/check completed   room organization consistent   nonskid shoes/slippers when out of bed   assistive device/personal items within reach   clutter free environment maintained   fall prevention program maintained  Taken 2/8/2025 2000 by Jocelyne Ambrocio RN  Safety Promotion/Fall Prevention:   safety round/check completed   room organization  consistent   nonskid shoes/slippers when out of bed   assistive device/personal items within reach   clutter free environment maintained   fall prevention program maintained     Problem: Hemodialysis  Goal: Absence of Infection Signs and Symptoms  Intervention: Prevent or Manage Infection  Recent Flowsheet Documentation  Taken 2/9/2025 0600 by Jocelyne Ambrocio RN  Infection Prevention:   environmental surveillance performed   equipment surfaces disinfected   hand hygiene promoted   single patient room provided  Taken 2/9/2025 0400 by Jocelyne Ambrocio RN  Infection Prevention:   environmental surveillance performed   equipment surfaces disinfected   hand hygiene promoted   single patient room provided  Taken 2/9/2025 0200 by Jocelyne Ambrocio RN  Infection Prevention:   environmental surveillance performed   equipment surfaces disinfected   hand hygiene promoted   single patient room provided  Taken 2/9/2025 0000 by Jocelyne Ambrocio RN  Infection Prevention:   environmental surveillance performed   equipment surfaces disinfected   hand hygiene promoted   single patient room provided  Taken 2/8/2025 2200 by Jocelyne Ambrocio RN  Infection Prevention:   environmental surveillance performed   equipment surfaces disinfected   hand hygiene promoted   single patient room provided  Taken 2/8/2025 2000 by Jocelyne Ambrocio RN  Infection Prevention:   environmental surveillance performed   equipment surfaces disinfected   hand hygiene promoted   single patient room provided     Problem: Adult Inpatient Plan of Care  Goal: Absence of Hospital-Acquired Illness or Injury  Intervention: Identify and Manage Fall Risk  Recent Flowsheet Documentation  Taken 2/9/2025 0600 by Jocelyne Ambrocio RN  Safety Promotion/Fall Prevention:   safety round/check completed   room organization consistent   nonskid shoes/slippers when out of bed   assistive device/personal items within reach   clutter free environment maintained   fall prevention program  maintained  Taken 2/9/2025 0400 by Jocelyne Ambrocio RN  Safety Promotion/Fall Prevention:   safety round/check completed   room organization consistent   nonskid shoes/slippers when out of bed   assistive device/personal items within reach   clutter free environment maintained   fall prevention program maintained  Taken 2/9/2025 0200 by Jocelyne Ambrocio RN  Safety Promotion/Fall Prevention:   safety round/check completed   room organization consistent   nonskid shoes/slippers when out of bed   assistive device/personal items within reach   clutter free environment maintained   fall prevention program maintained  Taken 2/9/2025 0000 by Jocelyne Ambrocio RN  Safety Promotion/Fall Prevention:   safety round/check completed   room organization consistent   nonskid shoes/slippers when out of bed   assistive device/personal items within reach   clutter free environment maintained   fall prevention program maintained  Taken 2/8/2025 2200 by Jocelyne Ambrocio RN  Safety Promotion/Fall Prevention:   safety round/check completed   room organization consistent   nonskid shoes/slippers when out of bed   assistive device/personal items within reach   clutter free environment maintained   fall prevention program maintained  Taken 2/8/2025 2000 by Jocelyne Ambrocio RN  Safety Promotion/Fall Prevention:   safety round/check completed   room organization consistent   nonskid shoes/slippers when out of bed   assistive device/personal items within reach   clutter free environment maintained   fall prevention program maintained  Intervention: Prevent Skin Injury  Recent Flowsheet Documentation  Taken 2/9/2025 0600 by Jocelyne Ambrocio RN  Body Position:   neutral head position   neutral body alignment  Skin Protection:   incontinence pads utilized   transparent dressing maintained  Taken 2/9/2025 0400 by Jocelyne Ambrocio RN  Body Position:   neutral head position   neutral body alignment   patient/family refused  Skin Protection:   incontinence  pads utilized   transparent dressing maintained  Taken 2/9/2025 0200 by Jocelyne Ambrocio RN  Body Position:   neutral head position   neutral body alignment   patient/family refused  Skin Protection:   incontinence pads utilized   transparent dressing maintained  Taken 2/9/2025 0000 by Jocelyne Ambrocio RN  Body Position:   neutral head position   neutral body alignment   patient/family refused  Skin Protection:   incontinence pads utilized   transparent dressing maintained  Taken 2/8/2025 2200 by Jocelyne Ambrocio RN  Body Position:   neutral head position   neutral body alignment   patient/family refused  Skin Protection:   incontinence pads utilized   transparent dressing maintained  Taken 2/8/2025 2000 by Jocelyne Ambrocio RN  Body Position:   neutral head position   neutral body alignment   patient/family refused  Skin Protection:   incontinence pads utilized   transparent dressing maintained   silicone foam dressing in place  Intervention: Prevent Infection  Recent Flowsheet Documentation  Taken 2/9/2025 0600 by Jocelyne Ambrocio RN  Infection Prevention:   environmental surveillance performed   equipment surfaces disinfected   hand hygiene promoted   single patient room provided  Taken 2/9/2025 0400 by Jocelyne Ambrocio RN  Infection Prevention:   environmental surveillance performed   equipment surfaces disinfected   hand hygiene promoted   single patient room provided  Taken 2/9/2025 0200 by Jocelyne Ambrocio RN  Infection Prevention:   environmental surveillance performed   equipment surfaces disinfected   hand hygiene promoted   single patient room provided  Taken 2/9/2025 0000 by Jocelyne Ambrocio RN  Infection Prevention:   environmental surveillance performed   equipment surfaces disinfected   hand hygiene promoted   single patient room provided  Taken 2/8/2025 2200 by Jocelyne Ambrocio RN  Infection Prevention:   environmental surveillance performed   equipment surfaces disinfected   hand hygiene promoted   single  patient room provided  Taken 2/8/2025 2000 by Jocelyne Ambrocio RN  Infection Prevention:   environmental surveillance performed   equipment surfaces disinfected   hand hygiene promoted   single patient room provided  Goal: Optimal Comfort and Wellbeing  Intervention: Provide Person-Centered Care  Recent Flowsheet Documentation  Taken 2/9/2025 0600 by Jocelyne Ambrocio RN  Trust Relationship/Rapport:   care explained   questions answered  Taken 2/8/2025 2200 by Jocelyne Ambrocio RN  Trust Relationship/Rapport:   care explained   questions answered     Problem: Cardiac Catheterization (Diagnostic/Interventional)  Goal: Anesthesia/Sedation Recovery  Intervention: Optimize Anesthesia Recovery  Recent Flowsheet Documentation  Taken 2/9/2025 0600 by Jocelyne Ambrocio RN  Safety Promotion/Fall Prevention:   safety round/check completed   room organization consistent   nonskid shoes/slippers when out of bed   assistive device/personal items within reach   clutter free environment maintained   fall prevention program maintained  Taken 2/9/2025 0400 by Jocelyne Ambrocio RN  Safety Promotion/Fall Prevention:   safety round/check completed   room organization consistent   nonskid shoes/slippers when out of bed   assistive device/personal items within reach   clutter free environment maintained   fall prevention program maintained  Taken 2/9/2025 0200 by Jocelyne Ambrocio RN  Safety Promotion/Fall Prevention:   safety round/check completed   room organization consistent   nonskid shoes/slippers when out of bed   assistive device/personal items within reach   clutter free environment maintained   fall prevention program maintained  Taken 2/9/2025 0000 by Joceylne Ambrocio RN  Safety Promotion/Fall Prevention:   safety round/check completed   room organization consistent   nonskid shoes/slippers when out of bed   assistive device/personal items within reach   clutter free environment maintained   fall prevention program maintained  Taken  2/8/2025 2200 by Jocelyne Ambrocio RN  Safety Promotion/Fall Prevention:   safety round/check completed   room organization consistent   nonskid shoes/slippers when out of bed   assistive device/personal items within reach   clutter free environment maintained   fall prevention program maintained  Taken 2/8/2025 2000 by Jocelyne Ambrocio RN  Safety Promotion/Fall Prevention:   safety round/check completed   room organization consistent   nonskid shoes/slippers when out of bed   assistive device/personal items within reach   clutter free environment maintained   fall prevention program maintained  Goal: Optimal Pain Control and Function  Intervention: Prevent or Manage Pain  Recent Flowsheet Documentation  Taken 2/9/2025 0600 by Jocelyne Ambrocio RN  Diversional Activities: television  Taken 2/8/2025 2200 by Jocelyne Ambrocio RN  Diversional Activities: television  Goal: Absence of Vascular Access Complication  Intervention: Prevent and Manage Access Complications  Recent Flowsheet Documentation  Taken 2/9/2025 0600 by Jocelyne Ambrocio RN  Activity Management: activity encouraged  Head of Bed (HOB) Positioning: HOB elevated  Taken 2/9/2025 0400 by Jocelyne Ambrocio RN  Activity Management: activity encouraged  Head of Bed (HOB) Positioning: HOB elevated  Taken 2/9/2025 0200 by Jocelyne Ambrocio RN  Activity Management: activity encouraged  Head of Bed (HOB) Positioning: HOB elevated  Taken 2/9/2025 0000 by Jocelyne Ambrocio RN  Activity Management: activity encouraged  Head of Bed (HOB) Positioning: HOB elevated  Taken 2/8/2025 2200 by Jocelyne Ambrocio RN  Activity Management: activity encouraged  Head of Bed (HOB) Positioning: HOB elevated  Taken 2/8/2025 2000 by Jocelyne Ambrocio RN  Activity Management: activity encouraged  Head of Bed (HOB) Positioning: HOB elevated   Goal Outcome Evaluation:

## 2025-02-10 ENCOUNTER — APPOINTMENT (OUTPATIENT)
Dept: NEPHROLOGY | Facility: HOSPITAL | Age: 75
DRG: 853 | End: 2025-02-10
Payer: MEDICARE

## 2025-02-10 LAB
ALBUMIN SERPL-MCNC: 2.2 G/DL (ref 3.5–5.2)
ANION GAP SERPL CALCULATED.3IONS-SCNC: 10 MMOL/L (ref 5–15)
BUN SERPL-MCNC: 48 MG/DL (ref 8–23)
BUN/CREAT SERPL: 9.1 (ref 7–25)
CALCIUM SPEC-SCNC: 8.5 MG/DL (ref 8.6–10.5)
CHLORIDE SERPL-SCNC: 99 MMOL/L (ref 98–107)
CO2 SERPL-SCNC: 23 MMOL/L (ref 22–29)
CREAT SERPL-MCNC: 5.27 MG/DL (ref 0.57–1)
EGFRCR SERPLBLD CKD-EPI 2021: 8 ML/MIN/1.73
GLUCOSE BLDC GLUCOMTR-MCNC: 114 MG/DL (ref 70–130)
GLUCOSE BLDC GLUCOMTR-MCNC: 116 MG/DL (ref 70–130)
GLUCOSE BLDC GLUCOMTR-MCNC: 117 MG/DL (ref 70–130)
GLUCOSE BLDC GLUCOMTR-MCNC: 125 MG/DL (ref 70–130)
GLUCOSE BLDC GLUCOMTR-MCNC: 93 MG/DL (ref 70–130)
GLUCOSE SERPL-MCNC: 103 MG/DL (ref 65–99)
HAV IGM SERPL QL IA: NORMAL
HBV CORE IGM SERPL QL IA: NORMAL
HBV SURFACE AG SERPL QL IA: NORMAL
HCV AB SER QL: NORMAL
PHOSPHATE SERPL-MCNC: 4.7 MG/DL (ref 2.5–4.5)
POTASSIUM SERPL-SCNC: 4.6 MMOL/L (ref 3.5–5.2)
SODIUM SERPL-SCNC: 132 MMOL/L (ref 136–145)

## 2025-02-10 PROCEDURE — 99232 SBSQ HOSP IP/OBS MODERATE 35: CPT | Performed by: NURSE PRACTITIONER

## 2025-02-10 PROCEDURE — 97530 THERAPEUTIC ACTIVITIES: CPT

## 2025-02-10 PROCEDURE — 25010000002 ONDANSETRON PER 1 MG: Performed by: ORTHOPAEDIC SURGERY

## 2025-02-10 PROCEDURE — 25010000002 EPOETIN ALFA PER 1000 UNITS: Performed by: ORTHOPAEDIC SURGERY

## 2025-02-10 PROCEDURE — 80069 RENAL FUNCTION PANEL: CPT | Performed by: INTERNAL MEDICINE

## 2025-02-10 PROCEDURE — 80074 ACUTE HEPATITIS PANEL: CPT | Performed by: STUDENT IN AN ORGANIZED HEALTH CARE EDUCATION/TRAINING PROGRAM

## 2025-02-10 PROCEDURE — 99232 SBSQ HOSP IP/OBS MODERATE 35: CPT

## 2025-02-10 PROCEDURE — 25010000002 HEPARIN (PORCINE) PER 1000 UNITS: Performed by: ORTHOPAEDIC SURGERY

## 2025-02-10 PROCEDURE — 82948 REAGENT STRIP/BLOOD GLUCOSE: CPT

## 2025-02-10 RX ADMIN — ATORVASTATIN CALCIUM 10 MG: 10 TABLET, FILM COATED ORAL at 20:48

## 2025-02-10 RX ADMIN — AMIODARONE HYDROCHLORIDE 200 MG: 200 TABLET ORAL at 20:49

## 2025-02-10 RX ADMIN — NIFEDIPINE 90 MG: 30 TABLET, FILM COATED, EXTENDED RELEASE ORAL at 11:32

## 2025-02-10 RX ADMIN — APIXABAN 2.5 MG: 2.5 TABLET, FILM COATED ORAL at 20:48

## 2025-02-10 RX ADMIN — OXYCODONE HYDROCHLORIDE 5 MG: 5 TABLET ORAL at 20:49

## 2025-02-10 RX ADMIN — ACETAMINOPHEN 1000 MG: 500 TABLET ORAL at 01:08

## 2025-02-10 RX ADMIN — ERYTHROPOIETIN 6000 UNITS: 3000 INJECTION, SOLUTION INTRAVENOUS; SUBCUTANEOUS at 10:26

## 2025-02-10 RX ADMIN — FAMOTIDINE 10 MG: 20 TABLET, FILM COATED ORAL at 11:33

## 2025-02-10 RX ADMIN — PANTOPRAZOLE SODIUM 40 MG: 40 TABLET, DELAYED RELEASE ORAL at 05:27

## 2025-02-10 RX ADMIN — CLONIDINE HYDROCHLORIDE 0.2 MG: 0.2 TABLET ORAL at 06:58

## 2025-02-10 RX ADMIN — ONDANSETRON 4 MG: 2 INJECTION INTRAMUSCULAR; INTRAVENOUS at 02:13

## 2025-02-10 RX ADMIN — OXYCODONE HYDROCHLORIDE 5 MG: 5 TABLET ORAL at 15:25

## 2025-02-10 RX ADMIN — Medication 2.5 MG: at 20:49

## 2025-02-10 RX ADMIN — HEPARIN SODIUM 2000 UNITS: 1000 INJECTION INTRAVENOUS; SUBCUTANEOUS at 10:26

## 2025-02-10 RX ADMIN — ALLOPURINOL 200 MG: 100 TABLET ORAL at 11:32

## 2025-02-10 RX ADMIN — Medication 10 ML: at 20:54

## 2025-02-10 RX ADMIN — ACETAMINOPHEN 1000 MG: 500 TABLET ORAL at 23:33

## 2025-02-10 RX ADMIN — ONDANSETRON 4 MG: 2 INJECTION INTRAMUSCULAR; INTRAVENOUS at 11:34

## 2025-02-10 RX ADMIN — APIXABAN 2.5 MG: 2.5 TABLET, FILM COATED ORAL at 11:33

## 2025-02-10 RX ADMIN — ACETAMINOPHEN 1000 MG: 500 TABLET ORAL at 15:24

## 2025-02-10 NOTE — THERAPY TREATMENT NOTE
Patient Name: Sara Esparza  : 1950    MRN: [id number]                              Today's Date: 2/10/2025       Admit Date: 2025    Visit Dx:     ICD-10-CM ICD-9-CM   1. S/P BKA (below knee amputation), left  Z89.512 V49.75   2. Generalized weakness  R53.1 780.79   3. Frequent falls  R29.6 V15.88   4. Chronic kidney disease, unspecified CKD stage  N18.9 585.9   5. Chronic anemia  D64.9 285.9   6. Type 2 diabetes mellitus with stage 5 chronic kidney disease not on chronic dialysis, without long-term current use of insulin  E11.22 250.40    N18.5 585.5   7. Chronic osteomyelitis of left foot  M86.672 730.17   8. Critical limb ischemia of left lower extremity  I70.222 440.22   9. Sepsis due to methicillin resistant Staphylococcus aureus (MRSA) with encephalopathy without septic shock  A41.02 038.12    R65.20 995.92    G93.41 348.31     Patient Active Problem List   Diagnosis    Postoperative abdominal hernia    ESRD (end stage renal disease)    Type 2 diabetes mellitus with stage 5 chronic kidney disease not on chronic dialysis, without long-term current use of insulin    Hypokalemia    Malnutrition    Severe malnutrition    Generalized weakness    Chronic osteomyelitis of left foot    Paroxysmal A-fib    Sepsis due to methicillin resistant Staphylococcus aureus (MRSA) with encephalopathy without septic shock    Critical limb ischemia of left lower extremity     Past Medical History:   Diagnosis Date    Anxiety     Arthritis     Asthma     allergy induced    Back pain     Depression     Diabetes mellitus     dx 10 years ago- checks fsbs most days    Diverticula of colon     GERD (gastroesophageal reflux disease)     Head ache     Hypertension     Sleep apnea     no longer needs cpap - approx. 10 years r/t weight loss     Past Surgical History:   Procedure Laterality Date    AORTAGRAM Left 2/3/2025    Procedure: CFA ACCESS RIGHT - ULTRASOUND GUIDED, AORTOGRAM WITH LEFT LOWER EXTREMITY RUN OFF,  ANGIOPLASTY, INTRAVASCULAR LITHOTRIPSY, RIGHT CFA CLOSURE;  Surgeon: Kyle Ramirez MD;  Location:  [facility];  Service: Vascular;  Laterality: Left;  fluoro 10 minutes  dose - 61mgy  contrast 20ml    BACK SURGERY      x 2    BELOW KNEE AMPUTATION Left 2/4/2025    Procedure: BELOW KNEE AMPUTATION LEFT;  Surgeon: Rom Gonzalez Jr., MD;  Location:  [facility];  Service: Orthopedics;  Laterality: Left;    CARDIAC CATHETERIZATION      no intervention    CHOLECYSTECTOMY OPEN      COLONOSCOPY      2013    FOOT IRRIGATION, DEBRIDEMENT AND REPAIR Left 1/24/2025    Procedure: HEEL IRRIGATION AND DEBRIDEMENT LEFT;  Surgeon: Rom Gonzalez Jr., MD;  Location:  [facility];  Service: Orthopedics;  Laterality: Left;    HYSTERECTOMY      PLACEMENT OF ANTIBIOTIC FILLER Left 2/4/2025    Procedure: PLACEMENT OF ANTIBIOTIC CEMENT FILLER FOR BONE VOID;  Surgeon: Rom Gonzalez Jr., MD;  Location:  [facility];  Service: Orthopedics;  Laterality: Left;    PLACEMENT OF WOUND VAC Left 1/24/2025    Procedure: PLACEMENT OF WOUND VAC;  Surgeon: Rom Gonzalez Jr., MD;  Location:  [facility];  Service: Orthopedics;  Laterality: Left;    REPLACEMENT TOTAL KNEE BILATERAL Bilateral     TONSILLECTOMY      VENTRAL/INCISIONAL HERNIA REPAIR N/A 6/6/2017    Procedure: INCISIONAL HERNIA REPAIR LAPAROSCOPIC;  Surgeon: Conrad Hrenandez MD;  Location:  [facility];  Service:       General Information       Row Name 02/10/25 1535          Physical Therapy Time and Intention    Document Type therapy note (daily note)  -ER     Mode of Treatment physical therapy  -ER       Row Name 02/10/25 1535          General Information    Patient Profile Reviewed yes  -ER     Existing Precautions/Restrictions fall;non-weight bearing;other (see comments)  L BKA, NWB LLE, pt. with dialysis port with dialysis MWF  -ER       Row Name 02/10/25 1535          Cognition    Orientation Status (Cognition) oriented x 3  -ER       Row Name 02/10/25 1537          Safety  Issues/Impairments Affecting Functional Mobility    Impairments Affecting Function (Mobility) balance;cognition;endurance/activity tolerance;pain;postural/trunk control;range of motion (ROM);sensation/sensory awareness;strength  -ER               User Key  (r) = Recorded By, (t) = Taken By, (c) = Cosigned By      Initials Name Provider Type    Marlene Juarez, PT Physical Therapist                   Mobility       Row Name 02/10/25 1535          Bed Mobility    Bed Mobility rolling left;rolling right;supine-sit;scooting/bridging;sit-supine  -ER     Rolling Left [place] (Bed Mobility) maximum assist (25% patient effort);2 person assist  -ER     Rolling Right [place] (Bed Mobility) nonverbal cues (demo/gesture);verbal cues;maximum assist (25% patient effort);2 person assist  -ER     Scooting/Bridging [place] (Bed Mobility) dependent (less than 25% patient effort);2 person assist;verbal cues  -ER     Supine-Sit [place] (Bed Mobility) maximum assist (25% patient effort);2 person assist;nonverbal cues (demo/gesture);verbal cues  -ER     Sit-Supine [place] (Bed Mobility) 2 person assist;verbal cues;nonverbal cues (demo/gesture);maximum assist (25% patient effort)  -ER     Assistive Device (Bed Mobility) bed rails;repositioning sheet;head of bed elevated  -ER     Comment, (Bed Mobility) max cues for sequencing and increased time needed, pt states she is in pain  -ER       Row Name 02/10/25 1535          Bed-Chair Transfer    Comment, (Bed-Chair Transfer) pt deferred even w lift  -ER       Row Name 02/10/25 1535          Sit-Stand Transfer    Comment, (Sit-Stand Transfer) deferred  -ER       Row Name 02/10/25 1535          Gait/Stairs (Locomotion)    Comment, (Gait/Stairs) deferred  -ER               User Key  (r) = Recorded By, (t) = Taken By, (c) = Cosigned By      Initials Name Provider Type    Marlene Juarez PT Physical Therapist                   Obj/Interventions       Row Name 02/10/25  1537          Balance    Balance Interventions sitting;supported;static  -ER     Comment, Balance sitting EOB balance practice, able to stay upright for ~30s ind but heavily relied on therapist support for remaining time  -ER               User Key  (r) = Recorded By, (t) = Taken By, (c) = Cosigned By      Initials Name Provider Type    ER Marlene Evans, PT Physical Therapist                   Goals/Plan    No documentation.                  Clinical Impression       Row Name 02/10/25 1540          Pain    Pretreatment Pain Rating 9/10  -ER     Posttreatment Pain Rating 9/10  -ER     Pain Location back  -ER     Pain Side/Orientation generalized  -ER     Pain Management Interventions nursing notified;breathing exercises utilized  -ER     Response to Pain Interventions activity participation with tolerable pain  -ER       Row Name 02/10/25 1540          Plan of Care Review    Plan of Care Reviewed With patient;spouse  -ER     Progress no change  -ER     Outcome Evaluation Pt able to sit EOB w significant amount of aid. Pt declined further mobility and declined lift to chair. Pt required assist of 2 during session. Continue to progress bed mobility and transfer ability as tolerated. continue to recommend SNF upon d/c.  -ER       Row Name 02/10/25 1548          Vital Signs    Posttreatment Heart Rate (beats/min) 61  -ER     O2 Delivery Pre Treatment room air  -ER     O2 Delivery Intra Treatment room air  -ER     O2 Delivery Post Treatment room air  -ER     Pre Patient Position Supine  -ER     Intra Patient Position Sitting  -ER     Post Patient Position Supine  -ER       Row Name 02/10/25 1549          Positioning and Restraints    Pre-Treatment Position in bed  -ER     Post Treatment Position bed  -ER     In Bed notified nsg;supine;call light within reach;encouraged to call for assist;with nsg;with family/caregiver;exit alarm on;side rails up x2  -ER               User Key  (r) = Recorded By, (t) = Taken By, (c) =  Cosigned By      Initials Name Provider Type    ER Marlene Evans, PT Physical Therapist                   Outcome Measures       Row Name 02/10/25 1542          How much help from another person do you currently need...    Turning from your back to your side while in flat bed without using bedrails? 1  -ER     Moving from lying on back to sitting on the side of a flat bed without bedrails? 1  -ER     Moving to and from a bed to a chair (including a wheelchair)? 1  -ER     Standing up from a chair using your arms (e.g., wheelchair, bedside chair)? 1  -ER     Climbing 3-5 steps with a railing? 1  -ER     To walk in hospital room? 1  -ER     AM-PAC 6 Clicks Score (PT) 6  -ER     Highest Level of Mobility Goal 2 --> Bed activities/dependent transfer  -ER       Row Name 02/10/25 1542          Functional Assessment    Outcome Measure Options AM-PAC 6 Clicks Basic Mobility (PT)  -ER               User Key  (r) = Recorded By, (t) = Taken By, (c) = Cosigned By      Initials Name Provider Type    ER Marlene Evans PT Physical Therapist                                 Physical Therapy Education       Title: PT OT SLP Therapies (In Progress)       Topic: Physical Therapy (Done)       Point: Mobility training (Done)       Learning Progress Summary            Patient Acceptance, E, VU by ER at 2/10/2025 1543    Comment: progress, bed mobility techniques    Acceptance, E, NR by  at 2/6/2025 1136   Family Acceptance, E, VU by ER at 2/10/2025 1543    Comment: progress, bed mobility techniques    Acceptance, E, NR by ML at 2/6/2025 1136                      Point: Home exercise program (Done)       Learning Progress Summary            Patient Acceptance, E, VU by ER at 2/10/2025 1543    Comment: progress, bed mobility techniques   Family Acceptance, E, VU by ER at 2/10/2025 1543    Comment: progress, bed mobility techniques                      Point: Body mechanics (Done)       Learning Progress Summary            Patient  Acceptance, E, VU by ER at 2/10/2025 1543    Comment: progress, bed mobility techniques    Acceptance, E, NR by  at 2/6/2025 1136   Family Acceptance, E, VU by ER at 2/10/2025 1543    Comment: progress, bed mobility techniques    Acceptance, E, NR by ML at 2/6/2025 1136                      Point: Precautions (Done)       Learning Progress Summary            Patient Acceptance, E, VU by ER at 2/10/2025 1543    Comment: progress, bed mobility techniques    Acceptance, E, NR by  at 2/6/2025 1136   Family Acceptance, E, VU by ER at 2/10/2025 1543    Comment: progress, bed mobility techniques    Acceptance, E, NR by  at 2/6/2025 1136                                      User Key       Initials Effective Dates Name Provider Type Discipline     04/22/21 -  Iliana Ray Physical Therapist PT    ER 12/13/24 -  Marlene Evans PT Physical Therapist PT                  PT Recommendation and Plan     Progress: no change  Outcome Evaluation: Pt able to sit EOB w significant amount of aid. Pt declined further mobility and declined lift to chair. Pt required assist of 2 during session. Continue to progress bed mobility and transfer ability as tolerated. continue to recommend SNF upon d/c.     Time Calculation:         PT Charges       Row Name 02/10/25 1544             Time Calculation    Start Time 1508  -ER      PT Received On 02/10/25  -ER      PT Goal Re-Cert Due Date 02/16/25  -ER         Timed Charges    99843 - PT Therapeutic Activity Minutes 25  -ER         Total Minutes    Timed Charges Total Minutes 25  -ER       Total Minutes 25  -ER                User Key  (r) = Recorded By, (t) = Taken By, (c) = Cosigned By      Initials Name Provider Type    ER Marlene Evans, PT Physical Therapist                  Therapy Charges for Today       Code Description Service Date Service Provider Modifiers Qty    74819853922 HC PT THERAPEUTIC ACT EA 15 MIN 2/10/2025 Marlene Evans, PT GP 2    70029699943 HC PT THER SUPP EA 15  MIN 2/10/2025 Marlene Evans, PT GP 2            PT G-Codes  Outcome Measure Options: AM-PAC 6 Clicks Basic Mobility (PT)  AM-PAC 6 Clicks Score (PT): 6  AM-PAC 6 Clicks Score (OT): 12  PT Discharge Summary  Anticipated Discharge Disposition (PT): skilled nursing facility    Marlene Evans, PT  2/10/2025

## 2025-02-10 NOTE — PROGRESS NOTES
[facility]    Acute pain service Inpatient Progress Note    Patient Name: Sara Esparza  :  1950  MRN:  [id number]        Acute Pain  Service Inpatient Progress Note:    Analgesia:Good  Pain Score:0/10  LOC: asleep but arousable  Resp Status: room air  Cardiac: VS stable  Side Effects:None  Catheter Site:clean, dressing intact and dry  Cath type: peripheral nerve cath(InfuSystem)  Volume: 1mL,5ml, 5ml InfuSystem Pump.  Catheter Plan:RN to remove catheter  Comments: Dialysis RN called bec pump volume out. Instructed to stop pump and tell floor nurse to pull catheter after dialysis. RN reports pt resting comfortably during dialysis

## 2025-02-10 NOTE — CASE MANAGEMENT/SOCIAL WORK
Continued Stay Note  [facility]     Patient Name: Sara Esparza  MRN: [id number]  Today's Date: 2/10/2025    Admit Date: 1/19/2025    Plan: Rehab   Discharge Plan       Row Name 02/10/25 1254       Plan    Plan Rehab    Plan Comments Pt has been hypertensive and is on room air. She had HD today. Ropivacaine nerve block was removed. I spoke with Pt and spouse (in room) and son, Stacy (via telephone). Referrals are currently pending with [facility] and [facility] (spoke with Huong). At their request, a referral was also sent to [facility] (spoke with April). CM will continue to follow for medical readiness.    Addendum: Per PT, Pt is not appropriate for acute rehab since she cannot tolerate 3 hours of therapy per day. CM withdrew [facility] referral.     Final Discharge Disposition Code 03 - skilled nursing facility (SNF)                   Discharge Codes    No documentation.                 Expected Discharge Date and Time       Expected Discharge Date Expected Discharge Time    Feb 11, 2025               Blanka Puentes RN

## 2025-02-10 NOTE — PLAN OF CARE
Goal Outcome Evaluation:  Plan of Care Reviewed With: patient, spouse        Progress: no change  Outcome Evaluation: Pt able to sit EOB w significant amount of aid. Pt declined further mobility and declined lift to chair. Pt required assist of 2 during session. Continue to progress bed mobility and transfer ability as tolerated. continue to recommend SNF upon d/c.    Anticipated Discharge Disposition (PT): skilled nursing facility

## 2025-02-10 NOTE — PLAN OF CARE
Problem: Hemodialysis  Goal: Safe, Effective Therapy Delivery  Outcome: Progressing  Goal: Effective Tissue Perfusion  Outcome: Progressing  Goal: Absence of Infection Signs and Symptoms  Outcome: Progressing   Goal Outcome Evaluation:               HD complete, blood reinfused, TDC dressing changed, report given to primary RN Shu.       Fluid removal: 2L

## 2025-02-10 NOTE — PROGRESS NOTES
"[facility]  Hospital Progress Note     LOS: 22 days   Patient Care Team:  Toribio Roberts MD as PCP - General (Internal Medicine)  PCP:  Toribio Roberts MD    Chief Complaint: afib    SUBJECTIVE: drowsy, family at bedside. No chest pain, palps.      OBJECTIVE:       Vital Sign Min/Max for last 24 hours  Temp  Min: 96.1 °F (35.6 °C)  Max: 98.4 °F (36.9 °C)   BP  Min: 150/68  Max: 183/75   Pulse  Min: 54  Max: 68   Resp  Min: 16  Max: 19   SpO2  Min: 93 %  Max: 99 %   No data recorded   No data recorded     Flowsheet Rows      Flowsheet Row First Filed Value   Admission Height 170.2 cm (67\") Documented at 01/19/2025 0307   Admission Weight 108 kg (238 lb 1.6 oz) Documented at 01/19/2025 0307          Telemetry: SR/SB    No intake or output data in the 24 hours ending 02/10/25 1249  Intake & Output (last 3 days)         02/07 0701  02/08 0700 02/08 0701  02/09 0700 02/09 0701  02/10 0700 02/10 0701  02/11 0700    P.O. 238       Total Intake(mL/kg) 238 (2.2)       Stool 0       Dialysis 2250       Total Output 2250       Net -2012               Stool Unmeasured Occurrence 1 x 5 x               Physical Exam:  Vitals reviewed.   Constitutional:       General: Awake. Not in acute distress.     Appearance: Chronically ill-appearing.   Pulmonary:      Effort: Pulmonary effort is normal.      Breath sounds: Normal breath sounds.   Chest:      Chest wall: Not tender to palpatation.   Cardiovascular:      Normal rate. Regular rhythm. Normal S1. Normal S2.       Murmurs: There is no murmur.   Pulses:     Comments: Right pedal pulses palpable  Edema:     Peripheral edema absent.   Abdominal:      General: Bowel sounds are normal.      Palpations: Abdomen is soft.      Tenderness: There is no abdominal tenderness.   Musculoskeletal:      Comments: Left BKA - leg brace intact Skin:     General: Skin is warm and dry.   Neurological:      Mental Status: Lethargic.          LABS/DIAGNOSTIC " DATA:  Results from last 7 days   Lab Units 02/05/25  1336 02/04/25  1109   WBC 10*3/mm3 5.58 6.86   HEMOGLOBIN g/dL 9.2* 9.2*   HEMATOCRIT % 29.4* 29.0*   PLATELETS 10*3/mm3 268 235     Lab Results   Lab Value Date/Time    TROPONINT 101 (C) 01/23/2025 0816    TROPONINT 106 (C) 01/23/2025 0159    TROPONINT 132 (C) 01/19/2025 0403    TROPONINT 128 (C) 01/18/2025 2259    TROPONINT 134 (C) 01/18/2025 2149    TROPONINT 89 (C) 01/16/2025 2011    TROPONINT 87 (C) 01/16/2025 1645         Results from last 7 days   Lab Units 02/10/25  0733 02/07/25  0814 02/05/25  1336   SODIUM mmol/L 132* 136 138   POTASSIUM mmol/L 4.6 3.6 3.8   CHLORIDE mmol/L 99 100 100   CO2 mmol/L 23.0 24.0 25.0   BUN mg/dL 48* 32* 14   CREATININE mg/dL 5.27* 4.22* 2.20*   CALCIUM mg/dL 8.5* 8.5* 8.7   GLUCOSE mg/dL 103* 96 105*                       Medication Review:   acetaminophen, 1,000 mg, Oral, Q8H  albumin human, , ,   allopurinol, 200 mg, Oral, Daily  amiodarone, 200 mg, Oral, Q24H  apixaban, 2.5 mg, Oral, Q12H  atorvastatin, 10 mg, Oral, Nightly  epoetin gracia/gracia-epbx, 6,000 Units, Subcutaneous, Once per day on Monday Wednesday Friday  famotidine, 10 mg, Oral, Daily  insulin lispro, 2-9 Units, Subcutaneous, 4x Daily AC & at Bedtime  NIFEdipine XL, 90 mg, Oral, Q24H  pantoprazole, 40 mg, Oral, Q AM  senna-docusate sodium, 2 tablet, Oral, BID  sodium chloride, 10 mL, Intravenous, Q12H                 ASSESSMENT/PLAN:    Generalized weakness    ESRD (end stage renal disease)    Type 2 diabetes mellitus with stage 5 chronic kidney disease not on chronic dialysis, without long-term current use of insulin    Chronic osteomyelitis of left foot    Paroxysmal A-fib    Sepsis due to methicillin resistant Staphylococcus aureus (MRSA) with encephalopathy without septic shock    Critical limb ischemia of left lower extremity    A-fib-patient is maintaining sinus rhythm on amiodarone. Carvedilol held due to bradycardia.   - continue amiodarone 200mg  daily   - AC with apixaban     Hypertension, SBP > 150s, on nifedipine   - Utilizing PRN clonidine  - add scheduled hydralazine if ok with nephrology        Carla Bustos, APRN   02/10/25  12:49 EST

## 2025-02-10 NOTE — PROGRESS NOTES
[facility] Medicine Services  PROGRESS NOTE    Patient Name: Sara Esparza  : 1950  MRN: [id number]    Date of Admission: 2025  Primary Care Physician: Toribio Roberts MD    Subjective     CC: f/u osteomyelitis     HPI:  Seen resting up in bed on hemodialysis.  Dozing but awakens easily to voice.  Pale and chronically ill and debilitated appearing.  Oriented to person that she is in a dialysis center, and initially thinks it is  but then states it is .  Obvious short-term memory deficits.  Denies any pain at this time.  Nerve cath in place.  Awaiting placement.        Objective     Vital Signs:   Temp:  [96.1 °F (35.6 °C)-98.4 °F (36.9 °C)] 96.1 °F (35.6 °C)  Heart Rate:  [54-68] 57  Resp:  [16-19] 19  BP: (150-180)/(55-76) 166/73     Physical Exam:  Constitutional: No acute distress, dozing back in bed on hemodialysis.  Awakens to voice.  Chronically ill, frail and debilitated appearing female.  HENT: NCAT, mucous membranes moist  Respiratory: Clear to auscultation bilaterally, respiratory effort normal on room air.  Cardiovascular: RRR, no murmurs, rubs, or gallops  Gastrointestinal: Positive bowel sounds, soft, nontender, nondistended  Musculoskeletal: No RLE edema.  Degroot spontaneously.  Left BKA dressing in place dry and intact.  Nerve catheter in place.  Psychiatric: flat affect, cooperative  Neurologic: Oriented to person, dialysis center, and ultimately to year.  Obvious short-term memory deficits, strength symmetric in all extremities generally weak, speech clear.  Follows commands.  Skin: No rashes        Results Reviewed:  LAB RESULTS:      Lab 25  1336 25  1109   WBC 5.58 6.86   HEMOGLOBIN 9.2* 9.2*   HEMATOCRIT 29.4* 29.0*   PLATELETS 268 235   .7* 98.0*         Lab 02/10/25  0733 25  0814 25  1336 25  1159 25  1109   SODIUM 132* 136 138  --  133*   POTASSIUM 4.6 3.6 3.8 4.5 4.3   CHLORIDE 99 100 100  --  98    CO2 23.0 24.0 25.0  --  22.0   ANION GAP 10.0 12.0 13.0  --  13.0   BUN 48* 32* 14  --  32*   CREATININE 5.27* 4.22* 2.20*  --  4.23*   EGFR 8.0* 10.5* 22.9*  --  10.4*   GLUCOSE 103* 96 105*  --  92   CALCIUM 8.5* 8.5* 8.7  --  8.8   MAGNESIUM  --   --  1.9  --   --    PHOSPHORUS 4.7* 4.0 2.8  --   --          Lab 02/10/25  0733 02/07/25  0814 02/05/25  1336   ALBUMIN 2.2* 2.3* 2.8*               Lab 02/04/25  1335   ABO TYPING O   RH TYPING Positive   ANTIBODY SCREEN Negative     Brief Urine Lab Results  (Last result in the past 365 days)        Color   Clarity   Blood   Leuk Est   Nitrite   Protein   CREAT   Urine HCG        01/19/25 1404 Yellow   Cloudy   Moderate (2+)   Moderate (2+)   Negative   >=300 mg/dL (3+)                 Microbiology Results Abnormal       Procedure Component Value - Date/Time    Anaerobic Culture - Surgical Site, Foot, Left [[id number]]  (Abnormal) Collected: 01/24/25 1754    Lab Status: Final result Specimen: Surgical Site from Foot, Left Updated: 01/30/25 1515     Anaerobic Culture Cutibacterium acnes    Body Fluid Culture - Surgical Site, Foot, Left [[id number]]  (Abnormal)  (Susceptibility) Collected: 01/24/25 1754    Lab Status: Final result Specimen: Surgical Site from Foot, Left Updated: 01/27/25 0642     Body Fluid Culture Light growth (2+) Staphylococcus aureus, MRSA     Comment:   Methicillin resistant Staphylococcus aureus, Patient may be an isolation risk.        Gram Stain Rare (1+) WBCs seen      No organisms seen    Narrative:      Not a body fluid.    Susceptibility        Staphylococcus aureus, MRSA      TAYLOR      Clindamycin Susceptible      Erythromycin Resistant      Oxacillin Resistant      Rifampin Susceptible      Tetracycline Susceptible      Trimethoprim + Sulfamethoxazole Susceptible      Vancomycin Susceptible                           Wound Culture - Wound, Foot, Left [[id number]]  (Abnormal)  (Susceptibility) Collected: 01/20/25 1409    Lab Status: Final  result Specimen: Wound from Foot, Left Updated: 01/24/25 0658     Wound Culture Scant growth (1+) Staphylococcus aureus, MRSA     Comment: Methicillin resistant Staphylococcus aureus, Patient may be an isolation risk.         Rare growth Morganella morganii ssp morganii     Comment:            Scant growth (1+) Normal Skin Kimber     Gram Stain Few (2+) WBCs seen      Many (4+) Gram positive cocci in pairs      Rare (1+) Gram negative bacilli      Rare (1+) Gram positive bacilli    Susceptibility        Staphylococcus aureus, MRSA      TAYLOR      Clindamycin Susceptible      Erythromycin Resistant      Oxacillin Resistant      Rifampin Susceptible      Tetracycline Susceptible      Trimethoprim + Sulfamethoxazole Susceptible      Vancomycin Susceptible                       Susceptibility        Morganella morganii ssp morganii      TAYLOR      Amoxicillin + Clavulanate Resistant      Ampicillin Resistant      Ampicillin + Sulbactam Resistant      Cefazolin (Non Urine) Resistant      Cefepime Susceptible  [1]       Cefotaxime Susceptible      Ceftazidime Susceptible  [1]       Gentamicin Susceptible      Levofloxacin Susceptible      Piperacillin + Tazobactam Susceptible      Tetracycline Susceptible      Trimethoprim + Sulfamethoxazole Susceptible                   [1]  Appended report. These results have been appended to a previously final verified report.                Susceptibility Comments       Morganella morganii ssp morganii    Cefotaxime susceptibility can be used as a surrogate for ceftriaxone susceptibility               MRSA Screen, PCR (Inpatient) - Swab, Nares [[id number]]  (Abnormal) Collected: 01/21/25 0614    Lab Status: Final result Specimen: Swab from Nares Updated: 01/21/25 0851     MRSA PCR Positive    Narrative:      The negative predictive value of this diagnostic test is high and should only be used to consider de-escalating anti-MRSA therapy. A positive result may indicate colonization with MRSA  and must be correlated clinically.          No radiology results from the last 24 hrs    Results for orders placed during the hospital encounter of 01/19/25    Adult Transthoracic Echo Complete W/ Cont if Necessary Per Protocol    Interpretation Summary    Left ventricular systolic function is normal. Estimated left ventricular EF = 55%    Left ventricular wall thickness is consistent with moderate concentric hypertrophy.    No hemodynamically significant valvular heart disease    Current medications:  Scheduled Meds:acetaminophen, 1,000 mg, Oral, Q8H  albumin human, , ,   allopurinol, 200 mg, Oral, Daily  amiodarone, 200 mg, Oral, Q24H  apixaban, 2.5 mg, Oral, Q12H  atorvastatin, 10 mg, Oral, Nightly  epoetin gracia/gracia-epbx, 6,000 Units, Subcutaneous, Once per day on Monday Wednesday Friday  famotidine, 10 mg, Oral, Daily  insulin lispro, 2-9 Units, Subcutaneous, 4x Daily AC & at Bedtime  NIFEdipine XL, 90 mg, Oral, Q24H  pantoprazole, 40 mg, Oral, Q AM  senna-docusate sodium, 2 tablet, Oral, BID  sodium chloride, 10 mL, Intravenous, Q12H      Continuous Infusions:ropivacaine, , Last Rate: Stopped (02/10/25 0751)      PRN Meds:.  albumin human    senna-docusate sodium **AND** polyethylene glycol **AND** bisacodyl **AND** bisacodyl    Calcium Replacement - Follow Nurse / BPA Driven Protocol    cloNIDine    dextrose    dextrose    glucagon (human recombinant)    heparin (porcine)    Magnesium Low Dose Replacement - Follow Nurse / BPA Driven Protocol    melatonin    metoprolol tartrate    ondansetron    oxyCODONE    Potassium Replacement - Follow Nurse / BPA Driven Protocol    [COMPLETED] Insert Peripheral IV **AND** sodium chloride    sodium chloride    sodium chloride    traMADol    Assessment & Plan     Active Hospital Problems    Diagnosis  POA    **Generalized weakness [R53.1]  Yes    Paroxysmal A-fib [I48.0]  Unknown    Sepsis due to methicillin resistant Staphylococcus aureus (MRSA) with encephalopathy without  septic shock [A41.02, R65.20, G93.41]  Unknown    Chronic osteomyelitis of left foot [M86.672]  Unknown    Critical limb ischemia of left lower extremity [I70.222]  Unknown    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     Brief Hospital Course to date:  Sara Esparza is a 74 y.o. female with PMH significant for HTN, PAF (Eliquis), prior DVT/PE, ESRD on HD, DMII, asthma, DENZEL (CPAP). Presented to [facility] ED on 1/19/25 for evaluation of generalized weakness, myalgias, lightheadedness and falls. On 1/20, she developed fevers. Source ultimately found to be related to L calcaneal osteomyelitis. ID / ortho consulted to follow. She is s/p debridement and wound vac placement 1/24/25. Ortho recommended BKA. In an effort to optimize arterial flow and wound healing, Dr. Ramirez of vascular surgery performed LLE angiogram with angioplasty/lithotripsy on 2/3/25. She was agreeable to L BKA and this is planned for 2/4/25    This patient's problems and plans were partially entered by my partner and updated as appropriate by me 02/10/25.    Assessment/Plan:    Sepsis secondary to Lt calcaneal osteomyelitis  Metabolic encephalopathy  Lt posterial tibial artery occlusion  Met sepsis criteria on 1/20 with fever 102.6F, 's, leukocytosis (WBC 15K), source of infection and altered mental status   1/21/25 CT LLE w/o contrast showed draining wound seen along plantar aspect of left calcaneus with surrounding changes consistent with osteomyelitis  S/p LLE angiogram with lithotripsy / angioplasty by Dr. Ramirez 2/3/25   She is s/p debridement and wound vac placement 1/24/25 - probability of limb salvage felt to be poor.  She is s/p Lt BKA on 2/4/25 by Dr. Gonzalez   Wound culture (+) MRSA / Morganella   1/24  surgical cultures growing MRSA and Cutibacterium acnes  S/p IV Vanc/Ceftriaxone, per ID  PT / OT recommended SNF. CM following     Generalized weakness, progressive x weeks  Generalized debility   Falls at home   CT chest/abd/pelvis without acute findings   Normal TSH and CK   PT/OT/CM following - anticipate will need SNF. Awaiting placement      Neck pain, C7 region   C5-6 canal stenosis  Chronic back pain s/p spinal stimulator  CT C-spine - no fractures  Reported history of Oxycodone dependence - attempted to avoid opiates however due to uncontrolled pain she was started on Oxycodone 5mg Q4H PRN on 2/1/25  Continue Lidocaine patches  Partner recommended that patient/ reach out to her pain management clinic for questions re: spinal stimulator     ESRD on HD  Has tunneled Rt IJ catheter - recent HD start in 9/2024  Nephrology following  Continue MWF HD   Monitor labs      Type 2 diabetes  A1c 4.9  On Sitagliptin at home  On SSI only with fairly normal glucose      Hypertension  Continue nifedipine XL 90 mg daily  Was on Coreg at home but held because of bradycardia  As needed clonidine    Paroxysmal Afib  Developed bradycardia while on beta-blockers   Now on amiodarone  Cardiology following  Continue Eliquis    Elevated troponin  In setting of ESRD  Troponin peaked at 132 on 1/19/25  Per chart review, had LHC recently at [facility] with non-obstructive CAD     Dyspepsia  GI cocktail PRN     Constipation  Likely due to opiates  Improved with bowel regimen and addition of Lactulose   Bowel regimen transitioned to PRN - appears constipated again. Restarted bowel regimen on 2/6      Expected Discharge Location and Transportation: likely SNF pending placement   Expected Discharge Expected Discharge Date: 2/11/2025; Expected Discharge Time:      VTE Prophylaxis: Pharmacologic & mechanical VTE prophylaxis orders are present.    AM-PAC 6 Clicks Score (PT): 10 (02/09/25 1953)    CODE STATUS:   Code Status and Medical  Interventions: CPR (Attempt to Resuscitate); Full Support   Ordered at: 01/19/25 0518     Level Of Support Discussed With:    Patient     Code Status (Patient has no pulse and is not breathing):    CPR (Attempt to Resuscitate)     Medical Interventions (Patient has pulse or is breathing):    Full Support     Imelda Roy, APRN  02/10/25

## 2025-02-10 NOTE — PROGRESS NOTES
"   LOS: 22 days    Patient Care Team:  Toribio Roberts MD as PCP - General (Internal Medicine)    Subjective     Seen on dialysis.  Reports not feeling well but unable to tell me exactly what is bothering her.   Denies chest pain or shortness of breath.     Objective     Vital Signs:  Blood pressure 153/61, pulse 61, temperature 98.4 °F (36.9 °C), temperature source Oral, resp. rate 18, height 170.2 cm (67.01\"), weight 108 kg (238 lb 1.6 oz), SpO2 98%.      Intake/Output Summary (Last 24 hours) at 2/10/2025 1559  Last data filed at 2/10/2025 1100  Gross per 24 hour   Intake 310 ml   Output 2310 ml   Net -2000 ml          No intake/output data recorded.    Physical Exam:  GENERAL: WD WF NAD  NEURO: Awake and alert. No focal deficit  PSYCHIATRIC: Cooperative with PE  EYE: PE, no icterus, no conjunctivitis  ENT: omm dry, dentition intact,  Hearing intact  NECK: Supple , No JVD discernable  CV: No edema, RRR  LUNGS:  Quiet,  Nonlabored resp.  Symmetrical expansion  ABDOMEN: Nondistended, soft nontender.  : No Corral, no palp bladder  SKIN: Warm and dry without rash  + RIJ TDC  Left BKA     Labs:  Results from last 7 days   Lab Units 02/05/25  1336 02/04/25  1109   WBC 10*3/mm3 5.58 6.86   HEMOGLOBIN g/dL 9.2* 9.2*   PLATELETS 10*3/mm3 268 235     Results from last 7 days   Lab Units 02/10/25  0733 02/07/25  0814 02/05/25  1336 02/04/25  1159 02/04/25  1109   SODIUM mmol/L 132* 136 138  --  133*   POTASSIUM mmol/L 4.6 3.6 3.8 4.5 4.3   CHLORIDE mmol/L 99 100 100  --  98   CO2 mmol/L 23.0 24.0 25.0  --  22.0   BUN mg/dL 48* 32* 14  --  32*   CREATININE mg/dL 5.27* 4.22* 2.20*  --  4.23*   CALCIUM mg/dL 8.5* 8.5* 8.7  --  8.8   PHOSPHORUS mg/dL 4.7* 4.0 2.8  --   --    MAGNESIUM mg/dL  --   --  1.9  --   --    ALBUMIN g/dL 2.2* 2.3* 2.8*  --   --            A/P:    ESRD: On HD MWF at [facility] [place] [facility] with NAL .  Continue outpatient schedule. She is interested in home dialysis. Discussed with home program they will reach out " to patient for further education as outpatient.   -Hemodialysis today as per schedule.      HTN: Blood pressure stable.  Monitor for now.    Hyponatremia: Adjust with HD.     Hyperkalemia: Resolved.  Adjust with HD     Metabolic acidosis:  Adjust with HD     Anemia: Hemoglobin below goal.  Patient unable.  Monitor for now.  Transfuse as indicated for Hgb <7.     Volume: Earlier in admission patient with orthostatic hypotension with intravascular volume depletion.  Monitor with UF.     Hyperphosphatemia: Phos stable at 4.0.  Poor p.o. intake.  Binders on hold for now.     Nutrition: Low potassium low Phos high-protein diet.  Renal vitamin.    PVD: Underwent left BKA.     High risk and complexity patient.     Ruel Giraldo MD  02/10/25  15:59 EST

## 2025-02-11 LAB
ANION GAP SERPL CALCULATED.3IONS-SCNC: 10 MMOL/L (ref 5–15)
BUN SERPL-MCNC: 27 MG/DL (ref 8–23)
BUN/CREAT SERPL: 7.8 (ref 7–25)
CALCIUM SPEC-SCNC: 8.4 MG/DL (ref 8.6–10.5)
CHLORIDE SERPL-SCNC: 99 MMOL/L (ref 98–107)
CO2 SERPL-SCNC: 22 MMOL/L (ref 22–29)
CREAT SERPL-MCNC: 3.45 MG/DL (ref 0.57–1)
DEPRECATED RDW RBC AUTO: 51.3 FL (ref 37–54)
EGFRCR SERPLBLD CKD-EPI 2021: 13.3 ML/MIN/1.73
ERYTHROCYTE [DISTWIDTH] IN BLOOD BY AUTOMATED COUNT: 14.3 % (ref 12.3–15.4)
FUNGUS WND CULT: NORMAL
FUNGUS WND CULT: NORMAL
GLUCOSE BLDC GLUCOMTR-MCNC: 115 MG/DL (ref 70–130)
GLUCOSE BLDC GLUCOMTR-MCNC: 118 MG/DL (ref 70–130)
GLUCOSE BLDC GLUCOMTR-MCNC: 125 MG/DL (ref 70–130)
GLUCOSE BLDC GLUCOMTR-MCNC: 192 MG/DL (ref 70–130)
GLUCOSE SERPL-MCNC: 105 MG/DL (ref 65–99)
HCT VFR BLD AUTO: 25.7 % (ref 34–46.6)
HGB BLD-MCNC: 8.1 G/DL (ref 12–15.9)
IRON 24H UR-MRATE: 23 MCG/DL (ref 37–145)
IRON SATN MFR SERPL: 16 % (ref 20–50)
MCH RBC QN AUTO: 31 PG (ref 26.6–33)
MCHC RBC AUTO-ENTMCNC: 31.5 G/DL (ref 31.5–35.7)
MCV RBC AUTO: 98.5 FL (ref 79–97)
MYCOBACTERIUM SPEC CULT: NORMAL
MYCOBACTERIUM SPEC CULT: NORMAL
NIGHT BLUE STAIN TISS: NORMAL
NIGHT BLUE STAIN TISS: NORMAL
PLATELET # BLD AUTO: 193 10*3/MM3 (ref 140–450)
PMV BLD AUTO: 9.6 FL (ref 6–12)
POTASSIUM SERPL-SCNC: 4.3 MMOL/L (ref 3.5–5.2)
QT INTERVAL: 328 MS
QT INTERVAL: 478 MS
QTC INTERVAL: 441 MS
QTC INTERVAL: 473 MS
RBC # BLD AUTO: 2.61 10*6/MM3 (ref 3.77–5.28)
SODIUM SERPL-SCNC: 131 MMOL/L (ref 136–145)
TIBC SERPL-MCNC: 140 MCG/DL (ref 298–536)
TRANSFERRIN SERPL-MCNC: 94 MG/DL (ref 200–360)
WBC NRBC COR # BLD AUTO: 3.44 10*3/MM3 (ref 3.4–10.8)

## 2025-02-11 PROCEDURE — 99232 SBSQ HOSP IP/OBS MODERATE 35: CPT

## 2025-02-11 PROCEDURE — 99232 SBSQ HOSP IP/OBS MODERATE 35: CPT | Performed by: NURSE PRACTITIONER

## 2025-02-11 PROCEDURE — 84466 ASSAY OF TRANSFERRIN: CPT | Performed by: INTERNAL MEDICINE

## 2025-02-11 PROCEDURE — 63710000001 INSULIN LISPRO (HUMAN) PER 5 UNITS: Performed by: ORTHOPAEDIC SURGERY

## 2025-02-11 PROCEDURE — 83540 ASSAY OF IRON: CPT | Performed by: INTERNAL MEDICINE

## 2025-02-11 PROCEDURE — 82948 REAGENT STRIP/BLOOD GLUCOSE: CPT

## 2025-02-11 PROCEDURE — 97535 SELF CARE MNGMENT TRAINING: CPT

## 2025-02-11 PROCEDURE — 85027 COMPLETE CBC AUTOMATED: CPT | Performed by: NURSE PRACTITIONER

## 2025-02-11 PROCEDURE — 93005 ELECTROCARDIOGRAM TRACING: CPT

## 2025-02-11 PROCEDURE — 80048 BASIC METABOLIC PNL TOTAL CA: CPT | Performed by: NURSE PRACTITIONER

## 2025-02-11 PROCEDURE — 97110 THERAPEUTIC EXERCISES: CPT

## 2025-02-11 PROCEDURE — 93010 ELECTROCARDIOGRAM REPORT: CPT | Performed by: INTERNAL MEDICINE

## 2025-02-11 RX ADMIN — PANTOPRAZOLE SODIUM 40 MG: 40 TABLET, DELAYED RELEASE ORAL at 05:57

## 2025-02-11 RX ADMIN — AMIODARONE HYDROCHLORIDE 200 MG: 200 TABLET ORAL at 20:40

## 2025-02-11 RX ADMIN — TRAMADOL HYDROCHLORIDE 50 MG: 50 TABLET, COATED ORAL at 22:08

## 2025-02-11 RX ADMIN — Medication 10 ML: at 20:41

## 2025-02-11 RX ADMIN — Medication 2.5 MG: at 20:40

## 2025-02-11 RX ADMIN — Medication 10 ML: at 09:05

## 2025-02-11 RX ADMIN — ACETAMINOPHEN 1000 MG: 500 TABLET ORAL at 05:57

## 2025-02-11 RX ADMIN — NIFEDIPINE 90 MG: 30 TABLET, FILM COATED, EXTENDED RELEASE ORAL at 09:04

## 2025-02-11 RX ADMIN — ACETAMINOPHEN 1000 MG: 500 TABLET ORAL at 22:09

## 2025-02-11 RX ADMIN — ACETAMINOPHEN 1000 MG: 500 TABLET ORAL at 14:22

## 2025-02-11 RX ADMIN — ALLOPURINOL 200 MG: 100 TABLET ORAL at 09:04

## 2025-02-11 RX ADMIN — FAMOTIDINE 10 MG: 20 TABLET, FILM COATED ORAL at 09:05

## 2025-02-11 RX ADMIN — TRAMADOL HYDROCHLORIDE 50 MG: 50 TABLET, COATED ORAL at 17:41

## 2025-02-11 RX ADMIN — APIXABAN 2.5 MG: 2.5 TABLET, FILM COATED ORAL at 09:04

## 2025-02-11 RX ADMIN — ATORVASTATIN CALCIUM 10 MG: 10 TABLET, FILM COATED ORAL at 20:40

## 2025-02-11 RX ADMIN — APIXABAN 2.5 MG: 2.5 TABLET, FILM COATED ORAL at 20:40

## 2025-02-11 RX ADMIN — INSULIN LISPRO 2 UNITS: 100 INJECTION, SOLUTION INTRAVENOUS; SUBCUTANEOUS at 17:41

## 2025-02-11 NOTE — PROGRESS NOTES
"   LOS: 23 days    Patient Care Team:  Toribio Roberts MD as PCP - General (Internal Medicine)    Subjective     Stable overnight    Objective     Vital Signs:  Blood pressure 156/68, pulse 60, temperature 98.3 °F (36.8 °C), temperature source Oral, resp. rate 18, height 170.2 cm (67.01\"), weight 108 kg (238 lb 1.6 oz), SpO2 98%.      Intake/Output Summary (Last 24 hours) at 2/11/2025 1023  Last data filed at 2/10/2025 1100  Gross per 24 hour   Intake 310 ml   Output 2310 ml   Net -2000 ml          02/10 0701 - 02/11 0700  In: 310   Out: 2310     Physical Exam:  GENERAL: WD WF NAD  NEURO: Awake and alert. No focal deficit  PSYCHIATRIC: Cooperative with PE  CV: No edema  LUNGS:  Quiet,  Nonlabored resp.   ABDOMEN: Nondistended  : No Corral  SKIN: Warm and dry without rash  + RIJ TDC  Left BKA     Labs:  Results from last 7 days   Lab Units 02/11/25  0447 02/05/25  1336 02/04/25  1109   WBC 10*3/mm3 3.44 5.58 6.86   HEMOGLOBIN g/dL 8.1* 9.2* 9.2*   PLATELETS 10*3/mm3 193 268 235     Results from last 7 days   Lab Units 02/11/25  0447 02/10/25  0733 02/07/25  0814 02/05/25  1336   SODIUM mmol/L 131* 132* 136 138   POTASSIUM mmol/L 4.3 4.6 3.6 3.8   CHLORIDE mmol/L 99 99 100 100   CO2 mmol/L 22.0 23.0 24.0 25.0   BUN mg/dL 27* 48* 32* 14   CREATININE mg/dL 3.45* 5.27* 4.22* 2.20*   CALCIUM mg/dL 8.4* 8.5* 8.5* 8.7   PHOSPHORUS mg/dL  --  4.7* 4.0 2.8   MAGNESIUM mg/dL  --   --   --  1.9   ALBUMIN g/dL  --  2.2* 2.3* 2.8*           A/P:    ESRD: On HD MWF at [facility] [place] E[facility] with NAL .  Continue outpatient schedule. She is interested in home dialysis. Discussed with home program they will reach out to patient for further education as outpatient.  Continue outpatient HD schedule.    HTN: Blood pressure stable.  Watch with HD.    Hyponatremia: Adjust with HD.  Limit free water.     Hyperkalemia: Resolved.  Adjust with HD     Metabolic acidosis:  Adjust with HD     Anemia: Hemoglobin below goal.  Patient on Epogen.  Will " adjust Epogen dose.  Need iron studies.  Transfuse as indicated for Hgb <7.     Volume: Stable.  Earlier in admission patient with orthostatic hypotension with intravascular volume depletion.  Monitor with UF on HD.     Hyperphosphatemia: Phos stable at 4.7.  Poor p.o. intake.  Binders on hold for now.     Nutrition: Low potassium low Phos high-protein diet.  Renal vitamin.    PVD: Underwent left BKA.     High risk and complexity patient.     Woo Barnes MD  02/11/25  10:23 EST

## 2025-02-11 NOTE — PROGRESS NOTES
[facility] Medicine Services  PROGRESS NOTE    Patient Name: Sara Esparza  : 1950  MRN: [id number]    Date of Admission: 2025  Primary Care Physician: Toribio Roberts MD    Subjective     CC: f/u osteomyelitis     HPI:  She was seen resting up in bed drowsy but awake.   at bedside.  States that she feels a little better today.  Pain is controlled.  Tolerating diet.  Eager to get to rehab. No new issues expressed       Objective     Vital Signs:   Temp:  [97.9 °F (36.6 °C)-98.6 °F (37 °C)] 98.3 °F (36.8 °C)  Heart Rate:  [58-67] 58  Resp:  [16-18] 18  BP: (153-168)/(64-73) 163/66     Physical Exam:  Constitutional: No acute distress, awake but drowsy.  Resting up in bed.   at bedside.  Awakens to voice.  Chronically ill, frail and debilitated appearing female.  More interactive today.  HENT: NCAT, mucous membranes moist  Respiratory: Clear to auscultation bilaterally, respiratory effort normal on room air.  Cardiovascular: Mild sinus adalid to RRR, no murmurs, rubs, or gallops  Gastrointestinal: Positive bowel sounds, soft, nontender, nondistended  Musculoskeletal: No RLE edema.  [place]root spontaneously.  Left BKA dressing in place dry and intact.   Psychiatric: flat affect, cooperative  Neurologic: Oriented to person, posthospital, Burden and most general events.  More interactive today.  Obvious short-term memory deficits, strength symmetric in all extremities generally weak, speech clear.  Follows commands.  Skin: No rashes.  Right upper chest wall tunneled HD cath with dressing dry and intact.        Results Reviewed:  LAB RESULTS:      Lab 25  1336   WBC 3.44 5.58   HEMOGLOBIN 8.1* 9.2*   HEMATOCRIT 25.7* 29.4*   PLATELETS 193 268   MCV 98.5* 100.7*         Lab 25  0447 02/10/25  0733 25  0814 25  1336   SODIUM 131* 132* 136 138   POTASSIUM 4.3 4.6 3.6 3.8   CHLORIDE 99 99 100 100   CO2 22.0 23.0 24.0 25.0   ANION  GAP 10.0 10.0 12.0 13.0   BUN 27* 48* 32* 14   CREATININE 3.45* 5.27* 4.22* 2.20*   EGFR 13.3* 8.0* 10.5* 22.9*   GLUCOSE 105* 103* 96 105*   CALCIUM 8.4* 8.5* 8.5* 8.7   MAGNESIUM  --   --   --  1.9   PHOSPHORUS  --  4.7* 4.0 2.8         Lab 02/10/25  0733 02/07/25  0814 02/05/25  1336   ALBUMIN 2.2* 2.3* 2.8*               Lab 02/11/25  0447   IRON 23*   IRON SATURATION (TSAT) 16*   TIBC 140*   TRANSFERRIN 94*     Brief Urine Lab Results  (Last result in the past 365 days)        Color   Clarity   Blood   Leuk Est   Nitrite   Protein   CREAT   Urine HCG        01/19/25 1404 Yellow   Cloudy   Moderate (2+)   Moderate (2+)   Negative   >=300 mg/dL (3+)                 Microbiology Results Abnormal       Procedure Component Value - Date/Time    Anaerobic Culture - Surgical Site, Foot, Left [[id number]]  (Abnormal) Collected: 01/24/25 1754    Lab Status: Final result Specimen: Surgical Site from Foot, Left Updated: 01/30/25 1515     Anaerobic Culture Cutibacterium acnes    Body Fluid Culture - Surgical Site, Foot, Left [[id number]]  (Abnormal)  (Susceptibility) Collected: 01/24/25 1754    Lab Status: Final result Specimen: Surgical Site from Foot, Left Updated: 01/27/25 0642     Body Fluid Culture Light growth (2+) Staphylococcus aureus, MRSA     Comment:   Methicillin resistant Staphylococcus aureus, Patient may be an isolation risk.        Gram Stain Rare (1+) WBCs seen      No organisms seen    Narrative:      Not a body fluid.    Susceptibility        Staphylococcus aureus, MRSA      TAYLOR      Clindamycin Susceptible      Erythromycin Resistant      Oxacillin Resistant      Rifampin Susceptible      Tetracycline Susceptible      Trimethoprim + Sulfamethoxazole Susceptible      Vancomycin Susceptible                           Wound Culture - Wound, Foot, Left [[id number]]  (Abnormal)  (Susceptibility) Collected: 01/20/25 1409    Lab Status: Final result Specimen: Wound from Foot, Left Updated: 01/24/25 0658     Wound  Culture Scant growth (1+) Staphylococcus aureus, MRSA     Comment: Methicillin resistant Staphylococcus aureus, Patient may be an isolation risk.         Rare growth Morganella morganii ssp morganii     Comment:            Scant growth (1+) Normal Skin Kimber     Gram Stain Few (2+) WBCs seen      Many (4+) Gram positive cocci in pairs      Rare (1+) Gram negative bacilli      Rare (1+) Gram positive bacilli    Susceptibility        Staphylococcus aureus, MRSA      TAYLOR      Clindamycin Susceptible      Erythromycin Resistant      Oxacillin Resistant      Rifampin Susceptible      Tetracycline Susceptible      Trimethoprim + Sulfamethoxazole Susceptible      Vancomycin Susceptible                       Susceptibility        Morganella morganii ssp morganii      TAYLOR      Amoxicillin + Clavulanate Resistant      Ampicillin Resistant      Ampicillin + Sulbactam Resistant      Cefazolin (Non Urine) Resistant      Cefepime Susceptible  [1]       Cefotaxime Susceptible      Ceftazidime Susceptible  [1]       Gentamicin Susceptible      Levofloxacin Susceptible      Piperacillin + Tazobactam Susceptible      Tetracycline Susceptible      Trimethoprim + Sulfamethoxazole Susceptible                   [1]  Appended report. These results have been appended to a previously final verified report.                Susceptibility Comments       Morganella morganii ssp morganii    Cefotaxime susceptibility can be used as a surrogate for ceftriaxone susceptibility               MRSA Screen, PCR (Inpatient) - Swab, Nares [[id number]]  (Abnormal) Collected: 01/21/25 0614    Lab Status: Final result Specimen: Swab from Nares Updated: 01/21/25 0820     MRSA PCR Positive    Narrative:      The negative predictive value of this diagnostic test is high and should only be used to consider de-escalating anti-MRSA therapy. A positive result may indicate colonization with MRSA and must be correlated clinically.          No radiology results from  the last 24 hrs    Results for orders placed during the hospital encounter of 01/19/25    Adult Transthoracic Echo Complete W/ Cont if Necessary Per Protocol    Interpretation Summary    Left ventricular systolic function is normal. Estimated left ventricular EF = 55%    Left ventricular wall thickness is consistent with moderate concentric hypertrophy.    No hemodynamically significant valvular heart disease    Current medications:  Scheduled Meds:acetaminophen, 1,000 mg, Oral, Q8H  albumin human, , ,   allopurinol, 200 mg, Oral, Daily  amiodarone, 200 mg, Oral, Q24H  apixaban, 2.5 mg, Oral, Q12H  atorvastatin, 10 mg, Oral, Nightly  [START ON 2/12/2025] epoetin gracia/gracia-epbx, 10,000 Units, Subcutaneous, Once per day on Monday Wednesday Friday  famotidine, 10 mg, Oral, Daily  insulin lispro, 2-9 Units, Subcutaneous, 4x Daily AC & at Bedtime  NIFEdipine XL, 90 mg, Oral, Q24H  pantoprazole, 40 mg, Oral, Q AM  senna-docusate sodium, 2 tablet, Oral, BID  sodium chloride, 10 mL, Intravenous, Q12H      Continuous Infusions:     PRN Meds:.  albumin human    senna-docusate sodium **AND** polyethylene glycol **AND** bisacodyl **AND** bisacodyl    Calcium Replacement - Follow Nurse / BPA Driven Protocol    cloNIDine    dextrose    dextrose    glucagon (human recombinant)    heparin (porcine)    Magnesium Low Dose Replacement - Follow Nurse / BPA Driven Protocol    melatonin    metoprolol tartrate    ondansetron    Potassium Replacement - Follow Nurse / BPA Driven Protocol    [COMPLETED] Insert Peripheral IV **AND** sodium chloride    sodium chloride    sodium chloride    traMADol    Assessment & Plan     Active Hospital Problems    Diagnosis  POA    **Generalized weakness [R53.1]  Yes    Paroxysmal A-fib [I48.0]  Unknown    Sepsis due to methicillin resistant Staphylococcus aureus (MRSA) with encephalopathy without septic shock [A41.02, R65.20, G93.41]  Unknown    Chronic osteomyelitis of left foot [M86.672]  Unknown     Critical limb ischemia of left lower extremity [I70.222]  Unknown    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     Brief Hospital Course to date:  Sara Esparza is a 74 y.o. female with PMH significant for HTN, PAF (Eliquis), prior DVT/PE, ESRD on HD, DMII, asthma, DENZEL (CPAP). Presented to [facility] ED on 1/19/25 for evaluation of generalized weakness, myalgias, lightheadedness and falls. On 1/20, she developed fevers. Source ultimately found to be related to L calcaneal osteomyelitis. ID / ortho consulted to follow. She is s/p debridement and wound vac placement 1/24/25. Ortho recommended BKA. In an effort to optimize arterial flow and wound healing, Dr. Ramirez of vascular surgery performed LLE angiogram with angioplasty/lithotripsy on 2/3/25. She was agreeable to L BKA and this is planned for 2/4/25    This patient's problems and plans were partially entered by my partner and updated as appropriate by me 02/11/25.    Assessment/Plan:    Sepsis secondary to Lt calcaneal osteomyelitis  Metabolic encephalopathy  Lt posterial tibial artery occlusion  Met sepsis criteria on 1/20 with fever 102.6F, 's, leukocytosis (WBC 15K), source of infection and altered mental status   1/21/25 CT LLE w/o contrast showed draining wound seen along plantar aspect of left calcaneus with surrounding changes consistent with osteomyelitis  S/p LLE angiogram with lithotripsy / angioplasty by Dr. Ramirez 2/3/25.  Vascular surgery signed off.  No follow-up required.  She is s/p debridement and wound vac placement 1/24/25 - probability of limb salvage felt to be poor.  She is s/p Lt BKA on 2/4/25 by Dr. Gonzalez.  Follow-up with Dr. Gonzalez in 2 weeks.  Wound culture (+) MRSA / Morganella   1/24 surgical  cultures growing MRSA and Cutibacterium acnes  S/p IV Vanc/Ceftriaxone, per ID.  ID has since signed off with no follow-up required.  PT / OT recommended SNF. CM following   Addendum: 1540 p.m.  Patient has not received a bed at [facility] with plans to transfer tomorrow at 1600 after HD.  Per CM, facility will transport to and from HD as prior arranged on Monday/Wednesday/Fridays.    DC recs per ortho:  Nonweightbearing left lower extremity  Follow cultures; antibiotic management per infectious disease, appreciate their recommendations  Dressing changed, incision inspected and drain removed at bedside 2/6/2025  Continue daily dressing changes per nursing staff: Xeroform, 4 x 4, Kerlix, Ace  -- Follow-up 2 weeks for incision check and x-rays.       Generalized weakness, progressive x weeks  Generalized debility   Falls at home   CT chest/abd/pelvis without acute findings   Normal TSH and CK   PT/OT/CM following -to rehab tomorrow afternoon.     Neck pain, C7 region   C5-6 canal stenosis  Chronic back pain s/p spinal stimulator  CT C-spine - no fractures  Reported history of Oxycodone dependence - attempted to avoid opiates however due to uncontrolled pain she was started on Oxycodone 5mg Q4H PRN on 2/1/25  Continue Lidocaine patches  Partner recommended that patient/ reach out to her pain management clinic for questions re: spinal stimulator     ESRD on HD  Has tunneled Rt IJ catheter - recent HD start in 9/2024  Nephrology following.  Can transfer to facility after HD tomorrow.  Will follow at dialysis center.  Continue MWF HD   Monitor labs      Type 2 diabetes  A1c 4.9  On Sitagliptin at home  On SSI only with fairly normal glucose      Hypertension  Continue nifedipine XL 90 mg daily  Was on Coreg at home but held because of bradycardia  As needed clonidine    Paroxysmal Afib  Developed bradycardia while on beta-blockers   Now on amiodarone  Cardiology following.  Okay for transfer tomorrow  on current medications with follow-up in 1 month   Continue Eliquis    Elevated troponin  In setting of ESRD  Troponin peaked at 132 on 1/19/25  Per chart review, had LHC recently at [facility] with non-obstructive CAD     Dyspepsia  GI cocktail PRN     Constipation  Likely due to opiates  Improved with bowel regimen and addition of Lactulose   Bowel regimen transitioned to PRN - appears constipated again. Restarted bowel regimen on 2/6      Expected Discharge Location and Transportation: to [place] [facility] tomorrow at 1600 after HD.  Facility will transfer to and from HD on her prior Monday/Wednesday/Friday schedule    Expected Discharge Expected Discharge Date: 2/12/2025; Expected Discharge Time:      VTE Prophylaxis: Pharmacologic & mechanical VTE prophylaxis orders are present.    AM-PAC 6 Clicks Score (PT): 6 (02/11/25 0800)    CODE STATUS:   Code Status and Medical Interventions: CPR (Attempt to Resuscitate); Full Support   Ordered at: 01/19/25 0518     Level Of Support Discussed With:    Patient     Code Status (Patient has no pulse and is not breathing):    CPR (Attempt to Resuscitate)     Medical Interventions (Patient has pulse or is breathing):    Full Support     Imelda Roy, APRN  02/11/25

## 2025-02-11 NOTE — CASE MANAGEMENT/SOCIAL WORK
Case Management Discharge Note      Final Note: Pt is discharging to SNF @ [facility] tomorrow, 2/12. CM confirmed with [facility] facility liaison, Pt can be accepted. Facility will provide transportation to and from  @ [facility] SW on MWF @ 0930 chairtime. CM will notify [facility] of discharge and fax a copy of the d/c summary to [phone], when available. [facility] will retrieve discharge summary from [facility]. RN to call report to [phone]. [facility]  [facility] 2/12 @ 1600. PCS was scanned into dropbox. Provider and RN aware. Any controlled meds will be escribed to [facility] (pharmacy verified in [facility]). Pt and spouse, Macario, are aware and agreeable to plan. No other discharge needs identified.         Selected Continued Care - Admitted Since 1/19/2025       Destination Coordination complete.      Service Provider Services Address Phone Fax Patient Preferred    [facility] [address] [facility] [address] [phone] --              Durable Medical Equipment    No services have been selected for the patient.                Dialysis/Infusion    No services have been selected for the patient.                Home Medical Care    No services have been selected for the patient.                Therapy    No services have been selected for the patient.                Community Resources    No services have been selected for the patient.                Community & DME    No services have been selected for the patient.                    Transportation Services  Ambulance: [facility] (2/12 @ 1600)    Final Discharge Disposition Code: 03 - skilled nursing facility (SNF)

## 2025-02-11 NOTE — PLAN OF CARE
Goal Outcome Evaluation:      Pt alert and oriented to self and place. RA, NSR/sinus adalid. VSS. LBKA changed last night. Staples intact. Pt tolerated dressing change well.  present at bedside. Bed alarm active and audible, bed in lowest position, call light within reach, and no other requests at this time. Care on-going.

## 2025-02-11 NOTE — THERAPY TREATMENT NOTE
Patient Name: Sara Esparza  : 1950    MRN: [id number]                              Today's Date: 2025       Admit Date: 2025    Visit Dx:     ICD-10-CM ICD-9-CM   1. S/P BKA (below knee amputation), left  Z89.512 V49.75   2. Generalized weakness  R53.1 780.79   3. Frequent falls  R29.6 V15.88   4. Chronic kidney disease, unspecified CKD stage  N18.9 585.9   5. Chronic anemia  D64.9 285.9   6. Type 2 diabetes mellitus with stage 5 chronic kidney disease not on chronic dialysis, without long-term current use of insulin  E11.22 250.40    N18.5 585.5   7. Chronic osteomyelitis of left foot  M86.672 730.17   8. Critical limb ischemia of left lower extremity  I70.222 440.22   9. Sepsis due to methicillin resistant Staphylococcus aureus (MRSA) with encephalopathy without septic shock  A41.02 038.12    R65.20 995.92    G93.41 348.31     Patient Active Problem List   Diagnosis    Postoperative abdominal hernia    ESRD (end stage renal disease)    Type 2 diabetes mellitus with stage 5 chronic kidney disease not on chronic dialysis, without long-term current use of insulin    Hypokalemia    Malnutrition    Severe malnutrition    Generalized weakness    Chronic osteomyelitis of left foot    Paroxysmal A-fib    Sepsis due to methicillin resistant Staphylococcus aureus (MRSA) with encephalopathy without septic shock    Critical limb ischemia of left lower extremity     Past Medical History:   Diagnosis Date    Anxiety     Arthritis     Asthma     allergy induced    Back pain     Depression     Diabetes mellitus     dx 10 years ago- checks fsbs most days    Diverticula of colon     GERD (gastroesophageal reflux disease)     Head ache     Hypertension     Sleep apnea     no longer needs cpap - approx. 10 years r/t weight loss     Past Surgical History:   Procedure Laterality Date    AORTAGRAM Left 2/3/2025    Procedure: CFA ACCESS RIGHT - ULTRASOUND GUIDED, AORTOGRAM WITH LEFT LOWER EXTREMITY RUN OFF,  ANGIOPLASTY, INTRAVASCULAR LITHOTRIPSY, RIGHT CFA CLOSURE;  Surgeon: Kyle Ramirez MD;  Location:  [facility];  Service: Vascular;  Laterality: Left;  fluoro 10 minutes  dose - 61mgy  contrast 20ml    BACK SURGERY      x 2    BELOW KNEE AMPUTATION Left 2/4/2025    Procedure: BELOW KNEE AMPUTATION LEFT;  Surgeon: Rom Gonzalez Jr., MD;  Location:  [facility];  Service: Orthopedics;  Laterality: Left;    CARDIAC CATHETERIZATION      no intervention    CHOLECYSTECTOMY OPEN      COLONOSCOPY      2013    FOOT IRRIGATION, DEBRIDEMENT AND REPAIR Left 1/24/2025    Procedure: HEEL IRRIGATION AND DEBRIDEMENT LEFT;  Surgeon: Rom Gonzalez Jr., MD;  Location:  [facility];  Service: Orthopedics;  Laterality: Left;    HYSTERECTOMY      PLACEMENT OF ANTIBIOTIC FILLER Left 2/4/2025    Procedure: PLACEMENT OF ANTIBIOTIC CEMENT FILLER FOR BONE VOID;  Surgeon: Rom Gonzalez Jr., MD;  Location:  [facility];  Service: Orthopedics;  Laterality: Left;    PLACEMENT OF WOUND VAC Left 1/24/2025    Procedure: PLACEMENT OF WOUND VAC;  Surgeon: Rom Gonzalez Jr., MD;  Location:  [facility];  Service: Orthopedics;  Laterality: Left;    REPLACEMENT TOTAL KNEE BILATERAL Bilateral     TONSILLECTOMY      VENTRAL/INCISIONAL HERNIA REPAIR N/A 6/6/2017    Procedure: INCISIONAL HERNIA REPAIR LAPAROSCOPIC;  Surgeon: Conrad Hernandez MD;  Location:  [facility];  Service:       General Information       Row Name 02/11/25 1207          OT Time and Intention    Subjective Information complains of;fatigue  -WILFREDO     Document Type therapy note (daily note)  -WILFREDO     Mode of Treatment individual therapy;occupational therapy  -WILFREDO     Patient Effort good  -WILFREDO     Symptoms Noted During/After Treatment increased pain  -WILFREDO     Comment increase in back pain  -WILFREDO       Row Name 02/11/25 1207          General Information    Patient Profile Reviewed yes  -WILFREDO     Existing Precautions/Restrictions fall;non-weight bearing;other (see comments)  L ENOCH, JOSIE LLE,  pt. with dialysis port with dialysis MWF  -WILFREDO     Barriers to Rehab medically complex;previous functional deficit;impaired sensation;ineffective coping;cognitive status  -WILFREDO       Row Name 02/11/25 1207          Cognition    Orientation Status (Cognition) oriented to;person;time;verbal cues/prompts needed for orientation;disoriented to;place  -WILFREDO       Row Name 02/11/25 1207          Safety Issues/Impairments Affecting Functional Mobility    Safety Issues Affecting Function (Mobility) ability to follow commands;awareness of need for assistance;insight into deficits/self-awareness;safety precaution awareness;safety precautions follow-through/compliance;sequencing abilities  -WILFREDO     Impairments Affecting Function (Mobility) balance;cognition;endurance/activity tolerance;pain;postural/trunk control;range of motion (ROM);sensation/sensory awareness;strength  -WILFREDO     Cognitive Impairments, Mobility Safety/Performance insight into deficits/self-awareness;safety precaution awareness;safety precaution follow-through;judgment;problem-solving/reasoning;sequencing abilities  -WILFREDO               User Key  (r) = Recorded By, (t) = Taken By, (c) = Cosigned By      Initials Name Provider Type    Eliana Grant OT Occupational Therapist                     Mobility/ADL's       Row Name 02/11/25 1209          Bed Mobility    Comment, (Bed Mobility) deferred with focus on other this date  -       Row Name 02/11/25 1209          Activities of Daily Living    BADL Assessment/Intervention grooming  -       Row Name 02/11/25 1209          Grooming Assessment/Training    [place] Level (Grooming) oral care regimen;minimum assist (75% patient effort);hair care, combing/brushing;wash face, hands;set up;verbal cues  -WILFREDO     Position (Grooming) sitting up in bed  -WILFREDO     Comment, (Grooming) encouragement needed  -WILFREDO               User Key  (r) = Recorded By, (t) = Taken By, (c) = Cosigned By      Initials Name Provider Type    WILFREDO  Eliana Peace OT Occupational Therapist                   Obj/Interventions       Row Name 02/11/25 1209          Shoulder (Therapeutic Exercise)    Shoulder (Therapeutic Exercise) AROM (active range of motion);strengthening exercise  -     Shoulder AROM (Therapeutic Exercise) bilateral;flexion;sitting;10 repetitions  -     Shoulder Strengthening (Therapeutic Exercise) bilateral;extension;sitting;red;resistance band;external rotation;horizontal aBduction/aDduction  OT had to hold one end of band for extension  -       Row Name 02/11/25 1209          Elbow/Forearm (Therapeutic Exercise)    Elbow/Forearm (Therapeutic Exercise) strengthening exercise  -WILFREDO     Elbow/Forearm Strengthening (Therapeutic Exercise) bilateral;flexion;extension;sitting;resistance band;red;10 repetitions  OT had to hold one end of band for extension/flexion  -       Row Name 02/11/25 1209          Motor Skills    Therapeutic Exercise shoulder;elbow/forearm  AROM issued and slightly reviewed, further review needed, 3 core TE completed, reaching forward and across L and R and using railing to pull self up to long sit 5 reps each, LLE AAROM hip flexion, 10 reps each BLE knee press with buttock squeeze & BLE IR  -WILFREDO               User Key  (r) = Recorded By, (t) = Taken By, (c) = Cosigned By      Initials Name Provider Type    WILFREDO Eliana Peace, ZULEYKA Occupational Therapist                   Goals/Plan       [facility] Name 02/11/25 1217          Bed Mobility Goal 1 (OT)    Progress/Outcomes (Bed Mobility Goal 1, OT) goal ongoing  -[facility] Name 02/11/25 1217          Transfer Goal 1 (OT)    Progress/Outcome (Transfer Goal 1, OT) goal ongoing  -[facility] Name 02/11/25 1217          Toileting Goal 1 (OT)    Progress/Outcome (Toileting Goal 1, OT) goal ongoing  -[facility] Name 02/11/25 1217          Grooming Goal 1 (OT)    Progress/Outcome (Grooming Goal 1, OT) continuing progress toward goal;goal ongoing  -WILFREDO       Row Name 02/11/25 1217           Strength Goal 1 (OT)    Progress/Outcome (Strength Goal 1, OT) good progress toward goal;goal ongoing  -WILFREDO               User Key  (r) = Recorded By, (t) = Taken By, (c) = Cosigned By      Initials Name Provider Type    Eliana Grant, OT Occupational Therapist                   Clinical Impression       Row Name 02/11/25 1212          Pain Assessment    Pretreatment Pain Rating 3/10  -WILFREDO     Posttreatment Pain Rating 9/10  -WILFREDO     Pain Location back  -WILFREDO     Pain Side/Orientation generalized;lower  -WILFREDO     Pain Management Interventions positioning techniques utilized;exercise or physical activity utilized  nurse unavailable at end of session, educated pt. and wife to call out in grossly 10 minutes post session to alert to further pain med needs  -WILFREDO     Response to Pain Interventions activity participation with tolerable pain  -       Row Name 02/11/25 1212          Plan of Care Review    Plan of Care Reviewed With patient;spouse  -WILFREDO     Progress improving  -WILFREDO     Outcome Evaluation Patient was able to progress with core and UE tband and AROM TE to support bed mobility, BADLs and realated trasnfers. Good completion grooming bed level, but need to progress to unsupported sit level.  No LLE pain complaints, but with complaints of back pain today. Continue OT POC.  -       Row Name 02/11/25 1212          Therapy Assessment/Plan (OT)    Rehab Potential (OT) fair  -WILFREDO     Criteria for Skilled Therapeutic Interventions Met (OT) yes;meets criteria;skilled treatment is necessary  -     Therapy Frequency (OT) daily  -       Row Name 02/11/25 1212          Therapy Plan Review/Discharge Plan (OT)    Anticipated Discharge Disposition (OT) skilled nursing facility  -       Row Name 02/11/25 1212          Vital Signs    Pre Systolic BP Rehab 156  -WILFREDO     Pre Treatment Diastolic BP 68  -WILFREDO     Post Systolic BP Rehab 169  -WILFREDO     Post Treatment Diastolic BP 71  -WILFREDO     Pretreatment Heart Rate (beats/min)  57  -WILFREDO     Posttreatment Heart Rate (beats/min) 60  -WILFREDO     Pre SpO2 (%) 97  -WILFREDO     O2 Delivery Pre Treatment room air  -WILFREDO     O2 Delivery Intra Treatment room air  -WILFREDO     Post SpO2 (%) 99  -WILFREDO     O2 Delivery Post Treatment room air  -WILFREDO     Pre Patient Position Supine  -WILFREDO     Intra Patient Position Supine  -WILFREDO     Post Patient Position Supine  -WILFREDO       Row Name 02/11/25 1212          Positioning and Restraints    Pre-Treatment Position in bed  -WILFREDO     Post Treatment Position bed  -WILFREDO     In Bed fowlers;call light within reach;encouraged to call for assist;exit alarm on;side rails up x3;with family/caregiver;LLE elevated  assist with wedges to keep RLE in straight position  -WILFREDO               User Key  (r) = Recorded By, (t) = Taken By, (c) = Cosigned By      Initials Name Provider Type    Eliana Grant, OT Occupational Therapist                   Outcome Measures       Row Name 02/11/25 1217          How much help from another is currently needed...    Putting on and taking off regular lower body clothing? 1  -WILFREDO     Bathing (including washing, rinsing, and drying) 2  -WILFREDO     Toileting (which includes using toilet bed pan or urinal) 1  -WILFREDO     Putting on and taking off regular upper body clothing 2  -WILFREDO     Taking care of personal grooming (such as brushing teeth) 3  -WILFREDO     Eating meals 3  -WILFREDO     AM-PAC 6 Clicks Score (OT) 12  -WILFREDO       Row Name 02/11/25 0800          How much help from another person do you currently need...    Turning from your back to your side while in flat bed without using bedrails? 1  -LL     Moving from lying on back to sitting on the side of a flat bed without bedrails? 1  -LL     Moving to and from a bed to a chair (including a wheelchair)? 1  -LL     Standing up from a chair using your arms (e.g., wheelchair, bedside chair)? 1  -LL     Climbing 3-5 steps with a railing? 1  -LL     To walk in hospital room? 1  -LL     AM-PAC 6 Clicks Score (PT) 6  -LL     Highest Level of  Mobility Goal 2 --> Bed activities/dependent transfer  -       Row Name 02/11/25 1217          Functional Assessment    Outcome Measure Options AM-PAC 6 Clicks Daily Activity (OT)  -WILFREDO               User Key  (r) = Recorded By, (t) = Taken By, (c) = Cosigned By      Initials Name Provider Type    Eliana Grant, OT Occupational Therapist    Billie Morales, RN Registered Nurse                    Occupational Therapy Education       Title: PT OT SLP Therapies (Done)       Topic: Occupational Therapy (Done)       Point: ADL training (Done)       Description:   Instruct learner(s) on proper safety adaptation and remediation techniques during self care or transfers.   Instruct in proper use of assistive devices.                  Learning Progress Summary            Patient Acceptance, E,D,H, VU,NR by WILFREDO at 2/11/2025 1217    Comment: UE, core and LE TE to support ADL independence, BAD progression, RLE positioning   Family Acceptance, E,D,H, VU,NR by WILFREDO at 2/11/2025 1217    Comment: UE, core and LE TE to support ADL independence, BAD progression, RLE positioning                      Point: Home exercise program (Done)       Description:   Instruct learner(s) on appropriate technique for monitoring, assisting and/or progressing therapeutic exercises/activities.                  Learning Progress Summary            Patient Acceptance, E,D,H, VU,NR by WILFREDO at 2/11/2025 1217    Comment: UE, core and LE TE to support ADL independence, BAD progression, RLE positioning    Acceptance, E,D, VU,NR by WILFREDO at 2/6/2025 1119    Comment: reason for consult, noted deficits, reason for delayed pain meds for safety, UE TE, position techniques for function, midline sitting   Family Acceptance, E,D,H, VU,NR by WILFREDO at 2/11/2025 1217    Comment: UE, core and LE TE to support ADL independence, BAD progression, RLE positioning    Acceptance, E,D, VU,NR by WILFREDO at 2/6/2025 1119    Comment: reason for consult, noted deficits, reason for  delayed pain meds for safety, UE TE, position techniques for function, midline sitting                      Point: Precautions (Done)       Description:   Instruct learner(s) on prescribed precautions during self-care and functional transfers.                  Learning Progress Summary            Patient Acceptance, E,D,H, VU,NR by WILFREDO at 2/11/2025 1217    Comment: UE, core and LE TE to support ADL independence, BAD progression, RLE positioning    Acceptance, E,D, VU,NR by WILFREDO at 2/6/2025 1119    Comment: reason for consult, noted deficits, reason for delayed pain meds for safety, UE TE, position techniques for function, midline sitting   Family Acceptance, E,D,H, VU,NR by WILFREDO at 2/11/2025 1217    Comment: UE, core and LE TE to support ADL independence, BAD progression, RLE positioning    Acceptance, E,D, VU,NR by WILFREDO at 2/6/2025 1119    Comment: reason for consult, noted deficits, reason for delayed pain meds for safety, UE TE, position techniques for function, midline sitting                      Point: Body mechanics (Done)       Description:   Instruct learner(s) on proper positioning and spine alignment during self-care, functional mobility activities and/or exercises.                  Learning Progress Summary            Patient Acceptance, E,D, VU,NR by WILFREDO at 2/6/2025 1119    Comment: reason for consult, noted deficits, reason for delayed pain meds for safety, UE TE, position techniques for function, midline sitting   Family Acceptance, E,D, VU,NR by WILFREDO at 2/6/2025 1119    Comment: reason for consult, noted deficits, reason for delayed pain meds for safety, UE TE, position techniques for function, midline sitting                                      User Key       Initials Effective Dates Name Provider Type Discipline     07/11/23 -  Eliana Peace, OT Occupational Therapist OT                  OT Recommendation and Plan  Planned Therapy Interventions (OT): activity tolerance training, adaptive equipment  training, BADL retraining, cognitive/visual perception retraining, functional balance retraining, occupation/activity based interventions, patient/caregiver education/training, strengthening exercise, ROM/therapeutic exercise, transfer/mobility retraining  Therapy Frequency (OT): daily  Plan of Care Review  Plan of Care Reviewed With: patient, spouse  Progress: improving  Outcome Evaluation: Patient was able to progress with core and UE tband and AROM TE to support bed mobility, BADLs and realated trasnfers. Good completion grooming bed level, but need to progress to unsupported sit level.  No LLE pain complaints, but with complaints of back pain today. Continue OT POC.     Time Calculation:   Evaluation Complexity (OT)  Review Occupational Profile/Medical/Therapy History Complexity: expanded/moderate complexity  Assessment, Occupational Performance/Identification of Deficit Complexity: 3-5 performance deficits  Clinical Decision Making Complexity (OT): detailed assessment/moderate complexity  Overall Complexity of Evaluation (OT): moderate complexity     Time Calculation- OT       Row Name 02/11/25 1218             Time Calculation- OT    OT Start Time 0952  -WILFREDO      OT Received On 02/11/25  -WILFREDO      OT Goal Re-Cert Due Date 02/16/25  -WILFREDO         Timed Charges    18528 - OT Therapeutic Exercise Minutes 31  -WILFREDO      44837 - OT Self Care/Mgmt Minutes 10  -WILFREDO         Total Minutes    Timed Charges Total Minutes 41  -WILFREDO       Total Minutes 41  -WILFREDO                User Key  (r) = Recorded By, (t) = Taken By, (c) = Cosigned By      Initials Name Provider Type    Eliana Grant OT Occupational Therapist                  Therapy Charges for Today       Code Description Service Date Service Provider Modifiers Qty    76793092153  OT THER PROC EA 15 MIN 2/11/2025 Eliana Peace OT GO 2    [id number]  OT SELF CARE/MGMT/TRAIN EA 15 MIN 2/11/2025 Eliana Peace OT GO 1                 Eliana Peace OT  2/11/2025

## 2025-02-11 NOTE — PROGRESS NOTES
"[facility]  Hospital Progress Note     LOS: 23 days   Patient Care Team:  Toribio Roberts MD as PCP - General (Internal Medicine)  PCP:  Toribio Roberts MD    Chief Complaint: afib    SUBJECTIVE: Sitting up in bed, more alert. Pain controlled.       OBJECTIVE:       Vital Sign Min/Max for last 24 hours  Temp  Min: 97.9 °F (36.6 °C)  Max: 98.6 °F (37 °C)   BP  Min: 153/72  Max: 168/73   Pulse  Min: 58  Max: 67   Resp  Min: 16  Max: 18   SpO2  Min: 97 %  Max: 98 %   No data recorded   No data recorded     Flowsheet Rows      Flowsheet Row First Filed Value   Admission Height 170.2 cm (67\") Documented at 01/19/2025 0307   Admission Weight 108 kg (238 lb 1.6 oz) Documented at 01/19/2025 0307          Telemetry: SR/SB    No intake or output data in the 24 hours ending 02/11/25 1449  2/10/25 - 2310 out with dialysis    Intake & Output (last 3 days)         02/07 0701  02/08 0700 02/08 0701  02/09 0700 02/09 0701  02/10 0700 02/10 0701  02/11 0700    P.O. 238       Total Intake(mL/kg) 238 (2.2)       Stool 0       Dialysis 2250       Total Output 2250       Net -2012               Stool Unmeasured Occurrence 1 x 5 x             Physical Exam:  Vitals reviewed.   Constitutional:       General: Not in acute distress.     Appearance: Chronically ill-appearing.   Pulmonary:      Effort: Pulmonary effort is normal.      Breath sounds: Normal breath sounds.   Chest:      Chest wall: Not tender to palpatation.   Cardiovascular:      Normal rate. Regular rhythm. Normal S1. Normal S2.       Murmurs: There is no murmur.   Pulses:     Comments: Right pedal pulses palpable, Left BKA  Edema:     Peripheral edema absent.   Abdominal:      General: Bowel sounds are normal.      Palpations: Abdomen is soft.      Tenderness: There is no abdominal tenderness.   Musculoskeletal:      Comments: Left BKA - leg brace intact Skin:     General: Skin is warm and dry.   Neurological:      Mental Status: " Alert.        LABS/DIAGNOSTIC DATA:  Results from last 7 days   Lab Units 02/11/25  0447 02/05/25  1336   WBC 10*3/mm3 3.44 5.58   HEMOGLOBIN g/dL 8.1* 9.2*   HEMATOCRIT % 25.7* 29.4*   PLATELETS 10*3/mm3 193 268         Results from last 7 days   Lab Units 02/11/25  0447 02/10/25  0733 02/07/25  0814   SODIUM mmol/L 131* 132* 136   POTASSIUM mmol/L 4.3 4.6 3.6   CHLORIDE mmol/L 99 99 100   CO2 mmol/L 22.0 23.0 24.0   BUN mg/dL 27* 48* 32*   CREATININE mg/dL 3.45* 5.27* 4.22*   CALCIUM mg/dL 8.4* 8.5* 8.5*   GLUCOSE mg/dL 105* 103* 96                       Medication Review:   acetaminophen, 1,000 mg, Oral, Q8H  albumin human, , ,   allopurinol, 200 mg, Oral, Daily  amiodarone, 200 mg, Oral, Q24H  apixaban, 2.5 mg, Oral, Q12H  atorvastatin, 10 mg, Oral, Nightly  [START ON 2/12/2025] epoetin gracia/gracia-epbx, 10,000 Units, Subcutaneous, Once per day on Monday Wednesday Friday  famotidine, 10 mg, Oral, Daily  insulin lispro, 2-9 Units, Subcutaneous, 4x Daily AC & at Bedtime  NIFEdipine XL, 90 mg, Oral, Q24H  pantoprazole, 40 mg, Oral, Q AM  senna-docusate sodium, 2 tablet, Oral, BID  sodium chloride, 10 mL, Intravenous, Q12H             ASSESSMENT/PLAN:    Generalized weakness    ESRD (end stage renal disease)    Type 2 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 Carvedilol held due to bradycardia.   - continue amiodarone 200mg daily   - AC with apixaban   - Ok to discharge from cardiac standpoint on current meds.    - follow up with primary cardiologist in 1 month (followed by Adolfo Batres MD prior to admission).     Hypertension, SBP > 150s, on nifedipine   - patient with orthostatic  hypotension with intravascular volume depletion on admission  - Utilizing PRN clonidine  - Nephrology following      Carla Bustos, APRN   02/11/25  14:49 EST

## 2025-02-11 NOTE — PLAN OF CARE
Goal Outcome Evaluation:  Plan of Care Reviewed With: patient, spouse        Progress: improving  Outcome Evaluation: Patient was able to progress with core and UE tband and AROM TE to support bed mobility, BADLs and realated trasnfers. Good completion grooming bed level, but need to progress to unsupported sit level.  No LLE pain complaints, but with complaints of back pain today. Continue OT POC.    Anticipated Discharge Disposition (OT): skilled nursing facility

## 2025-02-12 ENCOUNTER — APPOINTMENT (OUTPATIENT)
Dept: NEPHROLOGY | Facility: HOSPITAL | Age: 75
DRG: 853 | End: 2025-02-12
Payer: MEDICARE

## 2025-02-12 VITALS
BODY MASS INDEX: 37.37 KG/M2 | OXYGEN SATURATION: 97 % | HEART RATE: 64 BPM | TEMPERATURE: 98.3 F | HEIGHT: 67 IN | DIASTOLIC BLOOD PRESSURE: 67 MMHG | RESPIRATION RATE: 18 BRPM | WEIGHT: 238.1 LBS | SYSTOLIC BLOOD PRESSURE: 178 MMHG

## 2025-02-12 LAB
GLUCOSE BLDC GLUCOMTR-MCNC: 109 MG/DL (ref 70–130)
GLUCOSE BLDC GLUCOMTR-MCNC: 98 MG/DL (ref 70–130)

## 2025-02-12 PROCEDURE — 82948 REAGENT STRIP/BLOOD GLUCOSE: CPT

## 2025-02-12 PROCEDURE — 25010000002 EPOETIN ALFA-EPBX 10000 UNIT/ML SOLUTION: Performed by: INTERNAL MEDICINE

## 2025-02-12 PROCEDURE — 25010000002 HEPARIN (PORCINE) PER 1000 UNITS: Performed by: ORTHOPAEDIC SURGERY

## 2025-02-12 PROCEDURE — 99239 HOSP IP/OBS DSCHRG MGMT >30: CPT | Performed by: NURSE PRACTITIONER

## 2025-02-12 RX ORDER — FAMOTIDINE 10 MG
10 TABLET ORAL DAILY
Start: 2025-02-13

## 2025-02-12 RX ORDER — ATORVASTATIN CALCIUM 10 MG/1
10 TABLET, FILM COATED ORAL NIGHTLY
Start: 2025-02-12

## 2025-02-12 RX ORDER — CLONIDINE HYDROCHLORIDE 0.2 MG/1
0.2 TABLET ORAL EVERY 8 HOURS PRN
Start: 2025-02-12

## 2025-02-12 RX ORDER — TRAMADOL HYDROCHLORIDE 50 MG/1
50 TABLET ORAL EVERY 8 HOURS PRN
Qty: 9 TABLET | Refills: 0 | Status: SHIPPED | OUTPATIENT
Start: 2025-02-12

## 2025-02-12 RX ORDER — ALLOPURINOL 200 MG/1
200 TABLET ORAL DAILY
Start: 2025-02-13

## 2025-02-12 RX ORDER — PANTOPRAZOLE SODIUM 40 MG/1
40 TABLET, DELAYED RELEASE ORAL
Start: 2025-02-13

## 2025-02-12 RX ORDER — AMOXICILLIN 250 MG
2 CAPSULE ORAL 2 TIMES DAILY
Start: 2025-02-12

## 2025-02-12 RX ORDER — INSULIN LISPRO 100 [IU]/ML
2-9 INJECTION, SOLUTION INTRAVENOUS; SUBCUTANEOUS
Start: 2025-02-12

## 2025-02-12 RX ORDER — POLYETHYLENE GLYCOL 3350 17 G/17G
17 POWDER, FOR SOLUTION ORAL DAILY PRN
Start: 2025-02-12

## 2025-02-12 RX ORDER — AMIODARONE HYDROCHLORIDE 200 MG/1
200 TABLET ORAL
Start: 2025-02-12

## 2025-02-12 RX ADMIN — ALLOPURINOL 200 MG: 100 TABLET ORAL at 11:48

## 2025-02-12 RX ADMIN — EPOETIN ALFA-EPBX 10000 UNITS: 10000 INJECTION, SOLUTION INTRAVENOUS; SUBCUTANEOUS at 09:58

## 2025-02-12 RX ADMIN — ACETAMINOPHEN 1000 MG: 500 TABLET ORAL at 05:50

## 2025-02-12 RX ADMIN — HEPARIN SODIUM 2000 UNITS: 1000 INJECTION INTRAVENOUS; SUBCUTANEOUS at 09:19

## 2025-02-12 RX ADMIN — Medication 10 ML: at 11:50

## 2025-02-12 RX ADMIN — NIFEDIPINE 90 MG: 30 TABLET, FILM COATED, EXTENDED RELEASE ORAL at 11:48

## 2025-02-12 RX ADMIN — PANTOPRAZOLE SODIUM 40 MG: 40 TABLET, DELAYED RELEASE ORAL at 05:50

## 2025-02-12 RX ADMIN — ACETAMINOPHEN 1000 MG: 500 TABLET ORAL at 15:39

## 2025-02-12 RX ADMIN — FAMOTIDINE 10 MG: 20 TABLET, FILM COATED ORAL at 11:48

## 2025-02-12 RX ADMIN — APIXABAN 2.5 MG: 2.5 TABLET, FILM COATED ORAL at 11:47

## 2025-02-12 NOTE — CASE MANAGEMENT/SOCIAL WORK
Case Management Discharge Note      Final Note: Pt is discharging to SNF @ [facility]. CM confirmed with [facility] facility liaison, Pt can be accepted. Facility will provide transportation to and from HD @ [facility] on MWF @ 0930 chairtime. CM informed Kj with [facility] of today's discharge and confirmed Pt can resume HD on Friday, 2/14. [facility]EN[facility] will fax a copy of the d/c summary to [facility] @ [phone], when available. [facility] will retrieve discharge summary from [facility]. RN to call report to [phone]. [facility]  [facility] @ 1600. PCS was scanned into dropbox. Provider and RN aware. Any controlled meds will be escribed to [facility] (pharmacy verified in [facility]). Pt and spouse, Macario, are aware and agreeable to plan. No other discharge needs identified.         Selected Continued Care - Admitted Since 1/19/2025       Destination Coordination complete.      Service Provider Services Address Phone Fax Patient Preferred    [facility] [address] [facility] [address] [phone] --              Durable Medical Equipment    No services have been selected for the patient.                Dialysis/Infusion       Service Provider Services Address Phone Fax Patient Preferred    [facility] [address] [place], [place] [id number] [phone] --       Internal Comment last updated by Blanka Puentes, RN 2/12/2025 0818    Current Pt                         Home Medical Care    No services have been selected for the patient.                Therapy    No services have been selected for the patient.                Community Resources    No services have been selected for the patient.                Community & DME    No services have been selected for the patient.                    Transportation Services  Ambulance: [facility] (2/12 @ 1600)    Final Discharge Disposition Code: 03 - skilled  nursing facility (SNF)

## 2025-02-12 NOTE — PLAN OF CARE
Problem: Hemodialysis  Goal: Safe, Effective Therapy Delivery  Outcome: Progressing  Goal: Effective Tissue Perfusion  Outcome: Progressing  Goal: Absence of Infection Signs and Symptoms  Outcome: Progressing     HD completed. Tolerated scheduled 3.5 hrs well. Access functioned well. UF goal of 2 liters reached, total net fluid removal of 2050 liters noted. Blood returned with no complications. Report given to Aubrey kimball RN. Goal Outcome Evaluation:

## 2025-02-12 NOTE — DISCHARGE SUMMARY
[facility] Medicine Services  TRANSFER SUMMARY    Patient Name: Sara Esparza  : 1950  MRN: [id number]    Date of Admission: 2025  Date of Discharge:  2025  Length of Stay: 24  Primary Care Physician: Toribio Roberts MD    Consults       Date and Time Order Name Status Description    2025 11:51 AM Inpatient Vascular Surgery Consult Completed     2025 12:48 PM Inpatient Cardiology Consult Completed     2025 10:23 AM Inpatient Infectious Diseases Consult Completed     2025  5:54 AM Inpatient Orthopedic Surgery Consult Completed     2025  5:59 AM Inpatient Nephrology Consult Completed             Hospital Course     Presenting Problem:   Generalized weakness [R53.1]    Active Hospital Problems    Diagnosis  POA    **Generalized weakness [R53.1]  Yes    Paroxysmal A-fib [I48.0]  Unknown    Sepsis due to methicillin resistant Staphylococcus aureus (MRSA) with encephalopathy without septic shock [A41.02, R65.20, G93.41]  Unknown    Chronic osteomyelitis of left foot [M86.672]  Unknown    Critical limb ischemia of left lower extremity [I70.222]  Unknown    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          Hospital Course:  Sara Esparza is a 74 y.o. female with PMH significant for HTN, PAF (Eliquis), prior DVT/PE, ESRD on HD, DMII, asthma, DENZEL (CPAP). Presented to [facility] ED on 25 for evaluation of generalized weakness, myalgias, lightheadedness and falls. On , she developed fevers. Source ultimately found to be related to L calcaneal osteomyelitis. ID / ortho consulted to follow. She is s/p debridement and wound vac placement 25. Ortho recommended BKA. In an effort to optimize arterial flow and  wound healing, Dr. Ramirez of vascular surgery performed LLE angiogram with angioplasty/lithotripsy on 2/3/25. She was agreeable to L BKA and this is planned for 2/4/25     Sepsis secondary to Lt calcaneal osteomyelitis  Metabolic encephalopathy  Lt posterial tibial artery occlusion  Met sepsis criteria on 1/20 with fever 102.6F, 's, leukocytosis (WBC 15K), source of infection and altered mental status   1/21/25 CT LLE w/o contrast showed draining wound seen along plantar aspect of left calcaneus with surrounding changes consistent with osteomyelitis  S/p LLE angiogram with lithotripsy / angioplasty by Dr. Ramirez 2/3/25.  Vascular surgery signed off.  No follow-up required.  She is s/p debridement and wound vac placement 1/24/25 - probability of limb salvage felt to be poor.  She is s/p Lt BKA on 2/4/25 by Dr. Gonzalez.  Follow-up with Dr. Gonzalez in 2 weeks.  Wound culture (+) MRSA / Morganella   1/24 surgical cultures growing MRSA and Cutibacterium acnes  S/p IV Vanc/Ceftriaxone, per ID.  ID has since signed off with no follow-up required.  PT / OT recommended SNF. CM following   Patient has not received a bed at [facility] with plans to transfer today at 1600 after HD.  Per CM, facility will transport to and from  as prior arranged on Monday/Wednesday/Fridays.     DC recs per ortho:  Nonweightbearing left lower extremity  Follow cultures; antibiotic management per infectious disease, appreciate their recommendations  Dressing changed, incision inspected and drain removed at bedside 2/6/2025  Continue daily dressing changes per nursing staff: Xeroform, 4 x 4, Kerlix, Ace  -- Follow-up 2 weeks for incision check and x-rays.     Generalized weakness, progressive x weeks  Generalized debility   Falls at home   CT chest/abd/pelvis without acute findings   Normal TSH and CK   PT/OT/CM following -to rehab today     Neck pain, C7 region   C5-6 canal stenosis  Chronic back pain s/p spinal stimulator  CT  C-spine - no fractures  Reported history of Oxycodone dependence - attempted to avoid opiates however due to uncontrolled pain she was started on Oxycodone 5mg Q4H PRN on 2/1/25  Continue Lidocaine patches  Partner recommended that patient/ reach out to her pain management clinic for questions re: spinal stimulator      ESRD on HD  Has tunneled Rt IJ catheter - recent HD start in 9/2024  Nephrology following.  Can transfer to facility after HD today.  Will follow at dialysis center.  Continue MWF HD   Monitor labs      Type 2 diabetes  A1c 4.9  On Sitagliptin at home  On SSI only with fairly normal glucose . Defer to provider at SNF to manage further.     Hypertension  Continue nifedipine XL 90 mg daily  Was on Coreg at home but held because of bradycardia  Continue As needed clonidine  Follow-up with cardiology as below     Paroxysmal Afib  Developed bradycardia while on beta-blockers   Now on amiodarone  Cardiology following.  Per TRISTEN Aguirre with cards yest, Okay for transfer today on current medications with follow-up in 1 month   Continue Eliquis     Elevated troponin  In setting of ESRD  Troponin peaked at 132 on 1/19/25  Per chart review, had LHC recently at [facility] with non-obstructive CAD     Dyspepsia  Reflux  Continue current meds      Constipation  Likely due to opiates  Improved with bowel regimen and addition of Lactulose   Bowel regimen transitioned to PRN - appears constipated again. Restarted bowel regimen on 2/6. Stable        Patient was seen on hemodialysis awake, alert and oriented x 3 and much more conversant today.  Hemodynamically stable and afebrile.  Pain well-controlled.  Tolerating diet.  Eager to get to rehab today as planned.  Cleared by all services for transfer on medications as below with follow-ups as noted.      Discharge Follow Up Recommendations for outpatient labs/diagnostics:  Patient was seen resting up in bed on HD.  Awake and alert.  More interactive today.   Hemodynamically stable and afebrile.  Oriented x 3.  Cleared for transfer to rehab today by all services.  Will continue outpatient HD on her prior Monday/Wednesday/Friday schedule with facility to transfer to and from.  Will go today after HD.  Medications as below with follow-ups as noted.    -- To be seen by provider at facility on arrival, PCP 1 week of discharge  -- Follow-up with Dr. Gonzalez with orthopedics in 2 weeks  -- Follow-up [facility] in 1 month  -- Follow-up [facility] in the HD clinic.  Patient to continue prior outpatient HD chair per case management notes on her Monday/Wednesday/Friday schedule.  -- No follow-up required at this time with vascular surgery or ID.  Follow-up as needed.    Day of Discharge     HPI:   Patient was seen resting up in bed in no acute distress.  Awake and alert.  Seen on hemodialysis.  Oriented x 3 with obvious short-term memory deficits.  Much more interactive today.  Pain is well-controlled.  Tolerating diet.  Eager to get to rehab today as planned.    Review of Systems  Gen- No fevers, chills  CV- No chest pain, palpitations  Resp- No cough, dyspnea  GI- No N/V/D, abd pain       Vital Signs:   Temp:  [97.6 °F (36.4 °C)-98.3 °F (36.8 °C)] 98.3 °F (36.8 °C)  Heart Rate:  [55-70] 64  Resp:  [16-20] 18  BP: (139-178)/(64-82) 178/67     Physical Exam:  Constitutional: No acute distress, awake and alert. Resting up in bed currently on hemodialysis.  Chronically ill, frail and debilitated appearing female.  More awake and interactive today.  HENT: NCAT, mucous membranes moist  Respiratory: Clear to auscultation bilaterally, respiratory effort normal on room air.  Cardiovascular: RRR, no murmurs, rubs, or gallops  Gastrointestinal: Positive bowel sounds, soft, nontender, nondistended.  Obese.  Musculoskeletal: No RLE edema.  Degroot spontaneously.  Left BKA dressing in place dry and intact.   Psychiatric: flat affect, cooperative  Neurologic: Oriented to person,  "[facility] and to year today.  Much more interactive.  Obvious short-term memory deficits, strength symmetric in all extremities generally weak, speech clear.  Follows commands.  Skin: No rashes.  Right upper chest wall tunneled HD cath with dressing dry and intact.    Pertinent Results     Results from last 7 days   Lab Units 02/11/25  0447 02/10/25  0733 02/07/25  0814 02/05/25  1336   WBC 10*3/mm3 3.44  --   --  5.58   HEMOGLOBIN g/dL 8.1*  --   --  9.2*   HEMATOCRIT % 25.7*  --   --  29.4*   PLATELETS 10*3/mm3 193  --   --  268   SODIUM mmol/L 131* 132* 136 138   POTASSIUM mmol/L 4.3 4.6 3.6 3.8   CHLORIDE mmol/L 99 99 100 100   CO2 mmol/L 22.0 23.0 24.0 25.0   BUN mg/dL 27* 48* 32* 14   CREATININE mg/dL 3.45* 5.27* 4.22* 2.20*   GLUCOSE mg/dL 105* 103* 96 105*   CALCIUM mg/dL 8.4* 8.5* 8.5* 8.7           Invalid input(s): \"PROT\", \"LABALBU\"        Invalid input(s): \"TG\", \"LDLCALC\", \"LDLREALC\"      Brief Urine Lab Results  (Last result in the past 365 days)        Color   Clarity   Blood   Leuk Est   Nitrite   Protein   CREAT   Urine HCG        01/19/25 1404 Yellow   Cloudy   Moderate (2+)   Moderate (2+)   Negative   >=300 mg/dL (3+)                   Microbiology Results Abnormal       Procedure Component Value - Date/Time    Anaerobic Culture - Surgical Site, Foot, Left [[id number]]  (Abnormal) Collected: 01/24/25 1754    Lab Status: Final result Specimen: Surgical Site from Foot, Left Updated: 01/30/25 1515     Anaerobic Culture Cutibacterium acnes    Body Fluid Culture - Surgical Site, Foot, Left [[id number]]  (Abnormal)  (Susceptibility) Collected: 01/24/25 1754    Lab Status: Final result Specimen: Surgical Site from Foot, Left Updated: 01/27/25 0642     Body Fluid Culture Light growth (2+) Staphylococcus aureus, MRSA     Comment:   Methicillin resistant Staphylococcus aureus, Patient may be an isolation risk.        Gram Stain Rare (1+) WBCs seen      No organisms seen    Narrative:      Not a body " fluid.    Susceptibility        Staphylococcus aureus, MRSA      TAYLOR      Clindamycin Susceptible      Erythromycin Resistant      Oxacillin Resistant      Rifampin Susceptible      Tetracycline Susceptible      Trimethoprim + Sulfamethoxazole Susceptible      Vancomycin Susceptible                           Wound Culture - Wound, Foot, Left [[id number]]  (Abnormal)  (Susceptibility) Collected: 01/20/25 1409    Lab Status: Final result Specimen: Wound from Foot, Left Updated: 01/24/25 0658     Wound Culture Scant growth (1+) Staphylococcus aureus, MRSA     Comment: Methicillin resistant Staphylococcus aureus, Patient may be an isolation risk.         Rare growth Morganella morganii ssp morganii     Comment:            Scant growth (1+) Normal Skin Kimber     Gram Stain Few (2+) WBCs seen      Many (4+) Gram positive cocci in pairs      Rare (1+) Gram negative bacilli      Rare (1+) Gram positive bacilli    Susceptibility        Staphylococcus aureus, MRSA      TAYLOR      Clindamycin Susceptible      Erythromycin Resistant      Oxacillin Resistant      Rifampin Susceptible      Tetracycline Susceptible      Trimethoprim + Sulfamethoxazole Susceptible      Vancomycin Susceptible                       Susceptibility        Morganella morganii ssp morganii      TAYLOR      Amoxicillin + Clavulanate Resistant      Ampicillin Resistant      Ampicillin + Sulbactam Resistant      Cefazolin (Non Urine) Resistant      Cefepime Susceptible  [1]       Cefotaxime Susceptible      Ceftazidime Susceptible  [1]       Gentamicin Susceptible      Levofloxacin Susceptible      Piperacillin + Tazobactam Susceptible      Tetracycline Susceptible      Trimethoprim + Sulfamethoxazole Susceptible                   [1]  Appended report. These results have been appended to a previously final verified report.                Susceptibility Comments       Morganella morganii ssp morganii    Cefotaxime susceptibility can be used as a surrogate for  ceftriaxone susceptibility               MRSA Screen, PCR (Inpatient) - Swab, Nares [[id number]]  (Abnormal) Collected: 01/21/25 0614    Lab Status: Final result Specimen: Swab from Nares Updated: 01/21/25 0847     MRSA PCR Positive    Narrative:      The negative predictive value of this diagnostic test is high and should only be used to consider de-escalating anti-MRSA therapy. A positive result may indicate colonization with MRSA and must be correlated clinically.            Imaging Results (All)       Procedure Component Value Units Date/Time    XR [place] OR Procedure [[id number]] Resulted: 02/03/25 1002     Updated: 02/03/25 1002    XR Chest 1 View [[id number]] Collected: 01/23/25 0246     Updated: 01/23/25 0250    Narrative:      XR CHEST 1 VW    Date of Exam: 1/23/2025 1:51 AM EST    Indication: chest pain    Comparison: 1/21/2025, 1/19/2025.    Findings:  Right internal jugular PermCath present with the tip at the cavoatrial junction. Low lung volumes. There are no airspace consolidations. No pleural fluid. No pneumothorax. The pulmonary vasculature appears mildly indistinct. The cardiac and mediastinal   silhouette appear unremarkable. No acute osseous abnormality identified.      Impression:      Impression:  Questionable mild pulmonary edema pattern.        Electronically Signed: Cindy Fuller MD    1/23/2025 2:47 AM EST    Workstation ID: [id number]    CT Abdomen Pelvis Without Contrast [[id number]] Collected: 01/21/25 0526     Updated: 01/21/25 0534    Narrative:      CT ABDOMEN PELVIS WO CONTRAST, CT CHEST WO CONTRAST DIAGNOSTIC, CT LOWER EXTREMITY LEFT WO CONTRAST    Date of Exam: 1/21/2025 4:45 AM EST    Indication: Sepsis.    Comparison: None available.    Technique: Axial CT images were obtained of the chest, abdomen and pelvis as well as the left lower extremity without the administration of contrast. Reconstructed coronal and sagittal images were also obtained. Automated exposure control and  iterative   construction methods were used.      Findings:  CT Chest:   Limited evaluation of the solid organs due to lack of intravenous contrast. The thyroid appears within normal limits. The trachea and the esophagus are unremarkable.  Heart size is normal. Atherosclerotic calcifications are present including within the   coronary arteries. Right internal jugular PermCath present with the tip at the cavoatrial junction. No mediastinal or hilar lymphadenopathy.  No significant pericardial effusion.  The aorta and great vessels appear within normal limits.  Pulmonary artery   is unremarkable.    There is no pneumothorax, pleural effusion or focal airspace consolidation. There are no suspicious lung nodules.  No significant pleural disease.  Central and segmental airways appear patent.      Subcutaneous fat and underlying musculature appear within normal limits.  There are no acute osseous abnormalities or destructive bone lesions.      CT Abdomen and Pelvis:  The liver appears normal in size without focal abnormality. Spleen is normal size and appears homogeneous.  Stomach is nondistended.  No adrenal mass.  Kidneys appear unremarkable. Probable small hemorrhagic or proteinaceous cyst present on the right.   Mild scarring is seen surrounding the kidneys bilaterally. No hydronephrosis.  Urinary bladder is within normal limits.  There is no bowel distention.  No pneumatosis intestinalis, pneumoperitoneum or lymphadenopathy.  No significant ascites.  The   appendix appears within normal limits.  Gallbladder is nonvisualized and likely resected. There is diastases of the rectus abdominal musculature. No evidence of hernia. No significant stool burden. Diverticulosis of the left hemicolon present. No   significant inflammatory changes. Left-sided stimulator device present. Biliary tree is nondistended.  The pancreas appears homogeneous.  Abdominal vasculature is within normal limits.    Subcutaneous fat and underlying  musculature appear within normal limits.  There are no acute osseous abnormalities or destructive bone lesions. Orthopedic hardware appears unremarkable with no evidence of acute hardware failure. No acute osseous   abnormality.    Left lower extremity: Patchy subcutaneous air is seen along the left calcaneal region with a draining wound seen extending to the plantar aspect of the calcaneus (series 8 image 80). Mild destructive changes are seen along the plantar aspect of the   calcaneus with interosseous air (series 8 image 77). Plantar enthesopathy is present within this region. No evidence of fracture. No evidence of dislocation. Diffuse soft tissue swelling is present along the plantar aspect of the foot. No definite   drainable collection identified though evaluation is limited due to lack of intravenous contrast. Wound appears to extend near the bone. No additional interosseous air identified. No tibiotalar joint effusion identified. Moderate degenerative changes.   Lisfranc ligament region appears unremarkable with no evidence of widening.        Impression:      Impression:  1.No acute cardiopulmonary process. No acute intra-abdominal or intrapelvic process.  2.Draining wound seen along the plantar aspect of the left calcaneus with subcutaneous air and mild destructive changes seen along the plantar aspect of the calcaneus with interosseous air present consistent with osteomyelitis. No additional osseous   involvement. No definite drainable collection identified though evaluation is limited due to lack of intravenous contrast. Draining sinus tract likely present extending from the wound to the calcaneus.  3.Ancillary findings as described above.                Electronically Signed: Cindy Fuller MD    1/21/2025 5:31 AM EST    Workstation ID: [id number]    CT Chest Without Contrast Diagnostic [[id number]] Collected: 01/21/25 0526     Updated: 01/21/25 0534    Narrative:      CT ABDOMEN PELVIS WO CONTRAST, CT  CHEST WO CONTRAST DIAGNOSTIC, CT LOWER EXTREMITY LEFT WO CONTRAST    Date of Exam: 1/21/2025 4:45 AM EST    Indication: Sepsis.    Comparison: None available.    Technique: Axial CT images were obtained of the chest, abdomen and pelvis as well as the left lower extremity without the administration of contrast. Reconstructed coronal and sagittal images were also obtained. Automated exposure control and iterative   construction methods were used.      Findings:  CT Chest:   Limited evaluation of the solid organs due to lack of intravenous contrast. The thyroid appears within normal limits. The trachea and the esophagus are unremarkable.  Heart size is normal. Atherosclerotic calcifications are present including within the   coronary arteries. Right internal jugular PermCath present with the tip at the cavoatrial junction. No mediastinal or hilar lymphadenopathy.  No significant pericardial effusion.  The aorta and great vessels appear within normal limits.  Pulmonary artery   is unremarkable.    There is no pneumothorax, pleural effusion or focal airspace consolidation. There are no suspicious lung nodules.  No significant pleural disease.  Central and segmental airways appear patent.      Subcutaneous fat and underlying musculature appear within normal limits.  There are no acute osseous abnormalities or destructive bone lesions.      CT Abdomen and Pelvis:  The liver appears normal in size without focal abnormality. Spleen is normal size and appears homogeneous.  Stomach is nondistended.  No adrenal mass.  Kidneys appear unremarkable. Probable small hemorrhagic or proteinaceous cyst present on the right.   Mild scarring is seen surrounding the kidneys bilaterally. No hydronephrosis.  Urinary bladder is within normal limits.  There is no bowel distention.  No pneumatosis intestinalis, pneumoperitoneum or lymphadenopathy.  No significant ascites.  The   appendix appears within normal limits.  Gallbladder is  nonvisualized and likely resected. There is diastases of the rectus abdominal musculature. No evidence of hernia. No significant stool burden. Diverticulosis of the left hemicolon present. No   significant inflammatory changes. Left-sided stimulator device present. Biliary tree is nondistended.  The pancreas appears homogeneous.  Abdominal vasculature is within normal limits.    Subcutaneous fat and underlying musculature appear within normal limits.  There are no acute osseous abnormalities or destructive bone lesions. Orthopedic hardware appears unremarkable with no evidence of acute hardware failure. No acute osseous   abnormality.    Left lower extremity: Patchy subcutaneous air is seen along the left calcaneal region with a draining wound seen extending to the plantar aspect of the calcaneus (series 8 image 80). Mild destructive changes are seen along the plantar aspect of the   calcaneus with interosseous air (series 8 image 77). Plantar enthesopathy is present within this region. No evidence of fracture. No evidence of dislocation. Diffuse soft tissue swelling is present along the plantar aspect of the foot. No definite   drainable collection identified though evaluation is limited due to lack of intravenous contrast. Wound appears to extend near the bone. No additional interosseous air identified. No tibiotalar joint effusion identified. Moderate degenerative changes.   Lisfranc ligament region appears unremarkable with no evidence of widening.        Impression:      Impression:  1.No acute cardiopulmonary process. No acute intra-abdominal or intrapelvic process.  2.Draining wound seen along the plantar aspect of the left calcaneus with subcutaneous air and mild destructive changes seen along the plantar aspect of the calcaneus with interosseous air present consistent with osteomyelitis. No additional osseous   involvement. No definite drainable collection identified though evaluation is limited due to lack  of intravenous contrast. Draining sinus tract likely present extending from the wound to the calcaneus.  3.Ancillary findings as described above.                Electronically Signed: Cindy Fuller MD    1/21/2025 5:31 AM EST    Workstation ID: [id number]    CT Lower Extremity Left Without Contrast [[id number]] Collected: 01/21/25 0526     Updated: 01/21/25 0534    Narrative:      CT ABDOMEN PELVIS WO CONTRAST, CT CHEST WO CONTRAST DIAGNOSTIC, CT LOWER EXTREMITY LEFT WO CONTRAST    Date of Exam: 1/21/2025 4:45 AM EST    Indication: Sepsis.    Comparison: None available.    Technique: Axial CT images were obtained of the chest, abdomen and pelvis as well as the left lower extremity without the administration of contrast. Reconstructed coronal and sagittal images were also obtained. Automated exposure control and iterative   construction methods were used.      Findings:  CT Chest:   Limited evaluation of the solid organs due to lack of intravenous contrast. The thyroid appears within normal limits. The trachea and the esophagus are unremarkable.  Heart size is normal. Atherosclerotic calcifications are present including within the   coronary arteries. Right internal jugular PermCath present with the tip at the cavoatrial junction. No mediastinal or hilar lymphadenopathy.  No significant pericardial effusion.  The aorta and great vessels appear within normal limits.  Pulmonary artery   is unremarkable.    There is no pneumothorax, pleural effusion or focal airspace consolidation. There are no suspicious lung nodules.  No significant pleural disease.  Central and segmental airways appear patent.      Subcutaneous fat and underlying musculature appear within normal limits.  There are no acute osseous abnormalities or destructive bone lesions.      CT Abdomen and Pelvis:  The liver appears normal in size without focal abnormality. Spleen is normal size and appears homogeneous.  Stomach is nondistended.  No adrenal mass.   Kidneys appear unremarkable. Probable small hemorrhagic or proteinaceous cyst present on the right.   Mild scarring is seen surrounding the kidneys bilaterally. No hydronephrosis.  Urinary bladder is within normal limits.  There is no bowel distention.  No pneumatosis intestinalis, pneumoperitoneum or lymphadenopathy.  No significant ascites.  The   appendix appears within normal limits.  Gallbladder is nonvisualized and likely resected. There is diastases of the rectus abdominal musculature. No evidence of hernia. No significant stool burden. Diverticulosis of the left hemicolon present. No   significant inflammatory changes. Left-sided stimulator device present. Biliary tree is nondistended.  The pancreas appears homogeneous.  Abdominal vasculature is within normal limits.    Subcutaneous fat and underlying musculature appear within normal limits.  There are no acute osseous abnormalities or destructive bone lesions. Orthopedic hardware appears unremarkable with no evidence of acute hardware failure. No acute osseous   abnormality.    Left lower extremity: Patchy subcutaneous air is seen along the left calcaneal region with a draining wound seen extending to the plantar aspect of the calcaneus (series 8 image 80). Mild destructive changes are seen along the plantar aspect of the   calcaneus with interosseous air (series 8 image 77). Plantar enthesopathy is present within this region. No evidence of fracture. No evidence of dislocation. Diffuse soft tissue swelling is present along the plantar aspect of the foot. No definite   drainable collection identified though evaluation is limited due to lack of intravenous contrast. Wound appears to extend near the bone. No additional interosseous air identified. No tibiotalar joint effusion identified. Moderate degenerative changes.   Lisfranc ligament region appears unremarkable with no evidence of widening.        Impression:      Impression:  1.No acute cardiopulmonary  process. No acute intra-abdominal or intrapelvic process.  2.Draining wound seen along the plantar aspect of the left calcaneus with subcutaneous air and mild destructive changes seen along the plantar aspect of the calcaneus with interosseous air present consistent with osteomyelitis. No additional osseous   involvement. No definite drainable collection identified though evaluation is limited due to lack of intravenous contrast. Draining sinus tract likely present extending from the wound to the calcaneus.  3.Ancillary findings as described above.                Electronically Signed: Cindy Fuller MD    1/21/2025 5:31 AM EST    Workstation ID: [id number]    CT Cervical Spine Without Contrast [[id number]] Collected: 01/20/25 1616     Updated: 01/20/25 1628    Narrative:      CT CERVICAL SPINE WO CONTRAST    Date of Exam: 1/20/2025 2:29 PM EST    Indication: c7 pain after fall.    Comparison: None available.    Technique: Axial CT images were obtained of the cervical spine without contrast administration.  Reconstructed coronal and sagittal images were also obtained. Automated exposure control and iterative construction methods were used.      Findings:  There is slight reversal of the normal cervical lordosis associated with advanced degenerative disc disease at C5-6 and C6-7. There is mild posterior subluxation of C5 with respect to both C4 and C6, likely as result of underlying degenerative disc   disease. There are prominent posterior osteophytes at both C5-6 and C6-7. No vertebral compression deformity is seen. No fracture is identified.     There is mild nuchal ligament calcification but no evidence of acute posterior spinous process avulsion. Prevertebral soft tissues appear normal. Facet joints appear normally aligned. Coronal images show extensive left-sided multilevel facet DJD and   milder facet degenerative change on the right, but no evidence of acute bony trauma.    Axial bone images show no evidence of  skull base fracture. The dens and ring of C1 appear normally aligned and intact. No vertebral body fracture or posterior element fracture is identified. Soft tissue axial images show moderate, possibly marked canal   stenosis at C5-6 due to large posterior vertebral osteophytes, which may be chronic. No paraspinous hematoma, mass or inflammatory change is seen. There is extensive bilateral carotid bulb calcification.      Impression:      Impression:    1. Advanced C5-6 and C6-7 degenerative disc disease; suspected moderate or greater C5-6 canal stenosis due to posterior vertebral osteophytes, which may be chronic, as noted.    2. No evidence of acute cervical spine trauma is identified.        Electronically Signed: Celestino Aguirre MD    1/20/2025 4:25 PM EST    Workstation ID: [id number]    CT Head Without Contrast [[id number]] Collected: 01/19/25 1756     Updated: 01/19/25 1803    Narrative:      CT HEAD WO CONTRAST    Date of Exam: 1/19/2025 3:26 PM EST    Indication: falls, ataxia.    Comparison: None available.    Technique: Axial CT images were obtained of the head without contrast administration.  Automated exposure control and iterative construction methods were used.      Findings:  Negative for large territory infarct, acute intracranial hemorrhage, large mass lesion, midline shift or hydrocephalus. Small focal hypodensities in the right occipital lobe example image 39 series 3 and more medially image 40 series 3, the latter   associated with encephalomalacia. Right frontal lobe hypodensity measuring 1 x 0.9 cm image 44 series 3. Parenchymal volume within normal limits for age. No large extra-axial fluid collection. Symmetric orbits. Distal carotid atherosclerotic disease.   Nonobstructive maxillary sinus disease with frothy secretions on the left. No large mastoid effusions. No aggressive bone lesions or displaced fractures.      Impression:      Impression:  1. Small probably chronic medial right occipital lobe  infarct with other smaller age-indeterminate infarcts in the right occipital and right frontal lobes. Comparisons not available to assess stability. Consider nonemergent MRI for further assessment.  2. Negative for acute intracranial hemorrhage, large mass lesion, midline shift or hydrocephalus.        Electronically Signed: Deacon Loyola MD    1/19/2025 6:00 PM EST    Workstation ID: [id number]    XR Chest 1 View [[id number]] Collected: 01/19/25 0455     Updated: 01/19/25 0459    Narrative:      XR CHEST 1 VW    Date of Exam: 1/19/2025 4:30 AM EST    Indication: Cough    Comparison: 1/18/2025.    Findings:  Right internal jugular PermCath present with the tip at the cavoatrial junction. There are no airspace consolidations. No pleural fluid. No pneumothorax. The pulmonary vasculature appears within normal limits. The cardiac and mediastinal silhouette   appear unremarkable. No acute osseous abnormality identified.      Impression:      Impression:  No acute cardiopulmonary process.    Electronically Signed: Cindy Fuller MD    1/19/2025 4:56 AM EST    Workstation ID: [id number]            Results for orders placed during the hospital encounter of 01/19/25    Adult Transthoracic Echo Complete W/ Cont if Necessary Per Protocol    Interpretation Summary    Left ventricular systolic function is normal. Estimated left ventricular EF = 55%    Left ventricular wall thickness is consistent with moderate concentric hypertrophy.    No hemodynamically significant valvular heart disease      Pending Labs       Order Current Status    AFB Culture - Bone, Leg, Left Preliminary result    AFB Culture - Surgical Site, Leg, Left Preliminary result    Anaerobic Culture 10 Day Incubation - Bone, Leg, Left Preliminary result    Fungus Culture - Bone, Leg, Left Preliminary result    Fungus Culture - Surgical Site, Foot, Left Preliminary result    Fungus Culture - Surgical Site, Leg, Left Preliminary result            Discharge Details         Discharge Medications        New Medications        Instructions Start Date   amiodarone 200 MG tablet  Commonly known as: PACERONE   200 mg, Oral, Every 24 Hours Scheduled      cloNIDine 0.2 MG tablet  Commonly known as: CATAPRES   0.2 mg, Oral, Every 8 Hours PRN      epoetin gracia-epbx 62904 UNIT/ML injection  Commonly known as: RETACRIT   10,000 Units, Subcutaneous, 3 Times Weekly   Start Date: February 14, 2025     Insulin Lispro 100 UNIT/ML injection  Commonly known as: humaLOG   2-9 Units, Subcutaneous, 4 Times Daily Before Meals & Nightly      melatonin 5 MG tablet tablet   2.5 mg, Oral, Nightly PRN      oxyCODONE 5 MG immediate release tablet  Commonly known as: ROXICODONE   5 mg, Oral, Every 4 Hours PRN      pantoprazole 40 MG EC tablet  Commonly known as: PROTONIX  Replaces: omeprazole 40 MG capsule   40 mg, Oral, Every Early Morning   Start Date: February 13, 2025     polyethylene glycol 17 g packet  Commonly known as: MIRALAX   17 g, Oral, Daily PRN      sennosides-docusate 8.6-50 MG per tablet  Commonly known as: PERICOLACE   2 tablets, Oral, 2 Times Daily      traMADol 50 MG tablet  Commonly known as: ULTRAM   50 mg, Oral, Every 8 Hours PRN             Changes to Medications        Instructions Start Date   Allopurinol 200 MG tablet  What changed:   medication strength  See the new instructions.   200 mg, Oral, Daily   Start Date: February 13, 2025     atorvastatin 10 MG tablet  Commonly known as: LIPITOR  What changed: when to take this   10 mg, Oral, Nightly      famotidine 10 MG tablet  Commonly known as: PEPCID  What changed:   medication strength  how much to take   10 mg, Oral, Daily   Start Date: February 13, 2025            Continue These Medications        Instructions Start Date   acetaminophen 500 MG tablet  Commonly known as: TYLENOL   500 mg, Every 6 Hours PRN      albuterol sulfate  (90 Base) MCG/ACT inhaler  Commonly known as: PROVENTIL HFA;VENTOLIN HFA;PROAIR HFA   2 puffs,  Inhalation, Every 4 Hours PRN      apixaban 2.5 MG tablet tablet  Commonly known as: ELIQUIS   2.5 mg, Oral, Every 12 Hours Scheduled      Cholecalciferol 50 MCG (2000 UT) capsule   2,000 Units, Oral, Daily      Januvia 25 MG tablet  Generic drug: SITagliptin   25 mg, Oral, Daily      multivitamin with minerals tablet tablet   1 tablet, Oral, Daily      NIFEdipine XL 90 MG 24 hr tablet  Commonly known as: PROCARDIA XL   90 mg, Oral, Daily             Stop These Medications      carvedilol 25 MG tablet  Commonly known as: COREG     isosorbide mononitrate 120 MG 24 hr tablet  Commonly known as: IMDUR     omeprazole 40 MG capsule  Commonly known as: priLOSEC  Replaced by: pantoprazole 40 MG EC tablet              No Known Allergies      Discharge Disposition:  Skilled Nursing Facility (DC - External)    Discharge Diet:  Diet Order   Procedures    Diet: Diabetic; Consistent Carbohydrate; Fluid Consistency: Thin (IDDSI 0)       Discharge Activity:  Activity Instructions       Activity as Tolerated      Measure Blood Pressure                CODE STATUS:    Code Status and Medical Interventions: CPR (Attempt to Resuscitate); Full Support   Ordered at: 01/19/25 0518     Level Of Support Discussed With:    Patient     Code Status (Patient has no pulse and is not breathing):    CPR (Attempt to Resuscitate)     Medical Interventions (Patient has pulse or is breathing):    Full Support         Future Appointments   Date Time Provider Department Center   2/12/2025  4:00 PM MED 7  [facility]       Additional Instructions for the Follow-ups that You Need to Schedule       Discharge Follow-up with PCP   As directed       Currently Documented PCP:    Toribio Roberts MD    PCP Phone Number:    [phone]     Follow Up Details: To be seen by provider at facility on arrival, PCP 1 week of discharge        Discharge Follow-up with Specialty: Follow-up with nephrology in the dialysis clinic.  Continuing outpatient HD chair  on Monday/Wednesday/Friday as prior to admit.  Facility to transfer to and from HD.   As directed      Specialty: Follow-up with nephrology in the dialysis clinic.  Continuing outpatient HD chair on Monday/Wednesday/Friday as prior to admit.  Facility to transfer to and from HD.        Discharge Follow-up with Specified Provider: Follow-up with [facility] in 1 month (please schedule appointment prior to DC)   As directed      To: Follow-up with [facility] in 1 month (please schedule appointment prior to DC)        Discharge Follow-up with Specified Provider: Follow-up with Dr. Gonzalez with orthopedics in 2 weeks (please schedule appointment prior to DC)   As directed      To: Follow-up with Dr. Gonzalez with orthopedics in 2 weeks (please schedule appointment prior to DC)              Electronically signed by TRISTEN Arana, 02/12/25, 12:42 PM EST.      Time Spent on Discharge: I spent 45 minutes on this discharge activity which included: face-to-face encounter with the patient, reviewing the data in the system, coordination of the care with the nursing staff as well as consultants, documentation, and entering orders.

## 2025-02-12 NOTE — PLAN OF CARE
Goal Outcome Evaluation:      Pt alert and oriented. RA, MARY. VSS. No acute episodes overnight,. Pt scheduled for dialysis this morning.  at bedside. Bed alarm active and audible, bed in the lowest position, call light within reach, and no other requests at this time. Care on-going.

## 2025-02-12 NOTE — PROGRESS NOTES
"   LOS: 24 days    Patient Care Team:  Toribio Roberts MD as PCP - General (Internal Medicine)    Subjective   Seen and examined earlier today on dialysis.  Tolerating dialysis well.   Patient reports feeling better.  No chest pain or shortness of breath.     Objective     Vital Signs:  Blood pressure 178/67, pulse 64, temperature 98.3 °F (36.8 °C), temperature source Oral, resp. rate 18, height 170.2 cm (67.01\"), weight 108 kg (238 lb 1.6 oz), SpO2 97%.      Intake/Output Summary (Last 24 hours) at 2/12/2025 1630  Last data filed at 2/12/2025 1254  Gross per 24 hour   Intake 300 ml   Output 2350 ml   Net -2050 ml       No intake/output data recorded.    Physical Exam:  GENERAL: WD WF NAD  NEURO: Awake and alert. No focal deficit  PSYCHIATRIC: Cooperative with PE  CV: No edema  LUNGS:  Quiet,  Nonlabored resp.   ABDOMEN: Nondistended  : No [place]  SKIN: Warm and dry without rash  + RIJ TDC  Left BKA     Labs:  Results from last 7 days   Lab Units 02/11/25  0447   WBC 10*3/mm3 3.44   HEMOGLOBIN g/dL 8.1*   PLATELETS 10*3/mm3 193     Results from last 7 days   Lab Units 02/11/25  0447 02/10/25  0733 02/07/25  0814   SODIUM mmol/L 131* 132* 136   POTASSIUM mmol/L 4.3 4.6 3.6   CHLORIDE mmol/L 99 99 100   CO2 mmol/L 22.0 23.0 24.0   BUN mg/dL 27* 48* 32*   CREATININE mg/dL 3.45* 5.27* 4.22*   CALCIUM mg/dL 8.4* 8.5* 8.5*   PHOSPHORUS mg/dL  --  4.7* 4.0   ALBUMIN g/dL  --  2.2* 2.3*           A/P:    ESRD: On HD MWF at [facility] with [facility] .  Continue outpatient schedule. She is interested in home dialysis. Discussed with home program they will reach out to patient for further education as outpatient.  Continue outpatient HD schedule.    HTN: Blood pressure stable.  Watch with HD.    Hyponatremia: Adjust with HD.  Limit free water.     Hyperkalemia: Resolved.  Adjust with HD     Metabolic acidosis:  Adjust with HD     Anemia: Hemoglobin below goal.  Patient on Epogen.  Will adjust Epogen dose.  Need iron studies.  " Transfuse as indicated for Hgb <7.     Volume: Stable.  Earlier in admission patient with orthostatic hypotension with intravascular volume depletion.  Monitor with UF on HD.     Hyperphosphatemia: Last Phos stable at 4.7.  Poor p.o. intake.  Binders on hold for now.     Nutrition: Low potassium low Phos high-protein diet.  Renal vitamin.    PVD: Underwent left BKA.     High risk and complexity patient.     Ruel Giraldo MD  02/12/25  16:30 EST

## 2025-02-14 LAB
BACTERIA SPEC ANAEROBE CULT: NORMAL
FUNGUS WND CULT: NORMAL

## 2025-02-18 LAB
FUNGUS WND CULT: NORMAL
FUNGUS WND CULT: NORMAL
MYCOBACTERIUM SPEC CULT: NORMAL
NIGHT BLUE STAIN TISS: NORMAL

## 2025-02-21 LAB
FUNGUS WND CULT: NORMAL
MYCOBACTERIUM SPEC CULT: NORMAL
NIGHT BLUE STAIN TISS: NORMAL

## 2025-02-28 LAB — FUNGUS WND CULT: NORMAL

## 2025-03-07 LAB — FUNGUS WND CULT: NORMAL

## 2025-03-19 LAB
MYCOBACTERIUM SPEC CULT: NORMAL
NIGHT BLUE STAIN TISS: NORMAL

## 2025-04-18 NOTE — LETTER
[facility]  [address] [facility] [address]  [phone]        April 21, 2025      Patient: Sara Esparza  YOB: 1950  Date of Visit: 4/18/2025      For inpatient hospice admission  Referring Kirstin Carlos  Certifying Dr Kalpana Parker

## 2025-04-19 PROBLEM — E78.5 HYPERLIPIDEMIA: Status: ACTIVE | Noted: 2022-08-17

## 2025-04-19 PROBLEM — R79.89 ELEVATED TROPONIN: Status: ACTIVE | Noted: 2025-01-01

## 2025-04-19 PROBLEM — J44.9 CHRONIC OBSTRUCTIVE PULMONARY DISEASE: Status: ACTIVE | Noted: 2024-01-01

## 2025-04-19 PROBLEM — M96.1 LUMBAR POST-LAMINECTOMY SYNDROME: Status: ACTIVE | Noted: 2022-09-16

## 2025-04-19 PROBLEM — G92.8 TOXIC METABOLIC ENCEPHALOPATHY: Status: ACTIVE | Noted: 2025-01-01

## 2025-04-19 PROBLEM — L89.512 PRESSURE INJURY OF RIGHT ANKLE, STAGE 2: Status: ACTIVE | Noted: 2025-01-01

## 2025-04-19 PROBLEM — J45.909 ASTHMA: Status: ACTIVE | Noted: 2024-01-01

## 2025-04-19 PROBLEM — K21.9 GASTROESOPHAGEAL REFLUX DISEASE WITHOUT ESOPHAGITIS: Status: ACTIVE | Noted: 2018-01-25

## 2025-04-19 PROBLEM — D63.8 ANEMIA OF CHRONIC DISEASE: Status: ACTIVE | Noted: 2024-01-01

## 2025-04-19 PROBLEM — F32.A DEPRESSIVE DISORDER: Status: ACTIVE | Noted: 2018-01-25

## 2025-04-19 PROBLEM — N30.00 ACUTE CYSTITIS WITHOUT HEMATURIA: Status: ACTIVE | Noted: 2025-01-01

## 2025-04-19 PROBLEM — G47.33 OBSTRUCTIVE SLEEP APNEA SYNDROME: Status: ACTIVE | Noted: 2023-06-01

## 2025-04-19 PROBLEM — E11.42 DIABETIC PERIPHERAL NEUROPATHY: Status: ACTIVE | Noted: 2023-03-02

## 2025-04-19 PROBLEM — I10 ESSENTIAL HYPERTENSION: Status: ACTIVE | Noted: 2022-02-25

## 2025-04-19 NOTE — PROGRESS NOTES
[facility] Medicine Services  ADMISSION FOLLOW-UP NOTE          Patient admitted after midnight, H&P by my partner performed earlier on today's date reviewed.  Interim findings, labs, and charting also reviewed.        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         ADDITIONAL PLAN:  - detailed assessment and plan from admission reviewed    Ms. Esparza is a 75 yo WF with PMHx of HTN, PAF (Eliquis), prior DVT/PE, ESRD on HD (MWF), PAD s/p L BKA, IDDM2, Hx of OM, asthma, Chronic LBP s/p SCS, cervical stenosis, Chronic pain syndrome, DENZEL (CPAP) who presented from [facility] for evaluation of confusion. She was found to have an acute UTI.    Plan:    Toxic metabolic encephalopathy  Acute cystitis without hematuria  - Afebrile, WBC 7, CRP 11.38  - UA suggestive of acute infection  - CT head with no acute provess   - Continue IV Rocephin, follow cultures, neurochecks     IDDM Type 2  - SSI     PAF  - Rate-controlled in SR  - Continue Eliquis, amiodarone     Elevated troponin  - Chronically elevated in the setting of ESRD  - Trop peaked at 109  - No ACS/CP  - CTM on tele     ESRD on HD  - MWF  - Nephro consulted     AOCD  - Hgb stable  - No active bleeding  - transfuse PRN HgB<7     HTN  -  Chronic, normotensive  - Hold home antihypertensives and resume when appropriate     COPD  - Chronic, not in exacerbation  - Duonebs PRN     Right lateral malleolus pressure ulcer  Right foot ulceration  - off-load limb  - Wound care consult    H/o PAD s/p L BKA     Chronic pain syndrome  - Resume home roxicodone PRN         Expected Discharge   Expected Discharge Date: 4/21/2025; Expected Discharge Time:      Heather Carlos MD  04/19/25

## 2025-04-19 NOTE — PLAN OF CARE
Goal Outcome Evaluation:  Plan of Care Reviewed With: patient, spouse        Progress: no change       Anticipated Discharge Disposition (SLP): skilled nursing facility          SLP Swallowing Diagnosis: moderate, oral dysphagia, R/O pharyngeal dysphagia (04/19/25 1332)

## 2025-04-19 NOTE — H&P
[facility] Medicine Services  HISTORY AND PHYSICAL    Patient Name: Sara Esparza  : 1950  MRN: [id number]  Primary Care Physician: Toribio Roberts MD  Date of admission: 2025  Subjective   Subjective   Source of Information: Patient's , ER signout, EMR    Chief Complaint:   Chief Complaint   Patient presents with    Altered Mental Status     Patient arrives via EMS from [facility], [place] with altered mental status     HPI:  Sara Esparza is a 74 y.o. female with PMHx of HTN, PAF (Eliquis), prior DVT/PE, ESRD on HD (MWF), PAD s/p L BKA, IDDM2, Hx of OM asthma, Chronic LBP s/p SCS, cervical stenosis, Chronic pain syndrome, DENZEL (CPAP) who presented from [facility], [place] for evaluation of confusion. The patient is unable to provide any meaningful history at this time;  at bedside is poor historian but able to provide basic details. Patient reportedly had been exhibiting worsening generalized confusion over the past 2 weeks with hypersomnolence and inappropriate speech content. Currently, she is moaning out in pain and unable to answer any questions.    Review of systems:  Unable to obtain 2/2 AMS    Personal History     Past Medical History:   Diagnosis Date    Anxiety     Arthritis     Asthma     allergy induced    Back pain     Depression     Diabetes mellitus     dx 10 years ago- checks fsbs most days    Diverticula of colon     GERD (gastroesophageal reflux disease)     Head ache     Hypertension     Sleep apnea     no longer needs cpap - approx. 10 years r/t weight loss         Past Surgical History:   Procedure Laterality Date    AORTAGRAM Left 2/3/2025    Procedure: CFA ACCESS RIGHT - ULTRASOUND GUIDED, AORTOGRAM WITH LEFT LOWER EXTREMITY RUN OFF, ANGIOPLASTY, INTRAVASCULAR LITHOTRIPSY, RIGHT CFA CLOSURE;  Surgeon: Kyle Ramirez MD;  Location: [facility];  Service: Vascular;  Laterality: Left;  fluoro 10 minutes  dose -  61mgy  contrast 20ml    BACK SURGERY      x 2    BELOW KNEE AMPUTATION Left 2/4/2025    Procedure: BELOW KNEE AMPUTATION LEFT;  Surgeon: Rom Gonzalez Jr., MD;  Location: [facility] OR;  Service: Orthopedics;  Laterality: Left;    CARDIAC CATHETERIZATION      no intervention    CHOLECYSTECTOMY OPEN      COLONOSCOPY      2013    FOOT IRRIGATION, DEBRIDEMENT AND REPAIR Left 1/24/2025    Procedure: HEEL IRRIGATION AND DEBRIDEMENT LEFT;  Surgeon: Rom Gonzalez Jr., MD;  Location:  [facility] OR;  Service: Orthopedics;  Laterality: Left;    HYSTERECTOMY      PLACEMENT OF ANTIBIOTIC FILLER Left 2/4/2025    Procedure: PLACEMENT OF ANTIBIOTIC CEMENT FILLER FOR BONE VOID;  Surgeon: Rom Gonzalez Jr., MD;  Location:  [facility] OR;  Service: Orthopedics;  Laterality: Left;    PLACEMENT OF WOUND VAC Left 1/24/2025    Procedure: PLACEMENT OF WOUND VAC;  Surgeon: Rom Gonzalez Jr., MD;  Location:  [facility] OR;  Service: Orthopedics;  Laterality: Left;    REPLACEMENT TOTAL KNEE BILATERAL Bilateral     TONSILLECTOMY      VENTRAL/INCISIONAL HERNIA REPAIR N/A 6/6/2017    Procedure: INCISIONAL HERNIA REPAIR LAPAROSCOPIC;  Surgeon: Conrad Hernandez MD;  Location: [facility] OR;  Service:      Family History: family history is not on file.     Social History:  reports that she has never smoked. She has never used smokeless tobacco. She reports that she does not drink alcohol and does not use drugs.  Social History     Social History Narrative    Not on file       Medications:  Available home medication information reviewed.  Allopurinol, Cholecalciferol, Insulin Lispro, NIFEdipine XL, SITagliptin, acetaminophen, albuterol sulfate HFA, amiodarone, apixaban, atorvastatin, cloNIDine, epoetin gracia-epbx, famotidine, melatonin, multivitamin with minerals, oxyCODONE, pantoprazole, polyethylene glycol, sennosides-docusate, and traMADol    No Known Allergies  Objective   Objective     Vital Signs:   Temp:  [97.8 °F (36.6 °C)] 97.8 °F (36.6 °C)  Heart  Rate:  [79-87] 79  Resp:  [14] 14  BP: (113-127)/(54-71) 123/57  Flow (L/min) (Oxygen Therapy):  [2] 2     General:  Well nourished, mild distress, appears uncomfortable  Head:  Normocephalic, atraumatic  Eyes:  Sclerae appear normal. Pupils equally round  ENT:  Nares appear normal, no drainage. Moist oral mucosa  Neck:  No restricted ROM. Trachea midline  CV:  RRR. No M/R/G. No JVD; right tunneled HD cath (no erythema)  Lungs: CTAB. No wheezing, rhonchi, or rales. Symmetric expansion  Abdomen: Bowel sounds present. Nondistended, soft, nontender  Extremities: No cyanosis or clubbing. No edema. Left BKA. Right stage 2 pressure ulcers to right lateral malleolus  Skin:  No rashes and normal coloration. Warm and dry.  Neuro:  CN II-XII grossly intact. Sensation intact. Awake & alert, unable to answer questions at this time/encephalopathic  Psych:  Normal mood and affect    I have personally reviewed labs and tests showing:    LAB RESULTS:      Lab 04/19/25  0016   WBC 7.06   HEMOGLOBIN 8.6*   HEMATOCRIT 29.1*   PLATELETS 136*   NEUTROS ABS 5.76   IMMATURE GRANS (ABS) 0.03   LYMPHS ABS 0.56*   MONOS ABS 0.70   EOS ABS 0.00   MCV 93.0   SED RATE 12   CRP 11.38*   LACTATE 1.3         Lab 04/19/25  0016   SODIUM 135*   POTASSIUM 4.5   CHLORIDE 101   CO2 27.0   ANION GAP 7.0   BUN 18   CREATININE 2.28*   EGFR 21.9*   GLUCOSE 106*   CALCIUM 9.2   MAGNESIUM 2.0         Lab 04/19/25  0016   TOTAL PROTEIN 4.8*   ALBUMIN 2.1*   GLOBULIN 2.7   ALT (SGPT) 9   AST (SGOT) 21   BILIRUBIN 0.4   ALK PHOS 224*         Lab 04/19/25  0016   HSTROP T 109*                 UA          9/16/2024    11:12 1/19/2025    14:04 4/18/2025    23:55   Urinalysis   Squamous Epithelial Cells, UA 7-12  7-12  None Seen    Specific [place], UA 1.015  1.020  1.014    Ketones, UA Negative  Trace  Negative    Blood, UA Negative  Moderate (2+)  Small (1+)    Leukocytes, UA Small (1+)  Moderate (2+)  Large (3+)    Nitrite, UA Negative  Negative  Negative     RBC, UA 3-5  0-2  0-2    WBC, UA 3-5  Too Numerous to Count  21-50    Bacteria, UA None Seen  3+  4+      Microbiology Results (last 10 days)       ** No results found for the last 240 hours. **          CT Head Without Contrast  Result Date: 4/19/2025  CT HEAD WO CONTRAST Date of Exam: 4/19/2025 12:11 AM EDT Indication: altered mental status. Comparison: CT head 1/19/2025 Technique: Axial CT images were obtained of the head without contrast administration.  Automated exposure control and iterative construction methods were used. Findings: There is mild diffuse generalized atrophy. There are low-attenuation areas in the periventricular white matter consistent with chronic microvascular ischemic change. There is encephalomalacia in the medial right occipital lobe and in the right frontal lobe consistent with previous infarcts. There is no mass, mass effect or midline shift. There are no abnormal extra-axial fluid collections or areas of acute hemorrhage. The paranasal sinuses are clear. The mastoid air cells are clear.     Impression: Impression: Atrophy and chronic microvascular ischemic change. 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 No acute intracranial process. Electronically Signed: García Kinney MD  4/19/2025 12:49 AM EDT  Workstation ID: [id number]    XR Foot 3+ View Right  Result Date: 4/19/2025  XR FOOT 3+ VW RIGHT Date of Exam: 4/18/2025 11:23 PM EDT Indication: nontraumatic pain with open wound on base of 5th metatarsal, history of MRSA and DM Comparison: Right foot x-ray 7/6/2020 Findings: There is extensive calcaneal spurring. There are degenerative changes involving the first metatarsal phalangeal joint with adjacent soft tissue swelling. There is midfoot arthritic change. There is no convincing soft tissue gas. There are no convincing destructive changes of osteomyelitis.     Impression: Impression: Degenerative changes of the foot. No convincing destructive changes of osteomyelitis.  "Electronically Signed: García Kinney MD  4/19/2025 12:09 AM EDT  Workstation ID: [id number]    XR Chest 1 View  Result Date: 4/18/2025  XR CHEST 1 VW Date of Exam: 4/18/2025 11:05 PM EDT Indication: AMS Protocol AMS Protocol Comparison: Chest radiograph 1/23/2025 Findings: There is a right-sided tunneled dialysis catheter with the tip at the cavoatrial junction. The heart size and pulmonary vascular markings are normal. The lungs are clear.     Impression: Impression: No active disease. Electronically Signed: García Kinney MD  4/18/2025 11:46 PM EDT  Workstation ID: [id number]    Results for orders placed during the hospital encounter of 01/19/25    Adult Transthoracic Echo Complete W/ Cont if Necessary Per Protocol    Interpretation Summary    Left ventricular systolic function is normal. Estimated left ventricular EF = 55%    Left ventricular wall thickness is consistent with moderate concentric hypertrophy.    No hemodynamically significant valvular heart disease      My personal interpretation of the CXR is no acute cardiopulmonary process; right tunneled HD cath    My personal interpretation of the EKG is 85 NSR, iRBBB, nonspecific ST/T abnormalities in lateral leads; no ST elevations     This case was discussed with the ER attending physician.    Assessment & Plan   Assessment & Plan       Toxic metabolic encephalopathy    ESRD (end stage renal disease)    Type 2 diabetes mellitus with stage 5 chronic kidney disease not on chronic dialysis, without long-term current use of insulin    Paroxysmal A-fib    Anemia of chronic disease    Chronic obstructive pulmonary disease    Acute cystitis without hematuria    Pressure injury of right ankle, stage 2    Toxic metabolic encephalopathy  Acute cystitis without hematuria  - Afebrile, WBC 7, CRP 11.38  - US suggestive of acute infection  - CT Brain revealed \" Atrophy and chronic microvascular ischemic change. 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 No acute " "intracranial process.\"  - C/w IV Rocephin, follow cultures, neurochecks    IDDM Type 2  - Chronic  - ISS and accuchecks    PAF  - Rate-controlled in SR  - C/w Eliquis, amiodarone    Elevated troponin  - Chronically elevated in the setting of ESRD  - HsTrop 109 -> 105  - No ACS/CP  - CTM on tele    ESRD on HD  - MWF  - Nephro consulted    AOCD  - Hgb stable  - No active bleeding  - CTM    HTN  - Chronic, normotensive  - Hold home antihypertensives and resume when appropriate    COPD  - Chronic, not in exacerbation  - Duonebs PRN    Right lateral malleolus pressure ulcer  Right foot ulceration  - off-load limb  - Wound care consult    Chronic pain syndrome  - Resume home roxicodone PRN    VTE PPx: Eliquis  Diet: NPO Diet NPO Type: Sips with Meds  CODE STATUS:    Code Status and Medical Interventions: CPR (Attempt to Resuscitate); Full Support   Ordered at: 04/19/25 0155     Code Status (Patient has no pulse and is not breathing):    CPR (Attempt to Resuscitate)     Medical Interventions (Patient has pulse or is breathing):    Full Support     Level Of Support Discussed With:    Next of Kin (If No Surrogate)       Expected Discharge Expected discharge date/ time has not been documented.     Austin Brooks, DO  04/19/25    "

## 2025-04-19 NOTE — ED PROVIDER NOTES
"Subjective   History of Present Illness  74 year old female with history of HTN, DM2, GERD, PAF, ESRD on HD MWF last HD today, asthma, anxiety, and depression, presents to the emergency department brought by EMS from [facility] accompanied by her spouse and her son for evaluation of lethargy and confusion worsening over the past 2-3 weeks, worse for the past two days. Her spouse states she seems disoriented and is talking about people who aren't there and are . She seems to be hearing things that aren't being said.  She said she talked to her brother, and he passed away months ago.  HD today was over at approximately 1330, and she is typically tired on HD days. She \"cried the whole time she was at dialysis\" recently, but was unable to articulate why she was crying her her spouse. She had a food infection previously which resulted in left lower extremity BKA, and family states her current symptoms are similar to prior symptoms she had when she had that foot infection. She has a wound a small stage II decubitus ulcer on her right lateral malleolus of the ankle, and a second small stage II decubitus ulcer on the lateral foot at the base of the fifth metatarsal. No recent measured fever.  Code status is full code. She has had poor oral intake recently. Spouse states the patient complains of sensation of having to urinate, but then is unable to urinate.      Review of Systems   Unable to perform ROS: Mental status change   Constitutional:  Positive for appetite change and fatigue. Negative for fever.   Respiratory:  Negative for stridor.    Gastrointestinal:  Positive for nausea.   Genitourinary:  Positive for urgency.   Skin:  Positive for wound.   Psychiatric/Behavioral:  Positive for confusion.        Past Medical History:   Diagnosis Date    Anxiety     Arthritis     Asthma     allergy induced    Back pain     Depression     Diabetes mellitus     dx 10 years ago- checks fsbs most days    " Diverticula of colon     GERD (gastroesophageal reflux disease)     Head ache     Hypertension     Sleep apnea     no longer needs cpap - approx. 10 years r/t weight loss       No Known Allergies    Past Surgical History:   Procedure Laterality Date    AORTAGRAM Left 2/3/2025    Procedure: CFA ACCESS RIGHT - ULTRASOUND GUIDED, AORTOGRAM WITH LEFT LOWER EXTREMITY RUN OFF, ANGIOPLASTY, INTRAVASCULAR LITHOTRIPSY, RIGHT CFA CLOSURE;  Surgeon: Kyle Ramirez MD;  Location:  [facility];  Service: Vascular;  Laterality: Left;  fluoro 10 minutes  dose - 61mgy  contrast 20ml    BACK SURGERY      x 2    BELOW KNEE AMPUTATION Left 2/4/2025    Procedure: BELOW KNEE AMPUTATION LEFT;  Surgeon: Rom Gonzalez Jr., MD;  Location:  [facility];  Service: Orthopedics;  Laterality: Left;    CARDIAC CATHETERIZATION      no intervention    CHOLECYSTECTOMY OPEN      COLONOSCOPY      2013    FOOT IRRIGATION, DEBRIDEMENT AND REPAIR Left 1/24/2025    Procedure: HEEL IRRIGATION AND DEBRIDEMENT LEFT;  Surgeon: Rom Gonzalez Jr., MD;  Location: [facility];  Service: Orthopedics;  Laterality: Left;    HYSTERECTOMY      PLACEMENT OF ANTIBIOTIC FILLER Left 2/4/2025    Procedure: PLACEMENT OF ANTIBIOTIC CEMENT FILLER FOR BONE VOID;  Surgeon: Rom Gonzalez Jr., MD;  Location: [facility];  Service: Orthopedics;  Laterality: Left;    PLACEMENT OF WOUND VAC Left 1/24/2025    Procedure: PLACEMENT OF WOUND VAC;  Surgeon: Rom Gonzalez Jr., MD;  Location:  [facility] OR;  Service: Orthopedics;  Laterality: Left;    REPLACEMENT TOTAL KNEE BILATERAL Bilateral     TONSILLECTOMY      VENTRAL/INCISIONAL HERNIA REPAIR N/A 6/6/2017    Procedure: INCISIONAL HERNIA REPAIR LAPAROSCOPIC;  Surgeon: Conrad Hernandez MD;  Location:  [facility] OR;  Service:        No family history on file.    Social History     Socioeconomic History    Marital status:    Tobacco Use    Smoking status: Never    Smokeless tobacco: Never   Vaping Use    Vaping status:  Never Used   Substance and Sexual Activity    Alcohol use: No    Drug use: No    Sexual activity: Defer           Objective   Physical Exam  Vitals and nursing note reviewed.   Constitutional:       Appearance: She is ill-appearing. She is not diaphoretic.      Comments: Poor historian. BMI 23.   Eyes:      General: No scleral icterus.     Pupils: Pupils are equal, round, and reactive to light.      Comments: Discharge from right eye   Cardiovascular:      Rate and Rhythm: Normal rate and regular rhythm.      Heart sounds: No murmur heard.     No friction rub. No gallop.      Comments: S1, S2  Pulmonary:      Effort: Pulmonary effort is normal. No respiratory distress.      Breath sounds: Normal breath sounds. No stridor. No wheezing, rhonchi or rales.      Comments: Good air entry.  Abdominal:      General: There is no distension.      Palpations: Abdomen is soft.      Tenderness: There is no abdominal tenderness.   Musculoskeletal:      Cervical back: Neck supple. No rigidity.      Comments: Left lower extremity status post BKA. Right lower extremity no significant peripheral edema. She has some contractures and pain with range of motion of joints of right lower extremity. Bedside doppler used and right lower extremity DP and PT pulses are audible.   Skin:     General: Skin is warm and dry.      Comments: Wounds lateral right foot and ankle lateral malleolus, see image, no significant surrounding erythema or warmth.   Neurological:      Comments: No facial droop. Follows commands. No dysarthria. Moves all extremities.          Procedures           ED Course  ED Course as of 04/19/25 0111   Fri Apr 18, 2025   2302 [place], decreased oral food and liquid intake [LD]   Sat Apr 19, 2025   0103 BUN: 18 [LD]   0103 Creatinine(!): 2.28 [LD]   0103 Sodium(!): 135 [LD]   0103 Potassium: 4.5 [LD]   0103 Glucose(!): 106 [LD]   0103 WBC: 7.06 [LD]   0103 Hemoglobin(!): 8.6  Similar to prior values. [LD]   0103 C-Reactive  Protein(!): 11.38 [LD]   0103 Lactate: 1.3 [LD]   0103 Sed Rate: 12 [LD]   0104 WBC, UA(!): 21-50 [LD]   0104 Bacteria, UA(!): 4+ [LD]   0104 Squamous Epithelial Cells, UA: None Seen [LD]   0104 Blood, UA(!): Small (1+) [LD]   0104 Protein, UA(!): >=300 mg/dL (3+) [LD]   0104 Leukocytes, UA(!): Large (3+) [LD]   0104 Nitrite, UA: Negative [LD]   0107 HS Troponin T(!!): 109  Similar to prior values. [LD]      ED Course User Index  [LD] Cherelle Self MD                                                       Medical Decision Making  Differential diagnosis includes acute cystitis, sepsis, metabolic encephalopathy, subdural hematoma, malignancy, anemia, deconditioning, osteomyelitis of right foot or ankle, and others.    Problems Addressed:  Acute cystitis without hematuria: complicated acute illness or injury  Altered mental status, unspecified altered mental status type: complicated acute illness or injury    Amount and/or Complexity of Data Reviewed  Independent Historian: spouse and EMS     Details: Son at bedside  External Data Reviewed: labs.     Details: I reviewed paperwork including medication list and list of diagnoses from her skilled nursing facility.I reviewed prior hemoglobin values, at baseline today compared to prior values.  Labs: ordered. Decision-making details documented in ED Course.  Radiology: ordered. Decision-making details documented in ED Course.  ECG/medicine tests: ordered and independent interpretation performed. Decision-making details documented in ED Course.     Details: EKG at 2315 shows normal sinus rhythm at a rate of 85 beats per minute, incomplete RBBB, nonspecific ST/T wave changes.   Discussion of management or test interpretation with external provider(s): Hospitalist contacted.    Risk  Prescription drug management.  Decision regarding hospitalization.      Recent Results (from the past 24 hours)   ECG 12 Lead Altered Mental Status    Collection Time: 04/18/25 11:15 PM    Result Value Ref Range    QT Interval 392 ms    QTC Interval 466 ms   Urinalysis With Microscopic If Indicated (No Culture) - Straight Cath    Collection Time: 04/18/25 11:55 PM    Specimen: Straight Cath; Urine   Result Value Ref Range    Color, UA Dark Yellow (A) Yellow, Straw    Appearance, UA Turbid (A) Clear    pH, UA 8.0 5.0 - 8.0    Specific Gravity, UA 1.014 1.001 - 1.030    Glucose, UA Negative Negative    Ketones, UA Negative Negative    Bilirubin, UA Negative Negative    Blood, UA Small (1+) (A) Negative    Protein, UA >=300 mg/dL (3+) (A) Negative    Leuk Esterase, UA Large (3+) (A) Negative    Nitrite, UA Negative Negative    Urobilinogen, UA 1.0 E.U./dL 0.2 - 1.0 E.U./dL   Urinalysis, Microscopic Only - Straight Cath    Collection Time: 04/18/25 11:55 PM    Specimen: Straight Cath; Urine   Result Value Ref Range    RBC, UA 0-2 None Seen, 0-2 /HPF    WBC, UA 21-50 (A) None Seen, 0-2 /HPF    Bacteria, UA 4+ (A) None Seen, Trace /HPF    Squamous Epithelial Cells, UA None Seen None Seen, 0-2 /HPF    Hyaline Casts, UA 0-6 0 - 6 /LPF    Methodology Manual Light Microscopy    Comprehensive Metabolic Panel    Collection Time: 04/19/25 12:16 AM    Specimen: Blood   Result Value Ref Range    Glucose 106 (H) 65 - 99 mg/dL    BUN 18 8 - 23 mg/dL    Creatinine 2.28 (H) 0.57 - 1.00 mg/dL    Sodium 135 (L) 136 - 145 mmol/L    Potassium 4.5 3.5 - 5.2 mmol/L    Chloride 101 98 - 107 mmol/L    CO2 27.0 22.0 - 29.0 mmol/L    Calcium 9.2 8.6 - 10.5 mg/dL    Total Protein 4.8 (L) 6.0 - 8.5 g/dL    Albumin 2.1 (L) 3.5 - 5.2 g/dL    ALT (SGPT) 9 1 - 33 U/L    AST (SGOT) 21 1 - 32 U/L    Alkaline Phosphatase 224 (H) 39 - 117 U/L    Total Bilirubin 0.4 0.0 - 1.2 mg/dL    Globulin 2.7 gm/dL    A/G Ratio 0.8 g/dL    BUN/Creatinine Ratio 7.9 7.0 - 25.0    Anion Gap 7.0 5.0 - 15.0 mmol/L    eGFR 21.9 (L) >60.0 mL/min/1.73   Green Top (Gel)    Collection Time: 04/19/25 12:16 AM   Result Value Ref Range    Extra Tube Hold for  add-ons.    Lavender Top    Collection Time: 04/19/25 12:16 AM   Result Value Ref Range    Extra Tube hold for add-on    Gold Top - SST    Collection Time: 04/19/25 12:16 AM   Result Value Ref Range    Extra Tube Hold for add-ons.    Gray Top    Collection Time: 04/19/25 12:16 AM   Result Value Ref Range    Extra Tube Hold for add-ons.    Light Blue Top    Collection Time: 04/19/25 12:16 AM   Result Value Ref Range    Extra Tube Hold for add-ons.    CBC Auto Differential    Collection Time: 04/19/25 12:16 AM    Specimen: Blood   Result Value Ref Range    WBC 7.06 3.40 - 10.80 10*3/mm3    RBC 3.13 (L) 3.77 - 5.28 10*6/mm3    Hemoglobin 8.6 (L) 12.0 - 15.9 g/dL    Hematocrit 29.1 (L) 34.0 - 46.6 %    MCV 93.0 79.0 - 97.0 fL    MCH 27.5 26.6 - 33.0 pg    MCHC 29.6 (L) 31.5 - 35.7 g/dL    RDW 16.1 (H) 12.3 - 15.4 %    RDW-SD 55.0 (H) 37.0 - 54.0 fl    MPV 10.4 6.0 - 12.0 fL    Platelets 136 (L) 140 - 450 10*3/mm3    Neutrophil % 81.7 (H) 42.7 - 76.0 %    Lymphocyte % 7.9 (L) 19.6 - 45.3 %    Monocyte % 9.9 5.0 - 12.0 %    Eosinophil % 0.0 (L) 0.3 - 6.2 %    Basophil % 0.1 0.0 - 1.5 %    Immature Grans % 0.4 0.0 - 0.5 %    Neutrophils, Absolute 5.76 1.70 - 7.00 10*3/mm3    Lymphocytes, Absolute 0.56 (L) 0.70 - 3.10 10*3/mm3    Monocytes, Absolute 0.70 0.10 - 0.90 10*3/mm3    Eosinophils, Absolute 0.00 0.00 - 0.40 10*3/mm3    Basophils, Absolute 0.01 0.00 - 0.20 10*3/mm3    Immature Grans, Absolute 0.03 0.00 - 0.05 10*3/mm3    nRBC 0.0 0.0 - 0.2 /100 WBC   Lactic Acid, Plasma    Collection Time: 04/19/25 12:16 AM    Specimen: Blood   Result Value Ref Range    Lactate 1.3 0.5 - 2.0 mmol/L   High Sensitivity Troponin T    Collection Time: 04/19/25 12:16 AM    Specimen: Blood   Result Value Ref Range    HS Troponin T 109 (C) <14 ng/L   Magnesium    Collection Time: 04/19/25 12:16 AM    Specimen: Blood   Result Value Ref Range    Magnesium 2.0 1.6 - 2.4 mg/dL   C-reactive Protein    Collection Time: 04/19/25 12:16 AM     "Specimen: Blood   Result Value Ref Range    C-Reactive Protein 11.38 (H) 0.00 - 0.50 mg/dL   Sedimentation Rate    Collection Time: 04/19/25 12:16 AM    Specimen: Blood   Result Value Ref Range    Sed Rate 12 0 - 30 mm/hr     Note: In addition to lab results from this visit, the labs listed above may include labs taken at another facility or during a different encounter within the last 24 hours. Please correlate lab times with ED admission and discharge times for further clarification of the services performed during this visit.    CT Head Without Contrast   Final Result   Impression:   Atrophy and chronic microvascular ischemic change. 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 No acute intracranial process.                  Electronically Signed: García Kinney MD     4/19/2025 12:49 AM EDT     Workstation ID: [id number]      XR Foot 3+ View Right   Final Result   Impression:   Degenerative changes of the foot. No convincing destructive changes of osteomyelitis.               Electronically Signed: García Kinney MD     4/19/2025 12:09 AM EDT     Workstation ID: [id number]      XR Chest 1 View   Final Result   Impression:   No active disease.               Electronically Signed: García Kinney MD     4/18/2025 11:46 PM EDT     Workstation ID: [id number]        Vitals:    04/19/25 0020 04/19/25 0026 04/19/25 0030 04/19/25 0100   BP:   127/67 124/59   BP Location:       Patient Position:       Pulse:   82 85   Resp:       Temp:       SpO2:  92% 94% 99%   Weight: 67.8 kg (149 lb 6.4 oz)      Height: 170.2 cm (67\")        Medications   sodium chloride 0.9 % flush 10 mL (has no administration in time range)   cefTRIAXone (ROCEPHIN) 1,000 mg in sodium chloride 0.9 % 100 mL MBP (has no administration in time range)     ECG/EMG Results (last 24 hours)       ** No results found for the last 24 hours. **          ECG 12 Lead Altered Mental Status   Preliminary Result   Test Reason : Altered Mental Status   Blood Pressure :   */*   " mmHG   Vent. Rate :  85 BPM     Atrial Rate :  85 BPM      P-R Int : 176 ms          QRS Dur : 106 ms       QT Int : 392 ms       P-R-T Axes :  71 -68  83 degrees     QTcB Int : 466 ms      Normal sinus rhythm   Left axis deviation   Incomplete right bundle branch block   Inferior infarct , age undetermined   Anterior infarct (cited on or before 11-Feb-2025)   ST & T wave abnormality, consider lateral ischemia   Abnormal ECG   When compared with ECG of 11-Feb-2025 05:23,   Incomplete right bundle branch block has replaced Nonspecific   intraventricular block   Inferior infarct is now present   Questionable change in initial forces of Anterior leads      Referred By: ED MD           Confirmed By:              Final diagnoses:   Altered mental status, unspecified altered mental status type   Acute cystitis without hematuria       ED Disposition  ED Disposition       ED Disposition   Decision to Admit    Condition   --    Comment   --               No follow-up provider specified.       Medication List      No changes were made to your prescriptions during this visit.            Cherelle Self MD  04/19/25 9213       Cherelle Self MD  04/19/25 2285

## 2025-04-19 NOTE — CONSULTS
Patient Care Team:  Toribio Roberts MD as PCP - General (Internal Medicine)  Carla Bustos APRN as Nurse Practitioner (Cardiology)    Chief complaint: AMS    Inpatient Nephrology Consult  Consult performed by: Wilian Gonzalez MD  Consult ordered by: Austin Brooks DO  Reason for consult: esrd         History of Present Illness: fidel Esparza is a 74 y.o. female with PMHx of HTN, PAF (Eliquis), prior DVT/PE, ESRD on HD (MWF), PAD s/p L BKA, IDDM2, Hx of OM asthma, Chronic LBP s/p SCS, cervical stenosis, Chronic pain syndrome, DENZEL (CPAP) who presented from [facility] for evaluation of confusion. The patient is unable to provide any meaningful history at this time;  at bedside is poor historian but able to provide basic details. Patient reportedly had been exhibiting worsening generalized confusion over the past 2 weeks with hypersomnolence and inappropriate speech content     Review of Systems   Unable to perform ROS: Acuity of condition        Past Medical History:   Diagnosis Date    Anxiety     Arthritis     Asthma     allergy induced    Back pain     Depression     Diabetes mellitus     dx 10 years ago- checks fsbs most days    Diverticula of colon     GERD (gastroesophageal reflux disease)     Head ache     Hypertension     Sleep apnea     no longer needs cpap - approx. 10 years r/t weight loss   ,   Past Surgical History:   Procedure Laterality Date    AORTAGRAM Left 2/3/2025    Procedure: CFA ACCESS RIGHT - ULTRASOUND GUIDED, AORTOGRAM WITH LEFT LOWER EXTREMITY RUN OFF, ANGIOPLASTY, INTRAVASCULAR LITHOTRIPSY, RIGHT CFA CLOSURE;  Surgeon: Kyle Ramirez MD;  Location: [facility] HYBRID OR;  Service: Vascular;  Laterality: Left;  fluoro 10 minutes  dose - 61mgy  contrast 20ml    BACK SURGERY      x 2    BELOW KNEE AMPUTATION Left 2/4/2025    Procedure: BELOW KNEE AMPUTATION LEFT;  Surgeon: Rom Gonzalez Jr., MD;  Location: [facility] OR;  Service: Orthopedics;   Laterality: Left;    CARDIAC CATHETERIZATION      no intervention    CHOLECYSTECTOMY OPEN      COLONOSCOPY      2013    FOOT IRRIGATION, DEBRIDEMENT AND REPAIR Left 1/24/2025    Procedure: HEEL IRRIGATION AND DEBRIDEMENT LEFT;  Surgeon: Rom Gonzalez Jr., MD;  Location:  [facility] OR;  Service: Orthopedics;  Laterality: Left;    HYSTERECTOMY      PLACEMENT OF ANTIBIOTIC FILLER Left 2/4/2025    Procedure: PLACEMENT OF ANTIBIOTIC CEMENT FILLER FOR BONE VOID;  Surgeon: Rom Gonzalez Jr., MD;  Location:  [facility] OR;  Service: Orthopedics;  Laterality: Left;    PLACEMENT OF WOUND VAC Left 1/24/2025    Procedure: PLACEMENT OF WOUND VAC;  Surgeon: Rom Gonzalez Jr., MD;  Location:  [facility] OR;  Service: Orthopedics;  Laterality: Left;    REPLACEMENT TOTAL KNEE BILATERAL Bilateral     TONSILLECTOMY      VENTRAL/INCISIONAL HERNIA REPAIR N/A 6/6/2017    Procedure: INCISIONAL HERNIA REPAIR LAPAROSCOPIC;  Surgeon: Conrad Hernandez MD;  Location:  [facility] OR;  Service:    , History reviewed. No pertinent family history.,   Social History     Socioeconomic History    Marital status:    Tobacco Use    Smoking status: Never    Smokeless tobacco: Never   Vaping Use    Vaping status: Never Used   Substance and Sexual Activity    Alcohol use: No    Drug use: No    Sexual activity: Defer     E-cigarette/Vaping    E-cigarette/Vaping Use Never User      E-cigarette/Vaping Substances    Nicotine No     THC No     CBD No     Flavoring No      E-cigarette/Vaping Devices    Disposable No     Pre-filled or Refillable Cartridge No     Refillable Tank No     Pre-filled Pod No          , and   Medications Prior to Admission   Medication Sig Dispense Refill Last Dose/Taking    acetaminophen (TYLENOL) 500 MG tablet Take 1 tablet by mouth Every 6 (Six) Hours As Needed for Mild Pain.       albuterol sulfate  (90 Base) MCG/ACT inhaler Inhale 2 puffs Every 4 (Four) Hours As Needed for Wheezing or Shortness of Air.       allopurinol 200  MG tablet Take 200 mg by mouth Daily.       amiodarone (PACERONE) 200 MG tablet Take 1 tablet by mouth Daily.       apixaban (ELIQUIS) 2.5 MG tablet tablet Take 1 tablet by mouth Every 12 (Twelve) Hours. 180 tablet 3     atorvastatin (LIPITOR) 10 MG tablet Take 1 tablet by mouth Every Night.       Cholecalciferol 50 MCG (2000 UT) capsule Take 1 capsule by mouth Daily.       cloNIDine (CATAPRES) 0.2 MG tablet Take 1 tablet by mouth Every 8 (Eight) Hours As Needed for High Blood Pressure (SBP > 170).       epoetin gracia-epbx (RETACRIT) 84333 UNIT/ML injection Inject 1 mL under the skin into the appropriate area as directed 3 (Three) Times a Week. Indications: ESRD on Dialysis       famotidine (PEPCID) 10 MG tablet Take 1 tablet by mouth Daily.       Insulin Lispro (humaLOG) 100 UNIT/ML injection Inject 2-9 Units under the skin into the appropriate area as directed 4 (Four) Times a Day Before Meals & at Bedtime.       melatonin 5 MG tablet tablet Take 0.5 tablets by mouth At Night As Needed (insomnia).       multivitamin with minerals tablet tablet Take 1 tablet by mouth Daily.       NIFEdipine XL (PROCARDIA XL) 90 MG 24 hr tablet Take 1 tablet by mouth Daily.       oxyCODONE (ROXICODONE) 5 MG immediate release tablet Take 1 tablet by mouth Every 4 (Four) Hours As Needed for Moderate Pain. 42 tablet 0     pantoprazole (PROTONIX) 40 MG EC tablet Take 1 tablet by mouth Every Morning.       polyethylene glycol (MIRALAX) 17 g packet Take 17 g by mouth Daily As Needed (Use if senna-docusate is ineffective).       sennosides-docusate (PERICOLACE) 8.6-50 MG per tablet Take 2 tablets by mouth 2 (Two) Times a Day.       SITagliptin (JANUVIA) 25 MG tablet Take 1 tablet by mouth Daily. 90 tablet 3     traMADol (ULTRAM) 50 MG tablet Take 1 tablet by mouth Every 8 (Eight) Hours As Needed for Moderate Pain. 9 tablet 0        Objective     Vital Signs  Temp:  [97.8 °F (36.6 °C)-98.5 °F (36.9 °C)] 98.1 °F (36.7 °C)  Heart Rate:   "[65-87] 77  Resp:  [14-16] 16  BP: (104-135)/(50-71) 104/53    No intake/output data recorded.  I/O last 3 completed shifts:  In: 100 [IV Piggyback:100]  Out: -     Physical Exam  Constitutional:       Appearance: She is ill-appearing.   HENT:      Head: Normocephalic and atraumatic.      Nose: Nose normal.   Cardiovascular:      Rate and Rhythm: Normal rate and regular rhythm.   Pulmonary:      Effort: Pulmonary effort is normal.   Musculoskeletal:         General: Normal range of motion.   Skin:     General: Skin is dry.   Neurological:      Comments: Confiusion         Results Review:    I reviewed the patient's new clinical results.    WBC WBC   Date Value Ref Range Status   04/19/2025 6.09 3.40 - 10.80 10*3/mm3 Final   04/19/2025 7.06 3.40 - 10.80 10*3/mm3 Final      HGB Hemoglobin   Date Value Ref Range Status   04/19/2025 8.8 (L) 12.0 - 15.9 g/dL Final   04/19/2025 8.6 (L) 12.0 - 15.9 g/dL Final      HCT Hematocrit   Date Value Ref Range Status   04/19/2025 29.6 (L) 34.0 - 46.6 % Final   04/19/2025 29.1 (L) 34.0 - 46.6 % Final      Platlets No results found for: \"LABPLAT\"   MCV MCV   Date Value Ref Range Status   04/19/2025 93.7 79.0 - 97.0 fL Final   04/19/2025 93.0 79.0 - 97.0 fL Final          Sodium Sodium   Date Value Ref Range Status   04/19/2025 136 136 - 145 mmol/L Final   04/19/2025 135 (L) 136 - 145 mmol/L Final      Potassium Potassium   Date Value Ref Range Status   04/19/2025 4.2 3.5 - 5.2 mmol/L Final   04/19/2025 4.5 3.5 - 5.2 mmol/L Final     Comment:     Slight hemolysis detected by analyzer. Result may be falsely elevated.      Chloride Chloride   Date Value Ref Range Status   04/19/2025 102 98 - 107 mmol/L Final   04/19/2025 101 98 - 107 mmol/L Final      CO2 CO2   Date Value Ref Range Status   04/19/2025 27.0 22.0 - 29.0 mmol/L Final   04/19/2025 27.0 22.0 - 29.0 mmol/L Final      BUN BUN   Date Value Ref Range Status   04/19/2025 25 (H) 8 - 23 mg/dL Final   04/19/2025 18 8 - 23 mg/dL " "Final      Creatinine Creatinine   Date Value Ref Range Status   04/19/2025 2.84 (H) 0.57 - 1.00 mg/dL Final   04/19/2025 2.28 (H) 0.57 - 1.00 mg/dL Final      Calcium Calcium   Date Value Ref Range Status   04/19/2025 9.1 8.6 - 10.5 mg/dL Final   04/19/2025 9.2 8.6 - 10.5 mg/dL Final      PO4 No results found for: \"CAPO4\"   Albumin Albumin   Date Value Ref Range Status   04/19/2025 2.0 (L) 3.5 - 5.2 g/dL Final   04/19/2025 2.1 (L) 3.5 - 5.2 g/dL Final      Magnesium Magnesium   Date Value Ref Range Status   04/19/2025 1.9 1.6 - 2.4 mg/dL Final   04/19/2025 2.0 1.6 - 2.4 mg/dL Final      Uric Acid No results found for: \"URICACID\"         Assessment & Plan       Toxic metabolic encephalopathy    ESRD (end stage renal disease)    Type 2 diabetes mellitus with stage 5 chronic kidney disease not on chronic dialysis, without long-term current use of insulin    Paroxysmal A-fib    Anemia of chronic disease    Chronic obstructive pulmonary disease    Acute cystitis without hematuria    Pressure injury of right ankle, stage 2    Elevated troponin      Assessment & Plan    ESRD: MWF [facility].   AMS: Getting treated for possible UTI  Anemia: ACD due to ESRD CORNEL on HD days.  BMD: Continue home meds. Renal diet  Volume status: Stable.     Recs  No indication for dialysis today  Continue HD per MWF [facility]       I discussed the patients findings and my recommendations with patient    Wilian Gonzalez MD  04/19/25  18:50 EDT            "

## 2025-04-19 NOTE — ED NOTES
Sara Esparza    Nursing Report ED to Floor:  Mental status: alert and oriented x2  Ambulatory status: non ambulatory  Oxygen Therapy:  ra  Cardiac Rhythm: nsr  Admitted from: [facility]  Safety Concerns:  fall risk  Precautions: none  Social Issues: left bka, dialysis  ED Room #:  9    ED Nurse Phone Extension - [phone] or may call [phone].      HPI:   Chief Complaint   Patient presents with    Altered Mental Status     Patient arrives via EMS from [facility] with altered mental status       Past Medical History:  Past Medical History:   Diagnosis Date    Anxiety     Arthritis     Asthma     allergy induced    Back pain     Depression     Diabetes mellitus     dx 10 years ago- checks fsbs most days    Diverticula of colon     GERD (gastroesophageal reflux disease)     Head ache     Hypertension     Sleep apnea     no longer needs cpap - approx. 10 years r/t weight loss        Past Surgical History:  Past Surgical History:   Procedure Laterality Date    AORTAGRAM Left 2/3/2025    Procedure: CFA ACCESS RIGHT - ULTRASOUND GUIDED, AORTOGRAM WITH LEFT LOWER EXTREMITY RUN OFF, ANGIOPLASTY, INTRAVASCULAR LITHOTRIPSY, RIGHT CFA CLOSURE;  Surgeon: Kyle Ramirez MD;  Location: [facility] HYBRID OR;  Service: Vascular;  Laterality: Left;  fluoro 10 minutes  dose - 61mgy  contrast 20ml    BACK SURGERY      x 2    BELOW KNEE AMPUTATION Left 2/4/2025    Procedure: BELOW KNEE AMPUTATION LEFT;  Surgeon: Rom Gonzalez Jr., MD;  Location: [facility] OR;  Service: Orthopedics;  Laterality: Left;    CARDIAC CATHETERIZATION      no intervention    CHOLECYSTECTOMY OPEN      COLONOSCOPY      2013    FOOT IRRIGATION, DEBRIDEMENT AND REPAIR Left 1/24/2025    Procedure: HEEL IRRIGATION AND DEBRIDEMENT LEFT;  Surgeon: Rom Gonzalez Jr., MD;  Location: [facility] OR;  Service: Orthopedics;  Laterality: Left;    HYSTERECTOMY      PLACEMENT OF ANTIBIOTIC FILLER Left 2/4/2025    Procedure: PLACEMENT OF ANTIBIOTIC CEMENT  FILLER FOR BONE VOID;  Surgeon: Rom Gonzalez Jr., MD;  Location:  [facility] OR;  Service: Orthopedics;  Laterality: Left;    PLACEMENT OF WOUND VAC Left 1/24/2025    Procedure: PLACEMENT OF WOUND VAC;  Surgeon: Rom Gonzalez Jr., MD;  Location:  [facility] OR;  Service: Orthopedics;  Laterality: Left;    REPLACEMENT TOTAL KNEE BILATERAL Bilateral     TONSILLECTOMY      VENTRAL/INCISIONAL HERNIA REPAIR N/A 6/6/2017    Procedure: INCISIONAL HERNIA REPAIR LAPAROSCOPIC;  Surgeon: Conrda Hernandez MD;  Location:  [facility] OR;  Service:         Admitting Doctor:   Austin Brooks DO    Consulting Provider(s):  Consults       Date and Time Order Name Status Description    4/19/2025  1:52 AM Inpatient Nephrology Consult               Admitting Diagnosis:   The primary encounter diagnosis was Altered mental status, unspecified altered mental status type. A diagnosis of Acute cystitis without hematuria was also pertinent to this visit.    Most Recent Vitals:   Vitals:    04/19/25 0026 04/19/25 0030 04/19/25 0100 04/19/25 0130   BP:  127/67 124/59 123/57   Pulse:  82 85 79   Resp:       Temp:       SpO2: 92% 94% 99% 97%   Weight:       Height:           Active LDAs/IV Access:   Lines, Drains & Airways       Active LDAs       Name Placement date Placement time Site Days    Peripheral IV 04/19/25 0130 20 G Left Antecubital 04/19/25  0130  Antecubital  less than 1    Hemodialysis Cath Double with Pigtail 10/11/24  1527  Subclavian  189                    Labs (abnormal labs have a star):   Labs Reviewed   COMPREHENSIVE METABOLIC PANEL - Abnormal; Notable for the following components:       Result Value    Glucose 106 (*)     Creatinine 2.28 (*)     Sodium 135 (*)     Total Protein 4.8 (*)     Albumin 2.1 (*)     Alkaline Phosphatase 224 (*)     eGFR 21.9 (*)     All other components within normal limits    Narrative:     GFR Categories in Chronic Kidney Disease (CKD)      GFR Category          GFR (mL/min/1.73)     Interpretation  G1                     90 or greater         Normal or high (1)  G2                      60-89                Mild decrease (1)  G3a                   45-59                Mild to moderate decrease  G3b                   30-44                Moderate to severe decrease  G4                    15-29                Severe decrease  G5                    14 or less           Kidney failure          (1)In the absence of evidence of kidney disease, neither GFR category G1 or G2 fulfill the criteria for CKD.    eGFR calculation 2021 CKD-EPI creatinine equation, which does not include race as a factor   URINALYSIS W/ MICROSCOPIC IF INDICATED (NO CULTURE) - Abnormal; Notable for the following components:    Color, UA Dark Yellow (*)     Appearance, UA Turbid (*)     Blood, UA Small (1+) (*)     Protein, UA >=300 mg/dL (3+) (*)     Leuk Esterase, UA Large (3+) (*)     All other components within normal limits   CBC WITH AUTO DIFFERENTIAL - Abnormal; Notable for the following components:    RBC 3.13 (*)     Hemoglobin 8.6 (*)     Hematocrit 29.1 (*)     MCHC 29.6 (*)     RDW 16.1 (*)     RDW-SD 55.0 (*)     Platelets 136 (*)     Neutrophil % 81.7 (*)     Lymphocyte % 7.9 (*)     Eosinophil % 0.0 (*)     Lymphocytes, Absolute 0.56 (*)     All other components within normal limits   TROPONIN - Abnormal; Notable for the following components:    HS Troponin T 109 (*)     All other components within normal limits    Narrative:     High Sensitive Troponin T Reference Range:  <14.0 ng/L- Negative Female for AMI  <22.0 ng/L- Negative Male for AMI  >=14 - Abnormal Female indicating possible myocardial injury.  >=22 - Abnormal Male indicating possible myocardial injury.   Clinicians would have to utilize clinical acumen, EKG, Troponin, and serial changes to determine if it is an Acute Myocardial Infarction or myocardial injury due to an underlying chronic condition.        C-REACTIVE PROTEIN - Abnormal; Notable for the  following components:    C-Reactive Protein 11.38 (*)     All other components within normal limits   URINALYSIS, MICROSCOPIC ONLY - Abnormal; Notable for the following components:    WBC, UA 21-50 (*)     Bacteria, UA 4+ (*)     All other components within normal limits   LACTIC ACID, PLASMA - Normal   MAGNESIUM - Normal   SEDIMENTATION RATE - Normal   BLOOD CULTURE   BLOOD CULTURE   URINE CULTURE   RAINBOW DRAW    Narrative:     The following orders were created for panel order [place] Draw.  Procedure                               Abnormality         Status                     ---------                               -----------         ------                     Green Top (Gel)[[id number]]                                  Final result               Lavender Top[[id number]]                                     Final result               Gold Top - SST[[id number]]                                   Final result               Ambrose Top[[id number]]                                         Final result               Light Blue Top[[id number]]                                   Final result                 Please view results for these tests on the individual orders.   HIGH SENSITIVITIY TROPONIN T 1HR   CBC WITH AUTO DIFFERENTIAL   COMPREHENSIVE METABOLIC PANEL   MAGNESIUM   PHOSPHORUS   TROPONIN   POCT GLUCOSE FINGERSTICK   POCT GLUCOSE FINGERSTICK   POCT GLUCOSE FINGERSTICK   POCT GLUCOSE FINGERSTICK   CBC AND DIFFERENTIAL    Narrative:     The following orders were created for panel order CBC & Differential.  Procedure                               Abnormality         Status                     ---------                               -----------         ------                     CBC Auto Differential[[id number]]        Abnormal            Final result               Scan Slide[[id number]]                                                                    Please view results for these tests on the individual orders.   GREEN TOP    LAVENDER TOP   GOLD TOP - SST   GRAY TOP   LIGHT BLUE TOP       Meds Given in ED:   Medications   sodium chloride 0.9 % flush 10 mL (has no administration in time range)   cefTRIAXone (ROCEPHIN) 1,000 mg in sodium chloride 0.9 % 100 mL MBP (1,000 mg Intravenous New Bag 4/19/25 0135)   cefTRIAXone (ROCEPHIN) 1,000 mg in sodium chloride 0.9 % 100 mL MBP (has no administration in time range)   sodium chloride 0.9 % flush 10 mL (has no administration in time range)   sodium chloride 0.9 % flush 10 mL (has no administration in time range)   sodium chloride 0.9 % infusion 40 mL (has no administration in time range)   acetaminophen (TYLENOL) tablet 650 mg (has no administration in time range)   aluminum-magnesium hydroxide-simethicone (MAALOX MAX) 400-400-40 MG/5ML suspension 15 mL (has no administration in time range)   sennosides-docusate (PERICOLACE) 8.6-50 MG per tablet 2 tablet (has no administration in time range)     And   polyethylene glycol (MIRALAX) packet 17 g (has no administration in time range)     And   bisacodyl (DULCOLAX) EC tablet 5 mg (has no administration in time range)     And   bisacodyl (DULCOLAX) suppository 10 mg (has no administration in time range)   Potassium Replacement - Follow Nurse / BPA Driven Protocol (has no administration in time range)   Magnesium Standard Dose Replacement - Follow Nurse / BPA Driven Protocol (has no administration in time range)   Phosphorus Replacement - Follow Nurse / BPA Driven Protocol (has no administration in time range)   Calcium Replacement - Follow Nurse / BPA Driven Protocol (has no administration in time range)   ondansetron (ZOFRAN) injection 4 mg (has no administration in time range)   dextrose (GLUTOSE) oral gel 15 g (has no administration in time range)   dextrose (D50W) (25 g/50 mL) IV injection 25 g (has no administration in time range)   glucagon (GLUCAGEN) injection 1 mg (has no administration in time range)   Insulin Lispro (humaLOG) injection  2-7 Units (has no administration in time range)   nitroglycerin (NITROSTAT) SL tablet 0.4 mg (has no administration in time range)   lactated ringers infusion (has no administration in time range)   amiodarone (PACERONE) tablet 200 mg (has no administration in time range)   apixaban (ELIQUIS) tablet 2.5 mg (has no administration in time range)   atorvastatin (LIPITOR) tablet 10 mg (has no administration in time range)   pantoprazole (PROTONIX) EC tablet 40 mg (has no administration in time range)   NIFEdipine XL (PROCARDIA XL) 24 hr tablet 90 mg ( Oral Dose Auto Held 4/27/25 0900)   oxyCODONE (ROXICODONE) immediate release tablet 5 mg (has no administration in time range)   ipratropium-albuterol (DUO-NEB) nebulizer solution 3 mL (has no administration in time range)           Last NIH score:                                                          Dysphagia screening results:        [place] Coma Scale:  No data recorded     CIWA:        Restraint Type:            Isolation Status:  No active isolations

## 2025-04-19 NOTE — THERAPY EVALUATION
Acute Care - Speech Language Pathology   Swallow Initial Evaluation [facility]  Clinical Swallow Evaluation     Patient Name: Sara Esparza  : 1950  MRN: [id number]  Today's Date: 2025               Admit Date: 2025    Visit Dx:     ICD-10-CM ICD-9-CM   1. Altered mental status, unspecified altered mental status type  R41.82 780.97   2. Acute cystitis without hematuria  N30.00 595.0   3. Dysphagia, unspecified type  R13.10 787.20     Patient Active Problem List   Diagnosis    Postoperative abdominal hernia    ESRD (end stage renal disease)    Type 2 diabetes mellitus with stage 5 chronic kidney disease not on chronic dialysis, without long-term current use of insulin    Hypokalemia    Malnutrition    Severe malnutrition    Generalized weakness    Chronic osteomyelitis of left foot    Paroxysmal A-fib    Sepsis due to methicillin resistant Staphylococcus aureus (MRSA) with encephalopathy without septic shock    Critical limb ischemia of left lower extremity    Toxic metabolic encephalopathy    Anemia of chronic disease    Asthma    Chronic obstructive pulmonary disease    Depressive disorder    Diabetic peripheral neuropathy    Gastroesophageal reflux disease without esophagitis    Hyperlipidemia    Essential hypertension    Lumbar post-laminectomy syndrome    Obstructive sleep apnea syndrome    Acute cystitis without hematuria    Pressure injury of right ankle, stage 2    Elevated troponin     Past Medical History:   Diagnosis Date    Anxiety     Arthritis     Asthma     allergy induced    Back pain     Depression     Diabetes mellitus     dx 10 years ago- checks fsbs most days    Diverticula of colon     GERD (gastroesophageal reflux disease)     Head ache     Hypertension     Sleep apnea     no longer needs cpap - approx. 10 years r/t weight loss     Past Surgical History:   Procedure Laterality Date    AORTAGRAM Left 2/3/2025    Procedure: CFA ACCESS RIGHT - ULTRASOUND GUIDED, AORTOGRAM WITH  LEFT LOWER EXTREMITY RUN OFF, ANGIOPLASTY, INTRAVASCULAR LITHOTRIPSY, RIGHT CFA CLOSURE;  Surgeon: Kyle Ramirez MD;  Location:  [facility];  Service: Vascular;  Laterality: Left;  fluoro 10 minutes  dose - 61mgy  contrast 20ml    BACK SURGERY      x 2    BELOW KNEE AMPUTATION Left 2/4/2025    Procedure: BELOW KNEE AMPUTATION LEFT;  Surgeon: Rom Gonzalez Jr., MD;  Location:  [facility];  Service: Orthopedics;  Laterality: Left;    CARDIAC CATHETERIZATION      no intervention    CHOLECYSTECTOMY OPEN      COLONOSCOPY      2013    FOOT IRRIGATION, DEBRIDEMENT AND REPAIR Left 1/24/2025    Procedure: HEEL IRRIGATION AND DEBRIDEMENT LEFT;  Surgeon: Rom Gonzalez Jr., MD;  Location:  [facility];  Service: Orthopedics;  Laterality: Left;    HYSTERECTOMY      PLACEMENT OF ANTIBIOTIC FILLER Left 2/4/2025    Procedure: PLACEMENT OF ANTIBIOTIC CEMENT FILLER FOR BONE VOID;  Surgeon: Rom Gonzalez Jr., MD;  Location:  [facility];  Service: Orthopedics;  Laterality: Left;    PLACEMENT OF WOUND VAC Left 1/24/2025    Procedure: PLACEMENT OF WOUND VAC;  Surgeon: Rom Gonzalez Jr., MD;  Location:  [facility];  Service: Orthopedics;  Laterality: Left;    REPLACEMENT TOTAL KNEE BILATERAL Bilateral     TONSILLECTOMY      VENTRAL/INCISIONAL HERNIA REPAIR N/A 6/6/2017    Procedure: INCISIONAL HERNIA REPAIR LAPAROSCOPIC;  Surgeon: Conrad Hernandez MD;  Location:  [facility];  Service:        SLP Recommendation and Plan  SLP Swallowing Diagnosis: moderate, oral dysphagia, R/O pharyngeal dysphagia (04/19/25 0830)  SLP Diet Recommendation: puree, nectar thick liquids (04/19/25 0830)  Recommended Precautions and Strategies: upright posture during/after eating, general aspiration precautions, 1:1 supervision (04/19/25 0830)  SLP Rec. for Method of Medication Administration: meds crushed, with puree (04/19/25 0830)     Monitor for Signs of Aspiration: yes, notify SLP if any concerns (04/19/25 0830)  Recommended Diagnostics:  reassess via clinical swallow evaluation (when mentation is closer to baseline) (04/19/25 0830)  Swallow Criteria for Skilled Therapeutic Interventions Met: demonstrates skilled criteria (04/19/25 0830)  Anticipated Discharge Disposition (SLP): skilled nursing facility (04/19/25 0830)  Rehab Potential/Prognosis, Swallowing: adequate, monitor progress closely (04/19/25 0830)  Therapy Frequency (Swallow): 5 days per week (04/19/25 0830)  Predicted Duration Therapy Intervention (Days): 1 week (04/19/25 0830)  Oral Care Recommendations: Oral Care BID/PRN, Toothbrush (04/19/25 0830)                                        Progress: no change      SWALLOW EVALUATION (Last 72 Hours)       SLP Adult Swallow Evaluation       Row Name 04/19/25 0830                   Rehab Evaluation    Document Type evaluation  -RS        Subjective Information no complaints  -RS        Patient Observations alert  -RS        Patient/Family/Caregiver Comments/Observations  present. Unfortunately,  talks incessantly t/o exam re: unrelated topics and attempts to distract pt  -RS        Patient Effort adequate  -RS        Symptoms Noted During/After Treatment none  -RS        Oral Care patient refused intervention  -RS           General Information    Patient Profile Reviewed yes  -RS        Pertinent History Of Current Problem Pt is a 74 yoF PMHx of HTN, PAF, prior DVT/PE, ESRD on HD, PAD s/p L BKA, DM2, OM asthma, Chronic LBP s/p SCS, cervical stenosis, Chronic pain syndrome, DENZEL who was brought to [facility] from SNF 2/2 confusion.  -RS        Current Method of Nutrition NPO  -RS        Precautions/Limitations, Vision difficult to assess  -RS        Precautions/Limitations, Hearing difficult to assess  -RS        Prior Level of Function-Communication cognitive-linguistic impairment  -RS        Prior Level of Function-Swallowing other (see comments)  CSEs completed at OSH have historically recommended reg/thin diets and revealed no  pharyngeal impairment.  -RS        Plans/Goals Discussed with patient;spouse/S.O.  -RS           Pain    Additional Documentation Pain Scale: FACES Pre/Post-Treatment (Group)  -RS           Pain Scale: FACES Pre/Post-Treatment    Pain: FACES Scale, Pretreatment 0-->no hurt  -RS        Posttreatment Pain Rating 0-->no hurt  -RS        Pre/Posttreatment Pain Comment Pt's  initially refuses to allow me to raise pt's HOB for PO trials stating that pt has significant pain when moved. With slow HOB elevation, pt tolerates approximately 75 degrees without issue  -RS           Oral Motor Structure and Function    Dentition Assessment teeth are in poor condition;poor oral hygiene  -RS        Secretion Management WNL/WFL  -RS        Mucosal Quality dry;sticky  -RS        Volitional Swallow unable to elicit  -RS        Volitional Cough unable to elicit  -RS           Oral Musculature and Cranial Nerve Assessment    Oral Motor, Comment Pt unable to follow directions to assess in isolation  -RS           General Eating/Swallowing Observations    Respiratory Support Currently in Use room air  -RS        Eating/Swallowing Skills fed by SLP;unable to perform self-feeding;other (see comments)   reports that he is unsure if pt is able to feed herself at baseline  -RS        Positioning During Eating upright in bed  -RS        Utensils Used spoon;cup;straw  -RS        Consistencies Trialed regular textures;soft to chew textures;mixed consistency;pureed;ice chips;thin liquids;nectar/syrup-thick liquids  -RS           Respiratory    Respiratory Status WFL  -RS           Clinical Swallow Eval    Oral Prep Phase impaired  -RS        Oral Transit impaired  -RS        Oral Residue WFL  -RS        Pharyngeal Phase suspected pharyngeal impairment  -RS        Esophageal Phase unremarkable  -RS        Clinical Swallow Evaluation Summary Suspect mentation is negatively impacting swallow function. Education attempted w spouse but he  would benefit from continued teaching.  -RS           Oral Prep Concerns    Oral Prep Concerns poor rotary chew;reduced lip opening;incomplete or weak lip closure around spoon;spits out food prior to swallow;bolus removed from mouth manually  -RS        Poor Rotary Chew regular consistencies  -RS        Reduced Lip Opening all consistencies  -RS        Incomplete or Weak Lip Closure Around Spoon all consistencies  -RS        Spits Out Food Prior to Swallow regular consistencies  -RS        Bolus Removed from Mouth Manually regular consistencies  -RS        Oral Prep Concerns, Comment Inconsistent ability to pull thins/ntl from straw. Pt frequently bites straw. When she is able to pull liquids, no pharyngeal patterns are observed  -RS           Oral Transit Concerns    Oral Transit Concerns increased oral transit time;delayed initiation of bolus transit  -RS        Delayed Intiation of Bolus Transit regular consistencies  -RS        Increased Oral Transit Time regular consistencies  -RS           Pharyngeal Phase Concerns    Pharyngeal Phase Concerns throat clear  -RS        Throat Clear thin  -RS           SLP Evaluation Clinical Impression    SLP Swallowing Diagnosis moderate;oral dysphagia;R/O pharyngeal dysphagia  -RS        Functional Impact risk of aspiration/pneumonia  -RS        Rehab Potential/Prognosis, Swallowing adequate, monitor progress closely  -RS        Swallow Criteria for Skilled Therapeutic Interventions Met demonstrates skilled criteria  -RS           Recommendations    Therapy Frequency (Swallow) 5 days per week  -RS        Predicted Duration Therapy Intervention (Days) 1 week  -RS        SLP Diet Recommendation puree;nectar thick liquids  -RS        Recommended Diagnostics reassess via clinical swallow evaluation  when mentation is closer to baseline  -RS        Recommended Precautions and Strategies upright posture during/after eating;general aspiration precautions;1:1 supervision  -RS         Oral Care Recommendations Oral Care BID/PRN;Toothbrush  -RS        SLP Rec. for Method of Medication Administration meds crushed;with puree  -RS        Monitor for Signs of Aspiration yes;notify SLP if any concerns  -RS        Anticipated Discharge Disposition (SLP) skilled nursing facility  -RS                  User Key  (r) = Recorded By, (t) = Taken By, (c) = Cosigned By      Initials Name Effective Dates    RS Daniel Moreno MS CCC-SLP 09/14/23 -                     EDUCATION  The patient has been educated in the following areas:   Dysphagia (Swallowing Impairment) Modified Diet Instruction.                Time Calculation:    Time Calculation- SLP       Row Name 04/19/25 1011             Time Calculation- SLP    SLP Start Time 0830  -RS      SLP Received On 04/19/25  -RS         Untimed Charges    [id number] Eval Oral Pharyng Swallow Minutes 65  -RS         Total Minutes    Untimed Charges Total Minutes 65  -RS       Total Minutes 65  -RS                User Key  (r) = Recorded By, (t) = Taken By, (c) = Cosigned By      Initials Name Provider Type    RS Daniel Moreno MS CCC-SLP Speech and Language Pathologist                    Therapy Charges for Today       Code Description Service Date Service Provider Modifiers Qty    [id number] HC ST EVAL ORAL PHARYNG SWALLOW 4 4/19/2025 Daniel Moreno MS CCC-SLP GN 1                 MS KEN Kathleen  4/19/2025

## 2025-04-20 NOTE — CASE MANAGEMENT/SOCIAL WORK
Discharge Planning Assessment  [facility]     Patient Name: Sara Esparza  MRN: [id number]  Today's Date: 4/20/2025    Admit Date: 4/18/2025    Plan: return to [facility]   Discharge Needs Assessment       Row Name 04/20/25 1324       Living Environment    People in Home spouse;facility resident    Unique Family Situation Pt has been at [facility]. CM will need to confirm if this was for skilled or long term    Potentially Unsafe Housing Conditions none    In the past 12 months has the electric, gas, oil, or water company threatened to shut off services in your home? No    Primary Care Provided by other (see comments)    Quality of Family Relationships involved    Able to Return to Prior Arrangements yes       Resource/Environmental Concerns    Transportation Concerns none       Transportation Needs    In the past 12 months, has lack of transportation kept you from medical appointments or from getting medications? no    In the past 12 months, has lack of transportation kept you from meetings, work, or from getting things needed for daily living? No       Food Insecurity    Within the past 12 months, you worried that your food would run out before you got the money to buy more. Never true    Within the past 12 months, the food you bought just didn't last and you didn't have money to get more. Never true       Transition Planning    Patient/Family Anticipates Transition to long-term care facility    Patient/Family Anticipated Services at Transition none    Transportation Anticipated other (see comments)       Discharge Needs Assessment    Readmission Within the Last 30 Days no previous admission in last 30 days    Equipment Currently Used at Home walker, rolling;wheelchair    Concerns to be Addressed discharge planning    Do you want help finding or keeping work or a job? Patient unable to answer    Do you want help with school or training? For example, starting or completing job training or getting a high school diploma,  GED or equivalent Patient unable to answer    Anticipated Changes Related to Illness none    Equipment Needed After Discharge none                   Discharge Plan       Row Name 04/20/25 8935       Plan    Plan return to [facility]    Plan Comments Pt is unable to answer questions and CM was unable to reach . Information gathered from previous admission. Came from [facility] where she has been for approx 2 months. CM will reach out to [facility] to see if pt remains on skilled or has transitioned into a long term care bed. Pt requires asssitance with ADLs and mobility. She gets her dialysis at [facility] and her  was the one who had previously transported her. Pt is followed by her PCP and she has drug coverage. CM will confirm with [facility] if pt is able to return at OR. CM to follow                  Selected Continued Care - Prior Encounters Includes continued care and service providers with selected services from prior encounters from 1/18/2025 to 4/20/2025      Discharged on 2/12/2025 Admission date: 1/19/2025 - Discharge disposition: Skilled Nursing Facility (DC - External)      Destination       Service Provider Services Address Phone Fax Patient Preferred    [facility] [address] [facility] [id number] [phone] --              Dialysis/Infusion       Service Provider Services Address Phone Fax Patient Preferred    [facility] [address] [place], [place] [id number] [phone] --       Internal Comment last updated by Blanka Puentes, RN 2/12/2025 0818    Current Pt                                     Expected Discharge Date and Time       Expected Discharge Date Expected Discharge Time    Apr 21, 2025            Demographic Summary       Row Name 04/20/25 1323       General Information    Admission Type inpatient    Referral Source physician    Reason for Consult discharge planning     Preferred Language English    General Information Comments PCP Toribio Roberts       Contact Information    Permission Granted to Share Info With family/designee    Contact Information Comments Macario Esparza [phone]                   Functional Status       Row Name 04/20/25 1324       Functional Status    Usual Activity Tolerance fair    Current Activity Tolerance fair       Physical Activity    On average, how many days per week do you engage in moderate to strenuous exercise (like a brisk walk)? 0 days    On average, how many minutes do you engage in exercise at this level? 0 min    Number of minutes of exercise per week 0       Functional Status, IADL    Medications assistive person    Meal Preparation assistive person    Housekeeping assistive person    Laundry assistive person    Shopping assistive person    If for any reason you need help with day-to-day activities such as bathing, preparing meals, shopping, managing finances, etc., do you get the help you need? Patient unable to answer       Mental Status    General Appearance WDL WDL       Mental Status Summary    Recent Changes in Mental Status/Cognitive Functioning unable to assess       Employment/    Employment Status retired    Current or Previous Occupation not applicable                   Psychosocial    No documentation.                  Abuse/Neglect    No documentation.                  Legal    No documentation.                  Substance Abuse    No documentation.                  Patient Forms    No documentation.                     Stacy Hardy RN

## 2025-04-20 NOTE — THERAPY DISCHARGE NOTE
Acute Care - Speech Language Pathology   Swallow Discharge [facility]     Patient Name: Sara Esparza  : 1950  MRN: [id number]  Today's Date: 2025               Admit Date: 2025    Visit Dx:    ICD-10-CM ICD-9-CM   1. Altered mental status, unspecified altered mental status type  R41.82 780.97   2. Acute cystitis without hematuria  N30.00 595.0   3. Dysphagia, unspecified type  R13.10 787.20     Patient Active Problem List   Diagnosis    Postoperative abdominal hernia    ESRD (end stage renal disease)    Type 2 diabetes mellitus with stage 5 chronic kidney disease not on chronic dialysis, without long-term current use of insulin    Hypokalemia    Malnutrition    Severe malnutrition    Generalized weakness    Chronic osteomyelitis of left foot    Paroxysmal A-fib    Sepsis due to methicillin resistant Staphylococcus aureus (MRSA) with encephalopathy without septic shock    Critical limb ischemia of left lower extremity    Toxic metabolic encephalopathy    Anemia of chronic disease    Asthma    Chronic obstructive pulmonary disease    Depressive disorder    Diabetic peripheral neuropathy    Gastroesophageal reflux disease without esophagitis    Hyperlipidemia    Essential hypertension    Lumbar post-laminectomy syndrome    Obstructive sleep apnea syndrome    Acute cystitis without hematuria    Pressure injury of right ankle, stage 2    Elevated troponin     Past Medical History:   Diagnosis Date    Anxiety     Arthritis     Asthma     allergy induced    Back pain     Depression     Diabetes mellitus     dx 10 years ago- checks fsbs most days    Diverticula of colon     GERD (gastroesophageal reflux disease)     Head ache     Hypertension     Sleep apnea     no longer needs cpap - approx. 10 years r/t weight loss     Past Surgical History:   Procedure Laterality Date    AORTAGRAM Left 2/3/2025    Procedure: CFA ACCESS RIGHT - ULTRASOUND GUIDED, AORTOGRAM WITH LEFT LOWER EXTREMITY RUN OFF,  ANGIOPLASTY, INTRAVASCULAR LITHOTRIPSY, RIGHT CFA CLOSURE;  Surgeon: Kyle Ramirez MD;  Location:  [facility];  Service: Vascular;  Laterality: Left;  fluoro 10 minutes  dose - 61mgy  contrast 20ml    BACK SURGERY      x 2    BELOW KNEE AMPUTATION Left 2/4/2025    Procedure: BELOW KNEE AMPUTATION LEFT;  Surgeon: Rom Gonzalez Jr., MD;  Location:  [facility];  Service: Orthopedics;  Laterality: Left;    CARDIAC CATHETERIZATION      no intervention    CHOLECYSTECTOMY OPEN      COLONOSCOPY      2013    FOOT IRRIGATION, DEBRIDEMENT AND REPAIR Left 1/24/2025    Procedure: HEEL IRRIGATION AND DEBRIDEMENT LEFT;  Surgeon: Rom Gonzalez Jr., MD;  Location:  [facility];  Service: Orthopedics;  Laterality: Left;    HYSTERECTOMY      PLACEMENT OF ANTIBIOTIC FILLER Left 2/4/2025    Procedure: PLACEMENT OF ANTIBIOTIC CEMENT FILLER FOR BONE VOID;  Surgeon: Rom Gonzalez Jr., MD;  Location:  [facility];  Service: Orthopedics;  Laterality: Left;    PLACEMENT OF WOUND VAC Left 1/24/2025    Procedure: PLACEMENT OF WOUND VAC;  Surgeon: Rom Gonzalez Jr., MD;  Location:  [facility];  Service: Orthopedics;  Laterality: Left;    REPLACEMENT TOTAL KNEE BILATERAL Bilateral     TONSILLECTOMY      VENTRAL/INCISIONAL HERNIA REPAIR N/A 6/6/2017    Procedure: INCISIONAL HERNIA REPAIR LAPAROSCOPIC;  Surgeon: Conrad Hernandez MD;  Location:  [facility];  Service:        SLP Recommendation and Plan     SLP Diet Recommendation: comfort diet, per physician discretion (04/20/25 0900)     Monitor for Signs of Aspiration: notify SLP if any concerns, other (see comments) (and if spouse changes his mind) (04/20/25 0900)  Recommended Diagnostics: No further SLP services recommended (04/20/25 0900)     Anticipated Discharge Disposition (SLP): No further SLP services warranted (04/20/25 0900)                    Anticipated Discharge Disposition (SLP): No further SLP services warranted (04/20/25 0900)                            Progress: no  change (04/19/25 1013)    SWALLOW EVALUATION (Last 72 Hours)       SLP Adult Swallow Evaluation       Row Name 04/20/25 0900 04/19/25 0830                Rehab Evaluation    Document Type -- evaluation  -RS       Subjective Information -- no complaints  -RS       Patient Observations -- alert  -RS       Patient/Family/Caregiver Comments/Observations --  present. Unfortunately,  talks incessantly t/o exam re: unrelated topics and attempts to distract pt  -RS       Session Not Performed patient/family declined evaluation  -RS --       Evaluation Not Performed, Comment Pt's  is refusing modified diet despite staff education. D/w bedside RN and charge RN, and MD via secure chat. MD allowing comfort diet and code status is changing. No further SLP indicated and will sign off. Please reconsult if  changes his mind.  -RS --       Patient Effort -- adequate  -RS       Symptoms Noted During/After Treatment -- none  -RS       Oral Care -- patient refused intervention  -RS          General Information    Patient Profile Reviewed -- yes  -RS       Pertinent History Of Current Problem -- Pt is a 74 yoF PMHx of HTN, PAF, prior DVT/PE, ESRD on HD, PAD s/p L BKA, DM2, OM asthma, Chronic LBP s/p SCS, cervical stenosis, Chronic pain syndrome, DENZEL who was brought to [facility] from SNF 2/2 confusion.  -RS       Current Method of Nutrition -- NPO  -RS       Precautions/Limitations, Vision -- difficult to assess  -RS       Precautions/Limitations, Hearing -- difficult to assess  -RS       Prior Level of Function-Communication -- cognitive-linguistic impairment  -RS       Prior Level of Function-Swallowing -- other (see comments)  CSEs completed at OSH have historically recommended reg/thin diets and revealed no pharyngeal impairment.  -RS       Plans/Goals Discussed with -- patient;spouse/S.O.  -RS          Pain    Additional Documentation -- Pain Scale: FACES Pre/Post-Treatment (Group)  -RS          Pain Scale:  FACES Pre/Post-Treatment    Pain: FACES Scale, Pretreatment -- 0-->no hurt  -RS       Posttreatment Pain Rating -- 0-->no hurt  -RS       Pre/Posttreatment Pain Comment -- Pt's  initially refuses to allow me to raise pt's HOB for PO trials stating that pt has significant pain when moved. With slow HOB elevation, pt tolerates approximately 75 degrees without issue  -RS          Oral Motor Structure and Function    Dentition Assessment -- teeth are in poor condition;poor oral hygiene  -RS       Secretion Management -- WNL/WFL  -RS       Mucosal Quality -- dry;sticky  -RS       Volitional Swallow -- unable to elicit  -RS       Volitional Cough -- unable to elicit  -RS          Oral Musculature and Cranial Nerve Assessment    Oral Motor, Comment -- Pt unable to follow directions to assess in isolation  -RS          General Eating/Swallowing Observations    Respiratory Support Currently in Use -- room air  -RS       Eating/Swallowing Skills -- fed by SLP;unable to perform self-feeding;other (see comments)   reports that he is unsure if pt is able to feed herself at baseline  -RS       Positioning During Eating -- upright in bed  -RS       Utensils Used -- spoon;cup;straw  -RS       Consistencies Trialed -- regular textures;soft to chew textures;mixed consistency;pureed;ice chips;thin liquids;nectar/syrup-thick liquids  -RS          Respiratory    Respiratory Status -- WFL  -RS          Clinical Swallow Eval    Oral Prep Phase -- impaired  -RS       Oral Transit -- impaired  -RS       Oral Residue -- WFL  -RS       Pharyngeal Phase -- suspected pharyngeal impairment  -RS       Esophageal Phase -- unremarkable  -RS       Clinical Swallow Evaluation Summary -- Suspect mentation is negatively impacting swallow function. Education attempted w spouse but he would benefit from continued teaching.  -RS          Oral Prep Concerns    Oral Prep Concerns -- poor rotary chew;reduced lip opening;incomplete or weak lip  closure around spoon;spits out food prior to swallow;bolus removed from mouth manually  -RS       Poor Rotary Chew -- regular consistencies  -RS       Reduced Lip Opening -- all consistencies  -RS       Incomplete or Weak Lip Closure Around Spoon -- all consistencies  -RS       Spits Out Food Prior to Swallow -- regular consistencies  -RS       Bolus Removed from Mouth Manually -- regular consistencies  -RS       Oral Prep Concerns, Comment -- Inconsistent ability to pull thins/ntl from straw. Pt frequently bites straw. When she is able to pull liquids, no pharyngeal patterns are observed  -RS          Oral Transit Concerns    Oral Transit Concerns -- increased oral transit time;delayed initiation of bolus transit  -RS       Delayed Intiation of Bolus Transit -- regular consistencies  -RS       Increased Oral Transit Time -- regular consistencies  -RS          Pharyngeal Phase Concerns    Pharyngeal Phase Concerns -- throat clear  -RS       Throat Clear -- thin  -RS          SLP Evaluation Clinical Impression    SLP Swallowing Diagnosis -- moderate;oral dysphagia;R/O pharyngeal dysphagia  -RS       Functional Impact -- risk of aspiration/pneumonia  -RS       Rehab Potential/Prognosis, Swallowing -- adequate, monitor progress closely  -RS       Swallow Criteria for Skilled Therapeutic Interventions Met -- demonstrates skilled criteria  -RS          Recommendations    Therapy Frequency (Swallow) -- 5 days per week  -RS       Predicted Duration Therapy Intervention (Days) -- 1 week  -RS       SLP Diet Recommendation comfort diet, per physician discretion  -RS puree;nectar thick liquids  -RS       Recommended Diagnostics No further SLP services recommended  -RS reassess via clinical swallow evaluation  when mentation is closer to baseline  -RS       Recommended Precautions and Strategies upright posture during/after eating;general aspiration precautions;1:1 supervision  -RS upright posture during/after eating;general  aspiration precautions;1:1 supervision  -RS       Oral Care Recommendations Oral Care BID/PRN;Toothbrush  -RS Oral Care BID/PRN;Toothbrush  -RS       SLP Rec. for Method of Medication Administration meds crushed;with puree  -RS meds crushed;with puree  -RS       Monitor for Signs of Aspiration notify SLP if any concerns;other (see comments)  and if spouse changes his mind  -RS yes;notify SLP if any concerns  -RS       Anticipated Discharge Disposition (SLP) No further SLP services warranted  -RS skilled nursing facility  -RS                 User Key  (r) = Recorded By, (t) = Taken By, (c) = Cosigned By      Initials Name Effective Dates    RS Daniel Moreno MS CCC-SLP 09/14/23 -                            Time Calculation: NO CHARGE      Therapy Charges for Today       Code Description Service Date Service Provider Modifiers Qty    [id number] HC ST EVAL ORAL PHARYNG SWALLOW 4 4/19/2025 Daniel Moreno MS CCC-SLP GN 1                 SLP Discharge Summary  Anticipated Discharge Disposition (SLP): No further SLP services warranted    MS REE KathleenSLP  4/20/2025

## 2025-04-20 NOTE — PROGRESS NOTES
"    [facility] Medicine Services  PROGRESS NOTE    Patient Name: Sara Esparza  : 1950  MRN: [id number]    Date of Admission: 2025  Primary Care Physician: Toribio Roberts MD    Subjective   Subjective     CC:  AMS    HPI:  Pt's  at bedside and refusing pureed diet.  Long discussion with him about implications of comfort diet and he agreed on pt's code status changing to DNR/DNI as long as she can have a regular diet.  Pt is agitated this am and continues to yell out during our conversation- seemingly to stop the discussion about code status, however, when asked directly what was wrong, she said \"I just had the baby last night\" and continued to talk about \"the baby\".     Objective   Objective     Vital Signs:   Temp:  [97.6 °F (36.4 °C)-99.2 °F (37.3 °C)] 97.8 °F (36.6 °C)  Heart Rate:  [59-79] 59  Resp:  [16-18] 18  BP: (104-135)/(49-69) 115/49  Flow (L/min) (Oxygen Therapy):  [2] 2     Physical Exam:  Constitutional: awake, alert, agitated  HENT: NCAT, mucous membranes moist  Respiratory: Clear to auscultation bilaterally, respiratory effort normal   Cardiovascular: RRR, no murmurs, rubs, or gallops  Gastrointestinal: Positive bowel sounds, soft, nontender, nondistended  Musculoskeletal: No R ankle edema, R foot ulceration, L BKA  Psychiatric: agitated  Neurologic: confused,  strength symmetric in all extremities, Cranial Nerves grossly intact to confrontation, speech clear  Skin: No rashes     Results Reviewed:  LAB RESULTS:      Lab 25  1532 25  0205 25  0016   WBC 6.09  --  7.06   HEMOGLOBIN 8.8*  --  8.6*   HEMATOCRIT 29.6*  --  29.1*   PLATELETS 129*  --  136*   NEUTROS ABS 5.12  --  5.76   IMMATURE GRANS (ABS) 0.02  --  0.03   LYMPHS ABS 0.42*  --  0.56*   MONOS ABS 0.53  --  0.70   EOS ABS 0.00  --  0.00   MCV 93.7  --  93.0   SED RATE  --   --  12   CRP  --   --  11.38*   LACTATE  --   --  1.3   HSTROP T 99* 105* 109*         Lab " 04/19/25  1532 04/19/25  0016   SODIUM 136 135*   POTASSIUM 4.2 4.5   CHLORIDE 102 101   CO2 27.0 27.0   ANION GAP 7.0 7.0   BUN 25* 18   CREATININE 2.84* 2.28*   EGFR 16.8* 21.9*   GLUCOSE 110* 106*   CALCIUM 9.1 9.2   MAGNESIUM 1.9 2.0   PHOSPHORUS 1.6*  --          Lab 04/19/25  1532 04/19/25  0016   TOTAL PROTEIN 4.4* 4.8*   ALBUMIN 2.0* 2.1*   GLOBULIN 2.4 2.7   ALT (SGPT) 7 9   AST (SGOT) 13 21   BILIRUBIN 0.3 0.4   ALK PHOS 206* 224*         Lab 04/19/25  1532 04/19/25  0205 04/19/25  0016   HSTROP T 99* 105* 109*                 Brief Urine Lab Results  (Last result in the past 365 days)        Color   Clarity   Blood   Leuk Est   Nitrite   Protein   CREAT   Urine HCG        04/18/25 2355 Dark Yellow   Turbid   Small (1+)   Large (3+)   Negative   >=300 mg/dL (3+)                   Microbiology Results Abnormal       None            CT Head Without Contrast  Result Date: 4/19/2025  CT HEAD WO CONTRAST Date of Exam: 4/19/2025 12:11 AM EDT Indication: altered mental status. Comparison: CT head 1/19/2025 Technique: Axial CT images were obtained of the head without contrast administration.  Automated exposure control and iterative construction methods were used. Findings: There is mild diffuse generalized atrophy. There are low-attenuation areas in the periventricular white matter consistent with chronic microvascular ischemic change. There is encephalomalacia in the medial right occipital lobe and in the right frontal lobe consistent with previous infarcts. There is no mass, mass effect or midline shift. There are no abnormal extra-axial fluid collections or areas of acute hemorrhage. The paranasal sinuses are clear. The mastoid air cells are clear.     Impression: Impression: Atrophy and chronic microvascular ischemic change. 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 No acute intracranial process. Electronically Signed: García Kinney MD  4/19/2025 12:49 AM EDT  Workstation ID: [id number]    XR Foot 3+ View  Right  Result Date: 4/19/2025  XR FOOT 3+ VW RIGHT Date of Exam: 4/18/2025 11:23 PM EDT Indication: nontraumatic pain with open wound on base of 5th metatarsal, history of MRSA and DM Comparison: Right foot x-ray 7/6/2020 Findings: There is extensive calcaneal spurring. There are degenerative changes involving the first metatarsal phalangeal joint with adjacent soft tissue swelling. There is midfoot arthritic change. There is no convincing soft tissue gas. There are no convincing destructive changes of osteomyelitis.     Impression: Impression: Degenerative changes of the foot. No convincing destructive changes of osteomyelitis. Electronically Signed: García Kinney MD  4/19/2025 12:09 AM EDT  Workstation ID: [id number]    XR Chest 1 View  Result Date: 4/18/2025  XR CHEST 1 VW Date of Exam: 4/18/2025 11:05 PM EDT Indication: AMS Protocol AMS Protocol Comparison: Chest radiograph 1/23/2025 Findings: There is a right-sided tunneled dialysis catheter with the tip at the cavoatrial junction. The heart size and pulmonary vascular markings are normal. The lungs are clear.     Impression: Impression: No active disease. Electronically Signed: García Kinney MD  4/18/2025 11:46 PM EDT  Workstation ID: [id number]      Results for orders placed during the hospital encounter of 01/19/25    Adult Transthoracic Echo Complete W/ Cont if Necessary Per Protocol    Interpretation Summary    Left ventricular systolic function is normal. Estimated left ventricular EF = 55%    Left ventricular wall thickness is consistent with moderate concentric hypertrophy.    No hemodynamically significant valvular heart disease      Current medications:  Scheduled Meds:amiodarone, 200 mg, Oral, Q24H  apixaban, 2.5 mg, Oral, Q12H  atorvastatin, 10 mg, Oral, Nightly  cefTRIAXone, 1,000 mg, Intravenous, Q24H  insulin lispro, 2-7 Units, Subcutaneous, 4x Daily AC & at Bedtime  [Held by provider] NIFEdipine XL, 90 mg, Oral, Daily  pantoprazole, 40 mg,  Intravenous, Q AM  sodium chloride, 10 mL, Intravenous, Q12H      Continuous Infusions:   PRN Meds:.  acetaminophen    aluminum-magnesium hydroxide-simethicone    senna-docusate sodium **AND** polyethylene glycol **AND** bisacodyl **AND** bisacodyl    Calcium Replacement - Follow Nurse / BPA Driven Protocol    dextrose    dextrose    glucagon (human recombinant)    ipratropium-albuterol    Magnesium Standard Dose Replacement - Follow Nurse / BPA Driven Protocol    nitroglycerin    ondansetron    oxyCODONE    Phosphorus Replacement - Follow Nurse / BPA Driven Protocol    Potassium Replacement - Follow Nurse / BPA Driven Protocol    sodium chloride    sodium chloride    sodium chloride    Assessment & Plan   Assessment & Plan     Active Hospital Problems    Diagnosis  POA    **Toxic metabolic encephalopathy [G92.8]  Yes    Paroxysmal A-fib [I48.0]  Yes     Priority: Medium    Acute cystitis without hematuria [N30.00]  Yes    Pressure injury of right ankle, stage 2 [L89.512]  Yes    Elevated troponin [R79.89]  Yes    Anemia of chronic disease [D63.8]  Yes    ESRD (end stage renal disease) [N18.6]  Yes    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        Brief Hospital Course to date:  Sara Esparza is a 74 y.o. female with PMHx of HTN, PAF (Eliquis), prior DVT/PE, ESRD on HD (MWF), PAD s/p L BKA, IDDM2, Hx of OM, asthma, Chronic LBP s/p SCS, cervical stenosis, Chronic pain syndrome, DENZEL (CPAP) who presented from [facility] for evaluation of confusion. She was found to have an acute UTI.     Plan:     Toxic metabolic encephalopathy  Acute cystitis without hematuria  - Afebrile, WBC 7, CRP 11.38 on admission   - UA suggestive of acute infection with 4+ bacteria, 21-50 WBCs, large LE  -  CT head with no acute process   - Continue IV Rocephin, follow cultures    Hypophosphatemia  Hypomagnesemia  -- replace as per renal replacement protocol      IDDM Type 2  - SSI     PAF  - Rate-controlled in SR  - Continue Eliquis, amiodarone     Elevated troponin  - Chronically elevated in the setting of ESRD  - Trop peaked at 109  - No ACS/CP  - CTM on tele     ESRD on HD  - MWF  - Nephro consulted     AOCD  - Hgb stable  - No active bleeding  - transfuse PRN HgB<7    TCP  -- not a chronic issue, mild, platelets stable today, monitor     HTN  - Chronic, normotensive  - Hold home antihypertensives and resume when appropriate     COPD  - Chronic, not in exacerbation  - Duonebs PRN     Right lateral malleolus pressure ulcer  Right foot ulceration  - off-load limb  - Wound care consult  -- Xray R foot with no evidence of osteomyelitis      H/o PAD s/p L BKA     Chronic pain syndrome  - continue home roxicodone PRN    Dysphagia  -- pt's  understands that she is at high risk of aspiration. He would like for her to have a comfort diet and understands that her code status has been changed to DNR/DNI and he is agreeable.       Total time spent: Time Spent: Time Spent: 35 minutes  Time spent includes time reviewing chart, face-to-face time, counseling patient/family/caregiver, ordering medications/tests/procedures, communicating with other health care professionals, documenting clinical information in the electronic health record, and coordination of care.      Expected Discharge Location and Transportation: back to her nursing facility tomorrow   Expected Discharge   Expected Discharge Date: 4/21/2025; Expected Discharge Time:      VTE Prophylaxis:  Pharmacologic VTE prophylaxis orders are present.         AM-PAC 6 Clicks Score (PT): 6 (04/19/25 2000)    CODE STATUS:   Code Status and Medical Interventions: CPR (Attempt to Resuscitate); Full Support   Ordered at: 04/19/25 2074     Code Status (Patient has no pulse  and is not breathing):    CPR (Attempt to Resuscitate)     Medical Interventions (Patient has pulse or is breathing):    Full Support     Level Of Support Discussed With:    Next of Kin (If No Surrogate)       Heather Carlos MD  04/20/25

## 2025-04-20 NOTE — PROGRESS NOTES
" LOS: 1 day   Patient Care Team:  Toribio Roberts MD as PCP - General (Internal Medicine)  Carla Bustos APRN as Nurse Practitioner (Cardiology)    Chief Complaint: ESRD    Subjective     Altered Mental Status      Subjective:  Symptoms:  Stable.        History taken from: patient    Objective     Vital Sign Min/Max for last 24 hours  Temp  Min: 97.6 °F (36.4 °C)  Max: 99.2 °F (37.3 °C)   BP  Min: 104/53  Max: 143/76   Pulse  Min: 59  Max: 77   Resp  Min: 16  Max: 18   SpO2  Min: 91 %  Max: 98 %   No data recorded   No data recorded     Flowsheet Rows      Flowsheet Row First Filed Value   Admission Height 170.2 cm (67\") Documented at 04/19/2025 0020   Admission Weight 67.8 kg (149 lb 6.4 oz) Documented at 04/19/2025 0020            No intake/output data recorded.  I/O last 3 completed shifts:  In: 160 [P.O.:60; IV Piggyback:100]  Out: -     Objective:  General Appearance:  Comfortable and ill-appearing.    Vital signs: (most recent): Blood pressure 128/57, pulse 73, temperature 98.4 °F (36.9 °C), temperature source Axillary, resp. rate 18, height 170.2 cm (67\"), weight 67.8 kg (149 lb 6.4 oz), SpO2 94%.  Vital signs are normal.    Output: Minimal urine output.    HEENT: Normal HEENT exam.    Lungs:  Normal effort and normal respiratory rate.  Breath sounds clear to auscultation.    Heart: Normal rate.  Regular rhythm.  S1 normal and S2 normal.    Abdomen: Abdomen is soft.    Extremities: There is no dependent edema.  (Left BKA)  Neurological: Patient is alert.  (Confuse).                Results Review:     I reviewed the patient's new clinical results.    WBC WBC   Date Value Ref Range Status   04/20/2025 6.08 3.40 - 10.80 10*3/mm3 Final   04/19/2025 6.09 3.40 - 10.80 10*3/mm3 Final   04/19/2025 7.06 3.40 - 10.80 10*3/mm3 Final      HGB Hemoglobin   Date Value Ref Range Status   04/20/2025 9.2 (L) 12.0 - 15.9 g/dL Final   04/19/2025 8.8 (L) 12.0 - 15.9 g/dL Final   04/19/2025 8.6 (L) 12.0 - 15.9 g/dL " "Final      HCT Hematocrit   Date Value Ref Range Status   04/20/2025 31.1 (L) 34.0 - 46.6 % Final   04/19/2025 29.6 (L) 34.0 - 46.6 % Final   04/19/2025 29.1 (L) 34.0 - 46.6 % Final      Platlets No results found for: \"LABPLAT\"   MCV MCV   Date Value Ref Range Status   04/20/2025 93.4 79.0 - 97.0 fL Final   04/19/2025 93.7 79.0 - 97.0 fL Final   04/19/2025 93.0 79.0 - 97.0 fL Final          Sodium Sodium   Date Value Ref Range Status   04/20/2025 138 136 - 145 mmol/L Final   04/19/2025 136 136 - 145 mmol/L Final   04/19/2025 135 (L) 136 - 145 mmol/L Final      Potassium Potassium   Date Value Ref Range Status   04/20/2025 4.3 3.5 - 5.2 mmol/L Final     Comment:     Slight hemolysis detected by analyzer. Result may be falsely elevated.   04/19/2025 4.2 3.5 - 5.2 mmol/L Final   04/19/2025 4.5 3.5 - 5.2 mmol/L Final     Comment:     Slight hemolysis detected by analyzer. Result may be falsely elevated.      Chloride Chloride   Date Value Ref Range Status   04/20/2025 104 98 - 107 mmol/L Final   04/19/2025 102 98 - 107 mmol/L Final   04/19/2025 101 98 - 107 mmol/L Final      CO2 CO2   Date Value Ref Range Status   04/20/2025 24.0 22.0 - 29.0 mmol/L Final   04/19/2025 27.0 22.0 - 29.0 mmol/L Final   04/19/2025 27.0 22.0 - 29.0 mmol/L Final      BUN BUN   Date Value Ref Range Status   04/20/2025 31 (H) 8 - 23 mg/dL Final   04/19/2025 25 (H) 8 - 23 mg/dL Final   04/19/2025 18 8 - 23 mg/dL Final      Creatinine Creatinine   Date Value Ref Range Status   04/20/2025 3.48 (H) 0.57 - 1.00 mg/dL Final   04/19/2025 2.84 (H) 0.57 - 1.00 mg/dL Final   04/19/2025 2.28 (H) 0.57 - 1.00 mg/dL Final      Calcium Calcium   Date Value Ref Range Status   04/20/2025 8.5 (L) 8.6 - 10.5 mg/dL Final   04/19/2025 9.1 8.6 - 10.5 mg/dL Final   04/19/2025 9.2 8.6 - 10.5 mg/dL Final      PO4 No results found for: \"CAPO4\"   Albumin Albumin   Date Value Ref Range Status   04/19/2025 2.0 (L) 3.5 - 5.2 g/dL Final   04/19/2025 2.1 (L) 3.5 - 5.2 g/dL " "Final      Magnesium Magnesium   Date Value Ref Range Status   04/19/2025 1.9 1.6 - 2.4 mg/dL Final   04/19/2025 2.0 1.6 - 2.4 mg/dL Final      Uric Acid No results found for: \"URICACID\"     Medication Review: Yes    Assessment & Plan       Toxic metabolic encephalopathy    ESRD (end stage renal disease)    Type 2 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   AMS: Getting treated for possible UTI  Anemia: ACD due to ESRD CORNEL on HD days.  BMD: Continue home meds. Renal diet  Volume status: Stable.      Recs  Discussed with  patient has been going downhill in last few months. Notes reviewed from dialysis clinic there has been concern of change in her mentation. She appears malnourished with loss of muscle mass and v low albumin. Patient will benefit from GOC discuss with palliative team.  understands and wants to have conversation when kids are present.           I discussed the patients findings and my recommendations with patient    Wilian Gonzalez MD  04/20/25  14:36 EDT          "

## 2025-04-20 NOTE — PLAN OF CARE
Problem: Violence Risk or Actual  Goal: Anger and Impulse Control  Outcome: Progressing  Intervention: Minimize Safety Risk  Recent Flowsheet Documentation  Taken 4/20/2025 1800 by Stephanie Frank RN  Enhanced Safety Measures: bed alarm set  Taken 4/20/2025 1600 by Stephanie Frank RN  Enhanced Safety Measures: bed alarm set  Taken 4/20/2025 1400 by Stephanie Frank RN  Enhanced Safety Measures: bed alarm set  Taken 4/20/2025 1200 by Stephanie Frank RN  Enhanced Safety Measures: bed alarm set  Taken 4/20/2025 1000 by Stephanie Frank RN  Enhanced Safety Measures: bed alarm set  Taken 4/20/2025 0847 by Stephanie Frank RN  Enhanced Safety Measures: bed alarm set     Problem: Adult Inpatient Plan of Care  Goal: Plan of Care Review  Outcome: Progressing  Goal: Patient-Specific Goal (Individualized)  Outcome: Progressing  Goal: Absence of Hospital-Acquired Illness or Injury  Outcome: Progressing  Intervention: Identify and Manage Fall Risk  Recent Flowsheet Documentation  Taken 4/20/2025 1800 by Stephanie Frank RN  Safety Promotion/Fall Prevention:   activity supervised   fall prevention program maintained   nonskid shoes/slippers when out of bed   safety round/check completed  Taken 4/20/2025 1600 by Stephanie Frank RN  Safety Promotion/Fall Prevention:   activity supervised   fall prevention program maintained   nonskid shoes/slippers when out of bed   safety round/check completed  Taken 4/20/2025 1400 by Stephanie Frank RN  Safety Promotion/Fall Prevention:   activity supervised   fall prevention program maintained   nonskid shoes/slippers when out of bed   safety round/check completed  Taken 4/20/2025 1200 by Stephanie Frank RN  Safety Promotion/Fall Prevention:   activity supervised   fall prevention program maintained   nonskid shoes/slippers when out of bed   safety round/check completed  Taken 4/20/2025 1000 by Stephanie Frank RN  Safety Promotion/Fall Prevention:   activity supervised   fall  prevention program maintained   nonskid shoes/slippers when out of bed   safety round/check completed  Taken 4/20/2025 0847 by Stephanie Frank RN  Safety Promotion/Fall Prevention:   activity supervised   fall prevention program maintained   nonskid shoes/slippers when out of bed   safety round/check completed  Intervention: Prevent Skin Injury  Recent Flowsheet Documentation  Taken 4/20/2025 1800 by Stephanie Frank RN  Body Position:   tilted   left   turned  Skin Protection: incontinence pads utilized  Taken 4/20/2025 1600 by Setphanie Frank RN  Body Position:   tilted   right  Skin Protection: incontinence pads utilized  Taken 4/20/2025 1400 by Stephanie Frank RN  Body Position:   tilted   left  Skin Protection:   incontinence pads utilized   transparent dressing maintained  Taken 4/20/2025 1200 by Stephanie Frank RN  Body Position: supine  Taken 4/20/2025 1000 by Stephanie Frank RN  Body Position:   tilted   right  Skin Protection: incontinence pads utilized  Taken 4/20/2025 0847 by Stephanie Frank RN  Body Position:   tilted   right  Skin Protection:   incontinence pads utilized   transparent dressing maintained  Intervention: Prevent and Manage VTE (Venous Thromboembolism) Risk  Recent Flowsheet Documentation  Taken 4/20/2025 0847 by Stephanie Frank RN  VTE Prevention/Management: (see MAR)   bilateral   SCDs (sequential compression devices) off   other (see comments)  Intervention: Prevent Infection  Recent Flowsheet Documentation  Taken 4/20/2025 1800 by Stephanie Frank RN  Infection Prevention: environmental surveillance performed  Taken 4/20/2025 1600 by Stephanie Frank RN  Infection Prevention: environmental surveillance performed  Taken 4/20/2025 1400 by Stephanie Frank RN  Infection Prevention: environmental surveillance performed  Taken 4/20/2025 1200 by Stephanie Frank RN  Infection Prevention: environmental surveillance performed  Taken 4/20/2025 1000 by Stephanie Frank  RN  Infection Prevention: environmental surveillance performed  Taken 4/20/2025 0847 by Stephanie Frank RN  Infection Prevention: environmental surveillance performed  Goal: Optimal Comfort and Wellbeing  Outcome: Progressing  Intervention: Monitor Pain and Promote Comfort  Recent Flowsheet Documentation  Taken 4/20/2025 1535 by Stephanie Frank RN  Pain Management Interventions: pain medication given  Taken 4/20/2025 1333 by Stephanie Frank RN  Pain Management Interventions: pain medication given  Taken 4/20/2025 1200 by Stephanie Frank RN  Pain Management Interventions:   position adjusted   pillow support provided   quiet environment facilitated  Taken 4/20/2025 0847 by Stephanie Frank RN  Pain Management Interventions: pain medication given  Intervention: Provide Person-Centered Care  Recent Flowsheet Documentation  Taken 4/20/2025 0847 by Stephanie Frank RN  Trust Relationship/Rapport:   care explained   choices provided   questions answered   questions encouraged   thoughts/feelings acknowledged  Goal: Readiness for Transition of Care  Outcome: Progressing     Problem: Skin Injury Risk Increased  Goal: Skin Health and Integrity  Outcome: Progressing  Intervention: Optimize Skin Protection  Recent Flowsheet Documentation  Taken 4/20/2025 1800 by Stephanie Frank RN  Activity Management: activity encouraged  Pressure Reduction Techniques:   frequent weight shift encouraged   weight shift assistance provided   pressure points protected   heels elevated off bed  Head of Bed (HOB) Positioning: HOB elevated  Pressure Reduction Devices:   pressure-redistributing mattress utilized   positioning supports utilized   heel offloading device utilized  Skin Protection: incontinence pads utilized  Taken 4/20/2025 1600 by Stephanie Frank RN  Activity Management: activity encouraged  Pressure Reduction Techniques:   frequent weight shift encouraged   weight shift assistance provided   pressure points protected    heels elevated off bed  Head of Bed (HOB) Positioning: HOB elevated  Pressure Reduction Devices:   pressure-redistributing mattress utilized   positioning supports utilized   heel offloading device utilized   specialty bed utilized  Skin Protection: incontinence pads utilized  Taken 4/20/2025 1400 by Stephanie Frank RN  Activity Management: activity encouraged  Pressure Reduction Techniques:   frequent weight shift encouraged   weight shift assistance provided   pressure points protected   heels elevated off bed  Head of Bed (HOB) Positioning: HOB elevated  Pressure Reduction Devices:   specialty bed utilized   pressure-redistributing mattress utilized   positioning supports utilized   heel offloading device utilized  Skin Protection:   incontinence pads utilized   transparent dressing maintained  Taken 4/20/2025 1200 by Stephanie Frank RN  Activity Management: activity encouraged  Head of Bed (HOB) Positioning: HOB elevated  Taken 4/20/2025 1000 by Stephanie Frank RN  Activity Management: activity encouraged  Pressure Reduction Techniques:   frequent weight shift encouraged   weight shift assistance provided   pressure points protected   heels elevated off bed  Head of Bed (HOB) Positioning: HOB elevated  Pressure Reduction Devices:   pressure-redistributing mattress utilized   positioning supports utilized   heel offloading device utilized   specialty bed utilized  Skin Protection: incontinence pads utilized  Taken 4/20/2025 0847 by Stephanie Frank RN  Activity Management: activity encouraged  Pressure Reduction Techniques:   frequent weight shift encouraged   weight shift assistance provided   pressure points protected   heels elevated off bed  Head of Bed (HOB) Positioning: HOB elevated  Pressure Reduction Devices:   specialty bed utilized   pressure-redistributing mattress utilized   positioning supports utilized   heel offloading device utilized  Skin Protection:   incontinence pads utilized    transparent dressing maintained     Problem: Fall Injury Risk  Goal: Absence of Fall and Fall-Related Injury  Outcome: Progressing  Intervention: Identify and Manage Contributors  Recent Flowsheet Documentation  Taken 4/20/2025 1800 by Stephanie Frank RN  Medication Review/Management: medications reviewed  Taken 4/20/2025 1600 by Stephanie Frank RN  Medication Review/Management: medications reviewed  Taken 4/20/2025 1400 by Stephanie Frank RN  Medication Review/Management: medications reviewed  Taken 4/20/2025 1200 by Stephanie Frank RN  Medication Review/Management: medications reviewed  Taken 4/20/2025 1000 by Stephanie Frank RN  Medication Review/Management: medications reviewed  Taken 4/20/2025 0847 by Stephanie Frnak RN  Medication Review/Management: medications reviewed  Intervention: Promote Injury-Free Environment  Recent Flowsheet Documentation  Taken 4/20/2025 1800 by Stephanie Frank RN  Safety Promotion/Fall Prevention:   activity supervised   fall prevention program maintained   nonskid shoes/slippers when out of bed   safety round/check completed  Taken 4/20/2025 1600 by Stephanie Frank RN  Safety Promotion/Fall Prevention:   activity supervised   fall prevention program maintained   nonskid shoes/slippers when out of bed   safety round/check completed  Taken 4/20/2025 1400 by Stephanie Frank RN  Safety Promotion/Fall Prevention:   activity supervised   fall prevention program maintained   nonskid shoes/slippers when out of bed   safety round/check completed  Taken 4/20/2025 1200 by Stephanie Frank RN  Safety Promotion/Fall Prevention:   activity supervised   fall prevention program maintained   nonskid shoes/slippers when out of bed   safety round/check completed  Taken 4/20/2025 1000 by Stephanie Frank RN  Safety Promotion/Fall Prevention:   activity supervised   fall prevention program maintained   nonskid shoes/slippers when out of bed   safety round/check completed  Taken  4/20/2025 0847 by Stephanie Frank, RN  Safety Promotion/Fall Prevention:   activity supervised   fall prevention program maintained   nonskid shoes/slippers when out of bed   safety round/check completed     Problem: Noninvasive Ventilation Acute  Goal: Effective Unassisted Ventilation and Oxygenation  Outcome: Progressing     Problem: Confusion Acute  Goal: Optimal Cognitive Function  Outcome: Progressing     Problem: Infection  Goal: Absence of Infection Signs and Symptoms  Outcome: Progressing   Goal Outcome Evaluation:

## 2025-04-21 PROBLEM — R78.81 GRAM-POSITIVE BACTEREMIA: Status: ACTIVE | Noted: 2025-01-01

## 2025-04-21 NOTE — PLAN OF CARE
Goal Outcome Evaluation:Scheduled HD completed. Pt tolerated well. Dr Hoang ordered 50r of albumin. Pt moaned most of the tx. Vital signs stable. Goal reached. Blood reinfused to pt. Called report to PAULIE Rahman      Problem: Hemodialysis  Goal: Safe, Effective Therapy Delivery  Outcome: Progressing  Goal: Effective Tissue Perfusion  Outcome: Progressing  Goal: Absence of Infection Signs and Symptoms  Outcome: Not Progressing

## 2025-04-21 NOTE — PLAN OF CARE
Problem: Palliative Care  Goal: Enhanced Quality of Life  Intervention: Optimize Psychosocial Wellbeing  Flowsheets (Taken 4/21/2025 1651)  Supportive Measures:   active listening utilized   goal-setting facilitated   decision-making supported   verbalization of feelings encouraged   problem-solving facilitated  Family/Support System Care:   caregiver stress acknowledged   presence promoted   support provided   involvement promoted   Goal Outcome Evaluation:  Plan of Care Reviewed With: spouse, family, other (see comments), grandchild(sylvie) (son Stacy and dtr)        Progress: declining  Outcome Evaluation: New palliative consult from DR Carlos for [facility].  met with  and 2 children Stacy (son) and dtr, granddtr.  Pt has been in the hospital in January and then went to [place] and has been there ever since. Pt had Left BKA.  Pt has been on dialysis for about 6 months and has been declining since her Left BKA. She has chronic back pain and has a neurostimulator.  She is currently moaning and uncomfortable.  She says leave me alone, I'm tired as nursing staff is trying to start an iv.  After discussion with family, they discussed goals and want to proceed with comfort measures. Inpt hospice consulted.  Prn iv dilaudid, versed and robinul ordered. May place subcut line if iv access is lost.  Pt is on a comfort diet.   concerned about pt starving.  Discussed normal decrease in appetite at the end of life. Continue to offer food/liquids as pt desires.  Pt and her  have been  for 57 years.   Palliative IDT: RN, Md TRISTEN, ,   After hours# [phone]

## 2025-04-21 NOTE — PROGRESS NOTES
[facility]  PROGRESS NOTE    Patient Name: Sara Esparza  : 1950  MRN: [id number]    Date of Admission: 2025  Primary Care Physician: Toribio Roberts MD    Subjective   Subjective     CC:  AMS    HPI:  Pt was seen in HD this am, very lethargic and minimally interactive.     Objective   Objective     Vital Signs:   Temp:  [97.6 °F (36.4 °C)-98.2 °F (36.8 °C)] 97.6 °F (36.4 °C)  Heart Rate:  [55-73] 73  Resp:  [11-18] 15  BP: (100-134)/(51-84) 101/61     Physical Exam:  Constitutional: lethargic  HENT: NCAT, mucous membranes moist  Respiratory: Clear to auscultation bilaterally, respiratory effort normal   Cardiovascular: RRR, no murmurs, rubs, or gallops  Gastrointestinal: Positive bowel sounds, soft, nontender, nondistended  Musculoskeletal: No R ankle edema, R foot ulceration, L BKA  Psychiatric: KIANA  Neurologic: KIANA  Skin: No rashes     Results Reviewed:  LAB RESULTS:      Lab 25  0750 25  1109 25  1532 25  0205 25  0016   WBC 6.22 6.08 6.09  --  7.06   HEMOGLOBIN 8.2* 9.2* 8.8*  --  8.6*   HEMATOCRIT 27.2* 31.1* 29.6*  --  29.1*   PLATELETS 119* 116* 129*  --  136*   NEUTROS ABS 4.19 4.66 5.12  --  5.76   IMMATURE GRANS (ABS) 0.03 0.01 0.02  --  0.03   LYMPHS ABS 1.25 0.80 0.42*  --  0.56*   MONOS ABS 0.72 0.59 0.53  --  0.70   EOS ABS 0.03 0.01 0.00  --  0.00   MCV 92.5 93.4 93.7  --  93.0   SED RATE  --   --   --   --  12   CRP  --   --   --   --  11.38*   LACTATE  --   --   --   --  1.3   HSTROP T  --   --  99* 105* 109*         Lab 25  0750 25  1109 25  1532 25  0016   SODIUM 139 138 136 135*   POTASSIUM 4.1 4.3 4.2 4.5   CHLORIDE 105 104 102 101   CO2 26.0 24.0 27.0 27.0   ANION GAP 8.0 10.0 7.0 7.0   BUN 37* 31* 25* 18   CREATININE 3.86* 3.48* 2.84* 2.28*   EGFR 11.6* 13.2* 16.8* 21.9*   GLUCOSE 87 84 110* 106*   CALCIUM 8.2* 8.5* 9.1 9.2   MAGNESIUM  --   --  1.9 2.0   PHOSPHORUS  --  3.6  1.6*  --          Lab 04/19/25  1532 04/19/25  0016   TOTAL PROTEIN 4.4* 4.8*   ALBUMIN 2.0* 2.1*   GLOBULIN 2.4 2.7   ALT (SGPT) 7 9   AST (SGOT) 13 21   BILIRUBIN 0.3 0.4   ALK PHOS 206* 224*         Lab 04/19/25  1532 04/19/25  0205 04/19/25  0016   HSTROP T 99* 105* 109*                 Brief Urine Lab Results  (Last result in the past 365 days)        Color   Clarity   Blood   Leuk Est   Nitrite   Protein   CREAT   Urine HCG        04/18/25 2355 Dark Yellow   Turbid   Small (1+)   Large (3+)   Negative   >=300 mg/dL (3+)                   Microbiology Results Abnormal       Procedure Component Value - Date/Time    Urine Culture - Urine, Straight Cath [[id number]]  (Abnormal)  (Susceptibility) Collected: 04/18/25 2355    Lab Status: Final result Specimen: Urine from Straight Cath Updated: 04/21/25 1043     Urine Culture >100,000 CFU/mL Klebsiella pneumoniae ESBL    Narrative:      Colonization of the urinary tract without infection is common. Treatment is discouraged unless the patient is symptomatic, pregnant, or undergoing an invasive urologic procedure.  Recent outcomes data supports the use of pip/tazo in the treatment of susceptible ESBL infections for uncomplicated UTI. Consider use of pip/tazo as a carbapenem-sparing regimen in applicable patients.    Susceptibility        Klebsiella pneumoniae ESBL      TAYLOR      Ciprofloxacin Resistant      Ertapenem Susceptible      Gentamicin Resistant      Levofloxacin Resistant      Meropenem Susceptible      Nitrofurantoin Intermediate      Piperacillin + Tazobactam Susceptible      Tobramycin Susceptible      Trimethoprim + Sulfamethoxazole Susceptible                           Blood Culture - Blood, Arm, Right [[id number]]  (Abnormal) Collected: 04/19/25 0200    Lab Status: Preliminary result Specimen: Blood from Arm, Right Updated: 04/20/25 2112     Blood Culture Abnormal Stain     Gram Stain Anaerobic Bottle Gram positive cocci in clusters      Aerobic Bottle Gram  positive cocci in clusters    Narrative:      Less than seven (7) mL's of blood was collected.  Insufficient quantity may yield false negative results.    Blood Culture ID, PCR - Blood, Hand, Right [[id number]]  (Abnormal) Collected: 04/19/25 0205    Lab Status: Final result Specimen: Blood from Hand, Right Updated: 04/20/25 1947     BCID, PCR Staph aureus. mecA/C and MREJ (methicillin resistance gene) detected. Identification by BCID2 PCR.     BOTTLE TYPE Anaerobic Bottle    Narrative:      Infectious disease consultation is highly recommended to rule out distant foci of infection.    Blood Culture - Blood, Hand, Right [[id number]]  (Abnormal) Collected: 04/19/25 0205    Lab Status: Preliminary result Specimen: Blood from Hand, Right Updated: 04/20/25 1822     Blood Culture Abnormal Stain     Gram Stain Anaerobic Bottle Gram positive cocci in clusters      Aerobic Bottle Gram positive cocci in clusters    Narrative:      Less than seven (7) mL's of blood was collected.  Insufficient quantity may yield false negative results.            No radiology results from the last 24 hrs      Results for orders placed during the hospital encounter of 01/19/25    Adult Transthoracic Echo Complete W/ Cont if Necessary Per Protocol    Interpretation Summary    Left ventricular systolic function is normal. Estimated left ventricular EF = 55%    Left ventricular wall thickness is consistent with moderate concentric hypertrophy.    No hemodynamically significant valvular heart disease      Current medications:  Scheduled Meds:albumin human, , ,   amiodarone, 200 mg, Oral, Q24H  apixaban, 2.5 mg, Oral, Q12H  atorvastatin, 10 mg, Oral, Nightly  cefTRIAXone, 1,000 mg, Intravenous, Q24H  insulin lispro, 2-7 Units, Subcutaneous, 4x Daily AC & at Bedtime  [Held by provider] NIFEdipine XL, 90 mg, Oral, Daily  pantoprazole, 40 mg, Intravenous, Q AM  sodium chloride, 10 mL, Intravenous, Q12H  vancomycin, 750 mg, Intravenous, Once per day on  Monday Wednesday Friday      Continuous Infusions:Pharmacy Consult - Pharmacy to dose,       PRN Meds:.  acetaminophen    albumin human    aluminum-magnesium hydroxide-simethicone    senna-docusate sodium **AND** polyethylene glycol **AND** bisacodyl **AND** bisacodyl    Calcium Replacement - Follow Nurse / BPA Driven Protocol    dextrose    dextrose    glucagon (human recombinant)    heparin (porcine)    ipratropium-albuterol    LORazepam    Magnesium Standard Dose Replacement - Follow Nurse / BPA Driven Protocol    nitroglycerin    ondansetron    oxyCODONE    Pharmacy Consult - Pharmacy to dose    Phosphorus Replacement - Follow Nurse / BPA Driven Protocol    Potassium Replacement - Follow Nurse / BPA Driven Protocol    sodium chloride    sodium chloride    sodium chloride    Assessment & Plan   Assessment & Plan     Active Hospital Problems    Diagnosis  POA    **Toxic metabolic encephalopathy [G92.8]  Yes    Gram-positive bacteremia [R78.81]  Unknown     Priority: High    Paroxysmal A-fib [I48.0]  Yes     Priority: Medium    Acute cystitis without hematuria [N30.00]  Yes    Pressure injury of right ankle, stage 2 [L89.512]  Yes    Elevated troponin [R79.89]  Yes    Anemia of chronic disease [D63.8]  Yes    ESRD (end stage renal disease) [N18.6]  Yes    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        Brief Hospital Course to date:  Sara Esparza is a 74 y.o. female with PMHx of HTN, PAF (Eliquis), prior DVT/PE, ESRD on HD (MWF), PAD s/p L BKA, IDDM2, Hx of OM, asthma, Chronic LBP s/p SCS, cervical stenosis, Chronic pain syndrome, DENZEL (CPAP) who presented from [facility] for evaluation of confusion. She was found to have an acute UTI.     Plan:     Toxic metabolic  encephalopathy  Acute cystitis without hematuria  MRSA bacteremia  - Afebrile, WBC 7, CRP 11.38 on admission   - UA suggestive of acute infection with 4+ bacteria, 21-50 WBCs, large LE  - CT head with no acute process   - stop Rocephin for UTI, Ucx with >100K ESBL Klebsiella. Will change to Ertapenem until ID can see  -- Blood cultures now growing MRSA.  Appears to be in 4 out of 4 bottles.  ID consulted. Vanc started 4/20  -- unclear source of bacteremia, however, she does have a RLE wound and also has HD catheter which likely now needs to be replaced     Hypophosphatemia  Hypomagnesemia  -- replace as per renal replacement protocol      IDDM Type 2  - SSI     PAF  - Rate-controlled in SR  - Continue Eliquis, amiodarone     Elevated troponin  - Chronically elevated in the setting of ESRD  - Trop peaked at 109  - No ACS/CP  - CTM on tele     ESRD on HD  - MWF  - Nephro consulted     AOCD  - Hgb stable  - No active bleeding  - transfuse PRN HgB<7    TCP  -- not a chronic issue, mild, platelets stable today, monitor  -- likely related to acute infection(s)     HTN  - Chronic, normotensive  - Hold home antihypertensives and resume when appropriate     COPD  - Chronic, not in exacerbation  - Duonebs PRN     Right lateral malleolus pressure ulcer  Right foot ulceration  - off-load limb  - Wound care consult  -- Xray R foot with no evidence of osteomyelitis , ? Source of bacteremia     H/o PAD s/p L BKA     Chronic pain syndrome  - continue home roxicodone PRN    Dysphagia  -- pt's  understands that she is at high risk of aspiration. He would like for her to have a comfort diet and understands that her code status has been changed to DNR/DNI and he is agreeable.     Overall very poor prognosis.  Pt not doing well with HD even prior to this hospitalization per my discussion with Dr. Gonzalez yesterday.  I have consulted Palliative Care for GOC discussion and have encouraged family to think about hospice.       Total  time spent: Time Spent: Time Spent: 35 minutes  Time spent includes time reviewing chart, face-to-face time, counseling patient/family/caregiver, ordering medications/tests/procedures, communicating with other health care professionals, documenting clinical information in the electronic health record, and coordination of care.      Expected Discharge Location and Transportation: back to her nursing facility vs inpatient hospice   Expected Discharge   Expected Discharge Date: 4/24/2025; Expected Discharge Time:      VTE Prophylaxis:  Pharmacologic VTE prophylaxis orders are present.         AM-PAC 6 Clicks Score (PT): 6 (04/20/25 2000)    CODE STATUS:   Code Status and Medical Interventions: No CPR (Do Not Attempt to Resuscitate); Limited Support; No intubation (DNI), No cardioversion, No vasopressors   Ordered at: 04/20/25 1153     Code Status (Patient has no pulse and is not breathing):    No CPR (Do Not Attempt to Resuscitate)     Medical Interventions (Patient has pulse or is breathing):    Limited Support     Medical Intervention Limits:    No intubation (DNI)       No cardioversion       No vasopressors     Level Of Support Discussed With:    Health Care Surrogate       Heather Carlos MD  04/21/25

## 2025-04-21 NOTE — H&P
Hospice History and Physical     Patient Name:  Sara Esparza   : 1950   Sex: female    Patient Care Team:  Toribio Roberts MD as PCP - General (Internal Medicine)  Carla Bustos APRN as Nurse Practitioner (Cardiology)    Code Status: ***    Subjective     74 y.o.female presented to ED on *** with c/o ***.  At baseline patient ***    PMH significant for     In ED workup revealed     Hospital Course:     Inpatient hospice team met with *** at bedside.  Inpatient team reviewed inpatient hospice service, medication management, and goals of care.  Family elected comfort focused POC.    Patient admitted to inpatient hospice on 2025 with terminal diagnosis of ESRD (end stage renal disease) [N18.6] for management of severe *** that will require injectable medications and frequent skilled nursing assessments to manage.  Prognosis ***.      Review of Systems:  Review of Systems    History:  Past Medical History:   Diagnosis Date    Anxiety     Arthritis     Asthma     allergy induced    Back pain     Depression     Diabetes mellitus     dx 10 years ago- checks fsbs most days    Diverticula of colon     GERD (gastroesophageal reflux disease)     Head ache     Hypertension     Sleep apnea     no longer needs cpap - approx. 10 years r/t weight loss     Past Surgical History:   Procedure Laterality Date    AORTAGRAM Left 2/3/2025    Procedure: CFA ACCESS RIGHT - ULTRASOUND GUIDED, AORTOGRAM WITH LEFT LOWER EXTREMITY RUN OFF, ANGIOPLASTY, INTRAVASCULAR LITHOTRIPSY, RIGHT CFA CLOSURE;  Surgeon: Kyle Ramirez MD;  Location:  [facility];  Service: Vascular;  Laterality: Left;  fluoro 10 minutes  dose - 61mgy  contrast 20ml    BACK SURGERY      x 2    BELOW KNEE AMPUTATION Left 2025    Procedure: BELOW KNEE AMPUTATION LEFT;  Surgeon: Rom Gonzalez Jr., MD;  Location:  [facility];  Service: Orthopedics;  Laterality: Left;    CARDIAC CATHETERIZATION      no intervention    CHOLECYSTECTOMY  OPEN      COLONOSCOPY      2013    FOOT IRRIGATION, DEBRIDEMENT AND REPAIR Left 1/24/2025    Procedure: HEEL IRRIGATION AND DEBRIDEMENT LEFT;  Surgeon: Rom Gonzalez Jr., MD;  Location:  [facility];  Service: Orthopedics;  Laterality: Left;    HYSTERECTOMY      PLACEMENT OF ANTIBIOTIC FILLER Left 2/4/2025    Procedure: PLACEMENT OF ANTIBIOTIC CEMENT FILLER FOR BONE VOID;  Surgeon: Rom Gonzalez Jr., MD;  Location:  [facility];  Service: Orthopedics;  Laterality: Left;    PLACEMENT OF WOUND VAC Left 1/24/2025    Procedure: PLACEMENT OF WOUND VAC;  Surgeon: Rom Gonzalez Jr., MD;  Location:  [facility];  Service: Orthopedics;  Laterality: Left;    REPLACEMENT TOTAL KNEE BILATERAL Bilateral     TONSILLECTOMY      VENTRAL/INCISIONAL HERNIA REPAIR N/A 6/6/2017    Procedure: INCISIONAL HERNIA REPAIR LAPAROSCOPIC;  Surgeon: Conrad Hernandez MD;  Location:  [facility];  Service:      Current Facility-Administered Medications   Medication Dose Route Frequency Provider Last Rate Last Admin    acetaminophen (TYLENOL) tablet 650 mg  650 mg Oral Q4H PRN Helen Carlos APRN        Or    acetaminophen (TYLENOL) 160 MG/5ML oral solution 650 mg  650 mg Oral Q4H PRN Helen Carlos APRN        Or    acetaminophen (TYLENOL) suppository 650 mg  650 mg Rectal Q4H PRN Helen Carlos APRN        bisacodyl (DULCOLAX) suppository 10 mg  10 mg Rectal Daily PRN Helen Carlos APRN        glycopyrrolate (ROBINUL) injection 0.4 mg  0.4 mg Intravenous Q4H PRN Helen Carlos APRN        HYDROmorphone (DILAUDID) injection 0.5 mg  0.5 mg Intravenous Q1H PRN Helen Carlos APRN        LORazepam (ATIVAN) tablet 0.5 mg  0.5 mg Oral Q6H PRN Helen Carlos APRN        midazolam (VERSED) injection 0.5 mg  0.5 mg Intravenous Q2H PRN Helen Carlos APRN        ondansetron (ZOFRAN) injection 4 mg  4 mg Intravenous Q6H PRN Helen Carlos APRN        [START ON 4/22/2025] pantoprazole (PROTONIX) injection 40 mg  40 mg  Intravenous Q AM Helen Carlos APRN        Polyvinyl Alcohol-Povidone PF (ARTIFICIAL TEARS) 1.4-0.6 % ophthalmic solution 1 drop  1 drop Both Eyes Q30 Min PRN Helen Carlos APRN        scopolamine patch 1 mg/72 hr  1 patch Transdermal Q72H PRN Helen Carlos APRN        sodium chloride 0.9 % flush 10 mL  10 mL Intravenous PRN Helen Carlos APRN         Facility-Administered Medications Ordered in Other Encounters   Medication Dose Route Frequency Provider Last Rate Last Admin    glycopyrrolate (ROBINUL) injection 0.2 mg  0.2 mg Intravenous Q4H PRN Dayne Rodriguez MD        HYDROmorphone (DILAUDID) injection 0.5 mg  0.5 mg Intravenous Q2H PRN Dayne Rodriguez MD        midazolam (VERSED) injection 1 mg  1 mg Intravenous Q4H PRN Dayne Rodriguez MD        palliative care oral rinse   Mouth/Throat TID Dayne Rodriguez MD              acetaminophen **OR** acetaminophen **OR** acetaminophen    bisacodyl    glycopyrrolate    HYDROmorphone    LORazepam    midazolam    ondansetron    Polyvinyl Alcohol-Povidone PF    Scopolamine    sodium chloride  No Known Allergies  No family history on file.  Social History     Socioeconomic History    Marital status:    Tobacco Use    Smoking status: Never    Smokeless tobacco: Never   Vaping Use    Vaping status: Never Used   Substance and Sexual Activity    Alcohol use: No    Drug use: No    Sexual activity: Defer       Objective     Vital Signs:  Temp:  [97.6 °F (36.4 °C)-98.2 °F (36.8 °C)] 98 °F (36.7 °C)  Heart Rate:  [55-73] 65  Resp:  [11-18] 15  BP: (100-134)/(51-84) 127/52    PPS:    Physical Exam:  Physical Exam    Results Reviewed:  LAB RESULTS:      Lab 04/21/25  0750 04/20/25  1109 04/19/25  1532 04/19/25  0016   WBC 6.22 6.08 6.09 7.06   HEMOGLOBIN 8.2* 9.2* 8.8* 8.6*   HEMATOCRIT 27.2* 31.1* 29.6* 29.1*   PLATELETS 119* 116* 129* 136*   NEUTROS ABS 4.19 4.66 5.12 5.76   IMMATURE GRANS (ABS) 0.03 0.01 0.02 0.03   LYMPHS ABS 1.25 0.80 0.42* 0.56*    MONOS ABS 0.72 0.59 0.53 0.70   EOS ABS 0.03 0.01 0.00 0.00   MCV 92.5 93.4 93.7 93.0   SED RATE  --   --   --  12   CRP  --   --   --  11.38*   LACTATE  --   --   --  1.3         Lab 04/21/25  0750 04/20/25  1109 04/19/25  1532 04/19/25  0016   SODIUM 139 138 136 135*   POTASSIUM 4.1 4.3 4.2 4.5   CHLORIDE 105 104 102 101   CO2 26.0 24.0 27.0 27.0   ANION GAP 8.0 10.0 7.0 7.0   BUN 37* 31* 25* 18   CREATININE 3.86* 3.48* 2.84* 2.28*   EGFR 11.6* 13.2* 16.8* 21.9*   GLUCOSE 87 84 110* 106*   CALCIUM 8.2* 8.5* 9.1 9.2   MAGNESIUM  --   --  1.9 2.0   PHOSPHORUS  --  3.6 1.6*  --          Lab 04/19/25  1532 04/19/25  0016   TOTAL PROTEIN 4.4* 4.8*   ALBUMIN 2.0* 2.1*   GLOBULIN 2.4 2.7   ALT (SGPT) 7 9   AST (SGOT) 13 21   BILIRUBIN 0.3 0.4   ALK PHOS 206* 224*         Lab 04/19/25  1532 04/19/25  0205 04/19/25  0016   HSTROP T 99* 105* 109*                 Brief Urine Lab Results  (Last result in the past 365 days)        Color   Clarity   Blood   Leuk Est   Nitrite   Protein   CREAT   Urine HCG        04/18/25 2355 Dark Yellow   Turbid   Small (1+)   Large (3+)   Negative   >=300 mg/dL (3+)                   Microbiology Results Abnormal       None              ESRD (end stage renal disease)      Assessment & Plan     Assessment/Plan:   -Admit to inpatient hospice service 04/21/2025 with ESRD (end stage renal disease) [N18.6].     -Coordination of care with nursing staff and BCN IDT.     -Pain: Morphine Dilaudid    -Dyspnea:  Morphine Dilaudid as above, oxygen via NC PRN     -Nausea/Vomiting: Zofran 4 mg scheduled q 6 hr PRN       -Anxiety/Agitation: Versed 0.5 mg q 4 hrs PRN; haldol 1 mg q 4 hrs PRN      -Bowel/bladder: bisacodyl supp q day PRN     -Nutrition: Comfort diet as tolerated     -ADLs: total care     -Terminal fever: tylenol supp 650 mg q 6 hr PRN. tordal 15 mg IV q 6 hr PRN     -Terminal secretions:  May start PRN robinul/scop patch/lasix if needed     -Patient comfort: palliative oral rinse PRN,  liquifilm PRN      -Goals of Care: achieve comfortable death, educate and support family through this process.           Total Visit Time:  Face to Face Time:  Total time spent includes time reviewing chart, face-to-face time, counseling patient/family/caregiver, ordering medications/tests/procedures, communicating with other health care professionals, documenting clinical information in the electronic health record, and coordination of care with facility staff and BCN IDT.      Verbal certification obtained from Dr Ojeda who has determined patient's eligibility with terminal diagnosis of ESRD (end stage renal disease) [N18.6].  Medications and diagnosis determined to be related to terminal prognosis. See physician's CTI for information on eligibility determination.      Justification for care:  Patient meets criteria for acute in-patient care with required nursing assessment and interventions for symptoms with IV medications.      LATANYA BALDWIN, MSN, APRN  [facility]  Hospice and Palliative Care Nurse Practitioner  04/21/25  16:58 EDT

## 2025-04-21 NOTE — PROGRESS NOTES
" LOS: 2 days   Patient Care Team:  Toribio Roberts MD as PCP - General (Internal Medicine)  Carla Bustos APRN as Nurse Practitioner (Cardiology)    Chief Complaint: ESRD    Subjective     Non engaging. Weak and lethargic. Seen on dialysis. BP borderline low     Subjective:  Symptoms:  Stable.        History taken from: patient    Objective     Vital Sign Min/Max for last 24 hours  Temp  Min: 97.8 °F (36.6 °C)  Max: 98.4 °F (36.9 °C)   BP  Min: 103/57  Max: 134/78   Pulse  Min: 55  Max: 73   Resp  Min: 11  Max: 18   SpO2  Min: 90 %  Max: 99 %   No data recorded   No data recorded     Flowsheet Rows      Flowsheet Row First Filed Value   Admission Height 170.2 cm (67\") Documented at 04/19/2025 0020   Admission Weight 67.8 kg (149 lb 6.4 oz) Documented at 04/19/2025 0020            No intake/output data recorded.  I/O last 3 completed shifts:  In: 300 [P.O.:300]  Out: -       Gen: Alert, NAD non engaging.   HENT: NC, AT, EOMI   NECK: Supple, no JVD, Trachea midline   LUNGS: CTA bilaterally, non labored respirtation   CVS: S1/S2 audible, RRR, no murmur   Abd: Soft, NT, ND, BS+   Ext: No pedal edema, no cyanosis   CNS: Alert, No focal deficit noted grossly  Psy: Cooperative  Skin: Warm, dry and intact      Results Review:     I reviewed the patient's new clinical results.    WBC WBC   Date Value Ref Range Status   04/21/2025 6.22 3.40 - 10.80 10*3/mm3 Final   04/20/2025 6.08 3.40 - 10.80 10*3/mm3 Final   04/19/2025 6.09 3.40 - 10.80 10*3/mm3 Final   04/19/2025 7.06 3.40 - 10.80 10*3/mm3 Final      HGB Hemoglobin   Date Value Ref Range Status   04/21/2025 8.2 (L) 12.0 - 15.9 g/dL Final   04/20/2025 9.2 (L) 12.0 - 15.9 g/dL Final   04/19/2025 8.8 (L) 12.0 - 15.9 g/dL Final   04/19/2025 8.6 (L) 12.0 - 15.9 g/dL Final      HCT Hematocrit   Date Value Ref Range Status   04/21/2025 27.2 (L) 34.0 - 46.6 % Final   04/20/2025 31.1 (L) 34.0 - 46.6 % Final   04/19/2025 29.6 (L) 34.0 - 46.6 % Final   04/19/2025 29.1 " "(L) 34.0 - 46.6 % Final      Platlets No results found for: \"LABPLAT\"   MCV MCV   Date Value Ref Range Status   04/21/2025 92.5 79.0 - 97.0 fL Final   04/20/2025 93.4 79.0 - 97.0 fL Final   04/19/2025 93.7 79.0 - 97.0 fL Final   04/19/2025 93.0 79.0 - 97.0 fL Final          Sodium Sodium   Date Value Ref Range Status   04/21/2025 139 136 - 145 mmol/L Final   04/20/2025 138 136 - 145 mmol/L Final   04/19/2025 136 136 - 145 mmol/L Final   04/19/2025 135 (L) 136 - 145 mmol/L Final      Potassium Potassium   Date Value Ref Range Status   04/21/2025 4.1 3.5 - 5.2 mmol/L Final   04/20/2025 4.3 3.5 - 5.2 mmol/L Final     Comment:     Slight hemolysis detected by analyzer. Result may be falsely elevated.   04/19/2025 4.2 3.5 - 5.2 mmol/L Final   04/19/2025 4.5 3.5 - 5.2 mmol/L Final     Comment:     Slight hemolysis detected by analyzer. Result may be falsely elevated.      Chloride Chloride   Date Value Ref Range Status   04/21/2025 105 98 - 107 mmol/L Final   04/20/2025 104 98 - 107 mmol/L Final   04/19/2025 102 98 - 107 mmol/L Final   04/19/2025 101 98 - 107 mmol/L Final      CO2 CO2   Date Value Ref Range Status   04/21/2025 26.0 22.0 - 29.0 mmol/L Final   04/20/2025 24.0 22.0 - 29.0 mmol/L Final   04/19/2025 27.0 22.0 - 29.0 mmol/L Final   04/19/2025 27.0 22.0 - 29.0 mmol/L Final      BUN BUN   Date Value Ref Range Status   04/21/2025 37 (H) 8 - 23 mg/dL Final   04/20/2025 31 (H) 8 - 23 mg/dL Final   04/19/2025 25 (H) 8 - 23 mg/dL Final   04/19/2025 18 8 - 23 mg/dL Final      Creatinine Creatinine   Date Value Ref Range Status   04/21/2025 3.86 (H) 0.57 - 1.00 mg/dL Final   04/20/2025 3.48 (H) 0.57 - 1.00 mg/dL Final   04/19/2025 2.84 (H) 0.57 - 1.00 mg/dL Final   04/19/2025 2.28 (H) 0.57 - 1.00 mg/dL Final      Calcium Calcium   Date Value Ref Range Status   04/21/2025 8.2 (L) 8.6 - 10.5 mg/dL Final   04/20/2025 8.5 (L) 8.6 - 10.5 mg/dL Final   04/19/2025 9.1 8.6 - 10.5 mg/dL Final   04/19/2025 9.2 8.6 - 10.5 mg/dL " "Final      PO4 No results found for: \"CAPO4\"   Albumin Albumin   Date Value Ref Range Status   04/19/2025 2.0 (L) 3.5 - 5.2 g/dL Final   04/19/2025 2.1 (L) 3.5 - 5.2 g/dL Final      Magnesium Magnesium   Date Value Ref Range Status   04/19/2025 1.9 1.6 - 2.4 mg/dL Final   04/19/2025 2.0 1.6 - 2.4 mg/dL Final      Uric Acid No results found for: \"URICACID\"     Medication Review: Yes    Assessment & Plan       Toxic metabolic encephalopathy    ESRD (end stage renal disease)    Type 2 diabetes mellitus with stage 5 chronic kidney disease not on chronic dialysis, without long-term current use of insulin    Paroxysmal A-fib    Anemia of chronic disease    Chronic obstructive pulmonary disease    Acute cystitis without hematuria    Pressure injury of right ankle, stage 2    Elevated troponin    Gram-positive bacteremia      Assessment & Plan    ESRD: MWF [facility].   AMS: Getting treated for possible UTI  Anemia: ACD due to ESRD CORNEL on HD days.  BMD: Continue home meds. Renal diet  Volume status: Stable.      Recs  Seen on dialysis. UF as tolerated.   Albumin to support BP while in HD.  Patient will benefit from GOC discuss with palliative team.          I discussed the patients findings and my recommendations with patient    Lisa Hoang MD  04/21/25  09:12 EDT          "

## 2025-04-21 NOTE — PLAN OF CARE
Goal Outcome Evaluation:   Pt became more agitated over night, requiring PRN pain and anxiety medications. VS remained stable. New USG IV placed.

## 2025-04-21 NOTE — THERAPY EVALUATION
Patient Name: Sara Esparza  : 1950    MRN: [id number]                              Today's Date: 2025       Admit Date: 2025    Visit Dx:     ICD-10-CM ICD-9-CM   1. Altered mental status, unspecified altered mental status type  R41.82 780.97   2. Acute cystitis without hematuria  N30.00 595.0   3. Dysphagia, unspecified type  R13.10 787.20     Patient Active Problem List   Diagnosis    Postoperative abdominal hernia    ESRD (end stage renal disease)    Type 2 diabetes mellitus with stage 5 chronic kidney disease not on chronic dialysis, without long-term current use of insulin    Hypokalemia    Malnutrition    Severe malnutrition    Generalized weakness    Chronic osteomyelitis of left foot    Paroxysmal A-fib    Sepsis due to methicillin resistant Staphylococcus aureus (MRSA) with encephalopathy without septic shock    Critical limb ischemia of left lower extremity    Toxic metabolic encephalopathy    Anemia of chronic disease    Asthma    Chronic obstructive pulmonary disease    Depressive disorder    Diabetic peripheral neuropathy    Gastroesophageal reflux disease without esophagitis    Hyperlipidemia    Essential hypertension    Lumbar post-laminectomy syndrome    Obstructive sleep apnea syndrome    Acute cystitis without hematuria    Pressure injury of right ankle, stage 2    Elevated troponin    Gram-positive bacteremia     Past Medical History:   Diagnosis Date    Anxiety     Arthritis     Asthma     allergy induced    Back pain     Depression     Diabetes mellitus     dx 10 years ago- checks fsbs most days    Diverticula of colon     GERD (gastroesophageal reflux disease)     Head ache     Hypertension     Sleep apnea     no longer needs cpap - approx. 10 years r/t weight loss     Past Surgical History:   Procedure Laterality Date    AORTAGRAM Left 2/3/2025    Procedure: CFA ACCESS RIGHT - ULTRASOUND GUIDED, AORTOGRAM WITH LEFT LOWER EXTREMITY RUN OFF, ANGIOPLASTY, INTRAVASCULAR  LITHOTRIPSY, RIGHT CFA CLOSURE;  Surgeon: Kyle Ramirez MD;  Location:  [facility];  Service: Vascular;  Laterality: Left;  fluoro 10 minutes  dose - 61mgy  contrast 20ml    BACK SURGERY      x 2    BELOW KNEE AMPUTATION Left 2/4/2025    Procedure: BELOW KNEE AMPUTATION LEFT;  Surgeon: Rom Gonzalez Jr., MD;  Location:  [facility];  Service: Orthopedics;  Laterality: Left;    CARDIAC CATHETERIZATION      no intervention    CHOLECYSTECTOMY OPEN      COLONOSCOPY      2013    FOOT IRRIGATION, DEBRIDEMENT AND REPAIR Left 1/24/2025    Procedure: HEEL IRRIGATION AND DEBRIDEMENT LEFT;  Surgeon: Rom Gonzalez Jr., MD;  Location: [facility];  Service: Orthopedics;  Laterality: Left;    HYSTERECTOMY      PLACEMENT OF ANTIBIOTIC FILLER Left 2/4/2025    Procedure: PLACEMENT OF ANTIBIOTIC CEMENT FILLER FOR BONE VOID;  Surgeon: Rom Gonzalez Jr., MD;  Location:  [facility];  Service: Orthopedics;  Laterality: Left;    PLACEMENT OF WOUND VAC Left 1/24/2025    Procedure: PLACEMENT OF WOUND VAC;  Surgeon: Rom Gonzalez Jr., MD;  Location:  [facility];  Service: Orthopedics;  Laterality: Left;    REPLACEMENT TOTAL KNEE BILATERAL Bilateral     TONSILLECTOMY      VENTRAL/INCISIONAL HERNIA REPAIR N/A 6/6/2017    Procedure: INCISIONAL HERNIA REPAIR LAPAROSCOPIC;  Surgeon: Conrad Hernandez MD;  Location:  [facility];  Service:       General Information       Row Name 04/21/25 1345          OT Time and Intention    Document Type evaluation  -AJ     Mode of Treatment occupational therapy  -       Row Name 04/21/25 1345          General Information    Patient Profile Reviewed yes  -AJ     Prior Level of Function --  Per chart review: Pt has been at [facility] [place] the past two months (since L BKA) requires assist with ADLs and mobility. Pt unable to provide subjective history at this time.  -AJ     Existing Precautions/Restrictions fall;other (see comments)  L BKA, impaired cognition  -AJ     Barriers to Rehab  medically complex;previous functional deficit;cognitive status  -       Row Name 04/21/25 1345          Living Environment    Current Living Arrangements residential facility  -     People in Home facility resident  -       Row Name 04/21/25 1345          Home Main Entrance    Number of Stairs, Main Entrance none  -AJ       Row Name 04/21/25 1345          Stairs Within Home, Primary    Number of Stairs, Within Home, Primary none  -       Row Name 04/21/25 1345          Cognition    Orientation Status (Cognition) oriented to;person;disoriented to;place;situation;time  -       Row Name 04/21/25 1345          Safety Issues/Impairments Affecting Functional Mobility    Safety Issues Affecting Function (Mobility) ability to follow commands;awareness of need for assistance;friction/shear risk;insight into deficits/self-awareness;judgment;impulsivity;problem-solving;safety precaution awareness;safety precautions follow-through/compliance;sequencing abilities  -     Impairments Affecting Function (Mobility) balance;cognition;endurance/activity tolerance;pain;postural/trunk control;strength;motor planning  -     Cognitive Impairments, Mobility Safety/Performance awareness, need for assistance;safety precaution awareness;safety precaution follow-through;insight into deficits/self-awareness;sequencing abilities;problem-solving/reasoning;impulsivity;attention;judgment  -               User Key  (r) = Recorded By, (t) = Taken By, (c) = Cosigned By      Initials Name Provider Type    AJ Elana Gee OT Occupational Therapist                     Mobility/ADL's       Row Name 04/21/25 1355          Bed Mobility    Bed Mobility supine-sit-supine  -AJ     Supine-Sit-Supine [place] (Bed Mobility) dependent (less than 25% patient effort);2 person assist;verbal cues;nonverbal cues (demo/gesture)  -     Bed Mobility, Safety Issues cognitive deficits limit understanding;decreased use of arms for  pushing/pulling;decreased use of legs for bridging/pushing;impaired trunk control for bed mobility  -     Assistive Device (Bed Mobility) bed rails;head of bed elevated  -     Comment, (Bed Mobility) Pt verablized pain with sitting EOB and was quickly transferred back to supine. Strong posterior lean in sitting. Deferred further mobility/chair transfer d/t pt's limited command following along with increased pain and fatigue.  -       Row Name 04/21/25 1354          Activities of Daily Living    BADL Assessment/Intervention grooming  -       Row Name 04/21/25 1354          Grooming Assessment/Training    [place] Level (Grooming) wash face, hands;dependent (less than 25% patient effort)  -     Position (Grooming) supine  -               User Key  (r) = Recorded By, (t) = Taken By, (c) = Cosigned By      Initials Name Provider Type    Elana Brown, OT Occupational Therapist                   Obj/Interventions       Row Name 04/21/25 1356          Sensory Assessment (Somatosensory)    Sensory Assessment (Somatosensory) unable/difficult to assess  -       Row Name 04/21/25 1356          Vision Assessment/Intervention    Visual Impairment/Limitations unable/difficult to assess  -       Row Name 04/21/25 1356          Range of Motion Comprehensive    General Range of Motion bilateral upper extremity ROM WFL  -     Comment, General Range of Motion Based on bed mobility  -       Row Name 04/21/25 1356          Strength Comprehensive (MMT)    General Manual Muscle Testing (MMT) Assessment upper extremity strength deficits identified  -     Comment, General Manual Muscle Testing (MMT) Assessment Unable to formally assess, grossly +3/5 in BUE based on bed mobility.  -       Row Name 04/21/25 1356          Balance    Balance Assessment sitting static balance;sitting dynamic balance  -     Static Sitting Balance maximum assist;2-person assist;verbal cues;non-verbal cues (demo/gesture)  -      Dynamic Sitting Balance dependent;2-person assist;verbal cues;non-verbal cues (demo/gesture)  -AJ     Position, Sitting Balance sitting edge of bed;supported  -AJ     Balance Interventions sitting;supported;static;dynamic;occupation based/functional task  -               User Key  (r) = Recorded By, (t) = Taken By, (c) = Cosigned By      Initials Name Provider Type    Elana Brown, OT Occupational Therapist                   Goals/Plan       Row Name 04/21/25 1409          Bed Mobility Goal 1 (OT)    Activity/Assistive Device (Bed Mobility Goal 1, OT) sit to supine;supine to sit;scooting  -AJ     [place] Level/Cues Needed (Bed Mobility Goal 1, OT) moderate assist (50-74% patient effort)  -AJ     Time Frame (Bed Mobility Goal 1, OT) long term goal (LTG);10 days  -AJ     Progress/Outcomes (Bed Mobility Goal 1, OT) new goal  -       Row Name 04/21/25 1409          Transfer Goal 1 (OT)    Activity/Assistive Device (Transfer Goal 1, OT) sit-to-stand/stand-to-sit  -AJ     [place] Level/Cues Needed (Transfer Goal 1, OT) maximum assist (25-49% patient effort)  -AJ     Time Frame (Transfer Goal 1, OT) short term goal (STG);5 days  -AJ     Progress/Outcome (Transfer Goal 1, OT) new goal  -       Row Name 04/21/25 1409          Grooming Goal 1 (OT)    Activity/Device (Grooming Goal 1, OT) oral care;wash face, hands  -AJ     [place] (Grooming Goal 1, OT) minimum assist (75% or more patient effort)  -AJ     Time Frame (Grooming Goal 1, OT) long term goal (LTG);10 days  -AJ     Progress/Outcome (Grooming Goal 1, OT) new goal  -       Row Name 04/21/25 1409          Therapy Assessment/Plan (OT)    Planned Therapy Interventions (OT) activity tolerance training;adaptive equipment training;BADL retraining;functional balance retraining;IADL retraining;occupation/activity based interventions;patient/caregiver education/training;ROM/therapeutic exercise;strengthening exercise;transfer/mobility retraining   -               User Key  (r) = Recorded By, (t) = Taken By, (c) = Cosigned By      Initials Name Provider Type    AJ Elana Gee, OT Occupational Therapist                   Clinical Impression       Row Name 04/21/25 8972          Pain Scale: FACES Pre/Post-Treatment    Pain: FACES Scale, Pretreatment 4-->hurts little more  -     Posttreatment Pain Rating 8-->hurts whole lot  -     Pre/Posttreatment Pain Comment Groaning with all mobility, unable to localize pain.  -       Row Name 04/21/25 5607          Plan of Care Review    Plan of Care Reviewed With patient  -     Progress no change  -     Outcome Evaluation OT eval complete. Pt presents below fxl baseline with ADLs and mobility, limited by impaired cognition/lethargy, poor activity tolerance, weakness, and impaired balance. Pt was dependently transferred to sitting EOB and began to yell out in pain and was quickly transitioned back to supine. Pt groans with minimal movement in bed, limiting her ability to participate with therapy. Pt may benefit from ongoing skilled OT services, OT will place frequency at x3/week to monitor fxl progress. Rec d/c to SNF w/possible transition to LTC pending patient/family decisions.  -       Row Name 04/21/25 6498          Therapy Assessment/Plan (OT)    Patient/Family Therapy Goal Statement (OT) Return to PLOF  -     Rehab Potential (OT) limited  -     Criteria for Skilled Therapeutic Interventions Met (OT) yes;meets criteria;skilled treatment is necessary  -     Therapy Frequency (OT) 3 times/wk  -     Predicted Duration of Therapy Intervention (OT) 10 days  -       Row Name 04/21/25 5865          Therapy Plan Review/Discharge Plan (OT)    Anticipated Discharge Disposition (OT) skilled nursing facility  -       Row Name 04/21/25 2368          Vital Signs    Pre Systolic BP Rehab 111  -AJ     Pre Treatment Diastolic BP 55  -AJ     Pretreatment Heart Rate (beats/min) 65  -AJ     Pre SpO2 (%) 100   -AJ     O2 Delivery Pre Treatment room air  -AJ     Pre Patient Position Supine  -AJ     Intra Patient Position Sitting  -AJ     Post Patient Position Supine  -AJ       Row Name 04/21/25 1358          Positioning and Restraints    Pre-Treatment Position in bed  -AJ     Post Treatment Position bed  -AJ     In Bed notified nsg;supine;side lying left;call light within reach;encouraged to call for assist;exit alarm on;side rails up x3  -               User Key  (r) = Recorded By, (t) = Taken By, (c) = Cosigned By      Initials Name Provider Type    Elana Brown OT Occupational Therapist                   Outcome Measures       Row Name 04/21/25 1410          How much help from another is currently needed...    Putting on and taking off regular lower body clothing? 1  -AJ     Bathing (including washing, rinsing, and drying) 1  -AJ     Toileting (which includes using toilet bed pan or urinal) 1  -AJ     Putting on and taking off regular upper body clothing 1  -AJ     Taking care of personal grooming (such as brushing teeth) 1  -AJ     Eating meals 1  -AJ     AM-PAC 6 Clicks Score (OT) 6  -AJ       Row Name 04/21/25 1410          Functional Assessment    Outcome Measure Options AM-PAC 6 Clicks Daily Activity (OT)  -               User Key  (r) = Recorded By, (t) = Taken By, (c) = Cosigned By      Initials Name Provider Type    Elana Brown OT Occupational Therapist                    Occupational Therapy Education       Title: PT OT SLP Therapies (In Progress)       Topic: Occupational Therapy (In Progress)       Point: ADL training (Done)       Learning Progress Summary            Patient Nonacceptance, E, VU,NR by CARMELINA at 4/21/2025 1411                      Point: Precautions (Done)       Learning Progress Summary            Patient Nonacceptance, E, VU,NR by CARMELINA at 4/21/2025 1411                      Point: Body mechanics (Done)       Learning Progress Summary            Patient Nonacceptance, E, VU,NR by  CARMELINA at 4/21/2025 1411                                      User Key       Initials Effective Dates Name Provider Type Discipline     08/26/24 -  Elana Gee, OT Occupational Therapist OT                  OT Recommendation and Plan  Planned Therapy Interventions (OT): activity tolerance training, adaptive equipment training, BADL retraining, functional balance retraining, IADL retraining, occupation/activity based interventions, patient/caregiver education/training, ROM/therapeutic exercise, strengthening exercise, transfer/mobility retraining  Therapy Frequency (OT): 3 times/wk  Plan of Care Review  Plan of Care Reviewed With: patient  Progress: no change  Outcome Evaluation: OT eval complete. Pt presents below fxl baseline with ADLs and mobility, limited by impaired cognition/lethargy, poor activity tolerance, weakness, and impaired balance. Pt was dependently transferred to sitting EOB and began to yell out in pain and was quickly transitioned back to supine. Pt groans with minimal movement in bed, limiting her ability to participate with therapy. Pt may benefit from ongoing skilled OT services, OT will place frequency at x3/week to monitor fxl progress. Rec d/c to SNF w/possible transition to LTC pending patient/family decisions.     Time Calculation:   Evaluation Complexity (OT)  Review Occupational Profile/Medical/Therapy History Complexity: expanded/moderate complexity  Assessment, Occupational Performance/Identification of Deficit Complexity: 3-5 performance deficits  Clinical Decision Making Complexity (OT): detailed assessment/moderate complexity  Overall Complexity of Evaluation (OT): moderate complexity     Time Calculation- OT       Row Name 04/21/25 1412             Time Calculation- OT    OT Start Time 1315  -AJ      OT Received On 04/21/25  -AJ      OT Goal Re-Cert Due Date 05/01/25  -CARMELINA         Untimed Charges    OT Eval/Re-eval Minutes 46  -AJ         Total Minutes    Untimed Charges Total  Minutes 46  -AJ       Total Minutes 46  -AJ                User Key  (r) = Recorded By, (t) = Taken By, (c) = Cosigned By      Initials Name Provider Type    Elana Brown OT Occupational Therapist                  Therapy Charges for Today       Code Description Service Date Service Provider Modifiers Qty    [id number]  OT EVAL MOD COMPLEXITY 4 4/21/2025 Elana Gee OT GO 1                 Elana Gee OT  4/21/2025

## 2025-04-21 NOTE — CASE MANAGEMENT/SOCIAL WORK
Continued Stay Note  [facility]     Patient Name: Sara Esparza  MRN: [id number]  Today's Date: 4/21/2025    Admit Date: 4/18/2025    Plan: TBD   Discharge Plan       Row Name 04/21/25 1045       Plan    Plan TBD    Plan Comments Discussed patient in MDR.  Patient's code status changed yesterday.  Palliative consulted.  Updated [facility] at [facility].  Patient was in a skilled bed there and does not have a bed hold at this time.  CM will continue to follow.                   Discharge Codes    No documentation.                 Expected Discharge Date and Time       Expected Discharge Date Expected Discharge Time    Apr 24, 2025               Jahaira Marquez RN

## 2025-04-21 NOTE — PLAN OF CARE
Goal Outcome Evaluation:  Plan of Care Reviewed With: patient        Progress: no change  Outcome Evaluation: OT eval complete. Pt presents below fxl baseline with ADLs and mobility, limited by impaired cognition/lethargy, poor activity tolerance, weakness, and impaired balance. Pt was dependently transferred to sitting EOB and began to yell out in pain and was quickly transitioned back to supine. Pt groans with minimal movement in bed, limiting her ability to participate with therapy. Pt may benefit from ongoing skilled OT services, OT will place frequency at x3/week to monitor fxl progress. Rec d/c to SNF w/possible transition to LTC pending patient/family decisions.    Anticipated Discharge Disposition (OT): skilled nursing facility

## 2025-04-21 NOTE — CONSULTS
Palliative Care Initial Consult     Attending Physician: Heather Calros MD  Referring Provider: Dr. Carlos    assistance with clarification of goals of care  Code Status:   Code Status and Medical Interventions: No CPR (Do Not Attempt to Resuscitate); Limited Support; No intubation (DNI), No cardioversion, No vasopressors   Ordered at: 04/20/25 1153     Code Status (Patient has no pulse and is not breathing):    No CPR (Do Not Attempt to Resuscitate)     Medical Interventions (Patient has pulse or is breathing):    Limited Support     Medical Intervention Limits:    No intubation (DNI)       No cardioversion       No vasopressors     Level Of Support Discussed With:    Health Care Surrogate      Advanced Directives: Advance Directive Status: Patient does not have advance directive   Healthcare surrogate:       HPI: 74-year-old female with end-stage renal disease on hemodialysis, diabetes, COPD, atrial fibrillation, hypertension, chronic back pain, previous left BKA, and recurrent foot ulcers.  Patient was hospitalized with altered mentation and diagnosed with encephalopathy.  She is on broad-spectrum antibiotics and is receiving dialysis but continues to be confused and encephalopathic.  She has had a significant decline since starting dialysis 6 months ago, has had repeated hospitalizations, poor intake, and overall decline according to her .  Today we met with him as well as their son and daughter to review goals of care.  The patient has been screaming out that she is tired and that she does not want this.  We had a very lengthy discussion with the family and they feel that the patient would not want to continue dialysis and they are considering palliative measures and hospice services.  This was explained to them in detail and all of their questions were answered.        ROS: Negative except as above in HPI.     Past Medical History:   Diagnosis Date    Anxiety     Arthritis     Asthma      allergy induced    Back pain     Depression     Diabetes mellitus     dx 10 years ago- checks fsbs most days    Diverticula of colon     GERD (gastroesophageal reflux disease)     Head ache     Hypertension     Sleep apnea     no longer needs cpap - approx. 10 years r/t weight loss     Past Surgical History:   Procedure Laterality Date    AORTAGRAM Left 2/3/2025    Procedure: CFA ACCESS RIGHT - ULTRASOUND GUIDED, AORTOGRAM WITH LEFT LOWER EXTREMITY RUN OFF, ANGIOPLASTY, INTRAVASCULAR LITHOTRIPSY, RIGHT CFA CLOSURE;  Surgeon: Kyle Ramirez MD;  Location:  [facility];  Service: Vascular;  Laterality: Left;  fluoro 10 minutes  dose - 61mgy  contrast 20ml    BACK SURGERY      x 2    BELOW KNEE AMPUTATION Left 2/4/2025    Procedure: BELOW KNEE AMPUTATION LEFT;  Surgeon: Rom Gonzalez Jr., MD;  Location:  [facility];  Service: Orthopedics;  Laterality: Left;    CARDIAC CATHETERIZATION      no intervention    CHOLECYSTECTOMY OPEN      COLONOSCOPY      2013    FOOT IRRIGATION, DEBRIDEMENT AND REPAIR Left 1/24/2025    Procedure: HEEL IRRIGATION AND DEBRIDEMENT LEFT;  Surgeon: Rom Gonzalez Jr., MD;  Location:  [facility];  Service: Orthopedics;  Laterality: Left;    HYSTERECTOMY      PLACEMENT OF ANTIBIOTIC FILLER Left 2/4/2025    Procedure: PLACEMENT OF ANTIBIOTIC CEMENT FILLER FOR BONE VOID;  Surgeon: Rom Gonzalez Jr., MD;  Location:  [facility];  Service: Orthopedics;  Laterality: Left;    PLACEMENT OF WOUND VAC Left 1/24/2025    Procedure: PLACEMENT OF WOUND VAC;  Surgeon: Rom Gonzalez Jr., MD;  Location:  [facility];  Service: Orthopedics;  Laterality: Left;    REPLACEMENT TOTAL KNEE BILATERAL Bilateral     TONSILLECTOMY      VENTRAL/INCISIONAL HERNIA REPAIR N/A 6/6/2017    Procedure: INCISIONAL HERNIA REPAIR LAPAROSCOPIC;  Surgeon: Conrad Hernandez MD;  Location:  [facility];  Service:      Social History     Socioeconomic History    Marital status:    Tobacco Use    Smoking status: Never     Smokeless tobacco: Never   Vaping Use    Vaping status: Never Used   Substance and Sexual Activity    Alcohol use: No    Drug use: No    Sexual activity: Defer     History reviewed. No pertinent family history.    No Known Allergies    Current Facility-Administered Medications   Medication Dose Route Frequency Provider Last Rate Last Admin    acetaminophen (TYLENOL) tablet 650 mg  650 mg Oral Q4H PRN Austin Brooks DO   650 mg at 04/20/25 2034    albumin human 25 % IV SOLN             aluminum-magnesium hydroxide-simethicone (MAALOX MAX) 400-400-40 MG/5ML suspension 15 mL  15 mL Oral Q6H PRN Austin Brooks DO        amiodarone (PACERONE) tablet 200 mg  200 mg Oral Q24H Austin Brooks DO   200 mg at 04/21/25 1143    apixaban (ELIQUIS) tablet 2.5 mg  2.5 mg Oral Q12H Austin Brooks DO   2.5 mg at 04/21/25 1143    atorvastatin (LIPITOR) tablet 10 mg  10 mg Oral Nightly Austin Brooks DO   10 mg at 04/20/25 2034    sennosides-docusate (PERICOLACE) 8.6-50 MG per tablet 2 tablet  2 tablet Oral BID PRN Austin Brooks DO        And    polyethylene glycol (MIRALAX) packet 17 g  17 g Oral Daily PRN Austin Brooks DO        And    bisacodyl (DULCOLAX) EC tablet 5 mg  5 mg Oral Daily PRN Austin Brooks DO        And    bisacodyl (DULCOLAX) suppository 10 mg  10 mg Rectal Daily PRN Austin Brooks DO   10 mg at 04/19/25 1240    Calcium Replacement - Follow Nurse / BPA Driven Protocol   Not Applicable PRN Austin Brooks DO        dextrose (D50W) (25 g/50 mL) IV injection 25 g  25 g Intravenous Q15 Min PRN Austin Brooks DO   25 g at 04/21/25 1134    dextrose (GLUTOSE) oral gel 15 g  15 g Oral Q15 Min PRN Austin Brooks DO        ertapenem (INVanz) 500 mg in sodium chloride 0.9 % 50 mL IVPB  500 mg Intravenous Q24H Heather Carlos  mL/hr at 04/21/25 1344 500 mg at 04/21/25 1344    glucagon (GLUCAGEN) injection 1 mg  1 mg Intramuscular Q15 Min PRN Austin Brooks, DO   1  mg at 04/21/25 1205    heparin (porcine) injection 2,000 Units  2,000 Units Intracatheter PRN Lisa Hoang MD   2,000 Units at 04/21/25 1118    Insulin Lispro (humaLOG) injection 2-7 Units  2-7 Units Subcutaneous 4x Daily AC & at Bedtime Austin Brooks DO        ipratropium-albuterol (DUO-NEB) nebulizer solution 3 mL  3 mL Nebulization Q4H PRN Austin Brooks DO        LORazepam (ATIVAN) tablet 0.5 mg  0.5 mg Oral Q6H PRN Heather Carlos MD   0.5 mg at 04/21/25 0426    Magnesium Standard Dose Replacement - Follow Nurse / BPA Driven Protocol   Not Applicable PRN Austin Brooks DO        [Held by provider] NIFEdipine XL (PROCARDIA XL) 24 hr tablet 90 mg  90 mg Oral Daily Austin Brooks DO        nitroglycerin (NITROSTAT) SL tablet 0.4 mg  0.4 mg Sublingual Q5 Min PRN Austin Brooks DO        ondansetron (ZOFRAN) injection 4 mg  4 mg Intravenous Q6H PRN Austin Brooks DO        oxyCODONE (ROXICODONE) immediate release tablet 5 mg  5 mg Oral Q4H PRN Austin Brooks DO   5 mg at 04/21/25 0434    pantoprazole (PROTONIX) injection 40 mg  40 mg Intravenous Q AM Radha Nazario APRN   40 mg at 04/21/25 0506    Pharmacy Consult - Pharmacy to dose Vancomycin   Not Applicable Continuous PRN Heather Carlos MD        Phosphorus Replacement - Follow Nurse / BPA Driven Protocol   Not Applicable PRN Austin Brooks DO        Potassium Replacement - Follow Nurse / BPA Driven Protocol   Not Applicable PRN Austin Brooks DO        sodium chloride 0.9 % flush 10 mL  10 mL Intravenous PRN Cherelle Self MD        sodium chloride 0.9 % flush 10 mL  10 mL Intravenous Q12H Austin Brooks DO   10 mL at 04/20/25 2034    sodium chloride 0.9 % flush 10 mL  10 mL Intravenous PRN Austin Brooks DO        sodium chloride 0.9 % infusion 40 mL  40 mL Intravenous PRN Austin Brooks DO        vancomycin 750 mg in sodium chloride 0.9 % 250 mL IVPB-VTB  750 mg Intravenous Once per day  "on Monday Wednesday Friday Dave Saini, PharmD         Pharmacy Consult - Pharmacy to dose,         acetaminophen    albumin human    aluminum-magnesium hydroxide-simethicone    senna-docusate sodium **AND** polyethylene glycol **AND** bisacodyl **AND** bisacodyl    Calcium Replacement - Follow Nurse / BPA Driven Protocol    dextrose    dextrose    glucagon (human recombinant)    heparin (porcine)    ipratropium-albuterol    LORazepam    Magnesium Standard Dose Replacement - Follow Nurse / BPA Driven Protocol    nitroglycerin    ondansetron    oxyCODONE    Pharmacy Consult - Pharmacy to dose    Phosphorus Replacement - Follow Nurse / BPA Driven Protocol    Potassium Replacement - Follow Nurse / BPA Driven Protocol    sodium chloride    sodium chloride    sodium chloride    Current medication reviewed for route, type, dose and frequency and are current per MAR.    Palliative Performance Scale Score:     /61   Pulse 73   Temp 97.6 °F (36.4 °C) (Axillary)   Resp 15   Ht 170.2 cm (67\")   Wt 67.8 kg (149 lb 6.4 oz)   SpO2 93%   BMI 23.40 kg/m²     Intake/Output Summary (Last 24 hours) at 4/21/2025 1456  Last data filed at 4/21/2025 1045  Gross per 24 hour   Intake 0 ml   Output 1020 ml   Net -1020 ml       PE:  General Appearance:    Chronically ill-appearing, yelling out   HEENT:    NC/AT, dry mucous membranes   Neck:   supple   Lungs:   4 inspiratory effort    Heart:    RRR   Abdomen:     Soft, NT, ND, NABS    Extremities: Left BKA edema   Pulses:   Pulses weak   Skin: Multiple wounds to left arm, right foot, sacrum   Neurologic: Yelling out, confused, will not open her eyes or follow simple commands   Psych: Agitated       Labs:   Results from last 7 days   Lab Units 04/21/25  0750   WBC 10*3/mm3 6.22   HEMOGLOBIN g/dL 8.2*   HEMATOCRIT % 27.2*   PLATELETS 10*3/mm3 119*     Results from last 7 days   Lab Units 04/21/25  0750   SODIUM mmol/L 139   POTASSIUM mmol/L 4.1   CHLORIDE mmol/L 105   CO2 mmol/L " 26.0   BUN mg/dL 37*   CREATININE mg/dL 3.86*   GLUCOSE mg/dL 87   CALCIUM mg/dL 8.2*     Results from last 7 days   Lab Units 04/21/25  0750 04/20/25  1109 04/19/25  1532   SODIUM mmol/L 139   < > 136   POTASSIUM mmol/L 4.1   < > 4.2   CHLORIDE mmol/L 105   < > 102   CO2 mmol/L 26.0   < > 27.0   BUN mg/dL 37*   < > 25*   CREATININE mg/dL 3.86*   < > 2.84*   CALCIUM mg/dL 8.2*   < > 9.1   BILIRUBIN mg/dL  --   --  0.3   ALK PHOS U/L  --   --  206*   ALT (SGPT) U/L  --   --  7   AST (SGOT) U/L  --   --  13   GLUCOSE mg/dL 87   < > 110*    < > = values in this interval not displayed.     Imaging Results (Last 72 Hours)       Procedure Component Value Units Date/Time    CT Head Without Contrast [[id number]] Collected: 04/19/25 0046     Updated: 04/19/25 0052    Narrative:      CT HEAD WO CONTRAST    Date of Exam: 4/19/2025 12:11 AM EDT    Indication: altered mental status.    Comparison: CT head 1/19/2025    Technique: Axial CT images were obtained of the head without contrast administration.  Automated exposure control and iterative construction methods were used.      Findings:  There is mild diffuse generalized atrophy. There are low-attenuation areas in the periventricular white matter consistent with chronic microvascular ischemic change. There is encephalomalacia in the medial right occipital lobe and in the right frontal   lobe consistent with previous infarcts. There is no mass, mass effect or midline shift. There are no abnormal extra-axial fluid collections or areas of acute hemorrhage. The paranasal sinuses are clear. The mastoid air cells are clear.          Impression:      Impression:  Atrophy and chronic microvascular ischemic change. 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 No acute intracranial process.            Electronically Signed: García Kinney MD    4/19/2025 12:49 AM EDT    Workstation ID: [id number]    XR Foot 3+ View Right [[id number]] Collected: 04/19/25 0006     Updated: 04/19/25 0012     Narrative:      XR FOOT 3+ VW RIGHT    Date of Exam: 4/18/2025 11:23 PM EDT    Indication: nontraumatic pain with open wound on base of 5th metatarsal, history of MRSA and DM    Comparison: Right foot x-ray 7/6/2020    Findings:  There is extensive calcaneal spurring. There are degenerative changes involving the first metatarsal phalangeal joint with adjacent soft tissue swelling. There is midfoot arthritic change. There is no convincing soft tissue gas. There are no convincing   destructive changes of osteomyelitis.      Impression:      Impression:  Degenerative changes of the foot. No convincing destructive changes of osteomyelitis.          Electronically Signed: García Kinney MD    4/19/2025 12:09 AM EDT    Workstation ID: [id number]    XR Chest 1 View [[id number]] Collected: 04/18/25 2345     Updated: 04/18/25 2349    Narrative:      XR CHEST 1 VW    Date of Exam: 4/18/2025 11:05 PM EDT    Indication: AMS Protocol  AMS Protocol    Comparison: Chest radiograph 1/23/2025    Findings:  There is a right-sided tunneled dialysis catheter with the tip at the cavoatrial junction. The heart size and pulmonary vascular markings are normal. The lungs are clear.      Impression:      Impression:  No active disease.          Electronically Signed: García Kinney MD    4/18/2025 11:46 PM EDT    Workstation ID: [id number]            Diagnostics: Reviewed    A: 74-year-old female with end-stage renal disease and multiple medical conditions and overall decline.  She is hospice appropriate should they decide to stop dialysis.  We have spent time with the family to review palliative measures, hospice services, and answer all her questions.         P: Continue present management.  The family will discuss and will get back with us.  The patient is already a DNR/DNI.  Family is very supportive of each other as well as of the patient.  Will continue to monitor and provide support and assist with any medical decisions and discharge  planning.    We appreciate the consult and the opportunity to participate in Sara Esparza's care. We will continue to follow along. Please do not hesitate to contact us regarding further symptom management or goals of care needs, including after hours or on weekends via our on call provider at [phone].     Time: 45 minutes spent reviewing medical and medication records, assessing and examining patient, discussing with family, answering questions, providing some guidance about a plan and documentation of care, and coordinating care with other healthcare members, with > 50% time spent face to face.     Dayne Rodriguez MD    4/21/2025

## 2025-04-21 NOTE — DISCHARGE PLACEMENT REQUEST
"Sara Esparza (74 y.o. Female)       Date of Birth   1950    Social Security Number       Address   [address] [place] [address]    Home Phone   [phone]    MRN   [id number]       [employer]   None    Marital Status                               Admission Date   4/18/2025    Admission Type   Emergency    Admitting Provider   Heather Carlos MD    Attending Provider   Heather Carlos MD    Department, Room/Bed   [facility] 3E, S334/1       Discharge Date       Discharge Disposition       Discharge Destination                                 Attending Provider: Heather Carlos MD    Allergies: No Known Allergies    Isolation: Contact   Infection: MRSA (03/28/24), ESBL Klebsiella (04/21/25), ESBL (04/21/25)   Code Status: No CPR    Ht: 170.2 cm (67\")   Wt: 67.8 kg (149 lb 6.4 oz)    Admission Cmt: None   Principal Problem: Toxic metabolic encephalopathy [G92.8]                   Active Insurance as of 4/18/2025       Primary Coverage       Payor Plan Insurance Group Employer/Plan Group    MEDICARE MEDICARE A & B        Payor Plan Address Payor Plan Phone Number Payor Plan Fax Number Effective Dates    [address] [phone]  5/1/2012 - None Entered    [facility] [id number]         Subscriber Name Subscriber Birth Date Member ID       SARA ESPARZA 1950 [id number]               Secondary Coverage       Payor Plan Insurance Group Employer/Plan Group    HUMANA HUMANA [facility]              [id number]       Payor Plan Address Payor Plan Phone Number Payor Plan Fax Number Effective Dates    [address] [address]   1/1/2015 - None Entered    [facility] [address]         Subscriber Name Subscriber Birth Date Member ID       SARA ESPARZA 1950 [id number]                     Emergency Contacts        (Rel.) Home Phone Work Phone Mobile Phone    IsaiasMacario (Spouse) [phone] -- [phone]    Stacy Esparza (Son) [phone] -- [phone] "    Mariana Rajan (Daughter) [phone] -- [phone]              Emergency Contact Information       Name Relation Home Work Mobile    Macario Esparza Spouse [phone]    Stacy Esparza Son [phone]    Mariana Rajan Daughter [phone]          Other Contacts    None on File       Insurance Information                  MEDICARE/MEDICARE A & B Phone: [phone]    Subscriber: Sara Esparza Subscriber#: [id number]    Group#: -- Precert#: --    Authorization#: -- Effective Date: --        HUMANA/HUMANA [employer] SU[employer]              Phone: --    Subscriber: Sara Esparza Subscriber#: [id number]    Group#: [id number] Precert#: --    Authorization#: -- Effective Date: --             History & Physical        Austin Brooks DO at [facility]              [facility] Medicine Services  HISTORY AND PHYSICAL    Patient Name: Sara Esparza  : 1950  MRN: [id number]  Primary Care Physician: Toribio Roberts MD  Date of admission: 2025  Subjective  Subjective   Source of Information: Patient's , ER signout, EMR    Chief Complaint:   Chief Complaint   Patient presents with    Altered Mental Status     Patient arrives via EMS from [facility] with altered mental status     HPI:  Sara Esparza is a 74 y.o. female with PMHx of HTN, PAF (Eliquis), prior DVT/PE, ESRD on HD (MWF), PAD s/p L BKA, IDDM2, Hx of OM asthma, Chronic LBP s/p SCS, cervical stenosis, Chronic pain syndrome, DENZEL (CPAP) who presented from [facility] for evaluation of confusion. The patient is unable to provide any meaningful history at this time;  at bedside is poor historian but able to provide basic details. Patient reportedly had been exhibiting worsening generalized confusion over the past 2 weeks with hypersomnolence and inappropriate speech content. Currently, she is moaning out in pain and unable to answer any questions.    Review  of systems:  Unable to obtain 2/2 AMS    Personal History     Past Medical History:   Diagnosis Date    Anxiety     Arthritis     Asthma     allergy induced    Back pain     Depression     Diabetes mellitus     dx 10 years ago- checks fsbs most days    Diverticula of colon     GERD (gastroesophageal reflux disease)     Head ache     Hypertension     Sleep apnea     no longer needs cpap - approx. 10 years r/t weight loss         Past Surgical History:   Procedure Laterality Date    AORTAGRAM Left 2/3/2025    Procedure: CFA ACCESS RIGHT - ULTRASOUND GUIDED, AORTOGRAM WITH LEFT LOWER EXTREMITY RUN OFF, ANGIOPLASTY, INTRAVASCULAR LITHOTRIPSY, RIGHT CFA CLOSURE;  Surgeon: Kyle Ramirez MD;  Location:  [facility];  Service: Vascular;  Laterality: Left;  fluoro 10 minutes  dose - 61mgy  contrast 20ml    BACK SURGERY      x 2    BELOW KNEE AMPUTATION Left 2/4/2025    Procedure: BELOW KNEE AMPUTATION LEFT;  Surgeon: Rom Gonzalez Jr., MD;  Location:  [facility];  Service: Orthopedics;  Laterality: Left;    CARDIAC CATHETERIZATION      no intervention    CHOLECYSTECTOMY OPEN      COLONOSCOPY      2013    FOOT IRRIGATION, DEBRIDEMENT AND REPAIR Left 1/24/2025    Procedure: HEEL IRRIGATION AND DEBRIDEMENT LEFT;  Surgeon: Rom Gonzalez Jr., MD;  Location:  [facility] OR;  Service: Orthopedics;  Laterality: Left;    HYSTERECTOMY      PLACEMENT OF ANTIBIOTIC FILLER Left 2/4/2025    Procedure: PLACEMENT OF ANTIBIOTIC CEMENT FILLER FOR BONE VOID;  Surgeon: Rom Gonzalez Jr., MD;  Location:  [facility];  Service: Orthopedics;  Laterality: Left;    PLACEMENT OF WOUND VAC Left 1/24/2025    Procedure: PLACEMENT OF WOUND VAC;  Surgeon: Rom Gonzalez Jr., MD;  Location:  [facility];  Service: Orthopedics;  Laterality: Left;    REPLACEMENT TOTAL KNEE BILATERAL Bilateral     TONSILLECTOMY      VENTRAL/INCISIONAL HERNIA REPAIR N/A 6/6/2017    Procedure: INCISIONAL HERNIA REPAIR LAPAROSCOPIC;  Surgeon: Conrad Hernandez MD;   Location: [facility] OR;  Service:      Family History: family history is not on file.     Social History:  reports that she has never smoked. She has never used smokeless tobacco. She reports that she does not drink alcohol and does not use drugs.  Social History     Social History Narrative    Not on file       Medications:  Available home medication information reviewed.  Allopurinol, Cholecalciferol, Insulin Lispro, NIFEdipine XL, SITagliptin, acetaminophen, albuterol sulfate HFA, amiodarone, apixaban, atorvastatin, cloNIDine, epoetin gracia-epbx, famotidine, melatonin, multivitamin with minerals, oxyCODONE, pantoprazole, polyethylene glycol, sennosides-docusate, and traMADol    No Known Allergies  Objective  Objective     Vital Signs:   Temp:  [97.8 °F (36.6 °C)] 97.8 °F (36.6 °C)  Heart Rate:  [79-87] 79  Resp:  [14] 14  BP: (113-127)/(54-71) 123/57  Flow (L/min) (Oxygen Therapy):  [2] 2     General:  Well nourished, mild distress, appears uncomfortable  Head:  Normocephalic, atraumatic  Eyes:  Sclerae appear normal. Pupils equally round  ENT:  Nares appear normal, no drainage. Moist oral mucosa  Neck:  No restricted ROM. Trachea midline  CV:  RRR. No M/R/G. No JVD; right tunneled HD cath (no erythema)  Lungs: CTAB. No wheezing, rhonchi, or rales. Symmetric expansion  Abdomen: Bowel sounds present. Nondistended, soft, nontender  Extremities: No cyanosis or clubbing. No edema. Left BKA. Right stage 2 pressure ulcers to right lateral malleolus  Skin:  No rashes and normal coloration. Warm and dry.  Neuro:  CN II-XII grossly intact. Sensation intact. Awake & alert, unable to answer questions at this time/encephalopathic  Psych:  Normal mood and affect    I have personally reviewed labs and tests showing:    LAB RESULTS:      Lab 04/19/25  0016   WBC 7.06   HEMOGLOBIN 8.6*   HEMATOCRIT 29.1*   PLATELETS 136*   NEUTROS ABS 5.76   IMMATURE GRANS (ABS) 0.03   LYMPHS ABS 0.56*   MONOS ABS 0.70   EOS ABS 0.00   MCV 93.0    SED RATE 12   CRP 11.38*   LACTATE 1.3         Lab 04/19/25  0016   SODIUM 135*   POTASSIUM 4.5   CHLORIDE 101   CO2 27.0   ANION GAP 7.0   BUN 18   CREATININE 2.28*   EGFR 21.9*   GLUCOSE 106*   CALCIUM 9.2   MAGNESIUM 2.0         Lab 04/19/25  0016   TOTAL PROTEIN 4.8*   ALBUMIN 2.1*   GLOBULIN 2.7   ALT (SGPT) 9   AST (SGOT) 21   BILIRUBIN 0.4   ALK PHOS 224*         Lab 04/19/25  0016   HSTROP T 109*                 UA          9/16/2024    11:12 1/19/2025    14:04 4/18/2025    23:55   Urinalysis   Squamous Epithelial Cells, UA 7-12  7-12  None Seen    Specific [place], UA 1.015  1.020  1.014    Ketones, UA Negative  Trace  Negative    Blood, UA Negative  Moderate (2+)  Small (1+)    Leukocytes, UA Small (1+)  Moderate (2+)  Large (3+)    Nitrite, UA Negative  Negative  Negative    RBC, UA 3-5  0-2  0-2    WBC, UA 3-5  Too Numerous to Count  21-50    Bacteria, UA None Seen  3+  4+      Microbiology Results (last 10 days)       ** No results found for the last 240 hours. **          CT Head Without Contrast  Result Date: 4/19/2025  CT HEAD WO CONTRAST Date of Exam: 4/19/2025 12:11 AM EDT Indication: altered mental status. Comparison: CT head 1/19/2025 Technique: Axial CT images were obtained of the head without contrast administration.  Automated exposure control and iterative construction methods were used. Findings: There is mild diffuse generalized atrophy. There are low-attenuation areas in the periventricular white matter consistent with chronic microvascular ischemic change. There is encephalomalacia in the medial right occipital lobe and in the right frontal lobe consistent with previous infarcts. There is no mass, mass effect or midline shift. There are no abnormal extra-axial fluid collections or areas of acute hemorrhage. The paranasal sinuses are clear. The mastoid air cells are clear.     Impression: Impression: Atrophy and chronic microvascular ischemic change. 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 No acute intracranial process. Electronically Signed: García Kinney MD  4/19/2025 12:49 AM EDT  Workstation ID: [id number]    XR Foot 3+ View Right  Result Date: 4/19/2025  XR FOOT 3+ VW RIGHT Date of Exam: 4/18/2025 11:23 PM EDT Indication: nontraumatic pain with open wound on base of 5th metatarsal, history of MRSA and DM Comparison: Right foot x-ray 7/6/2020 Findings: There is extensive calcaneal spurring. There are degenerative changes involving the first metatarsal phalangeal joint with adjacent soft tissue swelling. There is midfoot arthritic change. There is no convincing soft tissue gas. There are no convincing destructive changes of osteomyelitis.     Impression: Impression: Degenerative changes of the foot. No convincing destructive changes of osteomyelitis. Electronically Signed: García Kinney MD  4/19/2025 12:09 AM EDT  Workstation ID: [id number]    XR Chest 1 View  Result Date: 4/18/2025  XR CHEST 1 VW Date of Exam: 4/18/2025 11:05 PM EDT Indication: AMS Protocol AMS Protocol Comparison: Chest radiograph 1/23/2025 Findings: There is a right-sided tunneled dialysis catheter with the tip at the cavoatrial junction. The heart size and pulmonary vascular markings are normal. The lungs are clear.     Impression: Impression: No active disease. Electronically Signed: García Kinney MD  4/18/2025 11:46 PM EDT  Workstation ID: [id number]    Results for orders placed during the hospital encounter of 01/19/25    Adult Transthoracic Echo Complete W/ Cont if Necessary Per Protocol    Interpretation Summary    Left ventricular systolic function is normal. Estimated left ventricular EF = 55%    Left ventricular wall thickness is consistent with moderate concentric hypertrophy.    No hemodynamically significant valvular heart disease      My personal interpretation of the CXR is no acute cardiopulmonary process; right tunneled HD cath    My personal interpretation of the EKG is 85 NSR, iRBBB, nonspecific ST/T  "abnormalities in lateral leads; no ST elevations     This case was discussed with the ER attending physician.    Assessment & Plan  Assessment & Plan       Toxic metabolic encephalopathy    ESRD (end stage renal disease)    Type 2 diabetes mellitus with stage 5 chronic kidney disease not on chronic dialysis, without long-term current use of insulin    Paroxysmal A-fib    Anemia of chronic disease    Chronic obstructive pulmonary disease    Acute cystitis without hematuria    Pressure injury of right ankle, stage 2    Toxic metabolic encephalopathy  Acute cystitis without hematuria  - Afebrile, WBC 7, CRP 11.38  - US suggestive of acute infection  - CT Brain revealed \" Atrophy and chronic microvascular ischemic change. 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 No acute intracranial process.\"  - C/w IV Rocephin, follow cultures, neurochecks    IDDM Type 2  - Chronic  - ISS and accuchecks    PAF  - Rate-controlled in SR  - C/w Eliquis, amiodarone    Elevated troponin  - Chronically elevated in the setting of ESRD  - HsTrop 109 -> 105  - No ACS/CP  - CTM on tele    ESRD on HD  - MWF  - Nephro consulted    AOCD  - Hgb stable  - No active bleeding  - CTM    HTN  - Chronic, normotensive  - Hold home antihypertensives and resume when appropriate    COPD  - Chronic, not in exacerbation  - Duonebs PRN    Right lateral malleolus pressure ulcer  Right foot ulceration  - off-load limb  - Wound care consult    Chronic pain syndrome  - Resume home roxicodone PRN    VTE PPx: Eliquis  Diet: NPO Diet NPO Type: Sips with Meds  CODE STATUS:    Code Status and Medical Interventions: CPR (Attempt to Resuscitate); Full Support   Ordered at: 04/19/25 0155     Code Status (Patient has no pulse and is not breathing):    CPR (Attempt to Resuscitate)     Medical Interventions (Patient has pulse or is breathing):    Full Support     Level Of Support Discussed With:    Next of Kin (If No Surrogate)       Expected Discharge Expected " discharge date/ time has not been documented.     Austin Brooks DO  04/19/25      Electronically signed by Austin Brooks DO at 04/19/25 3916

## 2025-04-21 NOTE — PLAN OF CARE
Goal Outcome Evaluation:  Plan of Care Reviewed With: patient        Progress: no change  Outcome Evaluation: PT initial eval completed. Pt presents with limitations related to strength, endurance, cognition, and pain. Minimal command following and eyes closed majority of session. Bed mobility completed with DEP Ax2. Pt yelling out in pain w/ all movement. Further IPPT is warrented w/ frequency of 3x/wk to monitor progress. Rec d.c to SNF when medically appropriate.    Anticipated Discharge Disposition (PT): skilled nursing facility

## 2025-04-21 NOTE — NURSING NOTE
"Reason for Wound, Ostomy and Continence (WOC) Nursing Consultation: \"multipe maroon non blanching spots, was wearing braces on legs that \"never come off\", gluteal/ coccyx injury, R heel/ foot wounds \"    Patient seen in bed, not really interacting except with care, moaning.  Family/support person present, supportive in helping patient turn in bed. Moans and asking us to stop care even with gentle touch.     Wounds noted by WOC: upper arm skin tears    Wound Assessment  Wound Type: Pressure Injury Unstageable  Location: left sacrum  Measurements: 2.5x5.5cm  Wound Bed: moist and slough  Wound Edges: Irregular  Periwound Skin: peeling epidermis, purple nonblanching  Drainage Characteristics/Odor: serous  Drainage Amount: scant  Pain: moans with cleansing  Care provided: cleansed, applied multilayer xeroform new allevyn  Notes: q3 day xeroform/allevyn  Wound Image:       Wound Assessment  Wound Type: Pressure Injury Deep Tissue Pressure Injury (DTPI)   Location:   Measurements: left lateral sacrum, left thoracic x2 scattered, left lateral thigh  Wound Bed: non-blanchable and purple  Periwound Skin: dry   Drainage Characteristics/Odor: none  Drainage Amount: none  Pain: No   Care provided:   Notes: recommend covering with silicone foam dressing   Wound Image:           Wound Assessment  Wound Type: Pressure Injury Unstageable  Location: right lateral malleolus, right lateral foot  Measurements: ankle 0.3x0.3cm and dry wound bed, right lateral foot: 0.5x0.5cm: dry slough  Periwound Skin: blanchable and pink   Drainage Characteristics/Odor: none  Drainage Amount: none  Pain: Yes   Care provided: painted with betadine, silicone foam/allevyn  Notes: heel intact  Wound Image:           Recommendation(s) for management of wound:   -Refer to wound care orders for specific instructions on how to treat/manage wound.  --Practice pressure injury prevention protocol.    Most recent Brad Scale score:  Sensory Perception: 2-->very " "limited  Moisture: 2-->very moist  Activity: 2-->chairfast  Mobility: 2-->very limited  Nutrition: 2-->probably inadequate  Friction and Shear: 2-->potential problem  Brad Score: 12 (04/20/25 2000)     Specialty support surface:   Skinguard isaias/ap from agiliti  Please be sure to choose \"AP and ISAIAS\" function on bed control box and reduce layers under patient to 2 layers (single layer drawsheet and minimize incontinence pads). Be sure to adjust weight on control panel to match patient's current weight.          Pressure Injury Prevention Protocol (initiate for Brad Score of 18 or less):   *Turn q 2 hr, keep heels elevated and offloaded with offloading heel boots.  *Allevn dressings to heels, sacrum/coccyx  *Follow C.A.R.E protocol if medical devices (Bipap, hernandez, Ng tube, etc) are being used.  *Reduce layers under patient (one sheet as drawsheet and two incontinence pads) to allow waffle or ISAIAS to improve microclimate   *Raise knee-gatch before elevating HOB to reduce shearing      WOC Team will continue to follow.  Please re-consult if the wound(s) worsens.   "

## 2025-04-21 NOTE — CONSULTS
INFECTIOUS DISEASE CONSULT/INITIAL HOSPITAL VISIT    Sara Esparza  1950  [id number]    Date of Consult: 4/21/2025    Admission Date: 4/18/2025      Requesting Provider: Heather Carlos MD  Evaluating Physician: Bailee Barrios MD    Reason for Consultation: MRSA bacteremia and ESBL Klebsiella UTI    History of present illness:    74 y.o. 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  In discussion with her , son and daughter I am told that she has been progressively more confused and lethargic over the last 2 to 3 weeks.She has been afebrile and WBC has been normal but  urinalysis showed too numerous to count WBC.  She was treated empirically with ceftriaxone and vancomycin.  When urine culture grew ESBL Klebsiella she was changed to ertapenem today.  This afternoon blood cultures became positive with MRSA identified through [facility]. The family has had extensive discussion regarding goals of care and options for palliative and hospice care with  and are reviewing options. She has a dialysis catheter that has been in place for approximately 6 months.  Family members tell me that she has had constant pain from back pain and other wounds.  She finds it very difficult to move and oral intake has been very poor.They state that she has had 2 urinary tract infections since her last admission.    Past Medical History:   Diagnosis Date    Anxiety     Arthritis     Asthma     allergy induced    Back pain     Depression     Diabetes mellitus     dx 10 years ago- checks fsbs most days    Diverticula of colon     GERD (gastroesophageal reflux disease)     Head ache     Hypertension     Sleep apnea     no longer needs cpap -  approx. 10 years r/t weight loss       Past Surgical History:   Procedure Laterality Date    AORTAGRAM Left 2/3/2025    Procedure: CFA ACCESS RIGHT - ULTRASOUND GUIDED, AORTOGRAM WITH LEFT LOWER EXTREMITY RUN OFF, ANGIOPLASTY, INTRAVASCULAR LITHOTRIPSY, RIGHT CFA CLOSURE;  Surgeon: Kyle Ramirez MD;  Location:  [facility];  Service: Vascular;  Laterality: Left;  fluoro 10 minutes  dose - 61mgy  contrast 20ml    BACK SURGERY      x 2    BELOW KNEE AMPUTATION Left 2/4/2025    Procedure: BELOW KNEE AMPUTATION LEFT;  Surgeon: Rom Gonzalez Jr., MD;  Location:  [facility];  Service: Orthopedics;  Laterality: Left;    CARDIAC CATHETERIZATION      no intervention    CHOLECYSTECTOMY OPEN      COLONOSCOPY      2013    FOOT IRRIGATION, DEBRIDEMENT AND REPAIR Left 1/24/2025    Procedure: HEEL IRRIGATION AND DEBRIDEMENT LEFT;  Surgeon: Rom Gonzalez Jr., MD;  Location:  [facility];  Service: Orthopedics;  Laterality: Left;    HYSTERECTOMY      PLACEMENT OF ANTIBIOTIC FILLER Left 2/4/2025    Procedure: PLACEMENT OF ANTIBIOTIC CEMENT FILLER FOR BONE VOID;  Surgeon: Rom Gonzalez Jr., MD;  Location:  [facility];  Service: Orthopedics;  Laterality: Left;    PLACEMENT OF WOUND VAC Left 1/24/2025    Procedure: PLACEMENT OF WOUND VAC;  Surgeon: Rom Gonzalez Jr., MD;  Location:  [facility];  Service: Orthopedics;  Laterality: Left;    REPLACEMENT TOTAL KNEE BILATERAL Bilateral     TONSILLECTOMY      VENTRAL/INCISIONAL HERNIA REPAIR N/A 6/6/2017    Procedure: INCISIONAL HERNIA REPAIR LAPAROSCOPIC;  Surgeon: Conrad Hernandez MD;  Location:  [facility];  Service:        History reviewed. No pertinent family history.    Social History     Socioeconomic History    Marital status:    Tobacco Use    Smoking status: Never    Smokeless tobacco: Never   Vaping Use    Vaping status: Never Used   Substance and Sexual Activity    Alcohol use: No    Drug use: No    Sexual activity: Defer       No Known  Allergies      Medication:    Current Facility-Administered Medications:     acetaminophen (TYLENOL) tablet 650 mg, 650 mg, Oral, Q4H PRN, Austin Brooks, DO, 650 mg at 04/20/25 2034    albumin human 25 % IV SOLN, , , ,     aluminum-magnesium hydroxide-simethicone (MAALOX MAX) 400-400-40 MG/5ML suspension 15 mL, 15 mL, Oral, Q6H PRN, Austin Brooks, DO    amiodarone (PACERONE) tablet 200 mg, 200 mg, Oral, Q24H, Austin Brooks, DO, 200 mg at 04/21/25 1143    apixaban (ELIQUIS) tablet 2.5 mg, 2.5 mg, Oral, Q12H, Austin Brooks, DO, 2.5 mg at 04/21/25 1143    atorvastatin (LIPITOR) tablet 10 mg, 10 mg, Oral, Nightly, Austin Brooks, DO, 10 mg at 04/20/25 2034    sennosides-docusate (PERICOLACE) 8.6-50 MG per tablet 2 tablet, 2 tablet, Oral, BID PRN **AND** polyethylene glycol (MIRALAX) packet 17 g, 17 g, Oral, Daily PRN **AND** bisacodyl (DULCOLAX) EC tablet 5 mg, 5 mg, Oral, Daily PRN **AND** bisacodyl (DULCOLAX) suppository 10 mg, 10 mg, Rectal, Daily PRN, Austin Brooks, , 10 mg at 04/19/25 1240    Calcium Replacement - Follow Nurse / BPA Driven Protocol, , Not Applicable, PRN, Austin Brooks, DO    dextrose (D50W) (25 g/50 mL) IV injection 25 g, 25 g, Intravenous, Q15 Min PRN, Austin Brooks DO, 25 g at 04/21/25 1134    dextrose (GLUTOSE) oral gel 15 g, 15 g, Oral, Q15 Min PRN, Austin Brooks, DO    ertapenem (INVanz) 500 mg in sodium chloride 0.9 % 50 mL IVPB, 500 mg, Intravenous, Q24H, Heather Carlos MD, Last Rate: 100 mL/hr at 04/21/25 1344, 500 mg at 04/21/25 1344    glucagon (GLUCAGEN) injection 1 mg, 1 mg, Intramuscular, Q15 Min PRN, Austin Brooks, , 1 mg at 04/21/25 1205    heparin (porcine) injection 2,000 Units, 2,000 Units, Intracatheter, PRN, Viktor, Lisa Gallardo MD, 2,000 Units at 04/21/25 1118    Insulin Lispro (humaLOG) injection 2-7 Units, 2-7 Units, Subcutaneous, 4x Daily AC & at Bedtime, Austin Brooks DO    ipratropium-albuterol (DUO-NEB) nebulizer  solution 3 mL, 3 mL, Nebulization, Q4H PRN, Austin Brooks, DO    LORazepam (ATIVAN) tablet 0.5 mg, 0.5 mg, Oral, Q6H PRN, Heather Carlos MD, 0.5 mg at 04/21/25 0426    Magnesium Standard Dose Replacement - Follow Nurse / BPA Driven Protocol, , Not Applicable, PRN, Austin Brooks, DO    [Held by provider] NIFEdipine XL (PROCARDIA XL) 24 hr tablet 90 mg, 90 mg, Oral, Daily, Austin Brooks,     nitroglycerin (NITROSTAT) SL tablet 0.4 mg, 0.4 mg, Sublingual, Q5 Min PRN, Austin Brooks,     ondansetron (ZOFRAN) injection 4 mg, 4 mg, Intravenous, Q6H PRN, Austin Brooks,     oxyCODONE (ROXICODONE) immediate release tablet 5 mg, 5 mg, Oral, Q4H PRN, Austin Brooks, DO, 5 mg at 04/21/25 0434    pantoprazole (PROTONIX) injection 40 mg, 40 mg, Intravenous, Q AM, Radha Nazario, APRN, 40 mg at 04/21/25 0506    Pharmacy Consult - Pharmacy to dose Vancomycin, , Not Applicable, Continuous PRN, Heather Carlos MD    Phosphorus Replacement - Follow Nurse / BPA Driven Protocol, , Not Applicable, PRNTodd Richard M, DO    Potassium Replacement - Follow Nurse / BPA Driven Protocol, , Not Applicable, PRN, Austin Brooks,     sodium chloride 0.9 % flush 10 mL, 10 mL, Intravenous, PRN, Cherelle Self MD    sodium chloride 0.9 % flush 10 mL, 10 mL, Intravenous, Q12H, Austin Brooks, DO, 10 mL at 04/20/25 2034    sodium chloride 0.9 % flush 10 mL, 10 mL, Intravenous, PRNTodd Richard M,     sodium chloride 0.9 % infusion 40 mL, 40 mL, Intravenous, PRN, Austin Brooks, DO    vancomycin 750 mg in sodium chloride 0.9 % 250 mL IVPB-VTB, 750 mg, Intravenous, Once per day on Monday Wednesday FridayYeny Austin, PharmD    Antibiotics:  Anti-Infectives (From admission, onward)      Ordered     Dose/Rate Route Frequency Start Stop    04/20/25 1912  vancomycin 750 mg in sodium chloride 0.9 % 250 mL IVPB-VTB        Ordering Provider: Dave Saini, PharmD    750 mg  333.3 mL/hr over 45  Minutes Intravenous Once per day on 25 19525 1220  ertapenem (INVanz) 500 mg in sodium chloride 0.9 % 50 mL IVPB        Ordering Provider: Heather Carlos MD    500 mg  100 mL/hr over 30 Minutes Intravenous Every 24 Hours 25 1315 25 1314    25 1905  vancomycin IVPB 1500 mg in 0.9% NaCl (Premix) 500 mL        Ordering Provider: Dave Saini PharmD    1,500 mg  333.3 mL/hr over 90 Minutes Intravenous Once 25 22025 0152  cefTRIAXone (ROCEPHIN) 1,000 mg in sodium chloride 0.9 % 100 mL MBP  Status:  Discontinued        Ordering Provider: Austin Brooks DO    1,000 mg  200 mL/hr over 30 Minutes Intravenous Every 24 Hours Scheduled 25 0400 25 1220    25 0105  cefTRIAXone (ROCEPHIN) 1,000 mg in sodium chloride 0.9 % 100 mL MBP        Ordering Provider: Cherelle Self MD    1,000 mg  200 mL/hr over 30 Minutes Intravenous Once 25 0121 25 0218              Review of Systems:  Unable to obtain    Physical Exam:   Vital Signs  Temp (24hrs), Av.9 °F (36.6 °C), Min:97.6 °F (36.4 °C), Max:98.2 °F (36.8 °C)    Temp  Min: 97.6 °F (36.4 °C)  Max: 98.2 °F (36.8 °C)  BP  Min: 100/57  Max: 134/78  Pulse  Min: 55  Max: 73  Resp  Min: 11  Max: 18  SpO2  Min: 90 %  Max: 100 %    GENERAL: Ill appearing, lethargic female who is moaning and who appears very uncomfortable but is unable to tell me where she has pain  HEENT: Normocephalic, atraumatic.   No conjunctival injection. No icterus. No oral labial lesions  NECK: Supple without nuchal rigidity. No mass.    HEART: RRR; No murmur, rubs, gallops.   LUNGS: Poor inspiratory effort.  Breath sounds decreased posteriorly  ABDOMEN: Soft, nontender, nondistended. Hypoactive bowel sounds. No rebound or guarding.  EXT:  Trace edema. No cords  :  Without Corral catheter.  MSK: Right BKA wound well-healed, no joint deformity appreciated  SKIN:  "Multiple pressure wounds noted including left ankle, left arm, right arm and left sacrum  NEURO: She does not respond verbally to questions, she appears to be moving all extremities  PSYCHIATRIC: Unable to assess    Laboratory Data    Results from last 7 days   Lab Units 04/21/25  0750 04/20/25  1109 04/19/25  1532   WBC 10*3/mm3 6.22 6.08 6.09   HEMOGLOBIN g/dL 8.2* 9.2* 8.8*   HEMATOCRIT % 27.2* 31.1* 29.6*   PLATELETS 10*3/mm3 119* 116* 129*     Results from last 7 days   Lab Units 04/21/25  0750   SODIUM mmol/L 139   POTASSIUM mmol/L 4.1   CHLORIDE mmol/L 105   CO2 mmol/L 26.0   BUN mg/dL 37*   CREATININE mg/dL 3.86*   GLUCOSE mg/dL 87   CALCIUM mg/dL 8.2*     Results from last 7 days   Lab Units 04/19/25  1532   ALK PHOS U/L 206*   BILIRUBIN mg/dL 0.3   ALT (SGPT) U/L 7   AST (SGOT) U/L 13     Results from last 7 days   Lab Units 04/19/25  0016   SED RATE mm/hr 12     Results from last 7 days   Lab Units 04/19/25  0016   CRP mg/dL 11.38*     Results from last 7 days   Lab Units 04/19/25  0016   LACTATE mmol/L 1.3             Estimated Creatinine Clearance: 13.5 mL/min (A) (by C-G formula based on SCr of 3.86 mg/dL (H)).      Microbiology:  Blood Culture   Date Value Ref Range Status   04/19/2025 Abnormal Stain (C)  Preliminary     BCID, PCR   Date Value Ref Range Status   04/19/2025 (C) Negative by BCID PCR. Culture to Follow. Final    Staph aureus. mecA/C and MREJ (methicillin resistance gene) detected. Identification by BCID2 PCR.     No results found for: \"CULTURES\", \"HSVCX\", \"URCX\"  No results found for: \"EYECULTURE\", \"GCCX\", \"HSVCULTURE\", \"LABHSV\"  No results found for: \"LEGIONELLA\", \"MRSACX\", \"MUMPSCX\", \"MYCOPLASCX\"  No results found for: \"NOCARDIACX\", \"STOOLCX\"  Urine Culture   Date Value Ref Range Status   04/18/2025 >100,000 CFU/mL Klebsiella pneumoniae ESBL (A)  Final     No results found for: \"VIRALCULTU\", \"WOUNDCX\"        Radiology:  Imaging Results (Last 72 Hours)       Procedure Component Value " Units Date/Time    CT Head Without Contrast [[id number]] Collected: 04/19/25 0046     Updated: 04/19/25 0052    Narrative:      CT HEAD WO CONTRAST    Date of Exam: 4/19/2025 12:11 AM EDT    Indication: altered mental status.    Comparison: CT head 1/19/2025    Technique: Axial CT images were obtained of the head without contrast administration.  Automated exposure control and iterative construction methods were used.      Findings:  There is mild diffuse generalized atrophy. There are low-attenuation areas in the periventricular white matter consistent with chronic microvascular ischemic change. There is encephalomalacia in the medial right occipital lobe and in the right frontal   lobe consistent with previous infarcts. There is no mass, mass effect or midline shift. There are no abnormal extra-axial fluid collections or areas of acute hemorrhage. The paranasal sinuses are clear. The mastoid air cells are clear.          Impression:      Impression:  Atrophy and chronic microvascular ischemic change. 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 No acute intracranial process.            Electronically Signed: García Kinney MD    4/19/2025 12:49 AM EDT    Workstation ID: [id number]    XR Foot 3+ View Right [[id number]] Collected: 04/19/25 0006     Updated: 04/19/25 0012    Narrative:      XR FOOT 3+ VW RIGHT    Date of Exam: 4/18/2025 11:23 PM EDT    Indication: nontraumatic pain with open wound on base of 5th metatarsal, history of MRSA and DM    Comparison: Right foot x-ray 7/6/2020    Findings:  There is extensive calcaneal spurring. There are degenerative changes involving the first metatarsal phalangeal joint with adjacent soft tissue swelling. There is midfoot arthritic change. There is no convincing soft tissue gas. There are no convincing   destructive changes of osteomyelitis.      Impression:      Impression:  Degenerative changes of the foot. No convincing destructive changes of  osteomyelitis.          Electronically Signed: García Kinney MD    4/19/2025 12:09 AM EDT    Workstation ID: [id number]    XR Chest 1 View [[id number]] Collected: 04/18/25 2345     Updated: 04/18/25 2349    Narrative:      XR CHEST 1 VW    Date of Exam: 4/18/2025 11:05 PM EDT    Indication: AMS Protocol  AMS Protocol    Comparison: Chest radiograph 1/23/2025    Findings:  There is a right-sided tunneled dialysis catheter with the tip at the cavoatrial junction. The heart size and pulmonary vascular markings are normal. The lungs are clear.      Impression:      Impression:  No active disease.          Electronically Signed: García Kinney MD    4/18/2025 11:46 PM EDT    Workstation ID: [id number]              Impression:       -- MRSA bacteremia in a patient with a hemodialysis tunneled catheter that has been in place for 6 months.  Potential sites of infection include catheter infection, endocarditis, osteomyelitis etc.  She had an MRSA heel abscess when she presented with her foot infection in January 2025 and it is quite possible that she seeded her catheter or any of the above sites at that time.    -- Klebsiella ESBL UTI.  With her extensive comorbidity and poor functional status she is at high risk for urinary retention. Bladder scan on 4/19 showed 165 mL so she clearly makes significant amounts of urine.      --ESRD on hemodialysis     --Diabetes mellitus A1c 4.9     --Severe back pain concerning for vertebral osteomyelitis     --Paroxysmal atrial fibrillation     --Encephalopathy likely secondary to sepsis    -- COPD       PLAN/RECOMMENDATIONS:   Thank you for asking us to see Sara Esparza, I recommend the following:    -- Agree with ertapenem and vancomycin.    -- Agree with supportive care    -- Palliative following and assisting family with GOC      Extensive discussion with family regarding the appropriate interventions depending on the level of care upon which they decide.  We discussed the fact that if they  "wish to pursue aggressive therapy that removal of the dialysis catheter, continued antibiotics for 2 to 3 days without a catheter in place then placement of a new catheter would be considered optimal therapy. We discussed the fact that she is at high risk for MRSA infection at the site separate from her catheter which could include brain abscess, endocarditis or vertebral osteomyelitis and that these are not always amenable to medical therapy alone.  At this time they are considering various levels of care i.e. avoiding aggressive interventions such as dialysis or other procedures.  They are also considering comfort care which would typically involve stopping all medications other than those contributing to comfort of the patient.    Discussed with RN  Discussed with Dr. Viktor Barrios MD  4/21/2025  16:29 EDT              45\" spent in reviewing chart, evaluating patient and counseling family                "

## 2025-04-21 NOTE — THERAPY EVALUATION
Patient Name: Sara Esparza  : 1950    MRN: [id number]                              Today's Date: 2025       Admit Date: 2025    Visit Dx:     ICD-10-CM ICD-9-CM   1. Altered mental status, unspecified altered mental status type  R41.82 780.97   2. Acute cystitis without hematuria  N30.00 595.0   3. Dysphagia, unspecified type  R13.10 787.20     Patient Active Problem List   Diagnosis    Postoperative abdominal hernia    ESRD (end stage renal disease)    Type 2 diabetes mellitus with stage 5 chronic kidney disease not on chronic dialysis, without long-term current use of insulin    Hypokalemia    Malnutrition    Severe malnutrition    Generalized weakness    Chronic osteomyelitis of left foot    Paroxysmal A-fib    Sepsis due to methicillin resistant Staphylococcus aureus (MRSA) with encephalopathy without septic shock    Critical limb ischemia of left lower extremity    Toxic metabolic encephalopathy    Anemia of chronic disease    Asthma    Chronic obstructive pulmonary disease    Depressive disorder    Diabetic peripheral neuropathy    Gastroesophageal reflux disease without esophagitis    Hyperlipidemia    Essential hypertension    Lumbar post-laminectomy syndrome    Obstructive sleep apnea syndrome    Acute cystitis without hematuria    Pressure injury of right ankle, stage 2    Elevated troponin    Gram-positive bacteremia     Past Medical History:   Diagnosis Date    Anxiety     Arthritis     Asthma     allergy induced    Back pain     Depression     Diabetes mellitus     dx 10 years ago- checks fsbs most days    Diverticula of colon     GERD (gastroesophageal reflux disease)     Head ache     Hypertension     Sleep apnea     no longer needs cpap - approx. 10 years r/t weight loss     Past Surgical History:   Procedure Laterality Date    AORTAGRAM Left 2/3/2025    Procedure: CFA ACCESS RIGHT - ULTRASOUND GUIDED, AORTOGRAM WITH LEFT LOWER EXTREMITY RUN OFF, ANGIOPLASTY, INTRAVASCULAR  LITHOTRIPSY, RIGHT CFA CLOSURE;  Surgeon: Kyle Ramirez MD;  Location:  [facility];  Service: Vascular;  Laterality: Left;  fluoro 10 minutes  dose - 61mgy  contrast 20ml    BACK SURGERY      x 2    BELOW KNEE AMPUTATION Left 2/4/2025    Procedure: BELOW KNEE AMPUTATION LEFT;  Surgeon: Rom Gonzalez Jr., MD;  Location:  [facility];  Service: Orthopedics;  Laterality: Left;    CARDIAC CATHETERIZATION      no intervention    CHOLECYSTECTOMY OPEN      COLONOSCOPY      2013    FOOT IRRIGATION, DEBRIDEMENT AND REPAIR Left 1/24/2025    Procedure: HEEL IRRIGATION AND DEBRIDEMENT LEFT;  Surgeon: Rom Gonzalez Jr., MD;  Location:  [facility];  Service: Orthopedics;  Laterality: Left;    HYSTERECTOMY      PLACEMENT OF ANTIBIOTIC FILLER Left 2/4/2025    Procedure: PLACEMENT OF ANTIBIOTIC CEMENT FILLER FOR BONE VOID;  Surgeon: Rom Gonzalez Jr., MD;  Location:  [facility];  Service: Orthopedics;  Laterality: Left;    PLACEMENT OF WOUND VAC Left 1/24/2025    Procedure: PLACEMENT OF WOUND VAC;  Surgeon: Rom Gonzalez Jr., MD;  Location:  [facility];  Service: Orthopedics;  Laterality: Left;    REPLACEMENT TOTAL KNEE BILATERAL Bilateral     TONSILLECTOMY      VENTRAL/INCISIONAL HERNIA REPAIR N/A 6/6/2017    Procedure: INCISIONAL HERNIA REPAIR LAPAROSCOPIC;  Surgeon: Conrad Hernandez MD;  Location:  [facility];  Service:       General Information       Row Name 04/21/25 1433          Physical Therapy Time and Intention    Document Type evaluation  -AB     Mode of Treatment physical therapy  -AB       Row Name 04/21/25 1433          General Information    Patient Profile Reviewed yes  -AB     Prior Level of Function --  Per chart review: Pt has been at [facility] the past two months (since L BKA) requires assist with ADLs and mobility. Pt unable to provide subjective history at this time.  -AB     Existing Precautions/Restrictions fall;other (see comments)  L BKA, impaired cognition  -AB     Barriers to  Rehab medically complex;previous functional deficit;cognitive status  -AB       Row Name 04/21/25 1433          Living Environment    Current Living Arrangements residential facility  -AB     People in Home facility resident  -AB       Row Name 04/21/25 1433          Home Main Entrance    Number of Stairs, Main Entrance none  -AB       Row Name 04/21/25 1433          Stairs Within Home, Primary    Number of Stairs, Within Home, Primary none  -AB       Row Name 04/21/25 1433          Cognition    Orientation Status (Cognition) oriented to;person;disoriented to;place;situation;time;other (see comments)  responded intermittently to name  -AB       Row Name 04/21/25 1433          Safety Issues/Impairments Affecting Functional Mobility    Safety Issues Affecting Function (Mobility) ability to follow commands;awareness of need for assistance;insight into deficits/self-awareness;judgment;problem-solving;safety precaution awareness;safety precautions follow-through/compliance;sequencing abilities;friction/shear risk  -AB     Impairments Affecting Function (Mobility) balance;cognition;endurance/activity tolerance;pain;postural/trunk control;strength;motor planning  -AB     Cognitive Impairments, Mobility Safety/Performance awareness, need for assistance;attention;insight into deficits/self-awareness;judgment;problem-solving/reasoning;sequencing abilities;safety precaution follow-through;safety precaution awareness  -AB     Comment, Safety Issues/Impairments (Mobility) Eye closed throughout session w/ minimal command following.  -AB               User Key  (r) = Recorded By, (t) = Taken By, (c) = Cosigned By      Initials Name Provider Type    AB Katya Nation, PT Physical Therapist                   Mobility       Row Name 04/21/25 1435          Bed Mobility    Bed Mobility supine-sit;sit-supine;scooting/bridging  -AB     Scooting/Bridging [place] (Bed Mobility) dependent (less than 25% patient effort);2 person  assist;verbal cues;nonverbal cues (demo/gesture)  -AB     Supine-Sit [place] (Bed Mobility) dependent (less than 25% patient effort);2 person assist;verbal cues;nonverbal cues (demo/gesture)  -AB     Sit-Supine [place] (Bed Mobility) dependent (less than 25% patient effort);2 person assist;verbal cues;nonverbal cues (demo/gesture)  -AB     Assistive Device (Bed Mobility) head of bed elevated;repositioning sheet  -AB     Comment, (Bed Mobility) Assist at trunk and w/ BLE management. Pt verbalized pain and pushed back into strong posterior lean. Returned to supine position.  -AB       Row Name 04/21/25 1435          Transfers    Comment, (Transfers) Deferred OOB activity secondary to limited command following and increased pain w/ all activity.  -AB       Row Name 04/21/25 1435          Bed-Chair Transfer    Bed-Chair [place] (Transfers) unable to assess  -AB       Row Name 04/21/25 1435          Sit-Stand Transfer    Sit-Stand [place] (Transfers) unable to assess  -AB       Row Name 04/21/25 1435          Gait/Stairs (Locomotion)    [place] Level (Gait) unable to assess  -AB               User Key  (r) = Recorded By, (t) = Taken By, (c) = Cosigned By      Initials Name Provider Type    AB Katya Nation, PT Physical Therapist                   Obj/Interventions       Row Name 04/21/25 1439          Range of Motion Comprehensive    Comment, General Range of Motion Difficult to assess as pt cried out with touch to BLE's.  -AB       Row Name 04/21/25 1439          Strength Comprehensive (MMT)    General Manual Muscle Testing (MMT) Assessment lower extremity strength deficits identified  -AB     Comment, General Manual Muscle Testing (MMT) Assessment unable to formally assess, grossly 3-/5  -AB       Row Name 04/21/25 1439          Balance    Balance Assessment sitting static balance;sitting dynamic balance  -AB     Static Sitting Balance maximum assist  -AB     Dynamic Sitting Balance dependent   -AB     Position, Sitting Balance supported;sitting edge of bed  -AB     Balance Interventions sitting;static;dynamic;supported  -AB     Comment, Balance strong posterior lean  -AB       Row Name 04/21/25 1439          Sensory Assessment (Somatosensory)    Sensory Assessment (Somatosensory) unable/difficult to assess  -AB               User Key  (r) = Recorded By, (t) = Taken By, (c) = Cosigned By      Initials Name Provider Type    AB Katya Nation, PT Physical Therapist                   Goals/Plan       Row Name 04/21/25 1451          Bed Mobility Goal 1 (PT)    Activity/Assistive Device (Bed Mobility Goal 1, PT) sit to supine;supine to sit  -AB     [place] Level/Cues Needed (Bed Mobility Goal 1, PT) maximum assist (25-49% patient effort)  -AB     Time Frame (Bed Mobility Goal 1, PT) short term goal (STG);5 days  -AB       Row Name 04/21/25 1451          Transfer Goal 1 (PT)    Activity/Assistive Device (Transfer Goal 1, PT) sit-to-stand/stand-to-sit;bed-to-chair/chair-to-bed  -AB     [place] Level/Cues Needed (Transfer Goal 1, PT) maximum assist (25-49% patient effort)  -AB     Time Frame (Transfer Goal 1, PT) long term goal (LTG);10 days  -AB       Row Name 04/21/25 1451          Gait Training Goal 1 (PT)    Activity/Assistive Device (Gait Training Goal 1, PT) gait (walking locomotion);assistive device use;walker, rolling  -AB     [place] Level (Gait Training Goal 1, PT) maximum assist (25-49% patient effort)  -AB     Distance (Gait Training Goal 1, PT) 4  -AB     Time Frame (Gait Training Goal 1, PT) long term goal (LTG);10 days  -AB       Row Name 04/21/25 1451          Therapy Assessment/Plan (PT)    Planned Therapy Interventions (PT) balance training;bed mobility training;gait training;patient/family education;postural re-education;transfer training;stretching;home exercise program;strengthening;ROM (range of motion)  -AB               User Key  (r) = Recorded By, (t) = Taken By, (c) =  Cosigned By      Initials Name Provider Type    AB Katya Nation, PT Physical Therapist                   Clinical Impression       Row Name 04/21/25 1440          Pain Scale: FACES Pre/Post-Treatment    Pain: FACES Scale, Pretreatment 4-->hurts little more  -AB     Posttreatment Pain Rating 6-->hurts even more  -AB       Row Name 04/21/25 1440          Plan of Care Review    Plan of Care Reviewed With patient  -AB     Progress no change  -AB     Outcome Evaluation PT initial eval completed. Pt presents with limitations related to strength, endurance, cognition, and pain. Minimal command following and eyes closed majority of session. Bed mobility completed with DEP Ax2. Pt yelling out in pain w/ all movement. Further IPPT is warrented w/ frequency of 3x/wk to monitor progress. Rec d.c to SNF when medically appropriate.  -AB       Row Name 04/21/25 1440          Therapy Assessment/Plan (PT)    Rehab Potential (PT) fair  -AB     Criteria for Skilled Interventions Met (PT) yes;meets criteria;skilled treatment is necessary  -AB     Therapy Frequency (PT) 3 times/wk  -AB     Predicted Duration of Therapy Intervention (PT) 10 days  -AB       Row Name 04/21/25 1440          Vital Signs    Pre Systolic BP Rehab 157  -AB     Pre Treatment Diastolic BP 88  -AB     Pre SpO2 (%) 91  -AB     O2 Delivery Pre Treatment room air  -AB     O2 Delivery Intra Treatment room air  -AB     O2 Delivery Post Treatment room air  -AB     Pre Patient Position Supine  -AB     Intra Patient Position Sitting  -AB     Post Patient Position Supine  -AB       Row Name 04/21/25 1440          Positioning and Restraints    Pre-Treatment Position in bed  -AB     Post Treatment Position bed  -AB     In Bed notified nsg;supine;sitting;sitting EOB;call light within reach;encouraged to call for assist;exit alarm on;side rails up x3  -AB               User Key  (r) = Recorded By, (t) = Taken By, (c) = Cosigned By      Initials Name Provider Type    AB  Katya Nation, PT Physical Therapist                   Outcome Measures       Row Name 04/21/25 1451          How much help from another person do you currently need...    Turning from your back to your side while in flat bed without using bedrails? 1  -AB     Moving from lying on back to sitting on the side of a flat bed without bedrails? 1  -AB     Moving to and from a bed to a chair (including a wheelchair)? 1  -AB     Standing up from a chair using your arms (e.g., wheelchair, bedside chair)? 1  -AB     Climbing 3-5 steps with a railing? 1  -AB     To walk in hospital room? 1  -AB     AM-PAC 6 Clicks Score (PT) 6  -AB       Row Name 04/21/25 1410          Functional Assessment    Outcome Measure Options AM-PAC 6 Clicks Daily Activity (OT)  -AJ               User Key  (r) = Recorded By, (t) = Taken By, (c) = Cosigned By      Initials Name Provider Type    AB Katya Nation, PT Physical Therapist    Elana Brown, OT Occupational Therapist                                 Physical Therapy Education       Title: PT OT SLP Therapies (In Progress)       Topic: Physical Therapy (In Progress)       Point: Mobility training (In Progress)       Learning Progress Summary            Patient Acceptance, E,D, NR,NL by AB at 4/21/2025 1452                      Point: Home exercise program (Not Started)       Learner Progress:  Not documented in this visit.              Point: Body mechanics (In Progress)       Learning Progress Summary            Patient Acceptance, E,D, NR,NL by AB at 4/21/2025 1452                      Point: Precautions (In Progress)       Learning Progress Summary            Patient Acceptance, E,D, NR,NL by AB at 4/21/2025 1452                                      User Key       Initials Effective Dates Name Provider Type Discipline    AB 09/22/22 -  Katya Nation, PT Physical Therapist PT                  PT Recommendation and Plan  Planned Therapy Interventions (PT): balance training, bed  mobility training, gait training, patient/family education, postural re-education, transfer training, stretching, home exercise program, strengthening, ROM (range of motion)  Progress: no change  Outcome Evaluation: PT initial eval completed. Pt presents with limitations related to strength, endurance, cognition, and pain. Minimal command following and eyes closed majority of session. Bed mobility completed with DEP Ax2. Pt yelling out in pain w/ all movement. Further IPPT is warrented w/ frequency of 3x/wk to monitor progress. Rec d.c to SNF when medically appropriate.     Time Calculation:   PT Evaluation Complexity  History, PT Evaluation Complexity: 3 or more personal factors and/or comorbidities  Examination of Body Systems (PT Eval Complexity): total of 3 or more elements  Clinical Presentation (PT Evaluation Complexity): evolving  Clinical Decision Making (PT Evaluation Complexity): moderate complexity  Overall Complexity (PT Evaluation Complexity): moderate complexity     PT Charges       Row Name 04/21/25 1453             Time Calculation    Start Time 1335  -AB      PT Received On 04/21/25  -AB      PT Goal Re-Cert Due Date 05/01/25  -AB         Untimed Charges    PT Eval/Re-eval Minutes 48  -AB         Total Minutes    Untimed Charges Total Minutes 48  -AB       Total Minutes 48  -AB                User Key  (r) = Recorded By, (t) = Taken By, (c) = Cosigned By      Initials Name Provider Type    AB Katya Nation, PT Physical Therapist                  Therapy Charges for Today       Code Description Service Date Service Provider Modifiers Qty    [id number]  PT EVAL MOD COMPLEXITY 4 4/21/2025 Katya Nation, PT GP 1            PT G-Codes  Outcome Measure Options: AM-PAC 6 Clicks Daily Activity (OT)  AM-PAC 6 Clicks Score (PT): 6  AM-PAC 6 Clicks Score (OT): 6  PT Discharge Summary  Anticipated Discharge Disposition (PT): skilled nursing facility    Katya Nation PT  4/21/2025

## 2025-04-21 NOTE — PROGRESS NOTES
Continued Stay Note  [facility]     Patient Name: Sara Esparza  MRN: [id number]  Today's Date: 4/21/2025    Admit Date: 4/21/2025    Plan: IPU Admission   Discharge Plan       Row Name 04/21/25 1722       Plan    Plan IPU Admission    Plan Comments   Hospice referral received, chart reviewed, Pt seen and evaluated. Hospice RN, JAMIE and APRN, Met with spouse, son and  dtr  at bedside  . Care needs discussed with primary nurse Cheyenne. Pt symptoms requiring skilled nursing assessment and symptom management with injectable medication administration. Evaluation was discussed with the hospice team, and Dr. Ojeda/MADHU RAMON .   At this time, we feel the patient is appropriate for inpatient hospice. Dr. Carlos is aware of discharge readmit.              Final Discharge Disposition Code 51 - hospice medical facility                   Discharge Codes    No documentation.                       Mariana Plascencia RN

## 2025-04-22 NOTE — DISCHARGE SUMMARY
[facility] Medicine Services  DISCHARGE TO INPATIENT HOSPICE    Patient Name: Sara Esparza  : 1950  MRN: [id number]    Date of Admission: 2025  Date of Discharge:  2025  Primary Care Physician: Toribio Roberts MD    Consults       Date and Time Order Name Status Description    2025  4:52 AM Inpatient Infectious Diseases Consult Completed     2025  2:44 PM Inpatient Palliative Care MD Consult Completed     2025  1:52 AM Inpatient Nephrology Consult Completed           Hospital Course     Presenting Problem:   Toxic metabolic encephalopathy [G92.8]    Active Hospital Problems    Diagnosis  POA   • **Toxic metabolic encephalopathy [G92.8]  Yes   • Gram-positive bacteremia [R78.81]  Unknown     Priority: High   • Paroxysmal A-fib [I48.0]  Yes     Priority: Medium   • Acute cystitis without hematuria [N30.00]  Yes   • Pressure injury of right ankle, stage 2 [L89.512]  Yes   • Elevated troponin [R79.89]  Yes   • Anemia of chronic disease [D63.8]  Yes   • ESRD (end stage renal disease) [N18.6]  Yes   • 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          Hospital Course:  Sara Esparza is a 74 y.o. female with PMHx of HTN, PAF (Eliquis), prior DVT/PE, ESRD on HD (MWF), PAD s/p L BKA, IDDM2, Hx of OM, asthma, Chronic LBP s/p SCS, cervical stenosis, Chronic pain syndrome, DENZEL (CPAP) who presented from [facility] for evaluation of confusion. She was found to have an acute UTI.     Plan:     Toxic metabolic encephalopathy  Acute cystitis without hematuria  MRSA bacteremia  - Afebrile, WBC 7, CRP 11.38 on admission   - UA suggestive of acute infection with 4+ bacteria, 21-50 WBCs, large LE  - CT head with no acute process   -  stop Rocephin for UTI, Ucx with >100K ESBL Klebsiella. Will change to Ertapenem until ID can see  -- Blood cultures now growing MRSA.  Appears to be in 4 out of 4 bottles.  ID consulted. Vanc started 4/20  -- unclear source of bacteremia, however, she does have a RLE wound and also has HD catheter which likely now needs to be replaced      Hypophosphatemia  Hypomagnesemia  -- replace as per renal replacement protocol      IDDM Type 2  - SSI     PAF  - Rate-controlled in SR  - Continue Eliquis, amiodarone     Elevated troponin  - Chronically elevated in the setting of ESRD  - Trop peaked at 109  - No ACS/CP  - CTM on tele     ESRD on HD  - MWF  - Nephro consulted     AOCD  - Hgb stable  - No active bleeding  - transfuse PRN HgB<7     TCP  -- not a chronic issue, mild, platelets stable today, monitor  -- likely related to acute infection(s)     HTN  - Chronic, normotensive  - Hold home antihypertensives and resume when appropriate     COPD  - Chronic, not in exacerbation  - Duonebs PRN     Right lateral malleolus pressure ulcer  Right foot ulceration  - off-load limb  - Wound care consult  -- Xray R foot with no evidence of osteomyelitis , ? Source of bacteremia     H/o PAD s/p L BKA     Chronic pain syndrome  - continue home roxicodone PRN     Dysphagia  -- pt's  understands that she is at high risk of aspiration. He would like for her to have a comfort diet and understands that her code status has been changed to DNR/DNI and he is agreeable.      Overall very poor prognosis.  Pt not doing well with HD even prior to this hospitalization per my discussion with Dr. Gonzalez yesterday.  I have consulted Palliative Care for GOC discussion and have encouraged family to think about hospice. They have decided to transition her to inpatient hospice       Day of Discharge     HPI:       ROS:      Vital Signs:   Temp:  [98 °F (36.7 °C)] 98 °F (36.7 °C)  Heart Rate:  [65] 65  Resp:  [15] 15  BP: (127)/(52) 127/52      Physical Exam:      Discharge Details     Discharge Disposition:  Transfer care to inpatient Hospice at [facility]    Time Spent on Discharge:  10 minutes    Heather Carlos MD  04/22/25

## 2025-04-22 NOTE — PLAN OF CARE
Goal Outcome Evaluation:           Progress: declining  Outcome Evaluation: Pt transferred to [facility] 4/21 and arrived upon the floor at shift change. Pt family present and interacting with pt. Pt tearful at beginning of night apologizing to family. Pt reassured that there was nothing to be sorry for. IV removed d/t infiltration and subq placed in LL abd. Pt complains of pain throughout the shift multiple times and has recieved 4x doses of dilaudid and 3x versed.  questioning hospice decision because he feels like she is starving. reassured that she is not starving and as this process progresses she will begin the have a decrease in appetite. Will continue to provide comfort to patient according to plan.

## 2025-04-22 NOTE — PROGRESS NOTES
Nutrition Services    Patient Name:  Sara Esparza  YOB: 1950  MRN: [id number]  Admit Date:  4/21/2025    Pt screened for nsg screen report 2/2 wounds. Noted to be in IPH care. Will sign off, please consult RD if needed for specific dietary needs or in the event that GOC should change, thank you.     Electronically signed by:  Bailee Escobar RD  04/22/25 09:11 EDT    Warm

## 2025-04-22 NOTE — PROGRESS NOTES
Palliative Care Progress Note    Date of Admission: 4/21/2025    Subjective:  quieter, no p[ain  at beside. Dosent know why she is calling out.   Current Code Status       Date Active Code Status Order ID Comments User Context       4/21/2025 4409 No CPR (Do Not Attempt to Resuscitate) [id number]  Helen Carlos APRN Inpatient        Question Answer    Code Status (Patient has no pulse and is not breathing) No CPR (Do Not Attempt to Resuscitate)    Medical Interventions (Patient has pulse or is breathing) Comfort Measures                  Current Facility-Administered Medications on File Prior to Encounter   Medication Dose Route Frequency Provider Last Rate Last Admin    [COMPLETED] albumin human 25 % IV SOLN 50 g  50 g Intravenous Once Viktor, Lisa Gallardo MD   50 g at 04/21/25 0930    [DISCONTINUED] acetaminophen (TYLENOL) tablet 650 mg  650 mg Oral Q4H PRN Austin Brooks DO   650 mg at 04/20/25 2034    [DISCONTINUED] albumin human 25 % IV SOLN             [DISCONTINUED] aluminum-magnesium hydroxide-simethicone (MAALOX MAX) 400-400-40 MG/5ML suspension 15 mL  15 mL Oral Q6H PRN Austin Brooks DO        [DISCONTINUED] amiodarone (PACERONE) tablet 200 mg  200 mg Oral Q24H Austin Brooks DO   200 mg at 04/21/25 1143    [DISCONTINUED] apixaban (ELIQUIS) tablet 2.5 mg  2.5 mg Oral Q12H Austin Brooks DO   2.5 mg at 04/21/25 1143    [DISCONTINUED] atorvastatin (LIPITOR) tablet 10 mg  10 mg Oral Nightly Austin Brooks DO   10 mg at 04/20/25 2034    [DISCONTINUED] bisacodyl (DULCOLAX) EC tablet 5 mg  5 mg Oral Daily PRN Austin Brooks DO        [DISCONTINUED] bisacodyl (DULCOLAX) suppository 10 mg  10 mg Rectal Daily PRN Austin Brooks DO   10 mg at 04/19/25 1240    [DISCONTINUED] Calcium Replacement - Follow Nurse / BPA Driven Protocol   Not Applicable PRN Austin Brooks DO        [DISCONTINUED] cefTRIAXone (ROCEPHIN) 1,000 mg in sodium chloride 0.9 % 100 mL MBP  1,000 mg  Intravenous Q24H Austin Brooks  mL/hr at 04/21/25 0505 1,000 mg at 04/21/25 0505    [DISCONTINUED] dextrose (D50W) (25 g/50 mL) IV injection 25 g  25 g Intravenous Q15 Min PRN Austin Brooks DO   25 g at 04/21/25 1637    [DISCONTINUED] dextrose (GLUTOSE) oral gel 15 g  15 g Oral Q15 Min PRN Austin Brooks DO        [DISCONTINUED] ertapenem (INVanz) 500 mg in sodium chloride 0.9 % 50 mL IVPB  500 mg Intravenous Q24H Heather Carlos  mL/hr at 04/21/25 1344 500 mg at 04/21/25 1344    [DISCONTINUED] glucagon (GLUCAGEN) injection 1 mg  1 mg Intramuscular Q15 Min PRN Austin Brooks DO   1 mg at 04/21/25 1205    [DISCONTINUED] glycopyrrolate (ROBINUL) injection 0.2 mg  0.2 mg Intravenous Q4H PRN Dayne Rodriguez MD        [DISCONTINUED] heparin (porcine) injection 2,000 Units  2,000 Units Intracatheter PRN ViktorLisa peoples MD   2,000 Units at 04/21/25 1118    [DISCONTINUED] HYDROmorphone (DILAUDID) injection 0.5 mg  0.5 mg Intravenous Q2H PRN Dayne Rodriguez MD        [DISCONTINUED] Insulin Lispro (humaLOG) injection 2-7 Units  2-7 Units Subcutaneous 4x Daily AC & at Bedtime Austin Brooks DO        [DISCONTINUED] ipratropium-albuterol (DUO-NEB) nebulizer solution 3 mL  3 mL Nebulization Q4H PRN Austin Brooks DO        [DISCONTINUED] LORazepam (ATIVAN) tablet 0.5 mg  0.5 mg Oral Q6H PRN Heather Carlos MD   0.5 mg at 04/21/25 0426    [DISCONTINUED] Magnesium Standard Dose Replacement - Follow Nurse / BPA Driven Protocol   Not Applicable PRN Austin Brooks DO        [DISCONTINUED] midazolam (VERSED) injection 1 mg  1 mg Intravenous Q4H PRN Dayne Rodriguez MD        [DISCONTINUED] NIFEdipine XL (PROCARDIA XL) 24 hr tablet 90 mg  90 mg Oral Daily Austin Brooks, DO        [DISCONTINUED] nitroglycerin (NITROSTAT) SL tablet 0.4 mg  0.4 mg Sublingual Q5 Min PRN Austin Brooks DO        [DISCONTINUED] ondansetron (ZOFRAN) injection 4 mg  4 mg Intravenous Q6H  PRN Austin Brooks DO        [DISCONTINUED] oxyCODONE (ROXICODONE) immediate release tablet 5 mg  5 mg Oral Q4H PRN Austin Brooks DO   5 mg at 04/21/25 0434    [DISCONTINUED] palliative care oral rinse   Mouth/Throat TID Dayne Rodriguez MD        [DISCONTINUED] pantoprazole (PROTONIX) injection 40 mg  40 mg Intravenous Q AM Radha Nazario APRN   40 mg at 04/21/25 0506    [DISCONTINUED] Pharmacy Consult - Pharmacy to dose Vancomycin   Not Applicable Continuous PRN Heather Carlos MD        [DISCONTINUED] Phosphorus Replacement - Follow Nurse / BPA Driven Protocol   Not Applicable PRN Austin Brooks DO        [DISCONTINUED] polyethylene glycol (MIRALAX) packet 17 g  17 g Oral Daily PRN Austin Brooks DO        [DISCONTINUED] Potassium Replacement - Follow Nurse / BPA Driven Protocol   Not Applicable PRN Austin Brooks DO        [DISCONTINUED] sennosides-docusate (PERICOLACE) 8.6-50 MG per tablet 2 tablet  2 tablet Oral BID PRN Austin Brooks DO        [DISCONTINUED] sodium chloride 0.9 % flush 10 mL  10 mL Intravenous PRN Cherelle Self MD        [DISCONTINUED] sodium chloride 0.9 % flush 10 mL  10 mL Intravenous Q12H Austin Brooks DO   10 mL at 04/20/25 2034    [DISCONTINUED] sodium chloride 0.9 % flush 10 mL  10 mL Intravenous PRN Austin Brooks DO        [DISCONTINUED] sodium chloride 0.9 % infusion 40 mL  40 mL Intravenous PRN Austin Brooks DO        [DISCONTINUED] vancomycin 750 mg in sodium chloride 0.9 % 250 mL IVPB-VTB  750 mg Intravenous Once per day on Monday Wednesday Friday Dave Saini, PharmD         Current Outpatient Medications on File Prior to Encounter   Medication Sig Dispense Refill    acetaminophen (TYLENOL) 500 MG tablet Take 1 tablet by mouth Every 6 (Six) Hours As Needed for Mild Pain.      albuterol sulfate  (90 Base) MCG/ACT inhaler Inhale 2 puffs Every 4 (Four) Hours As Needed for Wheezing or Shortness of Air.      allopurinol 200 MG  tablet Take 200 mg by mouth Daily.      amiodarone (PACERONE) 200 MG tablet Take 1 tablet by mouth Daily.      apixaban (ELIQUIS) 2.5 MG tablet tablet Take 1 tablet by mouth Every 12 (Twelve) Hours. 180 tablet 3    atorvastatin (LIPITOR) 10 MG tablet Take 1 tablet by mouth Every Night.      Cholecalciferol 50 MCG (2000 UT) capsule Take 1 capsule by mouth Daily.      cloNIDine (CATAPRES) 0.2 MG tablet Take 1 tablet by mouth Every 8 (Eight) Hours As Needed for High Blood Pressure (SBP > 170).      epoetin gracia-epbx (RETACRIT) 57686 UNIT/ML injection Inject 1 mL under the skin into the appropriate area as directed 3 (Three) Times a Week. Indications: ESRD on Dialysis      famotidine (PEPCID) 10 MG tablet Take 1 tablet by mouth Daily.      Insulin Lispro (humaLOG) 100 UNIT/ML injection Inject 2-9 Units under the skin into the appropriate area as directed 4 (Four) Times a Day Before Meals & at Bedtime.      melatonin 5 MG tablet tablet Take 0.5 tablets by mouth At Night As Needed (insomnia).      multivitamin with minerals tablet tablet Take 1 tablet by mouth Daily.      NIFEdipine XL (PROCARDIA XL) 90 MG 24 hr tablet Take 1 tablet by mouth Daily.      oxyCODONE (ROXICODONE) 5 MG immediate release tablet Take 1 tablet by mouth Every 4 (Four) Hours As Needed for Moderate Pain. 42 tablet 0    pantoprazole (PROTONIX) 40 MG EC tablet Take 1 tablet by mouth Every Morning.      polyethylene glycol (MIRALAX) 17 g packet Take 17 g by mouth Daily As Needed (Use if senna-docusate is ineffective).      sennosides-docusate (PERICOLACE) 8.6-50 MG per tablet Take 2 tablets by mouth 2 (Two) Times a Day.      SITagliptin (JANUVIA) 25 MG tablet Take 1 tablet by mouth Daily. 90 tablet 3    traMADol (ULTRAM) 50 MG tablet Take 1 tablet by mouth Every 8 (Eight) Hours As Needed for Moderate Pain. 9 tablet 0          acetaminophen **OR** acetaminophen **OR** acetaminophen    bisacodyl    glycopyrrolate    HYDROmorphone    LORazepam     "midazolam    ondansetron    Polyvinyl Alcohol-Povidone PF    Scopolamine    sodium chloride    Objective: There were no vitals taken for this visit.     Intake/Output Summary (Last 24 hours) at 4/22/2025 0840  Last data filed at 4/21/2025 1600  Gross per 24 hour   Intake --   Output 0 ml   Net 0 ml     Physical Exam:    Nonresponsive, calls out \"help me\" intermittant. Lungs clear, abd soft , minimal bs, ext no edema. BKA intact.   Results from last 7 days   Lab Units 04/21/25  0750   WBC 10*3/mm3 6.22   HEMOGLOBIN g/dL 8.2*   HEMATOCRIT % 27.2*   PLATELETS 10*3/mm3 119*     Results from last 7 days   Lab Units 04/21/25  0750 04/20/25  1109 04/19/25  1532   SODIUM mmol/L 139   < > 136   POTASSIUM mmol/L 4.1   < > 4.2   CHLORIDE mmol/L 105   < > 102   CO2 mmol/L 26.0   < > 27.0   BUN mg/dL 37*   < > 25*   CREATININE mg/dL 3.86*   < > 2.84*   CALCIUM mg/dL 8.2*   < > 9.1   BILIRUBIN mg/dL  --   --  0.3   ALK PHOS U/L  --   --  206*   ALT (SGPT) U/L  --   --  7   AST (SGOT) U/L  --   --  13   GLUCOSE mg/dL 87   < > 110*    < > = values in this interval not displayed.       Impression: Plan: Agitated delirium: continue versed and haldol prn. Hold protonix and continue assessment.  support. Needs GIP for above  Time: 25 minutes      Mikey Pantoja MD  04/22/25  08:40 EDT     "

## 2025-04-22 NOTE — PROGRESS NOTES
Continued Stay Note  [facility]     Patient Name: Sara Esparza  MRN: [id number]  Today's Date: 4/22/2025    Admit Date: 4/21/2025    Plan: IPU Assessment   Discharge Plan       Row Name 04/22/25 0947       Plan    Plan IPU Assessment    Plan Comments     Dr. Pantoja and Hospice RN made visit to patient bedside and spouse present. Pt was resting until staff did a turn. Radha RN notified and pain meds to be given. Patient need pre-medicated for turns and wound care. No output over night , pt to be bladder scanned, BM 4/21 x 2 large. PRN Dil 0.5 mg x 6, Versed 0.5 mg x 5/ 24 hrs. given for anxiety and pain. Currently patient remains GIP for complications of EOL care requiring skilled nursing and medical management of symptoms. Discharge plan dependent on a decreased level of care and survival of this admission.  Changes: D/C protonic, pt lost her IV site                   Discharge Codes    No documentation.                       Mariana Plascencia RN

## 2025-04-23 NOTE — PLAN OF CARE
Problem: Adult Inpatient Plan of Care  Goal: Plan of Care Review  Outcome: Not Progressing  Goal: Patient-Specific Goal (Individualized)  Outcome: Not Progressing  Goal: Absence of Hospital-Acquired Illness or Injury  Outcome: Not Progressing  Intervention: Identify and Manage Fall Risk  Recent Flowsheet Documentation  Taken 4/22/2025 1800 by Radha Perdomo RN  Safety Promotion/Fall Prevention:   activity supervised   assistive device/personal items within reach   clutter free environment maintained   fall prevention program maintained   lighting adjusted   gait belt   mobility aid in reach   muscle strengthening facilitated   nonskid shoes/slippers when out of bed   room organization consistent   safety round/check completed   toileting scheduled  Taken 4/22/2025 1600 by Radha Perdomo RN  Safety Promotion/Fall Prevention:   activity supervised   assistive device/personal items within reach   clutter free environment maintained   fall prevention program maintained   lighting adjusted   gait belt   mobility aid in reach   muscle strengthening facilitated   nonskid shoes/slippers when out of bed   room organization consistent   safety round/check completed   toileting scheduled  Taken 4/22/2025 1400 by Radha Perdomo RN  Safety Promotion/Fall Prevention:   activity supervised   assistive device/personal items within reach   clutter free environment maintained   fall prevention program maintained   lighting adjusted   gait belt   mobility aid in reach   muscle strengthening facilitated   nonskid shoes/slippers when out of bed   room organization consistent   safety round/check completed   toileting scheduled  Taken 4/22/2025 1200 by Radha Perdomo RN  Safety Promotion/Fall Prevention:   activity supervised   assistive device/personal items within reach   clutter free environment maintained   fall prevention program maintained   lighting adjusted   gait belt   mobility aid in reach   muscle strengthening facilitated    nonskid shoes/slippers when out of bed   room organization consistent   safety round/check completed   toileting scheduled  Taken 4/22/2025 1054 by Radha Perdomo RN  Safety Promotion/Fall Prevention:   activity supervised   assistive device/personal items within reach   clutter free environment maintained   fall prevention program maintained   lighting adjusted   gait belt   mobility aid in reach   muscle strengthening facilitated   nonskid shoes/slippers when out of bed   room organization consistent   safety round/check completed   toileting scheduled  Taken 4/22/2025 0800 by Radha Perdomo RN  Safety Promotion/Fall Prevention:   activity supervised   assistive device/personal items within reach   clutter free environment maintained   fall prevention program maintained   lighting adjusted   gait belt   mobility aid in reach   muscle strengthening facilitated   nonskid shoes/slippers when out of bed   room organization consistent   safety round/check completed   toileting scheduled  Intervention: Prevent Skin Injury  Recent Flowsheet Documentation  Taken 4/22/2025 0955 by Radha Perdomo RN  Skin Protection:   incontinence pads utilized   silicone foam dressing in place   transparent dressing maintained  Intervention: Prevent and Manage VTE (Venous Thromboembolism) Risk  Recent Flowsheet Documentation  Taken 4/22/2025 0955 by Radha Perdomo RN  VTE Prevention/Management: (Comfort Measures) other (see comments)  Intervention: Prevent Infection  Recent Flowsheet Documentation  Taken 4/22/2025 1800 by Radha Perdomo RN  Infection Prevention:   cohorting utilized   environmental surveillance performed   equipment surfaces disinfected   hand hygiene promoted   rest/sleep promoted   single patient room provided  Taken 4/22/2025 1600 by Radha Perdomo RN  Infection Prevention:   cohorting utilized   environmental surveillance performed   equipment surfaces disinfected   hand hygiene promoted   rest/sleep promoted   single patient  room provided  Taken 4/22/2025 1400 by Radha Perdomo RN  Infection Prevention:   cohorting utilized   environmental surveillance performed   equipment surfaces disinfected   hand hygiene promoted   rest/sleep promoted   single patient room provided  Taken 4/22/2025 1200 by Radha Perdomo RN  Infection Prevention:   cohorting utilized   environmental surveillance performed   equipment surfaces disinfected   hand hygiene promoted   rest/sleep promoted   single patient room provided  Taken 4/22/2025 1054 by Radha Perdomo RN  Infection Prevention:   cohorting utilized   environmental surveillance performed   equipment surfaces disinfected   hand hygiene promoted   rest/sleep promoted   single patient room provided  Taken 4/22/2025 0800 by Radha Perdomo RN  Infection Prevention:   cohorting utilized   environmental surveillance performed   equipment surfaces disinfected   hand hygiene promoted   rest/sleep promoted   single patient room provided  Goal: Optimal Comfort and Wellbeing  Outcome: Not Progressing  Intervention: Monitor Pain and Promote Comfort  Recent Flowsheet Documentation  Taken 4/22/2025 1800 by Radha Perdomo RN  Pain Management Interventions: pain medication given  Taken 4/22/2025 1600 by Radha Perdomo RN  Pain Management Interventions: pain medication given  Taken 4/22/2025 1400 by Radha Perdomo RN  Pain Management Interventions: pain medication given  Taken 4/22/2025 1200 by Radha Perdomo RN  Pain Management Interventions: pain medication given  Taken 4/22/2025 1054 by Radha Perdomo RN  Pain Management Interventions: pain medication given  Taken 4/22/2025 0800 by Radha Perdomo RN  Pain Management Interventions: position adjusted  Intervention: Provide Person-Centered Care  Recent Flowsheet Documentation  Taken 4/22/2025 1800 by Radha Perdomo RN  Trust Relationship/Rapport:   choices provided   care explained   emotional support provided   empathic listening provided   reassurance provided   questions answered    questions encouraged   thoughts/feelings acknowledged  Taken 4/22/2025 1600 by Radha Perdomo RN  Trust Relationship/Rapport:   care explained   choices provided   emotional support provided   empathic listening provided   questions encouraged   reassurance provided   thoughts/feelings acknowledged   questions answered  Taken 4/22/2025 1400 by Radha Perdomo RN  Trust Relationship/Rapport:   care explained   choices provided   emotional support provided   empathic listening provided   questions answered   questions encouraged   reassurance provided   thoughts/feelings acknowledged  Taken 4/22/2025 1200 by Radha Perdomo RN  Trust Relationship/Rapport:   care explained   choices provided   emotional support provided   empathic listening provided   questions answered   questions encouraged   reassurance provided   thoughts/feelings acknowledged  Taken 4/22/2025 1054 by Radha Perdomo RN  Trust Relationship/Rapport:   care explained   choices provided   emotional support provided   empathic listening provided   questions answered   questions encouraged   reassurance provided   thoughts/feelings acknowledged  Taken 4/22/2025 0955 by Radha Perdomo RN  Trust Relationship/Rapport:   care explained   choices provided   emotional support provided   empathic listening provided   questions answered   reassurance provided   thoughts/feelings acknowledged   questions encouraged  Taken 4/22/2025 0800 by Radha Perdomo RN  Trust Relationship/Rapport:   care explained   choices provided   emotional support provided   empathic listening provided   questions answered   questions encouraged   reassurance provided   thoughts/feelings acknowledged  Goal: Readiness for Transition of Care  Outcome: Not Progressing     Problem: Palliative Care  Goal: Enhanced Quality of Life  Outcome: Not Progressing  Intervention: Maximize Comfort  Recent Flowsheet Documentation  Taken 4/22/2025 1800 by Radha Perdomo RN  Pain Management Interventions: pain medication  given  Taken 4/22/2025 1600 by Radha Perdomo RN  Pain Management Interventions: pain medication given  Taken 4/22/2025 1400 by Radha Perdomo RN  Pain Management Interventions: pain medication given  Taken 4/22/2025 1200 by Radha Perdomo RN  Pain Management Interventions: pain medication given  Taken 4/22/2025 1054 by Radha Perdomo RN  Pain Management Interventions: pain medication given  Taken 4/22/2025 0800 by Radha Perdomo RN  Pain Management Interventions: position adjusted  Intervention: Optimize Function  Recent Flowsheet Documentation  Taken 4/22/2025 0955 by Radha Perdomo RN  Sensory Stimulation Regulation:   auditory stimulation provided   care clustered   lighting decreased   music on   quiet environment promoted  Fatigue Management: frequent rest breaks encouraged  Sleep/Rest Enhancement:   awakenings minimized   consistent schedule promoted   family presence promoted   music provided   natural light exposure provided   regular sleep/rest pattern promoted   relaxation techniques promoted   room darkened   therapeutic touch utilized  Intervention: Optimize Psychosocial Wellbeing  Recent Flowsheet Documentation  Taken 4/22/2025 1800 by Radha Perdomo RN  Family/Support System Care: support provided  Taken 4/22/2025 1600 by Radha Perdomo RN  Family/Support System Care: support provided  Taken 4/22/2025 1400 by Radha Perdomo RN  Family/Support System Care: support provided  Taken 4/22/2025 1200 by Radha Perdomo RN  Family/Support System Care: support provided  Taken 4/22/2025 1054 by Radha Perdomo RN  Family/Support System Care: support provided  Taken 4/22/2025 0955 by Radha Perdomo RN  Supportive Measures: relaxation techniques promoted  Family/Support System Care: support provided  Taken 4/22/2025 0800 by Radha Perdomo RN  Family/Support System Care: support provided     Problem: Skin Injury Risk Increased  Goal: Skin Health and Integrity  Outcome: Not Progressing  Intervention: Optimize Skin Protection  Recent Flowsheet  Documentation  Taken 4/22/2025 1800 by Radha Perdomo RN  Activity Management: activity minimized  Head of Bed (HOB) Positioning: HOB elevated  Taken 4/22/2025 1600 by Radha Perdomo RN  Activity Management: activity minimized  Head of Bed (HOB) Positioning: HOB elevated  Taken 4/22/2025 1400 by Radha Perdomo RN  Activity Management: activity minimized  Head of Bed (HOB) Positioning: HOB elevated  Taken 4/22/2025 1200 by Radha Perdomo RN  Activity Management: activity minimized  Head of Bed (HOB) Positioning: HOB elevated  Taken 4/22/2025 1054 by Radha Perdomo RN  Activity Management: activity minimized  Head of Bed (HOB) Positioning: HOB elevated  Taken 4/22/2025 0955 by Radha Perdomo RN  Pressure Reduction Techniques:   frequent weight shift encouraged   heels elevated off bed   positioned off wounds   pressure points protected   weight shift assistance provided  Pressure Reduction Devices: pressure-redistributing mattress utilized  Skin Protection:   incontinence pads utilized   silicone foam dressing in place   transparent dressing maintained  Taken 4/22/2025 0800 by Radha Perdomo RN  Activity Management: activity minimized     Problem: Fall Injury Risk  Goal: Absence of Fall and Fall-Related Injury  Outcome: Not Progressing  Intervention: Identify and Manage Contributors  Recent Flowsheet Documentation  Taken 4/22/2025 1800 by Radha Perdomo RN  Medication Review/Management: medications reviewed  Taken 4/22/2025 1600 by Radha Perdomo RN  Medication Review/Management: medications reviewed  Taken 4/22/2025 1400 by Radha Perdomo RN  Medication Review/Management: medications reviewed  Taken 4/22/2025 1200 by Radha Perdomo RN  Medication Review/Management: medications reviewed  Taken 4/22/2025 1054 by Radha Perdomo RN  Medication Review/Management: medications reviewed  Taken 4/22/2025 0955 by Radha Perdomo RN  Self-Care Promotion: BADL personal routines maintained  Taken 4/22/2025 0800 by Radha Perdomo RN  Medication  Review/Management: medications reviewed  Intervention: Promote Injury-Free Environment  Recent Flowsheet Documentation  Taken 4/22/2025 1800 by Radha Perdomo RN  Safety Promotion/Fall Prevention:   activity supervised   assistive device/personal items within reach   clutter free environment maintained   fall prevention program maintained   lighting adjusted   gait belt   mobility aid in reach   muscle strengthening facilitated   nonskid shoes/slippers when out of bed   room organization consistent   safety round/check completed   toileting scheduled  Taken 4/22/2025 1600 by Radha Perdomo RN  Safety Promotion/Fall Prevention:   activity supervised   assistive device/personal items within reach   clutter free environment maintained   fall prevention program maintained   lighting adjusted   gait belt   mobility aid in reach   muscle strengthening facilitated   nonskid shoes/slippers when out of bed   room organization consistent   safety round/check completed   toileting scheduled  Taken 4/22/2025 1400 by Radha Perdomo RN  Safety Promotion/Fall Prevention:   activity supervised   assistive device/personal items within reach   clutter free environment maintained   fall prevention program maintained   lighting adjusted   gait belt   mobility aid in reach   muscle strengthening facilitated   nonskid shoes/slippers when out of bed   room organization consistent   safety round/check completed   toileting scheduled  Taken 4/22/2025 1200 by Radha Perdomo RN  Safety Promotion/Fall Prevention:   activity supervised   assistive device/personal items within reach   clutter free environment maintained   fall prevention program maintained   lighting adjusted   gait belt   mobility aid in reach   muscle strengthening facilitated   nonskid shoes/slippers when out of bed   room organization consistent   safety round/check completed   toileting scheduled  Taken 4/22/2025 1054 by Radha Perdomo RN  Safety Promotion/Fall Prevention:   activity  supervised   assistive device/personal items within reach   clutter free environment maintained   fall prevention program maintained   lighting adjusted   gait belt   mobility aid in reach   muscle strengthening facilitated   nonskid shoes/slippers when out of bed   room organization consistent   safety round/check completed   toileting scheduled  Taken 4/22/2025 0800 by Radha Perdomo RN  Safety Promotion/Fall Prevention:   activity supervised   assistive device/personal items within reach   clutter free environment maintained   fall prevention program maintained   lighting adjusted   gait belt   mobility aid in reach   muscle strengthening facilitated   nonskid shoes/slippers when out of bed   room organization consistent   safety round/check completed   toileting scheduled   Goal Outcome Evaluation:

## 2025-04-23 NOTE — PROGRESS NOTES
"Continued Stay Note  [facility]     Patient Name: Sara Esparza  MRN: [id number]  Today's Date: 4/23/2025    Admit Date: 4/21/2025    Plan: Inpatient hospice   Discharge Plan       Row Name 04/23/25 1552       Plan    Plan Inpatient hospice    Plan Comments LENORA 20%pps is a 75yo  female with ESRD. Chart reviewed (LBM 4/21/25, PRN's: haldol x1, dilaudid x6, versed x 2), visit to bedside with Dr. Pantoja, assess completed. Present for visit are patient, spouse, grandson. Family noted loving and supportive of patient. Patient is observed with eyes open, resting in bed, holding her grandson's hand. Patient is answering questions appropriately, relays \"no\" to pain, dyspnea, anxiety, n/v, discomfort, hunger/thirst. Patient relays her love for her grandson and that she is leaving him too soon. Dr. Pantoja and RN offer support, active listening. Discussed eol changes, comfort, medications, provided open communication and support. Care coordinated with Karishma APODACA [facility]. Dilaudid changed to 0.5mg Q 4 hours atc. Patient requires injectable medications for symptom palliation necessitating skilled nursing care necessitating gip level of hospice care.    Final Discharge Disposition Code 51 - hospice medical facility                   Discharge Codes    No documentation.                       Trina Kearns RN    "

## 2025-04-23 NOTE — PLAN OF CARE
Goal Outcome Evaluation:           Progress: declining  Outcome Evaluation: Pt rested comfortably throughout most of shift. Breathing shallow with periods of labored breathing. 1x dose PRN Haldol, 2x dose of PRN dilaudid given to this time stamp. Bilateral abdominal subcutaneous lines.  Family present at bedside and interacting with patient.  tearful this morning. Pt still arouses to voice and able to state yes or no if asked if she is hurting or uncomfortable. Will continue to provide patient with comfort care.

## 2025-04-23 NOTE — PROGRESS NOTES
Palliative Care Progress Note    Date of Admission: 4/21/2025    Subjective:   gs at bedside ,  tearful, patient more conversant with intermittant delirium  Current Code Status       Date Active Code Status Order ID Comments User Context       4/21/2025 1654 No CPR (Do Not Attempt to Resuscitate) [id number]  Helen Carlos APRN Inpatient        Question Answer    Code Status (Patient has no pulse and is not breathing) No CPR (Do Not Attempt to Resuscitate)    Medical Interventions (Patient has pulse or is breathing) Comfort Measures                  No current facility-administered medications on file prior to encounter.     Current Outpatient Medications on File Prior to Encounter   Medication Sig Dispense Refill    acetaminophen (TYLENOL) 500 MG tablet Take 1 tablet by mouth Every 6 (Six) Hours As Needed for Mild Pain.      albuterol sulfate  (90 Base) MCG/ACT inhaler Inhale 2 puffs Every 4 (Four) Hours As Needed for Wheezing or Shortness of Air.      allopurinol 200 MG tablet Take 200 mg by mouth Daily.      amiodarone (PACERONE) 200 MG tablet Take 1 tablet by mouth Daily.      apixaban (ELIQUIS) 2.5 MG tablet tablet Take 1 tablet by mouth Every 12 (Twelve) Hours. 180 tablet 3    atorvastatin (LIPITOR) 10 MG tablet Take 1 tablet by mouth Every Night.      Cholecalciferol 50 MCG (2000 UT) capsule Take 1 capsule by mouth Daily.      cloNIDine (CATAPRES) 0.2 MG tablet Take 1 tablet by mouth Every 8 (Eight) Hours As Needed for High Blood Pressure (SBP > 170).      epoetin gracia-epbx (RETACRIT) 45163 UNIT/ML injection Inject 1 mL under the skin into the appropriate area as directed 3 (Three) Times a Week. Indications: ESRD on Dialysis      famotidine (PEPCID) 10 MG tablet Take 1 tablet by mouth Daily.      Insulin Lispro (humaLOG) 100 UNIT/ML injection Inject 2-9 Units under the skin into the appropriate area as directed 4 (Four) Times a Day Before Meals & at Bedtime.      melatonin 5 MG tablet  tablet Take 0.5 tablets by mouth At Night As Needed (insomnia).      multivitamin with minerals tablet tablet Take 1 tablet by mouth Daily.      NIFEdipine XL (PROCARDIA XL) 90 MG 24 hr tablet Take 1 tablet by mouth Daily.      oxyCODONE (ROXICODONE) 5 MG immediate release tablet Take 1 tablet by mouth Every 4 (Four) Hours As Needed for Moderate Pain. 42 tablet 0    pantoprazole (PROTONIX) 40 MG EC tablet Take 1 tablet by mouth Every Morning.      polyethylene glycol (MIRALAX) 17 g packet Take 17 g by mouth Daily As Needed (Use if senna-docusate is ineffective).      sennosides-docusate (PERICOLACE) 8.6-50 MG per tablet Take 2 tablets by mouth 2 (Two) Times a Day.      SITagliptin (JANUVIA) 25 MG tablet Take 1 tablet by mouth Daily. 90 tablet 3    traMADol (ULTRAM) 50 MG tablet Take 1 tablet by mouth Every 8 (Eight) Hours As Needed for Moderate Pain. 9 tablet 0          acetaminophen **OR** acetaminophen **OR** acetaminophen    bisacodyl    glycopyrrolate    haloperidol lactate    HYDROmorphone    midazolam    ondansetron    palliative care oral rinse    Polyvinyl Alcohol-Povidone PF    Scopolamine    sodium chloride    Objective: There were no vitals taken for this visit.     Intake/Output Summary (Last 24 hours) at 4/23/2025 0911  Last data filed at 4/23/2025 0552  Gross per 24 hour   Intake --   Output 100 ml   Net -100 ml     Physical Exam:    Alert, tearful , mumbles but answers question, Lungs cl, heart 90 reg, ext edema unchanged, no myoclonic  Results from last 7 days   Lab Units 04/21/25  0750   WBC 10*3/mm3 6.22   HEMOGLOBIN g/dL 8.2*   HEMATOCRIT % 27.2*   PLATELETS 10*3/mm3 119*     Results from last 7 days   Lab Units 04/21/25  0750 04/20/25  1109 04/19/25  1532   SODIUM mmol/L 139   < > 136   POTASSIUM mmol/L 4.1   < > 4.2   CHLORIDE mmol/L 105   < > 102   CO2 mmol/L 26.0   < > 27.0   BUN mg/dL 37*   < > 25*   CREATININE mg/dL 3.86*   < > 2.84*   CALCIUM mg/dL 8.2*   < > 9.1   BILIRUBIN mg/dL  --   --   0.3   ALK PHOS U/L  --   --  206*   ALT (SGPT) U/L  --   --  7   AST (SGOT) U/L  --   --  13   GLUCOSE mg/dL 87   < > 110*    < > = values in this interval not displayed.       Impression: Plan: pain: denies at this time. Conversant, tearful. Dave( GS ) at bedside. Continue HM but increase to q 4 hr with dose titration.  Delirium: agitation at times settled, Continue haldol. Needs GIP for above dose titration and asessment    Time: 25 min      Mikey Pantoja MD  04/23/25  09:11 EDT

## 2025-04-24 NOTE — PROGRESS NOTES
Hospice Progress Note    Patient Name: Sara Esparza   : 1950  Gender: female    Code Status: comfort measures    Date of Admission: 2025    Subjective:  Sara Esparza is a 74 y.o. female admitted to inpatient hospice 2025 with diagnosis of ESRD (end stage renal disease) [N18.6]  for symptom management.     - Scheduled:    - PRNs:      - Intake/Output  Intake & Output (last 3 days)          07 07 07 07 07 07 07 07    P.O.   0     Total Intake   0     Urine 0 100      Total Output 0 100      Net 0 -100 0             Urine Unmeasured Occurrence 1 x       Stool Unmeasured Occurrence   1 x                ROS:  Review of Systems    Reviewed current scheduled and prn medications for route, type, dose and frequency.       acetaminophen **OR** acetaminophen **OR** acetaminophen    bisacodyl    glycopyrrolate    haloperidol lactate    HYDROmorphone    ketorolac    midazolam    ondansetron    palliative care oral rinse    PHENobarbital    Polyvinyl Alcohol-Povidone PF    Scopolamine    sodium chloride    Objective:   There were no vitals taken for this visit.     PPS:     Physical Exam:  Physical Exam        ESRD (end stage renal disease)      Assessment/Plan:   Sara Esparza is a 74 y.o. female admitted to inpatient hospice 2025 with diagnosis of ESRD (end stage renal disease) [N18.6]  for symptom management.     Symptoms:    Discharge Disposition: home with hospice when symptoms are managed; however, she is unlikely to survive to hospital discharge.     Total Visit Time:  Face to Face Time:    Justification for care:  Patient meets criteria for acute in-patient care due to need for frequent skilled nursing assessments to determine patient comfort and medication effectiveness at end of life.  Frequent adjustments to medications and interventions for symptom management, including injectable medications.      Helen Carlos, MSN,  APRN  [facility]  Hospice and Palliative Care Nurse Practitioner  04/24/25  12:16 EDT

## 2025-04-24 NOTE — PLAN OF CARE
Goal Outcome Evaluation:            Patient with apneic breathing, very little UA output. Upper extremities is very edemous, lower ext is cold to touch. Left BKA. Family is at bedside. Will continue to monitor

## 2025-04-24 NOTE — PLAN OF CARE
Goal Outcome Evaluation:  Plan of Care Reviewed With: spouse, child        Progress: declining  Outcome Evaluation: On RA. Responds to pain. Frequently restless, grimacing, and moaning at start of shift. PRN dilaudid given x2 and scheduled versed x1 with no effect. Provider notified. Medications increased see MAR. Increase in medication successful. Pt currently resting comfortably with even, unlabored respirations. No negative vocalizations or signs of restlessness. Skin waxen this AM. Urinary catheter in place and patent. Minimal concentrated UOP. No BM this shift. Family at bedside. Comfort measures continued.

## 2025-04-24 NOTE — PROGRESS NOTES
Continued Stay Note  [facility]     Patient Name: Sara Esparza  MRN: [id number]  Today's Date: 4/24/2025    Admit Date: 4/21/2025    Plan: Inpatient hospice   Discharge Plan       Row Name 04/24/25 0845       Plan    Plan Inpatient hospice    Plan Comments 10% PPS. Patient resting peacefully with eyes closed. Face, jaw, and body relaxed. Breathing unlabored. Noted with periods of apnea. Right foot cold. She required the following prns prior to having medication changes: Haldol 1mg x1, Dilaudid 0.5mg x3, Dilaudid 1mg x1, Versed 0.5mg x2, Versed 1mg x1. One of these prns of Dilaudid was given after doses of Dilaudid and Versed were increased from 0.5mg to 1mg each. Patient continues to require inpatient hospice for skilled nursing assessment, medication titration, and injectable medication administration for palliation of pain and anxiety. Spouse, son, and daughter at bedside.                   Discharge Codes    No documentation.                       Bia Joiner RN

## 2025-04-25 NOTE — PROGRESS NOTES
Continued Stay Note  [facility]     Patient Name: Sara Esparza  MRN: [id number]  Today's Date: 4/25/2025    Admit Date: 4/21/2025    Plan: Inpatient hospice   Discharge Plan       Row Name 04/25/25 1000       Plan    Plan Inpatient hospice    Plan Comments 10% PPS. Patient resting with eyes closed. Spouse, son, and daughter at bedside. Patient's bed is elevated and side rail down. Asked spouse if RN could lower the bed and raise the side rail and spouse refused. He stated he was sitting right next to her and wanted to be able to lay his head on the side of the bed to be near her. Intermittently lifting her right arm up. Noted with secretions in the back of her throat that patient trying to clear. Asked nurse to give prn Dilaudid, prn Robinul, and to place a scopolamine patch. Teaching done with family on terminal secretions, using medications for comfort. Patient has required 2 prns of Dilaudid 1mg and 1 prn of Versed 1mg in the past 24 hours. She continues to require inpatient hospice for skilled nursing assessment, medication titration, and injectable medication administration for palliation of pain and anxiety.                    Discharge Codes    No documentation.                       Bia Joiner RN

## 2025-04-25 NOTE — PROGRESS NOTES
Hospice Progress Note    Patient Name: Sara Esparza   : 1950  Gender: female    Code Status: comfort measures    Date of Admission: 2025    Subjective:  Sara Esparza is a 74 y.o. female admitted to inpatient hospice 2025 with diagnosis of ESRD (end stage renal disease) [N18.6]  for symptom management.     Spouse at bedside.  Pt unresponsive to my assessment.  Increased terminal secretions today.  Long periods of apnea noted.  Cyanotic.     - Scheduled:  Dilaudid 1 mg q 4 hrs atc   Versed 1 mg q 4 hrs atc   Palliative oral rinse     - PRNs:  Dilaudid 1 mg x 2   Versed 1 mg x 1     - Intake/Output  Intake & Output (last 3 days)          07 07 07 07 07 07 07    P.O.  0      Total Intake  0      Urine 100  50     Total Output 100  50     Net -100 0 -50             Stool Unmeasured Occurrence  1 x                 ROS:  Review of Systems   Unable to perform ROS: Acuity of condition       Reviewed current scheduled and prn medications for route, type, dose and frequency.       acetaminophen **OR** acetaminophen **OR** acetaminophen    bisacodyl    glycopyrrolate    haloperidol lactate    HYDROmorphone    ketorolac    midazolam    ondansetron    palliative care oral rinse    PHENobarbital    Polyvinyl Alcohol-Povidone PF    Scopolamine    sodium chloride    Objective:   There were no vitals taken for this visit.     PPS: Palliative Performance Scale score as of 2025, 01:21 EDT is 10% based on the following measures:   Ambulation: Totally bed bound  Activity and Evidence of Disease: Unable to do any work, extensive evidence of disease  Self-Care: Total care  Intake:  Mouth care only  LOC: Drowsy or coma     Physical Exam:  Physical Exam  Nursing note reviewed. Exam conducted with a chaperone present.   Constitutional:       General: She is not in acute distress.     Appearance: She is ill-appearing.   Cardiovascular:      Rate  and Rhythm: Normal rate. Rhythm irregular.   Pulmonary:      Effort: Pulmonary effort is normal.      Comments: Apnea   Skin:     General: Skin is cool.      Coloration: Skin is cyanotic, mottled and pale.             ESRD (end stage renal disease)      Assessment/Plan:   Sara Esparza is a 74 y.o. female admitted to inpatient hospice 04/21/2025 with diagnosis of ESRD (end stage renal disease) [N18.6]  for symptom management.     Symptoms:  Pain- Dilaudid 1 mg q 4 atc: dilaudid 1 mg q 1 hr PRN   Anxiety- Versed 1 mg q 4 hrs atc; versed 1 mg q 2 hrs PRN  Terminal secretions: scopolamine patch; robinul PRN    Discharge Disposition: home with hospice when symptoms are managed; however, she is unlikely to survive to hospital discharge.     Total Visit Time: 20 min   Face to Face Time: 8 min    Justification for care:  Patient meets criteria for acute in-patient care due to need for frequent skilled nursing assessments to determine patient comfort and medication effectiveness at end of life.  Frequent adjustments to medications and interventions for symptom management, including injectable medications.      Helen Carlos, MSN, APRN  [facility] Nav[facility]  Hospice and Palliative Care Nurse Practitioner  04/25/25  12:07 EDT

## 2025-04-25 NOTE — PLAN OF CARE
Goal Outcome Evaluation:            Patient continues with terminal restless at times. Family remains at bedside. Upper extremities are still edemous and weeping. Very little output to hernandez cath bag. Left BKA, Continues with apneic breathing patterns. Will continue to monitor

## 2025-04-26 NOTE — SIGNIFICANT NOTE
Exam confirms with auscultation zero audible heart tones and zero audible respirations. Ms.Janet CHANNING Esparza was pronounced dead at 1726.  MD notified by Patient's RN.    Vera Gastelum RN  Clinical House Supervisor  4/26/2025 18:11 EDT

## 2025-04-26 NOTE — PROGRESS NOTES
Hospice Progress Note    Code Status:   Code Status and Medical Interventions: No CPR (Do Not Attempt to Resuscitate); Comfort Measures   Ordered at: 04/21/25 1654     Code Status (Patient has no pulse and is not breathing):    No CPR (Do Not Attempt to Resuscitate)     Medical Interventions (Patient has pulse or is breathing):    Comfort Measures      Advanced Directives: Advance Directive Status: Patient does not have advance directive      Subjective   S:   Follow-up visit made today for symptom management. No overnight events reported. Hospice RN reported to me this morning that the patient is mottled and is having long periods of apnea.   In the past 24 hour she has received the following PRN medications:  -glycopyrrolate 0.4mg V x 2  -hydromorphone 1mg IV x 5  -midazolam 1mg IV x 3  -scopolamine patch placed 4/25    Visit finds patient's family eating lunch at the bedside - asking what she could be waiting on.     ROS:  Unable to obtain due to patient's mental status.     PMH/PSH/PFH: Reviewed, no changes    No Known Allergies    Current Facility-Administered Medications   Medication Dose Route Frequency Provider Last Rate Last Admin    acetaminophen (TYLENOL) tablet 650 mg  650 mg Oral Q4H PRN Helen Carlos APRN        Or    acetaminophen (TYLENOL) 160 MG/5ML oral solution 650 mg  650 mg Oral Q4H PRN Helen Carlos APRN        Or    acetaminophen (TYLENOL) suppository 650 mg  650 mg Rectal Q4H PRN Helen Carlos APRN        bisacodyl (DULCOLAX) suppository 10 mg  10 mg Rectal Daily PRN Helen Carlos APRN        glycopyrrolate (ROBINUL) injection 0.4 mg  0.4 mg Intravenous Q4H PRN Helen Carlos APRN   0.4 mg at 04/26/25 0029    haloperidol lactate (HALDOL) injection 1 mg  1 mg Intravenous Q4H PRN Mikey Pantoja MD   1 mg at 04/23/25 1536    HYDROmorphone (DILAUDID) injection 1 mg  1 mg Intravenous Q4H Mikey Pantoja MD   1 mg at 04/26/25 1057    HYDROmorphone (DILAUDID) injection 1 mg  1 mg  Intravenous Q1H PRN Mikey Pantoja MD   1 mg at 04/26/25 0354    ketorolac (TORADOL) injection 15 mg  15 mg Intramuscular Q6H PRN Mikey Pantoja MD        midazolam (VERSED) injection 1 mg  1 mg Intravenous Q4H Mikey Pantoja MD   1 mg at 04/26/25 1057    midazolam (VERSED) injection 1 mg  1 mg Intravenous Q1H PRN Mikey Pantoja MD   1 mg at 04/26/25 0209    ondansetron (ZOFRAN) injection 4 mg  4 mg Intravenous Q6H PRN Helen Carlos APRN        palliative care oral rinse   Mouth/Throat Q6H Helen Carlos APRN   Given at 04/26/25 0800    palliative care oral rinse   Mouth/Throat PRN Helen Carlos APRN   Given at 04/25/25 1019    PHENobarbital injection 65 mg  65 mg Intravenous Q6H PRN Mikey Pantoja MD        Polyvinyl Alcohol-Povidone PF (ARTIFICIAL TEARS) 1.4-0.6 % ophthalmic solution 1 drop  1 drop Both Eyes Q30 Min PRN Helen Carlos APRN        scopolamine patch 1 mg/72 hr  1 patch Transdermal Q72H PRN Helen Carlos APRN   1 patch at 04/25/25 1018    sodium chloride 0.9 % flush 10 mL  10 mL Intravenous PRN Helen Carlos APRN           Current medication reviewed for route, type, dose and frequency and are current per MAR at time of dictation.    Objective   There were no vitals taken for this visit.    Intake/Output Summary (Last 24 hours) at 4/26/2025 1210  Last data filed at 4/26/2025 0500  Gross per 24 hour   Intake --   Output 50 ml   Net -50 ml       PPS: 10%  Physical Examination:   Gen: NAD, acute on chronically ill appearing female appears to be actively dying, face relaxed and without non-verbal signs of pain present  HEENT: NC/AT, neck hyperextended, open mouth breathing, oral mucosa dry  Resp: +apnea noted  GI: abdomen soft, no non-verbal signs of pain with palpation  : hernandez without urine in tubing or bag  Skin: WDI, +mottling noted  MSK/Ext: diffuse muscle wasting, s/p left BKA  Neuro: unresponsive to exam/visit, no myoclonus  Psych: calm    Labs/Diagnostics/Clinical Data:  Reviewed.    Assessment & Plan      ESRD (end stage renal disease)    75 yo female admitted for management of severe complications of ESRD. She requires use of IV medications to manage complications, along with monitoring for side effects/toxicity of treatment.     She is very close to end of life and is unlikely to survive to see the benefit of starting a hydromorphone infusion.    Pain   -continue hydromorphone 1mg IV q4h ATC  -hydromorphone 1mg IV q1h PRN breakthrough symptoms  -turn q2h and PRN for comfort  Anxiety / Restlessness  -continue midazolam 1mg IV q4h ATC  -midazolam 1mg IV q2h PRN  -haldol 1mg IV q4h PRN  Upper Airway Secretions / Respiratory Congestion  -scopolamine TD q72h  -glycopyrrolate 0.4mg IV q4h PRN  -keep HOB elevated  -avoid additional IVF   Risk for OIC  -unable to take oral bowel regimen due to condition  -Bisacodyl suppository daily PRN  GOC  -comfort  -DNR/DNI  -dispo: home with hospice when symptoms are managed; however, she is unlikely to survive to hospital discharge.    Care was coordinated with hospice IDT and [facility] bedside RN. Met with family at the bedside and reviewed the plan of care for today. Provided reassurance that it sounds like they are doing everything they can to ensure she has what she needs. Encouraged breaks/self care. Questions answered and support provided.     She meets criteria for GIP level of care for management of uncontrolled symptoms that requires frequent nursing assessment and frequent intervention with ATC and PRN IV medications to achieve comfort. She is displaying s/s of imminent death and at this time is unstable for transfer to lower level of care.     Kalpana Ojeda,   4/26/2025

## 2025-04-26 NOTE — PLAN OF CARE
Goal Outcome Evaluation:      Patient resting comfortably with family at bedside. Scheduled meds given for pain and restlessness with relieve. UO decreasing and mottling of extremities present. Will continue plan of care.

## 2025-04-26 NOTE — PLAN OF CARE
Goal Outcome Evaluation:  Plan of Care Reviewed With: spouse, child        Progress: declining  Outcome Evaluation: On RA. Occasionally responds to pain. PRN versed x2, dilaudid x2, and robinul x1. Extremities cool with mottling noted. Very shallow respirations with long periods of apnea. Increasing yellowing of the skin noted. Scant, brown UOP. Unable to utilize large turns to assess skin on back. No BM. family at bedside. Comfort measures continued.

## 2025-04-26 NOTE — PROGRESS NOTES
Continued Stay Note  [facility]     Patient Name: Sara Esparza  MRN: [id number]  Today's Date: 4/26/2025    Admit Date: 4/21/2025    Plan: IPU assessment   Discharge Plan       Row Name 04/26/25 1239       Plan    Plan IPU assessment    Plan Comments   4/26 PPS 10 % Patient is actively dying. Apnea spells, mottling of the skin and cool. Edema all extremities and weeping. F/C drained 50 cc urine, BM 4/23. PRN robinul 0.4 mg x 2, dil 1 mg x 5, versed 1 mg x 3/ 24 hrs. Given for secretions, dyspnea/pain and anxiety. Family at bedside. The  on the couch curled up crying. Support given. Currently patient remains GIP for complications of EOL care requiring skilled nursing and medical management of symptoms. Discharge plan dependent on a decreased level of care and survival of this admission.                        Discharge Codes    No documentation.                       Mariana Plascencia RN

## 2025-04-28 NOTE — DISCHARGE SUMMARY
Date of Admission: 04/21/2025   Date and Time of Death: 4/26/2025 at 5:26 PM    Hospital Course:  Sara Esparza is a 74 y.o. female admitted to inpatient hospice with diagnosis of ESRD (end stage renal disease) [N18.6]  for symptom management. Medications were available throughout admission for symptom management and comfort. Patient continued to decline after admission as expected ultimately to their death on 4/26/2025 at 5:26 PM. Patient was pronounced dead by Clinical House Supervisor. Spiritual and psychosocial support were available to patient and family throughout admission. Patient's remains were released per [facility] protocol.       Helen Carlos, MSN, APRN, ACHPN  [facility] Navigators  Hospice and Palliative Care Nurse Practitioner  04/28/25  15:13 EDT

## 2025-04-28 NOTE — PROGRESS NOTES
Vineet Rao with [facility] music therapy provided services for pt and her  on 4/25/25.

## (undated) DEVICE — SUT PDS 2 0 CT1 27IN CLR Z259H

## (undated) DEVICE — TRAP FLD MINIVAC MEGADYNE 100ML

## (undated) DEVICE — 1230 DOUBLE MAGNET NEEDLE COUNTER,BLACK: Brand: DEVON

## (undated) DEVICE — PROXIMATE RH ROTATING HEAD SKIN STAPLERS (35 WIDE) CONTAINS 35 STAINLESS STEEL STAPLES: Brand: PROXIMATE

## (undated) DEVICE — TBG INJ CONTRL EXCITE RA 1200PSI 48IN

## (undated) DEVICE — ENDOPATH XCEL BLADELESS TROCARS WITH STABILITY SLEEVES: Brand: ENDOPATH XCEL

## (undated) DEVICE — GOWN SURG ORBIS LVL3 2XL STRL

## (undated) DEVICE — PK EXTREM LOWR 10

## (undated) DEVICE — SNAP KOVER: Brand: UNBRANDED

## (undated) DEVICE — BALN NANOCROSS OTW .014 4F 3X210 150CM

## (undated) DEVICE — SKIN AFFIX SURG ADHESIVE 72/CS 0.55ML: Brand: MEDLINE

## (undated) DEVICE — SYR LUERLOK 20CC BX/50

## (undated) DEVICE — SUT ETHLN 3/0 PC5 18IN 1893G

## (undated) DEVICE — BANDAGE,GAUZE,BULKEE II,4.5"X4.1YD,STRL: Brand: MEDLINE

## (undated) DEVICE — ANGIO-SEAL VIP VASCULAR CLOSURE DEVICE: Brand: ANGIO-SEAL

## (undated) DEVICE — MEDI-VAC NON-CONDUCTIVE SUCTION TUBING: Brand: CARDINAL HEALTH

## (undated) DEVICE — MEDI-VAC YANKAUER SUCTION HANDLE W/BULBOUS TIP: Brand: CARDINAL HEALTH

## (undated) DEVICE — PATIENT RETURN ELECTRODE, SINGLE-USE, CONTACT QUALITY MONITORING, ADULT, WITH 9FT CORD, FOR PATIENTS WEIGING OVER 33LBS. (15KG): Brand: MEGADYNE

## (undated) DEVICE — BNDG ELAS W/CLIP 6IN 10YD LF STRL

## (undated) DEVICE — [HIGH FLOW HEATED INSUFFLATOR TUBING,  DO NOT USE IF PACKAGE IS DAMAGED]

## (undated) DEVICE — CANNULA,ADULT,SOFT-TOUCH,7TUBE,SC: Brand: MEDLINE

## (undated) DEVICE — PAD,ABDOMINAL,8"X10",ST,LF: Brand: MEDLINE

## (undated) DEVICE — CATH SZ ACCUVU SEG/20CM OMNI .038IN 5F 70CM

## (undated) DEVICE — AER DEFENSE FILTERONE: Brand: AER DEFENSE

## (undated) DEVICE — DRSNG GZ CURAD XEROFORM NONADHR OVERWRAP 5X9IN

## (undated) DEVICE — KT CANSTR VAC WND W/ISOLYSER SENSATRAC 500CC 10CS

## (undated) DEVICE — SUT PDS MONO 0 36 CT1 VIL PDP346H

## (undated) DEVICE — GLV SURG SENSICARE W/ALOE PF LF 7.5 STRL

## (undated) DEVICE — AIRWY 90MM NO9

## (undated) DEVICE — CATH OMNI FLUSH 5FR

## (undated) DEVICE — PINNACLE INTRODUCER SHEATH: Brand: PINNACLE

## (undated) DEVICE — SYR LL BD 10CC

## (undated) DEVICE — SUT GORE THX36 CV2 36IN 2N07A

## (undated) DEVICE — PK LAP LASR CHOLE 10

## (undated) DEVICE — SUT SILK 0 CT1 18IN 424H

## (undated) DEVICE — DESTINATION RENAL GUIDING SHEATH: Brand: DESTINATION

## (undated) DEVICE — SPNG GZ WOVN 4X4IN 12PLY 10/BX STRL

## (undated) DEVICE — APPL CHLORAPREP 26ML CLR

## (undated) DEVICE — SOL LR 1000ML

## (undated) DEVICE — KT INTRO MINISTICK MAX W/GW NITNL/TUNG ECHO STFF 4F 21G 7CM

## (undated) DEVICE — STRYKER PERFORMANCE SERIES SAGITTAL BLADE: Brand: STRYKER PERFORMANCE SERIES

## (undated) DEVICE — PK ANGIO OR 10

## (undated) DEVICE — 3M™ IOBAN™ 2 ANTIMICROBIAL INCISE DRAPE 6650EZ: Brand: IOBAN™ 2

## (undated) DEVICE — SYRINGE, LOCKING, 10CC, STERILE: Brand: BABY GORILLA/GORILLA PLATING SYSTEM

## (undated) DEVICE — BNDR ABD PREMIUM/UNIV 10IN 27TO48IN

## (undated) DEVICE — Device: Brand: XPERIENCE

## (undated) DEVICE — BNDG,ELSTC,MATRIX,STRL,4"X5YD,LF,HOOK&LP: Brand: MEDLINE

## (undated) DEVICE — SYS CLS PORTSITE CT CLOSESURE 5AND10/12

## (undated) DEVICE — SYR CONTRL LUERLOK 10CC

## (undated) DEVICE — SUT PDS 2/0 CT2 27IN VIL PDP333H

## (undated) DEVICE — X-LARGE COTTON GLOVE: Brand: DEROYAL

## (undated) DEVICE — RADIFOCUS GLIDEWIRE ADVANTAGE TRACK GUIDE WIRE: Brand: GLIDEWIRE ADVANTAGE TRACK

## (undated) DEVICE — GLV SURG SENSICARE PI MIC PF SZ9 LF STRL

## (undated) DEVICE — DRAPE,TOP,102X53,STERILE: Brand: MEDLINE

## (undated) DEVICE — BLANKT WARM UPPR/BDY ARM/OUT 57X196CM

## (undated) DEVICE — KT CANSTR VAC WND W/ISOLYSER SENSATRAC 500CC 5CS

## (undated) DEVICE — CVR PROB ULTRASND/TRANSD W/GEL 18X120CM STRL

## (undated) DEVICE — PAD,ARMBOARD,CONV,FOAM,2X8X20",12PR/CS: Brand: MEDLINE

## (undated) DEVICE — DRSNG PAD ABD 8X10IN STRL

## (undated) DEVICE — CULT AERO SGL

## (undated) DEVICE — CULT ANAERB

## (undated) DEVICE — PENCL SMOKE/EVAC MEGADYNE TELESCP 10FT

## (undated) DEVICE — GOWN,SIRUS,POLYRNF,XLN/3XL,18/CS: Brand: MEDLINE

## (undated) DEVICE — INFLATION DEVICE: Brand: ENCORE™ 26

## (undated) DEVICE — CATH BALN INTRAVASC/LITHO C2PLUS 2.5X12MM

## (undated) DEVICE — SUT VIC 0 UR5 27IN DYED J376H

## (undated) DEVICE — GLV SURG BIOGELULTRATOUCH POLYISPRN PF LF SZ7 STRL

## (undated) DEVICE — HYPODERMIC SAFETY NEEDLE: Brand: MONOJECT

## (undated) DEVICE — ADHS LIQ MASTISOL 2/3ML

## (undated) DEVICE — CATH SUP PROC TRAILBLAZER .018IN 15MM 150CM

## (undated) DEVICE — BNDG ELAS CO-FLEX SLF ADHR 4IN5YD LF STRL

## (undated) DEVICE — CATH SUP PROC TRAILBLAZER .035IN 150CM

## (undated) DEVICE — COVER,C-ARM,41X74: Brand: MEDLINE

## (undated) DEVICE — BLADE, TONGUE, 6", STERILE: Brand: MEDLINE

## (undated) DEVICE — SUT ETHLN 2/0 PS 18IN 585H

## (undated) DEVICE — SUT SILK 0 TIES 30IN A306H

## (undated) DEVICE — CVR HNDL LIGHT RIGID

## (undated) DEVICE — 450 ML BOTTLE OF 0.05% CHLORHEXIDINE GLUCONATE IN 99.95% STERILE WATER FOR IRRIGATION, USP AND APPLICATOR.: Brand: IRRISEPT ANTIMICROBIAL WOUND LAVAGE

## (undated) DEVICE — COVER,MAYO STAND,XL,STERILE: Brand: MEDLINE

## (undated) DEVICE — KT PUMP INFUBLOCK MDL 2100 PMKITSOLIS

## (undated) DEVICE — CONTAINER,SPECIMEN,OR STERILE,4OZ: Brand: MEDLINE

## (undated) DEVICE — KNEE IMMOBILIZER: Brand: DEROYAL

## (undated) DEVICE — RADIFOCUS GLIDEWIRE ADVANTAGE GUIDEWIRE: Brand: GLIDEWIRE ADVANTAGE

## (undated) DEVICE — UNDERCAST PADDING: Brand: DEROYAL